# Patient Record
Sex: FEMALE | Race: WHITE | Employment: OTHER | RURAL
[De-identification: names, ages, dates, MRNs, and addresses within clinical notes are randomized per-mention and may not be internally consistent; named-entity substitution may affect disease eponyms.]

---

## 2017-02-23 ENCOUNTER — HOSPITAL ENCOUNTER (OUTPATIENT)
Dept: OTHER | Age: 67
Discharge: HOME | End: 2017-02-23
Attending: NURSE PRACTITIONER | Admitting: NURSE PRACTITIONER

## 2017-02-23 ENCOUNTER — OFFICE VISIT (OUTPATIENT)
Dept: PRIMARY CARE CLINIC | Age: 67
End: 2017-02-23
Payer: MEDICARE

## 2017-02-23 VITALS
DIASTOLIC BLOOD PRESSURE: 78 MMHG | SYSTOLIC BLOOD PRESSURE: 124 MMHG | OXYGEN SATURATION: 92 % | WEIGHT: 193 LBS | HEART RATE: 99 BPM | BODY MASS INDEX: 31.15 KG/M2

## 2017-02-23 DIAGNOSIS — I48.91 ATRIAL FIBRILLATION, UNSPECIFIED TYPE (HCC): ICD-10-CM

## 2017-02-23 DIAGNOSIS — N02.8 IGA NEPHROPATHY: ICD-10-CM

## 2017-02-23 DIAGNOSIS — E11.9 TYPE 2 DIABETES MELLITUS WITHOUT COMPLICATION, WITHOUT LONG-TERM CURRENT USE OF INSULIN (HCC): ICD-10-CM

## 2017-02-23 DIAGNOSIS — D64.9 ANEMIA, UNSPECIFIED TYPE: ICD-10-CM

## 2017-02-23 DIAGNOSIS — D50.9 IRON DEFICIENCY ANEMIA, UNSPECIFIED IRON DEFICIENCY ANEMIA TYPE: ICD-10-CM

## 2017-02-23 DIAGNOSIS — M54.5 LOW BACK PAIN, UNSPECIFIED BACK PAIN LATERALITY, UNSPECIFIED CHRONICITY, WITH SCIATICA PRESENCE UNSPECIFIED: ICD-10-CM

## 2017-02-23 DIAGNOSIS — D64.9 ANEMIA, UNSPECIFIED TYPE: Primary | ICD-10-CM

## 2017-02-23 DIAGNOSIS — I10 ESSENTIAL HYPERTENSION: ICD-10-CM

## 2017-02-23 DIAGNOSIS — Z79.01 CHRONIC ANTICOAGULATION: ICD-10-CM

## 2017-02-23 LAB
A/G RATIO: 1.3 (ref 0.8–2)
ABO/RH: NORMAL
ALBUMIN SERPL-MCNC: 3.9 G/DL (ref 3.4–4.8)
ALP BLD-CCNC: 68 U/L (ref 25–100)
ALT SERPL-CCNC: 12 U/L (ref 4–36)
ANION GAP SERPL CALCULATED.3IONS-SCNC: 15 MMOL/L (ref 3–16)
ANTIBODY SCREEN: NORMAL
AST SERPL-CCNC: 15 U/L (ref 8–33)
BASOPHILS ABSOLUTE: 0 K/UL (ref 0–0.1)
BASOPHILS RELATIVE PERCENT: 0.4 %
BILIRUB SERPL-MCNC: <0.2 MG/DL (ref 0.3–1.2)
BUN BLDV-MCNC: 15 MG/DL (ref 6–20)
CALCIUM SERPL-MCNC: 9.1 MG/DL (ref 8.5–10.5)
CHLORIDE BLD-SCNC: 102 MMOL/L (ref 98–107)
CO2: 26 MMOL/L (ref 20–30)
CREAT SERPL-MCNC: 1.2 MG/DL (ref 0.4–1.2)
EOSINOPHILS ABSOLUTE: 0.2 K/UL (ref 0–0.4)
EOSINOPHILS RELATIVE PERCENT: 2.5 %
FERRITIN: 9.7 NG/ML (ref 22–322)
FOLATE: >20 NG/ML
GFR AFRICAN AMERICAN: 54
GFR NON-AFRICAN AMERICAN: 45
GLOBULIN: 2.9 G/DL
GLUCOSE BLD-MCNC: 146 MG/DL (ref 74–106)
HCT VFR BLD CALC: 28.4 % (ref 37–47)
HEMOGLOBIN: 7.7 G/DL (ref 11.5–16.5)
HYPOCHROMIA: PRESENT
IRON: 17 UG/DL (ref 37–145)
LYMPHOCYTES ABSOLUTE: 2.3 K/UL (ref 1.5–4)
LYMPHOCYTES RELATIVE PERCENT: 30.3 % (ref 20–40)
MCH RBC QN AUTO: 20.8 PG (ref 27–32)
MCHC RBC AUTO-ENTMCNC: 27.1 G/DL (ref 31–35)
MCV RBC AUTO: 76.5 FL (ref 80–100)
MONOCYTES ABSOLUTE: 0.8 K/UL (ref 0.2–0.8)
MONOCYTES RELATIVE PERCENT: 11.1 % (ref 3–10)
NEUTROPHILS ABSOLUTE: 4.2 K/UL (ref 2–7.5)
NEUTROPHILS RELATIVE PERCENT: 55.7 %
PDW BLD-RTO: 20.7 % (ref 11–16)
PLATELET # BLD: 212 K/UL (ref 150–400)
PMV BLD AUTO: 10.2 FL (ref 6–10)
POTASSIUM SERPL-SCNC: 3.9 MMOL/L (ref 3.4–5.1)
RBC # BLD: 3.71 M/UL (ref 3.8–5.8)
SODIUM BLD-SCNC: 143 MMOL/L (ref 136–145)
TOTAL IRON BINDING CAPACITY: 418 UG/DL (ref 250–450)
TOTAL PROTEIN: 6.8 G/DL (ref 6.4–8.3)
VITAMIN B-12: 662 PG/ML (ref 211–911)
WBC # BLD: 7.5 K/UL (ref 4–11)

## 2017-02-23 PROCEDURE — 3017F COLORECTAL CA SCREEN DOC REV: CPT | Performed by: NURSE PRACTITIONER

## 2017-02-23 PROCEDURE — 99214 OFFICE O/P EST MOD 30 MIN: CPT | Performed by: NURSE PRACTITIONER

## 2017-02-23 PROCEDURE — 4040F PNEUMOC VAC/ADMIN/RCVD: CPT | Performed by: NURSE PRACTITIONER

## 2017-02-23 PROCEDURE — G8484 FLU IMMUNIZE NO ADMIN: HCPCS | Performed by: NURSE PRACTITIONER

## 2017-02-23 PROCEDURE — 1123F ACP DISCUSS/DSCN MKR DOCD: CPT | Performed by: NURSE PRACTITIONER

## 2017-02-23 PROCEDURE — G8598 ASA/ANTIPLAT THER USED: HCPCS | Performed by: NURSE PRACTITIONER

## 2017-02-23 PROCEDURE — 1036F TOBACCO NON-USER: CPT | Performed by: NURSE PRACTITIONER

## 2017-02-23 PROCEDURE — G8400 PT W/DXA NO RESULTS DOC: HCPCS | Performed by: NURSE PRACTITIONER

## 2017-02-23 PROCEDURE — 3046F HEMOGLOBIN A1C LEVEL >9.0%: CPT | Performed by: NURSE PRACTITIONER

## 2017-02-23 PROCEDURE — G8417 CALC BMI ABV UP PARAM F/U: HCPCS | Performed by: NURSE PRACTITIONER

## 2017-02-23 PROCEDURE — 3014F SCREEN MAMMO DOC REV: CPT | Performed by: NURSE PRACTITIONER

## 2017-02-23 PROCEDURE — G8427 DOCREV CUR MEDS BY ELIG CLIN: HCPCS | Performed by: NURSE PRACTITIONER

## 2017-02-23 PROCEDURE — 1090F PRES/ABSN URINE INCON ASSESS: CPT | Performed by: NURSE PRACTITIONER

## 2017-02-23 ASSESSMENT — ENCOUNTER SYMPTOMS
GASTROINTESTINAL NEGATIVE: 1
RESPIRATORY NEGATIVE: 1
BACK PAIN: 1

## 2017-02-24 ENCOUNTER — TELEPHONE (OUTPATIENT)
Dept: PRIMARY CARE CLINIC | Age: 67
End: 2017-02-24

## 2017-02-24 DIAGNOSIS — D50.9 IRON DEFICIENCY ANEMIA, UNSPECIFIED IRON DEFICIENCY ANEMIA TYPE: ICD-10-CM

## 2017-02-24 DIAGNOSIS — D64.9 ANEMIA, UNSPECIFIED TYPE: Primary | ICD-10-CM

## 2017-02-24 LAB
BLOOD BANK DISPENSE STATUS: NORMAL
BLOOD BANK DISPENSE STATUS: NORMAL
BLOOD BANK PRODUCT CODE: NORMAL
BLOOD BANK PRODUCT CODE: NORMAL
BPU ID: NORMAL
BPU ID: NORMAL
COMPONENT: NORMAL
COMPONENT: NORMAL
DONOR TYPE/RH: NORMAL
DONOR TYPE/RH: NORMAL
HOLLISTER NO: NORMAL
HOLLISTER NO: NORMAL

## 2017-02-27 ENCOUNTER — HOSPITAL ENCOUNTER (OUTPATIENT)
Dept: OTHER | Age: 67
Discharge: HOME | End: 2017-02-27
Attending: NURSE PRACTITIONER | Admitting: NURSE PRACTITIONER

## 2017-02-27 DIAGNOSIS — D64.9 ANEMIA, UNSPECIFIED TYPE: ICD-10-CM

## 2017-02-27 DIAGNOSIS — D64.9 ANEMIA, UNSPECIFIED TYPE: Primary | ICD-10-CM

## 2017-02-27 LAB
BASOPHILS ABSOLUTE: 0 K/UL (ref 0–0.1)
BASOPHILS RELATIVE PERCENT: 0.4 %
EOSINOPHILS ABSOLUTE: 0.1 K/UL (ref 0–0.4)
EOSINOPHILS RELATIVE PERCENT: 1.4 %
HCT VFR BLD CALC: 34.1 % (ref 37–47)
HEMOGLOBIN: 9.6 G/DL (ref 11.5–16.5)
HYPOCHROMIA: PRESENT
LYMPHOCYTES ABSOLUTE: 1.4 K/UL (ref 1.5–4)
LYMPHOCYTES RELATIVE PERCENT: 17.4 % (ref 20–40)
MCH RBC QN AUTO: 22.5 PG (ref 27–32)
MCHC RBC AUTO-ENTMCNC: 28.2 G/DL (ref 31–35)
MCV RBC AUTO: 79.9 FL (ref 80–100)
MONOCYTES ABSOLUTE: 1 K/UL (ref 0.2–0.8)
MONOCYTES RELATIVE PERCENT: 13.2 % (ref 3–10)
NEUTROPHILS ABSOLUTE: 5.3 K/UL (ref 2–7.5)
NEUTROPHILS RELATIVE PERCENT: 67.6 %
PDW BLD-RTO: 22.1 % (ref 11–16)
PLATELET # BLD: 172 K/UL (ref 150–400)
PMV BLD AUTO: 10.4 FL (ref 6–10)
RBC # BLD: 4.27 M/UL (ref 3.8–5.8)
WBC # BLD: 7.8 K/UL (ref 4–11)

## 2017-02-27 ASSESSMENT — ENCOUNTER SYMPTOMS
ABDOMINAL PAIN: 0
VOMITING: 0
SHORTNESS OF BREATH: 0
SORE THROAT: 0
NAUSEA: 0
EYE PAIN: 0
COUGH: 0

## 2017-02-28 PROBLEM — E11.9 TYPE 2 DIABETES MELLITUS (HCC): Status: ACTIVE | Noted: 2017-02-28

## 2017-02-28 PROBLEM — J18.9 PNEUMONIA OF RIGHT LUNG DUE TO INFECTIOUS ORGANISM: Status: ACTIVE | Noted: 2017-02-28

## 2017-02-28 PROBLEM — D64.9 ANEMIA: Status: ACTIVE | Noted: 2017-02-28

## 2017-02-28 PROBLEM — I48.91 ATRIAL FIBRILLATION WITH RVR (HCC): Status: ACTIVE | Noted: 2017-02-28

## 2017-02-28 PROBLEM — I10 ESSENTIAL HYPERTENSION: Status: ACTIVE | Noted: 2017-02-28

## 2017-02-28 PROBLEM — I48.91 ATRIAL FIBRILLATION (HCC): Status: ACTIVE | Noted: 2017-02-28

## 2017-02-28 PROBLEM — J10.1 INFLUENZA A: Status: ACTIVE | Noted: 2017-02-28

## 2017-03-01 ENCOUNTER — OUTSIDE FACILITY SERVICE (OUTPATIENT)
Dept: CARDIOLOGY | Facility: CLINIC | Age: 67
End: 2017-03-01

## 2017-03-01 PROCEDURE — 93306 TTE W/DOPPLER COMPLETE: CPT | Performed by: INTERNAL MEDICINE

## 2017-03-02 PROBLEM — E11.8 TYPE 2 DIABETES MELLITUS WITH COMPLICATION, WITHOUT LONG-TERM CURRENT USE OF INSULIN (HCC): Status: ACTIVE | Noted: 2017-02-28

## 2017-03-06 ENCOUNTER — CARE COORDINATION (OUTPATIENT)
Dept: CARE COORDINATION | Age: 67
End: 2017-03-06

## 2017-03-07 ENCOUNTER — TELEPHONE (OUTPATIENT)
Dept: PRIMARY CARE CLINIC | Age: 67
End: 2017-03-07

## 2017-03-10 ENCOUNTER — OFFICE VISIT (OUTPATIENT)
Dept: PRIMARY CARE CLINIC | Age: 67
End: 2017-03-10
Payer: MEDICARE

## 2017-03-10 ENCOUNTER — TELEPHONE (OUTPATIENT)
Dept: PRIMARY CARE CLINIC | Age: 67
End: 2017-03-10

## 2017-03-10 ENCOUNTER — HOSPITAL ENCOUNTER (OUTPATIENT)
Dept: OTHER | Age: 67
Discharge: HOME | End: 2017-03-10
Attending: NURSE PRACTITIONER | Admitting: NURSE PRACTITIONER

## 2017-03-10 VITALS
HEART RATE: 103 BPM | DIASTOLIC BLOOD PRESSURE: 70 MMHG | WEIGHT: 187.4 LBS | BODY MASS INDEX: 30.25 KG/M2 | OXYGEN SATURATION: 94 % | SYSTOLIC BLOOD PRESSURE: 120 MMHG

## 2017-03-10 DIAGNOSIS — D50.9 IRON DEFICIENCY ANEMIA, UNSPECIFIED IRON DEFICIENCY ANEMIA TYPE: ICD-10-CM

## 2017-03-10 DIAGNOSIS — R30.0 DYSURIA: Primary | ICD-10-CM

## 2017-03-10 DIAGNOSIS — D64.9 ANEMIA, UNSPECIFIED TYPE: ICD-10-CM

## 2017-03-10 DIAGNOSIS — J18.9 PNEUMONIA DUE TO INFECTIOUS ORGANISM, UNSPECIFIED LATERALITY, UNSPECIFIED PART OF LUNG: ICD-10-CM

## 2017-03-10 DIAGNOSIS — N18.9 CHRONIC RENAL DISEASE, UNSPECIFIED STAGE: ICD-10-CM

## 2017-03-10 LAB
A/G RATIO: 1.2 (ref 0.8–2)
ALBUMIN SERPL-MCNC: 3.9 G/DL (ref 3.4–4.8)
ALP BLD-CCNC: 72 U/L (ref 25–100)
ALT SERPL-CCNC: 16 U/L (ref 4–36)
ANION GAP SERPL CALCULATED.3IONS-SCNC: 12 MMOL/L (ref 3–16)
AST SERPL-CCNC: 22 U/L (ref 8–33)
BASOPHILS ABSOLUTE: 0 K/UL (ref 0–0.1)
BASOPHILS RELATIVE PERCENT: 0.7 %
BILIRUB SERPL-MCNC: 0.3 MG/DL (ref 0.3–1.2)
BILIRUBIN, POC: NORMAL
BLOOD URINE, POC: NORMAL
BUN BLDV-MCNC: 14 MG/DL (ref 6–20)
CALCIUM SERPL-MCNC: 9.4 MG/DL (ref 8.5–10.5)
CHLORIDE BLD-SCNC: 103 MMOL/L (ref 98–107)
CLARITY, POC: CLEAR
CO2: 28 MMOL/L (ref 20–30)
COLOR, POC: YELLOW
CREAT SERPL-MCNC: 1 MG/DL (ref 0.4–1.2)
EOSINOPHILS ABSOLUTE: 0.2 K/UL (ref 0–0.4)
EOSINOPHILS RELATIVE PERCENT: 2.9 %
FERRITIN: 221.6 NG/ML (ref 22–322)
GFR AFRICAN AMERICAN: >59
GFR NON-AFRICAN AMERICAN: 55
GLOBULIN: 3.2 G/DL
GLUCOSE BLD-MCNC: 85 MG/DL (ref 74–106)
GLUCOSE URINE, POC: NORMAL
HCT VFR BLD CALC: 38.6 % (ref 37–47)
HEMOGLOBIN: 11.1 G/DL (ref 11.5–16.5)
HYPOCHROMIA: PRESENT
IRON: 101 UG/DL (ref 37–145)
KETONES, POC: NORMAL
LEUKOCYTE EST, POC: NORMAL
LYMPHOCYTES ABSOLUTE: 1.9 K/UL (ref 1.5–4)
LYMPHOCYTES RELATIVE PERCENT: 34.9 % (ref 20–40)
MCH RBC QN AUTO: 23.6 PG (ref 27–32)
MCHC RBC AUTO-ENTMCNC: 28.8 G/DL (ref 31–35)
MCV RBC AUTO: 82 FL (ref 80–100)
MONOCYTES ABSOLUTE: 0.5 K/UL (ref 0.2–0.8)
MONOCYTES RELATIVE PERCENT: 9 % (ref 3–10)
NEUTROPHILS ABSOLUTE: 2.9 K/UL (ref 2–7.5)
NEUTROPHILS RELATIVE PERCENT: 52.5 %
NITRITE, POC: NORMAL
PDW BLD-RTO: 25.5 % (ref 11–16)
PH, POC: 6.5
PLATELET # BLD: 288 K/UL (ref 150–400)
PMV BLD AUTO: 9.9 FL (ref 6–10)
POTASSIUM SERPL-SCNC: 4.4 MMOL/L (ref 3.4–5.1)
PROTEIN, POC: NORMAL
RBC # BLD: 4.71 M/UL (ref 3.8–5.8)
SODIUM BLD-SCNC: 143 MMOL/L (ref 136–145)
SPECIFIC GRAVITY, POC: 1.02
TOTAL IRON BINDING CAPACITY: 301 UG/DL (ref 250–450)
TOTAL PROTEIN: 7.1 G/DL (ref 6.4–8.3)
UROBILINOGEN, POC: 0.2
WBC # BLD: 5.5 K/UL (ref 4–11)

## 2017-03-10 PROCEDURE — 1090F PRES/ABSN URINE INCON ASSESS: CPT | Performed by: NURSE PRACTITIONER

## 2017-03-10 PROCEDURE — 3014F SCREEN MAMMO DOC REV: CPT | Performed by: NURSE PRACTITIONER

## 2017-03-10 PROCEDURE — 81002 URINALYSIS NONAUTO W/O SCOPE: CPT | Performed by: NURSE PRACTITIONER

## 2017-03-10 PROCEDURE — 3017F COLORECTAL CA SCREEN DOC REV: CPT | Performed by: NURSE PRACTITIONER

## 2017-03-10 PROCEDURE — G8598 ASA/ANTIPLAT THER USED: HCPCS | Performed by: NURSE PRACTITIONER

## 2017-03-10 PROCEDURE — 1111F DSCHRG MED/CURRENT MED MERGE: CPT | Performed by: NURSE PRACTITIONER

## 2017-03-10 PROCEDURE — 1036F TOBACCO NON-USER: CPT | Performed by: NURSE PRACTITIONER

## 2017-03-10 PROCEDURE — G8400 PT W/DXA NO RESULTS DOC: HCPCS | Performed by: NURSE PRACTITIONER

## 2017-03-10 PROCEDURE — G8484 FLU IMMUNIZE NO ADMIN: HCPCS | Performed by: NURSE PRACTITIONER

## 2017-03-10 PROCEDURE — 4040F PNEUMOC VAC/ADMIN/RCVD: CPT | Performed by: NURSE PRACTITIONER

## 2017-03-10 PROCEDURE — 1123F ACP DISCUSS/DSCN MKR DOCD: CPT | Performed by: NURSE PRACTITIONER

## 2017-03-10 PROCEDURE — G8417 CALC BMI ABV UP PARAM F/U: HCPCS | Performed by: NURSE PRACTITIONER

## 2017-03-10 PROCEDURE — 99214 OFFICE O/P EST MOD 30 MIN: CPT | Performed by: NURSE PRACTITIONER

## 2017-03-10 PROCEDURE — G8427 DOCREV CUR MEDS BY ELIG CLIN: HCPCS | Performed by: NURSE PRACTITIONER

## 2017-03-10 ASSESSMENT — ENCOUNTER SYMPTOMS
GASTROINTESTINAL NEGATIVE: 1
RESPIRATORY NEGATIVE: 1

## 2017-03-12 ASSESSMENT — ENCOUNTER SYMPTOMS
SORE THROAT: 0
NAUSEA: 0
COUGH: 0
EYE PAIN: 0
SHORTNESS OF BREATH: 0
VOMITING: 0
ABDOMINAL PAIN: 0

## 2017-03-16 ENCOUNTER — HOSPITAL ENCOUNTER (OUTPATIENT)
Dept: OTHER | Age: 67
Discharge: HOME | End: 2017-03-16
Attending: NURSE PRACTITIONER | Admitting: NURSE PRACTITIONER

## 2017-03-16 DIAGNOSIS — J18.9 PNEUMONIA DUE TO INFECTIOUS ORGANISM, UNSPECIFIED LATERALITY, UNSPECIFIED PART OF LUNG: ICD-10-CM

## 2017-04-11 ENCOUNTER — OFFICE VISIT (OUTPATIENT)
Dept: PRIMARY CARE CLINIC | Age: 67
End: 2017-04-11
Payer: MEDICARE

## 2017-04-11 ENCOUNTER — HOSPITAL ENCOUNTER (OUTPATIENT)
Dept: OTHER | Age: 67
Discharge: HOME | End: 2017-04-11
Attending: NURSE PRACTITIONER | Admitting: NURSE PRACTITIONER

## 2017-04-11 VITALS
SYSTOLIC BLOOD PRESSURE: 132 MMHG | HEART RATE: 90 BPM | DIASTOLIC BLOOD PRESSURE: 80 MMHG | WEIGHT: 193 LBS | BODY MASS INDEX: 31.15 KG/M2 | OXYGEN SATURATION: 92 %

## 2017-04-11 DIAGNOSIS — E11.9 TYPE 2 DIABETES MELLITUS WITHOUT COMPLICATION, WITH LONG-TERM CURRENT USE OF INSULIN (HCC): ICD-10-CM

## 2017-04-11 DIAGNOSIS — G47.30 SLEEP APNEA, UNSPECIFIED TYPE: ICD-10-CM

## 2017-04-11 DIAGNOSIS — Z79.4 TYPE 2 DIABETES MELLITUS WITHOUT COMPLICATION, WITH LONG-TERM CURRENT USE OF INSULIN (HCC): ICD-10-CM

## 2017-04-11 DIAGNOSIS — D50.9 IRON DEFICIENCY ANEMIA, UNSPECIFIED IRON DEFICIENCY ANEMIA TYPE: ICD-10-CM

## 2017-04-11 DIAGNOSIS — B07.9 VIRAL WARTS, UNSPECIFIED TYPE: ICD-10-CM

## 2017-04-11 DIAGNOSIS — I48.91 ATRIAL FIBRILLATION, UNSPECIFIED TYPE (HCC): ICD-10-CM

## 2017-04-11 DIAGNOSIS — R09.02 HYPOXIA: Primary | ICD-10-CM

## 2017-04-11 PROCEDURE — G8400 PT W/DXA NO RESULTS DOC: HCPCS | Performed by: NURSE PRACTITIONER

## 2017-04-11 PROCEDURE — G8427 DOCREV CUR MEDS BY ELIG CLIN: HCPCS | Performed by: NURSE PRACTITIONER

## 2017-04-11 PROCEDURE — 1090F PRES/ABSN URINE INCON ASSESS: CPT | Performed by: NURSE PRACTITIONER

## 2017-04-11 PROCEDURE — 99213 OFFICE O/P EST LOW 20 MIN: CPT | Performed by: NURSE PRACTITIONER

## 2017-04-11 PROCEDURE — 1036F TOBACCO NON-USER: CPT | Performed by: NURSE PRACTITIONER

## 2017-04-11 PROCEDURE — 3017F COLORECTAL CA SCREEN DOC REV: CPT | Performed by: NURSE PRACTITIONER

## 2017-04-11 PROCEDURE — G8598 ASA/ANTIPLAT THER USED: HCPCS | Performed by: NURSE PRACTITIONER

## 2017-04-11 PROCEDURE — 1123F ACP DISCUSS/DSCN MKR DOCD: CPT | Performed by: NURSE PRACTITIONER

## 2017-04-11 PROCEDURE — 4040F PNEUMOC VAC/ADMIN/RCVD: CPT | Performed by: NURSE PRACTITIONER

## 2017-04-11 PROCEDURE — G8417 CALC BMI ABV UP PARAM F/U: HCPCS | Performed by: NURSE PRACTITIONER

## 2017-04-11 PROCEDURE — 3014F SCREEN MAMMO DOC REV: CPT | Performed by: NURSE PRACTITIONER

## 2017-04-11 PROCEDURE — 3046F HEMOGLOBIN A1C LEVEL >9.0%: CPT | Performed by: NURSE PRACTITIONER

## 2017-04-11 ASSESSMENT — ENCOUNTER SYMPTOMS
SORE THROAT: 0
SHORTNESS OF BREATH: 0
NAUSEA: 0
ABDOMINAL PAIN: 0
COUGH: 0
EYE PAIN: 0
VOMITING: 0

## 2017-04-12 ENCOUNTER — TELEPHONE (OUTPATIENT)
Dept: PRIMARY CARE CLINIC | Age: 67
End: 2017-04-12

## 2017-04-12 LAB
A/G RATIO: 1.3 (ref 0.8–2)
ALBUMIN SERPL-MCNC: 4 G/DL (ref 3.4–4.8)
ALP BLD-CCNC: 69 U/L (ref 25–100)
ALT SERPL-CCNC: 17 U/L (ref 4–36)
ANION GAP SERPL CALCULATED.3IONS-SCNC: 12 MMOL/L (ref 3–16)
AST SERPL-CCNC: 19 U/L (ref 8–33)
BASOPHILS ABSOLUTE: 0.1 K/UL (ref 0–0.1)
BASOPHILS RELATIVE PERCENT: 0.9 %
BILIRUB SERPL-MCNC: <0.2 MG/DL (ref 0.3–1.2)
BUN BLDV-MCNC: 17 MG/DL (ref 6–20)
CALCIUM SERPL-MCNC: 9.5 MG/DL (ref 8.5–10.5)
CHLORIDE BLD-SCNC: 100 MMOL/L (ref 98–107)
CO2: 29 MMOL/L (ref 20–30)
CREAT SERPL-MCNC: 1.3 MG/DL (ref 0.4–1.2)
EOSINOPHILS ABSOLUTE: 0.3 K/UL (ref 0–0.4)
EOSINOPHILS RELATIVE PERCENT: 4.9 %
FERRITIN: 68 NG/ML (ref 22–322)
GFR AFRICAN AMERICAN: 49
GFR NON-AFRICAN AMERICAN: 41
GLOBULIN: 3.2 G/DL
GLUCOSE BLD-MCNC: 153 MG/DL (ref 74–106)
HCT VFR BLD CALC: 38.8 % (ref 37–47)
HEMOGLOBIN: 12.3 G/DL (ref 11.5–16.5)
IRON: 69 UG/DL (ref 37–145)
LYMPHOCYTES ABSOLUTE: 1.9 K/UL (ref 1.5–4)
LYMPHOCYTES RELATIVE PERCENT: 33 % (ref 20–40)
MCH RBC QN AUTO: 28 PG (ref 27–32)
MCHC RBC AUTO-ENTMCNC: 31.7 G/DL (ref 31–35)
MCV RBC AUTO: 88.2 FL (ref 80–100)
MONOCYTES ABSOLUTE: 0.6 K/UL (ref 0.2–0.8)
MONOCYTES RELATIVE PERCENT: 10.2 % (ref 3–10)
NEUTROPHILS ABSOLUTE: 2.9 K/UL (ref 2–7.5)
NEUTROPHILS RELATIVE PERCENT: 51 %
PDW BLD-RTO: 26 % (ref 11–16)
PLATELET # BLD: 215 K/UL (ref 150–400)
PMV BLD AUTO: 10.4 FL (ref 6–10)
POTASSIUM SERPL-SCNC: 4.6 MMOL/L (ref 3.4–5.1)
RBC # BLD: 4.4 M/UL (ref 3.8–5.8)
SODIUM BLD-SCNC: 141 MMOL/L (ref 136–145)
TOTAL PROTEIN: 7.2 G/DL (ref 6.4–8.3)
WBC # BLD: 5.7 K/UL (ref 4–11)

## 2017-04-17 ENCOUNTER — HOSPITAL ENCOUNTER (OUTPATIENT)
Dept: RESPIRATORY THERAPY | Age: 67
Discharge: HOME | End: 2017-04-17
Attending: NURSE PRACTITIONER | Admitting: NURSE PRACTITIONER

## 2017-04-17 DIAGNOSIS — R09.02 HYPOXEMIA: ICD-10-CM

## 2017-04-18 ENCOUNTER — HOSPITAL ENCOUNTER (OUTPATIENT)
Dept: OTHER | Age: 67
Discharge: HOME | End: 2017-04-18
Attending: INTERNAL MEDICINE | Admitting: INTERNAL MEDICINE

## 2017-04-18 ENCOUNTER — OFFICE VISIT (OUTPATIENT)
Dept: CARDIOLOGY | Facility: CLINIC | Age: 67
End: 2017-04-18

## 2017-04-18 VITALS
SYSTOLIC BLOOD PRESSURE: 112 MMHG | BODY MASS INDEX: 32.95 KG/M2 | DIASTOLIC BLOOD PRESSURE: 60 MMHG | HEIGHT: 64 IN | HEART RATE: 64 BPM | WEIGHT: 193 LBS

## 2017-04-18 DIAGNOSIS — I48.0 PAROXYSMAL ATRIAL FIBRILLATION (HCC): Primary | ICD-10-CM

## 2017-04-18 DIAGNOSIS — I10 ESSENTIAL HYPERTENSION: ICD-10-CM

## 2017-04-18 DIAGNOSIS — E78.5 DYSLIPIDEMIA: ICD-10-CM

## 2017-04-18 DIAGNOSIS — I38 VHD (VALVULAR HEART DISEASE): ICD-10-CM

## 2017-04-18 PROCEDURE — 99213 OFFICE O/P EST LOW 20 MIN: CPT | Performed by: INTERNAL MEDICINE

## 2017-04-18 PROCEDURE — 93000 ELECTROCARDIOGRAM COMPLETE: CPT | Performed by: INTERNAL MEDICINE

## 2017-04-18 RX ORDER — PAROXETINE HYDROCHLORIDE 40 MG/1
40 TABLET, FILM COATED ORAL DAILY
COMMUNITY
Start: 2017-01-27

## 2017-04-18 RX ORDER — ATORVASTATIN CALCIUM 10 MG/1
10 TABLET, FILM COATED ORAL DAILY
COMMUNITY
Start: 2017-04-14 | End: 2021-02-18

## 2017-04-18 RX ORDER — LOSARTAN POTASSIUM AND HYDROCHLOROTHIAZIDE 25; 100 MG/1; MG/1
1 TABLET ORAL DAILY
COMMUNITY
Start: 2017-03-27 | End: 2019-06-06

## 2017-04-18 RX ORDER — FLECAINIDE ACETATE 50 MG/1
50 TABLET ORAL DAILY
COMMUNITY
Start: 2017-03-19 | End: 2017-05-27 | Stop reason: SDUPTHER

## 2017-04-18 RX ORDER — GABAPENTIN 800 MG/1
800 TABLET ORAL 2 TIMES DAILY
COMMUNITY
Start: 2017-01-27 | End: 2021-10-15 | Stop reason: SDUPTHER

## 2017-04-18 RX ORDER — LEVOTHYROXINE SODIUM 175 UG/1
175 TABLET ORAL DAILY
COMMUNITY
Start: 2017-02-15 | End: 2017-07-06

## 2017-04-18 RX ORDER — DOXAZOSIN 8 MG/1
8 TABLET ORAL DAILY
COMMUNITY
Start: 2017-02-07 | End: 2017-05-07 | Stop reason: SDUPTHER

## 2017-04-18 RX ORDER — ASPIRIN 81 MG/1
81 TABLET ORAL 2 TIMES DAILY
COMMUNITY
Start: 2017-03-03 | End: 2017-07-30

## 2017-04-18 RX ORDER — OMEPRAZOLE 40 MG/1
40 CAPSULE, DELAYED RELEASE ORAL DAILY
COMMUNITY
Start: 2017-02-15 | End: 2021-06-17

## 2017-04-18 RX ORDER — LISINOPRIL 40 MG/1
40 TABLET ORAL DAILY
COMMUNITY
Start: 2017-03-19 | End: 2018-01-23

## 2017-04-18 RX ORDER — DILTIAZEM HYDROCHLORIDE 420 MG/1
240 CAPSULE, EXTENDED RELEASE ORAL DAILY
COMMUNITY
Start: 2017-02-15 | End: 2022-04-27 | Stop reason: DRUGHIGH

## 2017-04-18 NOTE — PROGRESS NOTES
Karen Newman  1950  [phone]    04/18/2017    EUGENIE Fountain    Chief Complaint   Patient presents with   • Atrial Fibrillation     IDENTIFICATION: A 65-year-old  white female.  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     PROBLEM LIST:  1. Atrial fibrillation,  duration unknown; diagnosed August of 2013:  a. Status post ALANIS/external  cardioversion, 10/18/2013, Yara Bobo M.D. with the initiation of propafenone therapy.   b. Atrial fibrillation, 11/13/2013, in Gloria Gomez’ office.  c. Normal sinus rhythm at the time of office visit, 11/21/2013.  d. Normal sinus rhythm,  EKG, 06/05/2014.  e. Discontinuation of Rythmol secondary to diarrhea, 06/05/2014, with initiation of flecainide 50 mg b.i.d. and Eliquis 5 mg b.i.d.   f. CHADS Vasc= 4, 4/18/2017   2.  Valvular heart disease:  a. Previous echocardiogram approximately,  2006 (incomplete data base).  b. Echocardiogram, 08/15/2002, revealing moderate concentric LVH, hyperdynamic LV, EF 81%; mild MR, trace TR.  c. Echocardiogram, 10/07/2008, revealed a normal LVEF with mild to moderate MR.  d. Echocardiogram, 07/01/2011:  normal LV systolic function and wall motion with mild MR and mild TR.   3.  Hypertension:  a. Adenosine Cardiolite, 06/18/2009:  Normal systolic  function without evidence of inducible ischemia.    4. Dyslipidemia.  5.  Diabetes mellitus, Type 2 diagnosed, 2006.  6. Thyroid disease:  a.  Hyperthyroidism status post radioactive iodine therapy.  b. Hypothyroidism on thyroid replacement.  7.  Gastroesophageal reflux disease.  8. Chronic kidney disease.  9. History of hemorrhoids status post hemorrhoidectomy.  10. Colon polyps status post polypectomy.  11. Anxiety.  12. Iron deficiency anemia.  13. Obstructive sleep apnea on chronic CPAP therapy.  14.  Surgical history:  a. Total abdominal hysterectomy.      Allergies   Allergen Reactions   • Sular [Nisoldipine Er]      Lower extremity        Current  Medications:      Current Outpatient Prescriptions:   •  aspirin 81 MG EC tablet, Take 81 mg by mouth Daily., Disp: , Rfl:   •  atorvastatin (LIPITOR) 10 MG tablet, Take 10 mg by mouth Daily., Disp: , Rfl:   •  diltiaZEM (TIAZAC) 360 MG 24 hr capsule, Take 360 mg by mouth Daily., Disp: , Rfl:   •  doxazosin (CARDURA) 8 MG tablet, Take 8 mg by mouth Daily., Disp: , Rfl:   •  flecainide (TAMBOCOR) 50 MG tablet, Take 50 mg by mouth Daily., Disp: , Rfl:   •  gabapentin (NEURONTIN) 800 MG tablet, Take 800 mg by mouth 2 (Two) Times a Day., Disp: , Rfl:   •  levothyroxine (SYNTHROID, LEVOTHROID) 175 MCG tablet, Take 175 mcg by mouth Daily., Disp: , Rfl:   •  lisinopril (PRINIVIL,ZESTRIL) 40 MG tablet, Take 40 mg by mouth Daily., Disp: , Rfl:   •  losartan-hydrochlorothiazide (HYZAAR) 100-25 MG per tablet, Take 1 tablet by mouth Daily., Disp: , Rfl:   •  metFORMIN (GLUCOPHAGE) 850 MG tablet, Take 850 mg by mouth 3 (Three) Times a Day With Meals., Disp: , Rfl:   •  omeprazole (priLOSEC) 40 MG capsule, Take 40 mg by mouth Daily., Disp: , Rfl:   •  PARoxetine (PAXIL) 30 MG tablet, Take 30 mg by mouth Daily., Disp: , Rfl:     HPI    Ms. Newman presents today for 12 month follow up of afib, valvular heart disease, hypertension, and dyslipidemia. Since last visit she is unable to tell when she is in afib; however, she admits to worsening fatigue over the past year. She mentions that her O2 saturation has been low when measured with a pulse oximeter but she is not sure if she was in afib at the time.. She stopped Eliquis 6 weeks ago secondary to anemia, and received 2 units of blood at that time. She had a negative colonoscopy and EGD with Dr. Maurer, and the source of the bleeding is still unknown to the patient. She is to have a pill camera soon.  She is interested in opions of [employer] Equipment and possibly Watchman.  Patient denies chest pain, palpitations, edema, PND, orthopnea, dizziness, and syncope.     The following portions of the  "patient's history were reviewed and updated as appropriate: allergies, current medications and problem list.    Pertinent positives as listed in the HPI.  All other systems reviewed are negative.    Vitals:    04/18/17 1118   BP: 112/60   BP Location: Left arm   Patient Position: Sitting   Pulse: 64   Weight: 193 lb (87.5 kg)   Height: 64\" (162.6 cm)       Physical Exam:    General: Alert and oriented  Neck: Jugular venous pressure is within normal limits. Carotids have normal upstrokes without bruits.   Cardiovascular: Heart has a nondisplaced focal PMI. Irregular rate and rhythm without murmur, gallop or rub.  Lungs: Clear without rales or wheezes. Equal expansion is noted.   Extremities: Show no edema.  Skin: warm and dry.  Neurologic: nonfocal    Diagnostic Data: N/A      ECG 12 Lead  Date/Time: 4/18/2017 12:04 PM  Performed by: YARA FERRARO  Authorized by: YARA FERRARO   Rhythm: atrial fibrillation  BPM: 64  Clinical impression: abnormal ECG  Comments: Nonspecific T wave abnormality             Assessment:      ICD-10-CM ICD-9-CM   1. Paroxysmal atrial fibrillation I48.0 427.31   2. VHD (valvular heart disease) I38 424.90   3. Essential hypertension I10 401.9   4. Dyslipidemia E78.5 272.4       Plan:    1. Stop flecainide due to inefficacy. She may stay on it if she wants as she may be going in and out of afib.  2. Patient is to resume Eliquis as soon as possible once bleeding issues are resolved.   3. Increase aspirin to 325 mg daily.   4. Discussed pulmonary vein ablation vs. Watchman for recurrent afib. Will refer to EP.   5. Continue current medications.  6. F/up in 2 months or sooner if needed.    Scribed for Yara Ferraro MD by Radha Kidd. 4/18/2017  11:43 AM    I Yara Ferraro MD personally performed the services described in this documentation as scribed by the above individual in my presence, and it is both accurate and complete.    Yara Ferraro MD, " FACC

## 2017-04-19 DIAGNOSIS — R09.02 HYPOXIA: ICD-10-CM

## 2017-04-19 DIAGNOSIS — G47.30 SLEEP APNEA, UNSPECIFIED TYPE: ICD-10-CM

## 2017-05-08 RX ORDER — DOXAZOSIN 8 MG/1
TABLET ORAL
Qty: 90 TABLET | Refills: 3 | Status: SHIPPED | OUTPATIENT
Start: 2017-05-08 | End: 2017-07-30

## 2017-05-30 RX ORDER — FLECAINIDE ACETATE 50 MG/1
TABLET ORAL
Qty: 180 TABLET | Refills: 0 | Status: SHIPPED | OUTPATIENT
Start: 2017-05-30 | End: 2017-06-14

## 2017-06-14 ENCOUNTER — CONSULT (OUTPATIENT)
Dept: CARDIOLOGY | Facility: CLINIC | Age: 67
End: 2017-06-14

## 2017-06-14 VITALS
WEIGHT: 194 LBS | SYSTOLIC BLOOD PRESSURE: 116 MMHG | BODY MASS INDEX: 31.18 KG/M2 | DIASTOLIC BLOOD PRESSURE: 76 MMHG | HEART RATE: 84 BPM | HEIGHT: 66 IN

## 2017-06-14 DIAGNOSIS — I48.19 PERSISTENT ATRIAL FIBRILLATION (HCC): Primary | ICD-10-CM

## 2017-06-14 DIAGNOSIS — D50.0 BLOOD LOSS ANEMIA: ICD-10-CM

## 2017-06-14 PROCEDURE — 93000 ELECTROCARDIOGRAM COMPLETE: CPT | Performed by: INTERNAL MEDICINE

## 2017-06-14 PROCEDURE — 99204 OFFICE O/P NEW MOD 45 MIN: CPT | Performed by: INTERNAL MEDICINE

## 2017-06-14 NOTE — PROGRESS NOTES
Cardiology Consult     Karen Newman  1950  [phone]      06/14/17    DATE OF ADMISSION: (Not on file)  [facility] CARDIOLOGY    Mirian Gomez, APRN  [address] / [facility] [address]    Chief Complaint   Patient presents with   • Atrial Fibrillation     PROBLEM LIST:  1. Atrial fibrillation, duration unknown; diagnosed August of 2013:  a. Status post ALANIS/external cardioversion, 10/18/2013, Yara Bobo M.D. with the initiation of propafenone therapy.   b. Atrial fibrillation, 11/13/2013, in Gloria Gomez’ office.  c. Normal sinus rhythm at the time of office visit, 11/21/2013.  d. Normal sinus rhythm, EKG, 06/05/2014.  e. Discontinuation of Rythmol secondary to diarrhea, 06/05/2014, with initiation of flecainide 50 mg b.i.d. and Eliquis 5 mg b.i.d.   f. CHADS Vasc= 4, 4/18/2017   g. Previous echocardiogram approximately, 2006 (incomplete data base).  h. Echocardiogram, 08/15/2002, revealing moderate concentric LVH, hyperdynamic LV, EF 81%; mild MR, trace TR.  i. Echocardiogram, 10/07/2008, revealed a normal LVEF with mild to moderate MR.  j. Echocardiogram, 07/01/2011: normal LV systolic function and wall motion with mild MR and mild TR.   2. Hypertension:  a. Adenosine Cardiolite, 06/18/2009: Normal systolic function without evidence of inducible ischemia.   3. Dyslipidemia.  4. Diabetes mellitus, Type 2 diagnosed, 2006.  5. Thyroid disease:  a. Hyperthyroidism status post radioactive iodine therapy.  b. Hypothyroidism on thyroid replacement.  6. Gastroesophageal reflux disease.  7. Chronic kidney disease.  8. History of hemorrhoids status post hemorrhoidectomy.  9. Colon polyps status post polypectomy.  10. Anxiety.  11. Iron deficiency anemia.  12. Obstructive sleep apnea on chronic CPAP therapy, O2 qhs  13. Surgical history:  a. Total abdominal hysterectomy      History of Present Illness:   67 year old WF referred by Dr. Bobo for atrial fibrillation and  evaluation of PVA vs Watchman device. She was diagnosed with Atrial fibrillation in 2013 and initially treated with Rythmol which was stopped due to side effects. She was started on Flecainide after that which was effective, however, in April of this year it was stopped due to ineffectiveness. She thinks she may went out of rhythm in February after she had some anemia. At that time, she was given 2 units of blood. She apparently had normal EGD, colonoscopy, and pill camera. She has been off Eliquis since this time. Her last blood check was 2 months ago and was back to normal. She is not having any black or bloody stools. No CVA symptoms. She complains of fatigue and SOB, anginal type CP. BP is well controlled. She has hypothyroidism after radioactive iodine therapy which is followed by her endocrinologist.     Allergies   Allergen Reactions   • Sular [Nisoldipine Er]      Lower extremity         Cannot display prior to admission medications because the patient has not been admitted in this contact.            Current Outpatient Prescriptions:   •  aspirin 81 MG EC tablet, Take 81 mg by mouth Daily., Disp: , Rfl:   •  atorvastatin (LIPITOR) 10 MG tablet, Take 10 mg by mouth Daily., Disp: , Rfl:   •  diltiaZEM (TIAZAC) 360 MG 24 hr capsule, Take 360 mg by mouth Daily., Disp: , Rfl:   •  doxazosin (CARDURA) 8 MG tablet, Take 1 tablet by mouth  daily as directed, Disp: 90 tablet, Rfl: 3  •  gabapentin (NEURONTIN) 800 MG tablet, Take 800 mg by mouth 2 (Two) Times a Day., Disp: , Rfl:   •  levothyroxine (SYNTHROID, LEVOTHROID) 175 MCG tablet, Take 175 mcg by mouth Daily., Disp: , Rfl:   •  lisinopril (PRINIVIL,ZESTRIL) 40 MG tablet, Take 40 mg by mouth Daily., Disp: , Rfl:   •  losartan-hydrochlorothiazide (HYZAAR) 100-25 MG per tablet, Take 1 tablet by mouth Daily., Disp: , Rfl:   •  metFORMIN (GLUCOPHAGE) 850 MG tablet, Take 850 mg by mouth 3 (Three) Times a Day With Meals., Disp: , Rfl:   •  omeprazole (priLOSEC) 40 MG  "capsule, Take 40 mg by mouth Daily., Disp: , Rfl:   •  PARoxetine (PAXIL) 30 MG tablet, Take 30 mg by mouth Daily., Disp: , Rfl:     Social History     Social History   • Marital status:      Spouse name: N/A   • Number of children: N/A   • Years of education: N/A     Social History Main Topics   • Smoking status: Former Smoker     Types: Cigarettes     Quit date: 10/18/2016   • Smokeless tobacco: Never Used   • Alcohol use No   • Drug use: No   • Sexual activity: Defer     Other Topics Concern   • None     Social History Narrative       REVIEW OF SYSTEMS:   CONST:  No weight loss, fever, chills, weakness + fatigue.   HEENT:  No visual loss, blurred vision, double vision, yellow sclerae.                   No hearing loss, congestion, sore throat.   SKIN:      No rashes, urticaria, ulcers, sores.     RESP:     +shortness of breath, - hemoptysis, cough, sputum.   GI:           No anorexia, nausea, vomiting, diarrhea. No abdominal pain, melena. No current melana/hematochezia.   :         No burning on urination, hematuria or increased frequency.  ENDO:    No diaphoresis, cold or heat intolerance. No polyuria or polydipsia.   NEURO:  No headache, dizziness, syncope, paralysis, ataxia, or parasthesias.                  No change in bowel or bladder control. No history of CVA/TIA  MUSC:    No muscle, back pain, joint pain or stiffness.   HEME:    No anemia, bleeding, bruising. No history of DVT/PE.  PSYCH:  No history of depression, anxiety    Vitals:    06/14/17 1345   BP: 116/76   BP Location: Right arm   Patient Position: Sitting   Pulse: 84   Weight: 194 lb (88 kg)   Height: 66\" (167.6 cm)           Physical Exam:  GEN: Well nourished, Well- developed  No acute distress  HEENT: Normocephalic, Atraumatic, PERRLA, moist mucous membranes  NECK: supple, NO JVD, no thyromegaly, no lymphadenopathy  CARD: S1S2  irr irr,  no murmur, gallop, rub  LUNGS: Clear to auscultataion, normal respiratory effort  ABDOMEN: Soft, " nontender, normal bowel sounds  EXTREMITIES:No gross deformities,  No clubbing, cyanosis, or edema  SKIN: Warm, dry  NEURO: No focal deficits  PSYCHIATRIC: Normal affect and mood      Data:                                         ECG 12 Lead  Date/Time: 6/14/2017 2:51 PM  Performed by: JD OSULLIVAN  Authorized by: JD OSULLIVAN   Rhythm: atrial fibrillation  BPM: 84                    ICD-10-CM ICD-9-CM   1. Persistent atrial fibrillation I48.1 427.31   2. Blood loss anemia D50.0 280.0         Assessment and Plan:   1. Persistent atrial fibrillation - symptomatic in nature. Unfortunately, she is currently not on anticoagulation due to blood loss anemia requring blood transfusions. She has a CHADSVasc = 4. She would be a good candidate for epicardial surgical ablation and RADHA closure/clip (VATS procedure). We will refer her to Dr. Kay. We will order an echocardiogram for LV function and valvular morphology prior to this plan.   2. Blood loss anemia- see #1. Monitor for now        Scribed for Jd Osullivan MD by Keri Gibbons PA-C. 6/14/2017  2:51 PM     IJd MD, personally performed the services face to face as described in this documentation and as scribed by the above named individual in my presence, and it is both accurate and complete.  6/14/2017  2:53 PM

## 2017-06-15 ENCOUNTER — OFFICE VISIT (OUTPATIENT)
Dept: CARDIAC SURGERY | Facility: CLINIC | Age: 67
End: 2017-06-15

## 2017-06-15 VITALS
WEIGHT: 195 LBS | HEART RATE: 85 BPM | SYSTOLIC BLOOD PRESSURE: 140 MMHG | HEIGHT: 67 IN | DIASTOLIC BLOOD PRESSURE: 70 MMHG | TEMPERATURE: 97.1 F | OXYGEN SATURATION: 89 % | BODY MASS INDEX: 30.61 KG/M2

## 2017-06-15 DIAGNOSIS — I48.91 ATRIAL FIBRILLATION, UNSPECIFIED TYPE (HCC): Primary | ICD-10-CM

## 2017-06-15 DIAGNOSIS — G47.33 OBSTRUCTIVE SLEEP APNEA: ICD-10-CM

## 2017-06-15 DIAGNOSIS — R06.02 SHORTNESS OF BREATH: Primary | ICD-10-CM

## 2017-06-15 DIAGNOSIS — R06.02 SHORTNESS OF BREATH: ICD-10-CM

## 2017-06-15 DIAGNOSIS — I48.19 PERSISTENT ATRIAL FIBRILLATION (HCC): Primary | ICD-10-CM

## 2017-06-15 PROCEDURE — 99204 OFFICE O/P NEW MOD 45 MIN: CPT | Performed by: THORACIC SURGERY (CARDIOTHORACIC VASCULAR SURGERY)

## 2017-06-20 ENCOUNTER — HOSPITAL ENCOUNTER (OUTPATIENT)
Dept: NON INVASIVE DIAGNOSTICS | Age: 67
Discharge: HOME | End: 2017-06-20
Attending: INTERNAL MEDICINE | Admitting: INTERNAL MEDICINE

## 2017-06-20 ENCOUNTER — OUTSIDE FACILITY SERVICE (OUTPATIENT)
Dept: CARDIOLOGY | Facility: CLINIC | Age: 67
End: 2017-06-20

## 2017-06-20 DIAGNOSIS — I48.19 PERSISTENT ATRIAL FIBRILLATION (HCC): ICD-10-CM

## 2017-06-20 LAB
LV EF: 58 %
LVEF MODALITY: NORMAL

## 2017-06-20 PROCEDURE — 93306 TTE W/DOPPLER COMPLETE: CPT | Performed by: INTERNAL MEDICINE

## 2017-06-29 ENCOUNTER — HOSPITAL ENCOUNTER (OUTPATIENT)
Dept: CT IMAGING | Facility: HOSPITAL | Age: 67
Discharge: HOME | End: 2017-06-29
Admitting: PHYSICIAN ASSISTANT

## 2017-06-29 ENCOUNTER — HOSPITAL ENCOUNTER (OUTPATIENT)
Dept: CARDIOLOGY | Facility: HOSPITAL | Age: 67
Discharge: HOME | End: 2017-06-29

## 2017-06-29 ENCOUNTER — HOSPITAL ENCOUNTER (OUTPATIENT)
Dept: PULMONOLOGY | Facility: HOSPITAL | Age: 67
Discharge: HOME | End: 2017-06-29

## 2017-06-29 ENCOUNTER — LAB REQUISITION (OUTPATIENT)
Dept: LAB | Facility: HOSPITAL | Age: 67
End: 2017-06-29

## 2017-06-29 VITALS
WEIGHT: 195 LBS | DIASTOLIC BLOOD PRESSURE: 64 MMHG | BODY MASS INDEX: 30.61 KG/M2 | HEART RATE: 56 BPM | HEIGHT: 67 IN | SYSTOLIC BLOOD PRESSURE: 110 MMHG

## 2017-06-29 DIAGNOSIS — R06.02 SHORTNESS OF BREATH: ICD-10-CM

## 2017-06-29 DIAGNOSIS — I48.19 PERSISTENT ATRIAL FIBRILLATION (HCC): ICD-10-CM

## 2017-06-29 DIAGNOSIS — G47.33 OBSTRUCTIVE SLEEP APNEA: ICD-10-CM

## 2017-06-29 LAB
ARTERIAL PATENCY WRIST A: ABNORMAL
BASE EXCESS BLDA CALC-SCNC: 2.6 MMOL/L (ref 0–2)
BDY SITE: ABNORMAL
CO2 BLDA-SCNC: 28.7 MMOL/L (ref 23–27)
COHGB MFR BLD: 1.5 % (ref 0–1.5)
HCO3 BLDA-SCNC: 27.4 MMOL/L (ref 20–26)
HCT VFR BLD CALC: 39.7 % (ref 39–51)
HGB BLDA-MCNC: 12.9 G/DL (ref 10–16)
HOROWITZ INDEX BLD+IHG-RTO: 21 %
METHGB BLD QL: 0.9 % (ref 0–1.5)
MODALITY: ABNORMAL
OXYHGB MFR BLDV: 88.2 % (ref 94–97)
PCO2 BLDA: 42.5 MM HG (ref 34–46)
PH BLDA: 7.42 PH UNITS (ref 7.34–7.46)
PO2 BLDA: 58.4 MM HG (ref 79–99)

## 2017-06-29 PROCEDURE — 82565 ASSAY OF CREATININE: CPT

## 2017-06-29 PROCEDURE — 78452 HT MUSCLE IMAGE SPECT MULT: CPT

## 2017-06-29 PROCEDURE — 93017 CV STRESS TEST TRACING ONLY: CPT

## 2017-06-29 PROCEDURE — 36600 WITHDRAWAL OF ARTERIAL BLOOD: CPT

## 2017-06-29 PROCEDURE — 94727 GAS DIL/WSHOT DETER LNG VOL: CPT

## 2017-06-29 PROCEDURE — 0 IOPAMIDOL 61 % SOLUTION: Performed by: PHYSICIAN ASSISTANT

## 2017-06-29 PROCEDURE — 94727 GAS DIL/WSHOT DETER LNG VOL: CPT | Performed by: INTERNAL MEDICINE

## 2017-06-29 PROCEDURE — 78452 HT MUSCLE IMAGE SPECT MULT: CPT | Performed by: INTERNAL MEDICINE

## 2017-06-29 PROCEDURE — A9500 TC99M SESTAMIBI: HCPCS | Performed by: INTERNAL MEDICINE

## 2017-06-29 PROCEDURE — 94060 EVALUATION OF WHEEZING: CPT

## 2017-06-29 PROCEDURE — 25010000002 REGADENOSON 0.4 MG/5ML SOLUTION: Performed by: INTERNAL MEDICINE

## 2017-06-29 PROCEDURE — 82805 BLOOD GASES W/O2 SATURATION: CPT

## 2017-06-29 PROCEDURE — 93018 CV STRESS TEST I&R ONLY: CPT | Performed by: INTERNAL MEDICINE

## 2017-06-29 PROCEDURE — 94060 EVALUATION OF WHEEZING: CPT | Performed by: INTERNAL MEDICINE

## 2017-06-29 PROCEDURE — 71270 CT THORAX DX C-/C+: CPT

## 2017-06-29 PROCEDURE — 0 TECHNETIUM SESTAMIBI: Performed by: INTERNAL MEDICINE

## 2017-06-29 PROCEDURE — 94729 DIFFUSING CAPACITY: CPT | Performed by: INTERNAL MEDICINE

## 2017-06-29 PROCEDURE — 94729 DIFFUSING CAPACITY: CPT

## 2017-06-29 RX ADMIN — IOPAMIDOL 95 ML: 612 INJECTION, SOLUTION INTRAVENOUS at 10:00

## 2017-06-29 RX ADMIN — Medication 1 DOSE: at 14:00

## 2017-06-29 RX ADMIN — Medication 1 DOSE: at 11:55

## 2017-06-29 RX ADMIN — REGADENOSON 0.4 MG: 0.08 INJECTION, SOLUTION INTRAVENOUS at 14:05

## 2017-06-30 LAB — CREAT BLDA-MCNC: 1.1 MG/DL (ref 0.6–1.3)

## 2017-07-02 LAB
BH CV STRESS BP STAGE 2: NORMAL
BH CV STRESS BP STAGE 4: NORMAL
BH CV STRESS COMMENTS STAGE 1: NORMAL
BH CV STRESS DOSE REGADENOSON STAGE 1: 0.4
BH CV STRESS DURATION MIN STAGE 1: 1
BH CV STRESS DURATION MIN STAGE 2: 1
BH CV STRESS DURATION MIN STAGE 3: 1
BH CV STRESS DURATION MIN STAGE 4: 1
BH CV STRESS DURATION SEC STAGE 2: 0
BH CV STRESS HR STAGE 1: 64
BH CV STRESS HR STAGE 2: 71
BH CV STRESS HR STAGE 3: 63
BH CV STRESS HR STAGE 4: 65
BH CV STRESS PROTOCOL 1: NORMAL
BH CV STRESS RECOVERY BP: NORMAL MMHG
BH CV STRESS RECOVERY HR: 66 BPM
BH CV STRESS STAGE 1: 1
BH CV STRESS STAGE 2: 2
BH CV STRESS STAGE 3: 3
BH CV STRESS STAGE 4: 4
LV EF NUC BP: 78 %
MAXIMAL PREDICTED HEART RATE: 153 BPM
PERCENT MAX PREDICTED HR: 56.21 %
STRESS BASELINE BP: NORMAL MMHG
STRESS BASELINE HR: 64 BPM
STRESS PERCENT HR: 66 %
STRESS POST PEAK BP: NORMAL MMHG
STRESS POST PEAK HR: 86 BPM
STRESS TARGET HR: 130 BPM

## 2017-07-06 ENCOUNTER — OFFICE VISIT (OUTPATIENT)
Dept: CARDIOLOGY | Facility: CLINIC | Age: 67
End: 2017-07-06

## 2017-07-06 VITALS
DIASTOLIC BLOOD PRESSURE: 70 MMHG | WEIGHT: 195 LBS | BODY MASS INDEX: 31.34 KG/M2 | SYSTOLIC BLOOD PRESSURE: 118 MMHG | HEIGHT: 66 IN | HEART RATE: 87 BPM

## 2017-07-06 DIAGNOSIS — I48.19 PERSISTENT ATRIAL FIBRILLATION (HCC): Primary | ICD-10-CM

## 2017-07-06 DIAGNOSIS — I10 ESSENTIAL HYPERTENSION: ICD-10-CM

## 2017-07-06 PROCEDURE — 99213 OFFICE O/P EST LOW 20 MIN: CPT | Performed by: INTERNAL MEDICINE

## 2017-07-06 RX ORDER — LEVOTHYROXINE SODIUM 0.15 MG/1
137 TABLET ORAL DAILY
COMMUNITY
End: 2020-10-06 | Stop reason: SDUPTHER

## 2017-07-06 NOTE — PROGRESS NOTES
Karen Newman  1950  [phone]      07/06/2017    EUGENIE Fountain    Chief Complaint   Patient presents with   • Atrial Fibrillation       PROBLEM LIST:  1. Atrial fibrillation, duration unknown; diagnosed August of 2013:  a. Status post ALANIS/external cardioversion, (10/18/2013), Yara Bobo M.D. with the initiation of propafenone therapy.   b. Atrial fibrillation, (11/13/2013), in Gloria Gomez’ office.  c. Normal sinus rhythm at the time of office visit, (11/21/2013).  d. Normal sinus rhythm, EKG, (06/05/2014).  e. Discontinuation of Rythmol secondary to diarrhea, (06/05/2014), with initiation of flecainide 50 mg b.i.d. and Eliquis 5 mg b.i.d.   f. CHADS Vasc= 4, (4/18/2017)   g. CT scan of the chest (06/29/2017): Mild fibrotic changes, o/w normal  h. Pulmonary Function Test (06/29/2017): FEV1/FVC  1.54/2.57 = 60%  i. Nuclear Stress Test (06/29/2017): EF>70%, no ischemia   2. Valvular heart disease:  a. Previous echocardiogram approximately, 2006 (incomplete data base).  b. Echocardiogram (08/15/2002), revealing moderate concentric LVH, hyperdynamic LV, EF 81%; mild MR, trace TR.  c. Echocardiogram (10/07/2008), revealed a normal LVEF with mild to moderate MR.  d. Echocardiogram, (07/01/2011): normal LV systolic function and wall motion with mild MR and mild TR.   3. Hypertension:  a. Adenosine Cardiolite, (06/18/2009): Normal systolic function without evidence of inducible ischemia.   4. Dyslipidemia.  5. Diabetes mellitus, Type 2 diagnosed, 2006.  6. Thyroid disease:  a. Hyperthyroidism status post radioactive iodine therapy.  b. Hypothyroidism on thyroid replacement.  7. Gastroesophageal reflux disease.  8. Chronic kidney disease.  9. History of hemorrhoids status post hemorrhoidectomy.  10. Colon polyps status post polypectomy.  11. Anxiety.  12. Iron deficiency anemia.  13. Obstructive sleep apnea on chronic CPAP therapy.  14. Surgical history:  a. Total abdominal  hysterectomy.      Allergies   Allergen Reactions   • Sular [Nisoldipine Er]      Lower extremity        Current Medications:    Current Outpatient Prescriptions:   •  aspirin 81 MG EC tablet, Take 81 mg by mouth 2 (Two) Times a Day., Disp: , Rfl:   •  atorvastatin (LIPITOR) 10 MG tablet, Take 10 mg by mouth Daily., Disp: , Rfl:   •  diltiaZEM (TIAZAC) 360 MG 24 hr capsule, Take 360 mg by mouth Daily., Disp: , Rfl:   •  doxazosin (CARDURA) 8 MG tablet, Take 1 tablet by mouth  daily as directed, Disp: 90 tablet, Rfl: 3  •  gabapentin (NEURONTIN) 800 MG tablet, Take 800 mg by mouth 2 (Two) Times a Day., Disp: , Rfl:   •  levothyroxine (SYNTHROID, LEVOTHROID) 150 MCG tablet, Take 150 mcg by mouth Daily., Disp: , Rfl:   •  lisinopril (PRINIVIL,ZESTRIL) 40 MG tablet, Take 40 mg by mouth Daily., Disp: , Rfl:   •  losartan-hydrochlorothiazide (HYZAAR) 100-25 MG per tablet, Take 1 tablet by mouth Daily., Disp: , Rfl:   •  metFORMIN (GLUCOPHAGE) 850 MG tablet, Take 850 mg by mouth 3 (Three) Times a Day With Meals., Disp: , Rfl:   •  omeprazole (priLOSEC) 40 MG capsule, Take 40 mg by mouth Daily., Disp: , Rfl:   •  PARoxetine (PAXIL) 30 MG tablet, Take 30 mg by mouth Daily., Disp: , Rfl:     History of Present Illness (HPI):     Karen Newman returns today for follow-up of atrial fibrillation. She states that Dr. Kay had a CT scan and a Pulmonary Function Test performed in order to see if she could be a candidate for an epicardial surgical ablation. The results of both were acceptable. Overall, the patient is doing well from a cardiac standpoint. Denies any complaints of chest pain, pressure, tightness, or unusual dyspnea.     The following portions of the patient's history were reviewed and updated as appropriate: allergies, current medications and problem list.    Pertinent positives as listed in the HPI.  All other systems reviewed are negative.    Vitals:    07/06/17 1324   BP: 118/70   BP Location: Right arm  "  Patient Position: Sitting   Pulse: 87   Weight: 195 lb (88.5 kg)   Height: 66\" (167.6 cm)       General: Alert, awake, and oriented.  Neck: Jugular venous pressure is within normal limits. Carotids have normal upstrokes without bruits.   Cardiovascular: Heart has a nondisplaced focal PMI. Irregular rate and rhythm without murmur, gallop or rub.  Lungs: Clear without rales or wheezes. Equal expansion is noted.   Extremities: Show no edema.  Skin: Warm and dry.  Neurologic: Non-focal.    Diagnostic Data:  Procedures    Assessment:    ICD-10-CM ICD-9-CM   1. Persistent atrial fibrillation I48.1 427.31   2. Essential hypertension I10 401.9       Plan:  1. F/u Dr Kay for Surgical ablation  2. Continue current medications.  3. Follow-up in 3 months or sooner, if needed.      Scribed for Yara Bobo MD by Irene Varghese. 7/6/2017  2:13 PM    I Yara Bobo MD personally performed the services described in this documentation as scribed by the above individual in my presence, and it is both accurate and complete.    Yara Bobo MD, FACC      "

## 2017-07-18 ENCOUNTER — PREP FOR SURGERY (OUTPATIENT)
Dept: OTHER | Facility: HOSPITAL | Age: 67
End: 2017-07-18

## 2017-07-18 DIAGNOSIS — I48.91 ATRIAL FIBRILLATION, UNSPECIFIED TYPE (HCC): Primary | ICD-10-CM

## 2017-07-18 RX ORDER — CHLORHEXIDINE GLUCONATE 500 MG/1
1 CLOTH TOPICAL EVERY 12 HOURS PRN
Status: CANCELLED | OUTPATIENT
Start: 2017-07-30

## 2017-07-30 ENCOUNTER — HOSPITAL ENCOUNTER (OUTPATIENT)
Dept: PULMONOLOGY | Facility: HOSPITAL | Age: 67
Discharge: HOME | End: 2017-07-30
Attending: THORACIC SURGERY (CARDIOTHORACIC VASCULAR SURGERY)

## 2017-07-30 ENCOUNTER — APPOINTMENT (OUTPATIENT)
Dept: PREADMISSION TESTING | Facility: HOSPITAL | Age: 67
End: 2017-07-30

## 2017-07-30 ENCOUNTER — HOSPITAL ENCOUNTER (OUTPATIENT)
Dept: GENERAL RADIOLOGY | Facility: HOSPITAL | Age: 67
Discharge: HOME | End: 2017-07-30
Admitting: PHYSICIAN ASSISTANT

## 2017-07-30 ENCOUNTER — ANESTHESIA EVENT (OUTPATIENT)
Dept: PERIOP | Facility: HOSPITAL | Age: 67
End: 2017-07-30

## 2017-07-30 ENCOUNTER — HOSPITAL ENCOUNTER (OUTPATIENT)
Dept: PULMONOLOGY | Facility: HOSPITAL | Age: 67
End: 2017-07-30

## 2017-07-30 VITALS — HEIGHT: 66 IN | OXYGEN SATURATION: 94 % | WEIGHT: 199.96 LBS | BODY MASS INDEX: 32.14 KG/M2

## 2017-07-30 DIAGNOSIS — I48.91 ATRIAL FIBRILLATION, UNSPECIFIED TYPE (HCC): ICD-10-CM

## 2017-07-30 LAB
ABO GROUP BLD: NORMAL
ALBUMIN SERPL-MCNC: 4.1 G/DL (ref 3.2–4.8)
ALP SERPL-CCNC: 76 U/L (ref 25–100)
ALT SERPL W P-5'-P-CCNC: 19 U/L (ref 7–40)
ANION GAP SERPL CALCULATED.3IONS-SCNC: 9 MMOL/L (ref 3–11)
AST SERPL-CCNC: 19 U/L (ref 0–33)
BASOPHILS # BLD AUTO: 0.05 10*3/MM3 (ref 0–0.2)
BASOPHILS NFR BLD AUTO: 0.9 % (ref 0–1)
BILIRUB CONJ SERPL-MCNC: 0.1 MG/DL (ref 0–0.2)
BILIRUB INDIRECT SERPL-MCNC: 0.3 MG/DL (ref 0.1–1.1)
BILIRUB SERPL-MCNC: 0.4 MG/DL (ref 0.3–1.2)
BLD GP AB SCN SERPL QL: NEGATIVE
BUN BLD-MCNC: 18 MG/DL (ref 9–23)
BUN/CREAT SERPL: 15 (ref 7–25)
CALCIUM SPEC-SCNC: 9.5 MG/DL (ref 8.7–10.4)
CHLORIDE SERPL-SCNC: 102 MMOL/L (ref 99–109)
CO2 SERPL-SCNC: 29 MMOL/L (ref 20–31)
CREAT BLD-MCNC: 1.2 MG/DL (ref 0.6–1.3)
DEPRECATED RDW RBC AUTO: 47.6 FL (ref 37–54)
EOSINOPHIL # BLD AUTO: 0.31 10*3/MM3 (ref 0–0.3)
EOSINOPHIL NFR BLD AUTO: 5.5 % (ref 0–3)
ERYTHROCYTE [DISTWIDTH] IN BLOOD BY AUTOMATED COUNT: 13.1 % (ref 11.3–14.5)
GFR SERPL CREATININE-BSD FRML MDRD: 45 ML/MIN/1.73
GLUCOSE BLD-MCNC: 160 MG/DL (ref 70–100)
HBA1C MFR BLD: 7.9 % (ref 4.8–5.6)
HCT VFR BLD AUTO: 39.6 % (ref 34.5–44)
HGB BLD-MCNC: 12.6 G/DL (ref 11.5–15.5)
IMM GRANULOCYTES # BLD: 0.01 10*3/MM3 (ref 0–0.03)
IMM GRANULOCYTES NFR BLD: 0.2 % (ref 0–0.6)
INR PPP: 0.99
LYMPHOCYTES # BLD AUTO: 1.99 10*3/MM3 (ref 0.6–4.8)
LYMPHOCYTES NFR BLD AUTO: 35.2 % (ref 24–44)
MCH RBC QN AUTO: 31.7 PG (ref 27–31)
MCHC RBC AUTO-ENTMCNC: 31.8 G/DL (ref 32–36)
MCV RBC AUTO: 99.5 FL (ref 80–99)
MONOCYTES # BLD AUTO: 0.47 10*3/MM3 (ref 0–1)
MONOCYTES NFR BLD AUTO: 8.3 % (ref 0–12)
NEUTROPHILS # BLD AUTO: 2.82 10*3/MM3 (ref 1.5–8.3)
NEUTROPHILS NFR BLD AUTO: 49.9 % (ref 41–71)
PLATELET # BLD AUTO: 202 10*3/MM3 (ref 150–450)
PMV BLD AUTO: 9.7 FL (ref 6–12)
POTASSIUM BLD-SCNC: 4.4 MMOL/L (ref 3.5–5.5)
PROT SERPL-MCNC: 7.3 G/DL (ref 5.7–8.2)
PROTHROMBIN TIME: 10.8 SECONDS (ref 9.6–11.5)
RBC # BLD AUTO: 3.98 10*6/MM3 (ref 3.89–5.14)
RH BLD: POSITIVE
SODIUM BLD-SCNC: 140 MMOL/L (ref 132–146)
WBC NRBC COR # BLD: 5.65 10*3/MM3 (ref 3.5–10.8)

## 2017-07-30 PROCEDURE — 80076 HEPATIC FUNCTION PANEL: CPT | Performed by: PHYSICIAN ASSISTANT

## 2017-07-30 PROCEDURE — 86850 RBC ANTIBODY SCREEN: CPT | Performed by: PHYSICIAN ASSISTANT

## 2017-07-30 PROCEDURE — 85025 COMPLETE CBC W/AUTO DIFF WBC: CPT | Performed by: PHYSICIAN ASSISTANT

## 2017-07-30 PROCEDURE — 94010 BREATHING CAPACITY TEST: CPT

## 2017-07-30 PROCEDURE — 86901 BLOOD TYPING SEROLOGIC RH(D): CPT | Performed by: PHYSICIAN ASSISTANT

## 2017-07-30 PROCEDURE — 83036 HEMOGLOBIN GLYCOSYLATED A1C: CPT | Performed by: ANESTHESIOLOGY

## 2017-07-30 PROCEDURE — 86900 BLOOD TYPING SEROLOGIC ABO: CPT | Performed by: PHYSICIAN ASSISTANT

## 2017-07-30 PROCEDURE — 86920 COMPATIBILITY TEST SPIN: CPT

## 2017-07-30 PROCEDURE — 80048 BASIC METABOLIC PNL TOTAL CA: CPT | Performed by: PHYSICIAN ASSISTANT

## 2017-07-30 PROCEDURE — 94010 BREATHING CAPACITY TEST: CPT | Performed by: INTERNAL MEDICINE

## 2017-07-30 PROCEDURE — 71020 HC CHEST PA AND LATERAL: CPT

## 2017-07-30 PROCEDURE — 36415 COLL VENOUS BLD VENIPUNCTURE: CPT

## 2017-07-30 PROCEDURE — 85610 PROTHROMBIN TIME: CPT | Performed by: PHYSICIAN ASSISTANT

## 2017-07-30 RX ORDER — DOXAZOSIN 8 MG/1
8 TABLET ORAL NIGHTLY
COMMUNITY
End: 2018-03-27 | Stop reason: SDUPTHER

## 2017-07-30 RX ORDER — INSULIN GLARGINE 100 [IU]/ML
35 INJECTION, SOLUTION SUBCUTANEOUS NIGHTLY
COMMUNITY
End: 2020-12-30 | Stop reason: ALTCHOICE

## 2017-07-31 ENCOUNTER — HOSPITAL ENCOUNTER (OUTPATIENT)
Facility: HOSPITAL | Age: 67
Discharge: HOME | End: 2017-07-31
Attending: THORACIC SURGERY (CARDIOTHORACIC VASCULAR SURGERY) | Admitting: THORACIC SURGERY (CARDIOTHORACIC VASCULAR SURGERY)

## 2017-07-31 ENCOUNTER — APPOINTMENT (OUTPATIENT)
Dept: PERIOP | Facility: HOSPITAL | Age: 67
End: 2017-07-31

## 2017-07-31 ENCOUNTER — ANESTHESIA (OUTPATIENT)
Dept: PERIOP | Facility: HOSPITAL | Age: 67
End: 2017-07-31

## 2017-07-31 VITALS
WEIGHT: 199.94 LBS | HEIGHT: 66 IN | TEMPERATURE: 98.1 F | DIASTOLIC BLOOD PRESSURE: 72 MMHG | BODY MASS INDEX: 32.13 KG/M2 | SYSTOLIC BLOOD PRESSURE: 128 MMHG | OXYGEN SATURATION: 96 % | HEART RATE: 66 BPM | RESPIRATION RATE: 16 BRPM

## 2017-07-31 DIAGNOSIS — I48.91 ATRIAL FIBRILLATION, UNSPECIFIED TYPE (HCC): ICD-10-CM

## 2017-07-31 LAB
GLUCOSE BLDC GLUCOMTR-MCNC: 143 MG/DL (ref 70–130)
GLUCOSE BLDC GLUCOMTR-MCNC: 201 MG/DL (ref 70–130)

## 2017-07-31 PROCEDURE — 25010000002 ONDANSETRON PER 1 MG: Performed by: ANESTHESIOLOGY

## 2017-07-31 PROCEDURE — 25010000002 NEOSTIGMINE PER 0.5 MG: Performed by: NURSE ANESTHETIST, CERTIFIED REGISTERED

## 2017-07-31 PROCEDURE — 25010000002 PROMETHAZINE PER 50 MG: Performed by: ANESTHESIOLOGY

## 2017-07-31 PROCEDURE — 25010000002 CEFUROXIME PER 750 MG: Performed by: PHYSICIAN ASSISTANT

## 2017-07-31 PROCEDURE — 25010000002 ONDANSETRON PER 1 MG: Performed by: NURSE ANESTHETIST, CERTIFIED REGISTERED

## 2017-07-31 PROCEDURE — 63710000001 INSULIN LISPRO (HUMAN) PER 5 UNITS: Performed by: ANESTHESIOLOGY

## 2017-07-31 PROCEDURE — 82962 GLUCOSE BLOOD TEST: CPT

## 2017-07-31 PROCEDURE — 25010000002 PROPOFOL 10 MG/ML EMULSION: Performed by: NURSE ANESTHETIST, CERTIFIED REGISTERED

## 2017-07-31 PROCEDURE — 93318 ECHO TRANSESOPHAGEAL INTRAOP: CPT | Performed by: ANESTHESIOLOGY

## 2017-07-31 PROCEDURE — 25010000002 FENTANYL CITRATE (PF) 100 MCG/2ML SOLUTION: Performed by: NURSE ANESTHETIST, CERTIFIED REGISTERED

## 2017-07-31 PROCEDURE — 93318 ECHO TRANSESOPHAGEAL INTRAOP: CPT

## 2017-07-31 RX ORDER — PROPOFOL 10 MG/ML
VIAL (ML) INTRAVENOUS AS NEEDED
Status: DISCONTINUED | OUTPATIENT
Start: 2017-07-31 | End: 2017-07-31 | Stop reason: SURG

## 2017-07-31 RX ORDER — PROMETHAZINE HYDROCHLORIDE 25 MG/ML
6.25 INJECTION, SOLUTION INTRAMUSCULAR; INTRAVENOUS
Status: ACTIVE | OUTPATIENT
Start: 2017-07-31 | End: 2017-07-31

## 2017-07-31 RX ORDER — GLYCOPYRROLATE 0.2 MG/ML
INJECTION INTRAMUSCULAR; INTRAVENOUS AS NEEDED
Status: DISCONTINUED | OUTPATIENT
Start: 2017-07-31 | End: 2017-07-31 | Stop reason: SURG

## 2017-07-31 RX ORDER — DEXTROSE MONOHYDRATE 25 G/50ML
25 INJECTION, SOLUTION INTRAVENOUS
Status: DISCONTINUED | OUTPATIENT
Start: 2017-07-31 | End: 2017-07-31 | Stop reason: HOSPADM

## 2017-07-31 RX ORDER — FAMOTIDINE 20 MG/1
20 TABLET, FILM COATED ORAL ONCE
Status: COMPLETED | OUTPATIENT
Start: 2017-07-31 | End: 2017-07-31

## 2017-07-31 RX ORDER — CHLORHEXIDINE GLUCONATE 500 MG/1
1 CLOTH TOPICAL EVERY 12 HOURS PRN
Status: DISCONTINUED | OUTPATIENT
Start: 2017-07-31 | End: 2017-07-31 | Stop reason: HOSPADM

## 2017-07-31 RX ORDER — HYDROMORPHONE HYDROCHLORIDE 1 MG/ML
0.5 INJECTION, SOLUTION INTRAMUSCULAR; INTRAVENOUS; SUBCUTANEOUS
Status: DISCONTINUED | OUTPATIENT
Start: 2017-07-31 | End: 2017-07-31 | Stop reason: HOSPADM

## 2017-07-31 RX ORDER — FENTANYL CITRATE 50 UG/ML
INJECTION, SOLUTION INTRAMUSCULAR; INTRAVENOUS AS NEEDED
Status: DISCONTINUED | OUTPATIENT
Start: 2017-07-31 | End: 2017-07-31 | Stop reason: SURG

## 2017-07-31 RX ORDER — FENTANYL CITRATE 50 UG/ML
50 INJECTION, SOLUTION INTRAMUSCULAR; INTRAVENOUS
Status: DISCONTINUED | OUTPATIENT
Start: 2017-07-31 | End: 2017-07-31 | Stop reason: HOSPADM

## 2017-07-31 RX ORDER — LIDOCAINE HYDROCHLORIDE 10 MG/ML
INJECTION, SOLUTION INFILTRATION; PERINEURAL AS NEEDED
Status: DISCONTINUED | OUTPATIENT
Start: 2017-07-31 | End: 2017-07-31 | Stop reason: SURG

## 2017-07-31 RX ORDER — NICOTINE POLACRILEX 4 MG
15 LOZENGE BUCCAL
Status: DISCONTINUED | OUTPATIENT
Start: 2017-07-31 | End: 2017-07-31 | Stop reason: HOSPADM

## 2017-07-31 RX ORDER — FAMOTIDINE 10 MG/ML
20 INJECTION, SOLUTION INTRAVENOUS ONCE
Status: DISCONTINUED | OUTPATIENT
Start: 2017-07-31 | End: 2017-07-31 | Stop reason: HOSPADM

## 2017-07-31 RX ORDER — LIDOCAINE HYDROCHLORIDE 10 MG/ML
0.5 INJECTION, SOLUTION EPIDURAL; INFILTRATION; INTRACAUDAL; PERINEURAL ONCE AS NEEDED
Status: COMPLETED | OUTPATIENT
Start: 2017-07-31 | End: 2017-07-31

## 2017-07-31 RX ORDER — ONDANSETRON 2 MG/ML
4 INJECTION INTRAMUSCULAR; INTRAVENOUS ONCE
Status: COMPLETED | OUTPATIENT
Start: 2017-07-31 | End: 2017-07-31

## 2017-07-31 RX ORDER — SODIUM CHLORIDE, SODIUM LACTATE, POTASSIUM CHLORIDE, CALCIUM CHLORIDE 600; 310; 30; 20 MG/100ML; MG/100ML; MG/100ML; MG/100ML
9 INJECTION, SOLUTION INTRAVENOUS CONTINUOUS
Status: DISCONTINUED | OUTPATIENT
Start: 2017-07-31 | End: 2017-07-31 | Stop reason: HOSPADM

## 2017-07-31 RX ORDER — SODIUM CHLORIDE 0.9 % (FLUSH) 0.9 %
1-10 SYRINGE (ML) INJECTION AS NEEDED
Status: DISCONTINUED | OUTPATIENT
Start: 2017-07-31 | End: 2017-07-31 | Stop reason: HOSPADM

## 2017-07-31 RX ORDER — ATRACURIUM BESYLATE 10 MG/ML
INJECTION, SOLUTION INTRAVENOUS AS NEEDED
Status: DISCONTINUED | OUTPATIENT
Start: 2017-07-31 | End: 2017-07-31 | Stop reason: SURG

## 2017-07-31 RX ORDER — ONDANSETRON 2 MG/ML
INJECTION INTRAMUSCULAR; INTRAVENOUS AS NEEDED
Status: DISCONTINUED | OUTPATIENT
Start: 2017-07-31 | End: 2017-07-31 | Stop reason: SURG

## 2017-07-31 RX ADMIN — SODIUM CHLORIDE, POTASSIUM CHLORIDE, SODIUM LACTATE AND CALCIUM CHLORIDE 9 ML/HR: 600; 310; 30; 20 INJECTION, SOLUTION INTRAVENOUS at 06:32

## 2017-07-31 RX ADMIN — ATRACURIUM BESYLATE 50 MG: 10 INJECTION, SOLUTION INTRAVENOUS at 07:38

## 2017-07-31 RX ADMIN — SODIUM CHLORIDE 1.5 G: 9 INJECTION, SOLUTION INTRAVENOUS at 07:29

## 2017-07-31 RX ADMIN — FENTANYL CITRATE 50 MCG: 50 INJECTION, SOLUTION INTRAMUSCULAR; INTRAVENOUS at 07:29

## 2017-07-31 RX ADMIN — GLYCOPYRROLATE 0.4 MG: 0.2 INJECTION, SOLUTION INTRAMUSCULAR; INTRAVENOUS at 08:40

## 2017-07-31 RX ADMIN — LIDOCAINE HYDROCHLORIDE 100 MG: 10 INJECTION, SOLUTION INFILTRATION; PERINEURAL at 07:38

## 2017-07-31 RX ADMIN — ONDANSETRON 4 MG: 2 INJECTION INTRAMUSCULAR; INTRAVENOUS at 08:35

## 2017-07-31 RX ADMIN — MUPIROCIN 1 APPLICATION: 20 OINTMENT TOPICAL at 06:31

## 2017-07-31 RX ADMIN — PROMETHAZINE HYDROCHLORIDE 3.12 MG: 25 INJECTION INTRAMUSCULAR; INTRAVENOUS at 10:13

## 2017-07-31 RX ADMIN — Medication 3 MG: at 08:40

## 2017-07-31 RX ADMIN — FENTANYL CITRATE 50 MCG: 50 INJECTION, SOLUTION INTRAMUSCULAR; INTRAVENOUS at 07:37

## 2017-07-31 RX ADMIN — INSULIN LISPRO 5 UNITS: 100 INJECTION, SOLUTION INTRAVENOUS; SUBCUTANEOUS at 11:05

## 2017-07-31 RX ADMIN — ONDANSETRON 4 MG: 2 INJECTION INTRAMUSCULAR; INTRAVENOUS at 09:15

## 2017-07-31 RX ADMIN — LIDOCAINE HYDROCHLORIDE 0.5 ML: 10 INJECTION, SOLUTION EPIDURAL; INFILTRATION; INTRACAUDAL; PERINEURAL at 06:31

## 2017-07-31 RX ADMIN — PROPOFOL 200 MG: 10 INJECTION, EMULSION INTRAVENOUS at 07:38

## 2017-07-31 RX ADMIN — SODIUM CHLORIDE, POTASSIUM CHLORIDE, SODIUM LACTATE AND CALCIUM CHLORIDE: 600; 310; 30; 20 INJECTION, SOLUTION INTRAVENOUS at 07:28

## 2017-07-31 RX ADMIN — FAMOTIDINE 20 MG: 20 TABLET ORAL at 06:31

## 2017-07-31 NOTE — ANESTHESIA POSTPROCEDURE EVALUATION
Patient: Karen Newman    Procedure Summary     Date Anesthesia Start Anesthesia Stop Room / Location    07/31/17 0729 0905 [facility] OR [facility] / [facility]       Procedure Diagnosis Surgeon Provider    TRANSESOPHAGEAL ECHOCARDIOGRAM and Bronchoscopy (Bilateral Chest) Atrial fibrillation, unspecified type  (Atrial fibrillation, unspecified type [I48.91]) MD Arcenio Quispe MD          Anesthesia Type: general  Last vitals  BP        Temp        Pulse       Resp        SpO2          Post Anesthesia Care and Evaluation    Patient location during evaluation: PACU  Patient participation: complete - patient participated  Level of consciousness: awake and alert  Pain score: 0  Pain management: adequate  Airway patency: patent  Anesthetic complications: No anesthetic complications  PONV Status: none  Cardiovascular status: hemodynamically stable and acceptable  Respiratory status: nonlabored ventilation, acceptable and nasal cannula  Hydration status: acceptable

## 2017-07-31 NOTE — ADDENDUM NOTE
Addendum  created 07/31/17 1022 by Arcenio Gibbons MD    Anesthesia Intra Blocks edited, Child order released for a procedure order, Sign clinical note

## 2017-07-31 NOTE — ANESTHESIA PROCEDURE NOTES
Airway  Urgency: elective    Date/Time: 7/31/2017 7:40 AM  Airway not difficult    General Information and Staff    Patient location during procedure: OR  Anesthesiologist: TRUPTI LEO  CRNA: FELIX BURK    Indications and Patient Condition  Indications for airway management: airway protection    Preoxygenated: yes  MILS maintained throughout  Mask difficulty assessment: 2 - vent by mask + OA or adjuvant +/- NMBA    Final Airway Details  Final airway type: endotracheal airway      Successful airway: ETT - double lumen left  Cuffed: yes   Successful intubation technique: direct laryngoscopy  Facilitating devices/methods: cricoid pressure  Endotracheal tube insertion site: oral  Blade: Amanda  Blade size: #3  ETT DL size: 37 fr  Cormack-Lehane Classification: grade IIa - partial view of glottis  Placement verified by: bronchoscopy   Measured from: lips  Number of attempts at approach: 1

## 2017-07-31 NOTE — ANESTHESIA PREPROCEDURE EVALUATION
Anesthesia Evaluation     Patient summary reviewed and Nursing notes reviewed   NPO Solid Status: > 8 hours  NPO Liquid Status: > 8 hours     Airway   Mallampati: I  TM distance: >3 FB  Neck ROM: full  no difficulty expected  Dental - normal exam     Pulmonary - normal exam   (+) pneumonia , sleep apnea on CPAP,   Cardiovascular - normal exam    (+) hypertension, dysrhythmias,       Neuro/Psych  (+) psychiatric history,    GI/Hepatic/Renal/Endo    (+)  GERD, diabetes mellitus,     Musculoskeletal     Abdominal  - normal exam    Bowel sounds: normal.   Substance History - negative use     OB/GYN negative ob/gyn ROS         Other   (+) arthritis                                     Anesthesia Plan    ASA 3     general   (DARIO, ALANIS)  intravenous induction   Anesthetic plan and risks discussed with patient.    Plan discussed with CRNA.

## 2017-07-31 NOTE — ANESTHESIA PROCEDURE NOTES
Procedure Performed: Emergent/Open-Heart Anesthesia ALANIS     Start Time:        End Time:      Preanesthesia Checklist:  Patient identified, IV assessed, risks and benefits discussed, monitors and equipment assessed, procedure being performed at surgeon's request and anesthesia consent obtained.    General Procedure Information  Diagnostic Indications for Echo:  assessment of surgical repair and hemodynamic monitoring  Modalities:  2D only, color flow mapping and pulse wave Doppler    Echocardiographic and Doppler Measurements    Ventricles    Right Ventricle:  Cavity size normal.  Global function normal.    Left Ventricle:  Cavity size normal.  Global Function normal.  Ejection Fraction 60%.      Ventricular Regional Function:  1- Basal Anteroseptal:  normal  2- Basal Anterior:  normal  3- Basal Anterolateral:  normal  4- Basal Inferolateral:  normal  5- Basal Inferior:  normal  6- Basal Inferoseptal:  normal  7- Mid Anteroseptal:  normal  8- Mid Anterior:  normal  9- Mid Anterolateral:  normal  10- Mid Inferolateral:  normal  11- Mid Inferior:  normal  12- Mid Inferoseptal:  normal  13- Apical Anterior:  normal  14- Apical Lateral:  normal  15- Apical Inferior:  normal  16- Apical Septal:  normal  17- Reading:  normal      Valves    Aortic Valve:  Annulus normal.  Stenosis not present.  Regurgitation absent.  Leaflet motions normal.      Mitral Valve:  Annulus normal.  Stenosis not present.  Regurgitation trace.  Leaflet motions normal.                Atria    Other Atria Findings:       CAN NOT RULE OUT THROMBUS IN THE LEFT ATRIAL APPENDAGE                Anesthesia Information  Performed Personally  Anesthesiologist:  TRUPTI LEO

## 2017-08-01 ENCOUNTER — TELEPHONE (OUTPATIENT)
Dept: CARDIAC SURGERY | Facility: CLINIC | Age: 67
End: 2017-08-01

## 2017-08-01 ENCOUNTER — TELEPHONE (OUTPATIENT)
Dept: CARDIOLOGY | Facility: CLINIC | Age: 67
End: 2017-08-01

## 2017-08-01 NOTE — TELEPHONE ENCOUNTER
Lalo Newman called today and said the prescription Xarelto was not called into his pharmacy.     I spoke with Dr. Osullivan's office who said to prescribe Xarelto 20mg 1 PO q daily with food. I called this into the pharmacy and made the patient aware.

## 2017-08-01 NOTE — TELEPHONE ENCOUNTER
Called patient back and she stated that DR Hernandez office took care of her question about the medication that they had not sent into pharmacy.

## 2017-08-03 LAB
ABO + RH BLD: NORMAL
ABO + RH BLD: NORMAL
BH BB BLOOD EXPIRATION DATE: NORMAL
BH BB BLOOD EXPIRATION DATE: NORMAL
BH BB BLOOD TYPE BARCODE: 6200
BH BB BLOOD TYPE BARCODE: 6200
BH BB DISPENSE STATUS: NORMAL
BH BB DISPENSE STATUS: NORMAL
BH BB PRODUCT CODE: NORMAL
BH BB PRODUCT CODE: NORMAL
BH BB UNIT NUMBER: NORMAL
BH BB UNIT NUMBER: NORMAL
CROSSMATCH INTERPRETATION: NORMAL
CROSSMATCH INTERPRETATION: NORMAL
UNIT  ABO: NORMAL
UNIT  ABO: NORMAL
UNIT  RH: NORMAL
UNIT  RH: NORMAL

## 2017-08-16 ENCOUNTER — TELEPHONE (OUTPATIENT)
Dept: CARDIAC SURGERY | Facility: CLINIC | Age: 67
End: 2017-08-16

## 2017-08-16 NOTE — TELEPHONE ENCOUNTER
[facility] Pharmacy Appeals: Raul Goyal  Approved Xarelto  Approval # [id number]

## 2017-08-22 ENCOUNTER — OFFICE VISIT (OUTPATIENT)
Dept: PRIMARY CARE CLINIC | Age: 67
End: 2017-08-22
Payer: MEDICARE

## 2017-08-22 ENCOUNTER — HOSPITAL ENCOUNTER (OUTPATIENT)
Dept: OTHER | Age: 67
Discharge: HOME | End: 2017-08-22
Attending: NURSE PRACTITIONER | Admitting: NURSE PRACTITIONER

## 2017-08-22 VITALS
HEART RATE: 94 BPM | DIASTOLIC BLOOD PRESSURE: 70 MMHG | SYSTOLIC BLOOD PRESSURE: 120 MMHG | WEIGHT: 195.4 LBS | OXYGEN SATURATION: 94 % | BODY MASS INDEX: 31.54 KG/M2

## 2017-08-22 DIAGNOSIS — L98.9 SKIN LESION OF FOOT: Primary | ICD-10-CM

## 2017-08-22 DIAGNOSIS — I10 ESSENTIAL HYPERTENSION: ICD-10-CM

## 2017-08-22 DIAGNOSIS — E11.9 TYPE 2 DIABETES MELLITUS WITHOUT COMPLICATION, WITHOUT LONG-TERM CURRENT USE OF INSULIN (HCC): ICD-10-CM

## 2017-08-22 DIAGNOSIS — I48.91 ATRIAL FIBRILLATION, UNSPECIFIED TYPE (HCC): ICD-10-CM

## 2017-08-22 PROCEDURE — 3014F SCREEN MAMMO DOC REV: CPT | Performed by: NURSE PRACTITIONER

## 2017-08-22 PROCEDURE — 4040F PNEUMOC VAC/ADMIN/RCVD: CPT | Performed by: NURSE PRACTITIONER

## 2017-08-22 PROCEDURE — G8417 CALC BMI ABV UP PARAM F/U: HCPCS | Performed by: NURSE PRACTITIONER

## 2017-08-22 PROCEDURE — 3017F COLORECTAL CA SCREEN DOC REV: CPT | Performed by: NURSE PRACTITIONER

## 2017-08-22 PROCEDURE — G8598 ASA/ANTIPLAT THER USED: HCPCS | Performed by: NURSE PRACTITIONER

## 2017-08-22 PROCEDURE — 1036F TOBACCO NON-USER: CPT | Performed by: NURSE PRACTITIONER

## 2017-08-22 PROCEDURE — 1123F ACP DISCUSS/DSCN MKR DOCD: CPT | Performed by: NURSE PRACTITIONER

## 2017-08-22 PROCEDURE — 3046F HEMOGLOBIN A1C LEVEL >9.0%: CPT | Performed by: NURSE PRACTITIONER

## 2017-08-22 PROCEDURE — G8400 PT W/DXA NO RESULTS DOC: HCPCS | Performed by: NURSE PRACTITIONER

## 2017-08-22 PROCEDURE — 1090F PRES/ABSN URINE INCON ASSESS: CPT | Performed by: NURSE PRACTITIONER

## 2017-08-22 PROCEDURE — G8427 DOCREV CUR MEDS BY ELIG CLIN: HCPCS | Performed by: NURSE PRACTITIONER

## 2017-08-22 PROCEDURE — 99213 OFFICE O/P EST LOW 20 MIN: CPT | Performed by: NURSE PRACTITIONER

## 2017-08-22 RX ORDER — CEPHALEXIN 500 MG/1
500 CAPSULE ORAL 4 TIMES DAILY
Qty: 28 CAPSULE | Refills: 0 | Status: SHIPPED | OUTPATIENT
Start: 2017-08-22 | End: 2017-09-10 | Stop reason: ALTCHOICE

## 2017-08-22 ASSESSMENT — ENCOUNTER SYMPTOMS
SHORTNESS OF BREATH: 0
VOMITING: 0
SORE THROAT: 0
NAUSEA: 0
EYE PAIN: 0
ABDOMINAL PAIN: 0
COUGH: 0

## 2017-08-23 LAB
BASOPHILS ABSOLUTE: 0.1 K/UL (ref 0–0.1)
BASOPHILS RELATIVE PERCENT: 0.8 %
EOSINOPHILS ABSOLUTE: 0.3 K/UL (ref 0–0.4)
EOSINOPHILS RELATIVE PERCENT: 4.7 %
HCT VFR BLD CALC: 39.6 % (ref 37–47)
HEMOGLOBIN: 13.1 G/DL (ref 11.5–16.5)
LYMPHOCYTES ABSOLUTE: 2.2 K/UL (ref 1.5–4)
LYMPHOCYTES RELATIVE PERCENT: 36.5 % (ref 20–40)
MCH RBC QN AUTO: 33 PG (ref 27–32)
MCHC RBC AUTO-ENTMCNC: 33.1 G/DL (ref 31–35)
MCV RBC AUTO: 99.7 FL (ref 80–100)
MONOCYTES ABSOLUTE: 0.6 K/UL (ref 0.2–0.8)
MONOCYTES RELATIVE PERCENT: 9.6 % (ref 3–10)
NEUTROPHILS ABSOLUTE: 2.9 K/UL (ref 2–7.5)
NEUTROPHILS RELATIVE PERCENT: 48.4 %
PDW BLD-RTO: 13.1 % (ref 11–16)
PLATELET # BLD: 202 K/UL (ref 150–400)
PMV BLD AUTO: 11.1 FL (ref 6–10)
RBC # BLD: 3.97 M/UL (ref 3.8–5.8)
WBC # BLD: 6 K/UL (ref 4–11)

## 2017-08-28 ENCOUNTER — HOSPITAL ENCOUNTER (OUTPATIENT)
Dept: OTHER | Age: 67
Discharge: HOME | End: 2017-08-28
Attending: NURSE PRACTITIONER | Admitting: NURSE PRACTITIONER

## 2017-08-28 DIAGNOSIS — I10 ESSENTIAL HYPERTENSION: Primary | Chronic | ICD-10-CM

## 2017-08-28 DIAGNOSIS — I51.3 THROMBUS OF LEFT ATRIAL APPENDAGE: Primary | ICD-10-CM

## 2017-08-28 DIAGNOSIS — I48.19 PERSISTENT ATRIAL FIBRILLATION (HCC): ICD-10-CM

## 2017-08-28 LAB
A/G RATIO: 1.4 (ref 0.8–2)
ALBUMIN SERPL-MCNC: 4.1 G/DL (ref 3.4–4.8)
ALP BLD-CCNC: 67 U/L (ref 25–100)
ALT SERPL-CCNC: 13 U/L (ref 4–36)
ANION GAP SERPL CALCULATED.3IONS-SCNC: 14 MMOL/L (ref 3–16)
AST SERPL-CCNC: 14 U/L (ref 8–33)
BILIRUB SERPL-MCNC: 0.3 MG/DL (ref 0.3–1.2)
BUN BLDV-MCNC: 15 MG/DL (ref 6–20)
CALCIUM SERPL-MCNC: 9.2 MG/DL (ref 8.5–10.5)
CHLORIDE BLD-SCNC: 98 MMOL/L (ref 98–107)
CO2: 28 MMOL/L (ref 20–30)
CREAT SERPL-MCNC: 1 MG/DL (ref 0.4–1.2)
GFR AFRICAN AMERICAN: >59
GFR NON-AFRICAN AMERICAN: 55
GLOBULIN: 2.9 G/DL
GLUCOSE BLD-MCNC: 274 MG/DL (ref 74–106)
POTASSIUM SERPL-SCNC: 4.2 MMOL/L (ref 3.4–5.1)
SODIUM BLD-SCNC: 140 MMOL/L (ref 136–145)
TOTAL PROTEIN: 7 G/DL (ref 6.4–8.3)

## 2017-08-30 ENCOUNTER — TELEPHONE (OUTPATIENT)
Dept: PRIMARY CARE CLINIC | Age: 67
End: 2017-08-30

## 2017-08-30 DIAGNOSIS — I48.20 CHRONIC ATRIAL FIBRILLATION (HCC): ICD-10-CM

## 2017-08-30 DIAGNOSIS — Q20.8 ABNORMALITY OF LEFT ATRIAL APPENDAGE: Primary | ICD-10-CM

## 2017-09-10 ENCOUNTER — OFFICE VISIT (OUTPATIENT)
Dept: PRIMARY CARE CLINIC | Age: 67
End: 2017-09-10
Payer: MEDICARE

## 2017-09-10 VITALS
HEART RATE: 95 BPM | DIASTOLIC BLOOD PRESSURE: 72 MMHG | OXYGEN SATURATION: 95 % | WEIGHT: 198 LBS | RESPIRATION RATE: 18 BRPM | HEIGHT: 66 IN | TEMPERATURE: 97.9 F | BODY MASS INDEX: 31.82 KG/M2 | SYSTOLIC BLOOD PRESSURE: 120 MMHG

## 2017-09-10 DIAGNOSIS — J20.9 ACUTE BRONCHITIS, UNSPECIFIED ORGANISM: Primary | ICD-10-CM

## 2017-09-10 PROCEDURE — G8417 CALC BMI ABV UP PARAM F/U: HCPCS | Performed by: NURSE PRACTITIONER

## 2017-09-10 PROCEDURE — 99213 OFFICE O/P EST LOW 20 MIN: CPT | Performed by: NURSE PRACTITIONER

## 2017-09-10 PROCEDURE — 1123F ACP DISCUSS/DSCN MKR DOCD: CPT | Performed by: NURSE PRACTITIONER

## 2017-09-10 PROCEDURE — 1036F TOBACCO NON-USER: CPT | Performed by: NURSE PRACTITIONER

## 2017-09-10 PROCEDURE — 3017F COLORECTAL CA SCREEN DOC REV: CPT | Performed by: NURSE PRACTITIONER

## 2017-09-10 PROCEDURE — G8400 PT W/DXA NO RESULTS DOC: HCPCS | Performed by: NURSE PRACTITIONER

## 2017-09-10 PROCEDURE — 4040F PNEUMOC VAC/ADMIN/RCVD: CPT | Performed by: NURSE PRACTITIONER

## 2017-09-10 PROCEDURE — 3014F SCREEN MAMMO DOC REV: CPT | Performed by: NURSE PRACTITIONER

## 2017-09-10 PROCEDURE — G8598 ASA/ANTIPLAT THER USED: HCPCS | Performed by: NURSE PRACTITIONER

## 2017-09-10 PROCEDURE — G8427 DOCREV CUR MEDS BY ELIG CLIN: HCPCS | Performed by: NURSE PRACTITIONER

## 2017-09-10 PROCEDURE — 1090F PRES/ABSN URINE INCON ASSESS: CPT | Performed by: NURSE PRACTITIONER

## 2017-09-10 RX ORDER — BENZONATATE 100 MG/1
100 CAPSULE ORAL 3 TIMES DAILY PRN
Qty: 30 CAPSULE | Refills: 0 | Status: SHIPPED | OUTPATIENT
Start: 2017-09-10 | End: 2017-09-10 | Stop reason: SDUPTHER

## 2017-09-10 RX ORDER — AMOXICILLIN AND CLAVULANATE POTASSIUM 875; 125 MG/1; MG/1
1 TABLET, FILM COATED ORAL EVERY 12 HOURS
Qty: 20 TABLET | Refills: 0 | Status: SHIPPED | OUTPATIENT
Start: 2017-09-10 | End: 2017-09-20

## 2017-09-10 RX ORDER — BENZONATATE 100 MG/1
100 CAPSULE ORAL 3 TIMES DAILY PRN
Qty: 30 CAPSULE | Refills: 0 | Status: SHIPPED | OUTPATIENT
Start: 2017-09-10 | End: 2017-09-17

## 2017-09-10 RX ORDER — AMOXICILLIN AND CLAVULANATE POTASSIUM 875; 125 MG/1; MG/1
1 TABLET, FILM COATED ORAL EVERY 12 HOURS
Qty: 20 TABLET | Refills: 0 | Status: SHIPPED | OUTPATIENT
Start: 2017-09-10 | End: 2017-09-10 | Stop reason: SDUPTHER

## 2017-09-10 RX ORDER — METHYLPREDNISOLONE 4 MG/1
TABLET ORAL
Qty: 1 KIT | Refills: 0 | Status: SHIPPED | OUTPATIENT
Start: 2017-09-10 | End: 2017-09-10 | Stop reason: SDUPTHER

## 2017-09-10 RX ORDER — ALBUTEROL SULFATE 90 UG/1
2 AEROSOL, METERED RESPIRATORY (INHALATION) EVERY 6 HOURS PRN
Qty: 1 INHALER | Refills: 3 | Status: SHIPPED | OUTPATIENT
Start: 2017-09-10 | End: 2017-09-10 | Stop reason: SDUPTHER

## 2017-09-10 RX ORDER — METHYLPREDNISOLONE 4 MG/1
TABLET ORAL
Qty: 1 KIT | Refills: 0 | Status: SHIPPED | OUTPATIENT
Start: 2017-09-10 | End: 2018-01-10 | Stop reason: SDUPTHER

## 2017-09-10 RX ORDER — ALBUTEROL SULFATE 90 UG/1
2 AEROSOL, METERED RESPIRATORY (INHALATION) EVERY 6 HOURS PRN
Qty: 1 INHALER | Refills: 3 | Status: SHIPPED | OUTPATIENT
Start: 2017-09-10 | End: 2019-06-25 | Stop reason: ALTCHOICE

## 2017-09-10 ASSESSMENT — ENCOUNTER SYMPTOMS
SHORTNESS OF BREATH: 1
COUGH: 1
SORE THROAT: 0

## 2017-09-13 DIAGNOSIS — I48.91 ATRIAL FIBRILLATION, UNSPECIFIED TYPE (HCC): Primary | ICD-10-CM

## 2017-09-14 ENCOUNTER — PREP FOR SURGERY (OUTPATIENT)
Dept: OTHER | Facility: HOSPITAL | Age: 67
End: 2017-09-14

## 2017-09-14 DIAGNOSIS — I48.91 ATRIAL FIBRILLATION, UNSPECIFIED TYPE (HCC): Primary | ICD-10-CM

## 2017-09-14 RX ORDER — CHLORHEXIDINE GLUCONATE 500 MG/1
1 CLOTH TOPICAL EVERY 12 HOURS PRN
Status: CANCELLED | OUTPATIENT
Start: 2017-09-14

## 2017-09-22 ENCOUNTER — APPOINTMENT (OUTPATIENT)
Dept: PREADMISSION TESTING | Facility: HOSPITAL | Age: 67
End: 2017-09-22

## 2017-09-22 ENCOUNTER — HOSPITAL ENCOUNTER (OUTPATIENT)
Dept: PULMONOLOGY | Facility: HOSPITAL | Age: 67
Discharge: HOME | End: 2017-09-22

## 2017-09-22 ENCOUNTER — HOSPITAL ENCOUNTER (OUTPATIENT)
Dept: GENERAL RADIOLOGY | Facility: HOSPITAL | Age: 67
Discharge: HOME | End: 2017-09-22

## 2017-09-22 ENCOUNTER — HOSPITAL ENCOUNTER (OUTPATIENT)
Dept: CARDIOLOGY | Facility: HOSPITAL | Age: 67
Discharge: HOME | End: 2017-09-22
Attending: INTERNAL MEDICINE | Admitting: INTERNAL MEDICINE

## 2017-09-22 VITALS
DIASTOLIC BLOOD PRESSURE: 74 MMHG | HEART RATE: 65 BPM | SYSTOLIC BLOOD PRESSURE: 107 MMHG | OXYGEN SATURATION: 96 % | RESPIRATION RATE: 12 BRPM

## 2017-09-22 VITALS — OXYGEN SATURATION: 96 % | BODY MASS INDEX: 32.21 KG/M2 | WEIGHT: 200.4 LBS | HEIGHT: 66 IN | HEART RATE: 69 BPM

## 2017-09-22 DIAGNOSIS — I48.91 ATRIAL FIBRILLATION, UNSPECIFIED TYPE (HCC): ICD-10-CM

## 2017-09-22 LAB
ABO GROUP BLD: NORMAL
ALBUMIN SERPL-MCNC: 3.8 G/DL (ref 3.2–4.8)
ALP SERPL-CCNC: 71 U/L (ref 25–100)
ALT SERPL W P-5'-P-CCNC: 20 U/L (ref 7–40)
ANION GAP SERPL CALCULATED.3IONS-SCNC: 7 MMOL/L (ref 3–11)
AST SERPL-CCNC: 18 U/L (ref 0–33)
AVERAGE GLUCOSE: NORMAL
BASOPHILS # BLD AUTO: 0.03 10*3/MM3 (ref 0–0.2)
BASOPHILS NFR BLD AUTO: 0.6 % (ref 0–1)
BH CV ECHO MEAS - LA DIMENSION: 4.3 CM
BH CV VAS BP LEFT ARM: NORMAL MMHG
BILIRUB CONJ SERPL-MCNC: 0.1 MG/DL (ref 0–0.2)
BILIRUB INDIRECT SERPL-MCNC: 0.4 MG/DL (ref 0.1–1.1)
BILIRUB SERPL-MCNC: 0.5 MG/DL (ref 0.3–1.2)
BLD GP AB SCN SERPL QL: NEGATIVE
BUN BLD-MCNC: 20 MG/DL (ref 9–23)
BUN/CREAT SERPL: 18.2 (ref 7–25)
CALCIUM SPEC-SCNC: 9.1 MG/DL (ref 8.7–10.4)
CHLORIDE SERPL-SCNC: 106 MMOL/L (ref 99–109)
CO2 SERPL-SCNC: 31 MMOL/L (ref 20–31)
CREAT BLD-MCNC: 1.1 MG/DL (ref 0.6–1.3)
DEPRECATED RDW RBC AUTO: 49.2 FL (ref 37–54)
EOSINOPHIL # BLD AUTO: 0.19 10*3/MM3 (ref 0–0.3)
EOSINOPHIL NFR BLD AUTO: 3.8 % (ref 0–3)
ERYTHROCYTE [DISTWIDTH] IN BLOOD BY AUTOMATED COUNT: 13.3 % (ref 11.3–14.5)
GFR SERPL CREATININE-BSD FRML MDRD: 50 ML/MIN/1.73
GLUCOSE BLD-MCNC: 141 MG/DL (ref 70–100)
HBA1C MFR BLD: 7.9 %
HBA1C MFR BLD: 7.9 % (ref 4.8–5.6)
HCT VFR BLD AUTO: 37.1 % (ref 34.5–44)
HGB BLD-MCNC: 12 G/DL (ref 11.5–15.5)
IMM GRANULOCYTES # BLD: 0.01 10*3/MM3 (ref 0–0.03)
IMM GRANULOCYTES NFR BLD: 0.2 % (ref 0–0.6)
INR PPP: 0.93
LV EF 2D ECHO EST: 60 %
LYMPHOCYTES # BLD AUTO: 1.57 10*3/MM3 (ref 0.6–4.8)
LYMPHOCYTES NFR BLD AUTO: 31.3 % (ref 24–44)
MCH RBC QN AUTO: 32.6 PG (ref 27–31)
MCHC RBC AUTO-ENTMCNC: 32.3 G/DL (ref 32–36)
MCV RBC AUTO: 100.8 FL (ref 80–99)
MONOCYTES # BLD AUTO: 0.44 10*3/MM3 (ref 0–1)
MONOCYTES NFR BLD AUTO: 8.8 % (ref 0–12)
NEUTROPHILS # BLD AUTO: 2.77 10*3/MM3 (ref 1.5–8.3)
NEUTROPHILS NFR BLD AUTO: 55.3 % (ref 41–71)
PLATELET # BLD AUTO: 163 10*3/MM3 (ref 150–450)
PMV BLD AUTO: 9.4 FL (ref 6–12)
POTASSIUM BLD-SCNC: 4.3 MMOL/L (ref 3.5–5.5)
PROT SERPL-MCNC: 6.5 G/DL (ref 5.7–8.2)
PROTHROMBIN TIME: 10.1 SECONDS (ref 9.6–11.5)
RBC # BLD AUTO: 3.68 10*6/MM3 (ref 3.89–5.14)
RH BLD: POSITIVE
SODIUM BLD-SCNC: 144 MMOL/L (ref 132–146)
WBC NRBC COR # BLD: 5.01 10*3/MM3 (ref 3.5–10.8)

## 2017-09-22 PROCEDURE — 93320 DOPPLER ECHO COMPLETE: CPT | Performed by: INTERNAL MEDICINE

## 2017-09-22 PROCEDURE — 86923 COMPATIBILITY TEST ELECTRIC: CPT

## 2017-09-22 PROCEDURE — 93325 DOPPLER ECHO COLOR FLOW MAPG: CPT | Performed by: INTERNAL MEDICINE

## 2017-09-22 PROCEDURE — 93312 ECHO TRANSESOPHAGEAL: CPT | Performed by: INTERNAL MEDICINE

## 2017-09-22 PROCEDURE — 94010 BREATHING CAPACITY TEST: CPT | Performed by: INTERNAL MEDICINE

## 2017-09-22 RX ORDER — FENTANYL CITRATE 50 UG/ML
INJECTION, SOLUTION INTRAMUSCULAR; INTRAVENOUS
Status: COMPLETED | OUTPATIENT
Start: 2017-09-22 | End: 2017-09-22

## 2017-09-22 RX ORDER — MIDAZOLAM HYDROCHLORIDE 1 MG/ML
INJECTION INTRAMUSCULAR; INTRAVENOUS
Status: COMPLETED | OUTPATIENT
Start: 2017-09-22 | End: 2017-09-22

## 2017-09-22 RX ADMIN — MIDAZOLAM HYDROCHLORIDE 2 MG: 1 INJECTION, SOLUTION INTRAMUSCULAR; INTRAVENOUS at 10:28

## 2017-09-22 RX ADMIN — FENTANYL CITRATE 50 MCG: 50 INJECTION, SOLUTION INTRAMUSCULAR; INTRAVENOUS at 10:23

## 2017-09-22 RX ADMIN — FENTANYL CITRATE 50 MCG: 50 INJECTION, SOLUTION INTRAMUSCULAR; INTRAVENOUS at 10:36

## 2017-09-22 RX ADMIN — FENTANYL CITRATE 50 MCG: 50 INJECTION, SOLUTION INTRAMUSCULAR; INTRAVENOUS at 10:29

## 2017-09-22 RX ADMIN — MIDAZOLAM HYDROCHLORIDE 2 MG: 1 INJECTION, SOLUTION INTRAMUSCULAR; INTRAVENOUS at 10:32

## 2017-09-22 RX ADMIN — MIDAZOLAM HYDROCHLORIDE 2 MG: 1 INJECTION, SOLUTION INTRAMUSCULAR; INTRAVENOUS at 10:21

## 2017-09-22 NOTE — H&P
[facility] Consult Note      Referring Provider: Yara Bobo,*  Primary Provider:  [unfilled]  Reason for Consultation: Atrial fibrillation    Patient Care Team:  EUGENIE Jimenez as PCP - General  Alec Kay MD as Surgeon (Cardiothoracic Surgery)  Yara Bobo MD as Consulting Physician (Cardiology)    Chief complaint Atrial fibrillation    Identification: 67-year-old white female    Problem list:    1. Atrial fibrillation, duration unknown; diagnosed August of 2013:  a. Status post ALANIS/external cardioversion, (10/18/2013), Yara Bobo M.D. with the initiation of propafenone therapy.   b. Atrial fibrillation, (11/13/2013), in [facility]’ office.  c. Normal sinus rhythm at the time of office visit, (11/21/2013).  d. Normal sinus rhythm, EKG, (06/05/2014).  e. Discontinuation of Rythmol secondary to diarrhea, (06/05/2014), with initiation of flecainide 50 mg b.i.d. and Eliquis 5 mg b.i.d.   f. CHADS Vasc= 4, (4/18/2017)   g. CT scan of the chest (06/29/2017): Mild fibrotic changes, o/w normal  h. Pulmonary Function Test (06/29/2017): FEV1/FVC  1.54/2.57 = 60%  i. Nuclear Stress Test (06/29/2017): EF>70%, no ischemia   2. Valvular heart disease:  a. Previous echocardiogram approximately, 2006 (incomplete data base).  b. Echocardiogram (08/15/2002), revealing moderate concentric LVH, hyperdynamic LV, EF 81%; mild MR, trace TR.  c. Echocardiogram (10/07/2008), revealed a normal LVEF with mild to moderate MR.  d. Echocardiogram, (07/01/2011): normal LV systolic function and wall motion with mild MR and mild TR.   3. Hypertension:  a. Adenosine Cardiolite, (06/18/2009): Normal systolic function without evidence of inducible ischemia.   4. Dyslipidemia.  5. Diabetes mellitus, Type 2 diagnosed, 2006.  6. Thyroid disease:  a. Hyperthyroidism status post radioactive iodine therapy.  b. Hypothyroidism on thyroid replacement.  7. Gastroesophageal reflux disease.  8. Chronic kidney  disease.  9. History of hemorrhoids status post hemorrhoidectomy.  10. Colon polyps status post polypectomy.  11. Anxiety.  12. Iron deficiency anemia.  13. Obstructive sleep apnea on chronic CPAP therapy.  14. Surgical history:  a. Total abdominal hysterectomy.    Allergies:  Sular [nisoldipine er]    Home/Current Medications:    •  aspirin 81 MG EC tablet, Take 81 mg by mouth 2 (Two) Times a Day., Disp: , Rfl:   •  atorvastatin (LIPITOR) 10 MG tablet, Take 10 mg by mouth Daily., Disp: , Rfl:   •  diltiaZEM (TIAZAC) 360 MG 24 hr capsule, Take 360 mg by mouth Daily., Disp: , Rfl:   •  doxazosin (CARDURA) 8 MG tablet, Take 1 tablet by mouth  daily as directed, Disp: 90 tablet, Rfl: 3  •  gabapentin (NEURONTIN) 800 MG tablet, Take 800 mg by mouth 2 (Two) Times a Day., Disp: , Rfl:   •  levothyroxine (SYNTHROID, LEVOTHROID) 150 MCG tablet, Take 150 mcg by mouth Daily., Disp: , Rfl:   •  lisinopril (PRINIVIL,ZESTRIL) 40 MG tablet, Take 40 mg by mouth Daily., Disp: , Rfl:   •  losartan-hydrochlorothiazide (HYZAAR) 100-25 MG per tablet, Take 1 tablet by mouth Daily., Disp: , Rfl:   •  metFORMIN (GLUCOPHAGE) 850 MG tablet, Take 850 mg by mouth 3 (Three) Times a Day With Meals., Disp: , Rfl:   •  omeprazole (priLOSEC) 40 MG capsule, Take 40 mg by mouth Daily., Disp: , Rfl:   •  PARoxetine (PAXIL) 30 MG tablet, Take 30 mg by mouth Daily., Disp: , Rfl:       History of present illness:  Patient is a pleasant 67-year-old female who presents today for ALANIS to further assess for any thrombus.  She is going to be having an epicardial surgical ablation with Dr. Kay for her atrial fibrillation.  Overall the patient is doing well from a cardiac standpoint.  She denies any current complaints of chest pain, shortness of breath, unusual dyspnea, fatigue, edema, dizziness and syncope.    Social History:  Social History     Social History   • Marital status:      Spouse name: N/A   • Number of children: 3   • Years of education:  N/A     Occupational History   • Housewife      Social History Main Topics   • Smoking status: Former Smoker     Packs/day: 0.50     Years: 15.00     Types: Cigarettes     Quit date: 10/18/2016   • Smokeless tobacco: Never Used   • Alcohol use No   • Drug use: No   • Sexual activity: Defer     Other Topics Concern   • Not on file     Social History Narrative     Family History:  Family History   Problem Relation Age of Onset   • Hypertension Mother    • Coronary artery disease Mother    • Kidney disease Father      Review of Systems  Pertinent positives are listed in the HPI.  All other systems reviewed are negative.       Objective     Vital Sign Min/Max for last 24 hours  No Data Recorded   BP  Min: 130/93  Max: 130/93   Pulse  Min: 67  Max: 69   No Data Recorded   SpO2  Min: 91 %  Max: 96 %   No Data Recorded   Weight  Min: 200 lb 6.4 oz (90.9 kg)  Max: 200 lb 6.4 oz (90.9 kg)         Physical Exam:    GENERAL: well-developed, well-nourished; in no acute distress.   NECK:  There is no jugular venous distention at 30°.  Carotid upstrokes are 2+ and  symmetrical without bruits.   LUNGS: Clear to auscultation bilaterally without wheezing, rhonchi, or rales noted.   CARDIOVASCULAR: The heart has a irregularly irregular rate with a normal S1 and S2. There is no murmur, gallop, rub, or click appreciated. The PMI is nondisplaced.   ABDOMEN: Soft and nontender  NEUROLOGICAL: Nonfocal; Alert and oriented  PERIPHERAL VASCULAR:  Posterior tibial and dorsalis pedis pulses are 2+ and symmetrical. There is no peripheral edema.   MUSCULOSKELETAL:  Normal ROM  SKIN:  Warm and dry  PSYCHIATRIC: normal mood and affect; behavior appropriate     Labs:      Results from last 7 days  Lab Units 09/22/17  0848   SODIUM mmol/L 144   POTASSIUM mmol/L 4.3   CHLORIDE mmol/L 106   CO2 mmol/L 31.0   BUN mg/dL 20   CREATININE mg/dL 1.10   CALCIUM mg/dL 9.1   BILIRUBIN mg/dL 0.5   ALK PHOS U/L 71   ALT (SGPT) U/L 20   AST (SGOT) U/L 18    GLUCOSE mg/dL 141*       Lab Results (last 24 hours)     ** No results found for the last 24 hours. **             Lab Results   Component Value Date    INR 0.93 09/22/2017    INR 0.99 07/30/2017    PROTIME 10.1 09/22/2017    PROTIME 10.8 07/30/2017       Results Review:  I reviewed the patients new clinical results.      Assessment:  1.  Persistent atrial fibrillation  2.  Essential hypertension.      Plan:  ALANIS today with Dr. Yara Bobo.  The risks, benefits, potential complications all been discussed with the patient and she is agreeable to proceed.    I discussed the patients findings and my recommendations with patient.    Scribed for Yara Bobo MD by EUGENIE Eagle on September 22, 2017 at 10:27 AM       EUGENIE Garcia  09/22/17  10:21 AM        I Yara Bobo MD personally performed the services described in this documentation as scribed by the above individual in my presence, and it is both accurate and complete.    Yara Bobo MD, FACC

## 2017-09-25 ENCOUNTER — ANESTHESIA EVENT (OUTPATIENT)
Dept: PERIOP | Facility: HOSPITAL | Age: 67
End: 2017-09-25

## 2017-09-25 ENCOUNTER — ANESTHESIA (OUTPATIENT)
Dept: PERIOP | Facility: HOSPITAL | Age: 67
End: 2017-09-25

## 2017-09-25 ENCOUNTER — APPOINTMENT (OUTPATIENT)
Dept: GENERAL RADIOLOGY | Facility: HOSPITAL | Age: 67
End: 2017-09-25

## 2017-09-25 ENCOUNTER — HOSPITAL ENCOUNTER (INPATIENT)
Facility: HOSPITAL | Age: 67
LOS: 2 days | Discharge: HOME | End: 2017-09-27
Attending: THORACIC SURGERY (CARDIOTHORACIC VASCULAR SURGERY) | Admitting: THORACIC SURGERY (CARDIOTHORACIC VASCULAR SURGERY)

## 2017-09-25 DIAGNOSIS — I48.91 ATRIAL FIBRILLATION, UNSPECIFIED TYPE (HCC): ICD-10-CM

## 2017-09-25 DIAGNOSIS — Z74.09 IMPAIRED FUNCTIONAL MOBILITY, BALANCE, GAIT, AND ENDURANCE: Primary | ICD-10-CM

## 2017-09-25 PROBLEM — J95.821 POSTOPERATIVE RESPIRATORY FAILURE: Status: ACTIVE | Noted: 2017-09-25

## 2017-09-25 LAB
ACT BLD: 136 SECONDS (ref 82–152)
ALBUMIN SERPL-MCNC: 3.7 G/DL (ref 3.2–4.8)
ALBUMIN SERPL-MCNC: 3.9 G/DL (ref 3.2–4.8)
AMORPH URATE CRY URNS QL MICRO: ABNORMAL /HPF
ANION GAP SERPL CALCULATED.3IONS-SCNC: 14 MMOL/L (ref 3–11)
ANION GAP SERPL CALCULATED.3IONS-SCNC: 14 MMOL/L (ref 3–11)
APTT PPP: 24.3 SECONDS (ref 24–31)
ARTERIAL PATENCY WRIST A: ABNORMAL
ATMOSPHERIC PRESS: ABNORMAL MMHG
BACTERIA UR QL AUTO: ABNORMAL /HPF
BASE EXCESS BLDA CALC-SCNC: -3.1 MMOL/L (ref 0–2)
BASE EXCESS BLDA CALC-SCNC: -3.6 MMOL/L (ref 0–2)
BASE EXCESS BLDA CALC-SCNC: -5.3 MMOL/L (ref 0–2)
BASE EXCESS BLDA CALC-SCNC: 5 MMOL/L (ref -5–5)
BDY SITE: ABNORMAL
BILIRUB UR QL STRIP: NEGATIVE
BUN BLD-MCNC: 15 MG/DL (ref 9–23)
BUN BLD-MCNC: 17 MG/DL (ref 9–23)
BUN/CREAT SERPL: 10.7 (ref 7–25)
BUN/CREAT SERPL: 11.3 (ref 7–25)
CA-I BLDA-SCNC: 1.21 MMOL/L (ref 1.2–1.32)
CA-I SERPL ISE-MCNC: 1.12 MMOL/L (ref 1.12–1.32)
CALCIUM SPEC-SCNC: 8.4 MG/DL (ref 8.7–10.4)
CALCIUM SPEC-SCNC: 8.6 MG/DL (ref 8.7–10.4)
CHLORIDE SERPL-SCNC: 103 MMOL/L (ref 99–109)
CHLORIDE SERPL-SCNC: 104 MMOL/L (ref 99–109)
CLARITY UR: ABNORMAL
CO2 BLDA-SCNC: 21.2 MMOL/L (ref 22–33)
CO2 BLDA-SCNC: 24.8 MMOL/L (ref 22–33)
CO2 BLDA-SCNC: 25.7 MMOL/L (ref 22–33)
CO2 BLDA-SCNC: 31 MMOL/L (ref 24–29)
CO2 SERPL-SCNC: 21 MMOL/L (ref 20–31)
CO2 SERPL-SCNC: 22 MMOL/L (ref 20–31)
COHGB MFR BLD: 0.6 % (ref 0–2)
COHGB MFR BLD: 0.7 % (ref 0–2)
COHGB MFR BLD: 0.7 % (ref 0–2)
COLOR UR: YELLOW
CREAT BLD-MCNC: 1.4 MG/DL (ref 0.6–1.3)
CREAT BLD-MCNC: 1.5 MG/DL (ref 0.6–1.3)
DEPRECATED RDW RBC AUTO: 49.4 FL (ref 37–54)
DEPRECATED RDW RBC AUTO: 49.4 FL (ref 37–54)
ERYTHROCYTE [DISTWIDTH] IN BLOOD BY AUTOMATED COUNT: 13.2 % (ref 11.3–14.5)
ERYTHROCYTE [DISTWIDTH] IN BLOOD BY AUTOMATED COUNT: 13.3 % (ref 11.3–14.5)
GFR SERPL CREATININE-BSD FRML MDRD: 35 ML/MIN/1.73
GFR SERPL CREATININE-BSD FRML MDRD: 38 ML/MIN/1.73
GLUCOSE BLD-MCNC: 434 MG/DL (ref 70–100)
GLUCOSE BLD-MCNC: 470 MG/DL (ref 70–100)
GLUCOSE BLDC GLUCOMTR-MCNC: 179 MG/DL (ref 70–130)
GLUCOSE BLDC GLUCOMTR-MCNC: 221 MG/DL (ref 70–130)
GLUCOSE BLDC GLUCOMTR-MCNC: 251 MG/DL (ref 70–130)
GLUCOSE BLDC GLUCOMTR-MCNC: 284 MG/DL (ref 70–130)
GLUCOSE BLDC GLUCOMTR-MCNC: 326 MG/DL (ref 70–130)
GLUCOSE BLDC GLUCOMTR-MCNC: 361 MG/DL (ref 70–130)
GLUCOSE BLDC GLUCOMTR-MCNC: 400 MG/DL (ref 70–130)
GLUCOSE BLDC GLUCOMTR-MCNC: 415 MG/DL (ref 70–130)
GLUCOSE BLDC GLUCOMTR-MCNC: 424 MG/DL (ref 70–130)
GLUCOSE BLDC GLUCOMTR-MCNC: 431 MG/DL (ref 70–130)
GLUCOSE UR STRIP-MCNC: ABNORMAL MG/DL
HCO3 BLDA-SCNC: 20 MMOL/L (ref 20–26)
HCO3 BLDA-SCNC: 23.3 MMOL/L (ref 20–26)
HCO3 BLDA-SCNC: 24.1 MMOL/L (ref 20–26)
HCO3 BLDA-SCNC: 29.5 MMOL/L (ref 22–26)
HCT VFR BLD AUTO: 36.8 % (ref 34.5–44)
HCT VFR BLD AUTO: 36.9 % (ref 34.5–44)
HCT VFR BLD CALC: 36.8 %
HCT VFR BLD CALC: 37.3 %
HCT VFR BLD CALC: 37.4 %
HCT VFR BLDA CALC: 36 % (ref 38–51)
HGB BLD-MCNC: 11.6 G/DL (ref 11.5–15.5)
HGB BLD-MCNC: 11.8 G/DL (ref 11.5–15.5)
HGB BLDA-MCNC: 12 G/DL (ref 14–18)
HGB BLDA-MCNC: 12.2 G/DL (ref 12–17)
HGB BLDA-MCNC: 12.2 G/DL (ref 14–18)
HGB BLDA-MCNC: 12.2 G/DL (ref 14–18)
HGB UR QL STRIP.AUTO: NEGATIVE
HOROWITZ INDEX BLD+IHG-RTO: 100 %
HOROWITZ INDEX BLD+IHG-RTO: 55 %
HOROWITZ INDEX BLD+IHG-RTO: 65 %
HYALINE CASTS UR QL AUTO: ABNORMAL /LPF
KETONES UR QL STRIP: NEGATIVE
LEUKOCYTE ESTERASE UR QL STRIP.AUTO: NEGATIVE
MAGNESIUM SERPL-MCNC: 1.4 MG/DL (ref 1.3–2.7)
MAGNESIUM SERPL-MCNC: 1.4 MG/DL (ref 1.3–2.7)
MCH RBC QN AUTO: 31.9 PG (ref 27–31)
MCH RBC QN AUTO: 32.4 PG (ref 27–31)
MCHC RBC AUTO-ENTMCNC: 31.4 G/DL (ref 32–36)
MCHC RBC AUTO-ENTMCNC: 32.1 G/DL (ref 32–36)
MCV RBC AUTO: 101.1 FL (ref 80–99)
MCV RBC AUTO: 101.4 FL (ref 80–99)
METHGB BLD QL: 0.8 % (ref 0–1.5)
METHGB BLD QL: 0.9 % (ref 0–1.5)
METHGB BLD QL: 0.9 % (ref 0–1.5)
MODALITY: ABNORMAL
NITRITE UR QL STRIP: NEGATIVE
OXYHGB MFR BLDV: 88.7 % (ref 94–99)
OXYHGB MFR BLDV: 93.1 % (ref 94–99)
OXYHGB MFR BLDV: 97 % (ref 94–99)
PCO2 BLDA: 37.5 MM HG (ref 35–45)
PCO2 BLDA: 42.7 MM HG (ref 35–45)
PCO2 BLDA: 48.9 MM HG (ref 35–45)
PCO2 BLDA: 51.5 MM HG (ref 35–45)
PH BLDA: 7.28 PH UNITS (ref 7.35–7.45)
PH BLDA: 7.29 PH UNITS (ref 7.35–7.45)
PH BLDA: 7.34 PH UNITS (ref 7.35–7.45)
PH BLDA: 7.45 PH UNITS (ref 7.35–7.6)
PH UR STRIP.AUTO: <=5 [PH] (ref 5–8)
PHOSPHATE SERPL-MCNC: 4.4 MG/DL (ref 2.4–5.1)
PHOSPHATE SERPL-MCNC: 5 MG/DL (ref 2.4–5.1)
PLATELET # BLD AUTO: 162 10*3/MM3 (ref 150–450)
PLATELET # BLD AUTO: 168 10*3/MM3 (ref 150–450)
PMV BLD AUTO: 9.7 FL (ref 6–12)
PMV BLD AUTO: 9.7 FL (ref 6–12)
PO2 BLDA: 64.7 MM HG (ref 83–108)
PO2 BLDA: 65 MMHG (ref 80–105)
PO2 BLDA: 81.9 MM HG (ref 83–108)
PO2 BLDA: 91.7 MM HG (ref 83–108)
POTASSIUM BLD-SCNC: 4.2 MMOL/L (ref 3.5–5.5)
POTASSIUM BLD-SCNC: 4.3 MMOL/L (ref 3.5–5.5)
POTASSIUM BLDA-SCNC: 4.6 MMOL/L (ref 3.5–4.9)
PROT UR QL STRIP: ABNORMAL
RBC # BLD AUTO: 3.64 10*6/MM3 (ref 3.89–5.14)
RBC # BLD AUTO: 3.64 10*6/MM3 (ref 3.89–5.14)
RBC # UR: ABNORMAL /HPF
REF LAB TEST METHOD: ABNORMAL
SAO2 % BLDA: 93 % (ref 95–98)
SAO2 % BLDCOA: 88.7 %
SAO2 % BLDCOA: 93.1 %
SAO2 % BLDCOA: 97 %
SODIUM BLD-SCNC: 139 MMOL/L (ref 132–146)
SODIUM BLD-SCNC: 139 MMOL/L (ref 132–146)
SODIUM BLDA-SCNC: 140 MMOL/L (ref 138–146)
SP GR UR STRIP: 1.03 (ref 1–1.03)
SQUAMOUS #/AREA URNS HPF: ABNORMAL /HPF
UROBILINOGEN UR QL STRIP: ABNORMAL
WBC NRBC COR # BLD: 8.18 10*3/MM3 (ref 3.5–10.8)
WBC NRBC COR # BLD: 9.03 10*3/MM3 (ref 3.5–10.8)
WBC UR QL AUTO: ABNORMAL /HPF

## 2017-09-25 PROCEDURE — 94640 AIRWAY INHALATION TREATMENT: CPT

## 2017-09-25 PROCEDURE — 81001 URINALYSIS AUTO W/SCOPE: CPT | Performed by: PHYSICIAN ASSISTANT

## 2017-09-25 PROCEDURE — 25010000002 ONDANSETRON PER 1 MG: Performed by: NURSE ANESTHETIST, CERTIFIED REGISTERED

## 2017-09-25 PROCEDURE — 25010000002 PROPOFOL 10 MG/ML EMULSION: Performed by: NURSE ANESTHETIST, CERTIFIED REGISTERED

## 2017-09-25 PROCEDURE — 94002 VENT MGMT INPAT INIT DAY: CPT

## 2017-09-25 PROCEDURE — 87086 URINE CULTURE/COLONY COUNT: CPT | Performed by: PHYSICIAN ASSISTANT

## 2017-09-25 PROCEDURE — 25010000002 CEFUROXIME: Performed by: THORACIC SURGERY (CARDIOTHORACIC VASCULAR SURGERY)

## 2017-09-25 PROCEDURE — 33254 ABLATE ATRIA LMTD: CPT | Performed by: PHYSICIAN ASSISTANT

## 2017-09-25 PROCEDURE — 25010000002 FENTANYL CITRATE (PF) 100 MCG/2ML SOLUTION: Performed by: NURSE ANESTHETIST, CERTIFIED REGISTERED

## 2017-09-25 PROCEDURE — 25010000002 METHYLPREDNISOLONE PER 40 MG: Performed by: INTERNAL MEDICINE

## 2017-09-25 PROCEDURE — 94760 N-INVAS EAR/PLS OXIMETRY 1: CPT

## 2017-09-25 PROCEDURE — 25010000002 DEXAMETHASONE PER 1 MG: Performed by: NURSE ANESTHETIST, CERTIFIED REGISTERED

## 2017-09-25 PROCEDURE — 84132 ASSAY OF SERUM POTASSIUM: CPT

## 2017-09-25 PROCEDURE — 25010000003 CEFAZOLIN IN DEXTROSE 2-4 GM/100ML-% SOLUTION: Performed by: NURSE ANESTHETIST, CERTIFIED REGISTERED

## 2017-09-25 PROCEDURE — 85730 THROMBOPLASTIN TIME PARTIAL: CPT | Performed by: PHYSICIAN ASSISTANT

## 2017-09-25 PROCEDURE — 99291 CRITICAL CARE FIRST HOUR: CPT | Performed by: INTERNAL MEDICINE

## 2017-09-25 PROCEDURE — 85027 COMPLETE CBC AUTOMATED: CPT | Performed by: PHYSICIAN ASSISTANT

## 2017-09-25 PROCEDURE — 02L74CK OCCLUSION OF LEFT ATRIAL APPENDAGE WITH EXTRALUMINAL DEVICE, PERCUTANEOUS ENDOSCOPIC APPROACH: ICD-10-PCS | Performed by: THORACIC SURGERY (CARDIOTHORACIC VASCULAR SURGERY)

## 2017-09-25 PROCEDURE — C1769 GUIDE WIRE: HCPCS | Performed by: THORACIC SURGERY (CARDIOTHORACIC VASCULAR SURGERY)

## 2017-09-25 PROCEDURE — 94660 CPAP INITIATION&MGMT: CPT

## 2017-09-25 PROCEDURE — 02584ZZ DESTRUCTION OF CONDUCTION MECHANISM, PERCUTANEOUS ENDOSCOPIC APPROACH: ICD-10-PCS | Performed by: THORACIC SURGERY (CARDIOTHORACIC VASCULAR SURGERY)

## 2017-09-25 PROCEDURE — 85347 COAGULATION TIME ACTIVATED: CPT

## 2017-09-25 PROCEDURE — 25010000002 CEFUROXIME: Performed by: PHYSICIAN ASSISTANT

## 2017-09-25 PROCEDURE — C1751 CATH, INF, PER/CENT/MIDLINE: HCPCS | Performed by: ANESTHESIOLOGY

## 2017-09-25 PROCEDURE — 25010000002 PHENYLEPHRINE PER 1 ML: Performed by: NURSE ANESTHETIST, CERTIFIED REGISTERED

## 2017-09-25 PROCEDURE — 25010000002 NEOSTIGMINE 10 MG/10ML SOLUTION: Performed by: NURSE ANESTHETIST, CERTIFIED REGISTERED

## 2017-09-25 PROCEDURE — 94799 UNLISTED PULMONARY SVC/PX: CPT

## 2017-09-25 PROCEDURE — 80048 BASIC METABOLIC PNL TOTAL CA: CPT | Performed by: INTERNAL MEDICINE

## 2017-09-25 PROCEDURE — 93005 ELECTROCARDIOGRAM TRACING: CPT | Performed by: PHYSICIAN ASSISTANT

## 2017-09-25 PROCEDURE — 25010000002 KETOROLAC TROMETHAMINE PER 15 MG: Performed by: THORACIC SURGERY (CARDIOTHORACIC VASCULAR SURGERY)

## 2017-09-25 PROCEDURE — 82330 ASSAY OF CALCIUM: CPT

## 2017-09-25 PROCEDURE — 82330 ASSAY OF CALCIUM: CPT | Performed by: PHYSICIAN ASSISTANT

## 2017-09-25 PROCEDURE — 85014 HEMATOCRIT: CPT

## 2017-09-25 PROCEDURE — 83735 ASSAY OF MAGNESIUM: CPT | Performed by: PHYSICIAN ASSISTANT

## 2017-09-25 PROCEDURE — 25810000003 DEXTROSE 5 % WITH KCL 20 MEQ 20-5 MEQ/L-% SOLUTION: Performed by: PHYSICIAN ASSISTANT

## 2017-09-25 PROCEDURE — 25010000002 INSULIN REGULAR HUMAN PER 5 UNITS: Performed by: INTERNAL MEDICINE

## 2017-09-25 PROCEDURE — 71010 HC CHEST PA OR AP: CPT

## 2017-09-25 PROCEDURE — 33254 ABLATE ATRIA LMTD: CPT | Performed by: THORACIC SURGERY (CARDIOTHORACIC VASCULAR SURGERY)

## 2017-09-25 PROCEDURE — 93010 ELECTROCARDIOGRAM REPORT: CPT | Performed by: INTERNAL MEDICINE

## 2017-09-25 PROCEDURE — 82805 BLOOD GASES W/O2 SATURATION: CPT | Performed by: THORACIC SURGERY (CARDIOTHORACIC VASCULAR SURGERY)

## 2017-09-25 PROCEDURE — 82803 BLOOD GASES ANY COMBINATION: CPT

## 2017-09-25 PROCEDURE — 25010000002 MORPHINE SULFATE (PF) 2 MG/ML SOLUTION: Performed by: THORACIC SURGERY (CARDIOTHORACIC VASCULAR SURGERY)

## 2017-09-25 PROCEDURE — 80069 RENAL FUNCTION PANEL: CPT | Performed by: PHYSICIAN ASSISTANT

## 2017-09-25 PROCEDURE — 82947 ASSAY GLUCOSE BLOOD QUANT: CPT

## 2017-09-25 PROCEDURE — 84295 ASSAY OF SERUM SODIUM: CPT

## 2017-09-25 PROCEDURE — 82805 BLOOD GASES W/O2 SATURATION: CPT | Performed by: PHYSICIAN ASSISTANT

## 2017-09-25 PROCEDURE — 5A09357 ASSISTANCE WITH RESPIRATORY VENTILATION, LESS THAN 24 CONSECUTIVE HOURS, CONTINUOUS POSITIVE AIRWAY PRESSURE: ICD-10-PCS | Performed by: THORACIC SURGERY (CARDIOTHORACIC VASCULAR SURGERY)

## 2017-09-25 DEVICE — IMPLANTABLE DEVICE: Type: IMPLANTABLE DEVICE | Status: FUNCTIONAL

## 2017-09-25 RX ORDER — LIDOCAINE HYDROCHLORIDE 40 MG/ML
SOLUTION TOPICAL AS NEEDED
Status: DISCONTINUED | OUTPATIENT
Start: 2017-09-25 | End: 2017-09-25 | Stop reason: SURG

## 2017-09-25 RX ORDER — ONDANSETRON 2 MG/ML
4 INJECTION INTRAMUSCULAR; INTRAVENOUS EVERY 6 HOURS PRN
Status: DISCONTINUED | OUTPATIENT
Start: 2017-09-25 | End: 2017-09-27 | Stop reason: HOSPADM

## 2017-09-25 RX ORDER — METHYLPREDNISOLONE SODIUM SUCCINATE 40 MG/ML
20 INJECTION, POWDER, LYOPHILIZED, FOR SOLUTION INTRAMUSCULAR; INTRAVENOUS ONCE
Status: COMPLETED | OUTPATIENT
Start: 2017-09-25 | End: 2017-09-25

## 2017-09-25 RX ORDER — MAGNESIUM SULFATE HEPTAHYDRATE 40 MG/ML
4 INJECTION, SOLUTION INTRAVENOUS AS NEEDED
Status: DISCONTINUED | OUTPATIENT
Start: 2017-09-25 | End: 2017-09-27 | Stop reason: HOSPADM

## 2017-09-25 RX ORDER — KETOROLAC TROMETHAMINE 30 MG/ML
15 INJECTION, SOLUTION INTRAMUSCULAR; INTRAVENOUS EVERY 8 HOURS SCHEDULED
Status: DISCONTINUED | OUTPATIENT
Start: 2017-09-25 | End: 2017-09-26

## 2017-09-25 RX ORDER — NEOSTIGMINE METHYLSULFATE 1 MG/ML
INJECTION, SOLUTION INTRAVENOUS AS NEEDED
Status: DISCONTINUED | OUTPATIENT
Start: 2017-09-25 | End: 2017-09-25 | Stop reason: SURG

## 2017-09-25 RX ORDER — DEXAMETHASONE SODIUM PHOSPHATE 10 MG/ML
INJECTION INTRAMUSCULAR; INTRAVENOUS AS NEEDED
Status: DISCONTINUED | OUTPATIENT
Start: 2017-09-25 | End: 2017-09-25 | Stop reason: SURG

## 2017-09-25 RX ORDER — PANTOPRAZOLE SODIUM 40 MG/1
40 TABLET, DELAYED RELEASE ORAL EVERY MORNING
Status: DISCONTINUED | OUTPATIENT
Start: 2017-09-26 | End: 2017-09-27 | Stop reason: HOSPADM

## 2017-09-25 RX ORDER — SODIUM CHLORIDE, SODIUM LACTATE, POTASSIUM CHLORIDE, CALCIUM CHLORIDE 600; 310; 30; 20 MG/100ML; MG/100ML; MG/100ML; MG/100ML
20 INJECTION, SOLUTION INTRAVENOUS CONTINUOUS
Status: DISCONTINUED | OUTPATIENT
Start: 2017-09-25 | End: 2017-09-25

## 2017-09-25 RX ORDER — HYDROMORPHONE HYDROCHLORIDE 1 MG/ML
0.5 INJECTION, SOLUTION INTRAMUSCULAR; INTRAVENOUS; SUBCUTANEOUS
Status: DISCONTINUED | OUTPATIENT
Start: 2017-09-25 | End: 2017-09-25

## 2017-09-25 RX ORDER — SENNA AND DOCUSATE SODIUM 50; 8.6 MG/1; MG/1
2 TABLET, FILM COATED ORAL 2 TIMES DAILY
Status: DISCONTINUED | OUTPATIENT
Start: 2017-09-25 | End: 2017-09-27 | Stop reason: HOSPADM

## 2017-09-25 RX ORDER — LEVOTHYROXINE SODIUM ANHYDROUS 100 UG/5ML
75 INJECTION, POWDER, LYOPHILIZED, FOR SOLUTION INTRAVENOUS
Status: DISCONTINUED | OUTPATIENT
Start: 2017-09-26 | End: 2017-09-26

## 2017-09-25 RX ORDER — NITROGLYCERIN 20 MG/100ML
5-200 INJECTION INTRAVENOUS CONTINUOUS PRN
Status: DISCONTINUED | OUTPATIENT
Start: 2017-09-25 | End: 2017-09-26

## 2017-09-25 RX ORDER — CEFAZOLIN SODIUM 2 G/100ML
INJECTION, SOLUTION INTRAVENOUS AS NEEDED
Status: DISCONTINUED | OUTPATIENT
Start: 2017-09-25 | End: 2017-09-25 | Stop reason: SURG

## 2017-09-25 RX ORDER — HYDROCODONE BITARTRATE AND ACETAMINOPHEN 7.5; 325 MG/1; MG/1
1 TABLET ORAL EVERY 4 HOURS PRN
Status: DISCONTINUED | OUTPATIENT
Start: 2017-09-25 | End: 2017-09-27 | Stop reason: HOSPADM

## 2017-09-25 RX ORDER — ASPIRIN 325 MG
325 TABLET ORAL ONCE
Status: DISCONTINUED | OUTPATIENT
Start: 2017-09-25 | End: 2017-09-25

## 2017-09-25 RX ORDER — POTASSIUM CHLORIDE 750 MG/1
40 CAPSULE, EXTENDED RELEASE ORAL AS NEEDED
Status: DISCONTINUED | OUTPATIENT
Start: 2017-09-25 | End: 2017-09-27 | Stop reason: HOSPADM

## 2017-09-25 RX ORDER — DEXMEDETOMIDINE HYDROCHLORIDE 4 UG/ML
.2-1.5 INJECTION, SOLUTION INTRAVENOUS CONTINUOUS PRN
Status: DISCONTINUED | OUTPATIENT
Start: 2017-09-25 | End: 2017-09-26

## 2017-09-25 RX ORDER — POTASSIUM CHLORIDE, DEXTROSE MONOHYDRATE 150; 5 MG/100ML; G/100ML
30 INJECTION, SOLUTION INTRAVENOUS CONTINUOUS
Status: DISCONTINUED | OUTPATIENT
Start: 2017-09-25 | End: 2017-09-26

## 2017-09-25 RX ORDER — ATORVASTATIN CALCIUM 40 MG/1
40 TABLET, FILM COATED ORAL NIGHTLY
Status: DISCONTINUED | OUTPATIENT
Start: 2017-09-25 | End: 2017-09-26

## 2017-09-25 RX ORDER — METHYLPREDNISOLONE 4 MG/1
4 TABLET ORAL
Status: COMPLETED | OUTPATIENT
Start: 2017-09-26 | End: 2017-09-26

## 2017-09-25 RX ORDER — NALOXONE HCL 0.4 MG/ML
0.4 VIAL (ML) INJECTION
Status: DISCONTINUED | OUTPATIENT
Start: 2017-09-25 | End: 2017-09-26

## 2017-09-25 RX ORDER — MAGNESIUM SULFATE HEPTAHYDRATE 40 MG/ML
2 INJECTION, SOLUTION INTRAVENOUS AS NEEDED
Status: DISCONTINUED | OUTPATIENT
Start: 2017-09-25 | End: 2017-09-27 | Stop reason: HOSPADM

## 2017-09-25 RX ORDER — CHLORHEXIDINE GLUCONATE 0.12 MG/ML
15 RINSE ORAL EVERY 12 HOURS SCHEDULED
Status: DISCONTINUED | OUTPATIENT
Start: 2017-09-25 | End: 2017-09-27 | Stop reason: HOSPADM

## 2017-09-25 RX ORDER — METHYLPREDNISOLONE 4 MG/1
4 TABLET ORAL
Status: DISCONTINUED | OUTPATIENT
Start: 2017-09-30 | End: 2017-09-27 | Stop reason: HOSPADM

## 2017-09-25 RX ORDER — LIDOCAINE HYDROCHLORIDE 10 MG/ML
0.2 INJECTION, SOLUTION INFILTRATION; PERINEURAL ONCE
Status: COMPLETED | OUTPATIENT
Start: 2017-09-25 | End: 2017-09-25

## 2017-09-25 RX ORDER — SODIUM CHLORIDE, SODIUM LACTATE, POTASSIUM CHLORIDE, CALCIUM CHLORIDE 600; 310; 30; 20 MG/100ML; MG/100ML; MG/100ML; MG/100ML
INJECTION, SOLUTION INTRAVENOUS CONTINUOUS PRN
Status: DISCONTINUED | OUTPATIENT
Start: 2017-09-25 | End: 2017-09-25 | Stop reason: SURG

## 2017-09-25 RX ORDER — PROTAMINE SULFATE 10 MG/ML
50 INJECTION, SOLUTION INTRAVENOUS ONCE
Status: DISCONTINUED | OUTPATIENT
Start: 2017-09-25 | End: 2017-09-25

## 2017-09-25 RX ORDER — FENTANYL CITRATE 50 UG/ML
INJECTION, SOLUTION INTRAMUSCULAR; INTRAVENOUS AS NEEDED
Status: DISCONTINUED | OUTPATIENT
Start: 2017-09-25 | End: 2017-09-25 | Stop reason: SURG

## 2017-09-25 RX ORDER — ALBUMIN, HUMAN INJ 5% 5 %
500 SOLUTION INTRAVENOUS AS NEEDED
Status: DISCONTINUED | OUTPATIENT
Start: 2017-09-25 | End: 2017-09-26

## 2017-09-25 RX ORDER — LEVOTHYROXINE SODIUM 0.15 MG/1
150 TABLET ORAL
Status: DISCONTINUED | OUTPATIENT
Start: 2017-09-26 | End: 2017-09-25

## 2017-09-25 RX ORDER — POTASSIUM CHLORIDE 1.5 G/1.77G
40 POWDER, FOR SOLUTION ORAL AS NEEDED
Status: DISCONTINUED | OUTPATIENT
Start: 2017-09-25 | End: 2017-09-27 | Stop reason: HOSPADM

## 2017-09-25 RX ORDER — FENTANYL CITRATE 50 UG/ML
25 INJECTION, SOLUTION INTRAMUSCULAR; INTRAVENOUS
Status: DISCONTINUED | OUTPATIENT
Start: 2017-09-25 | End: 2017-09-26

## 2017-09-25 RX ORDER — ATORVASTATIN CALCIUM 10 MG/1
10 TABLET, FILM COATED ORAL NIGHTLY
Status: DISCONTINUED | OUTPATIENT
Start: 2017-09-25 | End: 2017-09-25

## 2017-09-25 RX ORDER — METHYLPREDNISOLONE 4 MG/1
8 TABLET ORAL
Status: DISCONTINUED | OUTPATIENT
Start: 2017-09-25 | End: 2017-09-25

## 2017-09-25 RX ORDER — FENTANYL CITRATE 50 UG/ML
50 INJECTION, SOLUTION INTRAMUSCULAR; INTRAVENOUS
Status: DISCONTINUED | OUTPATIENT
Start: 2017-09-25 | End: 2017-09-25

## 2017-09-25 RX ORDER — MORPHINE SULFATE 2 MG/ML
2 INJECTION, SOLUTION INTRAMUSCULAR; INTRAVENOUS
Status: DISCONTINUED | OUTPATIENT
Start: 2017-09-25 | End: 2017-09-26

## 2017-09-25 RX ORDER — ROCURONIUM BROMIDE 10 MG/ML
INJECTION, SOLUTION INTRAVENOUS AS NEEDED
Status: DISCONTINUED | OUTPATIENT
Start: 2017-09-25 | End: 2017-09-25 | Stop reason: SURG

## 2017-09-25 RX ORDER — DOCUSATE SODIUM 100 MG/1
100 CAPSULE, LIQUID FILLED ORAL 2 TIMES DAILY PRN
Status: DISCONTINUED | OUTPATIENT
Start: 2017-09-25 | End: 2017-09-27 | Stop reason: HOSPADM

## 2017-09-25 RX ORDER — TERAZOSIN 5 MG/1
10 CAPSULE ORAL NIGHTLY
Status: DISCONTINUED | OUTPATIENT
Start: 2017-09-25 | End: 2017-09-27 | Stop reason: HOSPADM

## 2017-09-25 RX ORDER — CHLORHEXIDINE GLUCONATE 500 MG/1
1 CLOTH TOPICAL EVERY 12 HOURS PRN
Status: DISCONTINUED | OUTPATIENT
Start: 2017-09-25 | End: 2017-09-25

## 2017-09-25 RX ORDER — SODIUM CHLORIDE 0.9 % (FLUSH) 0.9 %
30 SYRINGE (ML) INJECTION ONCE AS NEEDED
Status: DISCONTINUED | OUTPATIENT
Start: 2017-09-25 | End: 2017-09-26

## 2017-09-25 RX ORDER — MINERAL OIL AND WHITE PETROLATUM 150; 830 MG/G; MG/G
OINTMENT OPHTHALMIC
Status: DISCONTINUED | OUTPATIENT
Start: 2017-09-25 | End: 2017-09-27 | Stop reason: HOSPADM

## 2017-09-25 RX ORDER — METHYLPREDNISOLONE 4 MG/1
4 TABLET ORAL
Status: DISCONTINUED | OUTPATIENT
Start: 2017-09-28 | End: 2017-09-27 | Stop reason: HOSPADM

## 2017-09-25 RX ORDER — SODIUM CHLORIDE 9 MG/ML
30 INJECTION, SOLUTION INTRAVENOUS CONTINUOUS PRN
Status: DISCONTINUED | OUTPATIENT
Start: 2017-09-25 | End: 2017-09-26

## 2017-09-25 RX ORDER — WARFARIN SODIUM 5 MG/1
5 TABLET ORAL
Status: DISCONTINUED | OUTPATIENT
Start: 2017-09-26 | End: 2017-09-26

## 2017-09-25 RX ORDER — SUCRALFATE 1 G/1
1 TABLET ORAL
Status: DISCONTINUED | OUTPATIENT
Start: 2017-09-25 | End: 2017-09-27 | Stop reason: HOSPADM

## 2017-09-25 RX ORDER — DILTIAZEM HYDROCHLORIDE 180 MG/1
360 CAPSULE, COATED, EXTENDED RELEASE ORAL
Status: DISCONTINUED | OUTPATIENT
Start: 2017-09-26 | End: 2017-09-27 | Stop reason: HOSPADM

## 2017-09-25 RX ORDER — POTASSIUM CHLORIDE 29.8 MG/ML
20 INJECTION INTRAVENOUS
Status: DISCONTINUED | OUTPATIENT
Start: 2017-09-25 | End: 2017-09-27 | Stop reason: HOSPADM

## 2017-09-25 RX ORDER — ATRACURIUM BESYLATE 10 MG/ML
INJECTION, SOLUTION INTRAVENOUS AS NEEDED
Status: DISCONTINUED | OUTPATIENT
Start: 2017-09-25 | End: 2017-09-25 | Stop reason: SURG

## 2017-09-25 RX ORDER — FAMOTIDINE 20 MG/1
20 TABLET, FILM COATED ORAL ONCE
Status: COMPLETED | OUTPATIENT
Start: 2017-09-25 | End: 2017-09-25

## 2017-09-25 RX ORDER — METHYLPREDNISOLONE 4 MG/1
4 TABLET ORAL
Status: DISCONTINUED | OUTPATIENT
Start: 2017-09-25 | End: 2017-09-25

## 2017-09-25 RX ORDER — BISACODYL 10 MG
10 SUPPOSITORY, RECTAL RECTAL DAILY PRN
Status: DISCONTINUED | OUTPATIENT
Start: 2017-09-26 | End: 2017-09-27 | Stop reason: HOSPADM

## 2017-09-25 RX ORDER — PHENYLEPHRINE HCL IN 0.9% NACL 0.5 MG/5ML
.5-3 SYRINGE (ML) INTRAVENOUS CONTINUOUS PRN
Status: DISCONTINUED | OUTPATIENT
Start: 2017-09-25 | End: 2017-09-26

## 2017-09-25 RX ORDER — ONDANSETRON 2 MG/ML
INJECTION INTRAMUSCULAR; INTRAVENOUS AS NEEDED
Status: DISCONTINUED | OUTPATIENT
Start: 2017-09-25 | End: 2017-09-25 | Stop reason: SURG

## 2017-09-25 RX ORDER — METHYLPREDNISOLONE 4 MG/1
8 TABLET ORAL
Status: COMPLETED | OUTPATIENT
Start: 2017-09-26 | End: 2017-09-26

## 2017-09-25 RX ORDER — BISACODYL 5 MG/1
10 TABLET, DELAYED RELEASE ORAL DAILY PRN
Status: DISCONTINUED | OUTPATIENT
Start: 2017-09-25 | End: 2017-09-27 | Stop reason: HOSPADM

## 2017-09-25 RX ORDER — METOPROLOL TARTRATE 5 MG/5ML
2.5 INJECTION INTRAVENOUS EVERY 6 HOURS SCHEDULED
Status: DISCONTINUED | OUTPATIENT
Start: 2017-09-25 | End: 2017-09-26

## 2017-09-25 RX ORDER — PROPOFOL 10 MG/ML
VIAL (ML) INTRAVENOUS AS NEEDED
Status: DISCONTINUED | OUTPATIENT
Start: 2017-09-25 | End: 2017-09-25 | Stop reason: SURG

## 2017-09-25 RX ORDER — GABAPENTIN 400 MG/1
800 CAPSULE ORAL EVERY 8 HOURS SCHEDULED
Status: DISCONTINUED | OUTPATIENT
Start: 2017-09-25 | End: 2017-09-27

## 2017-09-25 RX ORDER — GABAPENTIN 400 MG/1
800 CAPSULE ORAL 3 TIMES DAILY
Status: DISCONTINUED | OUTPATIENT
Start: 2017-09-25 | End: 2017-09-25

## 2017-09-25 RX ORDER — MEPERIDINE HYDROCHLORIDE 25 MG/ML
25 INJECTION INTRAMUSCULAR; INTRAVENOUS; SUBCUTANEOUS EVERY 4 HOURS PRN
Status: DISCONTINUED | OUTPATIENT
Start: 2017-09-25 | End: 2017-09-26

## 2017-09-25 RX ORDER — BUPIVACAINE HYDROCHLORIDE AND EPINEPHRINE 5; 5 MG/ML; UG/ML
INJECTION, SOLUTION PERINEURAL AS NEEDED
Status: DISCONTINUED | OUTPATIENT
Start: 2017-09-25 | End: 2017-09-25 | Stop reason: HOSPADM

## 2017-09-25 RX ORDER — KETOROLAC TROMETHAMINE 30 MG/ML
15 INJECTION, SOLUTION INTRAMUSCULAR; INTRAVENOUS EVERY 8 HOURS
Status: DISCONTINUED | OUTPATIENT
Start: 2017-09-25 | End: 2017-09-25

## 2017-09-25 RX ORDER — ACETAMINOPHEN 325 MG/1
650 TABLET ORAL EVERY 4 HOURS PRN
Status: DISCONTINUED | OUTPATIENT
Start: 2017-09-25 | End: 2017-09-27 | Stop reason: HOSPADM

## 2017-09-25 RX ORDER — ONDANSETRON 2 MG/ML
4 INJECTION INTRAMUSCULAR; INTRAVENOUS ONCE AS NEEDED
Status: DISCONTINUED | OUTPATIENT
Start: 2017-09-25 | End: 2017-09-25

## 2017-09-25 RX ORDER — LISINOPRIL 40 MG/1
40 TABLET ORAL DAILY
Status: DISCONTINUED | OUTPATIENT
Start: 2017-09-26 | End: 2017-09-26

## 2017-09-25 RX ORDER — METHYLPREDNISOLONE 4 MG/1
4 TABLET ORAL
Status: DISCONTINUED | OUTPATIENT
Start: 2017-09-27 | End: 2017-09-27 | Stop reason: HOSPADM

## 2017-09-25 RX ORDER — METHYLPREDNISOLONE 4 MG/1
4 TABLET ORAL 2 TIMES DAILY
Status: DISCONTINUED | OUTPATIENT
Start: 2017-09-29 | End: 2017-09-27 | Stop reason: HOSPADM

## 2017-09-25 RX ORDER — DOPAMINE HYDROCHLORIDE 160 MG/100ML
2-20 INJECTION, SOLUTION INTRAVENOUS CONTINUOUS PRN
Status: DISCONTINUED | OUTPATIENT
Start: 2017-09-25 | End: 2017-09-26

## 2017-09-25 RX ORDER — GLYCOPYRROLATE 0.2 MG/ML
INJECTION INTRAMUSCULAR; INTRAVENOUS AS NEEDED
Status: DISCONTINUED | OUTPATIENT
Start: 2017-09-25 | End: 2017-09-25 | Stop reason: SURG

## 2017-09-25 RX ORDER — OXYCODONE HYDROCHLORIDE AND ACETAMINOPHEN 5; 325 MG/1; MG/1
2 TABLET ORAL EVERY 4 HOURS PRN
Status: DISCONTINUED | OUTPATIENT
Start: 2017-09-25 | End: 2017-09-27 | Stop reason: HOSPADM

## 2017-09-25 RX ORDER — DOBUTAMINE HYDROCHLORIDE 100 MG/100ML
2-20 INJECTION INTRAVENOUS CONTINUOUS PRN
Status: DISCONTINUED | OUTPATIENT
Start: 2017-09-25 | End: 2017-09-25

## 2017-09-25 RX ORDER — ASPIRIN 325 MG
325 TABLET, DELAYED RELEASE (ENTERIC COATED) ORAL DAILY
Status: DISCONTINUED | OUTPATIENT
Start: 2017-09-26 | End: 2017-09-27 | Stop reason: HOSPADM

## 2017-09-25 RX ADMIN — ATRACURIUM BESYLATE 15 MG: 10 INJECTION, SOLUTION INTRAVENOUS at 11:27

## 2017-09-25 RX ADMIN — ONDANSETRON 4 MG: 2 INJECTION INTRAMUSCULAR; INTRAVENOUS at 09:52

## 2017-09-25 RX ADMIN — CEFUROXIME 1.5 G: 1.5 INJECTION, POWDER, FOR SOLUTION INTRAVENOUS at 07:01

## 2017-09-25 RX ADMIN — GLYCOPYRROLATE 0.4 MG: 0.2 INJECTION, SOLUTION INTRAMUSCULAR; INTRAVENOUS at 12:21

## 2017-09-25 RX ADMIN — EPHEDRINE SULFATE 15 MG: 50 INJECTION INTRAMUSCULAR; INTRAVENOUS; SUBCUTANEOUS at 08:41

## 2017-09-25 RX ADMIN — ATRACURIUM BESYLATE 15 MG: 10 INJECTION, SOLUTION INTRAVENOUS at 09:45

## 2017-09-25 RX ADMIN — POTASSIUM CHLORIDE AND DEXTROSE MONOHYDRATE 30 ML/HR: 150; 5 INJECTION, SOLUTION INTRAVENOUS at 13:48

## 2017-09-25 RX ADMIN — PHENYLEPHRINE HYDROCHLORIDE 1 MCG/KG/MIN: 10 INJECTION INTRAVENOUS at 07:49

## 2017-09-25 RX ADMIN — EPHEDRINE SULFATE 12.5 MG: 50 INJECTION INTRAMUSCULAR; INTRAVENOUS; SUBCUTANEOUS at 11:19

## 2017-09-25 RX ADMIN — LIDOCAINE HYDROCHLORIDE 0.2 ML: 10 INJECTION, SOLUTION EPIDURAL; INFILTRATION; INTRACAUDAL; PERINEURAL at 06:09

## 2017-09-25 RX ADMIN — EPHEDRINE SULFATE 12.5 MG: 50 INJECTION INTRAMUSCULAR; INTRAVENOUS; SUBCUTANEOUS at 09:58

## 2017-09-25 RX ADMIN — KETOROLAC TROMETHAMINE 15 MG: 30 INJECTION, SOLUTION INTRAMUSCULAR at 21:45

## 2017-09-25 RX ADMIN — EPHEDRINE SULFATE 12.5 MG: 50 INJECTION INTRAMUSCULAR; INTRAVENOUS; SUBCUTANEOUS at 09:38

## 2017-09-25 RX ADMIN — GABAPENTIN 800 MG: 400 CAPSULE ORAL at 21:45

## 2017-09-25 RX ADMIN — PROPOFOL 50 MG: 10 INJECTION, EMULSION INTRAVENOUS at 07:09

## 2017-09-25 RX ADMIN — LIDOCAINE HYDROCHLORIDE 30 EACH: 40 SOLUTION TOPICAL at 07:07

## 2017-09-25 RX ADMIN — ATRACURIUM BESYLATE 15 MG: 10 INJECTION, SOLUTION INTRAVENOUS at 10:33

## 2017-09-25 RX ADMIN — SUCRALFATE 1 G: 1 TABLET ORAL at 21:45

## 2017-09-25 RX ADMIN — ROCURONIUM BROMIDE 10 MG: 10 INJECTION INTRAVENOUS at 07:59

## 2017-09-25 RX ADMIN — ROCURONIUM BROMIDE 30 MG: 10 INJECTION INTRAVENOUS at 07:08

## 2017-09-25 RX ADMIN — Medication 1.5 G: at 21:49

## 2017-09-25 RX ADMIN — SODIUM CHLORIDE 7.2 UNITS/HR: 9 INJECTION, SOLUTION INTRAVENOUS at 15:15

## 2017-09-25 RX ADMIN — CEFAZOLIN SODIUM 2 G: 2 INJECTION, SOLUTION INTRAVENOUS at 12:30

## 2017-09-25 RX ADMIN — METHYLPREDNISOLONE SODIUM SUCCINATE 20 MG: 40 INJECTION, POWDER, FOR SOLUTION INTRAMUSCULAR; INTRAVENOUS at 18:34

## 2017-09-25 RX ADMIN — HYDROCODONE BITARTRATE AND ACETAMINOPHEN 1 TABLET: 7.5; 325 TABLET ORAL at 17:20

## 2017-09-25 RX ADMIN — KETOROLAC TROMETHAMINE 15 MG: 30 INJECTION, SOLUTION INTRAMUSCULAR at 13:51

## 2017-09-25 RX ADMIN — CHLORHEXIDINE GLUCONATE 15 ML: 1.2 RINSE ORAL at 21:44

## 2017-09-25 RX ADMIN — SODIUM CHLORIDE 30 ML/HR: 900 INJECTION INTRAVENOUS at 21:47

## 2017-09-25 RX ADMIN — FAMOTIDINE 20 MG: 20 TABLET ORAL at 06:08

## 2017-09-25 RX ADMIN — ATRACURIUM BESYLATE 20 MG: 10 INJECTION, SOLUTION INTRAVENOUS at 08:30

## 2017-09-25 RX ADMIN — TERAZOSIN HYDROCHLORIDE ANHYDROUS 10 MG: 5 CAPSULE ORAL at 21:44

## 2017-09-25 RX ADMIN — FENTANYL CITRATE 50 MCG: 50 INJECTION, SOLUTION INTRAMUSCULAR; INTRAVENOUS at 07:07

## 2017-09-25 RX ADMIN — PROPOFOL 100 MG: 10 INJECTION, EMULSION INTRAVENOUS at 07:08

## 2017-09-25 RX ADMIN — ATRACURIUM BESYLATE 15 MG: 10 INJECTION, SOLUTION INTRAVENOUS at 09:09

## 2017-09-25 RX ADMIN — SODIUM CHLORIDE 30 ML/HR: 900 INJECTION INTRAVENOUS at 21:44

## 2017-09-25 RX ADMIN — ROCURONIUM BROMIDE 10 MG: 10 INJECTION INTRAVENOUS at 07:45

## 2017-09-25 RX ADMIN — ATORVASTATIN CALCIUM 40 MG: 40 TABLET, FILM COATED ORAL at 21:44

## 2017-09-25 RX ADMIN — SODIUM CHLORIDE, POTASSIUM CHLORIDE, SODIUM LACTATE AND CALCIUM CHLORIDE 20 ML/HR: 600; 310; 30; 20 INJECTION, SOLUTION INTRAVENOUS at 06:08

## 2017-09-25 RX ADMIN — FENTANYL CITRATE 50 MCG: 50 INJECTION, SOLUTION INTRAMUSCULAR; INTRAVENOUS at 12:19

## 2017-09-25 RX ADMIN — MORPHINE SULFATE 2 MG: 2 INJECTION, SOLUTION INTRAMUSCULAR; INTRAVENOUS at 17:19

## 2017-09-25 RX ADMIN — SODIUM CHLORIDE 12.5 UNITS/HR: 9 INJECTION, SOLUTION INTRAVENOUS at 21:32

## 2017-09-25 RX ADMIN — NEOSTIGMINE METHYLSULFATE 2.5 MG: 1 INJECTION, SOLUTION INTRAVENOUS at 12:21

## 2017-09-25 RX ADMIN — EPHEDRINE SULFATE 10 MG: 50 INJECTION INTRAMUSCULAR; INTRAVENOUS; SUBCUTANEOUS at 08:05

## 2017-09-25 RX ADMIN — DEXAMETHASONE SODIUM PHOSPHATE 4 MG: 10 INJECTION INTRAMUSCULAR; INTRAVENOUS at 09:05

## 2017-09-25 RX ADMIN — SODIUM CHLORIDE, POTASSIUM CHLORIDE, SODIUM LACTATE AND CALCIUM CHLORIDE: 600; 310; 30; 20 INJECTION, SOLUTION INTRAVENOUS at 07:25

## 2017-09-25 RX ADMIN — SODIUM CHLORIDE 30 ML/HR: 900 INJECTION INTRAVENOUS at 21:48

## 2017-09-25 RX ADMIN — ALBUTEROL SULFATE 2.5 MG: 2.5 SOLUTION RESPIRATORY (INHALATION) at 19:06

## 2017-09-25 NOTE — ANESTHESIA POSTPROCEDURE EVALUATION
Patient: Karen Newman    Procedure Summary     Date Anesthesia Start Anesthesia Stop Room / Location    09/25/17 0701 1252  [facility] / [facility]       Procedure Diagnosis Surgeon Provider    VATS with left atrial appendage ablation  (Left ) Atrial fibrillation, unspecified type  (Atrial fibrillation, unspecified type [I48.91]) MD Wilfred Quispe MD          Anesthesia Type: general  Last vitals  BP     115/54   Temp 97   Pulse 81   Resp 12   SpO2 100 Vent     Post Anesthesia Care and Evaluation    Patient location during evaluation: ICU  Patient participation: complete - patient participated  Level of consciousness: awake  Pain score: 0  Pain management: adequate  Airway patency: patent  Anesthetic complications: No anesthetic complications  PONV Status: none  Cardiovascular status: hemodynamically stable and acceptable  Respiratory status: nonlabored ventilation, acceptable and ventilator  Hydration status: acceptable

## 2017-09-25 NOTE — ANESTHESIA PROCEDURE NOTES
Central Line    Patient location during procedure: OR  Indications: vascular access and MD/Surgeon request  Staff  Anesthesiologist: SETH CULP  Central Line Prep  Sterile Tech:cap, gloves, gown, mask and sterile barriers  Prep: chloraprep  Central Line Procedure  Laterality:right  Location:internal jugular  Catheter Type:Cordis  Catheter Size:9 Fr  Guidance:ultrasound guided, landmark technique and palpation technique  PROCEDURE NOTE/ULTRASOUND INTERPRETATION.  Using ultrasound guidance the potential vascular sites for insertion of the catheter were visualized to determine the patency of the vessel to be used for vascular access.  After selecting the appropriate site for insertion, the needle was visualized under ultrasound being inserted into the internal jugular vein, followed by ultrasound confirmation of wire and catheter placement. There were no abnormalities seen on ultrasound; an image was taken; and the patient tolerated the procedure with no complications.   Assessment  Post procedure:biopatch applied, line sutured and occlusive dressing applied  Assessement:blood return through all ports, free fluid flow and Tejas Test  Complications:no

## 2017-09-25 NOTE — ANESTHESIA PREPROCEDURE EVALUATION
Anesthesia Evaluation            Airway   Mallampati: II  TM distance: >3 FB  Neck ROM: full  possible difficult intubation  Dental - normal exam     Pulmonary    (+) shortness of breath, sleep apnea, decreased breath sounds,   Cardiovascular     ECG reviewed  Rate: normal    (+) hypertension, dysrhythmias Atrial Fib,   (-) pacemaker, angina, murmur, cardiac stents, CABG      Neuro/Psych  (-) seizures, TIA, CVA  GI/Hepatic/Renal/Endo    (+) obesity,    (-) liver disease, no renal diseaseDiabetes: NIDDM.    Musculoskeletal     Abdominal    Substance History      OB/GYN          Other                                        Anesthesia Plan    ASA 3     general   (GA, a line, cortis, ALANIS, Doube lumen tube 37L)  intravenous induction   Anesthetic plan and risks discussed with patient.    Plan discussed with CRNA.

## 2017-09-25 NOTE — ANESTHESIA PROCEDURE NOTES
Arterial Line    Patient location during procedure: pre-op   Performed By   Anesthesiologist: SETH CULP  Arterial Line Prep   Sterile Tech: cap, gloves, mask and sterile barriers  Prep: ChloraPrep  Patient monitoring: blood pressure monitoring  Arterial Line Procedure   Laterality:right  Location:  radial artery  Catheter size: 20 G   Successful placement: yes          Post Assessment   Dressing Type: line sutured and occlusive dressing applied.   Complications no  Patient Tolerance: patient tolerated the procedure well with no apparent complications

## 2017-09-26 ENCOUNTER — APPOINTMENT (OUTPATIENT)
Dept: GENERAL RADIOLOGY | Facility: HOSPITAL | Age: 67
End: 2017-09-26

## 2017-09-26 PROBLEM — N17.9 AKI (ACUTE KIDNEY INJURY) (HCC): Status: ACTIVE | Noted: 2017-09-26

## 2017-09-26 LAB
ABO + RH BLD: NORMAL
ABO + RH BLD: NORMAL
ALBUMIN SERPL-MCNC: 3.4 G/DL (ref 3.2–4.8)
ANION GAP SERPL CALCULATED.3IONS-SCNC: 7 MMOL/L (ref 3–11)
ANION GAP SERPL CALCULATED.3IONS-SCNC: 7 MMOL/L (ref 3–11)
BASOPHILS # BLD AUTO: 0.01 10*3/MM3 (ref 0–0.2)
BASOPHILS NFR BLD AUTO: 0.1 % (ref 0–1)
BH BB BLOOD EXPIRATION DATE: NORMAL
BH BB BLOOD EXPIRATION DATE: NORMAL
BH BB BLOOD TYPE BARCODE: 6200
BH BB BLOOD TYPE BARCODE: 6200
BH BB DISPENSE STATUS: NORMAL
BH BB DISPENSE STATUS: NORMAL
BH BB PRODUCT CODE: NORMAL
BH BB PRODUCT CODE: NORMAL
BH BB UNIT NUMBER: NORMAL
BH BB UNIT NUMBER: NORMAL
BUN BLD-MCNC: 20 MG/DL (ref 9–23)
BUN BLD-MCNC: 26 MG/DL (ref 9–23)
BUN/CREAT SERPL: 12.5 (ref 7–25)
BUN/CREAT SERPL: 17.3 (ref 7–25)
CALCIUM SPEC-SCNC: 8.6 MG/DL (ref 8.7–10.4)
CALCIUM SPEC-SCNC: 8.7 MG/DL (ref 8.7–10.4)
CHLORIDE SERPL-SCNC: 101 MMOL/L (ref 99–109)
CHLORIDE SERPL-SCNC: 104 MMOL/L (ref 99–109)
CO2 SERPL-SCNC: 23 MMOL/L (ref 20–31)
CO2 SERPL-SCNC: 24 MMOL/L (ref 20–31)
CREAT BLD-MCNC: 1.5 MG/DL (ref 0.6–1.3)
CREAT BLD-MCNC: 1.6 MG/DL (ref 0.6–1.3)
DEPRECATED RDW RBC AUTO: 48.7 FL (ref 37–54)
EOSINOPHIL # BLD AUTO: 0 10*3/MM3 (ref 0–0.3)
EOSINOPHIL NFR BLD AUTO: 0 % (ref 0–3)
ERYTHROCYTE [DISTWIDTH] IN BLOOD BY AUTOMATED COUNT: 13.5 % (ref 11.3–14.5)
GFR SERPL CREATININE-BSD FRML MDRD: 32 ML/MIN/1.73
GFR SERPL CREATININE-BSD FRML MDRD: 35 ML/MIN/1.73
GLUCOSE BLD-MCNC: 231 MG/DL (ref 70–100)
GLUCOSE BLD-MCNC: 246 MG/DL (ref 70–100)
GLUCOSE BLDC GLUCOMTR-MCNC: 156 MG/DL (ref 70–130)
GLUCOSE BLDC GLUCOMTR-MCNC: 158 MG/DL (ref 70–130)
GLUCOSE BLDC GLUCOMTR-MCNC: 160 MG/DL (ref 70–130)
GLUCOSE BLDC GLUCOMTR-MCNC: 165 MG/DL (ref 70–130)
GLUCOSE BLDC GLUCOMTR-MCNC: 168 MG/DL (ref 70–130)
GLUCOSE BLDC GLUCOMTR-MCNC: 169 MG/DL (ref 70–130)
GLUCOSE BLDC GLUCOMTR-MCNC: 176 MG/DL (ref 70–130)
GLUCOSE BLDC GLUCOMTR-MCNC: 177 MG/DL (ref 70–130)
GLUCOSE BLDC GLUCOMTR-MCNC: 179 MG/DL (ref 70–130)
GLUCOSE BLDC GLUCOMTR-MCNC: 191 MG/DL (ref 70–130)
GLUCOSE BLDC GLUCOMTR-MCNC: 206 MG/DL (ref 70–130)
GLUCOSE BLDC GLUCOMTR-MCNC: 215 MG/DL (ref 70–130)
GLUCOSE BLDC GLUCOMTR-MCNC: 227 MG/DL (ref 70–130)
GLUCOSE BLDC GLUCOMTR-MCNC: 257 MG/DL (ref 70–130)
GLUCOSE BLDC GLUCOMTR-MCNC: 290 MG/DL (ref 70–130)
HCT VFR BLD AUTO: 35.4 % (ref 34.5–44)
HGB BLD-MCNC: 11.4 G/DL (ref 11.5–15.5)
IMM GRANULOCYTES # BLD: 0.03 10*3/MM3 (ref 0–0.03)
IMM GRANULOCYTES NFR BLD: 0.3 % (ref 0–0.6)
INR PPP: 1.02
LYMPHOCYTES # BLD AUTO: 0.63 10*3/MM3 (ref 0.6–4.8)
LYMPHOCYTES NFR BLD AUTO: 6.4 % (ref 24–44)
MAGNESIUM SERPL-MCNC: 1.4 MG/DL (ref 1.3–2.7)
MCH RBC QN AUTO: 32.4 PG (ref 27–31)
MCHC RBC AUTO-ENTMCNC: 32.2 G/DL (ref 32–36)
MCV RBC AUTO: 100.6 FL (ref 80–99)
MONOCYTES # BLD AUTO: 0.98 10*3/MM3 (ref 0–1)
MONOCYTES NFR BLD AUTO: 10 % (ref 0–12)
NEUTROPHILS # BLD AUTO: 8.16 10*3/MM3 (ref 1.5–8.3)
NEUTROPHILS NFR BLD AUTO: 83.2 % (ref 41–71)
PHOSPHATE SERPL-MCNC: 2.8 MG/DL (ref 2.4–5.1)
PLATELET # BLD AUTO: 178 10*3/MM3 (ref 150–450)
PMV BLD AUTO: 9.6 FL (ref 6–12)
POTASSIUM BLD-SCNC: 4.4 MMOL/L (ref 3.5–5.5)
POTASSIUM BLD-SCNC: 4.6 MMOL/L (ref 3.5–5.5)
PROTHROMBIN TIME: 11.1 SECONDS (ref 9.6–11.5)
RBC # BLD AUTO: 3.52 10*6/MM3 (ref 3.89–5.14)
SODIUM BLD-SCNC: 131 MMOL/L (ref 132–146)
SODIUM BLD-SCNC: 135 MMOL/L (ref 132–146)
UNIT  ABO: NORMAL
UNIT  ABO: NORMAL
UNIT  RH: NORMAL
UNIT  RH: NORMAL
WBC NRBC COR # BLD: 9.81 10*3/MM3 (ref 3.5–10.8)

## 2017-09-26 PROCEDURE — 63710000001 METHYLPREDNISOLONE 4 MG TABLET THERAPY PACK 21 EACH DISP PACK: Performed by: PHYSICIAN ASSISTANT

## 2017-09-26 PROCEDURE — 94799 UNLISTED PULMONARY SVC/PX: CPT

## 2017-09-26 PROCEDURE — 94760 N-INVAS EAR/PLS OXIMETRY 1: CPT

## 2017-09-26 PROCEDURE — 25010000002 KETOROLAC TROMETHAMINE PER 15 MG: Performed by: THORACIC SURGERY (CARDIOTHORACIC VASCULAR SURGERY)

## 2017-09-26 PROCEDURE — 63710000001 INSULIN LISPRO (HUMAN) PER 5 UNITS: Performed by: INTERNAL MEDICINE

## 2017-09-26 PROCEDURE — 71010 HC CHEST PA OR AP: CPT

## 2017-09-26 PROCEDURE — 82962 GLUCOSE BLOOD TEST: CPT

## 2017-09-26 PROCEDURE — 25010000002 INSULIN REGULAR HUMAN PER 5 UNITS: Performed by: INTERNAL MEDICINE

## 2017-09-26 PROCEDURE — 83735 ASSAY OF MAGNESIUM: CPT | Performed by: PHYSICIAN ASSISTANT

## 2017-09-26 PROCEDURE — 25010000002 CEFUROXIME: Performed by: THORACIC SURGERY (CARDIOTHORACIC VASCULAR SURGERY)

## 2017-09-26 PROCEDURE — 80069 RENAL FUNCTION PANEL: CPT | Performed by: PHYSICIAN ASSISTANT

## 2017-09-26 PROCEDURE — 99024 POSTOP FOLLOW-UP VISIT: CPT | Performed by: PHYSICIAN ASSISTANT

## 2017-09-26 PROCEDURE — 93005 ELECTROCARDIOGRAM TRACING: CPT | Performed by: PHYSICIAN ASSISTANT

## 2017-09-26 PROCEDURE — 85610 PROTHROMBIN TIME: CPT | Performed by: PHYSICIAN ASSISTANT

## 2017-09-26 PROCEDURE — 99232 SBSQ HOSP IP/OBS MODERATE 35: CPT | Performed by: INTERNAL MEDICINE

## 2017-09-26 PROCEDURE — 63710000001 INSULIN DETEMIR PER 5 UNITS: Performed by: INTERNAL MEDICINE

## 2017-09-26 PROCEDURE — 93010 ELECTROCARDIOGRAM REPORT: CPT | Performed by: INTERNAL MEDICINE

## 2017-09-26 PROCEDURE — 97162 PT EVAL MOD COMPLEX 30 MIN: CPT

## 2017-09-26 PROCEDURE — 85025 COMPLETE CBC W/AUTO DIFF WBC: CPT | Performed by: PHYSICIAN ASSISTANT

## 2017-09-26 PROCEDURE — 94640 AIRWAY INHALATION TREATMENT: CPT

## 2017-09-26 RX ORDER — NICOTINE POLACRILEX 4 MG
15 LOZENGE BUCCAL
Status: DISCONTINUED | OUTPATIENT
Start: 2017-09-26 | End: 2017-09-27 | Stop reason: HOSPADM

## 2017-09-26 RX ORDER — ATORVASTATIN CALCIUM 10 MG/1
10 TABLET, FILM COATED ORAL NIGHTLY
Status: DISCONTINUED | OUTPATIENT
Start: 2017-09-26 | End: 2017-09-27 | Stop reason: HOSPADM

## 2017-09-26 RX ORDER — WARFARIN SODIUM 5 MG/1
5 TABLET ORAL
Status: DISCONTINUED | OUTPATIENT
Start: 2017-09-26 | End: 2017-09-26

## 2017-09-26 RX ORDER — MORPHINE SULFATE 2 MG/ML
2 INJECTION, SOLUTION INTRAMUSCULAR; INTRAVENOUS
Status: DISCONTINUED | OUTPATIENT
Start: 2017-09-26 | End: 2017-09-27 | Stop reason: HOSPADM

## 2017-09-26 RX ORDER — WARFARIN SODIUM 5 MG/1
5 TABLET ORAL
Status: DISCONTINUED | OUTPATIENT
Start: 2017-09-27 | End: 2017-09-27 | Stop reason: HOSPADM

## 2017-09-26 RX ORDER — LISINOPRIL 40 MG/1
40 TABLET ORAL DAILY
Status: DISCONTINUED | OUTPATIENT
Start: 2017-09-27 | End: 2017-09-27 | Stop reason: HOSPADM

## 2017-09-26 RX ORDER — DEXTROSE MONOHYDRATE 25 G/50ML
25 INJECTION, SOLUTION INTRAVENOUS
Status: DISCONTINUED | OUTPATIENT
Start: 2017-09-26 | End: 2017-09-27 | Stop reason: HOSPADM

## 2017-09-26 RX ORDER — HYDROCODONE BITARTRATE AND ACETAMINOPHEN 7.5; 325 MG/1; MG/1
1 TABLET ORAL EVERY 4 HOURS PRN
Qty: 30 TABLET | Refills: 0 | Status: SHIPPED | OUTPATIENT
Start: 2017-09-26 | End: 2017-10-05

## 2017-09-26 RX ADMIN — METHYLPREDNISOLONE 4 MG: 4 TABLET ORAL at 18:09

## 2017-09-26 RX ADMIN — SUCRALFATE 1 G: 1 TABLET ORAL at 20:04

## 2017-09-26 RX ADMIN — METHYLPREDNISOLONE 4 MG: 4 TABLET ORAL at 13:18

## 2017-09-26 RX ADMIN — HYDROCODONE BITARTRATE AND ACETAMINOPHEN 1 TABLET: 7.5; 325 TABLET ORAL at 20:03

## 2017-09-26 RX ADMIN — DILTIAZEM HYDROCHLORIDE 360 MG: 180 CAPSULE, COATED, EXTENDED RELEASE ORAL at 08:30

## 2017-09-26 RX ADMIN — Medication 2 TABLET: at 18:09

## 2017-09-26 RX ADMIN — Medication 1.5 G: at 04:09

## 2017-09-26 RX ADMIN — Medication 2 TABLET: at 08:31

## 2017-09-26 RX ADMIN — GABAPENTIN 800 MG: 400 CAPSULE ORAL at 23:12

## 2017-09-26 RX ADMIN — SUCRALFATE 1 G: 1 TABLET ORAL at 06:41

## 2017-09-26 RX ADMIN — LEVOTHYROXINE SODIUM 150 MCG: 100 TABLET ORAL at 13:14

## 2017-09-26 RX ADMIN — METHYLPREDNISOLONE 8 MG: 4 TABLET ORAL at 20:04

## 2017-09-26 RX ADMIN — METHYLPREDNISOLONE 4 MG: 4 TABLET ORAL at 06:38

## 2017-09-26 RX ADMIN — KETOROLAC TROMETHAMINE 15 MG: 30 INJECTION, SOLUTION INTRAMUSCULAR at 06:40

## 2017-09-26 RX ADMIN — PAROXETINE 30 MG: 10 TABLET, FILM COATED ORAL at 08:30

## 2017-09-26 RX ADMIN — INSULIN DETEMIR 25 UNITS: 100 INJECTION, SOLUTION SUBCUTANEOUS at 13:11

## 2017-09-26 RX ADMIN — TERAZOSIN HYDROCHLORIDE ANHYDROUS 10 MG: 5 CAPSULE ORAL at 20:06

## 2017-09-26 RX ADMIN — GABAPENTIN 800 MG: 400 CAPSULE ORAL at 06:41

## 2017-09-26 RX ADMIN — METOPROLOL TARTRATE 2.5 MG: 5 INJECTION INTRAVENOUS at 04:09

## 2017-09-26 RX ADMIN — SUCRALFATE 1 G: 1 TABLET ORAL at 16:39

## 2017-09-26 RX ADMIN — SODIUM CHLORIDE 17.5 UNITS/HR: 9 INJECTION, SOLUTION INTRAVENOUS at 09:26

## 2017-09-26 RX ADMIN — METOPROLOL TARTRATE 2.5 MG: 5 INJECTION INTRAVENOUS at 06:40

## 2017-09-26 RX ADMIN — INSULIN DETEMIR 25 UNITS: 100 INJECTION, SOLUTION SUBCUTANEOUS at 20:24

## 2017-09-26 RX ADMIN — METOPROLOL TARTRATE 12.5 MG: 25 TABLET, FILM COATED ORAL at 20:03

## 2017-09-26 RX ADMIN — LISINOPRIL 40 MG: 40 TABLET ORAL at 08:32

## 2017-09-26 RX ADMIN — ASPIRIN 325 MG: 325 TABLET, DELAYED RELEASE ORAL at 08:29

## 2017-09-26 RX ADMIN — PANTOPRAZOLE SODIUM 40 MG: 40 TABLET, DELAYED RELEASE ORAL at 06:41

## 2017-09-26 RX ADMIN — ATORVASTATIN CALCIUM 10 MG: 10 TABLET, FILM COATED ORAL at 20:04

## 2017-09-26 RX ADMIN — INSULIN LISPRO 15 UNITS: 100 INJECTION, SOLUTION INTRAVENOUS; SUBCUTANEOUS at 13:15

## 2017-09-26 RX ADMIN — SUCRALFATE 1 G: 1 TABLET ORAL at 13:16

## 2017-09-26 RX ADMIN — INSULIN LISPRO 4 UNITS: 100 INJECTION, SOLUTION INTRAVENOUS; SUBCUTANEOUS at 13:15

## 2017-09-26 RX ADMIN — INSULIN LISPRO 8 UNITS: 100 INJECTION, SOLUTION INTRAVENOUS; SUBCUTANEOUS at 20:28

## 2017-09-26 RX ADMIN — INSULIN LISPRO 15 UNITS: 100 INJECTION, SOLUTION INTRAVENOUS; SUBCUTANEOUS at 18:06

## 2017-09-26 RX ADMIN — CHLORHEXIDINE GLUCONATE 15 ML: 1.2 RINSE ORAL at 08:29

## 2017-09-26 RX ADMIN — INSULIN LISPRO 4 UNITS: 100 INJECTION, SOLUTION INTRAVENOUS; SUBCUTANEOUS at 16:39

## 2017-09-26 RX ADMIN — ALBUTEROL SULFATE 2.5 MG: 2.5 SOLUTION RESPIRATORY (INHALATION) at 07:12

## 2017-09-26 RX ADMIN — GABAPENTIN 800 MG: 400 CAPSULE ORAL at 13:14

## 2017-09-27 ENCOUNTER — APPOINTMENT (OUTPATIENT)
Dept: GENERAL RADIOLOGY | Facility: HOSPITAL | Age: 67
End: 2017-09-27

## 2017-09-27 VITALS
SYSTOLIC BLOOD PRESSURE: 96 MMHG | OXYGEN SATURATION: 92 % | DIASTOLIC BLOOD PRESSURE: 66 MMHG | RESPIRATION RATE: 16 BRPM | TEMPERATURE: 98.4 F | HEART RATE: 62 BPM

## 2017-09-27 LAB
ANION GAP SERPL CALCULATED.3IONS-SCNC: 1 MMOL/L (ref 3–11)
BACTERIA SPEC AEROBE CULT: NORMAL
BUN BLD-MCNC: 32 MG/DL (ref 9–23)
BUN/CREAT SERPL: 21.3 (ref 7–25)
CALCIUM SPEC-SCNC: 8.4 MG/DL (ref 8.7–10.4)
CHLORIDE SERPL-SCNC: 104 MMOL/L (ref 99–109)
CO2 SERPL-SCNC: 26 MMOL/L (ref 20–31)
CREAT BLD-MCNC: 1.5 MG/DL (ref 0.6–1.3)
DEPRECATED RDW RBC AUTO: 51.6 FL (ref 37–54)
ERYTHROCYTE [DISTWIDTH] IN BLOOD BY AUTOMATED COUNT: 13.7 % (ref 11.3–14.5)
GFR SERPL CREATININE-BSD FRML MDRD: 35 ML/MIN/1.73
GLUCOSE BLD-MCNC: 238 MG/DL (ref 70–100)
GLUCOSE BLDC GLUCOMTR-MCNC: 292 MG/DL (ref 70–130)
GLUCOSE BLDC GLUCOMTR-MCNC: 312 MG/DL (ref 70–130)
HCT VFR BLD AUTO: 34.6 % (ref 34.5–44)
HGB BLD-MCNC: 11.1 G/DL (ref 11.5–15.5)
INR PPP: 0.98
MCH RBC QN AUTO: 32.6 PG (ref 27–31)
MCHC RBC AUTO-ENTMCNC: 32.1 G/DL (ref 32–36)
MCV RBC AUTO: 101.5 FL (ref 80–99)
PLATELET # BLD AUTO: 176 10*3/MM3 (ref 150–450)
PMV BLD AUTO: 9.9 FL (ref 6–12)
POTASSIUM BLD-SCNC: 5.3 MMOL/L (ref 3.5–5.5)
PROTHROMBIN TIME: 10.7 SECONDS (ref 9.6–11.5)
RBC # BLD AUTO: 3.41 10*6/MM3 (ref 3.89–5.14)
SODIUM BLD-SCNC: 131 MMOL/L (ref 132–146)
WBC NRBC COR # BLD: 9.51 10*3/MM3 (ref 3.5–10.8)

## 2017-09-27 PROCEDURE — 93010 ELECTROCARDIOGRAM REPORT: CPT | Performed by: INTERNAL MEDICINE

## 2017-09-27 PROCEDURE — 63710000001 METHYLPREDNISOLONE 4 MG TABLET THERAPY PACK 21 EACH DISP PACK: Performed by: PHYSICIAN ASSISTANT

## 2017-09-27 PROCEDURE — 71010 HC CHEST PA OR AP: CPT

## 2017-09-27 PROCEDURE — 80048 BASIC METABOLIC PNL TOTAL CA: CPT | Performed by: PHYSICIAN ASSISTANT

## 2017-09-27 PROCEDURE — 93005 ELECTROCARDIOGRAM TRACING: CPT | Performed by: PHYSICIAN ASSISTANT

## 2017-09-27 PROCEDURE — 85027 COMPLETE CBC AUTOMATED: CPT | Performed by: PHYSICIAN ASSISTANT

## 2017-09-27 PROCEDURE — 97110 THERAPEUTIC EXERCISES: CPT

## 2017-09-27 PROCEDURE — 63710000001 INSULIN DETEMIR PER 5 UNITS: Performed by: INTERNAL MEDICINE

## 2017-09-27 PROCEDURE — 63710000001 INSULIN LISPRO (HUMAN) PER 5 UNITS: Performed by: INTERNAL MEDICINE

## 2017-09-27 PROCEDURE — 94799 UNLISTED PULMONARY SVC/PX: CPT

## 2017-09-27 PROCEDURE — 85610 PROTHROMBIN TIME: CPT

## 2017-09-27 PROCEDURE — 99232 SBSQ HOSP IP/OBS MODERATE 35: CPT | Performed by: INTERNAL MEDICINE

## 2017-09-27 PROCEDURE — 82962 GLUCOSE BLOOD TEST: CPT

## 2017-09-27 PROCEDURE — 99024 POSTOP FOLLOW-UP VISIT: CPT | Performed by: PHYSICIAN ASSISTANT

## 2017-09-27 RX ORDER — GABAPENTIN 400 MG/1
800 CAPSULE ORAL EVERY 12 HOURS SCHEDULED
Status: DISCONTINUED | OUTPATIENT
Start: 2017-09-27 | End: 2017-09-27 | Stop reason: HOSPADM

## 2017-09-27 RX ORDER — METHYLPREDNISOLONE 4 MG/1
TABLET ORAL
Qty: 10 TABLET | Refills: 0 | Status: SHIPPED | OUTPATIENT
Start: 2017-09-27 | End: 2017-10-19

## 2017-09-27 RX ADMIN — INSULIN DETEMIR 10 UNITS: 100 INJECTION, SOLUTION SUBCUTANEOUS at 10:37

## 2017-09-27 RX ADMIN — INSULIN LISPRO 20 UNITS: 100 INJECTION, SOLUTION INTRAVENOUS; SUBCUTANEOUS at 12:17

## 2017-09-27 RX ADMIN — PAROXETINE 30 MG: 10 TABLET, FILM COATED ORAL at 08:59

## 2017-09-27 RX ADMIN — LISINOPRIL 40 MG: 40 TABLET ORAL at 10:23

## 2017-09-27 RX ADMIN — INSULIN LISPRO 16 UNITS: 100 INJECTION, SOLUTION INTRAVENOUS; SUBCUTANEOUS at 08:53

## 2017-09-27 RX ADMIN — SUCRALFATE 1 G: 1 TABLET ORAL at 12:16

## 2017-09-27 RX ADMIN — ASPIRIN 325 MG: 325 TABLET, DELAYED RELEASE ORAL at 08:59

## 2017-09-27 RX ADMIN — DILTIAZEM HYDROCHLORIDE 360 MG: 180 CAPSULE, COATED, EXTENDED RELEASE ORAL at 08:59

## 2017-09-27 RX ADMIN — LEVOTHYROXINE SODIUM 150 MCG: 100 TABLET ORAL at 05:57

## 2017-09-27 RX ADMIN — GABAPENTIN 800 MG: 400 CAPSULE ORAL at 05:57

## 2017-09-27 RX ADMIN — INSULIN DETEMIR 25 UNITS: 100 INJECTION, SOLUTION SUBCUTANEOUS at 08:52

## 2017-09-27 RX ADMIN — Medication 2 TABLET: at 08:58

## 2017-09-27 RX ADMIN — METHYLPREDNISOLONE 4 MG: 4 TABLET ORAL at 09:00

## 2017-09-27 RX ADMIN — PANTOPRAZOLE SODIUM 40 MG: 40 TABLET, DELAYED RELEASE ORAL at 06:13

## 2017-09-27 RX ADMIN — METHYLPREDNISOLONE 4 MG: 4 TABLET ORAL at 12:18

## 2017-09-27 RX ADMIN — METOPROLOL TARTRATE 12.5 MG: 25 TABLET, FILM COATED ORAL at 08:59

## 2017-09-27 RX ADMIN — SUCRALFATE 1 G: 1 TABLET ORAL at 09:00

## 2017-09-27 RX ADMIN — HYDROCODONE BITARTRATE AND ACETAMINOPHEN 1 TABLET: 7.5; 325 TABLET ORAL at 14:27

## 2017-09-27 RX ADMIN — INSULIN LISPRO 12 UNITS: 100 INJECTION, SOLUTION INTRAVENOUS; SUBCUTANEOUS at 12:16

## 2017-09-27 RX ADMIN — INSULIN LISPRO 15 UNITS: 100 INJECTION, SOLUTION INTRAVENOUS; SUBCUTANEOUS at 08:54

## 2017-09-28 ENCOUNTER — TELEPHONE (OUTPATIENT)
Dept: CARDIOLOGY | Facility: CLINIC | Age: 67
End: 2017-09-28

## 2017-09-28 DIAGNOSIS — I48.91 ATRIAL FIBRILLATION, UNSPECIFIED TYPE (HCC): Primary | ICD-10-CM

## 2017-09-28 DIAGNOSIS — Z98.890 OTHER SPECIFIED POSTPROCEDURAL STATES: ICD-10-CM

## 2017-09-28 RX ORDER — SUCRALFATE 1 G/1
1 TABLET ORAL 4 TIMES DAILY
Qty: 21 TABLET | Refills: 0 | Status: SHIPPED | OUTPATIENT
Start: 2017-09-28 | End: 2017-11-01

## 2017-09-28 NOTE — TELEPHONE ENCOUNTER
Spoke with both Mr & Mrs Newman.  She states she is doing very well.  Warfarin is not listed on her hospital discharge med list, but she confirms that she does have it (she checked her bottles) and that she is to have an INR done tomorrow to be sent to Dr. Kay.  She does not have carafate - this was sent in.  I also gave her follow up appointments with the [facility] and Dr. Osullivan.  Explained about the ALANIS at 3 months.  She verbalized understanding.

## 2017-09-29 ENCOUNTER — HOSPITAL ENCOUNTER (OUTPATIENT)
Dept: OTHER | Age: 67
Discharge: HOME | End: 2017-09-29

## 2017-09-29 LAB
INR BLD: 1 (ref 0.86–1.14)
PROTHROMBIN TIME: 10.6 SEC (ref 9.5–10.9)

## 2017-10-17 ENCOUNTER — OFFICE VISIT (OUTPATIENT)
Dept: CARDIAC SURGERY | Facility: CLINIC | Age: 67
End: 2017-10-17

## 2017-10-17 VITALS
OXYGEN SATURATION: 90 % | HEIGHT: 65 IN | BODY MASS INDEX: 32.82 KG/M2 | HEART RATE: 78 BPM | WEIGHT: 197 LBS | SYSTOLIC BLOOD PRESSURE: 120 MMHG | TEMPERATURE: 97.3 F | DIASTOLIC BLOOD PRESSURE: 50 MMHG

## 2017-10-17 DIAGNOSIS — I48.91 ATRIAL FIBRILLATION, UNSPECIFIED TYPE (HCC): Primary | ICD-10-CM

## 2017-10-17 PROCEDURE — 99024 POSTOP FOLLOW-UP VISIT: CPT | Performed by: PHYSICIAN ASSISTANT

## 2017-10-17 NOTE — PROGRESS NOTES
10/17/2017  Patient Information  Karen Newman                                                                                          [address] [facility] [address]   1950  'PCP/Referring Physician'  Mirian Gomez, APRN  [phone]  No ref. provider found    Chief Complaint   Patient presents with   • Post-op Follow-up     Hosp D/C Bilateral VAT's w RADHA Ligation 9/25/17 for Atrial Fibrillation       History of Present Illness:  Patient presents to office today for postoperative follow up of 9/25/17 bilateral VAT with left atrial appendage ligation and MAZE. Patient states she has incisional soreness but denies any chest pain or shortness of breath. She has completed her Medrol dosepak regimen without incident. She will follow up with Dr. Bobo on 10/19/17, Connie PORTER on 11/1/17 and Dr. Osullivan on 1/17/18. She is currently on Warfarin without any difficulty, but would like to discontinue use. She denies any bleeding or hematochezia. She will need to have incisional sutures removed today in office. She is otherwise doing well.      Patient Active Problem List   Diagnosis   • Atrial fibrillation   • VHD (valvular heart disease)   • Hypertension   • Dyslipidemia   • Diabetes mellitus   • Thyroid disease   • GERD (gastroesophageal reflux disease)   • CKD (chronic kidney disease)   • Hemorrhoids   • Colon polyps   • Iron deficiency anemia   • KRISTEN on CPAP   • Blood loss anemia   • Postoperative respiratory failure   • JORDAN (acute kidney injury)     Past Medical History:   Diagnosis Date   • Anxiety    • Arthritis    • Atrial fibrillation    • Cancer     RIGHT FOOT STILL HAS SOME SKIN CANCER IN PLACE AT THIS TIME- WILL BE GETTING REMOVED OCTOBER 22ND    • CKD (chronic kidney disease)     IGA - GETS CHECKED YEARLY FROM [facility]    • Colon polyps    • Diabetes mellitus     SINCE 2005 - CHECKS 3 TIMES PER DAY    • Dyslipidemia    • GERD (gastroesophageal reflux disease)    • Hemorrhoids    • History  of transfusion    • Hypertension    • Iron deficiency anemia    • On home O2     2l AT NIGHT WITH CPAP    • KRISTEN on CPAP     COMPLIANT WITH MACHINE - DOES USE O2 2L WITH MACHINE    • Panic attacks    • Pneumonia    • PONV (postoperative nausea and vomiting)     NAUSEA -  MEDICINE HELPED PATIENT    • Sleep apnea     HAS CPAP AT HOME   • SOB (shortness of breath) on exertion    • Thyroid disease    • VHD (valvular heart disease)    • Wears glasses    • Wears glasses      Past Surgical History:   Procedure Laterality Date   • CATARACT EXTRACTION WITH INTRAOCULAR LENS IMPLANT Bilateral    • COLONOSCOPY W/ POLYPECTOMY     • COLONOSCOPY W/ POLYPECTOMY     • ENDOSCOPY     • OTHER SURGICAL HISTORY      PT HAD BLOOD CLOT IN HEART SURGERY AND WENT IN THROUGH NECK BUT UNSURE WHAT CALLED - possible santa?   • SKIN BIOPSY      RIGHT FOOT FOR SKIN CANCER   • THORACOSCOPY Left 9/25/2017    Procedure: THORASOSCOPY VIDEO ASSISTED WITH LEFT ATRIAL APPENDAGE ABLATION;  Surgeon: Alce Kay MD;  Location: [facility] OR;  Service:    • TOTAL ABDOMINAL HYSTERECTOMY     • TRANSESOPHAGEAL ECHOCARDIOGRAM (SANTA) Bilateral 7/31/2017    Procedure: TRANSESOPHAGEAL ECHOCARDIOGRAM AND BRONCHOSCOPY ;  Surgeon: Alec Kay MD;  Location: [facility] OR;  Service:    • WISDOM TOOTH EXTRACTION         Current Outpatient Prescriptions:   •  atorvastatin (LIPITOR) 10 MG tablet, Take 10 mg by mouth Daily., Disp: , Rfl:   •  diltiaZEM (TIAZAC) 360 MG 24 hr capsule, Take 360 mg by mouth Daily., Disp: , Rfl:   •  doxazosin (CARDURA) 8 MG tablet, Take 8 mg by mouth Every Night., Disp: , Rfl:   •  gabapentin (NEURONTIN) 800 MG tablet, Take 800 mg by mouth 2 (Two) Times a Day., Disp: , Rfl:   •  insulin glargine (LANTUS) 100 UNIT/ML injection, Inject 35 Units under the skin Every Night., Disp: , Rfl:   •  Insulin Lispro (HUMALOG KWIKPEN SC), Inject  under the skin 3 (Three) Times a Day Before Meals. B-14U; L-18U; D-20U, Disp: , Rfl:   •  levothyroxine (SYNTHROID,  "LEVOTHROID) 150 MCG tablet, Take 150 mcg by mouth Daily. 0.15 mg tablets, Disp: , Rfl:   •  lisinopril (PRINIVIL,ZESTRIL) 40 MG tablet, Take 40 mg by mouth Daily., Disp: , Rfl:   •  losartan-hydrochlorothiazide (HYZAAR) 100-25 MG per tablet, Take 1 tablet by mouth Daily., Disp: , Rfl:   •  metFORMIN (GLUCOPHAGE) 850 MG tablet, Take 850 mg by mouth 3 (Three) Times a Day With Meals., Disp: , Rfl:   •  MethylPREDNISolone (MEDROL, JUAN MIGUEL,) 4 MG tablet, Take 4 tablets by mouth on 9/29, then take 3 tabs on 9/28, then take 2 tabs on 9/29, then take 1 tab on 9/30., Disp: 10 tablet, Rfl: 0  •  omeprazole (priLOSEC) 40 MG capsule, Take 40 mg by mouth Daily., Disp: , Rfl:   •  PARoxetine (PAXIL) 30 MG tablet, Take 30 mg by mouth Daily., Disp: , Rfl:   •  sucralfate (CARAFATE) 1 g tablet, Take 1 tablet by mouth 4 (Four) Times a Day., Disp: 21 tablet, Rfl: 0  •  warfarin (COUMADIN) 5 MG tablet, Take 1 tablet by mouth Daily., Disp: 21 tablet, Rfl: 0  Allergies   Allergen Reactions   • Sular [Nisoldipine Er] Cough     Social History     Social History   • Marital status:      Spouse name: N/A   • Number of children: 3   • Years of education: N/A     Occupational History   • Housewife      Social History Main Topics   • Smoking status: Former Smoker     Packs/day: 0.50     Years: 15.00     Types: Cigarettes     Quit date: 10/18/2016   • Smokeless tobacco: Never Used   • Alcohol use No   • Drug use: No   • Sexual activity: Defer     Other Topics Concern   • Not on file     Social History Narrative     Family History   Problem Relation Age of Onset   • Hypertension Mother    • Coronary artery disease Mother    • Kidney disease Father      ROS: As per HPI, otherwise negative.  Vitals:    10/17/17 0920   BP: 120/50   BP Location: Left arm   Patient Position: Sitting   Pulse: 78   Temp: 97.3 °F (36.3 °C)   SpO2: 90%   Weight: 197 lb (89.4 kg)   Height: 65\" (165.1 cm)      Physical Exam:  Gen - NAD, pleasant, cooperative  CV - " Regular rate and rhythm, no murmur gallop or rub  Pulm - Lungs clear to auscultation without wheeze or rhonchi   GI - Soft, normoactive bowel sounds, non-tender  Ext - Without edema  Incision - Incisions healing well, no evidence of incisional dehiscence or cellulitis    Labs/Imaging: 10/17/17 CXR  Chest radiograph is satisfactory. Bony structures are within normal limits. Trachea is midline. Left and right lung fields clear, costophrenic angles appear blunted, but could be secondary to underdevelopment of flim. Appears to be bilateral atelectasis. Left atrial appendage clip noted. No evidence of pneumothorax. Compared to most recent chest xray (9/27/17), there is no acute chest pathology.    Assessment/Plan:  67 year old  female with history of atrial fibrillation s/p 9/25/17 bilateral VAT with left atrial appendage ligation and MAZE procedure. Patient is having steady postoperative course. Her heart rhythm is regular, as is her rate. Sutures were removed from incisions without incident. Patient requested more pain medication for soreness, but I explained that Tylenol for pain was reasonable solution. Will discuss anticoagulation with Dr. Bobo as to whether we may discontinue or not. She will need to follow up with our office on 1/23/18. By that time she will have seen Ari Bobo and Abran, as well as CHARLOTTE Calix. She may call with any questions or concerns regarding her operation, should any arise.       Patient Active Problem List   Diagnosis   • Atrial fibrillation   • VHD (valvular heart disease)   • Hypertension   • Dyslipidemia   • Diabetes mellitus   • Thyroid disease   • GERD (gastroesophageal reflux disease)   • CKD (chronic kidney disease)   • Hemorrhoids   • Colon polyps   • Iron deficiency anemia   • KRISTEN on CPAP   • Blood loss anemia   • Postoperative respiratory failure   • JORDAN (acute kidney injury)     Signed by:

## 2017-10-19 ENCOUNTER — OFFICE VISIT (OUTPATIENT)
Dept: CARDIOLOGY | Facility: CLINIC | Age: 67
End: 2017-10-19

## 2017-10-19 VITALS
HEART RATE: 80 BPM | WEIGHT: 197.6 LBS | SYSTOLIC BLOOD PRESSURE: 140 MMHG | HEIGHT: 66 IN | BODY MASS INDEX: 31.76 KG/M2 | DIASTOLIC BLOOD PRESSURE: 70 MMHG

## 2017-10-19 DIAGNOSIS — E78.5 DYSLIPIDEMIA: ICD-10-CM

## 2017-10-19 DIAGNOSIS — Z86.79 S/P MAZE OPERATION FOR ATRIAL FIBRILLATION: ICD-10-CM

## 2017-10-19 DIAGNOSIS — Z98.890 S/P MAZE OPERATION FOR ATRIAL FIBRILLATION: ICD-10-CM

## 2017-10-19 DIAGNOSIS — I48.19 PERSISTENT ATRIAL FIBRILLATION (HCC): Primary | ICD-10-CM

## 2017-10-19 DIAGNOSIS — I10 ESSENTIAL HYPERTENSION: ICD-10-CM

## 2017-10-19 PROCEDURE — 99213 OFFICE O/P EST LOW 20 MIN: CPT | Performed by: INTERNAL MEDICINE

## 2017-10-19 NOTE — PROGRESS NOTES
Karen Newman  1950  [phone]      10/19/2017    EUGENIE Fountain    Chief Complaint   Patient presents with   • Atrial Fibrillation   • Hypertension     PROBLEM LIST:  1. Atrial fibrillation, duration unknown; diagnosed August of 2013:  a. Status post ALANIS/external cardioversion, (10/18/2013), Yara Bobo M.D. with the initiation of propafenone therapy.   b. Atrial fibrillation, (11/13/2013), in Gloria Gomez’ office.  c. Normal sinus rhythm at the time of office visit, (11/21/2013).  d. Normal sinus rhythm, EKG, (06/05/2014).  e. Discontinuation of Rythmol secondary to diarrhea, (06/05/2014), with initiation of flecainide 50 mg b.i.d. and Eliquis 5 mg b.i.d.   f. CHADS Vasc= 4, (4/18/2017)   g. CT scan of the chest (06/29/2017): Mild fibrotic changes, o/w normal  h. Pulmonary Function Test (06/29/2017): FEV1/FVC  1.54/2.57 = 60%  i. Nuclear Stress Test (06/29/2017): EF>70%, no ischemia   j. Bilateral thoracoscopy and Maze procedure and clipping of the left atrial appendage (9/26/2017) Dr. Kay  2. Valvular heart disease:  a. Previous echocardiogram approximately, 2006 (incomplete data base).  b. Echocardiogram (08/15/2002), revealing moderate concentric LVH, hyperdynamic LV, EF 81%; mild MR, trace TR.  c. Echocardiogram (10/07/2008), revealed a normal LVEF with mild to moderate MR.  d. Echocardiogram, (07/01/2011): normal LV systolic function and wall motion with mild MR and mild TR.   e. Echocardiogram, 9/22/2017: EF 60%. Moderate to severe MR. Trace to mild TR. No thrombus appears to be present within the left atrial appendage.  3. Hypertension:  a. Adenosine Cardiolite, (06/18/2009): Normal systolic function without evidence of inducible ischemia.   4. Dyslipidemia.  5. Diabetes mellitus, Type 2 diagnosed, 2006.  6. Thyroid disease:  a. Hyperthyroidism status post radioactive iodine therapy.  b. Hypothyroidism on thyroid replacement.  7. Gastroesophageal reflux disease.  8. Chronic kidney  disease.  9. History of hemorrhoids status post hemorrhoidectomy.  10. Colon polyps status post polypectomy.  11. Anxiety.  12. Iron deficiency anemia.  13. Obstructive sleep apnea on chronic CPAP therapy.  14. Surgical history:  a. Total abdominal hysterectomy.      Allergies   Allergen Reactions   • Sular [Nisoldipine Er] Cough       Current Medications:      Current Outpatient Prescriptions:   •  atorvastatin (LIPITOR) 10 MG tablet, Take 10 mg by mouth Daily., Disp: , Rfl:   •  diltiaZEM (TIAZAC) 360 MG 24 hr capsule, Take 360 mg by mouth Daily., Disp: , Rfl:   •  doxazosin (CARDURA) 8 MG tablet, Take 8 mg by mouth Every Night., Disp: , Rfl:   •  gabapentin (NEURONTIN) 800 MG tablet, Take 800 mg by mouth 2 (Two) Times a Day., Disp: , Rfl:   •  insulin glargine (LANTUS) 100 UNIT/ML injection, Inject 35 Units under the skin Every Night., Disp: , Rfl:   •  Insulin Lispro (HUMALOG KWIKPEN SC), Inject  under the skin 3 (Three) Times a Day Before Meals. B-14U; L-18U; D-20U, Disp: , Rfl:   •  levothyroxine (SYNTHROID, LEVOTHROID) 150 MCG tablet, Take 150 mcg by mouth Daily. 0.15 mg tablets, Disp: , Rfl:   •  lisinopril (PRINIVIL,ZESTRIL) 40 MG tablet, Take 40 mg by mouth Daily., Disp: , Rfl:   •  losartan-hydrochlorothiazide (HYZAAR) 100-25 MG per tablet, Take 1 tablet by mouth Daily., Disp: , Rfl:   •  metFORMIN (GLUCOPHAGE) 850 MG tablet, Take 850 mg by mouth 3 (Three) Times a Day With Meals., Disp: , Rfl:   •  omeprazole (priLOSEC) 40 MG capsule, Take 40 mg by mouth Daily., Disp: , Rfl:   •  PARoxetine (PAXIL) 30 MG tablet, Take 30 mg by mouth Daily., Disp: , Rfl:   •  sucralfate (CARAFATE) 1 g tablet, Take 1 tablet by mouth 4 (Four) Times a Day., Disp: 21 tablet, Rfl: 0  •  warfarin (COUMADIN) 5 MG tablet, Take 1 tablet by mouth Daily., Disp: 21 tablet, Rfl: 0    HPI    Karen Newman presents today for 3 month follow up of atrial fibrillation, hypertension, and dyslipidemia. Since last visit, patient has been  "feeling well overall from a cardiovascular standpoint status post cardioversion and left atrial appendage ligation. Patient denies chest pain, palpitations, shortness of breath, edema, PND, orthopnea, dizziness, and syncope.     The following portions of the patient's history were reviewed and updated as appropriate: allergies, current medications and problem list.    Pertinent positives as listed in the HPI.  All other systems reviewed are negative.    Vitals:    10/19/17 1323   BP: 140/70   BP Location: Left arm   Patient Position: Sitting   Pulse: 80   Weight: 197 lb 9.6 oz (89.6 kg)   Height: 66\" (167.6 cm)       Physical Exam:  General: Alert and oriented  Neck: Jugular venous pressure is within normal limits. Carotids have normal upstrokes without bruits.   Cardiovascular: Regular rate and rhythm without murmur gallop or rub.  Lungs: Clear without rales or wheezes. Equal expansion is noted.   Extremities: Show no edema.  Skin: warm and dry.  Neurologic: nonfocal    Diagnostic Data:    Lab Results   Component Value Date    GLUCOSE 238 (H) 09/27/2017    CALCIUM 8.4 (L) 09/27/2017     (L) 09/27/2017    K 5.3 09/27/2017    CO2 26.0 09/27/2017     09/27/2017    BUN 32 (H) 09/27/2017    CREATININE 1.50 (H) 09/27/2017    EGFRIFNONA 35 (L) 09/27/2017    BCR 21.3 09/27/2017    ANIONGAP 1.0 (L) 09/27/2017     Lab Results   Component Value Date    WBC 9.51 09/27/2017    HGB 11.1 (L) 09/27/2017    HCT 34.6 09/27/2017    .5 (H) 09/27/2017     09/27/2017     Lab Results   Component Value Date    INR 0.98 09/27/2017    INR 1.02 09/26/2017    INR 0.93 09/22/2017    PROTIME 10.7 09/27/2017    PROTIME 11.1 09/26/2017    PROTIME 10.1 09/22/2017     Procedures    Assessment:      ICD-10-CM ICD-9-CM   1. Persistent atrial fibrillation I48.1 427.31   2. Essential hypertension I10 401.9   3. Dyslipidemia E78.5 272.4       Plan:    1. Proceed with ALANIS to assess left atrial appendage clipping and need for " future anticoagulation  2. Continue current medications.  3. F/up in 6 months or sooner if needed.    Scribed for Yara Bobo MD by Demarco Schaefer. 10/19/2017  1:38 PM    I Yara Bobo MD personally performed the services described in this documentation as scribed by the above individual in my presence, and it is both accurate and complete.    Yara Bobo MD, FACC

## 2017-10-24 ENCOUNTER — HOSPITAL ENCOUNTER (OUTPATIENT)
Dept: CARDIOLOGY | Facility: HOSPITAL | Age: 67
Discharge: HOME | End: 2017-10-24
Attending: INTERNAL MEDICINE | Admitting: INTERNAL MEDICINE

## 2017-10-24 VITALS
DIASTOLIC BLOOD PRESSURE: 70 MMHG | WEIGHT: 193 LBS | SYSTOLIC BLOOD PRESSURE: 131 MMHG | OXYGEN SATURATION: 92 % | HEIGHT: 66 IN | HEART RATE: 74 BPM | RESPIRATION RATE: 20 BRPM | BODY MASS INDEX: 31.02 KG/M2

## 2017-10-24 DIAGNOSIS — I48.19 PERSISTENT ATRIAL FIBRILLATION (HCC): ICD-10-CM

## 2017-10-24 DIAGNOSIS — Z86.79 S/P MAZE OPERATION FOR ATRIAL FIBRILLATION: ICD-10-CM

## 2017-10-24 DIAGNOSIS — Z98.890 S/P MAZE OPERATION FOR ATRIAL FIBRILLATION: ICD-10-CM

## 2017-10-24 PROCEDURE — 93312 ECHO TRANSESOPHAGEAL: CPT

## 2017-10-24 PROCEDURE — 93320 DOPPLER ECHO COMPLETE: CPT

## 2017-10-24 PROCEDURE — 25010000002 FENTANYL CITRATE (PF) 100 MCG/2ML SOLUTION: Performed by: INTERNAL MEDICINE

## 2017-10-24 PROCEDURE — 25010000002 MIDAZOLAM PER 1 MG: Performed by: INTERNAL MEDICINE

## 2017-10-24 PROCEDURE — 93325 DOPPLER ECHO COLOR FLOW MAPG: CPT

## 2017-10-24 PROCEDURE — 93312 ECHO TRANSESOPHAGEAL: CPT | Performed by: INTERNAL MEDICINE

## 2017-10-24 PROCEDURE — 93320 DOPPLER ECHO COMPLETE: CPT | Performed by: INTERNAL MEDICINE

## 2017-10-24 PROCEDURE — 93325 DOPPLER ECHO COLOR FLOW MAPG: CPT | Performed by: INTERNAL MEDICINE

## 2017-10-24 RX ORDER — FENTANYL CITRATE 50 UG/ML
INJECTION, SOLUTION INTRAMUSCULAR; INTRAVENOUS
Status: COMPLETED | OUTPATIENT
Start: 2017-10-24 | End: 2017-10-24

## 2017-10-24 RX ORDER — MIDAZOLAM HYDROCHLORIDE 1 MG/ML
INJECTION INTRAMUSCULAR; INTRAVENOUS
Status: COMPLETED | OUTPATIENT
Start: 2017-10-24 | End: 2017-10-24

## 2017-10-24 RX ADMIN — FENTANYL CITRATE 50 MCG: 50 INJECTION, SOLUTION INTRAMUSCULAR; INTRAVENOUS at 13:47

## 2017-10-24 RX ADMIN — MIDAZOLAM HYDROCHLORIDE 2 MG: 1 INJECTION, SOLUTION INTRAMUSCULAR; INTRAVENOUS at 13:50

## 2017-10-24 RX ADMIN — MIDAZOLAM HYDROCHLORIDE 2 MG: 1 INJECTION, SOLUTION INTRAMUSCULAR; INTRAVENOUS at 13:46

## 2017-10-24 RX ADMIN — FENTANYL CITRATE 50 MCG: 50 INJECTION, SOLUTION INTRAMUSCULAR; INTRAVENOUS at 13:52

## 2017-10-24 RX ADMIN — METHOHEXITAL SODIUM 20 MG: 500 INJECTION, POWDER, LYOPHILIZED, FOR SOLUTION INTRAMUSCULAR; INTRAVENOUS; RECTAL at 13:56

## 2017-10-24 NOTE — INTERVAL H&P NOTE
H&P reviewed. The patient was examined and there are no changes to the H&P.  ALANIS today to further assess RADHA clipping.  The risks, benefits and potential complications have been discussed with the patient and she is willing to proceed.     Yara Bobo MD, FACC

## 2017-10-24 NOTE — H&P (VIEW-ONLY)
Karen Newman  1950  [phone]      10/19/2017    EUGENIE Fountain    Chief Complaint   Patient presents with   • Atrial Fibrillation   • Hypertension     PROBLEM LIST:  1. Atrial fibrillation, duration unknown; diagnosed August of 2013:  a. Status post ALANIS/external cardioversion, (10/18/2013), Yara Bobo M.D. with the initiation of propafenone therapy.   b. Atrial fibrillation, (11/13/2013), in Gloria Gomez’ office.  c. Normal sinus rhythm at the time of office visit, (11/21/2013).  d. Normal sinus rhythm, EKG, (06/05/2014).  e. Discontinuation of Rythmol secondary to diarrhea, (06/05/2014), with initiation of flecainide 50 mg b.i.d. and Eliquis 5 mg b.i.d.   f. CHADS Vasc= 4, (4/18/2017)   g. CT scan of the chest (06/29/2017): Mild fibrotic changes, o/w normal  h. Pulmonary Function Test (06/29/2017): FEV1/FVC  1.54/2.57 = 60%  i. Nuclear Stress Test (06/29/2017): EF>70%, no ischemia   j. Bilateral thoracoscopy and Maze procedure and clipping of the left atrial appendage (9/26/2017) Dr. Kay  2. Valvular heart disease:  a. Previous echocardiogram approximately, 2006 (incomplete data base).  b. Echocardiogram (08/15/2002), revealing moderate concentric LVH, hyperdynamic LV, EF 81%; mild MR, trace TR.  c. Echocardiogram (10/07/2008), revealed a normal LVEF with mild to moderate MR.  d. Echocardiogram, (07/01/2011): normal LV systolic function and wall motion with mild MR and mild TR.   e. Echocardiogram, 9/22/2017: EF 60%. Moderate to severe MR. Trace to mild TR. No thrombus appears to be present within the left atrial appendage.  3. Hypertension:  a. Adenosine Cardiolite, (06/18/2009): Normal systolic function without evidence of inducible ischemia.   4. Dyslipidemia.  5. Diabetes mellitus, Type 2 diagnosed, 2006.  6. Thyroid disease:  a. Hyperthyroidism status post radioactive iodine therapy.  b. Hypothyroidism on thyroid replacement.  7. Gastroesophageal reflux disease.  8. Chronic kidney  disease.  9. History of hemorrhoids status post hemorrhoidectomy.  10. Colon polyps status post polypectomy.  11. Anxiety.  12. Iron deficiency anemia.  13. Obstructive sleep apnea on chronic CPAP therapy.  14. Surgical history:  a. Total abdominal hysterectomy.      Allergies   Allergen Reactions   • Sular [Nisoldipine Er] Cough       Current Medications:      Current Outpatient Prescriptions:   •  atorvastatin (LIPITOR) 10 MG tablet, Take 10 mg by mouth Daily., Disp: , Rfl:   •  diltiaZEM (TIAZAC) 360 MG 24 hr capsule, Take 360 mg by mouth Daily., Disp: , Rfl:   •  doxazosin (CARDURA) 8 MG tablet, Take 8 mg by mouth Every Night., Disp: , Rfl:   •  gabapentin (NEURONTIN) 800 MG tablet, Take 800 mg by mouth 2 (Two) Times a Day., Disp: , Rfl:   •  insulin glargine (LANTUS) 100 UNIT/ML injection, Inject 35 Units under the skin Every Night., Disp: , Rfl:   •  Insulin Lispro (HUMALOG KWIKPEN SC), Inject  under the skin 3 (Three) Times a Day Before Meals. B-14U; L-18U; D-20U, Disp: , Rfl:   •  levothyroxine (SYNTHROID, LEVOTHROID) 150 MCG tablet, Take 150 mcg by mouth Daily. 0.15 mg tablets, Disp: , Rfl:   •  lisinopril (PRINIVIL,ZESTRIL) 40 MG tablet, Take 40 mg by mouth Daily., Disp: , Rfl:   •  losartan-hydrochlorothiazide (HYZAAR) 100-25 MG per tablet, Take 1 tablet by mouth Daily., Disp: , Rfl:   •  metFORMIN (GLUCOPHAGE) 850 MG tablet, Take 850 mg by mouth 3 (Three) Times a Day With Meals., Disp: , Rfl:   •  omeprazole (priLOSEC) 40 MG capsule, Take 40 mg by mouth Daily., Disp: , Rfl:   •  PARoxetine (PAXIL) 30 MG tablet, Take 30 mg by mouth Daily., Disp: , Rfl:   •  sucralfate (CARAFATE) 1 g tablet, Take 1 tablet by mouth 4 (Four) Times a Day., Disp: 21 tablet, Rfl: 0  •  warfarin (COUMADIN) 5 MG tablet, Take 1 tablet by mouth Daily., Disp: 21 tablet, Rfl: 0    HPI    Karen Newman presents today for 3 month follow up of atrial fibrillation, hypertension, and dyslipidemia. Since last visit, patient has been  "feeling well overall from a cardiovascular standpoint status post cardioversion and left atrial appendage ligation. Patient denies chest pain, palpitations, shortness of breath, edema, PND, orthopnea, dizziness, and syncope.     The following portions of the patient's history were reviewed and updated as appropriate: allergies, current medications and problem list.    Pertinent positives as listed in the HPI.  All other systems reviewed are negative.    Vitals:    10/19/17 1323   BP: 140/70   BP Location: Left arm   Patient Position: Sitting   Pulse: 80   Weight: 197 lb 9.6 oz (89.6 kg)   Height: 66\" (167.6 cm)       Physical Exam:  General: Alert and oriented  Neck: Jugular venous pressure is within normal limits. Carotids have normal upstrokes without bruits.   Cardiovascular: Regular rate and rhythm without murmur gallop or rub.  Lungs: Clear without rales or wheezes. Equal expansion is noted.   Extremities: Show no edema.  Skin: warm and dry.  Neurologic: nonfocal    Diagnostic Data:    Lab Results   Component Value Date    GLUCOSE 238 (H) 09/27/2017    CALCIUM 8.4 (L) 09/27/2017     (L) 09/27/2017    K 5.3 09/27/2017    CO2 26.0 09/27/2017     09/27/2017    BUN 32 (H) 09/27/2017    CREATININE 1.50 (H) 09/27/2017    EGFRIFNONA 35 (L) 09/27/2017    BCR 21.3 09/27/2017    ANIONGAP 1.0 (L) 09/27/2017     Lab Results   Component Value Date    WBC 9.51 09/27/2017    HGB 11.1 (L) 09/27/2017    HCT 34.6 09/27/2017    .5 (H) 09/27/2017     09/27/2017     Lab Results   Component Value Date    INR 0.98 09/27/2017    INR 1.02 09/26/2017    INR 0.93 09/22/2017    PROTIME 10.7 09/27/2017    PROTIME 11.1 09/26/2017    PROTIME 10.1 09/22/2017     Procedures    Assessment:      ICD-10-CM ICD-9-CM   1. Persistent atrial fibrillation I48.1 427.31   2. Essential hypertension I10 401.9   3. Dyslipidemia E78.5 272.4       Plan:    1. Proceed with ALANIS to assess left atrial appendage clipping and need for " future anticoagulation  2. Continue current medications.  3. F/up in 6 months or sooner if needed.    Scribed for Yara Bobo MD by Demarco Schaefer. 10/19/2017  1:38 PM    I Yara Bobo MD personally performed the services described in this documentation as scribed by the above individual in my presence, and it is both accurate and complete.    Yara Bobo MD, FACC

## 2017-10-26 LAB
BH CV ECHO MEAS - BSA(HAYCOCK): 2 M^2
BH CV ECHO MEAS - BSA: 2 M^2
BH CV ECHO MEAS - BZI_BMI: 31.2 KILOGRAMS/M^2
BH CV ECHO MEAS - BZI_METRIC_HEIGHT: 167.6 CM
BH CV ECHO MEAS - BZI_METRIC_WEIGHT: 87.5 KG
BH CV VAS BP RIGHT ARM: NORMAL MMHG
LV EF 2D ECHO EST: 60 %

## 2017-10-27 ENCOUNTER — NURSE ONLY (OUTPATIENT)
Dept: PRIMARY CARE CLINIC | Age: 67
End: 2017-10-27

## 2017-10-27 DIAGNOSIS — Z23 NEED FOR INFLUENZA VACCINATION: Primary | ICD-10-CM

## 2017-10-27 PROCEDURE — 90688 IIV4 VACCINE SPLT 0.5 ML IM: CPT | Performed by: NURSE PRACTITIONER

## 2017-10-27 PROCEDURE — G0008 ADMIN INFLUENZA VIRUS VAC: HCPCS | Performed by: NURSE PRACTITIONER

## 2017-10-27 NOTE — PROGRESS NOTES
I have reviewed the notes, assessments, and/or procedures performed by Oz Franks and independently evaluated the patient.  I concur with his documentation of Karen Newman.

## 2017-11-01 ENCOUNTER — OFFICE VISIT (OUTPATIENT)
Dept: CARDIOLOGY | Facility: HOSPITAL | Age: 67
End: 2017-11-01

## 2017-11-01 ENCOUNTER — HOSPITAL ENCOUNTER (OUTPATIENT)
Dept: CARDIOLOGY | Facility: HOSPITAL | Age: 67
Discharge: HOME | End: 2017-11-01
Admitting: NURSE PRACTITIONER

## 2017-11-01 VITALS
OXYGEN SATURATION: 94 % | DIASTOLIC BLOOD PRESSURE: 68 MMHG | BODY MASS INDEX: 31.79 KG/M2 | SYSTOLIC BLOOD PRESSURE: 125 MMHG | HEART RATE: 94 BPM | WEIGHT: 197.8 LBS | TEMPERATURE: 97.5 F | HEIGHT: 66 IN | RESPIRATION RATE: 16 BRPM

## 2017-11-01 DIAGNOSIS — Z79.4 TYPE 2 DIABETES MELLITUS WITH COMPLICATION, WITH LONG-TERM CURRENT USE OF INSULIN (HCC): ICD-10-CM

## 2017-11-01 DIAGNOSIS — G47.33 OSA ON CPAP: ICD-10-CM

## 2017-11-01 DIAGNOSIS — I48.0 PAROXYSMAL ATRIAL FIBRILLATION (HCC): Primary | ICD-10-CM

## 2017-11-01 DIAGNOSIS — E11.8 TYPE 2 DIABETES MELLITUS WITH COMPLICATION, WITH LONG-TERM CURRENT USE OF INSULIN (HCC): ICD-10-CM

## 2017-11-01 DIAGNOSIS — I38 VHD (VALVULAR HEART DISEASE): ICD-10-CM

## 2017-11-01 DIAGNOSIS — Z99.89 OSA ON CPAP: ICD-10-CM

## 2017-11-01 DIAGNOSIS — I48.0 PAROXYSMAL ATRIAL FIBRILLATION (HCC): ICD-10-CM

## 2017-11-01 PROCEDURE — 93010 ELECTROCARDIOGRAM REPORT: CPT | Performed by: INTERNAL MEDICINE

## 2017-11-01 PROCEDURE — 99213 OFFICE O/P EST LOW 20 MIN: CPT | Performed by: NURSE PRACTITIONER

## 2017-11-01 PROCEDURE — 93005 ELECTROCARDIOGRAM TRACING: CPT | Performed by: NURSE PRACTITIONER

## 2017-11-01 RX ORDER — ASPIRIN 81 MG/1
81 TABLET ORAL DAILY
COMMUNITY
End: 2018-01-23 | Stop reason: HOSPADM

## 2017-11-01 NOTE — PROGRESS NOTES
Subjective:     Encounter Date:11/01/2017      Patient ID: Karen Newman is a 67 y.o. female.    Chief Complaint:  History of Present Illness:  Mrs. Newman comes in to the afib clinic for one month post thorascopic VATS MAZE and RADHA on 9/25/17.  EKG is needed to assess rhythm status.  She has followed up with Dr. Bobo and had SANTA.  Warfarin discontinued as RADHA was confirmed closed.      Overall, patient states she is feeling better and beginning to walk regularly.     Past Medical History:   Diagnosis Date   • Anxiety    • Arthritis    • Atrial fibrillation    • Cancer     RIGHT FOOT STILL HAS SOME SKIN CANCER IN PLACE AT THIS TIME- WILL BE GETTING REMOVED OCTOBER 22ND    • CKD (chronic kidney disease)     IGA - GETS CHECKED YEARLY FROM [facility]    • Colon polyps    • Diabetes mellitus     SINCE 2005 - CHECKS 3 TIMES PER DAY    • Dyslipidemia    • GERD (gastroesophageal reflux disease)    • Hemorrhoids    • History of transfusion    • Hypertension    • Iron deficiency anemia    • On home O2     2l AT NIGHT WITH CPAP    • KRISTEN on CPAP     COMPLIANT WITH MACHINE - DOES USE O2 2L WITH MACHINE    • Panic attacks    • Pneumonia    • PONV (postoperative nausea and vomiting)     NAUSEA -  MEDICINE HELPED PATIENT    • Sleep apnea     HAS CPAP AT HOME   • SOB (shortness of breath) on exertion    • Thyroid disease    • VHD (valvular heart disease)    • Wears glasses    • Wears glasses        Past Surgical History:   Procedure Laterality Date   • CATARACT EXTRACTION WITH INTRAOCULAR LENS IMPLANT Bilateral    • COLONOSCOPY W/ POLYPECTOMY     • COLONOSCOPY W/ POLYPECTOMY     • ENDOSCOPY     • OTHER SURGICAL HISTORY      PT HAD BLOOD CLOT IN HEART SURGERY AND WENT IN THROUGH NECK BUT UNSURE WHAT CALLED - possible santa?   • SKIN BIOPSY      RIGHT FOOT FOR SKIN CANCER   • THORACOSCOPY Left 9/25/2017    Procedure: THORASOSCOPY VIDEO ASSISTED WITH LEFT ATRIAL APPENDAGE ABLATION;  Surgeon: Alec Kay MD;  Location:   [facility];  Service:    • TOTAL ABDOMINAL HYSTERECTOMY     • TRANSESOPHAGEAL ECHOCARDIOGRAM (ALANIS) Bilateral 7/31/2017    Procedure: TRANSESOPHAGEAL ECHOCARDIOGRAM AND BRONCHOSCOPY ;  Surgeon: Alec Kay MD;  Location:  [facility];  Service:    • WISDOM TOOTH EXTRACTION         Social History     Social History   • Marital status:      Spouse name: N/A   • Number of children: 3   • Years of education: N/A     Occupational History   • Housewife      Social History Main Topics   • Smoking status: Former Smoker     Packs/day: 0.50     Years: 15.00     Types: Cigarettes     Quit date: 10/18/2016   • Smokeless tobacco: Never Used   • Alcohol use No   • Drug use: No   • Sexual activity: Defer     Other Topics Concern   • Not on file     Social History Narrative       Family History   Problem Relation Age of Onset   • Hypertension Mother    • Coronary artery disease Mother    • Kidney disease Father        Review of Systems   Constitution: Positive for weakness and malaise/fatigue. Negative for chills, decreased appetite, diaphoresis, fever, night sweats, weight gain and weight loss.   HENT: Negative for congestion, hearing loss, hoarse voice and nosebleeds.    Eyes: Negative for blurred vision, visual disturbance and visual halos.   Cardiovascular: Positive for dyspnea on exertion and orthopnea. Negative for chest pain, claudication, cyanosis, irregular heartbeat, leg swelling, near-syncope, palpitations, paroxysmal nocturnal dyspnea and syncope.   Respiratory: Positive for shortness of breath. Negative for cough, hemoptysis, sleep disturbances due to breathing, snoring, sputum production and wheezing.    Hematologic/Lymphatic: Negative for bleeding problem. Does not bruise/bleed easily.   Skin: Negative for dry skin, itching and rash.   Musculoskeletal: Negative for arthritis, joint pain, joint swelling and myalgias.   Gastrointestinal: Negative for bloating, abdominal pain, constipation, diarrhea, flatus,  "heartburn, hematemesis, hematochezia, melena, nausea and vomiting.   Genitourinary: Negative for dysuria, frequency, hematuria, nocturia and urgency.   Neurological: Negative for excessive daytime sleepiness, dizziness, headaches, light-headedness and loss of balance.   Psychiatric/Behavioral: Negative for depression. The patient does not have insomnia and is not nervous/anxious.      Vitals:    11/01/17 1310 11/01/17 1313 11/01/17 1314   BP: 115/68 117/79 125/68   BP Location: Right arm Left arm Left arm   Patient Position: Sitting Sitting Standing   Pulse: 90  94   Resp: 16     Temp: 97.5 °F (36.4 °C)     TempSrc: Temporal Artery      SpO2: 94%     Weight: 197 lb 12.8 oz (89.7 kg)     Height: 66\" (167.6 cm)         Objective:     Physical Exam   Constitutional: She is oriented to person, place, and time. She appears well-developed and well-nourished. No distress.   HENT:   Head: Normocephalic.   Eyes: Pupils are equal, round, and reactive to light.   Neck: Neck supple. No JVD present.   Cardiovascular: Normal rate, regular rhythm and intact distal pulses.    Murmur heard.  2/6 systolic murmur   Pulmonary/Chest: Effort normal and breath sounds normal. No respiratory distress. She has no wheezes. She has no rales.   Abdominal: Soft. Bowel sounds are normal. There is no tenderness.   Lymphadenopathy:     She has no cervical adenopathy.   Neurological: She is alert and oriented to person, place, and time. No cranial nerve deficit.   Skin: Skin is warm and dry. No erythema.   Chest tube sites healed well   Psychiatric: She has a normal mood and affect. Her behavior is normal.       Lab Review: EKG in clinic:  Sinus rhythm with 1st degree AV block.  HR is 90 bpm.  CO is 212 ms and  ms.     Assessment/ Plan:          Diagnosis Plan   1. Paroxysmal atrial fibrillation  S/P thoracoscopic assisted MAZE procedure and RADHA clip per Dr. Kay 9/26/17.  ALANIS performed per Dr. Bobo 10/24/17 showing closure of RADHA " and LVEF 60%.  Warfarin discontinued.  To follow up with Dr. Osullivan 1/2018.  Report any recurrent Afib symptoms if they occur.  Follow up with Dr. Bobo in 6 months.  No further scheduled visits at Afib clinic unless requested by Cardiology.       2. VHD (valvular heart disease)  Mild to moderate MR per echo 10/24/17.     3. KRISTEN on CPAP  Compliant w/ CPAP   4. Type 2 diabetes mellitus with complication, with long-term current use of insulin  Follows with Endocrinology.

## 2017-11-03 ENCOUNTER — TELEPHONE (OUTPATIENT)
Dept: CARDIOLOGY | Facility: HOSPITAL | Age: 67
End: 2017-11-03

## 2017-11-03 NOTE — TELEPHONE ENCOUNTER
Called patient regarding Dr. Bobo's response to question about lisinopril or losartan.  Advised Jihan says DC lisinopril.  Check BP regularly and report back if averaging above 140 systolic.  She may call me @ [phone] or call Rachana Devine RN.  Mrs. Newman read back to me the above information to me correctly.

## 2017-11-16 NOTE — ANESTHESIA PROCEDURE NOTES
Airway  Urgency: elective    Airway not difficult    General Information and Staff    Patient location during procedure: OR    Indications and Patient Condition  Indications for airway management: airway protection    Preoxygenated: yes  Mask difficulty assessment: 1 - vent by mask    Final Airway Details  Final airway type: endotracheal airway      Successful airway: ETT - double lumen left  Cuffed: yes   Successful intubation technique: direct laryngoscopy  Facilitating devices/methods: intubating stylet and Bougie  Endotracheal tube insertion site: oral  Blade: Rojas  Blade size: #2  ETT DL size: 37 fr  Cormack-Lehane Classification: grade IIa - partial view of glottis  Placement verified by: chest auscultation, bronchoscopy and capnometry   Measured from: lips  ETT to lips (cm): 29  Number of attempts at approach: 2                 Noemi Santos, Dr. Emilee Chi RN. Patient cleared from his standpoint for surgery tomorrow, RIH repair with mesh.

## 2017-11-28 ENCOUNTER — TELEPHONE (OUTPATIENT)
Dept: PRIMARY CARE CLINIC | Age: 67
End: 2017-11-28

## 2017-11-28 RX ORDER — HYDROCODONE BITARTRATE AND ACETAMINOPHEN 5; 325 MG/1; MG/1
1 TABLET ORAL EVERY 6 HOURS PRN
Qty: 12 TABLET | Refills: 0 | Status: SHIPPED | OUTPATIENT
Start: 2017-11-28 | End: 2017-12-05

## 2017-12-21 ENCOUNTER — OFFICE VISIT (OUTPATIENT)
Dept: PRIMARY CARE CLINIC | Age: 67
End: 2017-12-21
Payer: MEDICARE

## 2017-12-21 VITALS
SYSTOLIC BLOOD PRESSURE: 120 MMHG | DIASTOLIC BLOOD PRESSURE: 60 MMHG | HEART RATE: 84 BPM | OXYGEN SATURATION: 94 % | BODY MASS INDEX: 31.73 KG/M2 | WEIGHT: 196.6 LBS

## 2017-12-21 DIAGNOSIS — E11.8 TYPE 2 DIABETES MELLITUS WITH COMPLICATION, WITHOUT LONG-TERM CURRENT USE OF INSULIN (HCC): ICD-10-CM

## 2017-12-21 DIAGNOSIS — D50.9 IRON DEFICIENCY ANEMIA, UNSPECIFIED IRON DEFICIENCY ANEMIA TYPE: Primary | ICD-10-CM

## 2017-12-21 DIAGNOSIS — I48.91 ATRIAL FIBRILLATION, UNSPECIFIED TYPE (HCC): ICD-10-CM

## 2017-12-21 DIAGNOSIS — I10 ESSENTIAL HYPERTENSION: ICD-10-CM

## 2017-12-21 DIAGNOSIS — C44.709: ICD-10-CM

## 2017-12-21 PROCEDURE — 99213 OFFICE O/P EST LOW 20 MIN: CPT | Performed by: NURSE PRACTITIONER

## 2017-12-21 PROCEDURE — 1090F PRES/ABSN URINE INCON ASSESS: CPT | Performed by: NURSE PRACTITIONER

## 2017-12-21 PROCEDURE — 3017F COLORECTAL CA SCREEN DOC REV: CPT | Performed by: NURSE PRACTITIONER

## 2017-12-21 PROCEDURE — 4040F PNEUMOC VAC/ADMIN/RCVD: CPT | Performed by: NURSE PRACTITIONER

## 2017-12-21 PROCEDURE — G8484 FLU IMMUNIZE NO ADMIN: HCPCS | Performed by: NURSE PRACTITIONER

## 2017-12-21 PROCEDURE — 1123F ACP DISCUSS/DSCN MKR DOCD: CPT | Performed by: NURSE PRACTITIONER

## 2017-12-21 PROCEDURE — G8417 CALC BMI ABV UP PARAM F/U: HCPCS | Performed by: NURSE PRACTITIONER

## 2017-12-21 PROCEDURE — G8400 PT W/DXA NO RESULTS DOC: HCPCS | Performed by: NURSE PRACTITIONER

## 2017-12-21 PROCEDURE — 3046F HEMOGLOBIN A1C LEVEL >9.0%: CPT | Performed by: NURSE PRACTITIONER

## 2017-12-21 PROCEDURE — 1036F TOBACCO NON-USER: CPT | Performed by: NURSE PRACTITIONER

## 2017-12-21 PROCEDURE — 3014F SCREEN MAMMO DOC REV: CPT | Performed by: NURSE PRACTITIONER

## 2017-12-21 PROCEDURE — G8427 DOCREV CUR MEDS BY ELIG CLIN: HCPCS | Performed by: NURSE PRACTITIONER

## 2017-12-21 PROCEDURE — G8598 ASA/ANTIPLAT THER USED: HCPCS | Performed by: NURSE PRACTITIONER

## 2017-12-21 RX ORDER — OXYCODONE HYDROCHLORIDE AND ACETAMINOPHEN 5; 325 MG/1; MG/1
1 TABLET ORAL EVERY 4 HOURS PRN
Qty: 18 TABLET | Refills: 0 | Status: SHIPPED | OUTPATIENT
Start: 2017-12-21 | End: 2017-12-28

## 2017-12-21 ASSESSMENT — ENCOUNTER SYMPTOMS
NAUSEA: 0
EYE PAIN: 0
SHORTNESS OF BREATH: 0
VOMITING: 0
COUGH: 0
SORE THROAT: 0
ABDOMINAL PAIN: 0

## 2018-01-01 NOTE — PROGRESS NOTES
16u- lunch, 18u supper Yes Historical Provider, MD   furosemide (LASIX) 20 MG tablet One daily as needed for swelling. Yes EULALIA Rievra   doxazosin (CARDURA) 8 MG tablet Take 1 tablet by mouth nightly. Yes EULALIA Rivera   levothyroxine (SYNTHROID) 175 MCG tablet Take 1 tablet by mouth every 48 hours. Patient taking differently: Take 175 mcg by mouth daily  Yes EULALIA Rivera   metformin (GLUCOPHAGE) 850 MG tablet Take 1 tablet by mouth 3 times daily. Yes EULALIA Rivera       ASSESSMENT:  1. Iron deficiency anemia, unspecified iron deficiency anemia type    2. Type 2 diabetes mellitus with complication, without long-term current use of insulin ([facility] [address]    3. Essential hypertension    4. Atrial fibrillation, unspecified type ([facility] [address]    5. Primary cancer of skin of left foot          PLAN:    Orders Placed This Encounter   Medications    oxyCODONE-acetaminophen (PERCOCET) 5-325 MG per tablet     Sig: Take 1 tablet by mouth every 4 hours as needed for Pain . Dispense:  18 tablet     Refill:  0     Orders Placed This Encounter   Procedures    LIPID PANEL    COMPREHENSIVE METABOLIC PANEL    CBC WITH AUTO DIFFERENTIAL    FERRITIN    IRON     There are no Patient Instructions on file for this visit. Return in about 3 months (around 3/21/2018).

## 2018-01-02 RX ORDER — LOSARTAN POTASSIUM AND HYDROCHLOROTHIAZIDE 25; 100 MG/1; MG/1
TABLET ORAL
Qty: 90 TABLET | Refills: 1 | Status: SHIPPED | OUTPATIENT
Start: 2018-01-02 | End: 2018-07-03 | Stop reason: SDUPTHER

## 2018-01-10 ENCOUNTER — OFFICE VISIT (OUTPATIENT)
Dept: PRIMARY CARE CLINIC | Age: 68
End: 2018-01-10
Payer: MEDICARE

## 2018-01-10 VITALS
OXYGEN SATURATION: 90 % | TEMPERATURE: 98 F | HEART RATE: 85 BPM | DIASTOLIC BLOOD PRESSURE: 80 MMHG | BODY MASS INDEX: 31.54 KG/M2 | WEIGHT: 195.4 LBS | SYSTOLIC BLOOD PRESSURE: 140 MMHG

## 2018-01-10 DIAGNOSIS — F43.9 SITUATIONAL STRESS: ICD-10-CM

## 2018-01-10 DIAGNOSIS — R05.9 COUGH: ICD-10-CM

## 2018-01-10 DIAGNOSIS — F41.9 ANXIETY: ICD-10-CM

## 2018-01-10 DIAGNOSIS — J01.90 ACUTE SINUSITIS, RECURRENCE NOT SPECIFIED, UNSPECIFIED LOCATION: Primary | ICD-10-CM

## 2018-01-10 PROCEDURE — 1090F PRES/ABSN URINE INCON ASSESS: CPT | Performed by: NURSE PRACTITIONER

## 2018-01-10 PROCEDURE — 99213 OFFICE O/P EST LOW 20 MIN: CPT | Performed by: NURSE PRACTITIONER

## 2018-01-10 PROCEDURE — 4040F PNEUMOC VAC/ADMIN/RCVD: CPT | Performed by: NURSE PRACTITIONER

## 2018-01-10 PROCEDURE — G8598 ASA/ANTIPLAT THER USED: HCPCS | Performed by: NURSE PRACTITIONER

## 2018-01-10 PROCEDURE — G8417 CALC BMI ABV UP PARAM F/U: HCPCS | Performed by: NURSE PRACTITIONER

## 2018-01-10 PROCEDURE — 3014F SCREEN MAMMO DOC REV: CPT | Performed by: NURSE PRACTITIONER

## 2018-01-10 PROCEDURE — 1036F TOBACCO NON-USER: CPT | Performed by: NURSE PRACTITIONER

## 2018-01-10 PROCEDURE — 3017F COLORECTAL CA SCREEN DOC REV: CPT | Performed by: NURSE PRACTITIONER

## 2018-01-10 PROCEDURE — G8484 FLU IMMUNIZE NO ADMIN: HCPCS | Performed by: NURSE PRACTITIONER

## 2018-01-10 PROCEDURE — G8400 PT W/DXA NO RESULTS DOC: HCPCS | Performed by: NURSE PRACTITIONER

## 2018-01-10 PROCEDURE — 1123F ACP DISCUSS/DSCN MKR DOCD: CPT | Performed by: NURSE PRACTITIONER

## 2018-01-10 PROCEDURE — G8427 DOCREV CUR MEDS BY ELIG CLIN: HCPCS | Performed by: NURSE PRACTITIONER

## 2018-01-10 RX ORDER — HYDROCODONE POLISTIREX AND CHLORPHENIRAMINE POLISTIREX 10; 8 MG/5ML; MG/5ML
5 SUSPENSION, EXTENDED RELEASE ORAL EVERY 12 HOURS PRN
Qty: 120 ML | Refills: 0 | Status: SHIPPED | OUTPATIENT
Start: 2018-01-10 | End: 2018-01-20

## 2018-01-10 RX ORDER — LEVOFLOXACIN 500 MG/1
500 TABLET, FILM COATED ORAL DAILY
Qty: 10 TABLET | Refills: 0 | Status: SHIPPED | OUTPATIENT
Start: 2018-01-10 | End: 2018-01-20

## 2018-01-10 RX ORDER — METHYLPREDNISOLONE 4 MG/1
TABLET ORAL
Qty: 1 KIT | Refills: 0 | Status: SHIPPED | OUTPATIENT
Start: 2018-01-10 | End: 2018-01-16

## 2018-01-10 RX ORDER — ALPRAZOLAM 0.25 MG/1
0.25 TABLET ORAL 3 TIMES DAILY PRN
Qty: 9 TABLET | Refills: 0 | Status: SHIPPED | OUTPATIENT
Start: 2018-01-10 | End: 2018-12-06 | Stop reason: SDUPTHER

## 2018-01-10 ASSESSMENT — ENCOUNTER SYMPTOMS
VOMITING: 0
SHORTNESS OF BREATH: 0
EYE PAIN: 0
SORE THROAT: 0
COUGH: 1
ABDOMINAL PAIN: 0
NAUSEA: 0

## 2018-01-12 ENCOUNTER — PROCEDURE VISIT (OUTPATIENT)
Dept: PRIMARY CARE CLINIC | Age: 68
End: 2018-01-12
Payer: MEDICARE

## 2018-01-12 ENCOUNTER — HOSPITAL ENCOUNTER (OUTPATIENT)
Dept: CARDIOLOGY | Facility: HOSPITAL | Age: 68
Discharge: HOME | End: 2018-01-12
Attending: INTERNAL MEDICINE | Admitting: INTERNAL MEDICINE

## 2018-01-12 VITALS
HEART RATE: 73 BPM | WEIGHT: 196.8 LBS | TEMPERATURE: 98.3 F | DIASTOLIC BLOOD PRESSURE: 68 MMHG | OXYGEN SATURATION: 98 % | SYSTOLIC BLOOD PRESSURE: 128 MMHG | BODY MASS INDEX: 31.76 KG/M2

## 2018-01-12 VITALS
HEIGHT: 66 IN | SYSTOLIC BLOOD PRESSURE: 110 MMHG | OXYGEN SATURATION: 92 % | DIASTOLIC BLOOD PRESSURE: 72 MMHG | BODY MASS INDEX: 31.98 KG/M2 | HEART RATE: 72 BPM | WEIGHT: 199 LBS

## 2018-01-12 DIAGNOSIS — M25.511 CHRONIC RIGHT SHOULDER PAIN: Primary | ICD-10-CM

## 2018-01-12 DIAGNOSIS — Z98.890 OTHER SPECIFIED POSTPROCEDURAL STATES: ICD-10-CM

## 2018-01-12 DIAGNOSIS — G89.29 CHRONIC RIGHT SHOULDER PAIN: Primary | ICD-10-CM

## 2018-01-12 DIAGNOSIS — I48.91 ATRIAL FIBRILLATION, UNSPECIFIED TYPE (HCC): ICD-10-CM

## 2018-01-12 PROCEDURE — 3014F SCREEN MAMMO DOC REV: CPT | Performed by: FAMILY MEDICINE

## 2018-01-12 PROCEDURE — G8484 FLU IMMUNIZE NO ADMIN: HCPCS | Performed by: FAMILY MEDICINE

## 2018-01-12 PROCEDURE — 4040F PNEUMOC VAC/ADMIN/RCVD: CPT | Performed by: FAMILY MEDICINE

## 2018-01-12 PROCEDURE — 20610 DRAIN/INJ JOINT/BURSA W/O US: CPT | Performed by: FAMILY MEDICINE

## 2018-01-12 PROCEDURE — G8417 CALC BMI ABV UP PARAM F/U: HCPCS | Performed by: FAMILY MEDICINE

## 2018-01-12 PROCEDURE — 93325 DOPPLER ECHO COLOR FLOW MAPG: CPT

## 2018-01-12 PROCEDURE — 25010000002 FENTANYL CITRATE (PF) 100 MCG/2ML SOLUTION: Performed by: INTERNAL MEDICINE

## 2018-01-12 PROCEDURE — G8598 ASA/ANTIPLAT THER USED: HCPCS | Performed by: FAMILY MEDICINE

## 2018-01-12 PROCEDURE — 93321 DOPPLER ECHO F-UP/LMTD STD: CPT

## 2018-01-12 PROCEDURE — 99213 OFFICE O/P EST LOW 20 MIN: CPT | Performed by: FAMILY MEDICINE

## 2018-01-12 PROCEDURE — 1123F ACP DISCUSS/DSCN MKR DOCD: CPT | Performed by: FAMILY MEDICINE

## 2018-01-12 PROCEDURE — 25010000002 MIDAZOLAM PER 1 MG: Performed by: INTERNAL MEDICINE

## 2018-01-12 PROCEDURE — 3017F COLORECTAL CA SCREEN DOC REV: CPT | Performed by: FAMILY MEDICINE

## 2018-01-12 PROCEDURE — 1036F TOBACCO NON-USER: CPT | Performed by: FAMILY MEDICINE

## 2018-01-12 PROCEDURE — G8427 DOCREV CUR MEDS BY ELIG CLIN: HCPCS | Performed by: FAMILY MEDICINE

## 2018-01-12 PROCEDURE — G8400 PT W/DXA NO RESULTS DOC: HCPCS | Performed by: FAMILY MEDICINE

## 2018-01-12 PROCEDURE — 1090F PRES/ABSN URINE INCON ASSESS: CPT | Performed by: FAMILY MEDICINE

## 2018-01-12 RX ORDER — TRIAMCINOLONE ACETONIDE 40 MG/ML
40 INJECTION, SUSPENSION INTRA-ARTICULAR; INTRAMUSCULAR ONCE
Status: COMPLETED | OUTPATIENT
Start: 2018-01-12 | End: 2018-01-12

## 2018-01-12 RX ORDER — FENTANYL CITRATE 50 UG/ML
INJECTION, SOLUTION INTRAMUSCULAR; INTRAVENOUS
Status: COMPLETED | OUTPATIENT
Start: 2018-01-12 | End: 2018-01-12

## 2018-01-12 RX ORDER — MIDAZOLAM HYDROCHLORIDE 1 MG/ML
INJECTION INTRAMUSCULAR; INTRAVENOUS
Status: COMPLETED | OUTPATIENT
Start: 2018-01-12 | End: 2018-01-12

## 2018-01-12 RX ADMIN — TRIAMCINOLONE ACETONIDE 40 MG: 40 INJECTION, SUSPENSION INTRA-ARTICULAR; INTRAMUSCULAR at 14:20

## 2018-01-12 RX ADMIN — MIDAZOLAM HYDROCHLORIDE 2 MG: 1 INJECTION, SOLUTION INTRAMUSCULAR; INTRAVENOUS at 11:50

## 2018-01-12 RX ADMIN — FENTANYL CITRATE 25 MCG: 50 INJECTION, SOLUTION INTRAMUSCULAR; INTRAVENOUS at 11:51

## 2018-01-12 ASSESSMENT — ENCOUNTER SYMPTOMS
ABDOMINAL PAIN: 0
VOMITING: 0
RHINORRHEA: 0
COUGH: 1
SORE THROAT: 0
NAUSEA: 0
SHORTNESS OF BREATH: 0
CONSTIPATION: 0
DIARRHEA: 0

## 2018-01-12 ASSESSMENT — PATIENT HEALTH QUESTIONNAIRE - PHQ9
2. FEELING DOWN, DEPRESSED OR HOPELESS: 0
SUM OF ALL RESPONSES TO PHQ9 QUESTIONS 1 & 2: 3
1. LITTLE INTEREST OR PLEASURE IN DOING THINGS: 3
SUM OF ALL RESPONSES TO PHQ QUESTIONS 1-9: 3

## 2018-01-12 NOTE — PROGRESS NOTES
After obtaining consent, and per orders of Dr. Edward Chavez, injection of Kenalog was given in Right Shoulder by Joeann Boxer, DO. Patient instructed to remain in clinic for 20 minutes afterwards, and to report any adverse reaction to me immediately.
Relevant Orders    WI ARTHROCENTESIS ASPIR&/INJ MAJOR JT/BURSA W/O US (Completed)      Other Visit Diagnoses    None. Return if symptoms worsen or fail to improve.

## 2018-01-12 NOTE — H&P
[facility] Consult Note      Referring Provider: Arpan Osullivan MD  Primary Provider:  [unfilled]  Reason for Consultation: ALANIS s/p RADHA    Patient Care Team:  EUGENIE Jimenez as PCP - General  Alec Kay MD as Surgeon (Cardiothoracic Surgery)  Yara Bobo MD as Consulting Physician (Cardiology)    Chief complaint ALANIS s/p RADHA    Identification:  67-year-old white female    Problem list:    1. Atrial fibrillation, duration unknown; diagnosed August of 2013:  a. Status post ALANIS/external cardioversion, (10/18/2013), Yara Bobo M.D. with the initiation of propafenone therapy.   b. Atrial fibrillation, (11/13/2013), in Gloria Gomez’ office.  c. Normal sinus rhythm at the time of office visit, (11/21/2013).  d. Normal sinus rhythm, EKG, (06/05/2014).  e. Discontinuation of Rythmol secondary to diarrhea, (06/05/2014), with initiation of flecainide 50 mg b.i.d. and Eliquis 5 mg b.i.d.   f. CHADS Vasc= 4, (4/18/2017)   g. CT scan of the chest (06/29/2017): Mild fibrotic changes, o/w normal  h. Pulmonary Function Test (06/29/2017): FEV1/FVC  1.54/2.57 = 60%  i. Nuclear Stress Test (06/29/2017): EF>70%, no ischemia   j. NAHUM and Eliza GARCIA, 9/25/2017  k. ALANIS 10/24/17:  Closure of RADHA; coumadin discontinued  2. Valvular heart disease:  a. Previous echocardiogram approximately, 2006 (incomplete data base).  b. Echocardiogram (08/15/2002), revealing moderate concentric LVH, hyperdynamic LV, EF 81%; mild MR, trace TR.  c. Echocardiogram (10/07/2008), revealed a normal LVEF with mild to moderate MR.  d. Echocardiogram, (07/01/2011): normal LV systolic function and wall motion with mild MR and mild TR.   3. Hypertension:  a. Adenosine Cardiolite, (06/18/2009): Normal systolic function without evidence of inducible ischemia.   4. Dyslipidemia.  5. Diabetes mellitus, Type 2 diagnosed, 2006.  6. Thyroid disease:  a. Hyperthyroidism status post radioactive iodine therapy.  b. Hypothyroidism on  thyroid replacement.  7. Gastroesophageal reflux disease.  8. Chronic kidney disease.  9. History of hemorrhoids status post hemorrhoidectomy.  10. Colon polyps status post polypectomy.  11. Anxiety.  12. Iron deficiency anemia.  13. Obstructive sleep apnea on chronic CPAP therapy.  14. Surgical history:  a. Total abdominal hysterectomy.    Allergies:  Sular [nisoldipine er]    Home/Current Medications:    1.  Atorvastatin 10 mg daily  2.  Diltiazem 360 mg daily  3.  Doxazosin 8 mg daily at bedtime  4.  Neurontin 800 mg twice a day  5.  Lantus insulin 35 units daily at bedtime  6.  Lispro 3 times a day; 14 units at breakfast, 18 units at lunch, 20 units at dinner  7.  Levothyroxine 150 µg daily  8.  Losartan-HCTZ 125 mg daily  9.  Metformin 850 mg 3 times a day  10.  Prilosec 40 mg daily  11.  Paxil 30 mg daily  12.  Carafate 1 g 4 times a day      History of present illness:  Patient is a pleasant 67-year-old female with the above-noted medical history who presents today for ALANIS status post Maze procedure and left atrial appendage ligation performed on September 26, 2017 by Dr. Alec Kay.  Since her procedure she has been doing well from a cardiovascular standpoint.  She currently is in sinus rhythm and states that she has not ever been able to tell if she is in or out of atrial fibrillation.  She denies having any shortness of breath up until the past few days when she was recently diagnosed with bronchitis.  There has been no fever and she does state that there is an improvement in her cough since being initiated on steroids.  She denies having any chest pain, edema, fatigue, palpitations, dizziness and syncope.  She does admit to some shortness of breath and attributes this to her bronchitis she is only noticed within the past few days.  She underwent ALANIS in October 2017 of which her left atrial appendage was found to be closed.  Her Coumadin was discontinued at that time.    Cardiac Risk Factors:   "Hypertension, hyperlipidemia, sedentary lifestyle, advanced age female, former smoker       Social History:  Social History     Social History   • Marital status:      Spouse name: N/A   • Number of children: 3   • Years of education: N/A     Occupational History   • Housewife      Social History Main Topics   • Smoking status: Former Smoker     Packs/day: 0.50     Years: 15.00     Types: Cigarettes     Quit date: 10/18/2016   • Smokeless tobacco: Never Used   • Alcohol use No   • Drug use: No   • Sexual activity: Defer     Other Topics Concern   • Not on file     Social History Narrative     Family History:  Family History   Problem Relation Age of Onset   • Hypertension Mother    • Coronary artery disease Mother    • Kidney disease Father      Review of Systems  Pertinent positives are listed in the HPI.  All other systems reviewed are negative.         Objective     Vital Sign Min/Max for last 24 hours  No Data Recorded   BP  Min: 144/89  Max: 144/89   Pulse  Min: 71  Max: 73   No Data Recorded   SpO2  Min: 88 %  Max: 88 %   No Data Recorded   Weight  Min: 90.3 kg (199 lb)  Max: 90.3 kg (199 lb)     Flowsheet Rows         First Filed Value    Admission Height  167.6 cm (66\") Documented at 01/12/2018 0919    Admission Weight  90.3 kg (199 lb) Documented at 01/12/2018 0919          Physical Exam:    GENERAL: well-developed, well-nourished; in no acute distress.   NECK:  There is no jugular venous distention at 30°.  Carotid upstrokes are 2+ and  symmetrical without bruits.   LUNGS: Faint expiratory wheezing throughout; no rhonchi, or rales noted.   CARDIOVASCULAR: The heart has a regular rate with a normal S1 and S2. There is no murmur, gallop, rub, or click appreciated. The PMI is nondisplaced.   ABDOMEN: Soft and nontender  NEUROLOGICAL: Nonfocal; Alert and oriented  PERIPHERAL VASCULAR:  Posterior tibial and dorsalis pedis pulses are 2+ and symmetrical. There is no peripheral edema.   MUSCULOSKELETAL:  " Normal ROM  SKIN:  Warm and dry  PSYCHIATRIC: normal mood and affect; behavior appropriate     EKG:   NSR @ 74    Labs:        Lab Results   Component Value Date    INR 0.98 09/27/2017    INR 1.02 09/26/2017    INR 0.93 09/22/2017    PROTIME 10.7 09/27/2017    PROTIME 11.1 09/26/2017    PROTIME 10.1 09/22/2017       Ejection Fraction:  60% per ALANIS 10/24/17      Results Review:  I reviewed the patients new clinical results.      Assessment:  1.  Persistent atrial fibrillation   -Status post maze and RADHA on 9/25/17 with Eliza  2.  Hypertension  3.  Dyslipidemia      Plan:  ALANIS today to further assess RADHA ligation performed 9/25/2017 by Dr. Kay.  The risks, benefits, potential complications of all been discussed with the patient and she is agreeable to proceed.  It is of note she has been off of her Coumadin since October 25, 2017 which was when her last ALANIS was performed.    I discussed the patients findings and my recommendations with patient.    Scribed for Yara Bobo MD by EUGENIE Eagle on January 12, 2017 at 10:35 AM    EUGENIE Garcia  01/12/18  10:17 AM    The patient already had this study on 10/24/17.  This procedure will be cancelled.    I Yara Bobo MD personally performed the services described in this documentation as scribed by the above individual in my presence, and it is both accurate and complete.    Yara Bobo MD, [facility]

## 2018-01-23 ENCOUNTER — OFFICE VISIT (OUTPATIENT)
Dept: CARDIOLOGY | Facility: CLINIC | Age: 68
End: 2018-01-23

## 2018-01-23 VITALS
HEART RATE: 78 BPM | BODY MASS INDEX: 31.34 KG/M2 | HEIGHT: 66 IN | DIASTOLIC BLOOD PRESSURE: 64 MMHG | OXYGEN SATURATION: 94 % | WEIGHT: 195 LBS | SYSTOLIC BLOOD PRESSURE: 126 MMHG

## 2018-01-23 DIAGNOSIS — I10 ESSENTIAL HYPERTENSION: ICD-10-CM

## 2018-01-23 DIAGNOSIS — I48.19 PERSISTENT ATRIAL FIBRILLATION (HCC): Primary | ICD-10-CM

## 2018-01-23 PROCEDURE — 93000 ELECTROCARDIOGRAM COMPLETE: CPT | Performed by: PHYSICIAN ASSISTANT

## 2018-01-23 PROCEDURE — 99213 OFFICE O/P EST LOW 20 MIN: CPT | Performed by: PHYSICIAN ASSISTANT

## 2018-01-23 RX ORDER — MELATONIN
1000 DAILY
COMMUNITY
End: 2018-05-17 | Stop reason: ALTCHOICE

## 2018-01-23 NOTE — PROGRESS NOTES
Karen Newman  1950  [phone]      01/23/2018    [facility] CARDIOLOGY     Mirian Gomez, APRN  [address] [facility] [address]    Chief Complaint   Patient presents with   • Atrial Fibrillation         PROBLEM LIST:  1. Atrial fibrillation, duration unknown; diagnosed August of 2013:  a. Status post ALANIS/external cardioversion, (10/18/2013), Yara Bobo M.D. with the initiation of propafenone therapy.   b. Atrial fibrillation, (11/13/2013), in Gloria Gomez’ office.  c. Normal sinus rhythm at the time of office visit, (11/21/2013).  d. Normal sinus rhythm, EKG, (06/05/2014).  e. Discontinuation of Rythmol secondary to diarrhea, (06/05/2014), with initiation of flecainide 50 mg b.i.d. and Eliquis 5 mg b.i.d.   f. CHADS Vasc= 4, (4/18/2017)   g. CT scan of the chest (06/29/2017): Mild fibrotic changes, o/w normal  h. Pulmonary Function Test (06/29/2017): FEV1/FVC  1.54/2.57 = 60%  i. Nuclear Stress Test (06/29/2017): EF>70%, no ischemia   j. Bilateral thoracoscopy and Maze procedure and clipping of the left atrial appendage (9/26/2017) Dr. Eliza wallis ALANIS 10/24/17: EF 60%, RADHA successfully closed with no evidence of a residual left atrial appendage. Mild to moderate MR  2. Valvular heart disease:  a. Previous echocardiogram approximately, 2006 (incomplete data base).  b. Echocardiogram (08/15/2002), revealing moderate concentric LVH, hyperdynamic LV, EF 81%; mild MR, trace TR.  c. Echocardiogram (10/07/2008), revealed a normal LVEF with mild to moderate MR.  d. Echocardiogram, (07/01/2011): normal LV systolic function and wall motion with mild MR and mild TR.   e. Echocardiogram, 9/22/2017: EF 60%. Moderate to severe MR. Trace to mild TR. No thrombus appears to be present within the left atrial appendage.  f. ALANIS 10/24/17: EF 60%, RADHA successfully closed with no evidence of a residual left atrial appendage. Mild to moderate MR  3. Hypertension:  a. Adenosine Cardiolite,  (06/18/2009): Normal systolic function without evidence of inducible ischemia.   4. Dyslipidemia.  5. Diabetes mellitus, Type 2 diagnosed, 2006.  6. Thyroid disease:  a. Hyperthyroidism status post radioactive iodine therapy.  b. Hypothyroidism on thyroid replacement.  7. Gastroesophageal reflux disease.  8. Chronic kidney disease.  9. History of hemorrhoids status post hemorrhoidectomy.  10. Colon polyps status post polypectomy.  11. Anxiety.  12. Iron deficiency anemia.  13. Obstructive sleep apnea on chronic CPAP therapy.  14. Surgical history:  a. Total abdominal hysterectomy  b. Squamous cell removal (right foot)    Allergies  Allergies   Allergen Reactions   • Sular [Nisoldipine Er] Cough       Current Medications    Current Outpatient Prescriptions:   •  atorvastatin (LIPITOR) 10 MG tablet, Take 10 mg by mouth Daily., Disp: , Rfl:   •  B Complex-Folic Acid (B COMPLEX-VITAMIN B12 PO), Take  by mouth Daily., Disp: , Rfl:   •  cholecalciferol (VITAMIN D3) 1000 units tablet, Take 1,000 Units by mouth Daily., Disp: , Rfl:   •  diltiaZEM (TIAZAC) 360 MG 24 hr capsule, Take 360 mg by mouth Daily., Disp: , Rfl:   •  doxazosin (CARDURA) 8 MG tablet, Take 8 mg by mouth Every Night., Disp: , Rfl:   •  gabapentin (NEURONTIN) 800 MG tablet, Take 800 mg by mouth 2 (Two) Times a Day., Disp: , Rfl:   •  insulin glargine (LANTUS) 100 UNIT/ML injection, Inject 35 Units under the skin Every Night., Disp: , Rfl:   •  Insulin Lispro (HUMALOG KWIKPEN SC), Inject  under the skin 3 (Three) Times a Day Before Meals. B-14U; L-18U; D-20U, Disp: , Rfl:   •  levothyroxine (SYNTHROID, LEVOTHROID) 150 MCG tablet, Take 150 mcg by mouth Daily. 0.15 mg tablets, Disp: , Rfl:   •  losartan-hydrochlorothiazide (HYZAAR) 100-25 MG per tablet, Take 1 tablet by mouth Daily., Disp: , Rfl:   •  metFORMIN (GLUCOPHAGE) 850 MG tablet, Take 850 mg by mouth 3 (Three) Times a Day With Meals., Disp: , Rfl:   •  omeprazole (priLOSEC) 40 MG capsule, Take 40 mg  "by mouth Daily., Disp: , Rfl:   •  PARoxetine (PAXIL) 30 MG tablet, Take 30 mg by mouth Daily., Disp: , Rfl:     History of Present Illness   HPI    Pt presents for follow up of atrial fibrillation s/p MAZE and RADHA ligation with Dr. Kay in September 2017.  She had a follow up ALANIS on 10/24/17 which confirmed closure of her RADHA, and her anticoagulation was stopped at that time.  Since the procedure, she has been doing well with no recurrences of atrial fibrillation that she is aware of. She states that she is feeling much better and her energy level is much improved. She follows up with Dr. Kay again in a couple weeks. She states her incision site healed well.  She denies any CP, syncope, TIA/CVA like symptoms. She did have bronchitis and had some SOB, which is now resolved. No other hospitalizations or ER visits. Her BP has been well controlled. She has not been exercising due to a recent foot surgery with cancerous lesion removed.     ROS:  General:  Denies fatigue, weight gain or loss  Cardiovascular:  Denies CP, PND, syncope, near syncope, edema or palpitations.  Pulmonary:  Denies RODRIGUEZ, cough, or wheezing      Vitals:    01/23/18 1504   BP: 126/64   BP Location: Right arm   Patient Position: Sitting   Pulse: 78   SpO2: 94%   Weight: 88.5 kg (195 lb)   Height: 167.6 cm (66\")     PE:  General: NAD A & O x 3  Neck: no JVD, no carotid bruits, no TM  Heart RRR, NL S1, S2, S4 present, no rubs, murmurs  Lungs: CTAB, no wheezes, rhonchi, or rales  Abd: soft, non-tender  Ext: No musculoskeletal deformities, no edema, cyanosis, or clubbing  Psych: normal mood and affect    Diagnostic Data:        ECG 12 Lead  Date/Time: 1/23/2018 3:29 PM  Performed by: ANGEL LEO  Authorized by: ANGEL LEO   Comparison: compared with previous ECG from 11/1/2017  Similar to previous ECG  Rhythm: sinus rhythm  BPM: 78              1. Persistent atrial fibrillation    2. Essential hypertension          Plan:  1) Persistent " Atrial fibrillation - maintaining NSR since MAZE procedure 9/2017 off AAD.   Continue Dilitiazem.   2) Anticoagulation:   S/p RADHA ligation with confirmed closure by ALANIS.   3) HTN- controlled.   Wt loss, exercise, salt reduction    Follow up with Dr. Bobo, follow up with Dr. Abran ayoub.     [facility]  1/23/2018   3:33 PM  I saw this patient independently.

## 2018-01-30 NOTE — PROGRESS NOTES
injection Yes EULALIA Nuñez   Insulin Lispro, Human, (HUMALOG SC) Inject into the skin 14u- breakfast, 16u- lunch, 18u supper Yes Historical Provider, MD   furosemide (LASIX) 20 MG tablet One daily as needed for swelling. Yes EULALIA Nuñez   doxazosin (CARDURA) 8 MG tablet Take 1 tablet by mouth nightly. Yes EULALIA Nuñez   levothyroxine (SYNTHROID) 175 MCG tablet Take 1 tablet by mouth every 48 hours. Patient taking differently: Take 175 mcg by mouth daily  Yes EULALIA Nuñez   metformin (GLUCOPHAGE) 850 MG tablet Take 1 tablet by mouth 3 times daily. Yes EULALIA Nuñez       ASSESSMENT:  1. Acute sinusitis, recurrence not specified, unspecified location    2. Cough    3. Anxiety    4. Situational stress          PLAN:    Orders Placed This Encounter   Medications    methylPREDNISolone (MEDROL DOSEPACK) 4 MG tablet     Sig: Take by mouth. Dispense:  1 kit     Refill:  0    levofloxacin (LEVAQUIN) 500 MG tablet     Sig: Take 1 tablet by mouth daily for 10 days     Dispense:  10 tablet     Refill:  0    hydrocodone-chlorpheniramine (TUSSIONEX PENNKINETIC ER) 10-8 MG/5ML SUER     Sig: Take 5 mLs by mouth every 12 hours as needed (cough) for up to 10 days. Dispense:  120 mL     Refill:  0    ALPRAZolam (XANAX) 0.25 MG tablet     Sig: Take 1 tablet by mouth 3 times daily as needed for Sleep for up to 3 days. Dispense:  9 tablet     Refill:  0     Rest. Increase fluids. Call if no improvement in a few days.

## 2018-02-06 ENCOUNTER — OFFICE VISIT (OUTPATIENT)
Dept: CARDIAC SURGERY | Facility: CLINIC | Age: 68
End: 2018-02-06

## 2018-02-06 VITALS
OXYGEN SATURATION: 91 % | DIASTOLIC BLOOD PRESSURE: 70 MMHG | HEIGHT: 66 IN | BODY MASS INDEX: 31.66 KG/M2 | HEART RATE: 80 BPM | TEMPERATURE: 97.4 F | SYSTOLIC BLOOD PRESSURE: 180 MMHG | WEIGHT: 197 LBS

## 2018-02-06 DIAGNOSIS — I48.19 PERSISTENT ATRIAL FIBRILLATION (HCC): Primary | ICD-10-CM

## 2018-02-06 PROCEDURE — 99213 OFFICE O/P EST LOW 20 MIN: CPT | Performed by: PHYSICIAN ASSISTANT

## 2018-02-06 NOTE — PROGRESS NOTES
02/06/2018  Patient Information  Karen Newman                                                                                          [address] [facility] [address]   1950  'PCP/Referring Physician'  Mirian Gomez, APRN  [phone]  No ref. provider found    Chief Complaint   Patient presents with   • Follow-up     3 MO FU for A-Fib       History of Present Illness:  Patient presents to office today for 3 month postoperative follow-up of bilateral VATS with left atrial appendage ligation and Maze procedure performed 9/25/17.  The patient was last seen in office on 10/17/17, and since that time the patient states she has much more energy.  The patient complains of some shortness of breath with extended exertion but denies chest pain, palpitations, dizziness or syncopal episodes.  The patient's warfarin has been stopped at this time.  A 10/24/17 ALANIS confirmed closure of the left atrial appendage.  She was seen by Dr. Osullivan on 1/17/18 and will follow up when necessary.  The patient has an upcoming office visit with Dr. Bobo on 5/17/18.  The patient is otherwise doing well.    Patient Active Problem List   Diagnosis   • Atrial fibrillation   • VHD (valvular heart disease)   • Hypertension   • Dyslipidemia   • Diabetes mellitus   • Thyroid disease   • GERD (gastroesophageal reflux disease)   • CKD (chronic kidney disease)   • Hemorrhoids   • Colon polyps   • Iron deficiency anemia   • KRISTEN on CPAP   • Blood loss anemia   • Postoperative respiratory failure   • JORDAN (acute kidney injury)     Past Medical History:   Diagnosis Date   • Anxiety    • Arthritis    • Atrial fibrillation    • Cancer     RIGHT FOOT STILL HAS SOME SKIN CANCER IN PLACE AT THIS TIME- WILL BE GETTING REMOVED OCTOBER 22ND    • CKD (chronic kidney disease)     IGA - GETS CHECKED YEARLY FROM [facility]PH[facility]    • Colon polyps    • Diabetes mellitus     SINCE 2005 - CHECKS 3 TIMES PER DAY    • Dyslipidemia    • GERD (gastroesophageal  reflux disease)    • Hemorrhoids    • History of transfusion    • Hypertension    • Iron deficiency anemia    • On home O2     2l AT NIGHT WITH CPAP    • KRISTEN on CPAP     COMPLIANT WITH MACHINE - DOES USE O2 2L WITH MACHINE    • Panic attacks    • Pneumonia    • PONV (postoperative nausea and vomiting)     NAUSEA -  MEDICINE HELPED PATIENT    • Sleep apnea     HAS CPAP AT HOME   • SOB (shortness of breath) on exertion    • Thyroid disease    • VHD (valvular heart disease)    • Wears glasses    • Wears glasses      Past Surgical History:   Procedure Laterality Date   • CATARACT EXTRACTION WITH INTRAOCULAR LENS IMPLANT Bilateral    • COLONOSCOPY W/ POLYPECTOMY     • COLONOSCOPY W/ POLYPECTOMY     • ENDOSCOPY     • OTHER SURGICAL HISTORY      PT HAD BLOOD CLOT IN HEART SURGERY AND WENT IN THROUGH NECK BUT UNSURE WHAT CALLED - possible santa?   • SKIN BIOPSY      RIGHT FOOT FOR SKIN CANCER   • THORACOSCOPY Left 9/25/2017    Procedure: THORASOSCOPY VIDEO ASSISTED WITH LEFT ATRIAL APPENDAGE ABLATION;  Surgeon: Alec Kay MD;  Location:  [facility];  Service:    • TOTAL ABDOMINAL HYSTERECTOMY     • TRANSESOPHAGEAL ECHOCARDIOGRAM (SANTA) Bilateral 7/31/2017    Procedure: TRANSESOPHAGEAL ECHOCARDIOGRAM AND BRONCHOSCOPY ;  Surgeon: Alec Kay MD;  Location:  [facility];  Service:    • WISDOM TOOTH EXTRACTION         Current Outpatient Prescriptions:   •  atorvastatin (LIPITOR) 10 MG tablet, Take 10 mg by mouth Daily., Disp: , Rfl:   •  B Complex-Folic Acid (B COMPLEX-VITAMIN B12 PO), Take  by mouth Daily., Disp: , Rfl:   •  cholecalciferol (VITAMIN D3) 1000 units tablet, Take 1,000 Units by mouth Daily., Disp: , Rfl:   •  diltiaZEM (TIAZAC) 360 MG 24 hr capsule, Take 360 mg by mouth Daily., Disp: , Rfl:   •  doxazosin (CARDURA) 8 MG tablet, Take 8 mg by mouth Every Night., Disp: , Rfl:   •  gabapentin (NEURONTIN) 800 MG tablet, Take 800 mg by mouth 2 (Two) Times a Day., Disp: , Rfl:   •  insulin glargine (LANTUS) 100 UNIT/ML  injection, Inject 35 Units under the skin Every Night., Disp: , Rfl:   •  Insulin Lispro (HUMALOG KWIKPEN SC), Inject  under the skin 3 (Three) Times a Day Before Meals. B-14U; L-18U; D-20U, Disp: , Rfl:   •  levothyroxine (SYNTHROID, LEVOTHROID) 150 MCG tablet, Take 150 mcg by mouth Daily. 0.15 mg tablets, Disp: , Rfl:   •  losartan-hydrochlorothiazide (HYZAAR) 100-25 MG per tablet, Take 1 tablet by mouth Daily., Disp: , Rfl:   •  metFORMIN (GLUCOPHAGE) 850 MG tablet, Take 850 mg by mouth 3 (Three) Times a Day With Meals., Disp: , Rfl:   •  omeprazole (priLOSEC) 40 MG capsule, Take 40 mg by mouth Daily., Disp: , Rfl:   •  PARoxetine (PAXIL) 30 MG tablet, Take 30 mg by mouth Daily., Disp: , Rfl:   Allergies   Allergen Reactions   • Sular [Nisoldipine Er] Cough     Social History     Social History   • Marital status:      Spouse name: N/A   • Number of children: 3   • Years of education: N/A     Occupational History   • Housewife      Social History Main Topics   • Smoking status: Former Smoker     Packs/day: 0.50     Years: 15.00     Types: Cigarettes     Quit date: 10/18/2016   • Smokeless tobacco: Never Used   • Alcohol use No   • Drug use: No   • Sexual activity: Defer     Other Topics Concern   • Not on file     Social History Narrative     Family History   Problem Relation Age of Onset   • Hypertension Mother    • Coronary artery disease Mother    • Kidney disease Father      Review of Systems   Constitution: Negative for chills, fever, malaise/fatigue, night sweats and weight loss.   HENT: Negative for hearing loss, odynophagia and sore throat.    Eyes: Positive for blurred vision.   Cardiovascular: Positive for dyspnea on exertion. Negative for chest pain, leg swelling, orthopnea and palpitations.   Respiratory: Negative for cough and hemoptysis.    Endocrine: Negative for cold intolerance, heat intolerance, polydipsia, polyphagia and polyuria.   Hematologic/Lymphatic: Bruises/bleeds easily.   Skin:  "Negative for itching and rash.   Musculoskeletal: Positive for arthritis. Negative for joint pain, joint swelling and myalgias.   Gastrointestinal: Negative for abdominal pain, constipation, diarrhea, hematemesis, hematochezia, melena, nausea and vomiting.   Genitourinary: Negative for dysuria, frequency and hematuria.   Neurological: Positive for dizziness. Negative for focal weakness, headaches, numbness and seizures.   Psychiatric/Behavioral: Negative for suicidal ideas.   All other systems reviewed and are negative.    Vitals:    02/06/18 0905   BP: 180/70   BP Location: Left arm   Patient Position: Sitting   Pulse: 80   Temp: 97.4 °F (36.3 °C)   SpO2: 91%   Weight: 89.4 kg (197 lb)   Height: 167.6 cm (66\")      Physical Exam:  Gen - NAD, pleasant, cooperative  CV - Regular rate and rhythm, no murmur gallop or rub  Pulm - Lungs clear to auscultation without wheeze or rhonchi   GI - Soft, normoactive bowel sounds, non-tender  Ext - Without edema  Incision - well-healed, no evidence of incisional dehiscence or cellulitis    Assessment/Plan:  Patient is a 67-year-old  female with history of atrial fibrillation status post 9/25/17 bilateral VATS with left atrial appendage ligation and Maze procedure.  The patient has had a very productive postoperative course, and states she has a lot more energy.  She is without chest pain, palpitations, dizziness or syncopal episodes.  The patient's workup has been discontinued.  She is currently in regular rate and rhythm and is being followed by Dr. Bobo with cardiology.  Because the patient received a Maze procedure, I would like for her to be seen in 1 year so we may evaluate her progress.  She may call with any questions or concerns, should any arise during the interval.      Patient Active Problem List   Diagnosis   • Atrial fibrillation   • VHD (valvular heart disease)   • Hypertension   • Dyslipidemia   • Diabetes mellitus   • Thyroid disease   • GERD " (gastroesophageal reflux disease)   • CKD (chronic kidney disease)   • Hemorrhoids   • Colon polyps   • Iron deficiency anemia   • KRISTEN on CPAP   • Blood loss anemia   • Postoperative respiratory failure   • JORDAN (acute kidney injury)     Signed by:

## 2018-03-22 ENCOUNTER — OFFICE VISIT (OUTPATIENT)
Dept: PRIMARY CARE CLINIC | Age: 68
End: 2018-03-22
Payer: MEDICARE

## 2018-03-22 VITALS
BODY MASS INDEX: 31.44 KG/M2 | DIASTOLIC BLOOD PRESSURE: 80 MMHG | SYSTOLIC BLOOD PRESSURE: 124 MMHG | OXYGEN SATURATION: 91 % | WEIGHT: 194.8 LBS | HEART RATE: 86 BPM

## 2018-03-22 DIAGNOSIS — E11.8 TYPE 2 DIABETES MELLITUS WITH COMPLICATION, WITHOUT LONG-TERM CURRENT USE OF INSULIN (HCC): ICD-10-CM

## 2018-03-22 DIAGNOSIS — R42 DIZZINESS: Primary | ICD-10-CM

## 2018-03-22 DIAGNOSIS — I10 ESSENTIAL HYPERTENSION: ICD-10-CM

## 2018-03-22 PROCEDURE — G8482 FLU IMMUNIZE ORDER/ADMIN: HCPCS | Performed by: NURSE PRACTITIONER

## 2018-03-22 PROCEDURE — 1090F PRES/ABSN URINE INCON ASSESS: CPT | Performed by: NURSE PRACTITIONER

## 2018-03-22 PROCEDURE — G8400 PT W/DXA NO RESULTS DOC: HCPCS | Performed by: NURSE PRACTITIONER

## 2018-03-22 PROCEDURE — 1036F TOBACCO NON-USER: CPT | Performed by: NURSE PRACTITIONER

## 2018-03-22 PROCEDURE — 3014F SCREEN MAMMO DOC REV: CPT | Performed by: NURSE PRACTITIONER

## 2018-03-22 PROCEDURE — 3017F COLORECTAL CA SCREEN DOC REV: CPT | Performed by: NURSE PRACTITIONER

## 2018-03-22 PROCEDURE — 3046F HEMOGLOBIN A1C LEVEL >9.0%: CPT | Performed by: NURSE PRACTITIONER

## 2018-03-22 PROCEDURE — G8598 ASA/ANTIPLAT THER USED: HCPCS | Performed by: NURSE PRACTITIONER

## 2018-03-22 PROCEDURE — G8417 CALC BMI ABV UP PARAM F/U: HCPCS | Performed by: NURSE PRACTITIONER

## 2018-03-22 PROCEDURE — 4040F PNEUMOC VAC/ADMIN/RCVD: CPT | Performed by: NURSE PRACTITIONER

## 2018-03-22 PROCEDURE — 99213 OFFICE O/P EST LOW 20 MIN: CPT | Performed by: NURSE PRACTITIONER

## 2018-03-22 PROCEDURE — 1123F ACP DISCUSS/DSCN MKR DOCD: CPT | Performed by: NURSE PRACTITIONER

## 2018-03-22 PROCEDURE — G8427 DOCREV CUR MEDS BY ELIG CLIN: HCPCS | Performed by: NURSE PRACTITIONER

## 2018-03-22 ASSESSMENT — ENCOUNTER SYMPTOMS
ABDOMINAL PAIN: 0
COUGH: 0
EYE PAIN: 0
NAUSEA: 0
VOMITING: 0
SORE THROAT: 0
SHORTNESS OF BREATH: 0

## 2018-03-23 ENCOUNTER — TELEPHONE (OUTPATIENT)
Dept: PRIMARY CARE CLINIC | Age: 68
End: 2018-03-23

## 2018-03-23 NOTE — TELEPHONE ENCOUNTER
Pt states she tried taking only half of her Cardura (took 4 mg) r/t dizziness, but BP is staying elevated 185/95. Asking for advice. Advised to come be seen but declined.

## 2018-03-26 NOTE — TELEPHONE ENCOUNTER
Try going back to the full dose and decrease Losartan HCTZ to 1/2 tablet to see if that helps with her dizziness.

## 2018-03-27 ENCOUNTER — HOSPITAL ENCOUNTER (OUTPATIENT)
Dept: OTHER | Age: 68
Discharge: HOME | End: 2018-03-27
Attending: NURSE PRACTITIONER | Admitting: NURSE PRACTITIONER

## 2018-03-27 DIAGNOSIS — D50.9 IRON DEFICIENCY ANEMIA, UNSPECIFIED IRON DEFICIENCY ANEMIA TYPE: ICD-10-CM

## 2018-03-27 DIAGNOSIS — I10 ESSENTIAL HYPERTENSION: ICD-10-CM

## 2018-03-27 DIAGNOSIS — E11.8 TYPE 2 DIABETES MELLITUS WITH COMPLICATION, WITHOUT LONG-TERM CURRENT USE OF INSULIN (HCC): ICD-10-CM

## 2018-03-27 LAB
A/G RATIO: 1.6 (ref 0.8–2)
ALBUMIN SERPL-MCNC: 3.9 G/DL (ref 3.4–4.8)
ALP BLD-CCNC: 58 U/L (ref 25–100)
ALT SERPL-CCNC: 11 U/L (ref 4–36)
ANION GAP SERPL CALCULATED.3IONS-SCNC: 14 MMOL/L (ref 3–16)
AST SERPL-CCNC: 14 U/L (ref 8–33)
BASOPHILS ABSOLUTE: 0 K/UL (ref 0–0.1)
BASOPHILS RELATIVE PERCENT: 0.8 %
BILIRUB SERPL-MCNC: 0.3 MG/DL (ref 0.3–1.2)
BUN BLDV-MCNC: 21 MG/DL (ref 6–20)
CALCIUM SERPL-MCNC: 9.2 MG/DL (ref 8.5–10.5)
CHLORIDE BLD-SCNC: 100 MMOL/L (ref 98–107)
CO2: 27 MMOL/L (ref 20–30)
CREAT SERPL-MCNC: 1.3 MG/DL (ref 0.4–1.2)
EOSINOPHILS ABSOLUTE: 0.2 K/UL (ref 0–0.4)
EOSINOPHILS RELATIVE PERCENT: 3.3 %
FERRITIN: 29.4 NG/ML (ref 22–322)
GFR AFRICAN AMERICAN: 49
GFR NON-AFRICAN AMERICAN: 41
GLOBULIN: 2.4 G/DL
GLUCOSE BLD-MCNC: 167 MG/DL (ref 74–106)
HCT VFR BLD CALC: 38.1 % (ref 37–47)
HEMOGLOBIN: 11.7 G/DL (ref 11.5–16.5)
IMMATURE GRANULOCYTES #: 0 K/UL
IMMATURE GRANULOCYTES %: 0.4 % (ref 0–5)
IRON: 72 UG/DL (ref 37–145)
LYMPHOCYTES ABSOLUTE: 1.8 K/UL (ref 1.5–4)
LYMPHOCYTES RELATIVE PERCENT: 36.2 %
MCH RBC QN AUTO: 30.3 PG (ref 27–32)
MCHC RBC AUTO-ENTMCNC: 30.7 G/DL (ref 31–35)
MCV RBC AUTO: 98.7 FL (ref 80–100)
MONOCYTES ABSOLUTE: 0.5 K/UL (ref 0.2–0.8)
MONOCYTES RELATIVE PERCENT: 9.3 %
NEUTROPHILS ABSOLUTE: 2.4 K/UL (ref 2–7.5)
NEUTROPHILS RELATIVE PERCENT: 50 %
PDW BLD-RTO: 14.3 % (ref 11–16)
PLATELET # BLD: 236 K/UL (ref 150–400)
PMV BLD AUTO: 10 FL (ref 6–10)
POTASSIUM SERPL-SCNC: 4.7 MMOL/L (ref 3.4–5.1)
RBC # BLD: 3.86 M/UL (ref 3.8–5.8)
SODIUM BLD-SCNC: 141 MMOL/L (ref 136–145)
TOTAL PROTEIN: 6.3 G/DL (ref 6.4–8.3)
WBC # BLD: 4.8 K/UL (ref 4–11)

## 2018-03-27 RX ORDER — DOXAZOSIN 8 MG/1
TABLET ORAL
Qty: 90 TABLET | Refills: 2 | Status: SHIPPED | OUTPATIENT
Start: 2018-03-27 | End: 2018-11-05 | Stop reason: SDUPTHER

## 2018-03-28 ENCOUNTER — TELEPHONE (OUTPATIENT)
Dept: PRIMARY CARE CLINIC | Age: 68
End: 2018-03-28

## 2018-04-12 RX ORDER — LISINOPRIL 40 MG/1
40 TABLET ORAL DAILY
Qty: 30 TABLET | Refills: 3 | OUTPATIENT
Start: 2018-04-12 | End: 2022-09-10

## 2018-05-04 ENCOUNTER — HOSPITAL ENCOUNTER (OUTPATIENT)
Dept: OTHER | Age: 68
Discharge: HOME | End: 2018-05-04
Attending: NURSE PRACTITIONER | Admitting: NURSE PRACTITIONER

## 2018-05-04 ENCOUNTER — OFFICE VISIT (OUTPATIENT)
Dept: PRIMARY CARE CLINIC | Age: 68
End: 2018-05-04
Payer: MEDICARE

## 2018-05-04 VITALS
OXYGEN SATURATION: 94 % | BODY MASS INDEX: 30.73 KG/M2 | SYSTOLIC BLOOD PRESSURE: 120 MMHG | WEIGHT: 190.4 LBS | DIASTOLIC BLOOD PRESSURE: 60 MMHG | HEART RATE: 76 BPM

## 2018-05-04 DIAGNOSIS — I10 ESSENTIAL HYPERTENSION: ICD-10-CM

## 2018-05-04 DIAGNOSIS — E11.8 TYPE 2 DIABETES MELLITUS WITH COMPLICATION, WITHOUT LONG-TERM CURRENT USE OF INSULIN (HCC): ICD-10-CM

## 2018-05-04 DIAGNOSIS — L85.3 DRY SKIN: ICD-10-CM

## 2018-05-04 DIAGNOSIS — I48.91 ATRIAL FIBRILLATION, UNSPECIFIED TYPE (HCC): ICD-10-CM

## 2018-05-04 DIAGNOSIS — Z01.818 PREOP EXAMINATION: Primary | ICD-10-CM

## 2018-05-04 DIAGNOSIS — H05.20 EXOPHTHALMOS OF RIGHT EYE: ICD-10-CM

## 2018-05-04 LAB
ANION GAP SERPL CALCULATED.3IONS-SCNC: 12 MMOL/L (ref 3–16)
BUN BLDV-MCNC: 19 MG/DL (ref 6–20)
CALCIUM SERPL-MCNC: 9.8 MG/DL (ref 8.5–10.5)
CHLORIDE BLD-SCNC: 100 MMOL/L (ref 98–107)
CO2: 29 MMOL/L (ref 20–30)
CREAT SERPL-MCNC: 1.1 MG/DL (ref 0.4–1.2)
GFR AFRICAN AMERICAN: >59
GFR NON-AFRICAN AMERICAN: 49
GLUCOSE BLD-MCNC: 125 MG/DL (ref 74–106)
POTASSIUM SERPL-SCNC: 4.2 MMOL/L (ref 3.4–5.1)
SODIUM BLD-SCNC: 141 MMOL/L (ref 136–145)

## 2018-05-04 PROCEDURE — G8417 CALC BMI ABV UP PARAM F/U: HCPCS | Performed by: NURSE PRACTITIONER

## 2018-05-04 PROCEDURE — 1090F PRES/ABSN URINE INCON ASSESS: CPT | Performed by: NURSE PRACTITIONER

## 2018-05-04 PROCEDURE — 3046F HEMOGLOBIN A1C LEVEL >9.0%: CPT | Performed by: NURSE PRACTITIONER

## 2018-05-04 PROCEDURE — 2022F DILAT RTA XM EVC RTNOPTHY: CPT | Performed by: NURSE PRACTITIONER

## 2018-05-04 PROCEDURE — 4040F PNEUMOC VAC/ADMIN/RCVD: CPT | Performed by: NURSE PRACTITIONER

## 2018-05-04 PROCEDURE — 1036F TOBACCO NON-USER: CPT | Performed by: NURSE PRACTITIONER

## 2018-05-04 PROCEDURE — G8598 ASA/ANTIPLAT THER USED: HCPCS | Performed by: NURSE PRACTITIONER

## 2018-05-04 PROCEDURE — 99214 OFFICE O/P EST MOD 30 MIN: CPT | Performed by: NURSE PRACTITIONER

## 2018-05-04 PROCEDURE — G8400 PT W/DXA NO RESULTS DOC: HCPCS | Performed by: NURSE PRACTITIONER

## 2018-05-04 PROCEDURE — 1123F ACP DISCUSS/DSCN MKR DOCD: CPT | Performed by: NURSE PRACTITIONER

## 2018-05-04 PROCEDURE — 3017F COLORECTAL CA SCREEN DOC REV: CPT | Performed by: NURSE PRACTITIONER

## 2018-05-04 PROCEDURE — G8427 DOCREV CUR MEDS BY ELIG CLIN: HCPCS | Performed by: NURSE PRACTITIONER

## 2018-05-04 RX ORDER — TRIAMCINOLONE ACETONIDE 1 MG/G
CREAM TOPICAL
Qty: 80 G | Refills: 0 | Status: SHIPPED | OUTPATIENT
Start: 2018-05-04 | End: 2022-09-10

## 2018-05-04 ASSESSMENT — ENCOUNTER SYMPTOMS
ABDOMINAL PAIN: 0
VOMITING: 0
NAUSEA: 0
EYE PAIN: 0
COUGH: 0
SORE THROAT: 0
SHORTNESS OF BREATH: 0

## 2018-05-08 ENCOUNTER — TELEPHONE (OUTPATIENT)
Dept: PRIMARY CARE CLINIC | Age: 68
End: 2018-05-08

## 2018-05-17 ENCOUNTER — OFFICE VISIT (OUTPATIENT)
Dept: CARDIOLOGY | Facility: CLINIC | Age: 68
End: 2018-05-17

## 2018-05-17 VITALS
HEIGHT: 67 IN | OXYGEN SATURATION: 96 % | WEIGHT: 178.2 LBS | RESPIRATION RATE: 18 BRPM | HEART RATE: 76 BPM | DIASTOLIC BLOOD PRESSURE: 70 MMHG | BODY MASS INDEX: 27.97 KG/M2 | SYSTOLIC BLOOD PRESSURE: 124 MMHG

## 2018-05-17 DIAGNOSIS — E78.5 DYSLIPIDEMIA: ICD-10-CM

## 2018-05-17 DIAGNOSIS — Z86.79 STATUS POST MAZE OPERATION FOR ATRIAL FIBRILLATION: Primary | ICD-10-CM

## 2018-05-17 DIAGNOSIS — I10 ESSENTIAL HYPERTENSION: ICD-10-CM

## 2018-05-17 DIAGNOSIS — Z98.890 STATUS POST MAZE OPERATION FOR ATRIAL FIBRILLATION: Primary | ICD-10-CM

## 2018-05-17 DIAGNOSIS — I48.19 PERSISTENT ATRIAL FIBRILLATION (HCC): ICD-10-CM

## 2018-05-17 PROCEDURE — 99213 OFFICE O/P EST LOW 20 MIN: CPT | Performed by: INTERNAL MEDICINE

## 2018-05-17 NOTE — PROGRESS NOTES
Karen Newman  1950  [phone]      05/17/2018    EUGENIE Fountain    Chief Complaint   Patient presents with   • Follow-up       Problem List:  1. Atrial fibrillation, duration unknown; diagnosed August of 2013:  a. Status post ALANIS/external cardioversion, (10/18/2013), Yara Bobo M.D. with the initiation of propafenone therapy.   b. Atrial fibrillation, (11/13/2013), in [facility] Gomez’ office.  c. Normal sinus rhythm at the time of office visit, (11/21/2013).  d. Normal sinus rhythm, EKG, (06/05/2014).  e. Discontinuation of Rythmol secondary to diarrhea, (06/05/2014), with initiation of flecainide 50 mg b.i.d. and Eliquis 5 mg b.i.d.   f. CHADS Vasc= 4, (4/18/2017)   g. CT scan of the chest (06/29/2017): Mild fibrotic changes, o/w normal  h. Pulmonary Function Test (06/29/2017): FEV1/FVC  1.54/2.57 = 60%  i. Nuclear Stress Test (06/29/2017): EF>70%, no ischemia   j. Bilateral thoracoscopy and Maze procedure and clipping of the left atrial appendage (9/26/2017) Dr. Kay  2. Valvular heart disease:  a. Previous echocardiogram approximately, 2006 (incomplete data base).  b. Echocardiogram (08/15/2002), revealing moderate concentric LVH, hyperdynamic LV, EF 81%; mild MR, trace TR.  c. Echocardiogram (10/07/2008), revealed a normal LVEF with mild to moderate MR.  d. Echocardiogram, (07/01/2011): normal LV systolic function and wall motion with mild MR and mild TR.   e. Echocardiogram, 9/22/2017: EF 60%. Moderate to severe MR. Trace to mild TR. No thrombus appears to be present within the left atrial appendage.  f. ALANIS 10/26/2017: EF 60%. Left atrial appendage has been closed. Mild to moderate MR.   3. Hypertension:  a. Adenosine Cardiolite, (06/18/2009): Normal systolic function without evidence of inducible ischemia.   4. Dyslipidemia.  5. Diabetes mellitus, Type 2 diagnosed, 2006.  6. Thyroid disease:  a. Hyperthyroidism status post radioactive iodine therapy.  b. Hypothyroidism on thyroid  replacement.  7. Gastroesophageal reflux disease.  8. Chronic kidney disease.  9. History of hemorrhoids status post hemorrhoidectomy.  10. Colon polyps status post polypectomy.  11. Anxiety.  12. Iron deficiency anemia.  13. Obstructive sleep apnea on chronic CPAP therapy.  14. Surgical history:  a. Total abdominal hysterectomy.    Allergies   Allergen Reactions   • Sular [Nisoldipine Er] Cough       Current Medications:      Current Outpatient Prescriptions:   •  atorvastatin (LIPITOR) 10 MG tablet, Take 10 mg by mouth Daily., Disp: , Rfl:   •  diltiaZEM (TIAZAC) 360 MG 24 hr capsule, Take 360 mg by mouth Daily., Disp: , Rfl:   •  doxazosin (CARDURA) 8 MG tablet, TAKE 1 TABLET BY MOUTH  DAILY AS DIRECTED, Disp: 90 tablet, Rfl: 2  •  gabapentin (NEURONTIN) 800 MG tablet, Take 800 mg by mouth 2 (Two) Times a Day., Disp: , Rfl:   •  insulin glargine (LANTUS) 100 UNIT/ML injection, Inject 35 Units under the skin Every Night., Disp: , Rfl:   •  Insulin Lispro (HUMALOG KWIKPEN SC), Inject  under the skin 3 (Three) Times a Day Before Meals. B-14U; L-18U; D-20U, Disp: , Rfl:   •  levothyroxine (SYNTHROID, LEVOTHROID) 150 MCG tablet, Take 150 mcg by mouth Daily. 0.15 mg tablets, Disp: , Rfl:   •  losartan-hydrochlorothiazide (HYZAAR) 100-25 MG per tablet, Take 1 tablet by mouth Daily., Disp: , Rfl:   •  metFORMIN (GLUCOPHAGE) 850 MG tablet, Take 850 mg by mouth 3 (Three) Times a Day With Meals., Disp: , Rfl:   •  omeprazole (priLOSEC) 40 MG capsule, Take 40 mg by mouth Daily., Disp: , Rfl:   •  PARoxetine (PAXIL) 30 MG tablet, Take 30 mg by mouth Daily., Disp: , Rfl:     HPI    Karen Newman presents today for 6 month follow up of atrial fibrillation, valvular heart disease, hypertension, and hyperlipidemia. Since last visit, patient has been doing well from a cardiac standpoint. Has not had any reoccurrence of atrial fibrillation. Patient denies chest pain, palpitations, shortness of breath, edema, PND, orthopnea,  "dizziness, and syncope. Remains busy and active with walking on her treadmill with no limitations.     The following portions of the patient's history were reviewed and updated as appropriate: allergies, current medications and problem list.    Pertinent positives as listed in the HPI.  All other systems reviewed are negative.    Vitals:    05/17/18 1407   BP: 124/70   BP Location: Right arm   Patient Position: Sitting   Cuff Size: Small Adult   Pulse: 76   Resp: 18   SpO2: 96%   Weight: 80.8 kg (178 lb 3.2 oz)   Height: 170.2 cm (67\")       Physical Exam:  General: Alert and oriented to person, place, and time.  Neck: Jugular venous pressure is within normal limits. Carotids have normal upstrokes without bruits.   Cardiovascular: Regular rate and rhythm without murmur gallop or rub.  Lungs: Clear without rales or wheezes. Equal expansion is noted.   Extremities: Show no edema.  Skin: warm and dry.  Neurologic: nonfocal    Diagnostic Data:    Lab Results   Component Value Date    WBC 9.51 09/27/2017    HGB 11.1 (L) 09/27/2017    HCT 34.6 09/27/2017    .5 (H) 09/27/2017     09/27/2017     Lab Results   Component Value Date    GLUCOSE 238 (H) 09/27/2017    CALCIUM 8.4 (L) 09/27/2017     (L) 09/27/2017    K 5.3 09/27/2017    CO2 26.0 09/27/2017     09/27/2017    BUN 32 (H) 09/27/2017    CREATININE 1.50 (H) 09/27/2017    EGFRIFNONA 35 (L) 09/27/2017    BCR 21.3 09/27/2017    ANIONGAP 1.0 (L) 09/27/2017     Procedures    Assessment:      ICD-10-CM ICD-9-CM   1. Status post Maze operation for atrial fibrillation Z98.890 V45.89    Z86.79    2. Essential hypertension I10 401.9   3. Dyslipidemia E78.5 272.4   4. Persistent atrial fibrillation I48.1 427.31       Plan:    1. Stable cardiac status.  2. Continue current medications.  3. F/up in 12 months or sooner if needed.    Scribed for Yara Bobo MD by Moises Metcalf. 5/17/2018  2:30 PM     I Yara Bobo MD personally performed " the services described in this documentation as scribed by the above individual in my presence, and it is both accurate and complete.    Yara Bobo MD, FACC

## 2018-07-03 RX ORDER — LOSARTAN POTASSIUM AND HYDROCHLOROTHIAZIDE 25; 100 MG/1; MG/1
TABLET ORAL
Qty: 90 TABLET | Refills: 3 | Status: SHIPPED | OUTPATIENT
Start: 2018-07-03 | End: 2019-06-25 | Stop reason: ALTCHOICE

## 2018-07-18 ENCOUNTER — OFFICE VISIT (OUTPATIENT)
Dept: PRIMARY CARE CLINIC | Age: 68
End: 2018-07-18
Payer: MEDICARE

## 2018-07-18 VITALS
HEART RATE: 73 BPM | OXYGEN SATURATION: 96 % | WEIGHT: 184 LBS | BODY MASS INDEX: 29.57 KG/M2 | HEIGHT: 66 IN | DIASTOLIC BLOOD PRESSURE: 70 MMHG | SYSTOLIC BLOOD PRESSURE: 110 MMHG

## 2018-07-18 DIAGNOSIS — G89.29 CHRONIC RIGHT SHOULDER PAIN: Primary | ICD-10-CM

## 2018-07-18 DIAGNOSIS — M25.511 CHRONIC RIGHT SHOULDER PAIN: Primary | ICD-10-CM

## 2018-07-18 DIAGNOSIS — Z12.39 SCREENING FOR BREAST CANCER: ICD-10-CM

## 2018-07-18 PROCEDURE — 3017F COLORECTAL CA SCREEN DOC REV: CPT | Performed by: INTERNAL MEDICINE

## 2018-07-18 PROCEDURE — 4040F PNEUMOC VAC/ADMIN/RCVD: CPT | Performed by: INTERNAL MEDICINE

## 2018-07-18 PROCEDURE — 1101F PT FALLS ASSESS-DOCD LE1/YR: CPT | Performed by: INTERNAL MEDICINE

## 2018-07-18 PROCEDURE — 20610 DRAIN/INJ JOINT/BURSA W/O US: CPT | Performed by: INTERNAL MEDICINE

## 2018-07-18 PROCEDURE — 1090F PRES/ABSN URINE INCON ASSESS: CPT | Performed by: INTERNAL MEDICINE

## 2018-07-18 PROCEDURE — 1036F TOBACCO NON-USER: CPT | Performed by: INTERNAL MEDICINE

## 2018-07-18 PROCEDURE — G8417 CALC BMI ABV UP PARAM F/U: HCPCS | Performed by: INTERNAL MEDICINE

## 2018-07-18 PROCEDURE — 1123F ACP DISCUSS/DSCN MKR DOCD: CPT | Performed by: INTERNAL MEDICINE

## 2018-07-18 PROCEDURE — G8427 DOCREV CUR MEDS BY ELIG CLIN: HCPCS | Performed by: INTERNAL MEDICINE

## 2018-07-18 PROCEDURE — 99213 OFFICE O/P EST LOW 20 MIN: CPT | Performed by: INTERNAL MEDICINE

## 2018-07-18 PROCEDURE — G8598 ASA/ANTIPLAT THER USED: HCPCS | Performed by: INTERNAL MEDICINE

## 2018-07-18 PROCEDURE — G8400 PT W/DXA NO RESULTS DOC: HCPCS | Performed by: INTERNAL MEDICINE

## 2018-07-18 ASSESSMENT — ENCOUNTER SYMPTOMS
BACK PAIN: 0
SHORTNESS OF BREATH: 0
EYE DISCHARGE: 0
NAUSEA: 0
VOMITING: 0
SINUS PRESSURE: 0
ABDOMINAL PAIN: 0
COUGH: 0
WHEEZING: 0
SORE THROAT: 0

## 2018-07-18 NOTE — PROGRESS NOTES
SUBJECTIVE:    Patient ID: Msaoud Vincent is a 76 y.o. female. Chief Complaint   Patient presents with    Shoulder Pain     HPI:  Patient has been complaining of right shoulder pain for the past several months. Onset was gradual. Inciting event: none known. Current symptoms include: pain. Pain is Moderate. Pain is aching and throbbing. The pain occurs daily. Patient has had similar problems. Sx are aggravated by all activities. Sx are getting worse. Treatment to date: injections in the past with some relief. .    Patient's medications, allergies, past medical, surgical, social and family histories were reviewed and updated as appropriate in the electronic medical record/chart.         Outpatient Prescriptions Marked as Taking for the 7/18/18 encounter (Office Visit) with Brendan Caceres MD   Medication Sig Dispense Refill    losartan-hydrochlorothiazide (HYZAAR) 100-25 MG per tablet TAKE 1 TABLET BY MOUTH  DAILY 90 tablet 3    PARoxetine (PAXIL) 30 MG tablet TAKE 1 TABLET BY MOUTH  EVERY MORNING 90 tablet 3    triamcinolone (KENALOG) 0.1 % cream Apply topically 2 times daily- mix with entire jar of Cetaphil 80 g 0    lisinopril (PRINIVIL;ZESTRIL) 40 MG tablet Take 1 tablet by mouth daily 30 tablet 3    omeprazole (PRILOSEC) 40 MG delayed release capsule TAKE 1 CAPSULE BY MOUTH  DAILY 90 capsule 3    albuterol sulfate HFA (PROAIR HFA) 108 (90 Base) MCG/ACT inhaler Inhale 2 puffs into the lungs every 6 hours as needed for Wheezing 1 Inhaler 3    rivaroxaban (XARELTO) 20 MG TABS tablet Take 20 mg by mouth daily (with breakfast)      aspirin 81 MG EC tablet Take 1 tablet by mouth daily 30 tablet 0    insulin glargine (LANTUS) 100 UNIT/ML injection vial Inject 25 Units into the skin nightly 1 vial 3    ONE TOUCH ULTRA TEST strip   0    gabapentin (NEURONTIN) 800 MG tablet       Syringe/Needle, Disp, (SYRINGE 3CC/25GX1\") 25G X 1\" 3 ML MISC Monthly for B12 injection 3 each 3    atorvastatin (LIPITOR) 10 MG tablet Take 10 mg by mouth daily.  diltiazem (TIAZAC) 360 MG SR capsule Take 360 mg by mouth daily.  Needles & Syringes MISC 1 each by Does not apply route daily. Syringe for B12 IM injection 3 each 3    Insulin Lispro, Human, (HUMALOG SC) Inject into the skin 14u- breakfast, 16u- lunch, 18u supper      furosemide (LASIX) 20 MG tablet One daily as needed for swelling. 90 tablet 3    doxazosin (CARDURA) 8 MG tablet Take 1 tablet by mouth nightly. 30 tablet 0    levothyroxine (SYNTHROID) 175 MCG tablet Take 1 tablet by mouth every 48 hours. (Patient taking differently: Take 175 mcg by mouth daily ) 45 tablet 3    metformin (GLUCOPHAGE) 850 MG tablet Take 1 tablet by mouth 3 times daily. 270 tablet 3        Review of Systems   Constitutional: Negative for chills and fever. HENT: Negative for congestion, sinus pressure and sore throat. Eyes: Negative for discharge and visual disturbance. Respiratory: Negative for cough, shortness of breath and wheezing. Cardiovascular: Negative for chest pain and palpitations. Gastrointestinal: Negative for abdominal pain, nausea and vomiting. Endocrine: Negative for cold intolerance and heat intolerance. Genitourinary: Negative for dysuria, frequency and urgency. Musculoskeletal: Negative for arthralgias and back pain. R shoulder pain   Skin: Negative for rash and wound. Neurological: Negative for syncope, numbness and headaches. Hematological: Negative. Psychiatric/Behavioral: Negative for agitation and sleep disturbance. The patient is not nervous/anxious.         OBJECTIVE:   Wt Readings from Last 3 Encounters:   07/18/18 184 lb (83.5 kg)   05/04/18 190 lb 6.4 oz (86.4 kg)   03/22/18 194 lb 12.8 oz (88.4 kg)     BP Readings from Last 3 Encounters:   07/18/18 110/70   05/04/18 120/60   03/22/18 124/80       /70 (Site: Right Arm, Position: Sitting, Cuff Size: Medium Adult)   Pulse 73   Ht 5' 6\" (1.676 m)   Wt 184 lb (83.5 kg) SpO2 96%   BMI 29.70 kg/m²      Physical Exam   Constitutional: She is oriented to person, place, and time. She appears well-developed and well-nourished. HENT:   Head: Normocephalic and atraumatic. Right Ear: External ear normal.   Left Ear: External ear normal.   Nose: Nose normal.   Eyes: Conjunctivae and EOM are normal. Pupils are equal, round, and reactive to light. Neck: Neck supple. No JVD present. No thyromegaly present. Cardiovascular: Normal rate, regular rhythm and normal heart sounds. Pulmonary/Chest: Effort normal and breath sounds normal. She has no wheezes. She has no rales. Abdominal: Soft. Bowel sounds are normal. She exhibits no distension. There is no tenderness. Musculoskeletal: She exhibits no edema or tenderness. Right shoulder: She exhibits decreased range of motion, crepitus and decreased strength. She exhibits no swelling, no effusion and no deformity. Neurological: She is alert and oriented to person, place, and time. Skin: No rash noted. No erythema. Psychiatric: She has a normal mood and affect. Judgment normal.   Nursing note and vitals reviewed. Lab Results   Component Value Date     05/04/2018    K 4.2 05/04/2018     05/04/2018    CO2 29 05/04/2018    GLUCOSE 125 05/04/2018    BUN 19 05/04/2018    CREATININE 1.1 05/04/2018    CALCIUM 9.8 05/04/2018    PROT 6.3 03/27/2018    LABALBU 3.9 03/27/2018    BILITOT 0.3 03/27/2018    ALT 11 03/27/2018    AST 14 03/27/2018       Hemoglobin A1C (%)   Date Value   09/22/2017 7.9     LDL Calculated (mg/dL)   Date Value   06/06/2014 46         Lab Results   Component Value Date    WBC 4.8 03/27/2018    NEUTROABS 2.4 03/27/2018    HGB 11.7 03/27/2018    HCT 38.1 03/27/2018    MCV 98.7 03/27/2018     03/27/2018       Lab Results   Component Value Date    TSH 0.80 02/27/2017       ASSESSMENT/PLAN:     1. Chronic right shoulder pain  Proceed with xrays due to worsening sx.  Likely underlying degenerative arthritis with possible rotator cuff. Patient requested to have injection. I have discussed with her the procedure and  possible risks and complications from the procedure. After obtaining informed consent the area was prepped with iodine. Procedure was done under sterile conditions. I did inject Rshoulder  with 1ml of Kenalog 40 mg and 1ml of 1 % Lidocaine. She tolerated the procedure well. There was no bleeding. She was advised to contact me if there were any questions or complications in the next few days. Will continue on NSAIDs and pain meds on as needed basis. - XR Shoulder Right 2 VW; Future    2. Screening for breast cancer  Proceed with mammogram. Advised patient on monthly self breast exams.     - SUGEY DIGITAL SCREEN W CAD BILATERAL; Future  - Mammography screening bilateral; Future      Orders Placed This Encounter   Medications    lidocaine 1 % injection 1 mL    triamcinolone acetonide (KENALOG-40) injection 40 mg        Written by Salome Walls CMA, acting as a scribe for Dr. Brittany Nicholson on 7/18/2018 at 11:25 AM.       I, Dr. Rosario Alejandra, personally performed the services described in the documentation as scribed by Salome Walls CMA, in my presence and it is both accurate and complete.

## 2018-07-23 RX ORDER — TRIAMCINOLONE ACETONIDE 40 MG/ML
40 INJECTION, SUSPENSION INTRA-ARTICULAR; INTRAMUSCULAR ONCE
Status: SHIPPED | OUTPATIENT
Start: 2018-07-18 | End: 2038-07-18

## 2018-07-23 RX ORDER — LIDOCAINE HYDROCHLORIDE 10 MG/ML
1 INJECTION, SOLUTION INFILTRATION; PERINEURAL ONCE
Status: SHIPPED | OUTPATIENT
Start: 2018-07-18 | End: 2038-07-18

## 2018-07-26 ENCOUNTER — HOSPITAL ENCOUNTER (OUTPATIENT)
Dept: GENERAL RADIOLOGY | Facility: HOSPITAL | Age: 68
Discharge: HOME | End: 2018-07-26
Payer: MEDICARE

## 2018-07-26 ENCOUNTER — HOSPITAL ENCOUNTER (OUTPATIENT)
Dept: MAMMOGRAPHY | Facility: HOSPITAL | Age: 68
Discharge: HOME | End: 2018-07-26
Payer: MEDICARE

## 2018-07-26 DIAGNOSIS — M25.511 CHRONIC RIGHT SHOULDER PAIN: ICD-10-CM

## 2018-07-26 DIAGNOSIS — G89.29 CHRONIC RIGHT SHOULDER PAIN: ICD-10-CM

## 2018-07-26 DIAGNOSIS — Z12.39 SCREENING FOR BREAST CANCER: ICD-10-CM

## 2018-07-26 PROCEDURE — 73030 X-RAY EXAM OF SHOULDER: CPT

## 2018-07-26 PROCEDURE — 77063 BREAST TOMOSYNTHESIS BI: CPT

## 2018-07-31 NOTE — PROGRESS NOTES
The patient's recent mammogram was reported normal or no concerning findings identified. Recommend doing monthly self breast exams. Repeat mammogram in 1 year. Please notify patient and let them know to return or call if any new problems.

## 2018-08-03 ENCOUNTER — OFFICE VISIT (OUTPATIENT)
Dept: PRIMARY CARE CLINIC | Age: 68
End: 2018-08-03
Payer: MEDICARE

## 2018-08-03 ENCOUNTER — TELEPHONE (OUTPATIENT)
Dept: PRIMARY CARE CLINIC | Age: 68
End: 2018-08-03

## 2018-08-03 VITALS
WEIGHT: 180.4 LBS | DIASTOLIC BLOOD PRESSURE: 80 MMHG | BODY MASS INDEX: 29.12 KG/M2 | SYSTOLIC BLOOD PRESSURE: 124 MMHG | HEART RATE: 76 BPM | OXYGEN SATURATION: 97 %

## 2018-08-03 DIAGNOSIS — I10 ESSENTIAL HYPERTENSION: ICD-10-CM

## 2018-08-03 DIAGNOSIS — K21.9 GASTROESOPHAGEAL REFLUX DISEASE, ESOPHAGITIS PRESENCE NOT SPECIFIED: ICD-10-CM

## 2018-08-03 DIAGNOSIS — G89.29 CHRONIC RIGHT SHOULDER PAIN: Primary | ICD-10-CM

## 2018-08-03 DIAGNOSIS — F41.9 ANXIETY: ICD-10-CM

## 2018-08-03 DIAGNOSIS — M25.511 CHRONIC RIGHT SHOULDER PAIN: Primary | ICD-10-CM

## 2018-08-03 DIAGNOSIS — K59.00 CONSTIPATION, UNSPECIFIED CONSTIPATION TYPE: ICD-10-CM

## 2018-08-03 PROCEDURE — 1036F TOBACCO NON-USER: CPT | Performed by: NURSE PRACTITIONER

## 2018-08-03 PROCEDURE — 1123F ACP DISCUSS/DSCN MKR DOCD: CPT | Performed by: NURSE PRACTITIONER

## 2018-08-03 PROCEDURE — 1101F PT FALLS ASSESS-DOCD LE1/YR: CPT | Performed by: NURSE PRACTITIONER

## 2018-08-03 PROCEDURE — 3017F COLORECTAL CA SCREEN DOC REV: CPT | Performed by: NURSE PRACTITIONER

## 2018-08-03 PROCEDURE — G8427 DOCREV CUR MEDS BY ELIG CLIN: HCPCS | Performed by: NURSE PRACTITIONER

## 2018-08-03 PROCEDURE — G8417 CALC BMI ABV UP PARAM F/U: HCPCS | Performed by: NURSE PRACTITIONER

## 2018-08-03 PROCEDURE — G8400 PT W/DXA NO RESULTS DOC: HCPCS | Performed by: NURSE PRACTITIONER

## 2018-08-03 PROCEDURE — G8598 ASA/ANTIPLAT THER USED: HCPCS | Performed by: NURSE PRACTITIONER

## 2018-08-03 PROCEDURE — 4040F PNEUMOC VAC/ADMIN/RCVD: CPT | Performed by: NURSE PRACTITIONER

## 2018-08-03 PROCEDURE — 99213 OFFICE O/P EST LOW 20 MIN: CPT | Performed by: NURSE PRACTITIONER

## 2018-08-03 PROCEDURE — 1090F PRES/ABSN URINE INCON ASSESS: CPT | Performed by: NURSE PRACTITIONER

## 2018-08-03 ASSESSMENT — ENCOUNTER SYMPTOMS
VOMITING: 0
SORE THROAT: 0
EYE PAIN: 0
ABDOMINAL PAIN: 0
NAUSEA: 0
SHORTNESS OF BREATH: 0
COUGH: 0

## 2018-08-03 NOTE — PROGRESS NOTES
Have you seen any other physician or provider since your last visit? no    Have you had any other diagnostic tests since your last visit? no    Have you changed or stopped any medications since your last visit including any over-the-counter medicines, vitamins, or herbal medicines? no     Are you taking all your prescribed medications? Yes  If NO, why? -  N/A      REVIEW OF SYSTEMS:  Review of Systems   Constitutional: Negative for chills and fever. HENT: Negative for ear pain and sore throat. Eyes: Negative for pain and visual disturbance. Respiratory: Negative for cough and shortness of breath. Cardiovascular: Negative for chest pain, palpitations and leg swelling. Gastrointestinal: Positive for constipation. Negative for abdominal pain, nausea and vomiting. Abd bloating   Genitourinary: Negative for dysuria and hematuria. Musculoskeletal: Negative for joint swelling. Skin: Negative for rash. Neurological: Negative for dizziness and weakness. Psychiatric/Behavioral: Negative for sleep disturbance.

## 2018-08-03 NOTE — PATIENT INSTRUCTIONS
Patient Education        Shoulder Arthritis: Exercises  Your Care Instructions  Here are some examples of typical rehabilitation exercises for your condition. Start each exercise slowly. Ease off the exercise if you start to have pain. Your doctor or physical therapist will tell you when you can start these exercises and which ones will work best for you. How to do the exercises  Shoulder flexion (lying down)    To make a wand for this exercise, use a piece of PVC pipe or a broom handle with the broom removed. Make the wand about a foot wider than your shoulders. 1. Lie on your back, holding a wand with both hands. Your palms should face down as you hold the wand. 2. Keeping your elbows straight, slowly raise your arms over your head. Raise them until you feel a stretch in your shoulders, upper back, and chest.  3. Hold for 15 to 30 seconds. 4. Repeat 2 to 4 times. Shoulder rotation (lying down)    To make a wand for this exercise, use a piece of PVC pipe or a broom handle with the broom removed. Make the wand about a foot wider than your shoulders. 1. Lie on your back. Hold a wand with both hands with your elbows bent and palms up. 2. Keep your elbows close to your body, and move the wand across your body toward the sore arm. 3. Hold for 8 to 12 seconds. 4. Repeat 2 to 4 times. Shoulder internal rotation with towel    1. Hold a towel above and behind your head with the arm that is not sore. 2. With your sore arm, reach behind your back and grasp the towel. 3. With the arm above your head, pull the towel upward. Do this until you feel a stretch on the front and outside of your sore shoulder. 4. Hold 15 to 30 seconds. 5. Repeat 2 to 4 times. Shoulder blade squeeze    1. Stand with your arms at your sides, and squeeze your shoulder blades together. Do not raise your shoulders up as you squeeze. 2. Hold 6 seconds. 3. Repeat 8 to 12 times.   Resisted rows    For this exercise, you will need elastic exercise material, such as surgical tubing or Thera-Band. 1. Put the band around a solid object at about waist level. (A bedpost will work well.) Each hand should hold an end of the band. 2. With your elbows at your sides and bent to 90 degrees, pull the band back. Your shoulder blades should move toward each other. Return to the starting position. 3. Repeat 8 to 12 times. External rotator strengthening exercise    1. Start by tying a piece of elastic exercise material to a doorknob. You can use surgical tubing or Thera-Band. (You may also hold one end of the band in each hand.)  2. Stand or sit with your shoulder relaxed and your elbow bent 90 degrees. Your upper arm should rest comfortably against your side. Squeeze a rolled towel between your elbow and your body for comfort. This will help keep your arm at your side. 3. Hold one end of the elastic band with the hand of the painful arm. 4. Start with your forearm across your belly. Slowly rotate the forearm out away from your body. Keep your elbow and upper arm tucked against the towel roll or the side of your body until you begin to feel tightness in your shoulder. Slowly move your arm back to where you started. 5. Repeat 8 to 12 times. Internal rotator strengthening exercise    1. Start by tying a piece of elastic exercise material to a doorknob. You can use surgical tubing or Thera-Band. 2. Stand or sit with your shoulder relaxed and your elbow bent 90 degrees. Your upper arm should rest comfortably against your side. Squeeze a rolled towel between your elbow and your body for comfort. This will help keep your arm at your side. 3. Hold one end of the elastic band in the hand of the painful arm. 4. Slowly rotate your forearm toward your body until it touches your belly. Slowly move it back to where you started. 5. Keep your elbow and upper arm firmly tucked against the towel roll or at your side. 6. Repeat 8 to 12 times.   Pendulum swing    If you

## 2018-08-07 RX ORDER — INSULIN GLARGINE 100 [IU]/ML
25 INJECTION, SOLUTION SUBCUTANEOUS NIGHTLY
Qty: 3 VIAL | Refills: 0 | COMMUNITY
Start: 2018-08-07 | End: 2019-01-09 | Stop reason: SDUPTHER

## 2018-08-26 ASSESSMENT — ENCOUNTER SYMPTOMS: CONSTIPATION: 1

## 2018-08-26 NOTE — PROGRESS NOTES
SUBJECTIVE:    Patient ID: Matty Contreras is a 76 y.o. female. Medical history Review  Past Medical, Family, and Social History reviewed and does contribute to the patient presenting condition    Health Maintenance Due   Topic Date Due    Hepatitis C screen  1950    Diabetic foot exam  06/08/1960    DTaP/Tdap/Td vaccine (1 - Tdap) 06/08/1969    Shingles Vaccine (1 of 2 - 2 Dose Series) 06/08/2000    Lipid screen  06/06/2015    DEXA (modify frequency per FRAX score)  06/08/2015    Pneumococcal low/med risk (2 of 2 - PPSV23) 08/25/2017    TSH testing  02/27/2018        HPI:   Chief Complaint   Patient presents with    Hypertension     Patient here today for a follow up with HTN. She would like to discuss her stomach she states she stays bloated. She also had a shoulder x ray she would like to discuss. Home BP is normal. The shoulder injection has helped. Sugar has been good. She sees endocrinology. Patient's medications, allergies, past medical, surgical, social and family histories were reviewed and updated as appropriate. Review of Systems Reviewed and acurate. See MA note. OBJECTIVE:  /80 (Site: Right Arm, Position: Sitting, Cuff Size: Medium Adult)   Pulse 76   Wt 180 lb 6.4 oz (81.8 kg)   SpO2 97%   BMI 29.12 kg/m²    Physical Exam    No results found for requested labs within last 30 days. Hemoglobin A1C (%)   Date Value   09/22/2017 7.9     LDL Calculated (mg/dL)   Date Value   06/06/2014 46         Lab Results   Component Value Date    WBC 4.8 03/27/2018    NEUTROABS 2.4 03/27/2018    HGB 11.7 03/27/2018    HCT 38.1 03/27/2018    MCV 98.7 03/27/2018     03/27/2018       Lab Results   Component Value Date    TSH 0.80 02/27/2017       Prior to Visit Medications    Medication Sig Taking?  Authorizing Provider   losartan-hydrochlorothiazide (HYZAAR) 100-25 MG per tablet TAKE 1 TABLET BY MOUTH  DAILY Yes EULALIA Silva   PARoxetine (PAXIL) 30 MG Narrowing of the subacromial space suggestive of rotator cuff tendinopathy. 3. Calcific tendinosis. 4. Minimal right basilar subsegmental atelectasis.             ASSESSMENT:  1. Chronic right shoulder pain    2. Gastroesophageal reflux disease, esophagitis presence not specified    3. Constipation, unspecified constipation type    4. Essential hypertension    5. Anxiety          PLAN:      Patient Instructions     Patient Education        Shoulder Arthritis: Exercises  Your Care Instructions  Here are some examples of typical rehabilitation exercises for your condition. Start each exercise slowly. Ease off the exercise if you start to have pain. Your doctor or physical therapist will tell you when you can start these exercises and which ones will work best for you. How to do the exercises  Shoulder flexion (lying down)    To make a wand for this exercise, use a piece of PVC pipe or a broom handle with the broom removed. Make the wand about a foot wider than your shoulders. 1. Lie on your back, holding a wand with both hands. Your palms should face down as you hold the wand. 2. Keeping your elbows straight, slowly raise your arms over your head. Raise them until you feel a stretch in your shoulders, upper back, and chest.  3. Hold for 15 to 30 seconds. 4. Repeat 2 to 4 times. Shoulder rotation (lying down)    To make a wand for this exercise, use a piece of PVC pipe or a broom handle with the broom removed. Make the wand about a foot wider than your shoulders. 1. Lie on your back. Hold a wand with both hands with your elbows bent and palms up. 2. Keep your elbows close to your body, and move the wand across your body toward the sore arm. 3. Hold for 8 to 12 seconds. 4. Repeat 2 to 4 times. Shoulder internal rotation with towel    1. Hold a towel above and behind your head with the arm that is not sore. 2. With your sore arm, reach behind your back and grasp the towel.   3. With the arm above your towel between your elbow and your body for comfort. This will help keep your arm at your side. 3. Hold one end of the elastic band in the hand of the painful arm. 4. Slowly rotate your forearm toward your body until it touches your belly. Slowly move it back to where you started. 5. Keep your elbow and upper arm firmly tucked against the towel roll or at your side. 6. Repeat 8 to 12 times. Pendulum swing    If you have pain in your back, do not do this exercise. 1. Hold on to a table or the back of a chair with your good arm. Then bend forward a little and let your sore arm hang straight down. This exercise does not use the arm muscles. Rather, use your legs and your hips to create movement that makes your arm swing freely. 2. Use the movement from your hips and legs to guide the slightly swinging arm back and forth like a pendulum (or elephant trunk). Then guide it in circles that start small (about the size of a dinner plate). Make the circles a bit larger each day, as your pain allows. 3. Do this exercise for 5 minutes, 5 to 7 times each day. 4. As you have less pain, try bending over a little farther to do this exercise. This will increase the amount of movement at your shoulder. Follow-up care is a key part of your treatment and safety. Be sure to make and go to all appointments, and call your doctor if you are having problems. It's also a good idea to know your test results and keep a list of the medicines you take. Where can you learn more? Go to https://[email or web address]. org and sign in to your [employer] account. Enter H562 in the [employer] box to learn more about \"Shoulder Arthritis: Exercises. \"     If you do not have an account, please click on the \"Sign Up Now\" link. Current as of: November 29, 2017  Content Version: 11.6  © 1076-0335 [employer]. Care instructions adapted under license by [facility] ([facility]).  If you have questions about a medical condition or this instruction, always ask your healthcare professional. [place] [address] any warranty or liability for your use of this information. Patient Education        Shoulder Arthritis: Exercises  Your Care Instructions  Here are some examples of typical rehabilitation exercises for your condition. Start each exercise slowly. Ease off the exercise if you start to have pain. Your doctor or physical therapist will tell you when you can start these exercises and which ones will work best for you. How to do the exercises  Shoulder flexion (lying down)    To make a wand for this exercise, use a piece of PVC pipe or a broom handle with the broom removed. Make the wand about a foot wider than your shoulders. 5. Lie on your back, holding a wand with both hands. Your palms should face down as you hold the wand. 6. Keeping your elbows straight, slowly raise your arms over your head. Raise them until you feel a stretch in your shoulders, upper back, and chest.  7. Hold for 15 to 30 seconds. 8. Repeat 2 to 4 times. Shoulder rotation (lying down)    To make a wand for this exercise, use a piece of PVC pipe or a broom handle with the broom removed. Make the wand about a foot wider than your shoulders. 5. Lie on your back. Hold a wand with both hands with your elbows bent and palms up. 6. Keep your elbows close to your body, and move the wand across your body toward the sore arm. 7. Hold for 8 to 12 seconds. 8. Repeat 2 to 4 times. Shoulder internal rotation with towel    6. Hold a towel above and behind your head with the arm that is not sore. 7. With your sore arm, reach behind your back and grasp the towel. 8. With the arm above your head, pull the towel upward. Do this until you feel a stretch on the front and outside of your sore shoulder. 9. Hold 15 to 30 seconds. 10. Repeat 2 to 4 times. Shoulder blade squeeze    4.  Stand with your arms at your sides, and squeeze your shoulder blades bowels. Return in about 3 months (around 11/3/2018).

## 2018-09-26 PROBLEM — J10.1 INFLUENZA A: Status: RESOLVED | Noted: 2017-02-28 | Resolved: 2018-09-26

## 2018-11-05 RX ORDER — DOXAZOSIN 8 MG/1
TABLET ORAL
Qty: 90 TABLET | Refills: 1 | Status: SHIPPED | OUTPATIENT
Start: 2018-11-05 | End: 2019-07-03 | Stop reason: SDUPTHER

## 2018-11-06 ENCOUNTER — OFFICE VISIT (OUTPATIENT)
Dept: PRIMARY CARE CLINIC | Age: 68
End: 2018-11-06
Payer: MEDICARE

## 2018-11-06 ENCOUNTER — HOSPITAL ENCOUNTER (OUTPATIENT)
Facility: HOSPITAL | Age: 68
Discharge: HOME | End: 2018-11-06
Payer: MEDICARE

## 2018-11-06 VITALS
BODY MASS INDEX: 30.09 KG/M2 | WEIGHT: 186.4 LBS | SYSTOLIC BLOOD PRESSURE: 120 MMHG | DIASTOLIC BLOOD PRESSURE: 84 MMHG | OXYGEN SATURATION: 93 % | HEART RATE: 86 BPM

## 2018-11-06 DIAGNOSIS — R30.0 DYSURIA: Primary | ICD-10-CM

## 2018-11-06 DIAGNOSIS — I10 ESSENTIAL HYPERTENSION: ICD-10-CM

## 2018-11-06 DIAGNOSIS — R53.83 OTHER FATIGUE: ICD-10-CM

## 2018-11-06 DIAGNOSIS — D64.9 ANEMIA, UNSPECIFIED TYPE: ICD-10-CM

## 2018-11-06 DIAGNOSIS — E11.8 TYPE 2 DIABETES MELLITUS WITH COMPLICATION, WITHOUT LONG-TERM CURRENT USE OF INSULIN (HCC): ICD-10-CM

## 2018-11-06 DIAGNOSIS — F41.9 ANXIETY: ICD-10-CM

## 2018-11-06 LAB
BILIRUBIN, POC: NORMAL
BLOOD URINE, POC: NORMAL
CLARITY, POC: CLEAR
COLOR, POC: YELLOW
GLUCOSE URINE, POC: NORMAL
KETONES, POC: NORMAL
LEUKOCYTE EST, POC: NORMAL
NITRITE, POC: NORMAL
PH, POC: 6
PROTEIN, POC: NORMAL
SPECIFIC GRAVITY, POC: 1.03
UROBILINOGEN, POC: 0.2

## 2018-11-06 PROCEDURE — 1123F ACP DISCUSS/DSCN MKR DOCD: CPT | Performed by: NURSE PRACTITIONER

## 2018-11-06 PROCEDURE — G0008 ADMIN INFLUENZA VIRUS VAC: HCPCS | Performed by: NURSE PRACTITIONER

## 2018-11-06 PROCEDURE — 83540 ASSAY OF IRON: CPT

## 2018-11-06 PROCEDURE — 80053 COMPREHEN METABOLIC PANEL: CPT

## 2018-11-06 PROCEDURE — 90471 IMMUNIZATION ADMIN: CPT | Performed by: NURSE PRACTITIONER

## 2018-11-06 PROCEDURE — 3017F COLORECTAL CA SCREEN DOC REV: CPT | Performed by: NURSE PRACTITIONER

## 2018-11-06 PROCEDURE — 1090F PRES/ABSN URINE INCON ASSESS: CPT | Performed by: NURSE PRACTITIONER

## 2018-11-06 PROCEDURE — G8427 DOCREV CUR MEDS BY ELIG CLIN: HCPCS | Performed by: NURSE PRACTITIONER

## 2018-11-06 PROCEDURE — G8417 CALC BMI ABV UP PARAM F/U: HCPCS | Performed by: NURSE PRACTITIONER

## 2018-11-06 PROCEDURE — G8400 PT W/DXA NO RESULTS DOC: HCPCS | Performed by: NURSE PRACTITIONER

## 2018-11-06 PROCEDURE — G8598 ASA/ANTIPLAT THER USED: HCPCS | Performed by: NURSE PRACTITIONER

## 2018-11-06 PROCEDURE — 1101F PT FALLS ASSESS-DOCD LE1/YR: CPT | Performed by: NURSE PRACTITIONER

## 2018-11-06 PROCEDURE — 36415 COLL VENOUS BLD VENIPUNCTURE: CPT

## 2018-11-06 PROCEDURE — 90632 HEPA VACCINE ADULT IM: CPT | Performed by: NURSE PRACTITIONER

## 2018-11-06 PROCEDURE — G8482 FLU IMMUNIZE ORDER/ADMIN: HCPCS | Performed by: NURSE PRACTITIONER

## 2018-11-06 PROCEDURE — 1036F TOBACCO NON-USER: CPT | Performed by: NURSE PRACTITIONER

## 2018-11-06 PROCEDURE — 90688 IIV4 VACCINE SPLT 0.5 ML IM: CPT | Performed by: NURSE PRACTITIONER

## 2018-11-06 PROCEDURE — 82728 ASSAY OF FERRITIN: CPT

## 2018-11-06 PROCEDURE — 99213 OFFICE O/P EST LOW 20 MIN: CPT | Performed by: NURSE PRACTITIONER

## 2018-11-06 PROCEDURE — 2022F DILAT RTA XM EVC RTNOPTHY: CPT | Performed by: NURSE PRACTITIONER

## 2018-11-06 PROCEDURE — 83550 IRON BINDING TEST: CPT

## 2018-11-06 PROCEDURE — 85025 COMPLETE CBC W/AUTO DIFF WBC: CPT

## 2018-11-06 PROCEDURE — 3046F HEMOGLOBIN A1C LEVEL >9.0%: CPT | Performed by: NURSE PRACTITIONER

## 2018-11-06 PROCEDURE — 81002 URINALYSIS NONAUTO W/O SCOPE: CPT | Performed by: NURSE PRACTITIONER

## 2018-11-06 PROCEDURE — 4040F PNEUMOC VAC/ADMIN/RCVD: CPT | Performed by: NURSE PRACTITIONER

## 2018-11-06 ASSESSMENT — ENCOUNTER SYMPTOMS
ABDOMINAL PAIN: 0
VOMITING: 0
SORE THROAT: 0
EYE PAIN: 0
COUGH: 0
SHORTNESS OF BREATH: 0
NAUSEA: 0

## 2018-11-07 LAB
A/G RATIO: 1.4 (ref 0.8–2)
ALBUMIN SERPL-MCNC: 4 G/DL (ref 3.4–4.8)
ALP BLD-CCNC: 66 U/L (ref 25–100)
ALT SERPL-CCNC: 9 U/L (ref 4–36)
ANION GAP SERPL CALCULATED.3IONS-SCNC: 16 MMOL/L (ref 3–16)
AST SERPL-CCNC: 15 U/L (ref 8–33)
BASOPHILS ABSOLUTE: 0.1 K/UL (ref 0–0.1)
BASOPHILS RELATIVE PERCENT: 0.8 %
BILIRUB SERPL-MCNC: <0.2 MG/DL (ref 0.3–1.2)
BUN BLDV-MCNC: 17 MG/DL (ref 6–20)
CALCIUM SERPL-MCNC: 9.8 MG/DL (ref 8.5–10.5)
CHLORIDE BLD-SCNC: 103 MMOL/L (ref 98–107)
CO2: 26 MMOL/L (ref 20–30)
CREAT SERPL-MCNC: 1.2 MG/DL (ref 0.4–1.2)
EOSINOPHILS ABSOLUTE: 0.1 K/UL (ref 0–0.4)
EOSINOPHILS RELATIVE PERCENT: 2 %
FERRITIN: 24.4 NG/ML (ref 22–322)
GFR AFRICAN AMERICAN: 54
GFR NON-AFRICAN AMERICAN: 45
GLOBULIN: 2.8 G/DL
GLUCOSE BLD-MCNC: 145 MG/DL (ref 74–106)
HCT VFR BLD CALC: 38 % (ref 37–47)
HEMOGLOBIN: 12 G/DL (ref 11.5–16.5)
IMMATURE GRANULOCYTES #: 0 K/UL
IMMATURE GRANULOCYTES %: 0.2 % (ref 0–5)
IRON: 52 UG/DL (ref 37–145)
LYMPHOCYTES ABSOLUTE: 1.9 K/UL (ref 1.5–4)
LYMPHOCYTES RELATIVE PERCENT: 31.7 %
MCH RBC QN AUTO: 31.5 PG (ref 27–32)
MCHC RBC AUTO-ENTMCNC: 31.6 G/DL (ref 31–35)
MCV RBC AUTO: 99.7 FL (ref 80–100)
MONOCYTES ABSOLUTE: 0.6 K/UL (ref 0.2–0.8)
MONOCYTES RELATIVE PERCENT: 9.7 %
NEUTROPHILS ABSOLUTE: 3.3 K/UL (ref 2–7.5)
NEUTROPHILS RELATIVE PERCENT: 55.6 %
PDW BLD-RTO: 12.6 % (ref 11–16)
PLATELET # BLD: 246 K/UL (ref 150–400)
PMV BLD AUTO: 10.5 FL (ref 6–10)
POTASSIUM SERPL-SCNC: 4.1 MMOL/L (ref 3.4–5.1)
RBC # BLD: 3.81 M/UL (ref 3.8–5.8)
SODIUM BLD-SCNC: 145 MMOL/L (ref 136–145)
TOTAL IRON BINDING CAPACITY: 368 UG/DL (ref 250–450)
TOTAL PROTEIN: 6.8 G/DL (ref 6.4–8.3)
WBC # BLD: 6 K/UL (ref 4–11)

## 2018-11-08 ENCOUNTER — TELEPHONE (OUTPATIENT)
Dept: PRIMARY CARE CLINIC | Age: 68
End: 2018-11-08

## 2018-11-30 NOTE — PROGRESS NOTES
Yes EULALIA Arriaga   losartan-hydrochlorothiazide (HYZAAR) 100-25 MG per tablet TAKE 1 TABLET BY MOUTH  DAILY Yes EULALIA Arriaga   PARoxetine (PAXIL) 30 MG tablet TAKE 1 TABLET BY MOUTH  EVERY MORNING Yes EULALIA Arriaga   triamcinolone (KENALOG) 0.1 % cream Apply topically 2 times daily- mix with entire jar of Cetaphil Yes EULALIA Arriaga   lisinopril (PRINIVIL;ZESTRIL) 40 MG tablet Take 1 tablet by mouth daily Yes EULALIA Arriaga   omeprazole (PRILOSEC) 40 MG delayed release capsule TAKE 1 CAPSULE BY MOUTH  DAILY Yes EULALIA Arriaga   albuterol sulfate HFA (PROAIR HFA) 108 (90 Base) MCG/ACT inhaler Inhale 2 puffs into the lungs every 6 hours as needed for Wheezing Yes EULALIA Bunch   aspirin 81 MG EC tablet Take 1 tablet by mouth daily Yes Julito Mcarthur MD   ONE TOUCH ULTRA TEST strip  Yes Historical Provider, MD   gabapentin (NEURONTIN) 800 MG tablet  Yes Historical Provider, MD   Syringe/Needle, Disp, (SYRINGE 3CC/25GX1\") 25G X 1\" 3 ML MISC Monthly for B12 injection Yes EULALIA Arriaga   atorvastatin (LIPITOR) 10 MG tablet Take 10 mg by mouth daily. Yes Historical Provider, MD   diltiazem (TIAZAC) 360 MG SR capsule Take 360 mg by mouth daily. Yes Historical Provider, MD   Needles & Syringes MISC 1 each by Does not apply route daily. Syringe for B12 IM injection Yes EULALIA Arriaga   Insulin Lispro, Human, (HUMALOG SC) Inject into the skin 14u- breakfast, 16u- lunch, 18u supper Yes Historical Provider, MD   furosemide (LASIX) 20 MG tablet One daily as needed for swelling. Yes EULALIA Arriaga   doxazosin (CARDURA) 8 MG tablet Take 1 tablet by mouth nightly. Yes EULALIA Arriaga   levothyroxine (SYNTHROID) 175 MCG tablet Take 1 tablet by mouth every 48 hours. Patient taking differently: Take 175 mcg by mouth daily  Yes EULALIA Arriaga   metformin (GLUCOPHAGE) 850 MG tablet Take 1 tablet by mouth 3 times daily.  Yes EULALIA Arriaga

## 2018-12-06 ENCOUNTER — OFFICE VISIT (OUTPATIENT)
Dept: PRIMARY CARE CLINIC | Age: 68
End: 2018-12-06
Payer: MEDICARE

## 2018-12-06 VITALS
OXYGEN SATURATION: 94 % | HEART RATE: 83 BPM | WEIGHT: 188 LBS | SYSTOLIC BLOOD PRESSURE: 138 MMHG | DIASTOLIC BLOOD PRESSURE: 70 MMHG | BODY MASS INDEX: 30.34 KG/M2

## 2018-12-06 DIAGNOSIS — F41.9 ANXIETY: ICD-10-CM

## 2018-12-06 DIAGNOSIS — I10 ESSENTIAL HYPERTENSION: Primary | ICD-10-CM

## 2018-12-06 PROCEDURE — G8482 FLU IMMUNIZE ORDER/ADMIN: HCPCS | Performed by: NURSE PRACTITIONER

## 2018-12-06 PROCEDURE — 1036F TOBACCO NON-USER: CPT | Performed by: NURSE PRACTITIONER

## 2018-12-06 PROCEDURE — 3017F COLORECTAL CA SCREEN DOC REV: CPT | Performed by: NURSE PRACTITIONER

## 2018-12-06 PROCEDURE — 1101F PT FALLS ASSESS-DOCD LE1/YR: CPT | Performed by: NURSE PRACTITIONER

## 2018-12-06 PROCEDURE — G8427 DOCREV CUR MEDS BY ELIG CLIN: HCPCS | Performed by: NURSE PRACTITIONER

## 2018-12-06 PROCEDURE — G8598 ASA/ANTIPLAT THER USED: HCPCS | Performed by: NURSE PRACTITIONER

## 2018-12-06 PROCEDURE — 99213 OFFICE O/P EST LOW 20 MIN: CPT | Performed by: NURSE PRACTITIONER

## 2018-12-06 PROCEDURE — 4040F PNEUMOC VAC/ADMIN/RCVD: CPT | Performed by: NURSE PRACTITIONER

## 2018-12-06 PROCEDURE — G8417 CALC BMI ABV UP PARAM F/U: HCPCS | Performed by: NURSE PRACTITIONER

## 2018-12-06 PROCEDURE — G8400 PT W/DXA NO RESULTS DOC: HCPCS | Performed by: NURSE PRACTITIONER

## 2018-12-06 PROCEDURE — 1090F PRES/ABSN URINE INCON ASSESS: CPT | Performed by: NURSE PRACTITIONER

## 2018-12-06 PROCEDURE — 1123F ACP DISCUSS/DSCN MKR DOCD: CPT | Performed by: NURSE PRACTITIONER

## 2018-12-06 RX ORDER — PAROXETINE HYDROCHLORIDE 40 MG/1
40 TABLET, FILM COATED ORAL EVERY MORNING
Qty: 30 TABLET | Refills: 5 | Status: SHIPPED | OUTPATIENT
Start: 2018-12-06 | End: 2019-01-09 | Stop reason: SDUPTHER

## 2018-12-06 RX ORDER — ALPRAZOLAM 0.25 MG/1
0.25 TABLET ORAL 3 TIMES DAILY PRN
Qty: 9 TABLET | Refills: 0 | Status: SHIPPED | OUTPATIENT
Start: 2018-12-06 | End: 2018-12-09

## 2018-12-06 RX ORDER — DILTIAZEM HYDROCHLORIDE 420 MG/1
420 CAPSULE, EXTENDED RELEASE ORAL DAILY
Qty: 30 CAPSULE | Refills: 5 | Status: SHIPPED | OUTPATIENT
Start: 2018-12-06 | End: 2019-01-09 | Stop reason: SDUPTHER

## 2018-12-06 ASSESSMENT — ENCOUNTER SYMPTOMS
SHORTNESS OF BREATH: 0
VOMITING: 0
COUGH: 0
SORE THROAT: 0
NAUSEA: 0
ABDOMINAL PAIN: 0
EYE PAIN: 0

## 2019-01-09 ENCOUNTER — TELEPHONE (OUTPATIENT)
Dept: PRIMARY CARE CLINIC | Age: 69
End: 2019-01-09

## 2019-01-09 ENCOUNTER — OFFICE VISIT (OUTPATIENT)
Dept: PRIMARY CARE CLINIC | Age: 69
End: 2019-01-09
Payer: MEDICARE

## 2019-01-09 VITALS
BODY MASS INDEX: 30.67 KG/M2 | WEIGHT: 190 LBS | SYSTOLIC BLOOD PRESSURE: 142 MMHG | OXYGEN SATURATION: 93 % | DIASTOLIC BLOOD PRESSURE: 60 MMHG | HEART RATE: 87 BPM

## 2019-01-09 DIAGNOSIS — F41.9 ANXIETY: ICD-10-CM

## 2019-01-09 DIAGNOSIS — I10 ESSENTIAL HYPERTENSION: Primary | ICD-10-CM

## 2019-01-09 DIAGNOSIS — E11.8 TYPE 2 DIABETES MELLITUS WITH COMPLICATION, WITHOUT LONG-TERM CURRENT USE OF INSULIN (HCC): ICD-10-CM

## 2019-01-09 PROCEDURE — 99213 OFFICE O/P EST LOW 20 MIN: CPT | Performed by: NURSE PRACTITIONER

## 2019-01-09 PROCEDURE — 3017F COLORECTAL CA SCREEN DOC REV: CPT | Performed by: NURSE PRACTITIONER

## 2019-01-09 PROCEDURE — 2022F DILAT RTA XM EVC RTNOPTHY: CPT | Performed by: NURSE PRACTITIONER

## 2019-01-09 PROCEDURE — G8482 FLU IMMUNIZE ORDER/ADMIN: HCPCS | Performed by: NURSE PRACTITIONER

## 2019-01-09 PROCEDURE — G8417 CALC BMI ABV UP PARAM F/U: HCPCS | Performed by: NURSE PRACTITIONER

## 2019-01-09 PROCEDURE — 1123F ACP DISCUSS/DSCN MKR DOCD: CPT | Performed by: NURSE PRACTITIONER

## 2019-01-09 PROCEDURE — G8400 PT W/DXA NO RESULTS DOC: HCPCS | Performed by: NURSE PRACTITIONER

## 2019-01-09 PROCEDURE — 1101F PT FALLS ASSESS-DOCD LE1/YR: CPT | Performed by: NURSE PRACTITIONER

## 2019-01-09 PROCEDURE — G0009 ADMIN PNEUMOCOCCAL VACCINE: HCPCS | Performed by: NURSE PRACTITIONER

## 2019-01-09 PROCEDURE — 1090F PRES/ABSN URINE INCON ASSESS: CPT | Performed by: NURSE PRACTITIONER

## 2019-01-09 PROCEDURE — 3046F HEMOGLOBIN A1C LEVEL >9.0%: CPT | Performed by: NURSE PRACTITIONER

## 2019-01-09 PROCEDURE — G8427 DOCREV CUR MEDS BY ELIG CLIN: HCPCS | Performed by: NURSE PRACTITIONER

## 2019-01-09 PROCEDURE — 4040F PNEUMOC VAC/ADMIN/RCVD: CPT | Performed by: NURSE PRACTITIONER

## 2019-01-09 PROCEDURE — G8599 NO ASA/ANTIPLAT THER USE RNG: HCPCS | Performed by: NURSE PRACTITIONER

## 2019-01-09 PROCEDURE — 90732 PPSV23 VACC 2 YRS+ SUBQ/IM: CPT | Performed by: NURSE PRACTITIONER

## 2019-01-09 PROCEDURE — 1036F TOBACCO NON-USER: CPT | Performed by: NURSE PRACTITIONER

## 2019-01-09 RX ORDER — DILTIAZEM HYDROCHLORIDE 420 MG/1
420 CAPSULE, EXTENDED RELEASE ORAL DAILY
Qty: 90 CAPSULE | Refills: 3 | Status: SHIPPED | OUTPATIENT
Start: 2019-01-09 | End: 2019-10-22 | Stop reason: SDUPTHER

## 2019-01-09 RX ORDER — NAPROXEN SODIUM 220 MG
1 TABLET ORAL DAILY
Qty: 50 EACH | Refills: 5 | Status: SHIPPED | OUTPATIENT
Start: 2019-01-09

## 2019-01-09 RX ORDER — PAROXETINE HYDROCHLORIDE 40 MG/1
40 TABLET, FILM COATED ORAL EVERY MORNING
Qty: 90 TABLET | Refills: 3 | Status: SHIPPED | OUTPATIENT
Start: 2019-01-09 | End: 2019-12-10 | Stop reason: SDUPTHER

## 2019-01-09 RX ORDER — INSULIN GLARGINE 100 [IU]/ML
25 INJECTION, SOLUTION SUBCUTANEOUS NIGHTLY
Qty: 2 VIAL | Refills: 0 | COMMUNITY
Start: 2019-01-09 | End: 2020-09-02 | Stop reason: CLARIF

## 2019-01-09 ASSESSMENT — ENCOUNTER SYMPTOMS
NAUSEA: 0
SORE THROAT: 0
EYE PAIN: 0
COUGH: 0
VOMITING: 0
ABDOMINAL PAIN: 0
SHORTNESS OF BREATH: 0

## 2019-02-06 ENCOUNTER — OFFICE VISIT (OUTPATIENT)
Dept: PRIMARY CARE CLINIC | Age: 69
End: 2019-02-06
Payer: MEDICARE

## 2019-02-06 VITALS
OXYGEN SATURATION: 98 % | DIASTOLIC BLOOD PRESSURE: 80 MMHG | WEIGHT: 185.6 LBS | SYSTOLIC BLOOD PRESSURE: 122 MMHG | BODY MASS INDEX: 29.96 KG/M2 | HEART RATE: 75 BPM

## 2019-02-06 DIAGNOSIS — K64.9 HEMORRHOIDS, UNSPECIFIED HEMORRHOID TYPE: ICD-10-CM

## 2019-02-06 DIAGNOSIS — M54.50 ACUTE RIGHT-SIDED LOW BACK PAIN WITHOUT SCIATICA: ICD-10-CM

## 2019-02-06 DIAGNOSIS — R10.30 LOWER ABDOMINAL PAIN: Primary | ICD-10-CM

## 2019-02-06 DIAGNOSIS — I10 ESSENTIAL HYPERTENSION: ICD-10-CM

## 2019-02-06 PROCEDURE — 3017F COLORECTAL CA SCREEN DOC REV: CPT | Performed by: NURSE PRACTITIONER

## 2019-02-06 PROCEDURE — G8427 DOCREV CUR MEDS BY ELIG CLIN: HCPCS | Performed by: NURSE PRACTITIONER

## 2019-02-06 PROCEDURE — 4040F PNEUMOC VAC/ADMIN/RCVD: CPT | Performed by: NURSE PRACTITIONER

## 2019-02-06 PROCEDURE — 99213 OFFICE O/P EST LOW 20 MIN: CPT | Performed by: NURSE PRACTITIONER

## 2019-02-06 PROCEDURE — G8599 NO ASA/ANTIPLAT THER USE RNG: HCPCS | Performed by: NURSE PRACTITIONER

## 2019-02-06 PROCEDURE — G8482 FLU IMMUNIZE ORDER/ADMIN: HCPCS | Performed by: NURSE PRACTITIONER

## 2019-02-06 PROCEDURE — G8417 CALC BMI ABV UP PARAM F/U: HCPCS | Performed by: NURSE PRACTITIONER

## 2019-02-06 PROCEDURE — 1101F PT FALLS ASSESS-DOCD LE1/YR: CPT | Performed by: NURSE PRACTITIONER

## 2019-02-06 PROCEDURE — 1090F PRES/ABSN URINE INCON ASSESS: CPT | Performed by: NURSE PRACTITIONER

## 2019-02-06 PROCEDURE — 1036F TOBACCO NON-USER: CPT | Performed by: NURSE PRACTITIONER

## 2019-02-06 PROCEDURE — 1123F ACP DISCUSS/DSCN MKR DOCD: CPT | Performed by: NURSE PRACTITIONER

## 2019-02-06 PROCEDURE — G8400 PT W/DXA NO RESULTS DOC: HCPCS | Performed by: NURSE PRACTITIONER

## 2019-02-06 RX ORDER — TIZANIDINE 4 MG/1
4 TABLET ORAL EVERY 8 HOURS PRN
Qty: 60 TABLET | Refills: 2 | Status: SHIPPED | OUTPATIENT
Start: 2019-02-06 | End: 2020-09-02

## 2019-02-06 ASSESSMENT — PATIENT HEALTH QUESTIONNAIRE - PHQ9
2. FEELING DOWN, DEPRESSED OR HOPELESS: 0
SUM OF ALL RESPONSES TO PHQ9 QUESTIONS 1 & 2: 0
SUM OF ALL RESPONSES TO PHQ QUESTIONS 1-9: 0
SUM OF ALL RESPONSES TO PHQ QUESTIONS 1-9: 0
1. LITTLE INTEREST OR PLEASURE IN DOING THINGS: 0

## 2019-02-06 ASSESSMENT — ENCOUNTER SYMPTOMS
NAUSEA: 0
SORE THROAT: 0
VOMITING: 0
SHORTNESS OF BREATH: 0
BACK PAIN: 1
COUGH: 0
ABDOMINAL PAIN: 0
EYE PAIN: 0

## 2019-02-11 ENCOUNTER — TELEPHONE (OUTPATIENT)
Dept: PRIMARY CARE CLINIC | Age: 69
End: 2019-02-11

## 2019-02-11 RX ORDER — METHOCARBAMOL 750 MG/1
750 TABLET, FILM COATED ORAL 4 TIMES DAILY PRN
Qty: 60 TABLET | Refills: 2 | Status: SHIPPED | OUTPATIENT
Start: 2019-02-11 | End: 2019-02-21

## 2019-02-12 ENCOUNTER — HOSPITAL ENCOUNTER (OUTPATIENT)
Dept: ULTRASOUND IMAGING | Facility: HOSPITAL | Age: 69
Discharge: HOME | End: 2019-02-12
Payer: MEDICARE

## 2019-02-12 DIAGNOSIS — R10.30 LOWER ABDOMINAL PAIN: ICD-10-CM

## 2019-02-12 PROCEDURE — 76856 US EXAM PELVIC COMPLETE: CPT

## 2019-02-13 ENCOUNTER — TELEPHONE (OUTPATIENT)
Dept: PRIMARY CARE CLINIC | Age: 69
End: 2019-02-13

## 2019-03-04 ENCOUNTER — TELEPHONE (OUTPATIENT)
Dept: CARDIAC SURGERY | Facility: CLINIC | Age: 69
End: 2019-03-04

## 2019-03-21 RX ORDER — OMEPRAZOLE 40 MG/1
CAPSULE, DELAYED RELEASE ORAL
Qty: 90 CAPSULE | Refills: 3 | Status: SHIPPED | OUTPATIENT
Start: 2019-03-21 | End: 2020-04-17

## 2019-03-29 ENCOUNTER — HOSPITAL ENCOUNTER (OUTPATIENT)
Facility: HOSPITAL | Age: 69
Discharge: HOME | End: 2019-03-29
Payer: MEDICARE

## 2019-03-29 LAB
ANION GAP SERPL CALCULATED.3IONS-SCNC: 15 MMOL/L (ref 3–16)
BUN BLDV-MCNC: 17 MG/DL (ref 6–20)
CALCIUM SERPL-MCNC: 9.4 MG/DL (ref 8.5–10.5)
CHLORIDE BLD-SCNC: 103 MMOL/L (ref 98–107)
CO2: 23 MMOL/L (ref 20–30)
CREAT SERPL-MCNC: 1.3 MG/DL (ref 0.4–1.2)
GFR AFRICAN AMERICAN: 49
GFR NON-AFRICAN AMERICAN: 41
GLUCOSE BLD-MCNC: 220 MG/DL (ref 74–106)
POTASSIUM SERPL-SCNC: 4.1 MMOL/L (ref 3.4–5.1)
SODIUM BLD-SCNC: 141 MMOL/L (ref 136–145)

## 2019-03-29 PROCEDURE — 36415 COLL VENOUS BLD VENIPUNCTURE: CPT

## 2019-03-29 PROCEDURE — 93005 ELECTROCARDIOGRAM TRACING: CPT

## 2019-03-29 PROCEDURE — 80048 BASIC METABOLIC PNL TOTAL CA: CPT

## 2019-04-09 ENCOUNTER — OFFICE VISIT (OUTPATIENT)
Dept: PRIMARY CARE CLINIC | Age: 69
End: 2019-04-09
Payer: MEDICARE

## 2019-04-09 ENCOUNTER — OFFICE VISIT (OUTPATIENT)
Dept: CARDIAC SURGERY | Facility: CLINIC | Age: 69
End: 2019-04-09

## 2019-04-09 VITALS
HEART RATE: 65 BPM | BODY MASS INDEX: 30.28 KG/M2 | OXYGEN SATURATION: 93 % | WEIGHT: 188.4 LBS | HEIGHT: 66 IN | DIASTOLIC BLOOD PRESSURE: 60 MMHG | SYSTOLIC BLOOD PRESSURE: 130 MMHG

## 2019-04-09 VITALS
WEIGHT: 189 LBS | SYSTOLIC BLOOD PRESSURE: 150 MMHG | HEART RATE: 69 BPM | DIASTOLIC BLOOD PRESSURE: 68 MMHG | TEMPERATURE: 98 F | BODY MASS INDEX: 30.37 KG/M2 | HEIGHT: 66 IN | OXYGEN SATURATION: 92 %

## 2019-04-09 DIAGNOSIS — E53.8 B12 DEFICIENCY: ICD-10-CM

## 2019-04-09 DIAGNOSIS — E11.8 TYPE 2 DIABETES MELLITUS WITH COMPLICATION, WITHOUT LONG-TERM CURRENT USE OF INSULIN (HCC): ICD-10-CM

## 2019-04-09 DIAGNOSIS — I10 ESSENTIAL HYPERTENSION: ICD-10-CM

## 2019-04-09 DIAGNOSIS — Z01.818 PREOP EXAMINATION: Primary | ICD-10-CM

## 2019-04-09 DIAGNOSIS — R79.89 ELEVATED SERUM CREATININE: ICD-10-CM

## 2019-04-09 DIAGNOSIS — I48.19 PERSISTENT ATRIAL FIBRILLATION (HCC): Primary | ICD-10-CM

## 2019-04-09 PROCEDURE — 96372 THER/PROPH/DIAG INJ SC/IM: CPT | Performed by: NURSE PRACTITIONER

## 2019-04-09 PROCEDURE — G8400 PT W/DXA NO RESULTS DOC: HCPCS | Performed by: NURSE PRACTITIONER

## 2019-04-09 PROCEDURE — 3017F COLORECTAL CA SCREEN DOC REV: CPT | Performed by: NURSE PRACTITIONER

## 2019-04-09 PROCEDURE — 4040F PNEUMOC VAC/ADMIN/RCVD: CPT | Performed by: NURSE PRACTITIONER

## 2019-04-09 PROCEDURE — 1036F TOBACCO NON-USER: CPT | Performed by: NURSE PRACTITIONER

## 2019-04-09 PROCEDURE — 1123F ACP DISCUSS/DSCN MKR DOCD: CPT | Performed by: NURSE PRACTITIONER

## 2019-04-09 PROCEDURE — 99213 OFFICE O/P EST LOW 20 MIN: CPT | Performed by: PHYSICIAN ASSISTANT

## 2019-04-09 PROCEDURE — 99213 OFFICE O/P EST LOW 20 MIN: CPT | Performed by: NURSE PRACTITIONER

## 2019-04-09 PROCEDURE — G8599 NO ASA/ANTIPLAT THER USE RNG: HCPCS | Performed by: NURSE PRACTITIONER

## 2019-04-09 PROCEDURE — 3046F HEMOGLOBIN A1C LEVEL >9.0%: CPT | Performed by: NURSE PRACTITIONER

## 2019-04-09 PROCEDURE — 2022F DILAT RTA XM EVC RTNOPTHY: CPT | Performed by: NURSE PRACTITIONER

## 2019-04-09 PROCEDURE — G8427 DOCREV CUR MEDS BY ELIG CLIN: HCPCS | Performed by: NURSE PRACTITIONER

## 2019-04-09 PROCEDURE — 1090F PRES/ABSN URINE INCON ASSESS: CPT | Performed by: NURSE PRACTITIONER

## 2019-04-09 PROCEDURE — G8417 CALC BMI ABV UP PARAM F/U: HCPCS | Performed by: NURSE PRACTITIONER

## 2019-04-09 RX ORDER — CYANOCOBALAMIN 1000 UG/ML
1000 INJECTION INTRAMUSCULAR; SUBCUTANEOUS ONCE
Status: COMPLETED | OUTPATIENT
Start: 2019-04-09 | End: 2019-04-09

## 2019-04-09 RX ADMIN — CYANOCOBALAMIN 1000 MCG: 1000 INJECTION INTRAMUSCULAR; SUBCUTANEOUS at 13:13

## 2019-04-09 ASSESSMENT — ENCOUNTER SYMPTOMS
COUGH: 0
SORE THROAT: 0
ABDOMINAL PAIN: 0
NAUSEA: 0
SHORTNESS OF BREATH: 0
EYE PAIN: 0
VOMITING: 0

## 2019-04-09 NOTE — PROGRESS NOTES
04/09/2019  Patient Information  Karen Newman                                                                                          [address] [facility] [address]   1950  'PCP/Referring Physician'  Mirian Arnold, APRN  [phone]  No ref. provider found    Chief Complaint   Patient presents with   • Follow-up     1 year follow for Afib s/p atrial appendage ligation 9/25/17.   • Atrial Fibrillation       History of Present Illness:  Patient is a 68-year-old  female with history of persistent atrial fibrillation status post bilateral VATS with left atrial appendage ligation and maze procedure performed 9/25/2017 who presents to office for annual follow-up.  The patient was last seen on 2/6/2018 and denies any interval chest pain, shortness of breath, difficulty with ambulation, palpitations, dizziness or syncopal episodes.  She is scheduled to follow-up with her cardiologist, Dr. Bobo, in May 2019.  The patient denies any interval hospital admissions, and is overall doing well.    Patient Active Problem List   Diagnosis   • Atrial fibrillation (CMS/HCC)   • VHD (valvular heart disease)   • Hypertension   • Dyslipidemia   • Diabetes mellitus (CMS/HCC)   • Thyroid disease   • GERD (gastroesophageal reflux disease)   • CKD (chronic kidney disease)   • Hemorrhoids   • Colon polyps   • Iron deficiency anemia   • KRISTEN on CPAP   • Blood loss anemia   • Postoperative respiratory failure (CMS/HCC)   • JORDAN (acute kidney injury) (CMS/HCC)   • Status post Maze operation for atrial fibrillation     Past Medical History:   Diagnosis Date   • Anxiety    • Arthritis    • Atrial fibrillation (CMS/HCC)    • Cancer (CMS/HCC)     RIGHT FOOT STILL HAS SOME SKIN CANCER IN PLACE AT THIS TIME- WILL BE GETTING REMOVED OCTOBER 22ND    • CKD (chronic kidney disease)     IGA - GETS CHECKED YEARLY FROM [facility]    • Colon polyps    • Diabetes mellitus (CMS/HCC)     SINCE 2005 - CHECKS 3 TIMES PER DAY    •  Dyslipidemia    • GERD (gastroesophageal reflux disease)    • Hemorrhoids    • History of transfusion    • Hypertension    • Iron deficiency anemia    • On home O2     2l AT NIGHT WITH CPAP    • KRISTEN on CPAP     COMPLIANT WITH MACHINE - DOES USE O2 2L WITH MACHINE    • Panic attacks    • Pneumonia    • PONV (postoperative nausea and vomiting)     NAUSEA -  MEDICINE HELPED PATIENT    • Sleep apnea     HAS CPAP AT HOME   • SOB (shortness of breath) on exertion    • Thyroid disease    • VHD (valvular heart disease)    • Wears glasses    • Wears glasses      Past Surgical History:   Procedure Laterality Date   • CATARACT EXTRACTION WITH INTRAOCULAR LENS IMPLANT Bilateral    • COLONOSCOPY W/ POLYPECTOMY     • COLONOSCOPY W/ POLYPECTOMY     • ENDOSCOPY     • EYE SURGERY Right 05/15/2018   • OTHER SURGICAL HISTORY      PT HAD BLOOD CLOT IN HEART SURGERY AND WENT IN THROUGH NECK BUT UNSURE WHAT CALLED - possible santa?   • SKIN BIOPSY      RIGHT FOOT FOR SKIN CANCER   • THORACOSCOPY Left 9/25/2017    Procedure: THORASOSCOPY VIDEO ASSISTED WITH LEFT ATRIAL APPENDAGE ABLATION;  Surgeon: Alec Kay MD;  Location:  [facility];  Service:    • TOTAL ABDOMINAL HYSTERECTOMY     • TRANSESOPHAGEAL ECHOCARDIOGRAM (SANTA) Bilateral 7/31/2017    Procedure: TRANSESOPHAGEAL ECHOCARDIOGRAM AND BRONCHOSCOPY ;  Surgeon: Alec Kay MD;  Location:  [facility];  Service:    • WISDOM TOOTH EXTRACTION         Current Outpatient Medications:   •  atorvastatin (LIPITOR) 10 MG tablet, Take 10 mg by mouth Daily., Disp: , Rfl:   •  diltiaZEM (TIAZAC) 360 MG 24 hr capsule, Take 360 mg by mouth Daily., Disp: , Rfl:   •  doxazosin (CARDURA) 8 MG tablet, TAKE 1 TABLET BY MOUTH  DAILY AS DIRECTED, Disp: 90 tablet, Rfl: 1  •  gabapentin (NEURONTIN) 800 MG tablet, Take 800 mg by mouth 2 (Two) Times a Day., Disp: , Rfl:   •  insulin glargine (LANTUS) 100 UNIT/ML injection, Inject 35 Units under the skin Every Night., Disp: , Rfl:   •  Insulin Lispro (HUMALOG  TERESA SC), Inject  under the skin 3 (Three) Times a Day Before Meals. B-14U; L-18U; D-20U, Disp: , Rfl:   •  levothyroxine (SYNTHROID, LEVOTHROID) 150 MCG tablet, Take 150 mcg by mouth Daily. 0.15 mg tablets, Disp: , Rfl:   •  losartan-hydrochlorothiazide (HYZAAR) 100-25 MG per tablet, Take 1 tablet by mouth Daily., Disp: , Rfl:   •  metFORMIN (GLUCOPHAGE) 850 MG tablet, Take 850 mg by mouth 3 (Three) Times a Day With Meals., Disp: , Rfl:   •  omeprazole (priLOSEC) 40 MG capsule, Take 40 mg by mouth Daily., Disp: , Rfl:   •  PARoxetine (PAXIL) 30 MG tablet, Take 30 mg by mouth Daily., Disp: , Rfl:   Allergies   Allergen Reactions   • Sular [Nisoldipine Er] Cough     Social History     Socioeconomic History   • Marital status:      Spouse name: Not on file   • Number of children: 3   • Years of education: Not on file   • Highest education level: Not on file   Occupational History   • Occupation: Housewife   Tobacco Use   • Smoking status: Former Smoker     Packs/day: 0.50     Years: 15.00     Pack years: 7.50     Types: Cigarettes     Last attempt to quit: 10/18/2016     Years since quittin.4   • Smokeless tobacco: Never Used   Substance and Sexual Activity   • Alcohol use: No   • Drug use: No   • Sexual activity: Defer   Social History Narrative    Lives in [facility]     Family History   Problem Relation Age of Onset   • Hypertension Mother    • Coronary artery disease Mother    • Kidney disease Father      Review of Systems   Constitution: Positive for malaise/fatigue. Negative for chills, fever, night sweats and weight loss.   HENT: Positive for hearing loss. Negative for odynophagia and sore throat.    Eyes: Negative.    Cardiovascular: Negative.  Negative for chest pain, dyspnea on exertion, leg swelling, orthopnea and palpitations.   Respiratory: Negative.  Negative for cough and hemoptysis.    Endocrine: Negative.  Negative for cold intolerance, heat intolerance, polydipsia, polyphagia and  "polyuria.   Hematologic/Lymphatic: Negative.  Does not bruise/bleed easily.   Skin: Negative.  Negative for itching and rash.   Musculoskeletal: Positive for arthritis. Negative for joint pain, joint swelling and myalgias.   Gastrointestinal: Negative.  Negative for abdominal pain, constipation, diarrhea, hematemesis, hematochezia, melena, nausea and vomiting.   Genitourinary: Negative.  Negative for dysuria, frequency and hematuria.   Neurological: Negative.  Negative for focal weakness, headaches, numbness and seizures.   Psychiatric/Behavioral: Negative.  Negative for suicidal ideas.   Allergic/Immunologic: Negative.    All other systems reviewed and are negative.    Vitals:    04/09/19 0843   BP: 150/68   BP Location: Right arm   Patient Position: Sitting   Pulse: 69   Temp: 98 °F (36.7 °C)   SpO2: 92%   Weight: 85.7 kg (189 lb)   Height: 167.6 cm (66\")      Physical Exam:  Gen - NAD, pleasant, cooperative  CV - Regular rate and rhythm, no murmur gallop or rub  Pulm - Lungs clear to auscultation without wheeze or rhonchi   GI - Soft, normoactive bowel sounds, non-tender  Ext - Without edema  Neuro - CN II - XII grossly intact, tongue midline, voice normal    Assessment/Plan:  Patient is a 68-year-old  female with history of persistent atrial fibrillation status post bilateral VATS with left atrial appendage ligation and maze procedure performed 9/25/2017 who presents to office for annual follow-up.  The patient has been doing well since her last office visit.  Upon physical examination, her rhythm and rate are regular and she denies any symptoms of atrial fibrillation.  The patient will continue be followed by her cardiologist, Dr. Bobo, for continued management of cardiovascular medications.  If the patient has any questions, concerns or acutely worsening symptoms during the interval she may call our office or present to the nearest emergency department immediately. Otherwise, the patient will " need to follow up in one year for continued evaluation.     Patient Active Problem List   Diagnosis   • Atrial fibrillation (CMS/HCC)   • VHD (valvular heart disease)   • Hypertension   • Dyslipidemia   • Diabetes mellitus (CMS/HCC)   • Thyroid disease   • GERD (gastroesophageal reflux disease)   • CKD (chronic kidney disease)   • Hemorrhoids   • Colon polyps   • Iron deficiency anemia   • KRISTEN on CPAP   • Blood loss anemia   • Postoperative respiratory failure (CMS/HCC)   • JORDAN (acute kidney injury) (CMS/HCC)   • Status post Maze operation for atrial fibrillation

## 2019-04-09 NOTE — PROGRESS NOTES
Administrations This Visit     cyanocobalamin injection 1,000 mcg     Admin Date  04/09/2019  13:13 Action  Given Dose  1000 mcg Route  Intramuscular Site  Deltoid Left Administered By  Krunal Chapman MA    Ordering Provider:  EULALIA Boss    NDC:  34348-611-96    Lot#:  [id number]    :  [id number] [facility]    Patient Supplied?:  No    Comments:  exp. 12/20

## 2019-04-09 NOTE — PROGRESS NOTES
Have you seen any other physician or provider since your last visit? Yes Dr. Tomas Marroquin    Have you had any other diagnostic tests since your last visit? no    Have you changed or stopped any medications since your last visit including any over-the-counter medicines, vitamins, or herbal medicines? no     Are you taking all your prescribed medications? Yes  If NO, why? -  N/A      REVIEW OF SYSTEMS:  Review of Systems   Constitutional: Negative for chills and fever. HENT: Negative for ear pain and sore throat. Eyes: Negative for pain and visual disturbance. Respiratory: Negative for cough and shortness of breath. Cardiovascular: Negative for chest pain, palpitations and leg swelling. Gastrointestinal: Negative for abdominal pain, nausea and vomiting. Genitourinary: Negative for dysuria and hematuria. Musculoskeletal: Negative for joint swelling. Skin: Negative for rash. Neurological: Negative for dizziness and weakness. Psychiatric/Behavioral: Negative for sleep disturbance.

## 2019-04-09 NOTE — PROGRESS NOTES
SUBJECTIVE:    Patient ID: Mary Contreras is a 76 y.o. female. Medicalhistory Review  Past Medical, Family, and Social History reviewed and does contribute to the patient presenting condition    Health Maintenance Due   Topic Date Due    Hepatitis C screen  1950    Diabetic foot exam  06/08/1960    DTaP/Tdap/Td vaccine (1 - Tdap) 06/08/1969    Shingles Vaccine (1 of 2) 06/08/2000    Lipid screen  06/06/2015    DEXA (modify frequency per FRAX score)  06/08/2015    TSH testing  02/27/2018    A1C test (Diabetic or Prediabetic)  09/22/2018    Diabetic retinal exam  03/26/2019       HPI:   Chief Complaint   Patient presents with    Pre-op Exam     Patietnt here today for a pre op clearance. She is having surgery on her eyes. She would like a b12 shot today. She has been feeling well. Her sugar has been good. She sees endocrinology this week. She got a good report from cardiology today. She is having corrective surgery on her eyes from her complications from hyperthyroidism. Patient's medications, allergies, pastmedical, surgical, social and family histories were reviewed and updated as appropriate. Review of Systems Reviewed and acurate. See MA note. OBJECTIVE:    /60 (Site: Right Upper Arm, Position: Sitting, Cuff Size: Medium Adult)   Pulse 65   Ht 5' 6\" (1.676 m)   Wt 188 lb 6.4 oz (85.5 kg)   SpO2 93%   BMI 30.41 kg/m²      Physical Exam   Constitutional: She is oriented to person, place, and time. She appears well-developed and well-nourished. No distress. HENT:   Head: Normocephalic. Right Ear: Tympanic membrane normal.   Left Ear: Tympanic membrane normal.   Mouth/Throat: No oropharyngeal exudate. Eyes: Lids are normal.   Neck: Neck supple. Cardiovascular: Normal rate, regular rhythm and normal heart sounds. Pulmonary/Chest: Effort normal and breath sounds normal.   Abdominal: Soft. Bowel sounds are normal. She exhibits no distension.  There is no tenderness. Musculoskeletal: She exhibits no edema. Lymphadenopathy:     She has no cervical adenopathy. Neurological: She is alert and oriented to person, place, and time. Skin: Skin is warm and dry. Psychiatric: She has a normal mood and affect. Vitals reviewed. Results in Past 30 Days  Result Component Current Result Ref Range Previous Result Ref Range   BUN 17 (3/29/2019) 6 - 20 mg/dL Not in Time Range    Calcium 9.4 (3/29/2019) 8.5 - 10.5 mg/dL Not in Time Range    Chloride 103 (3/29/2019) 98 - 107 mmol/L Not in Time Range    CO2 23 (3/29/2019) 20 - 30 mmol/L Not in Time Range    CREATININE 1.3 (H) (3/29/2019) 0.4 - 1.2 mg/dL Not in Time Range    GFR  49 (L) (3/29/2019) >59 Not in Time Range    GFR Non- 41 (L) (3/29/2019) >59 Not in Time Range    Glucose 220 (H) (3/29/2019) 74 - 106 mg/dL Not in Time Range    Potassium 4.1 (3/29/2019) 3.4 - 5.1 mmol/L Not in Time Range    Sodium 141 (3/29/2019) 136 - 145 mmol/L Not in Time Range        Hemoglobin A1C (%)   Date Value   09/22/2017 7.9     LDL Calculated (mg/dL)   Date Value   06/06/2014 46         Lab Results   Component Value Date    WBC 6.0 11/06/2018    NEUTROABS 3.3 11/06/2018    HGB 12.0 11/06/2018    HCT 38.0 11/06/2018    MCV 99.7 11/06/2018     11/06/2018       Lab Results   Component Value Date    TSH 0.80 02/27/2017       Prior to Visit Medications    Medication Sig Taking? Authorizing Provider   omeprazole (PRILOSEC) 40 MG delayed release capsule TAKE 1 CAPSULE BY MOUTH  DAILY Yes EULALIA Ortiz   tiZANidine (ZANAFLEX) 4 MG tablet Take 1 tablet by mouth every 8 hours as needed (muscle spasm) Yes EULALIA Ortiz   hydrocortisone (ANUSOL-HC) 2.5 % rectal cream Place rectally 2 times daily.  Yes EULALIA Ortiz   PARoxetine (PAXIL) 40 MG tablet Take 1 tablet by mouth every morning Yes EULALIA Ortiz   Insulin Syringe-Needle U-100 (INSULIN SYRINGE .5CC/31GX5/16\") 31G X 5/16\" 0.5 ML MISC 1 each by Does not apply route daily Please sub U50 Yes EULALIA Michael   diltiazem HAYNES [facility]) 420 MG extended release capsule Take 1 capsule by mouth daily Yes EULALIA Michael   insulin glargine (LANTUS) 100 UNIT/ML injection vial Inject 25 Units into the skin nightly Yes EULALIA Michael   losartan-hydrochlorothiazide (HYZAAR) 100-25 MG per tablet TAKE 1 TABLET BY MOUTH  DAILY Yes EULALIA Michael   triamcinolone (KENALOG) 0.1 % cream Apply topically 2 times daily- mix with entire jar of Cetaphil Yes EULALIA Michael   lisinopril (PRINIVIL;ZESTRIL) 40 MG tablet Take 1 tablet by mouth daily Yes EULALIA Michael   albuterol sulfate HFA (PROAIR HFA) 108 (90 Base) MCG/ACT inhaler Inhale 2 puffs into the lungs every 6 hours as needed for Wheezing Yes EULALIA Bell   aspirin 81 MG EC tablet Take 1 tablet by mouth daily Yes Say Torres MD   ONE TOUCH ULTRA TEST strip  Yes Historical Provider, MD   gabapentin (NEURONTIN) 800 MG tablet  Yes Historical Provider, MD   Syringe/Needle, Disp, (SYRINGE 3CC/25GX1\") 25G X 1\" 3 ML MISC Monthly for B12 injection Yes EULALIA Michael   atorvastatin (LIPITOR) 10 MG tablet Take 10 mg by mouth daily. Yes Historical Provider, MD   Needles & Syringes MISC 1 each by Does not apply route daily. Syringe for B12 IM injection Yes EULALIA Michael   Insulin Lispro, Human, (HUMALOG SC) Inject into the skin 14u- breakfast, 16u- lunch, 18u supper Yes Historical Provider, MD   furosemide (LASIX) 20 MG tablet One daily as needed for swelling. Yes EULALIA Michael   doxazosin (CARDURA) 8 MG tablet Take 1 tablet by mouth nightly. Yes EULALIA Michael   levothyroxine (SYNTHROID) 175 MCG tablet Take 1 tablet by mouth every 48 hours. Patient taking differently: Take 175 mcg by mouth daily  Yes EULALIA Michael   metformin (GLUCOPHAGE) 850 MG tablet Take 1 tablet by mouth 3 times daily.  Yes EULALIA Michael     EKG: sinus rhythm with 1st degree AV block    ASSESSMENT:  1. Preop examination    2. Essential hypertension    3. Type 2 diabetes mellitus with complication, without long-term current use of insulin (HCC)    4. Elevated serum creatinine    5. B12 deficiency          PLAN:    Orders Placed This Encounter   Medications    cyanocobalamin injection 1,000 mcg     Needs to be sure to f/u with nephrology. Keep f/u. Cleared for surgery. Return if symptoms worsen or fail to improve.

## 2019-04-24 ENCOUNTER — TRANSCRIBE ORDERS (OUTPATIENT)
Dept: LAB | Facility: HOSPITAL | Age: 69
End: 2019-04-24

## 2019-04-24 ENCOUNTER — LAB (OUTPATIENT)
Dept: LAB | Facility: HOSPITAL | Age: 69
End: 2019-04-24

## 2019-04-24 DIAGNOSIS — N18.2 CHRONIC KIDNEY DISEASE, STAGE II (MILD): Primary | ICD-10-CM

## 2019-04-24 DIAGNOSIS — N18.2 CHRONIC KIDNEY DISEASE, STAGE II (MILD): ICD-10-CM

## 2019-04-24 LAB
ALBUMIN SERPL-MCNC: 4.3 G/DL (ref 3.5–5)
ANION GAP SERPL CALCULATED.3IONS-SCNC: 17 MMOL/L (ref 10–20)
BACTERIA UR QL AUTO: ABNORMAL /HPF
BASOPHILS # BLD AUTO: 0.05 10*3/MM3 (ref 0–0.2)
BASOPHILS NFR BLD AUTO: 0.9 % (ref 0–1.5)
BILIRUB UR QL STRIP: NEGATIVE
BUN BLD-MCNC: 21 MG/DL (ref 7–20)
BUN/CREAT SERPL: 16.2 (ref 7.1–23.5)
CALCIUM SPEC-SCNC: 9.4 MG/DL (ref 8.4–10.2)
CHLORIDE SERPL-SCNC: 98 MMOL/L (ref 98–107)
CLARITY UR: CLEAR
CO2 SERPL-SCNC: 28 MMOL/L (ref 26–30)
COLOR UR: YELLOW
CREAT BLD-MCNC: 1.3 MG/DL (ref 0.6–1.3)
CREAT UR-MCNC: 163.9 MG/DL
DEPRECATED RDW RBC AUTO: 46.5 FL (ref 37–54)
EOSINOPHIL # BLD AUTO: 0.17 10*3/MM3 (ref 0–0.4)
EOSINOPHIL NFR BLD AUTO: 3.1 % (ref 0.3–6.2)
ERYTHROCYTE [DISTWIDTH] IN BLOOD BY AUTOMATED COUNT: 13.4 % (ref 12.3–15.4)
GFR SERPL CREATININE-BSD FRML MDRD: 41 ML/MIN/1.73
GLUCOSE BLD-MCNC: 166 MG/DL (ref 74–98)
GLUCOSE UR STRIP-MCNC: NEGATIVE MG/DL
HCT VFR BLD AUTO: 36 % (ref 34–46.6)
HGB BLD-MCNC: 11.8 G/DL (ref 12–15.9)
HGB UR QL STRIP.AUTO: NEGATIVE
HYALINE CASTS UR QL AUTO: ABNORMAL /LPF
IMM GRANULOCYTES # BLD AUTO: 0.01 10*3/MM3 (ref 0–0.05)
IMM GRANULOCYTES NFR BLD AUTO: 0.2 % (ref 0–0.5)
KETONES UR QL STRIP: NEGATIVE
LEUKOCYTE ESTERASE UR QL STRIP.AUTO: NEGATIVE
LYMPHOCYTES # BLD AUTO: 2.2 10*3/MM3 (ref 0.7–3.1)
LYMPHOCYTES NFR BLD AUTO: 39.7 % (ref 19.6–45.3)
MCH RBC QN AUTO: 31.1 PG (ref 26.6–33)
MCHC RBC AUTO-ENTMCNC: 32.8 G/DL (ref 31.5–35.7)
MCV RBC AUTO: 94.7 FL (ref 79–97)
MONOCYTES # BLD AUTO: 0.51 10*3/MM3 (ref 0.1–0.9)
MONOCYTES NFR BLD AUTO: 9.2 % (ref 5–12)
NEUTROPHILS # BLD AUTO: 2.6 10*3/MM3 (ref 1.7–7)
NEUTROPHILS NFR BLD AUTO: 46.9 % (ref 42.7–76)
NITRITE UR QL STRIP: NEGATIVE
NRBC BLD AUTO-RTO: 0 /100 WBC (ref 0–0.2)
PH UR STRIP.AUTO: 6 [PH] (ref 5–8)
PHOSPHATE SERPL-MCNC: 4 MG/DL (ref 2.5–4.5)
PLATELET # BLD AUTO: 229 10*3/MM3 (ref 140–450)
PMV BLD AUTO: 9.6 FL (ref 6–12)
POTASSIUM BLD-SCNC: 4 MMOL/L (ref 3.5–5.1)
PROT UR QL STRIP: ABNORMAL
PROT UR-MCNC: 306 MG/DL (ref 0–11.9)
RBC # BLD AUTO: 3.8 10*6/MM3 (ref 3.77–5.28)
RBC # UR: ABNORMAL /HPF
REF LAB TEST METHOD: ABNORMAL
SODIUM BLD-SCNC: 139 MMOL/L (ref 137–145)
SP GR UR STRIP: 1.02 (ref 1–1.03)
SQUAMOUS #/AREA URNS HPF: ABNORMAL /HPF
UROBILINOGEN UR QL STRIP: ABNORMAL
WBC NRBC COR # BLD: 5.54 10*3/MM3 (ref 3.4–10.8)
WBC UR QL AUTO: ABNORMAL /HPF

## 2019-04-24 PROCEDURE — 84156 ASSAY OF PROTEIN URINE: CPT

## 2019-04-24 PROCEDURE — 80069 RENAL FUNCTION PANEL: CPT

## 2019-04-24 PROCEDURE — 82570 ASSAY OF URINE CREATININE: CPT

## 2019-04-24 PROCEDURE — 85025 COMPLETE CBC W/AUTO DIFF WBC: CPT

## 2019-04-24 PROCEDURE — 81001 URINALYSIS AUTO W/SCOPE: CPT

## 2019-04-24 PROCEDURE — 36415 COLL VENOUS BLD VENIPUNCTURE: CPT

## 2019-05-07 ENCOUNTER — HOSPITAL ENCOUNTER (OUTPATIENT)
Facility: HOSPITAL | Age: 69
Setting detail: HOSPITAL OUTPATIENT SURGERY
Discharge: HOME | End: 2019-05-07
Attending: OPHTHALMOLOGY | Admitting: OPHTHALMOLOGY

## 2019-05-07 VITALS
HEART RATE: 73 BPM | SYSTOLIC BLOOD PRESSURE: 151 MMHG | DIASTOLIC BLOOD PRESSURE: 72 MMHG | OXYGEN SATURATION: 93 % | HEIGHT: 66 IN | TEMPERATURE: 97.5 F | BODY MASS INDEX: 30.86 KG/M2 | WEIGHT: 192 LBS | RESPIRATION RATE: 18 BRPM

## 2019-05-07 PROBLEM — H26.491 POSTERIOR CAPSULAR OPACIFICATION OF RIGHT EYE, OBSCURING VISION: Status: RESOLVED | Noted: 2019-05-07 | Resolved: 2019-05-07

## 2019-05-07 PROBLEM — H26.491 POSTERIOR CAPSULAR OPACIFICATION OF RIGHT EYE, OBSCURING VISION: Status: ACTIVE | Noted: 2019-05-07

## 2019-05-07 RX ORDER — BALANCED SALT SOLUTION 6.4; .75; .48; .3; 3.9; 1.7 MG/ML; MG/ML; MG/ML; MG/ML; MG/ML; MG/ML
SOLUTION OPHTHALMIC AS NEEDED
Status: DISCONTINUED | OUTPATIENT
Start: 2019-05-07 | End: 2019-05-07 | Stop reason: HOSPADM

## 2019-05-07 RX ORDER — CYCLOPENTOLATE HYDROCHLORIDE 20 MG/ML
1 SOLUTION/ DROPS OPHTHALMIC ONCE
Status: COMPLETED | OUTPATIENT
Start: 2019-05-07 | End: 2019-05-07

## 2019-05-07 RX ORDER — PHENYLEPHRINE HYDROCHLORIDE 100 MG/ML
1 SOLUTION/ DROPS OPHTHALMIC ONCE
Status: COMPLETED | OUTPATIENT
Start: 2019-05-07 | End: 2019-05-07

## 2019-05-07 RX ORDER — PREDNISOLONE ACETATE 10 MG/ML
SUSPENSION/ DROPS OPHTHALMIC AS NEEDED
Status: DISCONTINUED | OUTPATIENT
Start: 2019-05-07 | End: 2019-05-07 | Stop reason: HOSPADM

## 2019-05-07 RX ORDER — TETRACAINE HYDROCHLORIDE 5 MG/ML
SOLUTION OPHTHALMIC AS NEEDED
Status: DISCONTINUED | OUTPATIENT
Start: 2019-05-07 | End: 2019-05-07 | Stop reason: HOSPADM

## 2019-05-07 RX ADMIN — CYCLOPENTOLATE HYDROCHLORIDE 1 DROP: 20 SOLUTION/ DROPS OPHTHALMIC at 09:47

## 2019-05-07 RX ADMIN — PHENYLEPHRINE HYDROCHLORIDE 1 DROP: 100 SOLUTION/ DROPS OPHTHALMIC at 09:47

## 2019-05-07 RX ADMIN — APRACLONIDINE HYDROCHLORIDE 1 DROP: 10 SOLUTION/ DROPS OPHTHALMIC at 09:49

## 2019-05-07 NOTE — OP NOTE
Pre-op Diagnosis: Posterior Capsular Opacification, Left eye  Procedure: Nd: YAG Laser Capsulotomy, Left eye    Post-op Diagnosis: Posterior Capsular Opacification, Left eye    Indications:  Karen Newman is a 68 y.o. female with Posterior Capsular Opacification. It was recommended that the next step in her management be a Nd:YAG laser capsulotomy in the left eye.      Procedure: The left eye was dilated prior to arrival in the laser suite.  A drop of topical Tetracaine was instilled in the left conjunctival cul-de-sac and the patient was then positioned in front of the Nd:YAG laser. The correct eye was again confirmed verbally by both the patient and the surgeon.  The laser contact lens was positioned on the left eye and the capsulotomy was carried out without difficulty using the following parameters:    Laser: Nd:YAG  Spot Size: Fixed  Burst Mode: I  Power Settin.6 mJ/burst  Number of shots: 32  Total energy delivered: 163 mJ    Immediately following the procedure, the left eye was rinsed with BSS solution and a drop of prednisolone acetate 1% was applied.    The patient tolerated the procedure without difficulty. No complications were encountered.    The patient was discharged home with the appropriate instructions.    Cecily Maria MD   2019  10:14 AM

## 2019-06-04 NOTE — PROGRESS NOTES
Karen Newman  1950  68 y.o.  [phone]  [phone]      Date: 06/06/2019    PCP: Mirian Arnold APRN    Chief Complaint   Patient presents with   • Status post Maze operation for atrial fibrillation       Problem List:  1. Atrial fibrillation, duration unknown; diagnosed August of 2013:  a. ALANIS/external cardioversion, (10/18/2013), Yara Bobo M.D. with the initiation of propafenone therapy.   b. Atrial fibrillation, (11/13/2013), in Gloria Gomez’ office.  c. Normal sinus rhythm at the time of office visit, (11/21/2013).  d. Normal sinus rhythm, EKG, (06/05/2014).  e. Discontinuation of Rythmol secondary to diarrhea, (06/05/2014), with initiation of flecainide 50 mg b.i.d. and Eliquis 5 mg b.i.d.   f. CHADS Vasc= 4, (4/18/2017)   g. CT scan of the chest (06/29/2017): Mild fibrotic changes, o/w normal  h. Pulmonary Function Test (06/29/2017): FEV1/FVC  1.54/2.57 = 60%  i. Nuclear Stress Test (06/29/2017): EF>70%, no ischemia   j. Bilateral thoracoscopy and Maze procedure and clipping of the left atrial appendage (9/26/2017) Dr. Kay  2. Valvular heart disease:  a. Previous echocardiogram approximately, 2006 (incomplete data base).  b. Echocardiogram (08/15/2002), revealing moderate concentric LVH, hyperdynamic LV, EF 81%; mild MR, trace TR.  c. Echocardiogram (10/07/2008), revealed a normal LVEF with mild to moderate MR.  d. Echocardiogram, (07/01/2011): normal LV systolic function and wall motion with mild MR and mild TR.   e. Echocardiogram, 9/22/2017: EF 60%. Moderate to severe MR. Trace to mild TR. No thrombus appears to be present within the left atrial appendage.  f. ALANIS 10/26/2017: EF 60%. Left atrial appendage has been closed. Mild to moderate MR.   3. Hypertension:  a. Adenosine Cardiolite, (06/18/2009): Normal systolic function without evidence of inducible ischemia.   4. Dyslipidemia.  5. Diabetes mellitus, Type 2 diagnosed, 2006.  6. Thyroid disease:  a. Hyperthyroidism status post  radioactive iodine therapy.  b. Hypothyroidism on thyroid replacement.  7. Gastroesophageal reflux disease.  8. Chronic kidney disease.  9. History of hemorrhoids status post hemorrhoidectomy.  10. Colon polyps status post polypectomy.  11. Anxiety.  12. Iron deficiency anemia.  13. Obstructive sleep apnea on chronic CPAP therapy.  14. Surgical history:  a. Total abdominal hysterectomy.      Allergies   Allergen Reactions   • Buspirone Nausea And Vomiting   • Lisinopril Other (See Comments)     cough   • Sertraline Hcl Other (See Comments)     nightmares   • Sular [Nisoldipine Er] Cough       Current Medications:      Current Outpatient Medications:   •  atorvastatin (LIPITOR) 10 MG tablet, Take 10 mg by mouth Daily., Disp: , Rfl:   •  diltiaZEM (TIAZAC) 420 MG 24 hr capsule, Take 420 mg by mouth Daily., Disp: , Rfl:   •  doxazosin (CARDURA) 8 MG tablet, TAKE 1 TABLET BY MOUTH  DAILY AS DIRECTED, Disp: 90 tablet, Rfl: 1  •  gabapentin (NEURONTIN) 800 MG tablet, Take 800 mg by mouth 2 (Two) Times a Day., Disp: , Rfl:   •  insulin glargine (LANTUS) 100 UNIT/ML injection, Inject 35 Units under the skin Every Night., Disp: , Rfl:   •  Insulin Lispro (HUMALOG KWIKPEN SC), Inject  under the skin into the appropriate area as directed 3 (Three) Times a Day Before Meals. Reports she is instructed to take as follows:  Breakfast -14 units, Lunch - 18 units, Supper - 20 units, Disp: , Rfl:   •  levothyroxine (SYNTHROID, LEVOTHROID) 150 MCG tablet, Take 150 mcg by mouth Daily. 0.15 mg tablets, Disp: , Rfl:   •  metFORMIN (GLUCOPHAGE) 850 MG tablet, Take 850 mg by mouth 3 (Three) Times a Day With Meals., Disp: , Rfl:   •  O2 (OXYGEN), Inhale 2 L/min Every Night., Disp: , Rfl:   •  omeprazole (priLOSEC) 40 MG capsule, Take 40 mg by mouth Daily., Disp: , Rfl:   •  PARoxetine (PAXIL) 40 MG tablet, Take 40 mg by mouth Daily., Disp: , Rfl:   •  spironolactone (ALDACTONE) 25 MG tablet, Take 12.5 mg by mouth 2 (Two) Times a Day., Disp:  ", Rfl: 0    HPI    Karen Newman is a 68 y.o. female who presents today for annual  follow up of atrial fibrillation, valvular heart disease, hypertension, and hyperlipidemia. Since last visit, she was taken off Hyzaar by her nephrologist and put on spironolactone. She reports high blood pressure readings at home, around 157/77. She is unsure whether her home blood pressure cuff is accurate. Patient denies chest pain, palpitations, shortness of breath, edema, dizziness, and syncope.     The following portions of the patient's history were reviewed and updated as appropriate: allergies, current medications and problem list.    Pertinent positives as listed in the HPI.  All other systems reviewed are negative.    Vitals:    06/06/19 1402 06/06/19 1426   BP: 112/48 114/46   BP Location: Right arm Right arm   Patient Position: Sitting Sitting   Pulse: 89 75   SpO2: 93%    Weight: 86.2 kg (190 lb)    Height: 167.6 cm (66\")        Physical Exam:    General: Alert and oriented.  Neck: Jugular venous pressure is within normal limits. Carotids have normal upstrokes without bruits.   Cardiovascular: Heart has a nondisplaced focal PMI. Regular rate and rhythm without murmur, gallop or rub.  Lungs: Clear without rales or wheezes. Equal expansion is noted.   Extremities: Show no edema.  Skin: Warm and dry.  Neurologic: Nonfocal.    Diagnostic Data:  Lab Results   Component Value Date    GLUCOSE 166 (H) 04/24/2019    BUN 21 (H) 04/24/2019    CREATININE 1.30 04/24/2019    EGFRIFNONA 41 (L) 04/24/2019    BCR 16.2 04/24/2019     04/24/2019    K 4.0 04/24/2019    CL 98 04/24/2019    CO2 28.0 04/24/2019    CALCIUM 9.4 04/24/2019    ALBUMIN 4.30 04/24/2019    AST 18 09/22/2017    ALT 20 09/22/2017     Lab Results   Component Value Date    WBC 5.54 04/24/2019    HGB 11.8 (L) 04/24/2019    HCT 36.0 04/24/2019    MCV 94.7 04/24/2019     04/24/2019       Procedures    Assessment:      ICD-10-CM ICD-9-CM   1. Persistent " atrial fibrillation (CMS/HCC) I48.1 427.31   2. VHD (valvular heart disease) I38 424.90   3. Essential hypertension I10 401.9   4. Dyslipidemia E78.5 272.4     Lab results found above were reviewed with the patient.    Plan:    1. Stable cardiac status overall.   2. Continue diltiazem, doxazosin, and spironolactone for hypertension.  3. Continue atorvastatin 10 mg for hyperlipidemia. Lipid levels followed by Dr. Friedman.  4. Continue all other current medications.  5. F/up in 12 months or sooner if needed.      Scribed for Yara Bobo MD by Augusta Spence. 6/6/2019  2:30 PM     I Yara Bobo MD personally performed the services described in this documentation as scribed by the above individual in my presence, and it is both accurate and complete.    Yara Bobo MD, FACC

## 2019-06-06 ENCOUNTER — OFFICE VISIT (OUTPATIENT)
Dept: CARDIOLOGY | Facility: CLINIC | Age: 69
End: 2019-06-06

## 2019-06-06 VITALS
DIASTOLIC BLOOD PRESSURE: 46 MMHG | SYSTOLIC BLOOD PRESSURE: 114 MMHG | OXYGEN SATURATION: 93 % | WEIGHT: 190 LBS | BODY MASS INDEX: 30.53 KG/M2 | HEIGHT: 66 IN | HEART RATE: 75 BPM

## 2019-06-06 DIAGNOSIS — I10 ESSENTIAL HYPERTENSION: ICD-10-CM

## 2019-06-06 DIAGNOSIS — I38 VHD (VALVULAR HEART DISEASE): ICD-10-CM

## 2019-06-06 DIAGNOSIS — E78.5 DYSLIPIDEMIA: ICD-10-CM

## 2019-06-06 DIAGNOSIS — I48.19 PERSISTENT ATRIAL FIBRILLATION (HCC): Primary | ICD-10-CM

## 2019-06-06 PROCEDURE — 99213 OFFICE O/P EST LOW 20 MIN: CPT | Performed by: INTERNAL MEDICINE

## 2019-06-06 RX ORDER — SPIRONOLACTONE 25 MG/1
12.5 TABLET ORAL 2 TIMES DAILY
Refills: 0 | COMMUNITY
Start: 2019-05-21 | End: 2020-01-12 | Stop reason: HOSPADM

## 2019-06-25 ENCOUNTER — HOSPITAL ENCOUNTER (OUTPATIENT)
Facility: HOSPITAL | Age: 69
Discharge: HOME | End: 2019-06-25
Payer: MEDICARE

## 2019-06-25 ENCOUNTER — TELEPHONE (OUTPATIENT)
Dept: PRIMARY CARE CLINIC | Age: 69
End: 2019-06-25

## 2019-06-25 ENCOUNTER — OFFICE VISIT (OUTPATIENT)
Dept: PRIMARY CARE CLINIC | Age: 69
End: 2019-06-25
Payer: MEDICARE

## 2019-06-25 VITALS
SYSTOLIC BLOOD PRESSURE: 123 MMHG | BODY MASS INDEX: 30.22 KG/M2 | OXYGEN SATURATION: 94 % | HEART RATE: 66 BPM | TEMPERATURE: 97.4 F | HEIGHT: 66 IN | DIASTOLIC BLOOD PRESSURE: 70 MMHG | WEIGHT: 188 LBS

## 2019-06-25 DIAGNOSIS — R53.83 FATIGUE, UNSPECIFIED TYPE: Primary | ICD-10-CM

## 2019-06-25 DIAGNOSIS — I10 ESSENTIAL HYPERTENSION: ICD-10-CM

## 2019-06-25 DIAGNOSIS — R53.83 FATIGUE, UNSPECIFIED TYPE: ICD-10-CM

## 2019-06-25 DIAGNOSIS — I48.91 ATRIAL FIBRILLATION, UNSPECIFIED TYPE (HCC): ICD-10-CM

## 2019-06-25 DIAGNOSIS — Z23 NEED FOR HEPATITIS A VACCINATION: ICD-10-CM

## 2019-06-25 DIAGNOSIS — E05.90 HYPERTHYROIDISM: ICD-10-CM

## 2019-06-25 LAB
BASOPHILS ABSOLUTE: 0.1 K/UL (ref 0–0.1)
BASOPHILS RELATIVE PERCENT: 0.9 %
EOSINOPHILS ABSOLUTE: 0.1 K/UL (ref 0–0.4)
EOSINOPHILS RELATIVE PERCENT: 2.6 %
FOLATE: 15.55 NG/ML
HCT VFR BLD CALC: 35.2 % (ref 37–47)
HEMOGLOBIN: 11.1 G/DL (ref 11.5–16.5)
IMMATURE GRANULOCYTES #: 0 K/UL
IMMATURE GRANULOCYTES %: 0.2 % (ref 0–5)
IRON: 102 UG/DL (ref 37–145)
LYMPHOCYTES ABSOLUTE: 1.9 K/UL (ref 1.5–4)
LYMPHOCYTES RELATIVE PERCENT: 36 %
MCH RBC QN AUTO: 30.6 PG (ref 27–32)
MCHC RBC AUTO-ENTMCNC: 31.5 G/DL (ref 31–35)
MCV RBC AUTO: 97 FL (ref 80–100)
MONOCYTES ABSOLUTE: 0.5 K/UL (ref 0.2–0.8)
MONOCYTES RELATIVE PERCENT: 8.8 %
NEUTROPHILS ABSOLUTE: 2.8 K/UL (ref 2–7.5)
NEUTROPHILS RELATIVE PERCENT: 51.5 %
PDW BLD-RTO: 13.4 % (ref 11–16)
PLATELET # BLD: 234 K/UL (ref 150–400)
PMV BLD AUTO: 10.3 FL (ref 6–10)
RBC # BLD: 3.63 M/UL (ref 3.8–5.8)
TOTAL IRON BINDING CAPACITY: 388 UG/DL (ref 250–450)
VITAMIN B-12: >2000 PG/ML (ref 211–911)
WBC # BLD: 5.3 K/UL (ref 4–11)

## 2019-06-25 PROCEDURE — 82607 VITAMIN B-12: CPT

## 2019-06-25 PROCEDURE — 83540 ASSAY OF IRON: CPT

## 2019-06-25 PROCEDURE — 99213 OFFICE O/P EST LOW 20 MIN: CPT | Performed by: NURSE PRACTITIONER

## 2019-06-25 PROCEDURE — 82746 ASSAY OF FOLIC ACID SERUM: CPT

## 2019-06-25 PROCEDURE — 90471 IMMUNIZATION ADMIN: CPT | Performed by: NURSE PRACTITIONER

## 2019-06-25 PROCEDURE — 85025 COMPLETE CBC W/AUTO DIFF WBC: CPT

## 2019-06-25 PROCEDURE — 90632 HEPA VACCINE ADULT IM: CPT | Performed by: NURSE PRACTITIONER

## 2019-06-25 PROCEDURE — G8427 DOCREV CUR MEDS BY ELIG CLIN: HCPCS | Performed by: NURSE PRACTITIONER

## 2019-06-25 PROCEDURE — 3017F COLORECTAL CA SCREEN DOC REV: CPT | Performed by: NURSE PRACTITIONER

## 2019-06-25 PROCEDURE — G8400 PT W/DXA NO RESULTS DOC: HCPCS | Performed by: NURSE PRACTITIONER

## 2019-06-25 PROCEDURE — 83550 IRON BINDING TEST: CPT

## 2019-06-25 PROCEDURE — 1036F TOBACCO NON-USER: CPT | Performed by: NURSE PRACTITIONER

## 2019-06-25 PROCEDURE — G8598 ASA/ANTIPLAT THER USED: HCPCS | Performed by: NURSE PRACTITIONER

## 2019-06-25 PROCEDURE — G8417 CALC BMI ABV UP PARAM F/U: HCPCS | Performed by: NURSE PRACTITIONER

## 2019-06-25 PROCEDURE — 1090F PRES/ABSN URINE INCON ASSESS: CPT | Performed by: NURSE PRACTITIONER

## 2019-06-25 PROCEDURE — 4040F PNEUMOC VAC/ADMIN/RCVD: CPT | Performed by: NURSE PRACTITIONER

## 2019-06-25 PROCEDURE — 1123F ACP DISCUSS/DSCN MKR DOCD: CPT | Performed by: NURSE PRACTITIONER

## 2019-06-25 PROCEDURE — 82652 VIT D 1 25-DIHYDROXY: CPT

## 2019-06-25 RX ORDER — OMEGA-3-ACID ETHYL ESTERS 1 G/1
CAPSULE, LIQUID FILLED ORAL
Refills: 0 | COMMUNITY
Start: 2019-06-03 | End: 2021-09-08

## 2019-06-25 RX ORDER — SPIRONOLACTONE 25 MG/1
12.5 TABLET ORAL
COMMUNITY
Start: 2019-05-21 | End: 2020-01-16 | Stop reason: ALTCHOICE

## 2019-06-25 RX ORDER — LEVOTHYROXINE SODIUM 137 UG/1
TABLET ORAL
COMMUNITY
Start: 2019-05-27 | End: 2019-06-25 | Stop reason: ALTCHOICE

## 2019-06-25 NOTE — PROGRESS NOTES
Chief Complaint   Patient presents with    Fatigue     Patient is very tired. She states she sleeps well but gets up and feels like she hasn't sleep at all. She would like to find out what is causing this problem. She has noticed her fatigue has been worse for the past couple months. Have you seen any other physician or provider since your last visit yes - patient has seen kidney specialist and cardiologist.     Have you had any other diagnostic tests since your last visit? yes - labs    Have you changed or stopped any medications since your last visit?  yes

## 2019-06-25 NOTE — TELEPHONE ENCOUNTER
[facility]  Marguerite Mayorga called to schedule a possible Lab visit for anemia check, Patient has been very fatigue.  Please call Patient back today for nurse advice

## 2019-06-25 NOTE — PROGRESS NOTES
SUBJECTIVE:    Patient ID: Ira Warner is a 71 y. o.female. Chief Complaint   Patient presents with    Fatigue         HPI:    Patient presents to clinic with complaints of fatigue for past few months and seems to be worsening past few weeks. No Associated symptoms reported. Hx of CAD, Afib, HTN, DM, hyperthyroidism. Followed by endocrine for thyroid and last TSH a month ago and normal per patient. Denies fevers, LE swelling, weight gain/loss, acute depression, muscle weakness, unilateral weakness, dizziness, dark stools, constipation, n/v/d, CP, palpitations, SOB. Denies new meds or changes in meds. Needs second Hep A shot too. No relieving or worsening factors reported. No treatment regimens reported. Of note: Last ECHO 06/2017 with EF 55-60%. Patient's medications, allergies, past medical, surgical, social and family histories were reviewed and updated as appropriate in electronic medical record. Outpatient Medications Marked as Taking for the 6/25/19 encounter (Office Visit) with EULALIA Rueda - CNP   Medication Sig Dispense Refill    spironolactone (ALDACTONE) 25 MG tablet Take 12.5 mg by mouth      omega-3 acid ethyl esters (LOVAZA) 1 g capsule take 4 capsules by mouth at bedtime  0    omeprazole (PRILOSEC) 40 MG delayed release capsule TAKE 1 CAPSULE BY MOUTH  DAILY 90 capsule 3    tiZANidine (ZANAFLEX) 4 MG tablet Take 1 tablet by mouth every 8 hours as needed (muscle spasm) 60 tablet 2    hydrocortisone (ANUSOL-HC) 2.5 % rectal cream Place rectally 2 times daily.  1 Tube 0    PARoxetine (PAXIL) 40 MG tablet Take 1 tablet by mouth every morning 90 tablet 3    Insulin Syringe-Needle U-100 (INSULIN SYRINGE .5CC/31GX5/16\") 31G X 5/16\" 0.5 ML MISC 1 each by Does not apply route daily Please sub U50 50 each 5    diltiazem (TIAZAC) 420 MG extended release capsule Take 1 capsule by mouth daily 90 capsule 3    insulin glargine (LANTUS) 100 UNIT/ML injection vial Inject 25 Psychiatric/Behavioral: Negative for decreased concentration and dysphoric mood. The patient is not nervous/anxious. Past Medical History:   Diagnosis Date    CAD (coronary artery disease)     Hypertension     Hyperthyroidism     IgA nephropathy     Type II or unspecified type diabetes mellitus without mention of complication, not stated as uncontrolled      Past Surgical History:   Procedure Laterality Date    COLONOSCOPY  2000    polypectomy    HYSTERECTOMY       History reviewed. No pertinent family history. Social History     Tobacco Use   Smoking Status Never Smoker   Smokeless Tobacco Never Used       OBJECTIVE:   Wt Readings from Last 3 Encounters:   06/25/19 188 lb (85.3 kg)   04/09/19 188 lb 6.4 oz (85.5 kg)   02/06/19 185 lb 9.6 oz (84.2 kg)     BP Readings from Last 3 Encounters:   06/25/19 123/70   04/09/19 130/60   02/06/19 122/80       /70   Pulse 66   Temp 97.4 °F (36.3 °C)   Ht 5' 6\" (1.676 m)   Wt 188 lb (85.3 kg)   SpO2 94%   BMI 30.34 kg/m²      Physical Exam   Constitutional: She is oriented to person, place, and time. She appears well-developed and well-nourished. HENT:   Head: Normocephalic. Right Ear: External ear normal.   Left Ear: External ear normal.   Nose: Nose normal.   Mouth/Throat: Oropharynx is clear and moist.   Eyes: Pupils are equal, round, and reactive to light. Conjunctivae are normal.   Neck: Normal range of motion. Neck supple. No JVD present. No thyromegaly present. Cardiovascular: Normal rate and regular rhythm. Pulmonary/Chest: Effort normal and breath sounds normal.   Abdominal: Soft. Bowel sounds are normal.   Musculoskeletal: Normal range of motion. Neurological: She is alert and oriented to person, place, and time. Skin: Skin is warm and dry. Capillary refill takes 2 to 3 seconds. Psychiatric: She has a normal mood and affect. Her behavior is normal. Thought content normal.   Nursing note and vitals reviewed.       Lab Results

## 2019-06-26 DIAGNOSIS — R53.83 FATIGUE, UNSPECIFIED TYPE: Primary | ICD-10-CM

## 2019-06-26 DIAGNOSIS — E05.90 HYPERTHYROIDISM: ICD-10-CM

## 2019-06-26 NOTE — PROGRESS NOTES
Pt presented with fatigue for few months and worsening past few weeks. Multiple labs checked and WNL. Reports she has hx of abnormal hormone levels in the past and would like those rechecked because still not feeling the best. Order placed and will call if abnormal. F/U PCP for further concerns.

## 2019-06-27 LAB — VITAMIN D 1,25-DIHYDROXY: 14.7 PG/ML (ref 19.9–79.3)

## 2019-06-28 ENCOUNTER — TELEPHONE (OUTPATIENT)
Dept: PRIMARY CARE CLINIC | Age: 69
End: 2019-06-28

## 2019-06-28 DIAGNOSIS — E55.9 VITAMIN D DEFICIENCY, UNSPECIFIED: Primary | ICD-10-CM

## 2019-06-28 RX ORDER — ERGOCALCIFEROL 1.25 MG/1
50000 CAPSULE ORAL WEEKLY
Qty: 8 CAPSULE | Refills: 0 | Status: SHIPPED | OUTPATIENT
Start: 2019-06-28 | End: 2019-06-28 | Stop reason: SDUPTHER

## 2019-06-28 RX ORDER — ERGOCALCIFEROL 1.25 MG/1
50000 CAPSULE ORAL WEEKLY
Qty: 36 CAPSULE | Refills: 3 | Status: SHIPPED | OUTPATIENT
Start: 2019-06-28 | End: 2020-04-16 | Stop reason: SDUPTHER

## 2019-06-28 NOTE — TELEPHONE ENCOUNTER
Pt vitamin D is very low. Will Start weekly dose and increase foods in diet with Vitamin D. Will recheck level in 8 weeks. This could a factor in causing her fatigue. F/U PCP any further concerns. Will check other Vit D level too and hormone level too. Orders are in and patient needs to come in for nurse visit and get those 2 labs drawn soon. Thanks.

## 2019-07-02 ASSESSMENT — ENCOUNTER SYMPTOMS
BACK PAIN: 0
SORE THROAT: 0
COLOR CHANGE: 0
ABDOMINAL PAIN: 0
CHEST TIGHTNESS: 0
COUGH: 0
ABDOMINAL DISTENTION: 0
DIARRHEA: 0
SHORTNESS OF BREATH: 0
NAUSEA: 0
CONSTIPATION: 0

## 2019-07-03 RX ORDER — DOXAZOSIN 8 MG/1
TABLET ORAL
Qty: 90 TABLET | Refills: 3 | Status: SHIPPED | OUTPATIENT
Start: 2019-07-03 | End: 2020-01-10

## 2019-07-18 ENCOUNTER — OFFICE VISIT (OUTPATIENT)
Dept: PRIMARY CARE CLINIC | Age: 69
End: 2019-07-18
Payer: MEDICARE

## 2019-07-18 VITALS
OXYGEN SATURATION: 98 % | DIASTOLIC BLOOD PRESSURE: 72 MMHG | BODY MASS INDEX: 30.51 KG/M2 | WEIGHT: 189 LBS | SYSTOLIC BLOOD PRESSURE: 118 MMHG | HEART RATE: 62 BPM

## 2019-07-18 DIAGNOSIS — R79.89 ELEVATED SERUM CREATININE: ICD-10-CM

## 2019-07-18 DIAGNOSIS — I10 ESSENTIAL HYPERTENSION: Primary | ICD-10-CM

## 2019-07-18 DIAGNOSIS — E11.8 TYPE 2 DIABETES MELLITUS WITH COMPLICATION, WITHOUT LONG-TERM CURRENT USE OF INSULIN (HCC): ICD-10-CM

## 2019-07-18 DIAGNOSIS — Z12.39 SCREENING FOR BREAST CANCER: ICD-10-CM

## 2019-07-18 DIAGNOSIS — E55.9 VITAMIN D DEFICIENCY, UNSPECIFIED: ICD-10-CM

## 2019-07-18 DIAGNOSIS — E05.90 HYPERTHYROIDISM: ICD-10-CM

## 2019-07-18 PROCEDURE — 3046F HEMOGLOBIN A1C LEVEL >9.0%: CPT | Performed by: NURSE PRACTITIONER

## 2019-07-18 PROCEDURE — 1036F TOBACCO NON-USER: CPT | Performed by: NURSE PRACTITIONER

## 2019-07-18 PROCEDURE — 3017F COLORECTAL CA SCREEN DOC REV: CPT | Performed by: NURSE PRACTITIONER

## 2019-07-18 PROCEDURE — 1123F ACP DISCUSS/DSCN MKR DOCD: CPT | Performed by: NURSE PRACTITIONER

## 2019-07-18 PROCEDURE — G8400 PT W/DXA NO RESULTS DOC: HCPCS | Performed by: NURSE PRACTITIONER

## 2019-07-18 PROCEDURE — G8427 DOCREV CUR MEDS BY ELIG CLIN: HCPCS | Performed by: NURSE PRACTITIONER

## 2019-07-18 PROCEDURE — 99214 OFFICE O/P EST MOD 30 MIN: CPT | Performed by: NURSE PRACTITIONER

## 2019-07-18 PROCEDURE — G8598 ASA/ANTIPLAT THER USED: HCPCS | Performed by: NURSE PRACTITIONER

## 2019-07-18 PROCEDURE — 2022F DILAT RTA XM EVC RTNOPTHY: CPT | Performed by: NURSE PRACTITIONER

## 2019-07-18 PROCEDURE — 1090F PRES/ABSN URINE INCON ASSESS: CPT | Performed by: NURSE PRACTITIONER

## 2019-07-18 PROCEDURE — G8417 CALC BMI ABV UP PARAM F/U: HCPCS | Performed by: NURSE PRACTITIONER

## 2019-07-18 PROCEDURE — 4040F PNEUMOC VAC/ADMIN/RCVD: CPT | Performed by: NURSE PRACTITIONER

## 2019-07-18 ASSESSMENT — ENCOUNTER SYMPTOMS
SHORTNESS OF BREATH: 0
SORE THROAT: 0
ABDOMINAL PAIN: 0
VOMITING: 0
NAUSEA: 0
COUGH: 0
EYE PAIN: 0

## 2019-07-19 ENCOUNTER — TELEPHONE (OUTPATIENT)
Dept: PRIMARY CARE CLINIC | Age: 69
End: 2019-07-19

## 2019-07-19 NOTE — TELEPHONE ENCOUNTER
Per the orders of мария Claudio, 1 box/boxes of Humalog vial 100 mg were given to patient today. , Lot # [id number], Exp. Date 07/21. Patient given instructions with samples.

## 2019-07-29 ENCOUNTER — HOSPITAL ENCOUNTER (OUTPATIENT)
Dept: MAMMOGRAPHY | Facility: HOSPITAL | Age: 69
Discharge: HOME | End: 2019-07-29
Payer: MEDICARE

## 2019-07-29 DIAGNOSIS — Z12.39 SCREENING FOR BREAST CANCER: ICD-10-CM

## 2019-07-29 PROCEDURE — 77067 SCR MAMMO BI INCL CAD: CPT

## 2019-08-02 ENCOUNTER — LAB (OUTPATIENT)
Dept: LAB | Facility: HOSPITAL | Age: 69
End: 2019-08-02

## 2019-08-02 ENCOUNTER — TRANSCRIBE ORDERS (OUTPATIENT)
Dept: INTERVENTIONAL RADIOLOGY/VASCULAR | Facility: HOSPITAL | Age: 69
End: 2019-08-02

## 2019-08-02 DIAGNOSIS — N18.30 CHRONIC KIDNEY DISEASE, STAGE III (MODERATE) (HCC): Primary | ICD-10-CM

## 2019-08-02 DIAGNOSIS — N18.30 CHRONIC KIDNEY DISEASE, STAGE III (MODERATE) (HCC): ICD-10-CM

## 2019-08-02 LAB
25(OH)D3 SERPL-MCNC: 45.3 NG/ML
ALBUMIN SERPL-MCNC: 4.2 G/DL (ref 3.5–5)
ANION GAP SERPL CALCULATED.3IONS-SCNC: 16.5 MMOL/L (ref 10–20)
BACTERIA UR QL AUTO: ABNORMAL /HPF
BILIRUB UR QL STRIP: NEGATIVE
BUN BLD-MCNC: 17 MG/DL (ref 7–20)
BUN/CREAT SERPL: 13.1 (ref 7.1–23.5)
CALCIUM SPEC-SCNC: 9.8 MG/DL (ref 8.4–10.2)
CHLORIDE SERPL-SCNC: 103 MMOL/L (ref 98–107)
CLARITY UR: CLEAR
CO2 SERPL-SCNC: 25 MMOL/L (ref 26–30)
COLOR UR: YELLOW
CREAT BLD-MCNC: 1.3 MG/DL (ref 0.6–1.3)
CREAT UR-MCNC: 126.7 MG/DL
GFR SERPL CREATININE-BSD FRML MDRD: 41 ML/MIN/1.73
GLUCOSE BLD-MCNC: 100 MG/DL (ref 74–98)
GLUCOSE UR STRIP-MCNC: NEGATIVE MG/DL
HGB UR QL STRIP.AUTO: NEGATIVE
HYALINE CASTS UR QL AUTO: ABNORMAL /LPF
KETONES UR QL STRIP: NEGATIVE
LEUKOCYTE ESTERASE UR QL STRIP.AUTO: NEGATIVE
MUCOUS THREADS URNS QL MICRO: ABNORMAL /HPF
NITRITE UR QL STRIP: NEGATIVE
PH UR STRIP.AUTO: 6 [PH] (ref 5–8)
PHOSPHATE SERPL-MCNC: 3.8 MG/DL (ref 2.5–4.5)
POTASSIUM BLD-SCNC: 5.5 MMOL/L (ref 3.5–5.1)
PROT UR QL STRIP: ABNORMAL
PROT UR-MCNC: 136 MG/DL (ref 0–11.9)
RBC # UR: ABNORMAL /HPF
REF LAB TEST METHOD: ABNORMAL
SODIUM BLD-SCNC: 139 MMOL/L (ref 137–145)
SP GR UR STRIP: 1.02 (ref 1–1.03)
SQUAMOUS #/AREA URNS HPF: ABNORMAL /HPF
TRANS CELLS #/AREA URNS HPF: ABNORMAL /HPF
UROBILINOGEN UR QL STRIP: ABNORMAL
WBC UR QL AUTO: ABNORMAL /HPF

## 2019-08-02 PROCEDURE — 82306 VITAMIN D 25 HYDROXY: CPT

## 2019-08-02 PROCEDURE — 81001 URINALYSIS AUTO W/SCOPE: CPT

## 2019-08-02 PROCEDURE — 80069 RENAL FUNCTION PANEL: CPT

## 2019-08-02 PROCEDURE — 36415 COLL VENOUS BLD VENIPUNCTURE: CPT

## 2019-08-02 PROCEDURE — 83970 ASSAY OF PARATHORMONE: CPT

## 2019-08-02 PROCEDURE — 84156 ASSAY OF PROTEIN URINE: CPT

## 2019-08-02 PROCEDURE — 82570 ASSAY OF URINE CREATININE: CPT

## 2019-08-03 LAB — PTH-INTACT SERPL-MCNC: 18 PG/ML (ref 15–65)

## 2019-08-11 NOTE — PROGRESS NOTES
SUBJECTIVE:    Patient ID: Aurelia Gutiérrez is a 71 y.o. female. Medicalhistory Review  Past Medical, Family, and Social History reviewed and does contribute to the patient presenting condition    Health Maintenance Due   Topic Date Due    Hepatitis C screen  1950    Diabetic foot exam  06/08/1960    DTaP/Tdap/Td vaccine (1 - Tdap) 06/08/1969    Shingles Vaccine (1 of 2) 06/08/2000    Annual Wellness Visit (AWV)  06/08/2013    Lipid screen  06/06/2015    DEXA (modify frequency per FRAX score)  06/08/2015    TSH testing  02/27/2018    A1C test (Diabetic or Prediabetic)  09/22/2018    Diabetic retinal exam  03/26/2019       HPI:   Chief Complaint   Patient presents with   [address]     Patient here today would like to discuss lab result from a few weeks ago. She has been doing well. Her sugar and BP are both good. She sees endocrinology and nephrology. Patient's medications, allergies, pastmedical, surgical, social and family histories were reviewed and updated as appropriate. Review of Systems Reviewed and acurate. See MA note. OBJECTIVE:    /72 (Site: Right Upper Arm, Position: Sitting, Cuff Size: Medium Adult)   Pulse 62   Wt 189 lb (85.7 kg)   SpO2 98%   BMI 30.51 kg/m²      Physical Exam   Constitutional: She is oriented to person, place, and time. She appears well-developed and well-nourished. No distress. HENT:   Head: Normocephalic. Right Ear: Tympanic membrane normal.   Left Ear: Tympanic membrane normal.   Mouth/Throat: No oropharyngeal exudate. Eyes: Lids are normal.   Neck: Neck supple. Cardiovascular: Normal rate, regular rhythm and normal heart sounds. Pulmonary/Chest: Effort normal and breath sounds normal.   Abdominal: Soft. Bowel sounds are normal. She exhibits no distension. There is no tenderness. Musculoskeletal: She exhibits no edema. Lymphadenopathy:     She has no cervical adenopathy.    Neurological: She is alert and oriented to person, place, and time. Skin: Skin is warm and dry. Psychiatric: She has a normal mood and affect. Vitals reviewed. No results found for requested labs within last 30 days. Hemoglobin A1C (%)   Date Value   09/22/2017 7.9     LDL Calculated (mg/dL)   Date Value   06/06/2014 46         Lab Results   Component Value Date    WBC 5.3 06/25/2019    NEUTROABS 2.8 06/25/2019    HGB 11.1 (L) 06/25/2019    HCT 35.2 (L) 06/25/2019    MCV 97.0 06/25/2019     06/25/2019       Lab Results   Component Value Date    TSH 0.80 02/27/2017       Prior to Visit Medications    Medication Sig Taking? Authorizing Provider   vitamin D (ERGOCALCIFEROL) 26874 units CAPS capsule Take 1 capsule by mouth once a week for 8 doses Yes EULALIA Clancy - CNP   spironolactone (ALDACTONE) 25 MG tablet Take 12.5 mg by mouth Yes Historical Provider, MD   omega-3 acid ethyl esters (LOVAZA) 1 g capsule take 4 capsules by mouth at bedtime Yes Historical Provider, MD   omeprazole (PRILOSEC) 40 MG delayed release capsule TAKE 1 CAPSULE BY MOUTH  DAILY Yes EULALIA Saini   tiZANidine (ZANAFLEX) 4 MG tablet Take 1 tablet by mouth every 8 hours as needed (muscle spasm) Yes EULALIA Saini   hydrocortisone (ANUSOL-HC) 2.5 % rectal cream Place rectally 2 times daily.  Yes EULALIA Saini   PARoxetine (PAXIL) 40 MG tablet Take 1 tablet by mouth every morning Yes EULALIA Saini   Insulin Syringe-Needle U-100 (INSULIN SYRINGE .5CC/31GX5/16\") 31G X 5/16\" 0.5 ML MISC 1 each by Does not apply route daily Please sub U50 Yes EULALIA Saini   diltiazem HAYNES [facility]) 420 MG extended release capsule Take 1 capsule by mouth daily Yes EULALIA Saini   insulin glargine (LANTUS) 100 UNIT/ML injection vial Inject 25 Units into the skin nightly Yes EULALIA Saini   triamcinolone (KENALOG) 0.1 % cream Apply topically 2 times daily- mix with entire jar of Cetaphil Yes EULALIA Saini   lisinopril

## 2019-08-13 ENCOUNTER — HOSPITAL ENCOUNTER (OUTPATIENT)
Dept: MAMMOGRAPHY | Facility: HOSPITAL | Age: 69
Discharge: HOME | End: 2019-08-13
Payer: MEDICARE

## 2019-08-13 DIAGNOSIS — R92.8 ABNORMAL MAMMOGRAM: ICD-10-CM

## 2019-08-13 PROCEDURE — 77065 DX MAMMO INCL CAD UNI: CPT

## 2019-08-27 ENCOUNTER — TRANSCRIBE ORDERS (OUTPATIENT)
Dept: LAB | Facility: HOSPITAL | Age: 69
End: 2019-08-27

## 2019-08-27 ENCOUNTER — LAB (OUTPATIENT)
Dept: LAB | Facility: HOSPITAL | Age: 69
End: 2019-08-27

## 2019-08-27 DIAGNOSIS — E87.5 HYPERKALEMIA: ICD-10-CM

## 2019-08-27 DIAGNOSIS — E87.5 HYPERKALEMIA: Primary | ICD-10-CM

## 2019-08-27 LAB
ANION GAP SERPL CALCULATED.3IONS-SCNC: 13.1 MMOL/L (ref 5–15)
BUN BLD-MCNC: 17 MG/DL (ref 8–23)
BUN/CREAT SERPL: 14 (ref 7–25)
CALCIUM SPEC-SCNC: 9.6 MG/DL (ref 8.6–10.5)
CHLORIDE SERPL-SCNC: 101 MMOL/L (ref 98–107)
CO2 SERPL-SCNC: 24.9 MMOL/L (ref 22–29)
CREAT BLD-MCNC: 1.21 MG/DL (ref 0.57–1)
GFR SERPL CREATININE-BSD FRML MDRD: 44 ML/MIN/1.73
GLUCOSE BLD-MCNC: 163 MG/DL (ref 65–99)
POTASSIUM BLD-SCNC: 4.9 MMOL/L (ref 3.5–5.2)
SODIUM BLD-SCNC: 139 MMOL/L (ref 136–145)

## 2019-08-27 PROCEDURE — 36415 COLL VENOUS BLD VENIPUNCTURE: CPT

## 2019-08-27 PROCEDURE — 80048 BASIC METABOLIC PNL TOTAL CA: CPT

## 2019-10-18 ENCOUNTER — OFFICE VISIT (OUTPATIENT)
Dept: PRIMARY CARE CLINIC | Age: 69
End: 2019-10-18
Payer: MEDICARE

## 2019-10-18 ENCOUNTER — HOSPITAL ENCOUNTER (OUTPATIENT)
Facility: HOSPITAL | Age: 69
Discharge: HOME | End: 2019-10-18
Payer: MEDICARE

## 2019-10-18 VITALS
OXYGEN SATURATION: 95 % | BODY MASS INDEX: 31.18 KG/M2 | SYSTOLIC BLOOD PRESSURE: 126 MMHG | HEART RATE: 72 BPM | WEIGHT: 193.2 LBS | DIASTOLIC BLOOD PRESSURE: 70 MMHG

## 2019-10-18 DIAGNOSIS — E11.8 TYPE 2 DIABETES MELLITUS WITH COMPLICATION, WITHOUT LONG-TERM CURRENT USE OF INSULIN (HCC): ICD-10-CM

## 2019-10-18 DIAGNOSIS — D64.9 ANEMIA, UNSPECIFIED TYPE: ICD-10-CM

## 2019-10-18 DIAGNOSIS — I10 ESSENTIAL HYPERTENSION: ICD-10-CM

## 2019-10-18 DIAGNOSIS — E55.9 VITAMIN D DEFICIENCY, UNSPECIFIED: ICD-10-CM

## 2019-10-18 DIAGNOSIS — R53.83 FATIGUE, UNSPECIFIED TYPE: Primary | ICD-10-CM

## 2019-10-18 DIAGNOSIS — N18.30 CHRONIC KIDNEY DISEASE, STAGE 3 (MODERATE): ICD-10-CM

## 2019-10-18 DIAGNOSIS — R53.83 FATIGUE, UNSPECIFIED TYPE: ICD-10-CM

## 2019-10-18 LAB
A/G RATIO: 1.4 (ref 0.8–2)
ALBUMIN SERPL-MCNC: 4.3 G/DL (ref 3.4–4.8)
ALP BLD-CCNC: 67 U/L (ref 25–100)
ALT SERPL-CCNC: 9 U/L (ref 4–36)
ANION GAP SERPL CALCULATED.3IONS-SCNC: 17 MMOL/L (ref 3–16)
AST SERPL-CCNC: 13 U/L (ref 8–33)
BILIRUB SERPL-MCNC: <0.2 MG/DL (ref 0.3–1.2)
BUN BLDV-MCNC: 17 MG/DL (ref 6–20)
CALCIUM SERPL-MCNC: 9.8 MG/DL (ref 8.5–10.5)
CHLORIDE BLD-SCNC: 103 MMOL/L (ref 98–107)
CO2: 23 MMOL/L (ref 20–30)
CREAT SERPL-MCNC: 1.4 MG/DL (ref 0.4–1.2)
FERRITIN: 19.5 NG/ML (ref 22–322)
GFR AFRICAN AMERICAN: 45
GFR NON-AFRICAN AMERICAN: 37
GLOBULIN: 3 G/DL
GLUCOSE BLD-MCNC: 64 MG/DL (ref 74–106)
IRON: 85 UG/DL (ref 37–145)
POTASSIUM SERPL-SCNC: 4.8 MMOL/L (ref 3.4–5.1)
SODIUM BLD-SCNC: 143 MMOL/L (ref 136–145)
TOTAL IRON BINDING CAPACITY: 418 UG/DL (ref 250–450)
TOTAL PROTEIN: 7.3 G/DL (ref 6.4–8.3)
VITAMIN D 25-HYDROXY: 32.9 (ref 32–100)

## 2019-10-18 PROCEDURE — 1123F ACP DISCUSS/DSCN MKR DOCD: CPT | Performed by: NURSE PRACTITIONER

## 2019-10-18 PROCEDURE — G8598 ASA/ANTIPLAT THER USED: HCPCS | Performed by: NURSE PRACTITIONER

## 2019-10-18 PROCEDURE — G8427 DOCREV CUR MEDS BY ELIG CLIN: HCPCS | Performed by: NURSE PRACTITIONER

## 2019-10-18 PROCEDURE — 3046F HEMOGLOBIN A1C LEVEL >9.0%: CPT | Performed by: NURSE PRACTITIONER

## 2019-10-18 PROCEDURE — 2022F DILAT RTA XM EVC RTNOPTHY: CPT | Performed by: NURSE PRACTITIONER

## 2019-10-18 PROCEDURE — 80053 COMPREHEN METABOLIC PANEL: CPT

## 2019-10-18 PROCEDURE — 83540 ASSAY OF IRON: CPT

## 2019-10-18 PROCEDURE — 4040F PNEUMOC VAC/ADMIN/RCVD: CPT | Performed by: NURSE PRACTITIONER

## 2019-10-18 PROCEDURE — 3017F COLORECTAL CA SCREEN DOC REV: CPT | Performed by: NURSE PRACTITIONER

## 2019-10-18 PROCEDURE — 82728 ASSAY OF FERRITIN: CPT

## 2019-10-18 PROCEDURE — 85025 COMPLETE CBC W/AUTO DIFF WBC: CPT

## 2019-10-18 PROCEDURE — 99213 OFFICE O/P EST LOW 20 MIN: CPT | Performed by: NURSE PRACTITIONER

## 2019-10-18 PROCEDURE — 1036F TOBACCO NON-USER: CPT | Performed by: NURSE PRACTITIONER

## 2019-10-18 PROCEDURE — 83550 IRON BINDING TEST: CPT

## 2019-10-18 PROCEDURE — G8482 FLU IMMUNIZE ORDER/ADMIN: HCPCS | Performed by: NURSE PRACTITIONER

## 2019-10-18 PROCEDURE — 1090F PRES/ABSN URINE INCON ASSESS: CPT | Performed by: NURSE PRACTITIONER

## 2019-10-18 PROCEDURE — 82306 VITAMIN D 25 HYDROXY: CPT

## 2019-10-18 PROCEDURE — 90688 IIV4 VACCINE SPLT 0.5 ML IM: CPT | Performed by: NURSE PRACTITIONER

## 2019-10-18 PROCEDURE — G8400 PT W/DXA NO RESULTS DOC: HCPCS | Performed by: NURSE PRACTITIONER

## 2019-10-18 PROCEDURE — G0008 ADMIN INFLUENZA VIRUS VAC: HCPCS | Performed by: NURSE PRACTITIONER

## 2019-10-18 PROCEDURE — 36415 COLL VENOUS BLD VENIPUNCTURE: CPT

## 2019-10-18 PROCEDURE — G8417 CALC BMI ABV UP PARAM F/U: HCPCS | Performed by: NURSE PRACTITIONER

## 2019-10-18 ASSESSMENT — ENCOUNTER SYMPTOMS
COUGH: 0
VOMITING: 0
SHORTNESS OF BREATH: 0
EYE PAIN: 0
ABDOMINAL PAIN: 0
SORE THROAT: 0
NAUSEA: 0

## 2019-10-19 LAB
BASOPHILS ABSOLUTE: 0.1 K/UL (ref 0–0.1)
BASOPHILS RELATIVE PERCENT: 0.8 %
EOSINOPHILS ABSOLUTE: 0.2 K/UL (ref 0–0.4)
EOSINOPHILS RELATIVE PERCENT: 3.2 %
HCT VFR BLD CALC: 34.6 % (ref 37–47)
HEMOGLOBIN: 10.7 G/DL (ref 11.5–16.5)
IMMATURE GRANULOCYTES #: 0 K/UL
IMMATURE GRANULOCYTES %: 0.2 % (ref 0–5)
LYMPHOCYTES ABSOLUTE: 2.1 K/UL (ref 1.5–4)
LYMPHOCYTES RELATIVE PERCENT: 34.8 %
MCH RBC QN AUTO: 30.1 PG (ref 27–32)
MCHC RBC AUTO-ENTMCNC: 30.9 G/DL (ref 31–35)
MCV RBC AUTO: 97.2 FL (ref 80–100)
MONOCYTES ABSOLUTE: 0.6 K/UL (ref 0.2–0.8)
MONOCYTES RELATIVE PERCENT: 9.6 %
NEUTROPHILS ABSOLUTE: 3.1 K/UL (ref 2–7.5)
NEUTROPHILS RELATIVE PERCENT: 51.4 %
PDW BLD-RTO: 14.1 % (ref 11–16)
PLATELET # BLD: 251 K/UL (ref 150–400)
PMV BLD AUTO: 10.3 FL (ref 6–10)
RBC # BLD: 3.56 M/UL (ref 3.8–5.8)
WBC # BLD: 5.9 K/UL (ref 4–11)

## 2019-10-23 ENCOUNTER — TELEPHONE (OUTPATIENT)
Dept: PRIMARY CARE CLINIC | Age: 69
End: 2019-10-23

## 2019-10-23 RX ORDER — DILTIAZEM HYDROCHLORIDE 420 MG/1
420 CAPSULE, EXTENDED RELEASE ORAL DAILY
Qty: 90 CAPSULE | Refills: 3 | Status: SHIPPED | OUTPATIENT
Start: 2019-10-23 | End: 2020-10-20

## 2019-10-23 RX ORDER — FERROUS SULFATE 325(65) MG
325 TABLET ORAL
Qty: 30 TABLET | Refills: 5 | Status: SHIPPED | OUTPATIENT
Start: 2019-10-23 | End: 2020-11-09

## 2019-10-28 ENCOUNTER — NURSE ONLY (OUTPATIENT)
Dept: PRIMARY CARE CLINIC | Age: 69
End: 2019-10-28

## 2019-10-28 DIAGNOSIS — Z12.11 SCREENING FOR COLON CANCER: Primary | ICD-10-CM

## 2019-10-28 DIAGNOSIS — Z12.11 SCREENING FOR COLON CANCER: ICD-10-CM

## 2019-10-28 LAB
CONTROL: POSITIVE
HEMOCCULT STL QL: NEGATIVE

## 2019-10-28 PROCEDURE — 82274 ASSAY TEST FOR BLOOD FECAL: CPT | Performed by: NURSE PRACTITIONER

## 2019-12-11 RX ORDER — PAROXETINE HYDROCHLORIDE 40 MG/1
40 TABLET, FILM COATED ORAL EVERY MORNING
Qty: 90 TABLET | Refills: 3 | Status: SHIPPED | OUTPATIENT
Start: 2019-12-11 | End: 2020-10-20

## 2020-01-10 ENCOUNTER — APPOINTMENT (OUTPATIENT)
Dept: CT IMAGING | Facility: HOSPITAL | Age: 70
End: 2020-01-10

## 2020-01-10 ENCOUNTER — HOSPITAL ENCOUNTER (INPATIENT)
Facility: HOSPITAL | Age: 70
LOS: 2 days | Discharge: HOME | End: 2020-01-12
Attending: EMERGENCY MEDICINE | Admitting: FAMILY MEDICINE

## 2020-01-10 ENCOUNTER — APPOINTMENT (OUTPATIENT)
Dept: GENERAL RADIOLOGY | Facility: HOSPITAL | Age: 70
End: 2020-01-10

## 2020-01-10 DIAGNOSIS — M54.50 ACUTE LOW BACK PAIN, UNSPECIFIED BACK PAIN LATERALITY, UNSPECIFIED WHETHER SCIATICA PRESENT: ICD-10-CM

## 2020-01-10 DIAGNOSIS — R10.9 ACUTE ABDOMINAL PAIN: ICD-10-CM

## 2020-01-10 DIAGNOSIS — R53.1 WEAKNESS: ICD-10-CM

## 2020-01-10 DIAGNOSIS — E83.42 HYPOMAGNESEMIA: ICD-10-CM

## 2020-01-10 DIAGNOSIS — N28.9 RENAL INSUFFICIENCY: ICD-10-CM

## 2020-01-10 DIAGNOSIS — E87.5 HYPERKALEMIA: Primary | ICD-10-CM

## 2020-01-10 DIAGNOSIS — R00.1 BRADYCARDIA: ICD-10-CM

## 2020-01-10 PROBLEM — N17.9 ACUTE RENAL FAILURE SUPERIMPOSED ON STAGE 3 CHRONIC KIDNEY DISEASE: Status: ACTIVE | Noted: 2020-01-10

## 2020-01-10 PROBLEM — Z79.4 TYPE 2 DIABETES MELLITUS WITH CHRONIC KIDNEY DISEASE, WITH LONG-TERM CURRENT USE OF INSULIN: Status: ACTIVE | Noted: 2020-01-10

## 2020-01-10 PROBLEM — N18.30 ACUTE RENAL FAILURE SUPERIMPOSED ON STAGE 3 CHRONIC KIDNEY DISEASE: Status: ACTIVE | Noted: 2020-01-10

## 2020-01-10 PROBLEM — E11.22 TYPE 2 DIABETES MELLITUS WITH CHRONIC KIDNEY DISEASE, WITH LONG-TERM CURRENT USE OF INSULIN: Status: ACTIVE | Noted: 2020-01-10

## 2020-01-10 PROBLEM — IMO0001 INSULIN DEPENDENT DIABETES MELLITUS: Status: ACTIVE | Noted: 2020-01-10

## 2020-01-10 PROBLEM — I38 VHD (VALVULAR HEART DISEASE): Status: ACTIVE | Noted: 2020-01-10

## 2020-01-10 LAB
ALBUMIN SERPL-MCNC: 4.5 G/DL (ref 3.5–5.2)
ALBUMIN/GLOB SERPL: 1.5 G/DL
ALP SERPL-CCNC: 67 U/L (ref 39–117)
ALT SERPL W P-5'-P-CCNC: 9 U/L (ref 1–33)
ANION GAP SERPL CALCULATED.3IONS-SCNC: 11 MMOL/L (ref 5–15)
AST SERPL-CCNC: 12 U/L (ref 1–32)
BACTERIA UR QL AUTO: NORMAL /HPF
BASOPHILS # BLD AUTO: 0.04 10*3/MM3 (ref 0–0.2)
BASOPHILS NFR BLD AUTO: 0.6 % (ref 0–1.5)
BILIRUB SERPL-MCNC: 0.2 MG/DL (ref 0.2–1.2)
BILIRUB UR QL STRIP: NEGATIVE
BUN BLD-MCNC: 29 MG/DL (ref 8–23)
BUN BLDA-MCNC: 29 MG/DL (ref 8–26)
BUN/CREAT SERPL: 15.9 (ref 7–25)
CA-I BLDA-SCNC: 1.29 MMOL/L (ref 1.2–1.32)
CALCIUM SPEC-SCNC: 10 MG/DL (ref 8.6–10.5)
CHLORIDE BLDA-SCNC: 111 MMOL/L (ref 98–109)
CHLORIDE SERPL-SCNC: 107 MMOL/L (ref 98–107)
CLARITY UR: CLEAR
CO2 BLDA-SCNC: 20 MMOL/L (ref 24–29)
CO2 SERPL-SCNC: 19 MMOL/L (ref 22–29)
COLOR UR: YELLOW
CREAT BLD-MCNC: 1.82 MG/DL (ref 0.57–1)
CREAT BLDA-MCNC: 1.9 MG/DL (ref 0.6–1.3)
DEPRECATED RDW RBC AUTO: 52.4 FL (ref 37–54)
EOSINOPHIL # BLD AUTO: 0.14 10*3/MM3 (ref 0–0.4)
EOSINOPHIL NFR BLD AUTO: 2.1 % (ref 0.3–6.2)
ERYTHROCYTE [DISTWIDTH] IN BLOOD BY AUTOMATED COUNT: 14.5 % (ref 12.3–15.4)
GFR SERPL CREATININE-BSD FRML MDRD: 28 ML/MIN/1.73
GLOBULIN UR ELPH-MCNC: 3.1 GM/DL
GLUCOSE BLD-MCNC: 115 MG/DL (ref 65–99)
GLUCOSE BLDC GLUCOMTR-MCNC: 123 MG/DL (ref 70–130)
GLUCOSE BLDC GLUCOMTR-MCNC: 128 MG/DL (ref 70–130)
GLUCOSE BLDC GLUCOMTR-MCNC: 135 MG/DL (ref 70–130)
GLUCOSE UR STRIP-MCNC: NEGATIVE MG/DL
HCT VFR BLD AUTO: 34.4 % (ref 34–46.6)
HCT VFR BLDA CALC: 31 % (ref 38–51)
HGB BLD-MCNC: 10.8 G/DL (ref 12–15.9)
HGB BLDA-MCNC: 10.5 G/DL (ref 12–17)
HGB UR QL STRIP.AUTO: NEGATIVE
HOLD SPECIMEN: NORMAL
HOLD SPECIMEN: NORMAL
HYALINE CASTS UR QL AUTO: NORMAL /LPF
IMM GRANULOCYTES # BLD AUTO: 0.02 10*3/MM3 (ref 0–0.05)
IMM GRANULOCYTES NFR BLD AUTO: 0.3 % (ref 0–0.5)
KETONES UR QL STRIP: NEGATIVE
LEUKOCYTE ESTERASE UR QL STRIP.AUTO: NEGATIVE
LYMPHOCYTES # BLD AUTO: 2.43 10*3/MM3 (ref 0.7–3.1)
LYMPHOCYTES NFR BLD AUTO: 36.9 % (ref 19.6–45.3)
MAGNESIUM SERPL-MCNC: 1.5 MG/DL (ref 1.6–2.4)
MCH RBC QN AUTO: 31.2 PG (ref 26.6–33)
MCHC RBC AUTO-ENTMCNC: 31.4 G/DL (ref 31.5–35.7)
MCV RBC AUTO: 99.4 FL (ref 79–97)
MONOCYTES # BLD AUTO: 0.62 10*3/MM3 (ref 0.1–0.9)
MONOCYTES NFR BLD AUTO: 9.4 % (ref 5–12)
NEUTROPHILS # BLD AUTO: 3.34 10*3/MM3 (ref 1.7–7)
NEUTROPHILS NFR BLD AUTO: 50.7 % (ref 42.7–76)
NITRITE UR QL STRIP: NEGATIVE
NRBC BLD AUTO-RTO: 0 /100 WBC (ref 0–0.2)
PH UR STRIP.AUTO: 5.5 [PH] (ref 5–8)
PLATELET # BLD AUTO: 216 10*3/MM3 (ref 140–450)
PMV BLD AUTO: 9.8 FL (ref 6–12)
POTASSIUM BLD-SCNC: 8 MMOL/L (ref 3.5–5.2)
POTASSIUM BLD-SCNC: 8.2 MMOL/L (ref 3.5–5.2)
POTASSIUM BLDA-SCNC: 8 MMOL/L (ref 3.5–4.9)
PROT SERPL-MCNC: 7.6 G/DL (ref 6–8.5)
PROT UR QL STRIP: ABNORMAL
RBC # BLD AUTO: 3.46 10*6/MM3 (ref 3.77–5.28)
RBC # UR: NORMAL /HPF
REF LAB TEST METHOD: NORMAL
SODIUM BLD-SCNC: 137 MMOL/L (ref 136–145)
SODIUM BLDA-SCNC: 137 MMOL/L (ref 138–146)
SP GR UR STRIP: 1.02 (ref 1–1.03)
SQUAMOUS #/AREA URNS HPF: NORMAL /HPF
TROPONIN T SERPL-MCNC: <0.01 NG/ML (ref 0–0.03)
UROBILINOGEN UR QL STRIP: ABNORMAL
WBC NRBC COR # BLD: 6.59 10*3/MM3 (ref 3.4–10.8)
WBC UR QL AUTO: NORMAL /HPF
WHOLE BLOOD HOLD SPECIMEN: NORMAL
WHOLE BLOOD HOLD SPECIMEN: NORMAL

## 2020-01-10 PROCEDURE — 82962 GLUCOSE BLOOD TEST: CPT

## 2020-01-10 PROCEDURE — 85014 HEMATOCRIT: CPT

## 2020-01-10 PROCEDURE — 71045 X-RAY EXAM CHEST 1 VIEW: CPT

## 2020-01-10 PROCEDURE — 83735 ASSAY OF MAGNESIUM: CPT

## 2020-01-10 PROCEDURE — 93005 ELECTROCARDIOGRAM TRACING: CPT | Performed by: PHYSICIAN ASSISTANT

## 2020-01-10 PROCEDURE — 85025 COMPLETE CBC W/AUTO DIFF WBC: CPT

## 2020-01-10 PROCEDURE — 93005 ELECTROCARDIOGRAM TRACING: CPT | Performed by: EMERGENCY MEDICINE

## 2020-01-10 PROCEDURE — 25010000002 MAGNESIUM SULFATE IN D5W 1G/100ML (PREMIX) 1-5 GM/100ML-% SOLUTION: Performed by: NURSE PRACTITIONER

## 2020-01-10 PROCEDURE — 80053 COMPREHEN METABOLIC PANEL: CPT

## 2020-01-10 PROCEDURE — 94640 AIRWAY INHALATION TREATMENT: CPT

## 2020-01-10 PROCEDURE — 84484 ASSAY OF TROPONIN QUANT: CPT

## 2020-01-10 PROCEDURE — 93005 ELECTROCARDIOGRAM TRACING: CPT

## 2020-01-10 PROCEDURE — 93010 ELECTROCARDIOGRAM REPORT: CPT | Performed by: INTERNAL MEDICINE

## 2020-01-10 PROCEDURE — 99284 EMERGENCY DEPT VISIT MOD MDM: CPT

## 2020-01-10 PROCEDURE — 63710000001 INSULIN REGULAR HUMAN PER 5 UNITS: Performed by: NURSE PRACTITIONER

## 2020-01-10 PROCEDURE — 80047 BASIC METABLC PNL IONIZED CA: CPT

## 2020-01-10 PROCEDURE — 81001 URINALYSIS AUTO W/SCOPE: CPT | Performed by: EMERGENCY MEDICINE

## 2020-01-10 PROCEDURE — 74176 CT ABD & PELVIS W/O CONTRAST: CPT

## 2020-01-10 PROCEDURE — 63710000001 INSULIN DETEMIR PER 5 UNITS: Performed by: PHYSICIAN ASSISTANT

## 2020-01-10 PROCEDURE — 99223 1ST HOSP IP/OBS HIGH 75: CPT | Performed by: FAMILY MEDICINE

## 2020-01-10 PROCEDURE — 84132 ASSAY OF SERUM POTASSIUM: CPT | Performed by: NURSE PRACTITIONER

## 2020-01-10 PROCEDURE — 63710000001 INSULIN LISPRO (HUMAN) PER 5 UNITS: Performed by: PHYSICIAN ASSISTANT

## 2020-01-10 RX ORDER — LISINOPRIL 40 MG/1
40 TABLET ORAL DAILY
COMMUNITY
End: 2021-02-18 | Stop reason: SDUPTHER

## 2020-01-10 RX ORDER — PANTOPRAZOLE SODIUM 40 MG/1
40 TABLET, DELAYED RELEASE ORAL EVERY MORNING
Status: DISCONTINUED | OUTPATIENT
Start: 2020-01-11 | End: 2020-01-12 | Stop reason: HOSPADM

## 2020-01-10 RX ORDER — FERROUS SULFATE TAB EC 324 MG (65 MG FE EQUIVALENT) 324 (65 FE) MG
324 TABLET DELAYED RESPONSE ORAL
COMMUNITY
End: 2023-01-19

## 2020-01-10 RX ORDER — DEXTROSE MONOHYDRATE 25 G/50ML
25 INJECTION, SOLUTION INTRAVENOUS
Status: DISCONTINUED | OUTPATIENT
Start: 2020-01-10 | End: 2020-01-12 | Stop reason: HOSPADM

## 2020-01-10 RX ORDER — ATORVASTATIN CALCIUM 10 MG/1
10 TABLET, FILM COATED ORAL NIGHTLY
Status: DISCONTINUED | OUTPATIENT
Start: 2020-01-10 | End: 2020-01-12 | Stop reason: HOSPADM

## 2020-01-10 RX ORDER — MAGNESIUM SULFATE 1 G/100ML
1 INJECTION INTRAVENOUS ONCE
Status: COMPLETED | OUTPATIENT
Start: 2020-01-10 | End: 2020-01-10

## 2020-01-10 RX ORDER — SODIUM CHLORIDE 0.9 % (FLUSH) 0.9 %
10 SYRINGE (ML) INJECTION AS NEEDED
Status: DISCONTINUED | OUTPATIENT
Start: 2020-01-10 | End: 2020-01-12 | Stop reason: HOSPADM

## 2020-01-10 RX ORDER — IPRATROPIUM BROMIDE AND ALBUTEROL SULFATE 2.5; .5 MG/3ML; MG/3ML
3 SOLUTION RESPIRATORY (INHALATION) ONCE
Status: COMPLETED | OUTPATIENT
Start: 2020-01-10 | End: 2020-01-10

## 2020-01-10 RX ORDER — TERAZOSIN 5 MG/1
5 CAPSULE ORAL NIGHTLY
Status: DISCONTINUED | OUTPATIENT
Start: 2020-01-10 | End: 2020-01-12 | Stop reason: HOSPADM

## 2020-01-10 RX ORDER — SODIUM CHLORIDE 0.9 % (FLUSH) 0.9 %
10 SYRINGE (ML) INJECTION EVERY 12 HOURS SCHEDULED
Status: DISCONTINUED | OUTPATIENT
Start: 2020-01-10 | End: 2020-01-12 | Stop reason: HOSPADM

## 2020-01-10 RX ORDER — NICOTINE POLACRILEX 4 MG
15 LOZENGE BUCCAL
Status: DISCONTINUED | OUTPATIENT
Start: 2020-01-10 | End: 2020-01-12 | Stop reason: HOSPADM

## 2020-01-10 RX ORDER — DOXAZOSIN 8 MG/1
8 TABLET ORAL NIGHTLY
COMMUNITY
End: 2020-07-21

## 2020-01-10 RX ORDER — GABAPENTIN 300 MG/1
600 CAPSULE ORAL 2 TIMES DAILY
Status: DISCONTINUED | OUTPATIENT
Start: 2020-01-10 | End: 2020-01-12 | Stop reason: HOSPADM

## 2020-01-10 RX ORDER — ACETAMINOPHEN 325 MG/1
650 TABLET ORAL EVERY 4 HOURS PRN
Status: DISCONTINUED | OUTPATIENT
Start: 2020-01-10 | End: 2020-01-12 | Stop reason: HOSPADM

## 2020-01-10 RX ORDER — FERROUS SULFATE 325(65) MG
325 TABLET ORAL
Status: DISCONTINUED | OUTPATIENT
Start: 2020-01-11 | End: 2020-01-12 | Stop reason: HOSPADM

## 2020-01-10 RX ORDER — LEVOTHYROXINE SODIUM 0.15 MG/1
150 TABLET ORAL
Status: DISCONTINUED | OUTPATIENT
Start: 2020-01-11 | End: 2020-01-12 | Stop reason: HOSPADM

## 2020-01-10 RX ORDER — PAROXETINE HYDROCHLORIDE 20 MG/1
40 TABLET, FILM COATED ORAL DAILY
Status: DISCONTINUED | OUTPATIENT
Start: 2020-01-11 | End: 2020-01-12 | Stop reason: HOSPADM

## 2020-01-10 RX ORDER — DEXTROSE AND SODIUM CHLORIDE 5; .45 G/100ML; G/100ML
100 INJECTION, SOLUTION INTRAVENOUS CONTINUOUS
Status: ACTIVE | OUTPATIENT
Start: 2020-01-10 | End: 2020-01-11

## 2020-01-10 RX ORDER — DEXTROSE MONOHYDRATE 25 G/50ML
25 INJECTION, SOLUTION INTRAVENOUS ONCE
Status: COMPLETED | OUTPATIENT
Start: 2020-01-10 | End: 2020-01-10

## 2020-01-10 RX ADMIN — GABAPENTIN 600 MG: 300 CAPSULE ORAL at 20:07

## 2020-01-10 RX ADMIN — INSULIN LISPRO 7 UNITS: 100 INJECTION, SOLUTION INTRAVENOUS; SUBCUTANEOUS at 20:29

## 2020-01-10 RX ADMIN — TERAZOSIN HYDROCHLORIDE 5 MG: 5 CAPSULE ORAL at 20:07

## 2020-01-10 RX ADMIN — INSULIN DETEMIR 20 UNITS: 100 INJECTION, SOLUTION SUBCUTANEOUS at 20:30

## 2020-01-10 RX ADMIN — ATORVASTATIN CALCIUM 10 MG: 10 TABLET, FILM COATED ORAL at 20:07

## 2020-01-10 RX ADMIN — INSULIN HUMAN 10 UNITS: 100 INJECTION, SOLUTION PARENTERAL at 15:30

## 2020-01-10 RX ADMIN — SODIUM CHLORIDE 1000 ML: 9 INJECTION, SOLUTION INTRAVENOUS at 15:27

## 2020-01-10 RX ADMIN — DEXTROSE MONOHYDRATE 25 G: 25 INJECTION, SOLUTION INTRAVENOUS at 16:21

## 2020-01-10 RX ADMIN — MAGNESIUM SULFATE HEPTAHYDRATE 1 G: 1 INJECTION, SOLUTION INTRAVENOUS at 16:25

## 2020-01-10 RX ADMIN — DEXTROSE AND SODIUM CHLORIDE 100 ML/HR: 5; 450 INJECTION, SOLUTION INTRAVENOUS at 18:07

## 2020-01-10 RX ADMIN — SODIUM ZIRCONIUM CYCLOSILICATE 10 G: 10 POWDER, FOR SUSPENSION ORAL at 20:06

## 2020-01-10 RX ADMIN — IPRATROPIUM BROMIDE AND ALBUTEROL SULFATE 3 ML: 2.5; .5 SOLUTION RESPIRATORY (INHALATION) at 15:34

## 2020-01-10 RX ADMIN — SODIUM ZIRCONIUM CYCLOSILICATE 10 G: 10 POWDER, FOR SUSPENSION ORAL at 15:28

## 2020-01-10 NOTE — PLAN OF CARE
Pt arrived via stretcher from CT with RN. Pt in NAD, SB on the monitor, RA SpO2 90%, applied 2 lpm NC. Pt given diet tray and glucose checked. Pt ambulatory with stand by assistance to bathroom. Echo ordered for pt. Will continue to monitor.

## 2020-01-10 NOTE — H&P
[facility] Medicine Services  Clinical Decision Unit  HISTORY & PHYSICAL    Patient Name: Karen Newman  : 1950  MRN: [id number]  Primary Care Physician: Mirian Arnold APRN  Date of admission: 1/10/2020  1:56 PM    Subjective     Chief Complaint: weakness     HPI:  Karen Newman is a 69 y.o. female with PMH significant for HTN, HLD, paroxysmal atrial fibrillation (s/p MAZE by Dr. Kay 17), insulin-dependent DMII, GERD, acquired hypothyroidism KRISTEN on CPAP and CKD III. She presented to [facility] ED 1/10/2020 with complaints of a 24 hour history of weakness. She reported that she developed weakness on  and has been unable to walk since this time. She also noted a 2-3 month history of low back pain and intermittent dizzy spells, which occur particularly after sitting or standing for a long period of time and resolve quickly. She has fallen once due to a dizzy spell, approximately 1 month ago.     No recent medication changes. She was started on spironolactone sometime in summer 2019, per chart review, as recently as May 2019.     ED evaluation notable for K+ 8.2, creatinine 1.9 (baseline 1.3-1.5), BUN 29. Hgb 10.8. She was given insulin, D50 and Lokelma in the ED. Hospital medicine was asked to admit.     Review of Systems   HENT: Negative.    Respiratory: Negative.    Cardiovascular: Negative.    Gastrointestinal: Positive for abdominal pain. Negative for diarrhea, nausea and vomiting.   Genitourinary: Negative.    Musculoskeletal: Positive for back pain and gait problem.   Neurological: Positive for weakness.   Psychiatric/Behavioral: Negative.       All other systems reviewed and negative    Personal History     Past Medical History:   Diagnosis Date   • Anxiety    • Arthritis    • Atrial fibrillation (CMS/HCC)     Patient reported HX of cardiac ablation and that she had a small clot that formed in a vessel prior to this procedure   • Body piercing      "ears   • Cancer (CMS/HCC) 10/22/2018    RIGHT FOOT STILL HAS SOME SKIN CANCER IN PLACE AT THIS TIME- WILL BE GETTING REMOVED OCTOBER 22ND 2018   • CKD (chronic kidney disease)     IGA - GETS CHECKED YEARLY FROM NEPHRO    • Colon polyps    • Diabetes mellitus (CMS/HCC)     SINCE 2005 - CHECKS 3 TIMES PER DAY    • Elevated cholesterol    • GERD (gastroesophageal reflux disease)    • Hemorrhoids    • History of bronchitis    • History of pneumonia    • History of transfusion     Reports no reaction to transfusion   • Hypertension    • Iron deficiency anemia    • On home O2     2L AT NIGHT WITH CPAP    • Sleep apnea     CPAP HS with oxygen at 2L    • SOB (shortness of breath) on exertion    • Thyroid disease    • VHD (valvular heart disease)    • Wears glasses      Past Surgical History:   Procedure Laterality Date   • CARDIAC ABLATION     • COLONOSCOPY     • COLONOSCOPY W/ POLYPECTOMY     • COLONOSCOPY W/ POLYPECTOMY     • ENDOSCOPY     • EYE CAPSULOTOMY WITH LASER Left 5/7/2019    Procedure: EYE CAPSULOTOMY WITH LASER LEFT;  Surgeon: Cecily Maria MD;  Location:  [facility];  Service: Ophthalmology   • EYE SURGERY Bilateral     cataract extraction   • EYE SURGERY Right     Reported \"they did a little surgery for Graves Disease where they chopped the bone away\"   • EYE SURGERY Left     Reported \"they took some scar tissue\"   • OTHER SURGICAL HISTORY      PT HAD BLOOD CLOT IN HEART SURGERY AND WENT IN THROUGH NECK BUT UNSURE WHAT CALLED - possible santa?   • SKIN BIOPSY      RIGHT FOOT FOR SKIN CANCER   • THORACOSCOPY Left 9/25/2017    Procedure: THORASOSCOPY VIDEO ASSISTED WITH LEFT ATRIAL APPENDAGE ABLATION;  Surgeon: Alec Kay MD;  Location:  [facility];  Service:    • TOTAL ABDOMINAL HYSTERECTOMY     • TRANSESOPHAGEAL ECHOCARDIOGRAM (SANTA) Bilateral 7/31/2017    Procedure: TRANSESOPHAGEAL ECHOCARDIOGRAM AND BRONCHOSCOPY ;  Surgeon: Alec Kay MD;  Location:  [facility];  Service:    • TUBAL ABDOMINAL " LIGATION     • VAGINAL DELIVERY      x3   • WISDOM TOOTH EXTRACTION       Family History: family history includes Coronary artery disease in her mother; Hypertension in her mother; Kidney disease in her father. Otherwise pertinent FHx was reviewed and unremarkable.     Social History:  reports that she quit smoking about 3 years ago. Her smoking use included cigarettes. She has a 7.50 pack-year smoking history. She has never used smokeless tobacco. She reports that she does not drink alcohol or use drugs.  Social History     Social History Narrative    L[facility] in [facility]     Medications:  Available home medication information reviewed.    (Not in a hospital admission)    Allergies   Allergen Reactions   • Buspirone Nausea And Vomiting   • Lisinopril Other (See Comments)     cough   • Sertraline Hcl Other (See Comments)     nightmares   • Sular [Nisoldipine Er] Cough     Objective     Vital Signs:   Temp:  [98.5 °F (36.9 °C)] 98.5 °F (36.9 °C)  Heart Rate:  [43-58] 51  Resp:  [12-20] 19  BP: (123-148)/(56-69) 123/56        Physical Exam   Constitutional: Awake, alert and conversant. Sitting upright in bed. Obese body habitus.   Eyes: PERRLA, sclerae anicteric, no conjunctival injection  HENT: NCAT, mucous membranes moist  Neck: Supple, no thyromegaly, no lymphadenopathy, trachea midline  Respiratory: Clear to auscultation bilaterally, nonlabored respirations   Cardiovascular: RRR, no murmurs, rubs, or gallops, palpable pedal pulses bilaterally  Gastrointestinal: Positive bowel sounds, soft, nontender, nondistended  Musculoskeletal: No bilateral ankle edema, no clubbing or cyanosis to extremities  Psychiatric: Appropriate affect, cooperative  Neurologic: Oriented x 3, strength symmetric in all extremities, Cranial Nerves grossly intact to confrontation, speech clear  Skin: No rashes    Results Reviewed:  Results from last 7 days   Lab Units 01/10/20  1435 01/10/20  1307   WBC 10*3/mm3  --  6.59   HEMOGLOBIN g/dL  --   10.8*   HEMOGLOBIN, POC g/dL 10.5*  --    HEMATOCRIT %  --  34.4   HEMATOCRIT POC % 31*  --    PLATELETS 10*3/mm3  --  216     Results from last 7 days   Lab Units 01/10/20  1435 01/10/20  1434 01/10/20  1307   SODIUM mmol/L  --   --  137   POTASSIUM mmol/L  --  8.2* 8.0*   CHLORIDE mmol/L  --   --  107   CO2 mmol/L  --   --  19.0*   BUN mg/dL  --   --  29*   CREATININE mg/dL 1.90*  --  1.82*   CALCIUM mg/dL  --   --  10.0   BILIRUBIN mg/dL  --   --  0.2   ALK PHOS U/L  --   --  67   ALT (SGPT) U/L  --   --  9   AST (SGOT) U/L  --   --  12   GLUCOSE mg/dL  --   --  115*     Assessment / Plan     Active Hospital Problems    Diagnosis POA   • **Hyperkalemia [E87.5] Yes   • VHD (valvular heart disease) [I38] Yes   • Acute renal failure superimposed on stage 3 chronic kidney disease (CMS/HCC) [N17.9, N18.3] Yes   • Type 2 diabetes mellitus with chronic kidney disease, with long-term current use of insulin (CMS/HCC) [E11.22, Z79.4] Not Applicable   • Stage 3 chronic kidney disease (CMS/HCC) [N18.3] Yes   • KRISTEN on CPAP [G47.33, Z99.89] Not Applicable   • Hypertension [I10] Yes     Karen Newman is a 69 y.o. female with PMH significant for HTN, HLD, paroxysmal atrial fibrillation (s/p MAZE by Dr. Kay 9/26/17), insulin-dependent DMII, GERD, acquired hypothyroidism KRISTEN on CPAP and CKD III. Admitted to [facility] 1/10/20 for JORDAN, hyperkalemia and weakness. Also complained of intermittent lower abdominal pain and a 2-3 month history of hip/low back pain for which a CT abdomen/pelvis was ordered in the ED.     Hyperkalemia  - Cause not clear, given that she has been taking spironolactone for at least 5 months   - Hold spironolactine and lisinopril for now  - s/p Lokelma 10g in ED, give a second dose at 8pm tonight  - Repeat BMP in AM - can have up to 50g Lokelma in 24H     Acute kidney injury on CKD III  - [facility]. Baseline creatinine 1.3-1.5  - Holding nephrotoxins  - Give D5 1/2 NS  overnight, AM BMP     Dizzy spells  - Check orthostatics tonight and again in AM  - Check ECHO   - Low DBP in ED and she reports that episodes primarily occur when getting up after sitting or laying for a long period of time   - May need less BP medication     Low back/hip pain   - 2-3 month history. No sciatica symptoms  - CT abdomen/pelvis pending     Weakness  - Suspect related to hyperkalemia and JORDAN  - PT/OT for mobilization     Essential HTN  - Holding home lisinopril and spironolactone   - Continue home Diltiazem XL 420mg and Cardura 8mg QHS   - Suspect will need adjustments in BP regimen prior to discharge     Insulin-dependent DMII  - Check A1c, patient of Dr. Karel Blevins   - At baseline, on Metformin 850mg PO TID, Lantus 35 units QHS and Lispro 14 units QAM, 18 units with lunch and 20 units with dinner   - Hold Metformin, start Levemir 20 units QHS, Lispro 6 units TID AC + SSI     KRISTEN on CPAP  - CPAP QHS and PRN     Admission Status:   I believe this patient meets INPATIENT status     Discharge Blueprint (criteria for discharge):   1. Electrolytes stabilized  2. JORDAN resolved   3. Blood pressure controlled (and no hypotension or pre-syncopal symptoms)   4. Weakness improved    Electronically signed by Sparkle Gayle PA-C, 01/10/20, 4:42 PM.           Attending   Admission Attestation         I have seen and examined the patient, performing an independent face-to-face diagnostic evaluation with plan of care reviewed and developed with the advanced practice clinician (APC).        Brief Summary Statement:   Karen Newman is a 69 y.o. female with known hx of CKDIII followed by Dr. Hastings of [facility] recently started on aldactone in August 2019 and also taking chronic ACEI presented to [facility] ED with 1 day of extreme BLE weakness.  The weakness started yesterday as uncontrolled tremors to BLE then progressed to unable to safely bear own weight or ambulate.  Also very fatigued.  Has had ongoing issues with  likely symptomatic orthostasis based on history.  Admitting for reversal of critical K+ with  adjustment of home meds to avoid orthostasis and electrolyte abnormalities.   Remainder of detailed HPI is as noted by APC and has been reviewed and/or edited by me for completeness.     Attending Physical Exam:  Constitutional: No acute distress, awake, alert, nontoxic, obese body habitus  Eyes: Pupils equal, sclerae anicteric, no conjunctival injection  HENT: Mucous membranes moist  Respiratory: Clear to auscultation bilaterally, good effort, nonlabored respirations   Cardiovascular: RRR, 2-3/6 soft holosystolic murmur  Gastrointestinal: Soft, nondistended  Musculoskeletal: No peripheral edema, normal muscle tone for age  Psychiatric: Appropriate affect, good insight and judgement, cooperative  Neurologic: No current tremors or myoclonic jerks     Brief Assessment/Plan :  See detailed assessment and plan developed with APC which I have reviewed and/or edited for completeness.     Electronically signed by Ebony Mayen MD, 01/10/20, 4:29 PM.

## 2020-01-10 NOTE — ED PROVIDER NOTES
Subjective   Karen Newman is a 69 y.o. female who presents to the ED with c/o generalized weakness. The patient complains of generalized weakness, dizziness, and fatigue since last night. She notes that she feels so weak that she is unable to walk. She also complains of lower back pain, abdominal pain, and frequent falls. She denies chest pain, dysuria, numbness or tingling in her groin, and shortness of breath. The patient has a medical history of chronic kidney disease and diabetes mellitus. She denies dialysis. There are no other acute complains       History provided by:  Patient  Weakness - Generalized   Severity:  Moderate  Onset quality:  Sudden  Duration:  1 day  Timing:  Constant  Progression:  Unchanged  Chronicity:  New  Relieved by:  None tried  Ineffective treatments:  None tried  Associated symptoms: abdominal pain and dizziness    Associated symptoms: no chest pain, no dysuria and no shortness of breath        Review of Systems   Constitutional: Positive for fatigue.   Respiratory: Negative for shortness of breath.    Cardiovascular: Negative for chest pain.   Gastrointestinal: Positive for abdominal pain.   Genitourinary: Negative for dysuria.   Musculoskeletal: Positive for back pain.   Neurological: Positive for dizziness and weakness. Negative for numbness.   All other systems reviewed and are negative.      Past Medical History:   Diagnosis Date   • Anxiety    • Arthritis    • Atrial fibrillation (CMS/HCC)     Patient reported HX of cardiac ablation and that she had a small clot that formed in a vessel prior to this procedure   • Body piercing     ears   • Cancer (CMS/HCC) 10/22/2018    RIGHT FOOT STILL HAS SOME SKIN CANCER IN PLACE AT THIS TIME- WILL BE GETTING REMOVED OCTOBER 22ND 2018   • CKD (chronic kidney disease)     IGA - GETS CHECKED YEARLY FROM NEPHRO    • Colon polyps    • Diabetes mellitus (CMS/HCC)     SINCE 2005 - CHECKS 3 TIMES PER DAY    • Elevated cholesterol    • GERD  "(gastroesophageal reflux disease)    • Hemorrhoids    • History of bronchitis    • History of pneumonia    • History of transfusion     Reports no reaction to transfusion   • Hypertension    • Iron deficiency anemia    • On home O2     2L AT NIGHT WITH CPAP    • Sleep apnea     CPAP HS with oxygen at 2L    • SOB (shortness of breath) on exertion    • Thyroid disease    • VHD (valvular heart disease)    • Wears glasses        Allergies   Allergen Reactions   • Buspirone Nausea And Vomiting   • Lisinopril Other (See Comments)     cough   • Sertraline Hcl Other (See Comments)     nightmares   • Sular [Nisoldipine Er] Cough       Past Surgical History:   Procedure Laterality Date   • CARDIAC ABLATION     • COLONOSCOPY     • COLONOSCOPY W/ POLYPECTOMY     • COLONOSCOPY W/ POLYPECTOMY     • ENDOSCOPY     • EYE CAPSULOTOMY WITH LASER Left 5/7/2019    Procedure: EYE CAPSULOTOMY WITH LASER LEFT;  Surgeon: Cecily Maria MD;  Location:  [facility];  Service: Ophthalmology   • EYE SURGERY Bilateral     cataract extraction   • EYE SURGERY Right     Reported \"they did a little surgery for Graves Disease where they chopped the bone away\"   • EYE SURGERY Left     Reported \"they took some scar tissue\"   • OTHER SURGICAL HISTORY      PT HAD BLOOD CLOT IN HEART SURGERY AND WENT IN THROUGH NECK BUT UNSURE WHAT CALLED - possible santa?   • SKIN BIOPSY      RIGHT FOOT FOR SKIN CANCER   • THORACOSCOPY Left 9/25/2017    Procedure: THORASOSCOPY VIDEO ASSISTED WITH LEFT ATRIAL APPENDAGE ABLATION;  Surgeon: Alec Kay MD;  Location:  [facility];  Service:    • TOTAL ABDOMINAL HYSTERECTOMY     • TRANSESOPHAGEAL ECHOCARDIOGRAM (SANTA) Bilateral 7/31/2017    Procedure: TRANSESOPHAGEAL ECHOCARDIOGRAM AND BRONCHOSCOPY ;  Surgeon: Alec Kay MD;  Location:  [facility];  Service:    • TUBAL ABDOMINAL LIGATION     • VAGINAL DELIVERY      x3   • WISDOM TOOTH EXTRACTION         Family History   Problem Relation Age of Onset   • Hypertension " Mother    • Coronary artery disease Mother    • Kidney disease Father        Social History     Socioeconomic History   • Marital status:      Spouse name: Not on file   • Number of children: 3   • Years of education: Not on file   • Highest education level: Not on file   Occupational History   • Occupation: Housewife   Tobacco Use   • Smoking status: Former Smoker     Packs/day: 0.50     Years: 15.00     Pack years: 7.50     Types: Cigarettes     Last attempt to quit: 10/18/2016     Years since quitting: 3.2   • Smokeless tobacco: Never Used   Substance and Sexual Activity   • Alcohol use: No   • Drug use: No   • Sexual activity: Defer   Social History Narrative    L[facility] in [facility]         Objective   Physical Exam   Constitutional: She is oriented to person, place, and time. She appears well-developed and well-nourished. No distress.   HENT:   Head: Normocephalic and atraumatic.   Nose: Nose normal.   Eyes: Conjunctivae are normal. No scleral icterus.   Neck: Normal range of motion. Neck supple.   Cardiovascular: Regular rhythm and normal heart sounds. Bradycardia present.   Pulmonary/Chest: Effort normal and breath sounds normal. No respiratory distress.   Abdominal: There is tenderness.   Mild diffuse abdominal tenderness.   Musculoskeletal: Normal range of motion. She exhibits tenderness.   Right sided lumbar tenderness.   Neurological: She is alert and oriented to person, place, and time.   Skin: Skin is warm and dry.   Psychiatric: She has a normal mood and affect. Her behavior is normal.   Nursing note and vitals reviewed.      Critical Care  Performed by: Alec Rojas APRN  Authorized by: Mandeep Hodgson MD     Critical care provider statement:     Critical care time (minutes):  35    Critical care was necessary to treat or prevent imminent or life-threatening deterioration of the following conditions:  Metabolic crisis (Hyperkalemia.  Hypo-magnesium.)    Critical care was time spent personally  by me on the following activities:  Ordering and review of laboratory studies, ordering and review of radiographic studies, pulse oximetry, re-evaluation of patient's condition, review of old charts, obtaining history from patient or surrogate, examination of patient, evaluation of patient's response to treatment, discussions with consultants and development of treatment plan with patient or surrogate             ED Course  ED Course as of Chalino 10 1604   Fri Chalino 10, 2020   1526 Alec Rojas paged the hospitalist.     [SK]   1544 Alec Rojas spoke to Dr. Hines, hospitalist.    [SK]   1548 Alec Rojas is at bedside updating the patient.     [SK]   1600 Spoke with a nurse Emily who advised she is already given the D50 insulin.  CT pending.    [JM]      ED Course User Index  [JM] Alec Rojas APRN  [SK] Rody Wade     Recent Results (from the past 24 hour(s))   Comprehensive Metabolic Panel    Collection Time: 01/10/20  1:07 PM   Result Value Ref Range    Glucose 115 (H) 65 - 99 mg/dL    BUN 29 (H) 8 - 23 mg/dL    Creatinine 1.82 (H) 0.57 - 1.00 mg/dL    Sodium 137 136 - 145 mmol/L    Potassium 8.0 (C) 3.5 - 5.2 mmol/L    Chloride 107 98 - 107 mmol/L    CO2 19.0 (L) 22.0 - 29.0 mmol/L    Calcium 10.0 8.6 - 10.5 mg/dL    Total Protein 7.6 6.0 - 8.5 g/dL    Albumin 4.50 3.50 - 5.20 g/dL    ALT (SGPT) 9 1 - 33 U/L    AST (SGOT) 12 1 - 32 U/L    Alkaline Phosphatase 67 39 - 117 U/L    Total Bilirubin 0.2 0.2 - 1.2 mg/dL    eGFR Non African Amer 28 (L) >60 mL/min/1.73    Globulin 3.1 gm/dL    A/G Ratio 1.5 g/dL    BUN/Creatinine Ratio 15.9 7.0 - 25.0    Anion Gap 11.0 5.0 - 15.0 mmol/L   Troponin    Collection Time: 01/10/20  1:07 PM   Result Value Ref Range    Troponin T <0.010 0.000 - 0.030 ng/mL   Magnesium    Collection Time: 01/10/20  1:07 PM   Result Value Ref Range    Magnesium 1.5 (L) 1.6 - 2.4 mg/dL   Light Blue Top    Collection Time: 01/10/20  1:07 PM   Result Value Ref Range    Extra Tube hold for add-on     Green Top (Gel)    Collection Time: 01/10/20  1:07 PM   Result Value Ref Range    Extra Tube Hold for add-ons.    Lavender Top    Collection Time: 01/10/20  1:07 PM   Result Value Ref Range    Extra Tube hold for add-on    Gold Top - SST    Collection Time: 01/10/20  1:07 PM   Result Value Ref Range    Extra Tube Hold for add-ons.    CBC Auto Differential    Collection Time: 01/10/20  1:07 PM   Result Value Ref Range    WBC 6.59 3.40 - 10.80 10*3/mm3    RBC 3.46 (L) 3.77 - 5.28 10*6/mm3    Hemoglobin 10.8 (L) 12.0 - 15.9 g/dL    Hematocrit 34.4 34.0 - 46.6 %    MCV 99.4 (H) 79.0 - 97.0 fL    MCH 31.2 26.6 - 33.0 pg    MCHC 31.4 (L) 31.5 - 35.7 g/dL    RDW 14.5 12.3 - 15.4 %    RDW-SD 52.4 37.0 - 54.0 fl    MPV 9.8 6.0 - 12.0 fL    Platelets 216 140 - 450 10*3/mm3    Neutrophil % 50.7 42.7 - 76.0 %    Lymphocyte % 36.9 19.6 - 45.3 %    Monocyte % 9.4 5.0 - 12.0 %    Eosinophil % 2.1 0.3 - 6.2 %    Basophil % 0.6 0.0 - 1.5 %    Immature Grans % 0.3 0.0 - 0.5 %    Neutrophils, Absolute 3.34 1.70 - 7.00 10*3/mm3    Lymphocytes, Absolute 2.43 0.70 - 3.10 10*3/mm3    Monocytes, Absolute 0.62 0.10 - 0.90 10*3/mm3    Eosinophils, Absolute 0.14 0.00 - 0.40 10*3/mm3    Basophils, Absolute 0.04 0.00 - 0.20 10*3/mm3    Immature Grans, Absolute 0.02 0.00 - 0.05 10*3/mm3    nRBC 0.0 0.0 - 0.2 /100 WBC   POC Glucose Once    Collection Time: 01/10/20  2:24 PM   Result Value Ref Range    Glucose 128 70 - 130 mg/dL   Potassium    Collection Time: 01/10/20  2:34 PM   Result Value Ref Range    Potassium 8.2 (C) 3.5 - 5.2 mmol/L   POC CHEM 8    Collection Time: 01/10/20  2:35 PM   Result Value Ref Range    Glucose 123 70 - 130 mg/dL    BUN 29 (H) 8 - 26 mg/dL    Creatinine 1.90 (H) 0.60 - 1.30 mg/dL    Sodium 137 (L) 138 - 146 mmol/L    POC Potassium 8.0 (H) 3.5 - 4.9 mmol/L    Chloride 111 (H) 98 - 109 mmol/L    Total CO2 20 (L) 24 - 29 mmol/L    Hemoglobin 10.5 (L) 12.0 - 17.0 g/dL    Hematocrit 31 (L) 38 - 51 %    Ionized Calcium  "1.29 1.20 - 1.32 mmol/L     Note: In addition to lab results from this visit, the labs listed above may include labs taken at another facility or during a different encounter within the last 24 hours. Please correlate lab times with ED admission and discharge times for further clarification of the services performed during this visit.    XR Chest 1 View   Final Result   No active disease.       D:  01/10/2020   E:  01/10/2020       This report was finalized on 1/10/2020 3:07 PM by Dr. Donnie Dorado MD.          CT Abdomen Pelvis Without Contrast    (Results Pending)     Vitals:    01/10/20 1252 01/10/20 1528 01/10/20 1534 01/10/20 1536   BP: 148/69 127/60  128/63   BP Location: Right arm      Patient Position: Sitting      Pulse: 58 (!) 46 (!) 43 (!) 45   Resp: 18 20  12   Temp: 98.5 °F (36.9 °C)      TempSrc: Oral      SpO2: 95% 97% 97% 93%   Weight: 85.3 kg (188 lb)      Height: 167.6 cm (66\")        Medications   sodium chloride 0.9 % flush 10 mL (has no administration in time range)   sodium chloride 0.9 % bolus 1,000 mL (1,000 mL Intravenous New Bag 1/10/20 1527)   dextrose (D50W) 25 g/ 50mL Intravenous Solution 25 g (has no administration in time range)   magnesium sulfate in D5W 1g/100mL (PREMIX) (has no administration in time range)   Patiromer Sorbitex Calcium (VELTASSA) 1 packet (has no administration in time range)   ipratropium-albuterol (DUO-NEB) nebulizer solution 3 mL (3 mL Nebulization Given 1/10/20 1534)   insulin regular (humuLIN R,novoLIN R) injection 10 Units (10 Units Intravenous Given 1/10/20 1530)   Sodium Zirconium Cyclosilicate pack 10 g (10 g Oral Given 1/10/20 1528)     ECG/EMG Results (last 24 hours)     Procedure Component Value Units Date/Time    ECG 12 Lead [[id number]] Collected:  01/10/20 1303     Updated:  01/10/20 1402        ECG 12 Lead   Final Result   Test Reason : WEAK   Blood Pressure : **/** mmHG   Vent. Rate : 056 BPM     Atrial Rate : 267 BPM      P-R Int : 260 ms          " QRS Dur : 086 ms       QT Int : 374 ms       P-R-T Axes : 000 001 098 degrees      QTc Int : 360 ms      Normal sinus rhythm  with first degree AV block   Nonspecific ST and T wave abnormality   Abnormal ECG   When compared with ECG of 01-NOV-2017 12:19,   Vent. rate has decreased BY  34 BPM   T wave inversion no longer evident in Inferior leads   T wave inversion less evident in Lateral leads   QT has shortened   Confirmed by JEANMARIE LEGER MD ([id number]) on 1/10/2020 3:40:49 PM      Referred By: MD ER           Confirmed By:JEANMARIE LEGER MD                          [facility]    Final diagnoses:   Hyperkalemia   Hypomagnesemia   Bradycardia   Weakness   Acute abdominal pain   Acute low back pain, unspecified back pain laterality, unspecified whether sciatica present   Renal insufficiency       Documentation assistance provided by keegan Wade.  Information recorded by the scribe was done at my direction and has been verified and validated by me.     Rody Wade  01/10/20 3381       Alec Rojas APRN  01/10/20 1312

## 2020-01-11 ENCOUNTER — APPOINTMENT (OUTPATIENT)
Dept: CARDIOLOGY | Facility: HOSPITAL | Age: 70
End: 2020-01-11

## 2020-01-11 LAB
ANION GAP SERPL CALCULATED.3IONS-SCNC: 10 MMOL/L (ref 5–15)
ANION GAP SERPL CALCULATED.3IONS-SCNC: 9 MMOL/L (ref 5–15)
BUN BLD-MCNC: 21 MG/DL (ref 8–23)
BUN BLD-MCNC: 24 MG/DL (ref 8–23)
BUN/CREAT SERPL: 13.1 (ref 7–25)
BUN/CREAT SERPL: 15.1 (ref 7–25)
CALCIUM SPEC-SCNC: 9.1 MG/DL (ref 8.6–10.5)
CALCIUM SPEC-SCNC: 9.3 MG/DL (ref 8.6–10.5)
CHLORIDE SERPL-SCNC: 105 MMOL/L (ref 98–107)
CHLORIDE SERPL-SCNC: 106 MMOL/L (ref 98–107)
CO2 SERPL-SCNC: 22 MMOL/L (ref 22–29)
CO2 SERPL-SCNC: 24 MMOL/L (ref 22–29)
CREAT BLD-MCNC: 1.59 MG/DL (ref 0.57–1)
CREAT BLD-MCNC: 1.6 MG/DL (ref 0.57–1)
DEPRECATED RDW RBC AUTO: 51.5 FL (ref 37–54)
ERYTHROCYTE [DISTWIDTH] IN BLOOD BY AUTOMATED COUNT: 14.5 % (ref 12.3–15.4)
GFR SERPL CREATININE-BSD FRML MDRD: 32 ML/MIN/1.73
GFR SERPL CREATININE-BSD FRML MDRD: 32 ML/MIN/1.73
GLUCOSE BLD-MCNC: 177 MG/DL (ref 65–99)
GLUCOSE BLD-MCNC: 254 MG/DL (ref 65–99)
GLUCOSE BLDC GLUCOMTR-MCNC: 113 MG/DL (ref 70–130)
GLUCOSE BLDC GLUCOMTR-MCNC: 131 MG/DL (ref 70–130)
GLUCOSE BLDC GLUCOMTR-MCNC: 156 MG/DL (ref 70–130)
GLUCOSE BLDC GLUCOMTR-MCNC: 176 MG/DL (ref 70–130)
HBA1C MFR BLD: 8.4 % (ref 4.8–5.6)
HCT VFR BLD AUTO: 33 % (ref 34–46.6)
HGB BLD-MCNC: 10.4 G/DL (ref 12–15.9)
MAGNESIUM SERPL-MCNC: 1.6 MG/DL (ref 1.6–2.4)
MCH RBC QN AUTO: 30.6 PG (ref 26.6–33)
MCHC RBC AUTO-ENTMCNC: 31.5 G/DL (ref 31.5–35.7)
MCV RBC AUTO: 97.1 FL (ref 79–97)
PHOSPHATE SERPL-MCNC: 3.9 MG/DL (ref 2.5–4.5)
PLATELET # BLD AUTO: 218 10*3/MM3 (ref 140–450)
PMV BLD AUTO: 10.1 FL (ref 6–12)
POTASSIUM BLD-SCNC: 6.2 MMOL/L (ref 3.5–5.2)
POTASSIUM BLD-SCNC: 6.4 MMOL/L (ref 3.5–5.2)
RBC # BLD AUTO: 3.4 10*6/MM3 (ref 3.77–5.28)
SODIUM BLD-SCNC: 137 MMOL/L (ref 136–145)
SODIUM BLD-SCNC: 139 MMOL/L (ref 136–145)
WBC NRBC COR # BLD: 5.91 10*3/MM3 (ref 3.4–10.8)

## 2020-01-11 PROCEDURE — 80048 BASIC METABOLIC PNL TOTAL CA: CPT | Performed by: FAMILY MEDICINE

## 2020-01-11 PROCEDURE — 84100 ASSAY OF PHOSPHORUS: CPT | Performed by: PHYSICIAN ASSISTANT

## 2020-01-11 PROCEDURE — 93306 TTE W/DOPPLER COMPLETE: CPT | Performed by: INTERNAL MEDICINE

## 2020-01-11 PROCEDURE — 25010000002 SULFUR HEXAFLUORIDE MICROSPH 60.7-25 MG RECONSTITUTED SUSPENSION: Performed by: FAMILY MEDICINE

## 2020-01-11 PROCEDURE — 83735 ASSAY OF MAGNESIUM: CPT | Performed by: PHYSICIAN ASSISTANT

## 2020-01-11 PROCEDURE — 80048 BASIC METABOLIC PNL TOTAL CA: CPT | Performed by: PHYSICIAN ASSISTANT

## 2020-01-11 PROCEDURE — 97161 PT EVAL LOW COMPLEX 20 MIN: CPT

## 2020-01-11 PROCEDURE — 83036 HEMOGLOBIN GLYCOSYLATED A1C: CPT | Performed by: PHYSICIAN ASSISTANT

## 2020-01-11 PROCEDURE — 82962 GLUCOSE BLOOD TEST: CPT

## 2020-01-11 PROCEDURE — 63710000001 INSULIN LISPRO (HUMAN) PER 5 UNITS: Performed by: PHYSICIAN ASSISTANT

## 2020-01-11 PROCEDURE — 85027 COMPLETE CBC AUTOMATED: CPT | Performed by: PHYSICIAN ASSISTANT

## 2020-01-11 PROCEDURE — 63710000001 INSULIN DETEMIR PER 5 UNITS: Performed by: PHYSICIAN ASSISTANT

## 2020-01-11 PROCEDURE — 93306 TTE W/DOPPLER COMPLETE: CPT

## 2020-01-11 PROCEDURE — 99232 SBSQ HOSP IP/OBS MODERATE 35: CPT | Performed by: FAMILY MEDICINE

## 2020-01-11 RX ADMIN — LEVOTHYROXINE SODIUM 150 MCG: 150 TABLET ORAL at 05:47

## 2020-01-11 RX ADMIN — PANTOPRAZOLE SODIUM 40 MG: 40 TABLET, DELAYED RELEASE ORAL at 05:47

## 2020-01-11 RX ADMIN — DILTIAZEM HYDROCHLORIDE 420 MG: 240 CAPSULE, COATED, EXTENDED RELEASE ORAL at 08:02

## 2020-01-11 RX ADMIN — GABAPENTIN 600 MG: 300 CAPSULE ORAL at 08:02

## 2020-01-11 RX ADMIN — SULFUR HEXAFLUORIDE 3 ML: KIT at 16:15

## 2020-01-11 RX ADMIN — INSULIN LISPRO 2 UNITS: 100 INJECTION, SOLUTION INTRAVENOUS; SUBCUTANEOUS at 17:38

## 2020-01-11 RX ADMIN — GABAPENTIN 600 MG: 300 CAPSULE ORAL at 20:48

## 2020-01-11 RX ADMIN — TERAZOSIN HYDROCHLORIDE 5 MG: 5 CAPSULE ORAL at 20:47

## 2020-01-11 RX ADMIN — FERROUS SULFATE TAB 325 MG (65 MG ELEMENTAL FE) 325 MG: 325 (65 FE) TAB at 08:02

## 2020-01-11 RX ADMIN — INSULIN LISPRO 6 UNITS: 100 INJECTION, SOLUTION INTRAVENOUS; SUBCUTANEOUS at 12:33

## 2020-01-11 RX ADMIN — PAROXETINE HYDROCHLORIDE 40 MG: 20 TABLET, FILM COATED ORAL at 08:02

## 2020-01-11 RX ADMIN — SODIUM ZIRCONIUM CYCLOSILICATE 10 G: 10 POWDER, FOR SUSPENSION ORAL at 09:17

## 2020-01-11 RX ADMIN — SODIUM ZIRCONIUM CYCLOSILICATE 20 G: 10 POWDER, FOR SUSPENSION ORAL at 18:48

## 2020-01-11 RX ADMIN — SODIUM CHLORIDE, PRESERVATIVE FREE 10 ML: 5 INJECTION INTRAVENOUS at 20:53

## 2020-01-11 RX ADMIN — INSULIN LISPRO 6 UNITS: 100 INJECTION, SOLUTION INTRAVENOUS; SUBCUTANEOUS at 07:55

## 2020-01-11 RX ADMIN — ATORVASTATIN CALCIUM 10 MG: 10 TABLET, FILM COATED ORAL at 20:48

## 2020-01-11 RX ADMIN — SODIUM CHLORIDE, PRESERVATIVE FREE 10 ML: 5 INJECTION INTRAVENOUS at 08:04

## 2020-01-11 RX ADMIN — DEXTROSE AND SODIUM CHLORIDE 100 ML/HR: 5; 450 INJECTION, SOLUTION INTRAVENOUS at 04:29

## 2020-01-11 RX ADMIN — INSULIN LISPRO 6 UNITS: 100 INJECTION, SOLUTION INTRAVENOUS; SUBCUTANEOUS at 17:39

## 2020-01-11 RX ADMIN — INSULIN DETEMIR 20 UNITS: 100 INJECTION, SOLUTION SUBCUTANEOUS at 20:47

## 2020-01-11 RX ADMIN — INSULIN LISPRO 2 UNITS: 100 INJECTION, SOLUTION INTRAVENOUS; SUBCUTANEOUS at 07:55

## 2020-01-11 NOTE — PLAN OF CARE
Pt has slept well during the night, VSS on 2L, NSR/SB, no complaints of pain, plan for ECHO in tomorrow, plan for labs in the AM.

## 2020-01-11 NOTE — PROGRESS NOTES
[facility] Medicine Services  Clinical Decision Unit  PROGRESS NOTE    Patient Name: Karen Newman  : 1950  MRN: [id number]    Admission Date and Time: 1/10/2020  1:56 PM  Primary Care Physician: Mirian Arnold APRN    Subjective   Subjective     CC:  Symptomatic hyperkalemia    HPI:  Patient feels much less fatigued today, no longer has myoclonic tremors, also feels her bilateral lower extremities are not as weak and wants to attempt ambulation with assistance today.    Review of Systems  CV- No chest pain, palpitations  Resp- No dyspnea  GI- No N/V/D, abd pain    Objective   Objective     Vital Signs:   Temp:  [97.3 °F (36.3 °C)-99 °F (37.2 °C)] 98 °F (36.7 °C)  Heart Rate:  [43-79] 73  Resp:  [12-20] 16  BP: (117-150)/(56-97) 127/63        Physical Exam:  Constitutional: No acute distress, awake, alert, nontoxic, obese body habitus  HENT: Mucous membranes moist  Cardiovascular: RRR, 2-3/6 chronic soft holosystolic murmur  Gastrointestinal: Soft, nontender, nondistended  Musculoskeletal: No peripheral edema, normal muscle tone for age  Psychiatric: Appropriate affect, good insight and judgement, cooperative      Results Reviewed:  Potassium improved to 6.4  Creatinine down to 1.59 (baseline ~1.2)      Assessment/Plan   Assessment / Plan     Active Hospital Problems    Diagnosis  POA   • **Hyperkalemia [E87.5]  Yes   • VHD (valvular heart disease) [I38]  Yes   • Acute renal failure superimposed on stage 3 chronic kidney disease (CMS/HCC) [N17.9, N18.3]  Yes   • Type 2 diabetes mellitus with chronic kidney disease, with long-term current use of insulin (CMS/HCC) [E11.22, Z79.4]  Not Applicable   • Stage 3 chronic kidney disease (CMS/HCC) [N18.3]  Yes   • KRISTEN on CPAP [G47.33, Z99.89]  Not Applicable   • Hypertension [I10]  Yes      Resolved Hospital Problems   No resolved problems to display.        Brief course to date:  Karen Newman is a 69 y.o. female with known hx of  CKDIII followed by Dr. Hastings of [facility] recently started on aldactone in summer 2019 and also taking chronic ACEI presented to [facility] ED with 1 day of extreme BLE weakness.  The weakness started yesterday as uncontrolled tremors to BLE then progressed to unable to safely bear own weight or ambulate.  Also very fatigued.  Has had ongoing issues with likely symptomatic orthostasis based on history.  Admitting for reversal of critical K+ with  adjustment of home meds to avoid orthostasis and electrolyte abnormalities.     - s/p Lokelma x2 with improved K+ and sx's -->  Will give another dose x1, check BMP at 1800 and in am    - STOPPED home aldactone due to both hyperkalemia and orthostasis    - SBP's 140's off aldactone and patient no longer feels orthostatic -->  plan to keep OFF aldactone and allow permissive mild HTN due to chronic orthostatism    - unclear why aldactone specifically was initiated but may have simply been for aldosterone sensitive HTN, last ECHO is 2017 --> repeat ECHO pending to eval EF%    - at d/c will restart ACEI (has been long standing med) if creatinine near her baseline ~1.2 or so    - will need outpt repeat BMP and f/u with PCP next week as well as f/u Dr. Hastings of [facility] ([facility]) at discharge       Discharge Blueprint:   1. Repeat BMPs tonight and in am show reliably improved K+  2. Ambulating safely  3. Creatinine remains stable or improved on tomorrow am labs      Electronically signed by Ebony Mayen MD, 01/11/20, 1:36 PM.

## 2020-01-11 NOTE — PLAN OF CARE
Problem: Patient Care Overview  Goal: Plan of Care Review  Flowsheets (Taken 1/11/2020 6051)  Progress: no change  Plan of Care Reviewed With: patient  Outcome Summary: PT evaluation completed; pt reports she feels she is back at her baseline. Pt ambulated in room without assist, no LOB or unsteadiness. Pt also displayed good safety awareness throughout. No mobility needs identified at this time. Recommend home with assist upon DC. If status changes please reorder IP PT services.

## 2020-01-11 NOTE — PLAN OF CARE
Problem: Fall Risk (Adult)  Goal: Identify Related Risk Factors and Signs and Symptoms  Outcome: Ongoing (interventions implemented as appropriate)  Note:   Pt continues to be treated for hyperkalemia. Fluids stopped. Pt ambulating in room more.  at bedside. VSS. Additional K to be drawn at 1800. VSS. Will continue to monitor.

## 2020-01-11 NOTE — THERAPY DISCHARGE NOTE
Patient Name: Karen Newman  : 1950    MRN: [id number]                              Today's Date: 2020       Admit Date: 1/10/2020    Visit Dx:     ICD-10-CM ICD-9-CM   1. Hyperkalemia E87.5 276.7   2. Hypomagnesemia E83.42 275.2   3. Bradycardia R00.1 427.89   4. Weakness R53.1 780.79   5. Acute abdominal pain R10.9 789.00     338.19   6. Acute low back pain, unspecified back pain laterality, unspecified whether sciatica present M54.5 724.2   7. Renal insufficiency N28.9 593.9     Patient Active Problem List   Diagnosis   • Paroxysmal atrial fibrillation (CMS/HCC)   • VHD (valvular heart disease)   • Hypertension   • Dyslipidemia   • Thyroid disease   • GERD (gastroesophageal reflux disease)   • Stage 3 chronic kidney disease (CMS/HCC)   • Hemorrhoids   • Colon polyps   • Iron deficiency anemia   • KRISTEN on CPAP   • Blood loss anemia   • Postoperative respiratory failure (CMS/HCC)   • Status post Maze operation for atrial fibrillation   • Hyperkalemia   • VHD (valvular heart disease)   • Acute renal failure superimposed on stage 3 chronic kidney disease (CMS/HCC)   • Type 2 diabetes mellitus with chronic kidney disease, with long-term current use of insulin (CMS/HCC)     Past Medical History:   Diagnosis Date   • Anxiety    • Arthritis    • Atrial fibrillation (CMS/HCC)     Patient reported HX of cardiac ablation and that she had a small clot that formed in a vessel prior to this procedure   • Body piercing     ears   • Cancer (CMS/HCC) 10/22/2018    RIGHT FOOT STILL HAS SOME SKIN CANCER IN PLACE AT THIS TIME- WILL BE GETTING REMOVED    • CKD (chronic kidney disease)     IGA - GETS CHECKED YEARLY FROM [facility]    • Colon polyps    • Diabetes mellitus (CMS/HCC)     SINCE  - CHECKS 3 TIMES PER DAY    • Elevated cholesterol    • GERD (gastroesophageal reflux disease)    • Hemorrhoids    • History of bronchitis    • History of pneumonia    • History of transfusion     Reports no reaction  "to transfusion   • Hypertension    • Iron deficiency anemia    • On home O2     2L AT NIGHT WITH CPAP    • Sleep apnea     CPAP HS with oxygen at 2L    • SOB (shortness of breath) on exertion    • Thyroid disease    • VHD (valvular heart disease)    • Wears glasses      Past Surgical History:   Procedure Laterality Date   • CARDIAC ABLATION     • COLONOSCOPY     • COLONOSCOPY W/ POLYPECTOMY     • COLONOSCOPY W/ POLYPECTOMY     • ENDOSCOPY     • EYE CAPSULOTOMY WITH LASER Left 5/7/2019    Procedure: EYE CAPSULOTOMY WITH LASER LEFT;  Surgeon: Cecily Maria MD;  Location:  [facility];  Service: Ophthalmology   • EYE SURGERY Bilateral     cataract extraction   • EYE SURGERY Right     Reported \"they did a little surgery for Graves Disease where they chopped the bone away\"   • EYE SURGERY Left     Reported \"they took some scar tissue\"   • OTHER SURGICAL HISTORY      PT HAD BLOOD CLOT IN HEART SURGERY AND WENT IN THROUGH NECK BUT UNSURE WHAT CALLED - possible santa?   • SKIN BIOPSY      RIGHT FOOT FOR SKIN CANCER   • THORACOSCOPY Left 9/25/2017    Procedure: THORASOSCOPY VIDEO ASSISTED WITH LEFT ATRIAL APPENDAGE ABLATION;  Surgeon: Alec Kay MD;  Location:  [facility];  Service:    • TOTAL ABDOMINAL HYSTERECTOMY     • TRANSESOPHAGEAL ECHOCARDIOGRAM (SANTA) Bilateral 7/31/2017    Procedure: TRANSESOPHAGEAL ECHOCARDIOGRAM AND BRONCHOSCOPY ;  Surgeon: Alec Kay MD;  Location:  [facility];  Service:    • TUBAL ABDOMINAL LIGATION     • VAGINAL DELIVERY      x3   • WISDOM TOOTH EXTRACTION       General Information     Row Name 01/11/20 0932          PT Evaluation Time/Intention    Document Type  evaluation;discharge evaluation/summary  -DEION     Mode of Treatment  physical therapy  -DEION     Row Name 01/11/20 0932          General Information    Patient Profile Reviewed?  yes  -DEION     Prior Level of Function  independent:;ADL's;bathing;dressing;driving;community mobility;all household mobility  -DEION     Existing " Precautions/Restrictions  fall  -DEION     Barriers to Rehab  none identified  -DEION     Row Name 01/11/20 0932          Relationship/Environment    Lives With  spouse  -DEION     Row Name 01/11/20 0932          Resource/Environmental Concerns    Current Living Arrangements  home/apartment/condo  -DEION     Row Name 01/11/20 0932          Home Main Entrance    Number of Stairs, Main Entrance  none Can use door without steps.  -DEION     Row Name 01/11/20 0932          Cognitive Assessment/Intervention- PT/OT    Orientation Status (Cognition)  oriented x 4  -DEION     Row Name 01/11/20 0932          Safety Issues, Functional Mobility    Comment, Safety Issues/Impairments (Mobility)  Pt demo good safety awareness during evaluation, no needs identified.   -DEION       User Key  (r) = Recorded By, (t) = Taken By, (c) = Cosigned By    Initials Name Provider Type    Inés Carrasco, PT Physical Therapist        Mobility     Row Name 01/11/20 0933          Bed Mobility Assessment/Treatment    Comment (Bed Mobility)  Deferred, pt up in restroom with staff when PT entered.   -DEION     Row Name 01/11/20 0933          Transfer Assessment/Treatment    Comment (Transfers)  Pt ambulated in room, demo good dynamic balance and safety with transfers.   -DEION     Row Name 01/11/20 0933          Sit-Stand Transfer    Sit-Stand [place] (Transfers)  conditional independence  -DEION     Row Name 01/11/20 0933          Gait/Stairs Assessment/Training    Gait/Stairs Assessment/Training  gait/ambulation independence  -DEION     Harris Level (Gait)  conditional independence  -DEION     Distance in Feet (Gait)  30  -DEION     Pattern (Gait)  step-through  -DEION     Deviations/Abnormal Patterns (Gait)  gait speed decreased  -DEION     Comment (Gait/Stairs)  Pt ambulated in room, no complaints or LOB.   -DEION       User Key  (r) = Recorded By, (t) = Taken By, (c) = Cosigned By    Initials Name Provider Type    Inés Carrasco PT Physical Therapist         Obj/Interventions     Row Name 01/11/20 0935          General ROM    GENERAL ROM COMMENTS  LEs WFL  -DEION     Row Name 01/11/20 0935          MMT (Manual Muscle Testing)    General MMT Comments  LEs grossly 4/5  -DEION     Row Name 01/11/20 0935          Static Sitting Balance    Level of [place] (Unsupported Sitting, Static Balance)  independent  -DEION     Sitting Position (Unsupported Sitting, Static Balance)  sitting in chair  -DEION     Time Able to Maintain Position (Unsupported Sitting, Static Balance)  3 to 4 minutes  -DEION     Row Name 01/11/20 09          Static Standing Balance    Level of [place] (Supported Standing, Static Balance)  independent  -DEION     Time Able to Maintain Position (Supported Standing, Static Balance)  1 to 2 minutes  -DEION     Row Name 01/11/20 09          Dynamic Standing Balance    Level of [place], Reaches Outside Midline (Standing, Dynamic Balance)  conditional independence  -DEION     Time Able to Maintain Position, Reaches Outside Midline (Standing, Dynamic Balance)  15 to 30 seconds  -DEION     Comment, Reaches Outside Midline (Standing, Dynamic Balance)  Reaching in restroom for supplies while standing.   -DEION     Row Name 01/11/20 0935          Sensory Assessment/Intervention    Sensory General Assessment  no sensation deficits identified  -       User Key  (r) = Recorded By, (t) = Taken By, (c) = Cosigned By    Initials Name Provider Type    Inés Carrasco, PT Physical Therapist        Goals/Plan    No documentation.       Clinical Impression     Row Name 01/11/20 0935          Pain Assessment    Additional Documentation  Pain Scale: Numbers Pre/Post-Treatment (Group)  -DEION     Row Name 01/11/20 0935          Pain Scale: Numbers Pre/Post-Treatment    Pain Scale: Numbers, Pretreatment  0/10 - no pain  -DEION     Pain Scale: Numbers, Post-Treatment  0/10 - no pain  -DEION     Row Name 01/11/20 0935          Plan of Care Review    Plan of Care Reviewed With  patient  -DEION      Progress  no change  -DEION     Outcome Summary  PT evaluation completed, pt reports she feels she is back at her baseline. Pt ambulated in room without assist, no LOB or unsteadiness. Pt also displayed good safety awareness throughout. No mobility needs identified at this time. Recommend home with assist upon DC. If status changes please reorder IP PT services.   -DEION     Row Name 01/11/20 0935          Physical Therapy Clinical Impression    Patient/Family Goals Statement (PT Clinical Impression)  Return home.   -DEION     Criteria for Skilled Interventions Met (PT Clinical Impression)  no;no problems identified which require skilled intervention;current level of function same as previous level of function  -DEION     Row Name 01/11/20 0935          Vital Signs    Pre Systolic BP Rehab  137  -DEION     Pre Treatment Diastolic BP  97  -DEION     Post Systolic BP Rehab  144  -DEION     Post Treatment Diastolic BP  73  -DEION     Pretreatment Heart Rate (beats/min)  73  -DEION     Pre SpO2 (%)  94  -DEION     O2 Delivery Pre Treatment  room air  -DEION     O2 Delivery Intra Treatment  room air  -DEION     Post SpO2 (%)  95  -DEION     O2 Delivery Post Treatment  room air  -DEION     Pre Patient Position  Standing  -DEION     Intra Patient Position  Standing  -DEION     Post Patient Position  Sitting  -DEION     Row Name 01/11/20 0935          Positioning and Restraints    Pre-Treatment Position  bathroom  -DEION     Post Treatment Position  chair  -DEION     In Chair  notified nsg;reclined;call light within reach;encouraged to call for assist  -DEION       User Key  (r) = Recorded By, (t) = Taken By, (c) = Cosigned By    Initials Name Provider Type    Inés Carrasco, PT Physical Therapist        Outcome Measures     Row Name 01/11/20 0938          How much help from another person do you currently need...    Turning from your back to your side while in flat bed without using bedrails?  4  -DEION     Moving from lying on back to sitting on the side of a flat bed without  bedrails?  4  -DEION     Moving to and from a bed to a chair (including a wheelchair)?  4  -DEION     Standing up from a chair using your arms (e.g., wheelchair, bedside chair)?  4  -DEION     Climbing 3-5 steps with a railing?  3  -DEION     To walk in hospital room?  4  -DEION     AM-PAC 6 Clicks Score (PT)  23  -DEION     Row Name 01/11/20 0938          Functional Assessment    Outcome Measure Options  AM-PAC 6 Clicks Basic Mobility (PT)  -DEION       User Key  (r) = Recorded By, (t) = Taken By, (c) = Cosigned By    Initials Name Provider Type    Inés Carrasco, PT Physical Therapist          PT Recommendation and Plan     Outcome Summary/Treatment Plan (PT)  Anticipated Discharge Disposition (PT): home with assist  Plan of Care Reviewed With: patient  Progress: no change  Outcome Summary: PT evaluation completed, pt reports she feels she is back at her baseline. Pt ambulated in room without assist, no LOB or unsteadiness. Pt also displayed good safety awareness throughout. No mobility needs identified at this time. Recommend home with assist upon DC. If status changes please reorder IP PT services.      Time Calculation:   PT Charges     Row Name 01/11/20 0940             Time Calculation    Start Time  0836  -DEION      PT Received On  01/11/20  -DEION        User Key  (r) = Recorded By, (t) = Taken By, (c) = Cosigned By    Initials Name Provider Type    Inés Carrasco PT Physical Therapist        Therapy Charges for Today     Code Description Service Date Service Provider Modifiers Qty    55342591756 HC PT EVAL LOW COMPLEXITY 3 1/11/2020 Inés Mendosa, PT GP 1          PT G-Codes  Outcome Measure Options: AM-PAC 6 Clicks Basic Mobility (PT)  AM-PAC 6 Clicks Score (PT): 23    PT Discharge Summary  Anticipated Discharge Disposition (PT): home with assist    Inés Mendosa PT  1/11/2020

## 2020-01-12 VITALS
HEART RATE: 69 BPM | OXYGEN SATURATION: 91 % | RESPIRATION RATE: 16 BRPM | HEIGHT: 66 IN | BODY MASS INDEX: 30.22 KG/M2 | SYSTOLIC BLOOD PRESSURE: 133 MMHG | TEMPERATURE: 98.1 F | DIASTOLIC BLOOD PRESSURE: 77 MMHG | WEIGHT: 188.05 LBS

## 2020-01-12 PROBLEM — E87.5 HYPERKALEMIA: Status: RESOLVED | Noted: 2020-01-10 | Resolved: 2020-01-12

## 2020-01-12 LAB
ANION GAP SERPL CALCULATED.3IONS-SCNC: 12 MMOL/L (ref 5–15)
ASCENDING AORTA: 3 CM
BH CV ECHO MEAS - AO MAX PG (FULL): 5.3 MMHG
BH CV ECHO MEAS - AO MAX PG: 11.4 MMHG
BH CV ECHO MEAS - AO MEAN PG (FULL): 3.1 MMHG
BH CV ECHO MEAS - AO MEAN PG: 6.2 MMHG
BH CV ECHO MEAS - AO ROOT AREA (BSA CORRECTED): 1.5
BH CV ECHO MEAS - AO ROOT AREA: 6.3 CM^2
BH CV ECHO MEAS - AO ROOT DIAM: 2.8 CM
BH CV ECHO MEAS - AO V2 MAX: 169 CM/SEC
BH CV ECHO MEAS - AO V2 MEAN: 114.8 CM/SEC
BH CV ECHO MEAS - AO V2 VTI: 40 CM
BH CV ECHO MEAS - ASC AORTA: 3 CM
BH CV ECHO MEAS - AVA(I,A): 2 CM^2
BH CV ECHO MEAS - AVA(I,D): 2 CM^2
BH CV ECHO MEAS - AVA(V,A): 2.3 CM^2
BH CV ECHO MEAS - AVA(V,D): 2.3 CM^2
BH CV ECHO MEAS - BSA(HAYCOCK): 2 M^2
BH CV ECHO MEAS - BSA: 1.9 M^2
BH CV ECHO MEAS - BZI_BMI: 30.3 KILOGRAMS/M^2
BH CV ECHO MEAS - BZI_METRIC_HEIGHT: 167.6 CM
BH CV ECHO MEAS - BZI_METRIC_WEIGHT: 85.3 KG
BH CV ECHO MEAS - EDV(CUBED): 60.5 ML
BH CV ECHO MEAS - EDV(MOD-SP2): 61 ML
BH CV ECHO MEAS - EDV(MOD-SP4): 163 ML
BH CV ECHO MEAS - EDV(TEICH): 67 ML
BH CV ECHO MEAS - EF(CUBED): 74.4 %
BH CV ECHO MEAS - EF(MOD-BP): 68 %
BH CV ECHO MEAS - EF(MOD-SP2): 63.9 %
BH CV ECHO MEAS - EF(MOD-SP4): 71.8 %
BH CV ECHO MEAS - EF(TEICH): 66.9 %
BH CV ECHO MEAS - ESV(CUBED): 15.5 ML
BH CV ECHO MEAS - ESV(MOD-SP2): 22 ML
BH CV ECHO MEAS - ESV(MOD-SP4): 46 ML
BH CV ECHO MEAS - ESV(TEICH): 22.2 ML
BH CV ECHO MEAS - FS: 36.5 %
BH CV ECHO MEAS - IVS/LVPW: 1
BH CV ECHO MEAS - IVSD: 1.2 CM
BH CV ECHO MEAS - LA DIMENSION: 3.7 CM
BH CV ECHO MEAS - LA/AO: 1.3
BH CV ECHO MEAS - LAD MAJOR: 6.1 CM
BH CV ECHO MEAS - LAT PEAK E' VEL: 12.3 CM/SEC
BH CV ECHO MEAS - LATERAL E/E' RATIO: 9
BH CV ECHO MEAS - LV DIASTOLIC VOL/BSA (35-75): 83.7 ML/M^2
BH CV ECHO MEAS - LV MASS(C)D: 156.1 GRAMS
BH CV ECHO MEAS - LV MASS(C)DI: 80.1 GRAMS/M^2
BH CV ECHO MEAS - LV MAX PG: 6.1 MMHG
BH CV ECHO MEAS - LV MEAN PG: 3.1 MMHG
BH CV ECHO MEAS - LV SYSTOLIC VOL/BSA (12-30): 23.6 ML/M^2
BH CV ECHO MEAS - LV V1 MAX: 123.4 CM/SEC
BH CV ECHO MEAS - LV V1 MEAN: 81.7 CM/SEC
BH CV ECHO MEAS - LV V1 VTI: 25.6 CM
BH CV ECHO MEAS - LVIDD: 3.9 CM
BH CV ECHO MEAS - LVIDS: 2.5 CM
BH CV ECHO MEAS - LVLD AP2: 8.4 CM
BH CV ECHO MEAS - LVLD AP4: 10.1 CM
BH CV ECHO MEAS - LVLS AP2: 6.7 CM
BH CV ECHO MEAS - LVLS AP4: 8.2 CM
BH CV ECHO MEAS - LVOT AREA (M): 3.1 CM^2
BH CV ECHO MEAS - LVOT AREA: 3.2 CM^2
BH CV ECHO MEAS - LVOT DIAM: 2 CM
BH CV ECHO MEAS - LVPWD: 1.2 CM
BH CV ECHO MEAS - MED PEAK E' VEL: 9.2 CM/SEC
BH CV ECHO MEAS - MEDIAL E/E' RATIO: 12
BH CV ECHO MEAS - MV DEC SLOPE: 478 CM/SEC^2
BH CV ECHO MEAS - MV DEC TIME: 0.26 SEC
BH CV ECHO MEAS - MV E MAX VEL: 113 CM/SEC
BH CV ECHO MEAS - MV MAX PG: 6.5 MMHG
BH CV ECHO MEAS - MV MEAN PG: 1.6 MMHG
BH CV ECHO MEAS - MV P1/2T MAX VEL: 128.3 CM/SEC
BH CV ECHO MEAS - MV P1/2T: 78.6 MSEC
BH CV ECHO MEAS - MV V2 MAX: 127.6 CM/SEC
BH CV ECHO MEAS - MV V2 MEAN: 54.2 CM/SEC
BH CV ECHO MEAS - MV V2 VTI: 42.6 CM
BH CV ECHO MEAS - MVA P1/2T LCG: 1.7 CM^2
BH CV ECHO MEAS - MVA(P1/2T): 2.8 CM^2
BH CV ECHO MEAS - MVA(VTI): 1.9 CM^2
BH CV ECHO MEAS - PA ACC SLOPE: 892.9 CM/SEC^2
BH CV ECHO MEAS - PA ACC TIME: 0.11 SEC
BH CV ECHO MEAS - PA MAX PG: 6 MMHG
BH CV ECHO MEAS - PA MEAN PG: 3.6 MMHG
BH CV ECHO MEAS - PA PR(ACCEL): 28.3 MMHG
BH CV ECHO MEAS - PA V2 MAX: 121.9 CM/SEC
BH CV ECHO MEAS - PA V2 MEAN: 87.4 CM/SEC
BH CV ECHO MEAS - PA V2 VTI: 28 CM
BH CV ECHO MEAS - PI END-D VEL: 77.4 CM/SEC
BH CV ECHO MEAS - RAP SYSTOLE: 8 MMHG
BH CV ECHO MEAS - RVSP: 32 MMHG
BH CV ECHO MEAS - SI(AO): 129.2 ML/M^2
BH CV ECHO MEAS - SI(CUBED): 23.1 ML/M^2
BH CV ECHO MEAS - SI(LVOT): 42 ML/M^2
BH CV ECHO MEAS - SI(MOD-SP2): 20 ML/M^2
BH CV ECHO MEAS - SI(MOD-SP4): 60.1 ML/M^2
BH CV ECHO MEAS - SI(TEICH): 23 ML/M^2
BH CV ECHO MEAS - SV(AO): 251.6 ML
BH CV ECHO MEAS - SV(CUBED): 45 ML
BH CV ECHO MEAS - SV(LVOT): 81.7 ML
BH CV ECHO MEAS - SV(MOD-SP2): 39 ML
BH CV ECHO MEAS - SV(MOD-SP4): 117 ML
BH CV ECHO MEAS - SV(TEICH): 44.8 ML
BH CV ECHO MEAS - TR MAX PG: 24 MMHG
BH CV ECHO MEAS - TR MAX VEL: 245 CM/SEC
BH CV ECHO MEASUREMENTS AVERAGE E/E' RATIO: 10.51
BH CV VAS BP RIGHT ARM: NORMAL MMHG
BH CV XLRA - RV BASE: 4.2 CM
BH CV XLRA - RV LENGTH: 7.5 CM
BH CV XLRA - RV MID: 2.6 CM
BH CV XLRA - TDI S': 9.87 CM/SEC
BUN BLD-MCNC: 21 MG/DL (ref 8–23)
BUN/CREAT SERPL: 13.5 (ref 7–25)
CALCIUM SPEC-SCNC: 9.5 MG/DL (ref 8.6–10.5)
CHLORIDE SERPL-SCNC: 106 MMOL/L (ref 98–107)
CO2 SERPL-SCNC: 23 MMOL/L (ref 22–29)
CREAT BLD-MCNC: 1.56 MG/DL (ref 0.57–1)
GFR SERPL CREATININE-BSD FRML MDRD: 33 ML/MIN/1.73
GLUCOSE BLD-MCNC: 122 MG/DL (ref 65–99)
GLUCOSE BLDC GLUCOMTR-MCNC: 154 MG/DL (ref 70–130)
GLUCOSE BLDC GLUCOMTR-MCNC: 318 MG/DL (ref 70–130)
LEFT ATRIUM VOLUME INDEX: 27.7 ML/M^2
LEFT ATRIUM VOLUME: 54 ML
LV EF 2D ECHO EST: 65 %
POTASSIUM BLD-SCNC: 4.9 MMOL/L (ref 3.5–5.2)
POTASSIUM BLD-SCNC: 5.1 MMOL/L (ref 3.5–5.2)
SODIUM BLD-SCNC: 141 MMOL/L (ref 136–145)

## 2020-01-12 PROCEDURE — 82962 GLUCOSE BLOOD TEST: CPT

## 2020-01-12 PROCEDURE — 99239 HOSP IP/OBS DSCHRG MGMT >30: CPT | Performed by: FAMILY MEDICINE

## 2020-01-12 PROCEDURE — 80048 BASIC METABOLIC PNL TOTAL CA: CPT | Performed by: FAMILY MEDICINE

## 2020-01-12 PROCEDURE — 63710000001 INSULIN LISPRO (HUMAN) PER 5 UNITS: Performed by: PHYSICIAN ASSISTANT

## 2020-01-12 PROCEDURE — 84132 ASSAY OF SERUM POTASSIUM: CPT | Performed by: FAMILY MEDICINE

## 2020-01-12 RX ADMIN — INSULIN LISPRO 2 UNITS: 100 INJECTION, SOLUTION INTRAVENOUS; SUBCUTANEOUS at 08:06

## 2020-01-12 RX ADMIN — LEVOTHYROXINE SODIUM 150 MCG: 150 TABLET ORAL at 05:59

## 2020-01-12 RX ADMIN — INSULIN LISPRO 6 UNITS: 100 INJECTION, SOLUTION INTRAVENOUS; SUBCUTANEOUS at 08:01

## 2020-01-12 RX ADMIN — INSULIN LISPRO 2 UNITS: 100 INJECTION, SOLUTION INTRAVENOUS; SUBCUTANEOUS at 12:13

## 2020-01-12 RX ADMIN — PANTOPRAZOLE SODIUM 40 MG: 40 TABLET, DELAYED RELEASE ORAL at 05:59

## 2020-01-12 RX ADMIN — FERROUS SULFATE TAB 325 MG (65 MG ELEMENTAL FE) 325 MG: 325 (65 FE) TAB at 08:01

## 2020-01-12 RX ADMIN — INSULIN LISPRO 6 UNITS: 100 INJECTION, SOLUTION INTRAVENOUS; SUBCUTANEOUS at 12:13

## 2020-01-12 RX ADMIN — SODIUM CHLORIDE, PRESERVATIVE FREE 10 ML: 5 INJECTION INTRAVENOUS at 08:07

## 2020-01-12 RX ADMIN — GABAPENTIN 600 MG: 300 CAPSULE ORAL at 08:01

## 2020-01-12 RX ADMIN — DILTIAZEM HYDROCHLORIDE 420 MG: 240 CAPSULE, COATED, EXTENDED RELEASE ORAL at 08:01

## 2020-01-12 RX ADMIN — PAROXETINE HYDROCHLORIDE 40 MG: 20 TABLET, FILM COATED ORAL at 08:01

## 2020-01-12 NOTE — DISCHARGE SUMMARY
[facility] Medicine Services  Clinical Decision Unit  DISCHARGE SUMMARY    Patient Name: Karen Newman  : 1950  MRN: [id number]    Admission Date and Time: 1/10/2020  1:56 PM  Discharge Date and Time:  20    Primary Care Physician: Mirian Arnold APRN    Hospital Course     Active Hospital Problems    Diagnosis  POA   • VHD (valvular heart disease) [I38]  Yes   • Acute renal failure superimposed on stage 3 chronic kidney disease (CMS/HCC) [N17.9, N18.3]  Yes   • Type 2 diabetes mellitus with chronic kidney disease, with long-term current use of insulin (CMS/HCC) [E11.22, Z79.4]  Not Applicable   • Status post Maze operation for atrial fibrillation [Z98.890, Z86.79]  Not Applicable   • Stage 3 chronic kidney disease (CMS/HCC) [N18.3]  Yes   • RKISTEN on CPAP [G47.33, Z99.89]  Not Applicable   • Hypertension [I10]  Yes   • Paroxysmal atrial fibrillation (CMS/HCC) [I48.0]  Yes      Resolved Hospital Problems    Diagnosis Date Resolved POA   • **Hyperkalemia [E87.5] 2020 Yes          Brief CDU Course:  Karen Newman is a 69 y.o. female with known hx of CKDIII followed by Dr. Hastings of [facility] recently started on aldactone in summer 2019 and also taking chronic ACEI presented to [facility] ED with 1 day of extreme BLE weakness.  The weakness started yesterday as uncontrolled tremors to BLE then progressed to unable to safely bear own weight or ambulate.  Also very fatigued.  Has had ongoing issues with likely symptomatic orthostasis based on history.  Admitting for reversal of critical K+ with  adjustment of home meds to avoid orthostasis and electrolyte abnormalities.      - s/p Lokelma x3 with improved K+ and sx's     - STOPPED home aldactone due to both hyperkalemia and orthostasis     - SBP's ~140's off aldactone and patient no longer feels orthostatic -->  plan to keep OFF aldactone and allow permissive mild HTN due to chronic orthostatism     - ECHO 20 with mild LVH, EF  65%, normal RVSP      - incidental tele finding of asymptomatic rate controlled Afib, pt s/p prior ablation and MAZE -->  Start Xarelto (the anticoagulant patient best tolerated in past) and patient to see Dr. Bobo soonest available to discuss consideration of repeat ablation since patient had issue on chronic anticoagulation with indolent anemia prompting MAZE candidacy       Key Discharge Recommendations:  - STOP aldactone, but continue chronic ACEI  - f/u PCP this week for K+ and H&H recheck  - start Xarelto for incidental finding on tele of rate controlled Afib (s/p ablation)     Discharge Evaluation     Vital Signs:   Temp:  [97.6 °F (36.4 °C)-98.6 °F (37 °C)] 98.1 °F (36.7 °C)  Heart Rate:  [61-77] 69  Resp:  [16] 16  BP: (106-162)/(63-88) 133/77     Physical Exam:  Constitutional: No acute distress, awake, alert, nontoxic, obese body habitus  Respiratory: Clear to auscultation bilaterally, good effort, nonlabored respirations   Cardiovascular: IRRR, no murmur  Musculoskeletal: No peripheral edema, normal muscle tone for age  Psychiatric: Appropriate affect, good insight and judgement, cooperative      Discharge Medications and Follow-Up        Discharge Medications      New Medications      Instructions Start Date   Rivaroxaban tablet therapy pack starter pack  Commonly known as:  XARELTO   Take as directed         Continue These Medications      Instructions Start Date   atorvastatin 10 MG tablet  Commonly known as:  LIPITOR   10 mg, Oral, Daily      dilTIAZem 420 MG 24 hr capsule  Commonly known as:  TIAZAC   420 mg, Oral, Daily      doxazosin 8 MG tablet  Commonly known as:  CARDURA   8 mg, Oral, Nightly      ferrous sulfate 324 (65 Fe) MG tablet delayed-release EC tablet   324 mg, Oral, Daily With Breakfast      gabapentin 800 MG tablet  Commonly known as:  NEURONTIN   800 mg, Oral, 2 Times Daily      HUMALOG KWIKPEN SC   Subcutaneous, 3 Times Daily Before Meals, Reports she is instructed to take  as follows:  Breakfast -14 units, Lunch - 18 units, Supper - 20 units      insulin glargine 100 UNIT/ML injection  Commonly known as:  LANTUS   35 Units, Subcutaneous, Nightly      levothyroxine 150 MCG tablet  Commonly known as:  SYNTHROID, LEVOTHROID   150 mcg, Oral, Daily      lisinopril 40 MG tablet  Commonly known as:  PRINIVIL,ZESTRIL   40 mg, Oral, Daily      metFORMIN 850 MG tablet  Commonly known as:  GLUCOPHAGE   850 mg, Oral, 3 Times Daily With Meals      Omega-3-Acid Eth Est (Dietary) 1 g capsule   1 capsule, Oral, 2 Times Daily      omeprazole 40 MG capsule  Commonly known as:  priLOSEC   40 mg, Oral, Daily      PARoxetine 40 MG tablet  Commonly known as:  PAXIL   40 mg, Oral, Daily         Stop These Medications    spironolactone 25 MG tablet  Commonly known as:  ALDACTONE              Future Appointments   Date Time Provider Department Center   6/18/2020  1:45 PM Yara Bobo MD [facility] None         Time Spent on Discharge:  45 minutes    Electronically signed by Ebony Mayen MD, 01/12/20, 11:50 AM.

## 2020-01-12 NOTE — PLAN OF CARE
"Patient states that she is no longer feeling dizzy. She ambulated to the bathroom on her own and said that she felt \"steady on her feet\". No c/o of Nausea. Tolerated PO meds. No c/o pain. Afib on monitor.Will continue to monitor.   "

## 2020-01-13 ENCOUNTER — TELEPHONE (OUTPATIENT)
Dept: CARDIOLOGY | Facility: CLINIC | Age: 70
End: 2020-01-13

## 2020-01-13 ENCOUNTER — READMISSION MANAGEMENT (OUTPATIENT)
Dept: CALL CENTER | Facility: HOSPITAL | Age: 70
End: 2020-01-13

## 2020-01-13 NOTE — TELEPHONE ENCOUNTER
PT's  called requesting a f/u appt s/p hospital visit-     Reviewed records with Dr. Bobo who recommends her to f/u with Dr. Osullivan regarding the aflutter she had in the hospital-      While reviewing the records, it was discovered that PT was sent home on Xarelto but not per Afib/flutter protocol but per DVT/PE protocol- also PT does not need anticoagulation as she had RADHA closure 2017 with Dr. Kay- she reports to me that she tried to tell them this but they insisted that she go home on Xarelto-   She states that she has not started Xarelto- instructed that she does not need to start it-     Message to scheduling to get her worked on to see Dr. Osullivan- pt knows that they will contact her with an appt

## 2020-01-13 NOTE — OUTREACH NOTE
Prep Survey      Responses   Facility patient discharged from?  [facility]   Is patient eligible?  Yes   Discharge diagnosis  Hyperkalemia    Does the patient have one of the following disease processes/diagnoses(primary or secondary)?  Other   Does the patient have Home health ordered?  No   Is there a DME ordered?  No   Prep survey completed?  Yes          Virginia Salazar RN

## 2020-01-14 LAB — GLUCOSE BLDC GLUCOMTR-MCNC: 165 MG/DL (ref 70–130)

## 2020-01-16 ENCOUNTER — OFFICE VISIT (OUTPATIENT)
Dept: PRIMARY CARE CLINIC | Age: 70
End: 2020-01-16
Payer: MEDICARE

## 2020-01-16 ENCOUNTER — HOSPITAL ENCOUNTER (OUTPATIENT)
Facility: HOSPITAL | Age: 70
Discharge: HOME | End: 2020-01-16
Payer: MEDICARE

## 2020-01-16 VITALS
WEIGHT: 189 LBS | SYSTOLIC BLOOD PRESSURE: 110 MMHG | OXYGEN SATURATION: 99 % | DIASTOLIC BLOOD PRESSURE: 60 MMHG | BODY MASS INDEX: 30.51 KG/M2 | HEART RATE: 75 BPM

## 2020-01-16 PROCEDURE — 2022F DILAT RTA XM EVC RTNOPTHY: CPT | Performed by: NURSE PRACTITIONER

## 2020-01-16 PROCEDURE — 3046F HEMOGLOBIN A1C LEVEL >9.0%: CPT | Performed by: NURSE PRACTITIONER

## 2020-01-16 PROCEDURE — 83550 IRON BINDING TEST: CPT

## 2020-01-16 PROCEDURE — G8417 CALC BMI ABV UP PARAM F/U: HCPCS | Performed by: NURSE PRACTITIONER

## 2020-01-16 PROCEDURE — 82728 ASSAY OF FERRITIN: CPT

## 2020-01-16 PROCEDURE — 1090F PRES/ABSN URINE INCON ASSESS: CPT | Performed by: NURSE PRACTITIONER

## 2020-01-16 PROCEDURE — 80053 COMPREHEN METABOLIC PANEL: CPT

## 2020-01-16 PROCEDURE — 99214 OFFICE O/P EST MOD 30 MIN: CPT | Performed by: NURSE PRACTITIONER

## 2020-01-16 PROCEDURE — 82306 VITAMIN D 25 HYDROXY: CPT

## 2020-01-16 PROCEDURE — 83540 ASSAY OF IRON: CPT

## 2020-01-16 PROCEDURE — 1123F ACP DISCUSS/DSCN MKR DOCD: CPT | Performed by: NURSE PRACTITIONER

## 2020-01-16 PROCEDURE — 1036F TOBACCO NON-USER: CPT | Performed by: NURSE PRACTITIONER

## 2020-01-16 PROCEDURE — G8400 PT W/DXA NO RESULTS DOC: HCPCS | Performed by: NURSE PRACTITIONER

## 2020-01-16 PROCEDURE — G8427 DOCREV CUR MEDS BY ELIG CLIN: HCPCS | Performed by: NURSE PRACTITIONER

## 2020-01-16 PROCEDURE — 85025 COMPLETE CBC W/AUTO DIFF WBC: CPT

## 2020-01-16 PROCEDURE — G8482 FLU IMMUNIZE ORDER/ADMIN: HCPCS | Performed by: NURSE PRACTITIONER

## 2020-01-16 PROCEDURE — 4040F PNEUMOC VAC/ADMIN/RCVD: CPT | Performed by: NURSE PRACTITIONER

## 2020-01-16 PROCEDURE — 3017F COLORECTAL CA SCREEN DOC REV: CPT | Performed by: NURSE PRACTITIONER

## 2020-01-16 RX ORDER — SODIUM POLYSTYRENE SULFONATE 15 G/60ML
15 SUSPENSION ORAL; RECTAL
COMMUNITY
Start: 2020-01-08 | End: 2022-09-10

## 2020-01-16 ASSESSMENT — PATIENT HEALTH QUESTIONNAIRE - PHQ9
SUM OF ALL RESPONSES TO PHQ9 QUESTIONS 1 & 2: 0
SUM OF ALL RESPONSES TO PHQ QUESTIONS 1-9: 0
2. FEELING DOWN, DEPRESSED OR HOPELESS: 0
1. LITTLE INTEREST OR PLEASURE IN DOING THINGS: 0
SUM OF ALL RESPONSES TO PHQ QUESTIONS 1-9: 0

## 2020-01-16 ASSESSMENT — ENCOUNTER SYMPTOMS
SHORTNESS OF BREATH: 0
EYE PAIN: 0
SORE THROAT: 0
COUGH: 0
NAUSEA: 0
VOMITING: 0
ABDOMINAL PAIN: 0

## 2020-01-16 NOTE — PROGRESS NOTES
Chief Complaint   Patient presents with    Hypertension     Patient here today for a follow up. She states she has been having pain in her lower back. She would like her potassium, iron, vitamin D checked. She has been in the hospital with her potassium. She got to were she couldn't walk.  Diabetes       Have you seen any other physician or provider since your last visit? yes [facility]    Have you had any other diagnostic tests since your last visit? no    Have you changed or stopped any medications since your last visit including any over-the-counter medicines, vitamins, or herbal medicines? no     Are you taking all your prescribed medications? Yes  If NO, why? -  N/A        Diabetic retinal exam completed this year? Yes- Dr. Mahsa Craven in [place]- already in chart will get rescanned. * If yes please have patient sign a records release to obtain record to update Health Maintenance                       * If no, please order referral for patient to be scheduled      REVIEW OF SYSTEMS:  Review of Systems   Constitutional: Negative for chills and fever. HENT: Negative for ear pain and sore throat. Eyes: Negative for pain and visual disturbance. Respiratory: Negative for cough and shortness of breath. Cardiovascular: Negative for chest pain, palpitations and leg swelling. Gastrointestinal: Negative for abdominal pain, nausea and vomiting. Genitourinary: Negative for dysuria and hematuria. Musculoskeletal: Negative for joint swelling. Skin: Negative for rash. Neurological: Negative for dizziness and weakness. Psychiatric/Behavioral: Negative for sleep disturbance.

## 2020-01-17 ENCOUNTER — READMISSION MANAGEMENT (OUTPATIENT)
Dept: CALL CENTER | Facility: HOSPITAL | Age: 70
End: 2020-01-17

## 2020-01-17 LAB
A/G RATIO: 1.6 (ref 0.8–2)
ALBUMIN SERPL-MCNC: 4.4 G/DL (ref 3.4–4.8)
ALP BLD-CCNC: 70 U/L (ref 25–100)
ALT SERPL-CCNC: 8 U/L (ref 4–36)
ANION GAP SERPL CALCULATED.3IONS-SCNC: 10 MMOL/L (ref 3–16)
AST SERPL-CCNC: 14 U/L (ref 8–33)
BASOPHILS ABSOLUTE: 0.1 K/UL (ref 0–0.1)
BASOPHILS RELATIVE PERCENT: 0.8 %
BILIRUB SERPL-MCNC: <0.2 MG/DL (ref 0.3–1.2)
BUN BLDV-MCNC: 26 MG/DL (ref 6–20)
CALCIUM SERPL-MCNC: 9.5 MG/DL (ref 8.5–10.5)
CHLORIDE BLD-SCNC: 104 MMOL/L (ref 98–107)
CO2: 25 MMOL/L (ref 20–30)
CREAT SERPL-MCNC: 1.7 MG/DL (ref 0.4–1.2)
EOSINOPHILS ABSOLUTE: 0.2 K/UL (ref 0–0.4)
EOSINOPHILS RELATIVE PERCENT: 2.9 %
FERRITIN: 28 NG/ML (ref 22–322)
GFR AFRICAN AMERICAN: 36
GFR NON-AFRICAN AMERICAN: 30
GLOBULIN: 2.8 G/DL
GLUCOSE BLD-MCNC: 109 MG/DL (ref 74–106)
HCT VFR BLD CALC: 34.7 % (ref 37–47)
HEMOGLOBIN: 10.8 G/DL (ref 11.5–16.5)
IMMATURE GRANULOCYTES #: 0 K/UL
IMMATURE GRANULOCYTES %: 0.2 % (ref 0–5)
IRON: 69 UG/DL (ref 37–145)
LYMPHOCYTES ABSOLUTE: 2.2 K/UL (ref 1.5–4)
LYMPHOCYTES RELATIVE PERCENT: 35.3 %
MCH RBC QN AUTO: 30.6 PG (ref 27–32)
MCHC RBC AUTO-ENTMCNC: 31.1 G/DL (ref 31–35)
MCV RBC AUTO: 98.3 FL (ref 80–100)
MONOCYTES ABSOLUTE: 0.6 K/UL (ref 0.2–0.8)
MONOCYTES RELATIVE PERCENT: 10.1 %
NEUTROPHILS ABSOLUTE: 3.1 K/UL (ref 2–7.5)
NEUTROPHILS RELATIVE PERCENT: 50.7 %
PDW BLD-RTO: 14.3 % (ref 11–16)
PLATELET # BLD: 245 K/UL (ref 150–400)
PMV BLD AUTO: 10.6 FL (ref 6–10)
POTASSIUM SERPL-SCNC: 5.1 MMOL/L (ref 3.4–5.1)
RBC # BLD: 3.53 M/UL (ref 3.8–5.8)
SODIUM BLD-SCNC: 139 MMOL/L (ref 136–145)
TOTAL IRON BINDING CAPACITY: 385 UG/DL (ref 250–450)
TOTAL PROTEIN: 7.2 G/DL (ref 6.4–8.3)
VITAMIN D 25-HYDROXY: 25.1 (ref 32–100)
WBC # BLD: 6.2 K/UL (ref 4–11)

## 2020-01-17 NOTE — OUTREACH NOTE
Medical Week 1 Survey      Responses   Facility patient discharged from?  [place]   Does the patient have one of the following disease processes/diagnoses(primary or secondary)?  Other   Is there a successful TCM telephone encounter documented?  No   Week 1 attempt successful?  Yes   Call start time  0946   Call end time  0949   Discharge diagnosis  Hyperkalemia    Meds reviewed with patient/caregiver?  Yes   Is the patient having any side effects they believe may be caused by any medication additions or changes?  No   Does the patient have all medications ordered at discharge?  Yes   Is the patient taking all medications as directed (includes completed medication regime)?  Yes   Does the patient have a primary care provider?   Yes   Does the patient have an appointment with their PCP within 7 days of discharge?  Yes   Has the patient kept scheduled appointments due by today?  N/A   Has home health visited the patient within 72 hours of discharge?  N/A   Psychosocial issues?  No   Did the patient receive a copy of their discharge instructions?  Yes   What is the patient's perception of their health status since discharge?  Improving   Is the patient/caregiver able to teach back signs and symptoms related to disease process for when to call PCP?  Yes   Is the patient/caregiver able to teach back signs and symptoms related to disease process for when to call 911?  Yes   Is the patient/caregiver able to teach back the hierarchy of who to call/visit for symptoms/problems? PCP, Specialist, Home health nurse, Urgent Care, ED, 911  Yes   Week 1 call completed?  Yes   Graduated  Yes   Did the patient feel the follow up calls were helpful during their recovery period?  Yes   Was the number of calls appropriate?  Yes   Graduated/Revoked comments  Doing well.  All goals met.          Kami Syed RN

## 2020-01-26 NOTE — PROGRESS NOTES
SUBJECTIVE:    Patient ID: Ezra Myers is a 71 y.o. female. Medical History Review  Past Medical, Family, and Social History reviewed and does contribute to the patient presenting condition    Health Maintenance Due   Topic Date Due    Hepatitis C screen  1950    Diabetic foot exam  06/08/1960    DTaP/Tdap/Td vaccine (1 - Tdap) 06/08/1961    Shingles Vaccine (1 of 2) 06/08/2000    Lipid screen  06/06/2015    DEXA (modify frequency per FRAX score)  06/08/2015    TSH testing  02/27/2018    A1C test (Diabetic or Prediabetic)  09/22/2018    Annual Wellness Visit (AWV)  05/29/2019       HPI:   Chief Complaint   Patient presents with    Hypertension     Patient here today for a follow up. She states she has been having pain in her lower back. She would like her potassium, iron, vitamin D checked. She has been in the hospital with her potassium. She got to were she couldn't walk.  Diabetes   She is taking daily Kayexelate. She had some a fib while she was admitted. They did not restart any anticoagulants. Patient's medications, allergies, past medical, surgical, social and family histories were reviewed and updated as appropriate. Review of Systems Reviewed and acurate. See MA note. OBJECTIVE:  /60   Pulse 75   Wt 189 lb (85.7 kg)   SpO2 99%   BMI 30.51 kg/m²    Physical Exam  Vitals signs reviewed. Constitutional:       General: She is not in acute distress. Appearance: She is well-developed. HENT:      Head: Normocephalic. Right Ear: Tympanic membrane normal.      Left Ear: Tympanic membrane normal.      Mouth/Throat:      Pharynx: No oropharyngeal exudate. Eyes:      General: Lids are normal.   Neck:      Musculoskeletal: Neck supple. Cardiovascular:      Rate and Rhythm: Normal rate and regular rhythm. Heart sounds: Normal heart sounds. Pulmonary:      Effort: Pulmonary effort is normal.      Breath sounds: Normal breath sounds.    Abdominal: General: Bowel sounds are normal. There is no distension. Palpations: Abdomen is soft. Tenderness: There is no abdominal tenderness. Lymphadenopathy:      Cervical: No cervical adenopathy. Skin:     General: Skin is warm and dry. Neurological:      Mental Status: She is alert and oriented to person, place, and time.          Results in Past 30 Days  Result Component Current Result Ref Range Previous Result Ref Range   Alb 4.4 (1/16/2020) 3.4 - 4.8 g/dL Not in Time Range    Albumin/Globulin Ratio 1.6 (1/16/2020) 0.8 - 2.0 Not in Time Range    Alkaline Phosphatase 70 (1/16/2020) 25 - 100 U/L Not in Time Range    ALT 8 (1/16/2020) 4 - 36 U/L Not in Time Range    AST 14 (1/16/2020) 8 - 33 U/L Not in Time Range    BUN 26 (H) (1/16/2020) 6 - 20 mg/dL Not in Time Range    Calcium 9.5 (1/16/2020) 8.5 - 10.5 mg/dL Not in Time Range    Chloride 104 (1/16/2020) 98 - 107 mmol/L Not in Time Range    CO2 25 (1/16/2020) 20 - 30 mmol/L Not in Time Range    CREATININE 1.7 (H) (1/16/2020) 0.4 - 1.2 mg/dL Not in Time Range    GFR  36 (L) (1/16/2020) >59 Not in Time Range    GFR Non- 30 (L) (1/16/2020) >59 Not in Time Range    Globulin 2.8 (1/16/2020) g/dL Not in Time Range    Glucose 109 (H) (1/16/2020) 74 - 106 mg/dL Not in Time Range    Potassium 5.1 (1/16/2020) 3.4 - 5.1 mmol/L Not in Time Range    Sodium 139 (1/16/2020) 136 - 145 mmol/L Not in Time Range    Total Bilirubin <0.2 (L) (1/16/2020) 0.3 - 1.2 mg/dL Not in Time Range    Total Protein 7.2 (1/16/2020) 6.4 - 8.3 g/dL Not in Time Range      Hemoglobin A1C (%)   Date Value   09/22/2017 7.9     LDL Calculated (mg/dL)   Date Value   06/06/2014 46       Lab Results   Component Value Date    WBC 6.2 01/16/2020    NEUTROABS 3.1 01/16/2020    HGB 10.8 (L) 01/16/2020    HCT 34.7 (L) 01/16/2020    MCV 98.3 01/16/2020     01/16/2020     Lab Results   Component Value Date    TSH 0.80 02/27/2017       Prior to Visit Medications 1\" 3 ML MISC Monthly for B12 injection Yes EULALIA Alvarez   atorvastatin (LIPITOR) 10 MG tablet Take 10 mg by mouth daily. Yes Historical Provider, MD   Needles & Syringes MISC 1 each by Does not apply route daily. Syringe for B12 IM injection Yes EULALIA Alvarez   Insulin Lispro, Human, (HUMALOG SC) Inject into the skin 14u- breakfast, 16u- lunch, 18u supper Yes Historical Provider, MD   furosemide (LASIX) 20 MG tablet One daily as needed for swelling. Yes EULALIA Alvarez   doxazosin (CARDURA) 8 MG tablet Take 1 tablet by mouth nightly. Yes EULALIA Alvarez   levothyroxine (SYNTHROID) 175 MCG tablet Take 1 tablet by mouth every 48 hours. Patient taking differently: Take 175 mcg by mouth daily  Yes EULALIA Alvarez   metformin (GLUCOPHAGE) 850 MG tablet Take 1 tablet by mouth 3 times daily. Yes EULALIA Alvarez   vitamin D (ERGOCALCIFEROL) 66546 units CAPS capsule Take 1 capsule by mouth once a week for 8 doses  EULALIA Pepe - CNP       ASSESSMENT:  1. Anemia, unspecified type    2. Type 2 diabetes mellitus with complication, without long-term current use of insulin ([facility] [address]    3. Chronic kidney disease, stage 3 (moderate) (HCC)    4. Vitamin D deficiency    5. Hyperkalemia        PLAN:  No orders of the defined types were placed in this encounter. Orders Placed This Encounter   Procedures    VITAMIN D 25 HYDROXY    CBC WITH AUTO DIFFERENTIAL    COMPREHENSIVE METABOLIC PANEL    FERRITIN     Keep scheduled f/u.

## 2020-01-29 ENCOUNTER — OFFICE VISIT (OUTPATIENT)
Dept: CARDIOLOGY | Facility: CLINIC | Age: 70
End: 2020-01-29

## 2020-01-29 VITALS
SYSTOLIC BLOOD PRESSURE: 110 MMHG | DIASTOLIC BLOOD PRESSURE: 56 MMHG | BODY MASS INDEX: 30.76 KG/M2 | OXYGEN SATURATION: 93 % | HEART RATE: 72 BPM | HEIGHT: 66 IN | WEIGHT: 191.4 LBS

## 2020-01-29 DIAGNOSIS — I48.0 PAF (PAROXYSMAL ATRIAL FIBRILLATION) (HCC): Primary | ICD-10-CM

## 2020-01-29 DIAGNOSIS — Z98.890 STATUS POST MAZE OPERATION FOR ATRIAL FIBRILLATION: ICD-10-CM

## 2020-01-29 DIAGNOSIS — Z86.79 STATUS POST MAZE OPERATION FOR ATRIAL FIBRILLATION: ICD-10-CM

## 2020-01-29 DIAGNOSIS — I38 VHD (VALVULAR HEART DISEASE): ICD-10-CM

## 2020-01-29 PROCEDURE — 99213 OFFICE O/P EST LOW 20 MIN: CPT | Performed by: INTERNAL MEDICINE

## 2020-01-29 NOTE — PROGRESS NOTES
Karen Newman  1950  [phone]    01/29/2020    [facility] CARDIOLOGY     Mirian Arnold, APRN  [address] [facility] [address]    Chief Complaint   Patient presents with   • Atrial Fibrillation       PROBLEM LIST:  1. Atrial fibrillation, duration unknown; diagnosed August of 2013:  a. Status post ALANIS/external cardioversion, (10/18/2013), Yara Bobo M.D. with the initiation of propafenone therapy.   b. Atrial fibrillation, (11/13/2013), in Gloria Gomez’ office.  c. Normal sinus rhythm at the time of office visit, (11/21/2013).  d. Normal sinus rhythm, EKG, (06/05/2014).  e. Discontinuation of Rythmol secondary to diarrhea, (06/05/2014), with initiation of flecainide 50 mg b.i.d. and Eliquis 5 mg b.i.d.   f. CHADS Vasc= 4, (4/18/2017)   g. CT scan of the chest (06/29/2017): Mild fibrotic changes, o/w normal  h. Pulmonary Function Test (06/29/2017): FEV1/FVC  1.54/2.57 = 60%  i. Nuclear Stress Test (06/29/2017): EF>70%, no ischemia   j. Bilateral thoracoscopy and Maze procedure and clipping of the left atrial appendage (9/26/2017) Dr. Eliza jeronimo. ALANIS 10/24/17: EF 60%, RADHA successfully closed with no evidence of a residual left atrial appendage. Mild to moderate MR  2. Valvular heart disease:  a. Previous echocardiogram approximately, 2006 (incomplete data base).  b. Echocardiogram (08/15/2002), revealing moderate concentric LVH, hyperdynamic LV, EF 81%; mild MR, trace TR.  c. Echocardiogram (10/07/2008), revealed a normal LVEF with mild to moderate MR.  d. Echocardiogram, (07/01/2011): normal LV systolic function and wall motion with mild MR and mild TR.   e. Echocardiogram, 9/22/2017: EF 60%. Moderate to severe MR. Trace to mild TR. No thrombus appears to be present within the left atrial appendage.  f. ALANIS 10/24/17: EF 60%, RADHA successfully closed with no evidence of a residual left atrial appendage. Mild to moderate MR  3. Hypertension:  a. Adenosine Cardiolite,  (06/18/2009): Normal systolic function without evidence of inducible ischemia.   b. Echo 1/11/20 NL LVEF   4. Dyslipidemia.  5. Diabetes mellitus, Type 2 diagnosed, 2006.  6. Thyroid disease:  a. Hyperthyroidism status post radioactive iodine therapy.  b. Hypothyroidism on thyroid replacement.  7. Gastroesophageal reflux disease.  8. Chronic kidney disease. crea 1.7  9. History of hemorrhoids status post hemorrhoidectomy.  10. Colon polyps status post polypectomy.  11. Anxiety.  12. Iron deficiency anemia.  13. Obstructive sleep apnea on chronic CPAP therapy.  14. Surgical history:  a. Total abdominal hysterectomy  b. Squamous cell removal (right foot)  Allergies  Allergies   Allergen Reactions   • Buspirone Nausea And Vomiting   • Lisinopril Other (See Comments)     cough   • Sertraline Hcl Other (See Comments)     nightmares   • Sular [Nisoldipine Er] Cough       Current Medications    Current Outpatient Medications:   •  atorvastatin (LIPITOR) 10 MG tablet, Take 10 mg by mouth Daily., Disp: , Rfl:   •  diltiaZEM (TIAZAC) 420 MG 24 hr capsule, Take 420 mg by mouth Daily., Disp: , Rfl:   •  doxazosin (CARDURA) 8 MG tablet, Take 8 mg by mouth Every Night., Disp: , Rfl:   •  ferrous sulfate 324 (65 Fe) MG tablet delayed-release EC tablet, Take 324 mg by mouth Daily With Breakfast., Disp: , Rfl:   •  gabapentin (NEURONTIN) 800 MG tablet, Take 800 mg by mouth 2 (Two) Times a Day., Disp: , Rfl:   •  insulin glargine (LANTUS) 100 UNIT/ML injection, Inject 35 Units under the skin Every Night., Disp: , Rfl:   •  Insulin Lispro (HUMALOG KWIKPEN SC), Inject  under the skin into the appropriate area as directed 3 (Three) Times a Day Before Meals. Reports she is instructed to take as follows:  Breakfast -14 units, Lunch - 18 units, Supper - 20 units, Disp: , Rfl:   •  levothyroxine (SYNTHROID, LEVOTHROID) 150 MCG tablet, Take 137 mcg by mouth Daily., Disp: , Rfl:   •  lisinopril (PRINIVIL,ZESTRIL) 40 MG tablet, Take 40 mg by  "mouth Daily., Disp: , Rfl:   •  metFORMIN (GLUCOPHAGE) 850 MG tablet, Take 850 mg by mouth 3 (Three) Times a Day With Meals., Disp: , Rfl:   •  Omega-3-Acid Eth Est, Dietary, 1 g capsule, Take 1 capsule by mouth 2 (Two) Times a Day., Disp: , Rfl:   •  omeprazole (priLOSEC) 40 MG capsule, Take 40 mg by mouth Daily., Disp: , Rfl:   •  PARoxetine (PAXIL) 40 MG tablet, Take 40 mg by mouth Daily., Disp: , Rfl:     History of Present Illness     Pt presents for follow up of AF/HTN/VVS. Since we last saw the pt, pt denies any AF episodes, SOB, CP, LH, and dizziness. Denies any hospitalizations, ER visits, bleeding, or TIA/CVA symptoms. Overall feels well. BP at home has been well controlled. Had high K last week on aldactone and was hospitalization. Pt with PAF during the hospital asymptomatic.     ROS:  General:  + mild fatigue, No weight gain or loss  Cardiovascular:  Denies CP, PND, syncope, near syncope, edema or palpitations.  Pulmonary:  Denies RODRIGUEZ, cough, or wheezing      Vitals:    01/29/20 1459   BP: 110/56   BP Location: Left arm   Patient Position: Sitting   Pulse: 72   SpO2: 93%   Weight: 86.8 kg (191 lb 6.4 oz)   Height: 167.6 cm (66\")     Body mass index is 30.89 kg/m².  PE:  General: NAD  Neck: no JVD, no carotid bruits, no TM  Heart RRR, NL S1, S2, S4 present, no rubs, murmurs  Lungs: CTA, no wheezes, rhonchi, or rales  Abd: soft, non-tender, NL BS  Ext: No musculoskeletal deformities, no edema, cyanosis, or clubbing  Psych: normal mood and affect    Diagnostic Data:      Procedures    1. PAF (paroxysmal atrial fibrillation) (CMS/HCC)    2. Status post Maze operation for atrial fibrillation    3. VHD (valvular heart disease)          Plan:  1) PAF - maintaining NSR since MAZE procedure 9/2017 off AAD.   Continue Diltiazem Had PAF during hospitalization. In NSR today. Needs ziopatch  2) Anticoagulation:   S/p RADHA ligation with confirmed closure by ALANIS. No OAC  3) HTN- controlled.   Wt loss, exercise, salt " reduction    F/up in 6 months

## 2020-02-13 ENCOUNTER — HOSPITAL ENCOUNTER (OUTPATIENT)
Dept: MAMMOGRAPHY | Facility: HOSPITAL | Age: 70
Discharge: HOME | End: 2020-02-13
Payer: MEDICARE

## 2020-02-13 PROCEDURE — G0279 TOMOSYNTHESIS, MAMMO: HCPCS

## 2020-02-14 ENCOUNTER — TRANSCRIBE ORDERS (OUTPATIENT)
Dept: LAB | Facility: HOSPITAL | Age: 70
End: 2020-02-14

## 2020-02-14 ENCOUNTER — LAB (OUTPATIENT)
Dept: LAB | Facility: HOSPITAL | Age: 70
End: 2020-02-14

## 2020-02-14 DIAGNOSIS — N18.32 CHRONIC KIDNEY DISEASE (CKD) STAGE G3B/A3, MODERATELY DECREASED GLOMERULAR FILTRATION RATE (GFR) BETWEEN 30-44 ML/MIN/1.73 SQUARE METER AND ALBUMINURIA CREATININE RATIO GREATER THAN 300 MG/G (C* (HCC): Primary | ICD-10-CM

## 2020-02-14 DIAGNOSIS — N18.32 CHRONIC KIDNEY DISEASE (CKD) STAGE G3B/A3, MODERATELY DECREASED GLOMERULAR FILTRATION RATE (GFR) BETWEEN 30-44 ML/MIN/1.73 SQUARE METER AND ALBUMINURIA CREATININE RATIO GREATER THAN 300 MG/G (C* (HCC): ICD-10-CM

## 2020-02-14 LAB
ANION GAP SERPL CALCULATED.3IONS-SCNC: 14.2 MMOL/L (ref 5–15)
BUN BLD-MCNC: 16 MG/DL (ref 8–23)
BUN/CREAT SERPL: 12.5 (ref 7–25)
CALCIUM SPEC-SCNC: 9 MG/DL (ref 8.6–10.5)
CHLORIDE SERPL-SCNC: 102 MMOL/L (ref 98–107)
CO2 SERPL-SCNC: 23.8 MMOL/L (ref 22–29)
CREAT BLD-MCNC: 1.28 MG/DL (ref 0.57–1)
GFR SERPL CREATININE-BSD FRML MDRD: 41 ML/MIN/1.73
GLUCOSE BLD-MCNC: 99 MG/DL (ref 65–99)
POTASSIUM BLD-SCNC: 4.2 MMOL/L (ref 3.5–5.2)
SODIUM BLD-SCNC: 140 MMOL/L (ref 136–145)

## 2020-02-14 PROCEDURE — 36415 COLL VENOUS BLD VENIPUNCTURE: CPT

## 2020-02-14 PROCEDURE — 80048 BASIC METABOLIC PNL TOTAL CA: CPT

## 2020-04-15 ENCOUNTER — TELEPHONE (OUTPATIENT)
Dept: CARDIAC SURGERY | Facility: CLINIC | Age: 70
End: 2020-04-15

## 2020-04-16 ENCOUNTER — VIRTUAL VISIT (OUTPATIENT)
Dept: PRIMARY CARE CLINIC | Age: 70
End: 2020-04-16
Payer: MEDICARE

## 2020-04-16 PROCEDURE — 98967 PH1 ASSMT&MGMT NQHP 11-20: CPT | Performed by: NURSE PRACTITIONER

## 2020-04-16 RX ORDER — ERGOCALCIFEROL 1.25 MG/1
50000 CAPSULE ORAL WEEKLY
Qty: 12 CAPSULE | Refills: 3 | Status: SHIPPED | OUTPATIENT
Start: 2020-04-16 | End: 2021-02-18

## 2020-04-16 RX ORDER — DOXAZOSIN 2 MG/1
2 TABLET ORAL EVERY MORNING
Qty: 90 TABLET | Refills: 3 | Status: SHIPPED | OUTPATIENT
Start: 2020-04-16 | End: 2020-09-02 | Stop reason: SDUPTHER

## 2020-04-17 RX ORDER — OMEPRAZOLE 40 MG/1
CAPSULE, DELAYED RELEASE ORAL
Qty: 90 CAPSULE | Refills: 3 | Status: SHIPPED | OUTPATIENT
Start: 2020-04-17 | End: 2021-02-18

## 2020-04-28 ENCOUNTER — VIRTUAL VISIT (OUTPATIENT)
Dept: PRIMARY CARE CLINIC | Age: 70
End: 2020-04-28
Payer: MEDICARE

## 2020-04-28 PROCEDURE — 98966 PH1 ASSMT&MGMT NQHP 5-10: CPT | Performed by: NURSE PRACTITIONER

## 2020-04-28 RX ORDER — HYDROCHLOROTHIAZIDE 25 MG/1
25 TABLET ORAL DAILY
Qty: 90 TABLET | Refills: 3 | Status: SHIPPED | OUTPATIENT
Start: 2020-04-28 | End: 2022-09-10

## 2020-05-08 ENCOUNTER — TRANSCRIBE ORDERS (OUTPATIENT)
Dept: LAB | Facility: HOSPITAL | Age: 70
End: 2020-05-08

## 2020-05-08 ENCOUNTER — LAB (OUTPATIENT)
Dept: LAB | Facility: HOSPITAL | Age: 70
End: 2020-05-08

## 2020-05-08 DIAGNOSIS — N02.8 PRIMARY IGA NEPHROPATHY: ICD-10-CM

## 2020-05-08 DIAGNOSIS — N18.30 CHRONIC KIDNEY DISEASE, STAGE III (MODERATE) (HCC): Primary | ICD-10-CM

## 2020-05-08 DIAGNOSIS — N18.30 CHRONIC KIDNEY DISEASE, STAGE III (MODERATE) (HCC): ICD-10-CM

## 2020-05-08 LAB
BACTERIA UR QL AUTO: NORMAL /HPF
BASOPHILS # BLD AUTO: 0.04 10*3/MM3 (ref 0–0.2)
BASOPHILS NFR BLD AUTO: 0.7 % (ref 0–1.5)
BILIRUB UR QL STRIP: NEGATIVE
CLARITY UR: CLEAR
COLOR UR: YELLOW
DEPRECATED RDW RBC AUTO: 42 FL (ref 37–54)
EOSINOPHIL # BLD AUTO: 0.21 10*3/MM3 (ref 0–0.4)
EOSINOPHIL NFR BLD AUTO: 3.5 % (ref 0.3–6.2)
ERYTHROCYTE [DISTWIDTH] IN BLOOD BY AUTOMATED COUNT: 12.6 % (ref 12.3–15.4)
GLUCOSE UR STRIP-MCNC: NEGATIVE MG/DL
HCT VFR BLD AUTO: 32.4 % (ref 34–46.6)
HGB BLD-MCNC: 10.8 G/DL (ref 12–15.9)
HGB UR QL STRIP.AUTO: NEGATIVE
HYALINE CASTS UR QL AUTO: NORMAL /LPF
IMM GRANULOCYTES # BLD AUTO: 0.02 10*3/MM3 (ref 0–0.05)
IMM GRANULOCYTES NFR BLD AUTO: 0.3 % (ref 0–0.5)
KETONES UR QL STRIP: NEGATIVE
LEUKOCYTE ESTERASE UR QL STRIP.AUTO: NEGATIVE
LYMPHOCYTES # BLD AUTO: 1.74 10*3/MM3 (ref 0.7–3.1)
LYMPHOCYTES NFR BLD AUTO: 29.2 % (ref 19.6–45.3)
MCH RBC QN AUTO: 30.9 PG (ref 26.6–33)
MCHC RBC AUTO-ENTMCNC: 33.3 G/DL (ref 31.5–35.7)
MCV RBC AUTO: 92.6 FL (ref 79–97)
MONOCYTES # BLD AUTO: 0.61 10*3/MM3 (ref 0.1–0.9)
MONOCYTES NFR BLD AUTO: 10.3 % (ref 5–12)
NEUTROPHILS # BLD AUTO: 3.33 10*3/MM3 (ref 1.7–7)
NEUTROPHILS NFR BLD AUTO: 56 % (ref 42.7–76)
NITRITE UR QL STRIP: NEGATIVE
NRBC BLD AUTO-RTO: 0 /100 WBC (ref 0–0.2)
PH UR STRIP.AUTO: 8 [PH] (ref 5–8)
PLATELET # BLD AUTO: 242 10*3/MM3 (ref 140–450)
PMV BLD AUTO: 10.5 FL (ref 6–12)
PROT UR QL STRIP: ABNORMAL
RBC # BLD AUTO: 3.5 10*6/MM3 (ref 3.77–5.28)
RBC # UR: NORMAL /HPF
REF LAB TEST METHOD: NORMAL
SP GR UR STRIP: 1.02 (ref 1–1.03)
SQUAMOUS #/AREA URNS HPF: NORMAL /HPF
UROBILINOGEN UR QL STRIP: ABNORMAL
WBC NRBC COR # BLD: 5.95 10*3/MM3 (ref 3.4–10.8)
WBC UR QL AUTO: NORMAL /HPF

## 2020-05-08 PROCEDURE — 83540 ASSAY OF IRON: CPT

## 2020-05-08 PROCEDURE — 84466 ASSAY OF TRANSFERRIN: CPT

## 2020-05-08 PROCEDURE — 85025 COMPLETE CBC W/AUTO DIFF WBC: CPT

## 2020-05-08 PROCEDURE — 81001 URINALYSIS AUTO W/SCOPE: CPT

## 2020-05-08 PROCEDURE — 84156 ASSAY OF PROTEIN URINE: CPT

## 2020-05-08 PROCEDURE — 36415 COLL VENOUS BLD VENIPUNCTURE: CPT

## 2020-05-08 PROCEDURE — 80069 RENAL FUNCTION PANEL: CPT

## 2020-05-08 PROCEDURE — 82570 ASSAY OF URINE CREATININE: CPT

## 2020-05-08 PROCEDURE — 82728 ASSAY OF FERRITIN: CPT

## 2020-05-09 LAB
ALBUMIN SERPL-MCNC: 4.2 G/DL (ref 3.5–5.2)
ANION GAP SERPL CALCULATED.3IONS-SCNC: 11.8 MMOL/L (ref 5–15)
BUN BLD-MCNC: 17 MG/DL (ref 8–23)
BUN/CREAT SERPL: 13.1 (ref 7–25)
CALCIUM SPEC-SCNC: 9.8 MG/DL (ref 8.6–10.5)
CHLORIDE SERPL-SCNC: 100 MMOL/L (ref 98–107)
CO2 SERPL-SCNC: 27.2 MMOL/L (ref 22–29)
CREAT BLD-MCNC: 1.3 MG/DL (ref 0.57–1)
CREAT UR-MCNC: 69 MG/DL
FERRITIN SERPL-MCNC: 26.3 NG/ML (ref 13–150)
GFR SERPL CREATININE-BSD FRML MDRD: 41 ML/MIN/1.73
GLUCOSE BLD-MCNC: 105 MG/DL (ref 65–99)
IRON 24H UR-MRATE: 39 MCG/DL (ref 37–145)
IRON SATN MFR SERPL: 9 % (ref 20–50)
PHOSPHATE SERPL-MCNC: 3 MG/DL (ref 2.5–4.5)
POTASSIUM BLD-SCNC: 4.2 MMOL/L (ref 3.5–5.2)
PROT UR-MCNC: 147 MG/DL
SODIUM BLD-SCNC: 139 MMOL/L (ref 136–145)
TIBC SERPL-MCNC: 450 MCG/DL (ref 298–536)
TRANSFERRIN SERPL-MCNC: 302 MG/DL (ref 200–360)

## 2020-05-11 NOTE — PROGRESS NOTES
Jorgito Mart is a 71 y.o. female evaluated via telephone on 4/16/2020. The visit was conducted with the patient in his/her home and provider in her office. Consent:  She and/or health care decision maker is aware that that she may receive a bill for this telephone service, depending on her insurance coverage, and has provided verbal consent to proceed: Yes    SUBJECTIVE:    Health Maintenance Due   Topic Date Due    Hepatitis C screen  1950    Diabetic foot exam  06/08/1960    DTaP/Tdap/Td vaccine (1 - Tdap) 06/08/1969    Shingles Vaccine (1 of 2) 06/08/2000    DEXA (modify frequency per FRAX score)  06/08/2005    Lipid screen  06/06/2015    TSH testing  02/27/2018    A1C test (Diabetic or Prediabetic)  09/22/2018    Annual Wellness Visit (AWV)  05/29/2019    Colon cancer screen colonoscopy  04/05/2020    Diabetic retinal exam  05/03/2020       HPI:   Chief Complaint   Patient presents with    Hypertension   She states her BP has been running a little high. Nephrology decreased Lisinopril to 20mg QD instead of BID due to hyperkalemia. She is not taking her Vit D. Her blood sugar is staying below 200. Her endocrinology appointment was rescheduled. Patient's medications, allergies, past medical, surgical, social and family histories were reviewed and updated as appropriate. Review of Systems   Constitutional: Negative for chills and fever. HENT: Negative for ear pain and sore throat. Eyes: Negative for pain and visual disturbance. Respiratory: Negative for cough and shortness of breath. Cardiovascular: Negative for chest pain, palpitations and leg swelling. Gastrointestinal: Negative for abdominal pain, nausea and vomiting. Genitourinary: Negative for dysuria and hematuria. Musculoskeletal: Negative for joint swelling. Skin: Negative for rash. Neurological: Negative for dizziness and weakness. Psychiatric/Behavioral: Negative for sleep disturbance.

## 2020-05-12 ASSESSMENT — ENCOUNTER SYMPTOMS
EYE PAIN: 0
SHORTNESS OF BREATH: 0
VOMITING: 0
NAUSEA: 0
COUGH: 0
SORE THROAT: 0
ABDOMINAL PAIN: 0

## 2020-05-14 ENCOUNTER — VIRTUAL VISIT (OUTPATIENT)
Dept: PRIMARY CARE CLINIC | Age: 70
End: 2020-05-14
Payer: MEDICARE

## 2020-05-14 PROCEDURE — G2025 DIS SITE TELE SVCS RHC/FQHC: HCPCS | Performed by: NURSE PRACTITIONER

## 2020-05-14 RX ORDER — CARVEDILOL 6.25 MG/1
6.25 TABLET ORAL 2 TIMES DAILY
Qty: 60 TABLET | Refills: 5 | Status: SHIPPED | OUTPATIENT
Start: 2020-05-14 | End: 2020-09-02

## 2020-05-18 ASSESSMENT — ENCOUNTER SYMPTOMS
ABDOMINAL PAIN: 0
COUGH: 0
VOMITING: 0
SHORTNESS OF BREATH: 0
NAUSEA: 0
EYE PAIN: 0
SORE THROAT: 0

## 2020-05-19 NOTE — PROGRESS NOTES
Marco Anderson is a 71 y.o. female evaluated via telephone on 4/28/2020. The visit was conducted with the patient in his/her home and provider in her home. Consent:  She and/or health care decision maker is aware that that she may receive a bill for this telephone service, depending on her insurance coverage, and has provided verbal consent to proceed: Yes    SUBJECTIVE:    Health Maintenance Due   Topic Date Due    Hepatitis C screen  1950    Diabetic foot exam  06/08/1960    DTaP/Tdap/Td vaccine (1 - Tdap) 06/08/1969    Shingles Vaccine (1 of 2) 06/08/2000    DEXA (modify frequency per FRAX score)  06/08/2005    Lipid screen  06/06/2015    TSH testing  02/27/2018    A1C test (Diabetic or Prediabetic)  09/22/2018    Annual Wellness Visit (AWV)  05/29/2019    Colon cancer screen colonoscopy  04/05/2020    Diabetic retinal exam  05/03/2020       HPI:   Chief Complaint   Patient presents with    Hypertension   Her BP has been high and is not going down. Her Lisinopril was cut in half and Hyzaar was stopped due to hyperkalemia. She was in a fib the lat time she saw cardiology. She added Doxazosin 2mg in the morning for a week with no change. She is not taking Furosemide. Patient's medications, allergies, past medical, surgical, social and family histories were reviewed and updated as appropriate. Review of Systems   Constitutional: Negative for chills and fever. HENT: Negative for ear pain and sore throat. Eyes: Negative for pain and visual disturbance. Respiratory: Negative for cough and shortness of breath. Cardiovascular: Negative for chest pain, palpitations and leg swelling. Gastrointestinal: Negative for abdominal pain, nausea and vomiting. Genitourinary: Negative for dysuria and hematuria. Musculoskeletal: Negative for joint swelling. Skin: Negative for rash. Neurological: Negative for dizziness and weakness.    Psychiatric/Behavioral: Negative for sleep lispro (HUMALOG) 100 UNIT/ML injection vial Samples given in office  Claudeen Ponds, APRN   omega-3 acid ethyl esters (LOVAZA) 1 g capsule take 4 capsules by mouth at bedtime  Historical Provider, MD   tiZANidine (ZANAFLEX) 4 MG tablet Take 1 tablet by mouth every 8 hours as needed (muscle spasm)  Claudeen Ponds, APRN   hydrocortisone (ANUSOL-HC) 2.5 % rectal cream Place rectally 2 times daily. Claudeen Ponds, APRN   Insulin Syringe-Needle U-100 (INSULIN SYRINGE .5CC/31GX5/16\") 31G X 5/16\" 0.5 ML MISC 1 each by Does not apply route daily Please sub U50  Claudeen Ponds, APRN   insulin glargine (LANTUS) 100 UNIT/ML injection vial Inject 25 Units into the skin nightly  Claudeen Ponds, APRN   triamcinolone (KENALOG) 0.1 % cream Apply topically 2 times daily- mix with entire jar of Cetaphil  Claudeen Ponds, APRN   lisinopril (PRINIVIL;ZESTRIL) 40 MG tablet Take 1 tablet by mouth daily  Claudeen Ponds, APRN   ONE TOUCH ULTRA TEST strip   Historical Provider, MD   gabapentin (NEURONTIN) 800 MG tablet   Historical Provider, MD   Syringe/Needle, Disp, (SYRINGE 3CC/25GX1\") 25G X 1\" 3 ML MISC Monthly for B12 injection  Claudeen Ponds, APRN   atorvastatin (LIPITOR) 10 MG tablet Take 10 mg by mouth daily. Historical Provider, MD   Needles & Syringes MISC 1 each by Does not apply route daily. Syringe for B12 IM injection  Claudeen Ponds, APRN   Insulin Lispro, Human, (HUMALOG SC) Inject into the skin 14u- breakfast, 16u- lunch, 18u supper  Historical Provider, MD   furosemide (LASIX) 20 MG tablet One daily as needed for swelling. Claudeen Ponds, APRN   doxazosin (CARDURA) 8 MG tablet Take 1 tablet by mouth nightly. Claudeen Ponds, APRN   levothyroxine (SYNTHROID) 175 MCG tablet Take 1 tablet by mouth every 48 hours. Patient taking differently: Take 175 mcg by mouth daily   Claudeen Ponds, APRN   metformin (GLUCOPHAGE) 850 MG tablet Take 1 tablet by mouth 3 times daily. Claudeen Ponds, APRN       ASSESSMENT:  1.  Essential

## 2020-05-26 ENCOUNTER — OFFICE VISIT (OUTPATIENT)
Dept: CARDIOLOGY | Facility: CLINIC | Age: 70
End: 2020-05-26

## 2020-05-26 VITALS
HEIGHT: 66 IN | OXYGEN SATURATION: 92 % | HEART RATE: 50 BPM | SYSTOLIC BLOOD PRESSURE: 110 MMHG | DIASTOLIC BLOOD PRESSURE: 60 MMHG | WEIGHT: 193 LBS | BODY MASS INDEX: 31.02 KG/M2

## 2020-05-26 DIAGNOSIS — I38 VHD (VALVULAR HEART DISEASE): Primary | ICD-10-CM

## 2020-05-26 DIAGNOSIS — I48.0 PAROXYSMAL ATRIAL FIBRILLATION (HCC): ICD-10-CM

## 2020-05-26 PROCEDURE — 99213 OFFICE O/P EST LOW 20 MIN: CPT | Performed by: PHYSICIAN ASSISTANT

## 2020-05-26 RX ORDER — CARVEDILOL 6.25 MG/1
6.25 TABLET ORAL 2 TIMES DAILY WITH MEALS
COMMUNITY
End: 2021-01-06

## 2020-05-26 NOTE — PROGRESS NOTES
[facility]   OFFICE NOTE      Karen Newman  1950  PCP: Mirian Arnlod APRN    SUBJECTIVE:   Karen Newman is a 69 y.o. female seen for a follow up visit regarding the following:     CC:Afib    HPI:   Pleasant 69-year-old female presents today follow-up regarding atrial fibrillation, valvular heart disease and hypertension.  She reports recently she is had some fluid retention.  Her blood pressure somewhat labile usually high when she first wakes in the morning but later in the day that are controlled to the afternoon to get and becomes high.  She recently started carvedilol per her primary care provider which seems to have helped.  She has not had any dizziness, near-syncope seen.  She denies any chest pain.  She denies orthopnea PND peripheral edema.  She would like to take Lasix for couple days to improve upon her her edema.  Since the pandemic she is not very active she is not exercising and she is very sedentary.    Cardiac PMH: (Old records have been reviewed and summarized below)  1. Atrial fibrillation, duration unknown; diagnosed August of 2013:  a. Status post ALANIS/external cardioversion, (10/18/2013), Yara Bobo M.D. with the initiation of propafenone therapy.   b. Atrial fibrillation, (11/13/2013), in Gloria Gomez’ office.  c. Normal sinus rhythm at the time of office visit, (11/21/2013).  d. Normal sinus rhythm, EKG, (06/05/2014).  e. Discontinuation of Rythmol secondary to diarrhea, (06/05/2014), with initiation of flecainide 50 mg b.i.d. and Eliquis 5 mg b.i.d.   f. CHADS Vasc= 4, (4/18/2017)   g. CT scan of the chest (06/29/2017): Mild fibrotic changes, o/w normal  h. Pulmonary Function Test (06/29/2017): FEV1/FVC  1.54/2.57 = 60%  i. Nuclear Stress Test (06/29/2017): EF>70%, no ischemia   j. Bilateral thoracoscopy and Maze procedure and clipping of the left atrial appendage (9/26/2017) Dr. Eliza wallis ALANIS 10/24/17: EF 60%, RADHA successfully closed  with no evidence of a residual left atrial appendage. Mild to moderate MR  2. Valvular heart disease:  a. Previous echocardiogram approximately, 2006 (incomplete data base).  b. Echocardiogram (08/15/2002), revealing moderate concentric LVH, hyperdynamic LV, EF 81%; mild MR, trace TR.  c. Echocardiogram (10/07/2008), revealed a normal LVEF with mild to moderate MR.  d. Echocardiogram, (07/01/2011): normal LV systolic function and wall motion with mild MR and mild TR.   e. Echocardiogram, 9/22/2017: EF 60%. Moderate to severe MR. Trace to mild TR. No thrombus appears to be present within the left atrial appendage.  f. ALANIS 10/24/17: EF 60%, RADHA successfully closed with no evidence of a residual left atrial appendage. Mild to moderate MR  3. Hypertension:  a. Adenosine Cardiolite, (06/18/2009): Normal systolic function without evidence of inducible ischemia.   b. Echo 1/11/20 NL LVEF   4. Dyslipidemia.  5. Diabetes mellitus, Type 2 diagnosed, 2006.  6. Thyroid disease:  a. Hyperthyroidism status post radioactive iodine therapy.  b. Hypothyroidism on thyroid replacement.  7. Gastroesophageal reflux disease.  8. Chronic kidney disease. crea 1.7  9. History of hemorrhoids status post hemorrhoidectomy.  10. Colon polyps status post polypectomy.  11. Anxiety.  12. Iron deficiency anemia.  13. Obstructive sleep apnea on chronic CPAP therapy.  14. Surgical history:  a. Total abdominal hysterectomy  b. Squamous cell removal (right foot)    Past Medical History, Past Surgical History, Family history, Social History, and Medications were all reviewed with the patient today and updated as necessary.       Current Outpatient Medications:   •  atorvastatin (LIPITOR) 10 MG tablet, Take 10 mg by mouth Daily., Disp: , Rfl:   •  carvedilol (COREG) 6.25 MG tablet, Take 6.25 mg by mouth 2 (Two) Times a Day With Meals., Disp: , Rfl:   •  diltiaZEM (TIAZAC) 420 MG 24 hr capsule, Take 420 mg by mouth Daily., Disp: , Rfl:   •  doxazosin  (CARDURA) 8 MG tablet, Take 8 mg by mouth Every Night., Disp: , Rfl:   •  ferrous sulfate 324 (65 Fe) MG tablet delayed-release EC tablet, Take 324 mg by mouth Daily With Breakfast., Disp: , Rfl:   •  gabapentin (NEURONTIN) 800 MG tablet, Take 800 mg by mouth 2 (Two) Times a Day., Disp: , Rfl:   •  insulin glargine (LANTUS) 100 UNIT/ML injection, Inject 35 Units under the skin Every Night., Disp: , Rfl:   •  Insulin Lispro (HUMALOG KWIKPEN SC), Inject  under the skin into the appropriate area as directed 3 (Three) Times a Day Before Meals. Reports she is instructed to take as follows:  Breakfast -14 units, Lunch - 18 units, Supper - 20 units, Disp: , Rfl:   •  levothyroxine (SYNTHROID, LEVOTHROID) 150 MCG tablet, Take 137 mcg by mouth Daily., Disp: , Rfl:   •  lisinopril (PRINIVIL,ZESTRIL) 40 MG tablet, Take 20 mg by mouth Daily., Disp: , Rfl:   •  metFORMIN (GLUCOPHAGE) 850 MG tablet, Take 850 mg by mouth 3 (Three) Times a Day With Meals., Disp: , Rfl:   •  Omega-3-Acid Eth Est, Dietary, 1 g capsule, Take 1 capsule by mouth 2 (Two) Times a Day., Disp: , Rfl:   •  omeprazole (priLOSEC) 40 MG capsule, Take 40 mg by mouth Daily., Disp: , Rfl:   •  PARoxetine (PAXIL) 40 MG tablet, Take 40 mg by mouth Daily., Disp: , Rfl:       Allergies   Allergen Reactions   • Buspirone Nausea And Vomiting   • Lisinopril Other (See Comments)     cough   • Sertraline Hcl Other (See Comments)     nightmares   • Sular [Nisoldipine Er] Cough     Patient Active Problem List   Diagnosis   • Paroxysmal atrial fibrillation (CMS/HCC)   • VHD (valvular heart disease)   • Hypertension   • Dyslipidemia   • Thyroid disease   • GERD (gastroesophageal reflux disease)   • Stage 3 chronic kidney disease (CMS/HCC)   • Hemorrhoids   • Colon polyps   • Iron deficiency anemia   • KRISTEN on CPAP   • Blood loss anemia   • Postoperative respiratory failure (CMS/HCC)   • Status post Maze operation for atrial fibrillation   • VHD (valvular heart disease)   •  "Acute renal failure superimposed on stage 3 chronic kidney disease (CMS/HCC)   • Type 2 diabetes mellitus with chronic kidney disease, with long-term current use of insulin (CMS/HCC)     Past Medical History:   Diagnosis Date   • Anxiety    • Arthritis    • Atrial fibrillation (CMS/HCC)     Patient reported HX of cardiac ablation and that she had a small clot that formed in a vessel prior to this procedure   • Body piercing     ears   • Cancer (CMS/HCC) 10/22/2018    RIGHT FOOT STILL HAS SOME SKIN CANCER IN PLACE AT THIS TIME- WILL BE GETTING REMOVED OCTOBER 22ND 2018   • CKD (chronic kidney disease)     IGA - GETS CHECKED YEARLY FROM NEPHRO    • Colon polyps    • Diabetes mellitus (CMS/HCC)     SINCE 2005 - CHECKS 3 TIMES PER DAY    • Elevated cholesterol    • GERD (gastroesophageal reflux disease)    • Hemorrhoids    • History of bronchitis    • History of pneumonia    • History of transfusion     Reports no reaction to transfusion   • Hypertension    • Iron deficiency anemia    • On home O2     2L AT NIGHT WITH CPAP    • Sleep apnea     CPAP HS with oxygen at 2L    • SOB (shortness of breath) on exertion    • Thyroid disease    • VHD (valvular heart disease)    • Wears glasses      Past Surgical History:   Procedure Laterality Date   • CARDIAC ABLATION     • COLONOSCOPY     • COLONOSCOPY W/ POLYPECTOMY     • COLONOSCOPY W/ POLYPECTOMY     • ENDOSCOPY     • EYE CAPSULOTOMY WITH LASER Left 5/7/2019    Procedure: EYE CAPSULOTOMY WITH LASER LEFT;  Surgeon: Cecily Maria MD;  Location: [facility];  Service: Ophthalmology   • EYE SURGERY Bilateral     cataract extraction   • EYE SURGERY Right     Reported \"they did a little surgery for Graves Disease where they chopped the bone away\"   • EYE SURGERY Left     Reported \"they took some scar tissue\"   • OTHER SURGICAL HISTORY      PT HAD BLOOD CLOT IN HEART SURGERY AND WENT IN THROUGH NECK BUT UNSURE WHAT CALLED - possible santa?   • SKIN BIOPSY      RIGHT FOOT " "FOR SKIN CANCER   • THORACOSCOPY Left 9/25/2017    Procedure: THORASOSCOPY VIDEO ASSISTED WITH LEFT ATRIAL APPENDAGE ABLATION;  Surgeon: Alec Kay MD;  Location:  [facility];  Service:    • TOTAL ABDOMINAL HYSTERECTOMY     • TRANSESOPHAGEAL ECHOCARDIOGRAM (ALANIS) Bilateral 7/31/2017    Procedure: TRANSESOPHAGEAL ECHOCARDIOGRAM AND BRONCHOSCOPY ;  Surgeon: Alec Kay MD;  Location:  [facility];  Service:    • TUBAL ABDOMINAL LIGATION     • VAGINAL DELIVERY      x3   • WISDOM TOOTH EXTRACTION       Family History   Problem Relation Age of Onset   • Hypertension Mother    • Coronary artery disease Mother    • Kidney disease Father      Social History     Tobacco Use   • Smoking status: Former Smoker     Packs/day: 0.50     Years: 15.00     Pack years: 7.50     Types: Cigarettes     Last attempt to quit: 10/18/2016     Years since quitting: 3.6   • Smokeless tobacco: Never Used   Substance Use Topics   • Alcohol use: No       ROS:  Review of Symptoms:  General: no recent weight loss/gain, weakness or fatigue  Skin: no rashes, lumps, or other skin changes  HEENT: no dizziness, lightheadedness, or vision changes  Respiratory: no cough or hemoptysis  Cardiovascular: no palpitations, and tachycardia  Gastrointestinal: no black/tarry stools or diarrhea  Urinary: no change in frequency or urgency  Peripheral Vascular: no claudication or leg cramps  Musculoskeletal: no muscle or joint pain/stiffness  Psychiatric: no depression or excessive stress  Neurological: no sensory or motor loss, no syncope  Hematologic: + Iron def. Anemia.    Endocrine: no thyroid problems, nor heat or cold intolerance    PHYSICAL EXAM:    /60 (BP Location: Left arm, Patient Position: Sitting)   Pulse 50   Ht 167.6 cm (66\")   Wt 87.5 kg (193 lb)   LMP  (LMP Unknown)   SpO2 92%   BMI 31.15 kg/m²        Wt Readings from Last 5 Encounters:   05/26/20 87.5 kg (193 lb)   01/29/20 86.8 kg (191 lb 6.4 oz)   01/11/20 85.3 kg (188 lb 0.8 oz) "   06/06/19 86.2 kg (190 lb)   05/06/19 87.1 kg (192 lb)       BP Readings from Last 5 Encounters:   05/26/20 110/60   01/29/20 110/56   01/12/20 133/77   06/06/19 114/46   05/07/19 151/72       General appearance - Alert, well appearing, and in no distress   Mental status - Affect appropriate to mood.  Eyes - Sclerae anicteric,  ENMT - Hearing grossly normal bilaterally, Dental hygiene good.  Neck - Carotids upstroke normal bilaterally, no bruits, no JVD.  Resp - Clear to auscultation, no wheezes, rales or rhonchi, symmetric air entry.  Heart - Normal rate, regular rhythm, normal S1, S2, no murmurs, rubs, clicks or gallops.  GI - Soft, nontender, nondistended, no masses or organomegaly.  Neurological - Grossly intact - normal speech, no focal findings  Musculoskeletal - No joint tenderness, deformity or swelling, no muscular tenderness noted.  Extremities - Peripheral pulses normal, no pedal edema, no clubbing or cyanosis.  Skin - Normal coloration and turgor.  Psych -  oriented to person, place, and time.    Medical problems and test results were reviewed with the patient today.     Recent Results (from the past 672 hour(s))   Iron Profile    Collection Time: 05/08/20  1:50 PM   Result Value Ref Range    Iron 39 37 - 145 mcg/dL    Iron Saturation 9 (L) 20 - 50 %    Transferrin 302 200 - 360 mg/dL    TIBC 450 298 - 536 mcg/dL   Creatinine, Urine, Random - Urine, Clean Catch    Collection Time: 05/08/20  1:50 PM   Result Value Ref Range    Creatinine, Urine 69.0 mg/dL   Ferritin    Collection Time: 05/08/20  1:50 PM   Result Value Ref Range    Ferritin 26.30 13.00 - 150.00 ng/mL   Protein, Urine, Random - Urine, Clean Catch    Collection Time: 05/08/20  1:50 PM   Result Value Ref Range    Total Protein, Urine 147.0 mg/dL   Renal Function Panel    Collection Time: 05/08/20  1:50 PM   Result Value Ref Range    Glucose 105 (H) 65 - 99 mg/dL    BUN 17 8 - 23 mg/dL    Creatinine 1.30 (H) 0.57 - 1.00 mg/dL    Sodium 139  136 - 145 mmol/L    Potassium 4.2 3.5 - 5.2 mmol/L    Chloride 100 98 - 107 mmol/L    CO2 27.2 22.0 - 29.0 mmol/L    Calcium 9.8 8.6 - 10.5 mg/dL    Albumin 4.20 3.50 - 5.20 g/dL    Phosphorus 3.0 2.5 - 4.5 mg/dL    Anion Gap 11.8 5.0 - 15.0 mmol/L    BUN/Creatinine Ratio 13.1 7.0 - 25.0    eGFR Non African Amer 41 (L) >60 mL/min/1.73   CBC Auto Differential    Collection Time: 05/08/20  1:50 PM   Result Value Ref Range    WBC 5.95 3.40 - 10.80 10*3/mm3    RBC 3.50 (L) 3.77 - 5.28 10*6/mm3    Hemoglobin 10.8 (L) 12.0 - 15.9 g/dL    Hematocrit 32.4 (L) 34.0 - 46.6 %    MCV 92.6 79.0 - 97.0 fL    MCH 30.9 26.6 - 33.0 pg    MCHC 33.3 31.5 - 35.7 g/dL    RDW 12.6 12.3 - 15.4 %    RDW-SD 42.0 37.0 - 54.0 fl    MPV 10.5 6.0 - 12.0 fL    Platelets 242 140 - 450 10*3/mm3    Neutrophil % 56.0 42.7 - 76.0 %    Lymphocyte % 29.2 19.6 - 45.3 %    Monocyte % 10.3 5.0 - 12.0 %    Eosinophil % 3.5 0.3 - 6.2 %    Basophil % 0.7 0.0 - 1.5 %    Immature Grans % 0.3 0.0 - 0.5 %    Neutrophils, Absolute 3.33 1.70 - 7.00 10*3/mm3    Lymphocytes, Absolute 1.74 0.70 - 3.10 10*3/mm3    Monocytes, Absolute 0.61 0.10 - 0.90 10*3/mm3    Eosinophils, Absolute 0.21 0.00 - 0.40 10*3/mm3    Basophils, Absolute 0.04 0.00 - 0.20 10*3/mm3    Immature Grans, Absolute 0.02 0.00 - 0.05 10*3/mm3    nRBC 0.0 0.0 - 0.2 /100 WBC   Urinalysis without microscopic (no culture) - Urine, Clean Catch    Collection Time: 05/08/20  1:50 PM   Result Value Ref Range    Color, UA Yellow Yellow, Straw    Appearance, UA Clear Clear    pH, UA 8.0 5.0 - 8.0    Specific Gravity, UA 1.017 1.005 - 1.030    Glucose, UA Negative Negative    Ketones, UA Negative Negative    Bilirubin, UA Negative Negative    Blood, UA Negative Negative    Protein,  mg/dL (2+) (A) Negative    Leuk Esterase, UA Negative Negative    Nitrite, UA Negative Negative    Urobilinogen, UA 0.2 E.U./dL 0.2 - 1.0 E.U./dL   Urinalysis, Microscopic Only - Urine, Clean Catch    Collection Time: 05/08/20   1:50 PM   Result Value Ref Range    RBC, UA 0-2 None Seen, 0-2 /HPF    WBC, UA 0-2 None Seen, 0-2 /HPF    Bacteria, UA None Seen None Seen /HPF    Squamous Epithelial Cells, UA 0-2 None Seen, 0-2 /HPF    Hyaline Casts, UA None Seen None Seen /LPF    Methodology Automated Microscopy            Echocardiogram 1/11/2020    · Left ventricular wall thickness is consistent with mild concentric hypertrophy.  · Left ventricular systolic function is normal. Estimated EF = 65%.  · Trace mitral regurgitation, trace to mild tricuspid regurgitation, normal RVSP.         ASSESSMENT   1. PAF/Flutter:  Remote MAZE 9/17, OFF AAD. Rate controlled on CCB and BB. She denies any palpitations. She can't tell if she is out of rhythm.  Zio monitor on last visit did no record.  She declines another monitor.   2. HTN: Labile, Followed by Nephorlogy, on CCB, Coreg, ACE and Cardura.   3. Anticoagulation:  S/p RADHA Ligation with confirmed closure by ALANIS.   4. Echocardiogram 1/12/2020 Normal EF Mild LVH, Trace MR.   5. CKD Stage II-III      PLAN  · Patient reports she is asymptomatic from A. fib she cannot tell she is out of rhythm.  Is all monitor was then completed the data did not transfer.  We offered option of wearing another monitor but she declines at this time.  · Initiate Lasix for lower extremity  Edema as instructed.   Continue to monitor sodium intake, daily weights.  She has a follow-up with nephrology tomorrow.  · Return to follow-up Dr. Bobo as scheduled with Dr. Osullivan in 6 months or sooner as needed.            5/26/2020  13:24    Will Chava GAYLE

## 2020-06-04 ENCOUNTER — OFFICE VISIT (OUTPATIENT)
Dept: CARDIOLOGY | Facility: CLINIC | Age: 70
End: 2020-06-04

## 2020-06-04 VITALS
BODY MASS INDEX: 30.37 KG/M2 | RESPIRATION RATE: 18 BRPM | HEIGHT: 66 IN | HEART RATE: 54 BPM | WEIGHT: 189 LBS | SYSTOLIC BLOOD PRESSURE: 100 MMHG | DIASTOLIC BLOOD PRESSURE: 44 MMHG | OXYGEN SATURATION: 96 %

## 2020-06-04 DIAGNOSIS — I38 VHD (VALVULAR HEART DISEASE): ICD-10-CM

## 2020-06-04 DIAGNOSIS — I48.0 PAROXYSMAL ATRIAL FIBRILLATION (HCC): ICD-10-CM

## 2020-06-04 DIAGNOSIS — I10 ESSENTIAL HYPERTENSION: Primary | ICD-10-CM

## 2020-06-04 PROCEDURE — 99214 OFFICE O/P EST MOD 30 MIN: CPT | Performed by: NURSE PRACTITIONER

## 2020-06-04 RX ORDER — AMLODIPINE BESYLATE 2.5 MG/1
2.5 TABLET ORAL DAILY
COMMUNITY
End: 2020-12-30 | Stop reason: ALTCHOICE

## 2020-06-04 RX ORDER — ERGOCALCIFEROL 1.25 MG/1
50000 CAPSULE ORAL WEEKLY
COMMUNITY
End: 2021-06-17

## 2020-06-04 NOTE — PROGRESS NOTES
[facility]    Patient ID: Karen Newman is a 69 y.o. female.  : 1950   Contact: [phone]    Encounter date: 2020    PCP: Mirian Arnold APRN      Chief complaint:   Chief Complaint   Patient presents with   • Follow-up   • Shortness of Breath   • Dizziness       PROBLEM LIST:  1. Atrial fibrillation, duration unknown; diagnosed 2013:  a. Status post ALANIS/external cardioversion, (10/18/2013), Yara Bobo M.D. with the initiation of propafenone therapy.   b. Atrial fibrillation, (2013), in Gloria Gomez’ office.  c. Normal sinus rhythm at the time of office visit, (2013).  d. Normal sinus rhythm, EKG, (2014).  e. Discontinuation of Rythmol secondary to diarrhea, (2014), with initiation of flecainide 50 mg b.i.d. and Eliquis 5 mg b.i.d.   f. CHADS Vasc= 4, (2017)   g. CT scan of the chest (2017): Mild fibrotic changes, o/w normal  h. Pulmonary Function Test (2017): FEV1/FVC  1.54/2.57 = 60%  i. Nuclear Stress Test (2017): EF>70%, no ischemia   j. Bilateral thoracoscopy and Maze procedure and clipping of the left atrial appendage (2017) Dr. Eliza wallis ALANIS 10/24/17: EF 60%, RADHA successfully closed with no evidence of a residual left atrial appendage. Mild to moderate MR  2. Valvular heart disease:  a. Previous echocardiogram approximately,  (incomplete data base).  b. Echocardiogram (08/15/2002), revealing moderate concentric LVH, hyperdynamic LV, EF 81%; mild MR, trace TR.  c. Echocardiogram (10/07/2008), revealed a normal LVEF with mild to moderate MR.  d. Echocardiogram, (2011): normal LV systolic function and wall motion with mild MR and mild TR.   e. Echocardiogram, 2017: EF 60%. Moderate to severe MR. Trace to mild TR. No thrombus appears to be present within the left atrial appendage.  f. ALANIS 10/24/17: EF 60%, RADHA successfully closed with no evidence of a residual left  atrial appendage. Mild to moderate MR  g. Echo 1/10/20: EF 65%, trace MR, mild TR, mild LVH.   3. Hypertension:  a. Adenosine Cardiolite, (06/18/2009): Normal systolic function without evidence of inducible ischemia.   b. Echo 1/11/20 NL LVEF   4. Dyslipidemia.  5. Diabetes mellitus, Type 2 diagnosed, 2006.  6. Thyroid disease:  a. Hyperthyroidism status post radioactive iodine therapy.  b. Hypothyroidism on thyroid replacement.  7. Gastroesophageal reflux disease.  8. Chronic kidney disease. crea 1.7  9. History of hemorrhoids status post hemorrhoidectomy.  10. Colon polyps status post polypectomy.  11. Anxiety.  12. Iron deficiency anemia.  13. Obstructive sleep apnea on chronic CPAP therapy.  14. Surgical history:  a. Total abdominal hysterectomy  b. Squamous cell removal (right foot)    Allergies   Allergen Reactions   • Buspirone Nausea And Vomiting   • Lisinopril Other (See Comments)     cough   • Sertraline Hcl Other (See Comments)     nightmares   • Sular [Nisoldipine Er] Cough       Current Medications:    Current Outpatient Medications:   •  amLODIPine (NORVASC) 2.5 MG tablet, Take 2.5 mg by mouth Daily., Disp: , Rfl:   •  atorvastatin (LIPITOR) 10 MG tablet, Take 10 mg by mouth Daily., Disp: , Rfl:   •  carvedilol (COREG) 6.25 MG tablet, Take 6.25 mg by mouth 2 (Two) Times a Day With Meals., Disp: , Rfl:   •  diltiaZEM (TIAZAC) 420 MG 24 hr capsule, Take 420 mg by mouth Daily., Disp: , Rfl:   •  doxazosin (CARDURA) 8 MG tablet, Take 8 mg by mouth Every Night., Disp: , Rfl:   •  ferrous sulfate 324 (65 Fe) MG tablet delayed-release EC tablet, Take 324 mg by mouth Daily With Breakfast., Disp: , Rfl:   •  gabapentin (NEURONTIN) 800 MG tablet, Take 800 mg by mouth 2 (Two) Times a Day., Disp: , Rfl:   •  insulin glargine (LANTUS) 100 UNIT/ML injection, Inject 35 Units under the skin Every Night., Disp: , Rfl:   •  Insulin Lispro (HUMALOG KWIKPEN SC), Inject  under the skin into the appropriate area as directed  "3 (Three) Times a Day Before Meals. Reports she is instructed to take as follows:  Breakfast -16 units, Lunch - 18 units, Supper - 20 units, Disp: , Rfl:   •  levothyroxine (SYNTHROID, LEVOTHROID) 150 MCG tablet, Take 137 mcg by mouth Daily., Disp: , Rfl:   •  lisinopril (PRINIVIL,ZESTRIL) 40 MG tablet, Take 40 mg by mouth Daily., Disp: , Rfl:   •  metFORMIN (GLUCOPHAGE) 850 MG tablet, Take 850 mg by mouth 3 (Three) Times a Day With Meals., Disp: , Rfl:   •  Omega-3-Acid Eth Est, Dietary, 1 g capsule, Take 1 capsule by mouth 2 (Two) Times a Day., Disp: , Rfl:   •  omeprazole (priLOSEC) 40 MG capsule, Take 40 mg by mouth Daily., Disp: , Rfl:   •  PARoxetine (PAXIL) 40 MG tablet, Take 40 mg by mouth Daily., Disp: , Rfl:   •  vitamin D (ERGOCALCIFEROL) 1.25 MG (75095 UT) capsule capsule, Take 50,000 Units by mouth 1 (One) Time Per Week., Disp: , Rfl:     [facility]    Karen Newman is a 69 y.o. female who presents today for follow up on PAF, HTN, VHD. Since last visit, patient has done well from cardiovascular standpoint. BP has been mostly controlled. Some dizziness today but had c-scope yesterday and relates mostly feeling tired. No chest pain, palpitations, SOB, KWASI.  She had echo 2/2020 with stable VHD. Her endocrinologist follows [facility]. She has f/u with them in July.       The following portions of the patient's history were reviewed and updated as appropriate: allergies, current medications and problem list.    Pertinent positives as listed in the HPI.  All other systems reviewed are negative.         Vitals:    06/04/20 1352 06/04/20 1356 06/04/20 1357   BP: 112/44 112/44 100/44   BP Location: Left arm Left arm Left arm   Patient Position: Lying Sitting Standing   Pulse: 52 51 54   Resp: 18     SpO2: 96%     Weight: 85.7 kg (189 lb)     Height: 167.6 cm (66\")         Physical Exam:  General: Alert and oriented.  Neck: Jugular venous pressure is within normal limits. Carotids have normal upstrokes without bruits. "   Cardiovascular: Heart has a nondisplaced focal PMI. Regular rate and rhythm without murmur, gallop or rub.  Lungs: Clear without rales or wheezes. Equal expansion is noted.   Extremities: Show no edema.  Skin: Warm and dry.  Neurologic: Nonfocal.     Diagnostic Data:  Lab Results   Component Value Date    GLUCOSE 105 (H) 05/08/2020    BUN 17 05/08/2020    CREATININE 1.30 (H) 05/08/2020    EGFRIFNONA 41 (L) 05/08/2020    BCR 13.1 05/08/2020    K 4.2 05/08/2020    CO2 27.2 05/08/2020    CALCIUM 9.8 05/08/2020    ALBUMIN 4.20 05/08/2020    AST 12 01/10/2020    ALT 9 01/10/2020     Lab Results   Component Value Date    GLUCOSE 105 (H) 05/08/2020    CALCIUM 9.8 05/08/2020     05/08/2020    K 4.2 05/08/2020    CO2 27.2 05/08/2020     05/08/2020    BUN 17 05/08/2020    CREATININE 1.30 (H) 05/08/2020    EGFRIFNONA 41 (L) 05/08/2020    BCR 13.1 05/08/2020    ANIONGAP 11.8 05/08/2020     No results found for: CHOL, CHLPL, TRIG, HDL, LDL, LDLDIRECT  Lab Results   Component Value Date    WBC 5.95 05/08/2020    HGB 10.8 (L) 05/08/2020    HCT 32.4 (L) 05/08/2020    MCV 92.6 05/08/2020     05/08/2020     No results found for: TSH    Procedures    Advance Care Planning   ACP discussion was held with the patient during this visit. Patient has an advance directive in EMR which is still valid.        ASSESSMENT:    ICD-10-CM ICD-9-CM   1. Essential hypertension I10 401.9   2. Paroxysmal atrial fibrillation (CMS/HCC) I48.0 427.31   3. VHD (valvular heart disease) I38 424.90     Lab results found above were reviewed with the patient.      PLAN:  1. Continue Norvasc, lisinopril, diltiazem, coreg for HTN  2. Continue Coreg and diltiazem for Afib.   3. Not on A/C owed to prev having RADHA closure   4. Continue all other current medications.  5. F/up in 12 months, sooner if needed.      Electronically signed by EUGENIE Gruber, 06/04/20, 2:34 PM.

## 2020-06-07 ASSESSMENT — ENCOUNTER SYMPTOMS
VOMITING: 0
EYE PAIN: 0
ABDOMINAL PAIN: 0
SHORTNESS OF BREATH: 0
NAUSEA: 0
SORE THROAT: 0
COUGH: 0

## 2020-06-07 NOTE — PROGRESS NOTES
Tricia Babin is a 71 y.o. female evaluated via telephone on 5/14/2020. The visit was conducted with the patient in his/her home and provider in her home. Consent:  She and/or health care decision maker is aware that that she may receive a bill for this telephone service, depending on her insurance coverage, and has provided verbal consent to proceed: Yes    SUBJECTIVE:    Health Maintenance Due   Topic Date Due    Hepatitis C screen  1950    Diabetic foot exam  06/08/1960    DTaP/Tdap/Td vaccine (1 - Tdap) 06/08/1969    Shingles Vaccine (1 of 2) 06/08/2000    DEXA (modify frequency per FRAX score)  06/08/2005    Lipid screen  06/06/2015    TSH testing  02/27/2018    A1C test (Diabetic or Prediabetic)  09/22/2018    Annual Wellness Visit (AWV)  05/29/2019    Colon cancer screen colonoscopy  04/05/2020    Diabetic retinal exam  05/03/2020       HPI:   Chief Complaint   Patient presents with    Hypertension   Her BP may be a little bit better with the addition of HCTZ but is still high. She states she had a recurrence of a fib the last time she saw cardiology. She feels OK except for an occ HA. BP readings- 150/86, 150/82, 139/60, 163/79, 148/71. Patient's medications, allergies, past medical, surgical, social and family histories were reviewed and updated as appropriate. Review of Systems   Constitutional: Negative for chills and fever. HENT: Negative for ear pain and sore throat. Eyes: Negative for pain and visual disturbance. Respiratory: Negative for cough and shortness of breath. Cardiovascular: Negative for chest pain, palpitations and leg swelling. Gastrointestinal: Negative for abdominal pain, nausea and vomiting. Genitourinary: Negative for dysuria and hematuria. Musculoskeletal: Negative for joint swelling. Skin: Negative for rash. Neurological: Positive for headaches. Negative for dizziness and weakness.    Psychiatric/Behavioral: Negative for sleep disturbance. OBJECTIVE:  There were no vitals taken for this visit. Physical Exam  Pulmonary:      Effort: Pulmonary effort is normal.   Neurological:      Mental Status: She is alert and oriented to person, place, and time. No results found for requested labs within last 30 days. Hemoglobin A1C (%)   Date Value   09/22/2017 7.9     LDL Calculated (mg/dL)   Date Value   06/06/2014 46       Lab Results   Component Value Date    WBC 6.2 01/16/2020    NEUTROABS 3.1 01/16/2020    HGB 10.8 (L) 01/16/2020    HCT 34.7 (L) 01/16/2020    MCV 98.3 01/16/2020     01/16/2020     Lab Results   Component Value Date    TSH 0.80 02/27/2017       Prior to Visit Medications    Medication Sig Taking?  Authorizing Provider   carvedilol (COREG) 6.25 MG tablet Take 1 tablet by mouth 2 times daily Yes EULALIA Schmidt   hydroCHLOROthiazide (HYDRODIURIL) 25 MG tablet Take 1 tablet by mouth daily 340B if needed  EULALIA Schmidt   omeprazole (PRILOSEC) 40 MG delayed release capsule TAKE 1 CAPSULE BY MOUTH  DAILY  EULALIA Schmidt   vitamin D (ERGOCALCIFEROL) 1.25 MG (64507 UT) CAPS capsule Take 1 capsule by mouth once a week  EULALIA Schmidt   doxazosin (CARDURA) 2 MG tablet Take 1 tablet by mouth every morning Take with 8mg  EULALIA Schmidt   SPS 15 GM/60ML suspension Take 15 g by mouth  Historical Provider, MD   PARoxetine (PAXIL) 40 MG tablet TAKE 1 TABLET BY MOUTH  EVERY MORNING  EULALIA Schmidt   diltiazem (TIAZAC) 420 MG extended release capsule TAKE 1 CAPSULE BY MOUTH  DAILY  EULALIA Schmidt   ferrous sulfate 325 (65 Fe) MG tablet Take 1 tablet by mouth daily (with breakfast)  EULALIA Schmidt   insulin detemir (LEVEMIR FLEXTOUCH) 100 UNIT/ML injection pen Inject 35 Units into the skin nightly 340B  EULALIA Schmidt   insulin lispro (HUMALOG KWIKPEN) 100 UNIT/ML pen Inject 16-20 Units into the skin 3 times daily (before meals) [address] Janny[address] Raygoza, EULALIA   insulin

## 2020-06-19 ENCOUNTER — TELEPHONE (OUTPATIENT)
Dept: PRIMARY CARE CLINIC | Age: 70
End: 2020-06-19

## 2020-06-19 NOTE — TELEPHONE ENCOUNTER
Pt states  (kidney specialist) put her Amlodipine 2.5 mg nightly and states that \"It is making her swell (legs and feet) and causing her to be short or breath) Also reports he told her to stop HCTZ and Losartan \"Unable to get in touch with him\"

## 2020-06-23 NOTE — TELEPHONE ENCOUNTER
She really needs to talk to him since he made the changes. Can we call the office and ask them to touch base with her about some medication side effects?

## 2020-07-01 ENCOUNTER — TRANSCRIBE ORDERS (OUTPATIENT)
Dept: GENERAL RADIOLOGY | Facility: HOSPITAL | Age: 70
End: 2020-07-01

## 2020-07-01 ENCOUNTER — LAB (OUTPATIENT)
Dept: LAB | Facility: HOSPITAL | Age: 70
End: 2020-07-01

## 2020-07-01 DIAGNOSIS — N18.32 CHRONIC KIDNEY DISEASE (CKD) STAGE G3B/A3, MODERATELY DECREASED GLOMERULAR FILTRATION RATE (GFR) BETWEEN 30-44 ML/MIN/1.73 SQUARE METER AND ALBUMINURIA CREATININE RATIO GREATER THAN 300 MG/G (C* (HCC): ICD-10-CM

## 2020-07-01 DIAGNOSIS — N18.32 CHRONIC KIDNEY DISEASE (CKD) STAGE G3B/A3, MODERATELY DECREASED GLOMERULAR FILTRATION RATE (GFR) BETWEEN 30-44 ML/MIN/1.73 SQUARE METER AND ALBUMINURIA CREATININE RATIO GREATER THAN 300 MG/G (C* (HCC): Primary | ICD-10-CM

## 2020-07-01 PROCEDURE — 80069 RENAL FUNCTION PANEL: CPT

## 2020-07-01 PROCEDURE — 84156 ASSAY OF PROTEIN URINE: CPT

## 2020-07-01 PROCEDURE — 82570 ASSAY OF URINE CREATININE: CPT

## 2020-07-01 PROCEDURE — 36415 COLL VENOUS BLD VENIPUNCTURE: CPT

## 2020-07-02 ENCOUNTER — VIRTUAL VISIT (OUTPATIENT)
Dept: PRIMARY CARE CLINIC | Age: 70
End: 2020-07-02
Payer: MEDICARE

## 2020-07-02 LAB
ALBUMIN SERPL-MCNC: 4.1 G/DL (ref 3.5–5.2)
ANION GAP SERPL CALCULATED.3IONS-SCNC: 10.3 MMOL/L (ref 5–15)
BUN SERPL-MCNC: 21 MG/DL (ref 8–23)
BUN/CREAT SERPL: 15 (ref 7–25)
CALCIUM SPEC-SCNC: 9.7 MG/DL (ref 8.6–10.5)
CHLORIDE SERPL-SCNC: 105 MMOL/L (ref 98–107)
CO2 SERPL-SCNC: 26.7 MMOL/L (ref 22–29)
CREAT SERPL-MCNC: 1.4 MG/DL (ref 0.57–1)
CREAT UR-MCNC: 167 MG/DL
GFR SERPL CREATININE-BSD FRML MDRD: 37 ML/MIN/1.73
GLUCOSE SERPL-MCNC: 94 MG/DL (ref 65–99)
PHOSPHATE SERPL-MCNC: 4 MG/DL (ref 2.5–4.5)
POTASSIUM SERPL-SCNC: 5 MMOL/L (ref 3.5–5.2)
PROT UR-MCNC: 235.8 MG/DL
SODIUM SERPL-SCNC: 142 MMOL/L (ref 136–145)

## 2020-07-02 PROCEDURE — 1090F PRES/ABSN URINE INCON ASSESS: CPT | Performed by: NURSE PRACTITIONER

## 2020-07-02 PROCEDURE — 4040F PNEUMOC VAC/ADMIN/RCVD: CPT | Performed by: NURSE PRACTITIONER

## 2020-07-02 PROCEDURE — G8400 PT W/DXA NO RESULTS DOC: HCPCS | Performed by: NURSE PRACTITIONER

## 2020-07-02 PROCEDURE — G8428 CUR MEDS NOT DOCUMENT: HCPCS | Performed by: NURSE PRACTITIONER

## 2020-07-02 PROCEDURE — 3017F COLORECTAL CA SCREEN DOC REV: CPT | Performed by: NURSE PRACTITIONER

## 2020-07-02 PROCEDURE — G2025 DIS SITE TELE SVCS RHC/FQHC: HCPCS | Performed by: NURSE PRACTITIONER

## 2020-07-02 PROCEDURE — 1123F ACP DISCUSS/DSCN MKR DOCD: CPT | Performed by: NURSE PRACTITIONER

## 2020-07-21 RX ORDER — DOXAZOSIN 8 MG/1
TABLET ORAL
Qty: 90 TABLET | Refills: 3 | Status: SHIPPED | OUTPATIENT
Start: 2020-07-21 | End: 2021-06-17

## 2020-08-03 ENCOUNTER — TELEPHONE (OUTPATIENT)
Dept: PRIMARY CARE CLINIC | Age: 70
End: 2020-08-03

## 2020-08-03 NOTE — TELEPHONE ENCOUNTER
----- Message from EULALIA Dyer sent at 8/2/2020  8:09 PM EDT -----  Tell her I looked up her labs she did for the kidney doctor. They look about the same. Creatinine was 1.4. Blood count slightly low but stable.

## 2020-08-03 NOTE — TELEPHONE ENCOUNTER
----- Message from EULALIA Rogers sent at 8/2/2020  8:09 PM EDT -----  Tell her I looked up her labs she did for the kidney doctor. They look about the same. Creatinine was 1.4. Blood count slightly low but stable.

## 2020-08-07 ASSESSMENT — ENCOUNTER SYMPTOMS
ABDOMINAL DISTENTION: 1
SHORTNESS OF BREATH: 1
EYE PAIN: 0
SORE THROAT: 0
COUGH: 0
ABDOMINAL PAIN: 0
VOMITING: 0
NAUSEA: 0

## 2020-08-07 NOTE — PROGRESS NOTES
Musculoskeletal: Negative for joint swelling. Skin: Negative for rash. Neurological: Negative for dizziness and weakness. Psychiatric/Behavioral: Negative for sleep disturbance. OBJECTIVE:  There were no vitals taken for this visit. Physical Exam  Constitutional:       Appearance: Normal appearance. Pulmonary:      Effort: Pulmonary effort is normal.   Neurological:      Mental Status: She is alert and oriented to person, place, and time. No results found for requested labs within last 30 days. Hemoglobin A1C (%)   Date Value   09/22/2017 7.9     LDL Calculated (mg/dL)   Date Value   06/06/2014 46       Lab Results   Component Value Date    WBC 6.2 01/16/2020    NEUTROABS 3.1 01/16/2020    HGB 10.8 (L) 01/16/2020    HCT 34.7 (L) 01/16/2020    MCV 98.3 01/16/2020     01/16/2020     Lab Results   Component Value Date    TSH 0.80 02/27/2017       Prior to Visit Medications    Medication Sig Taking?  Authorizing Provider   carvedilol (COREG) 6.25 MG tablet Take 1 tablet by mouth 2 times daily  EULLAIA Navarro   hydroCHLOROthiazide (HYDRODIURIL) 25 MG tablet Take 1 tablet by mouth daily 340B if needed  EULALIA Navarro   omeprazole (PRILOSEC) 40 MG delayed release capsule TAKE 1 CAPSULE BY MOUTH  DAILY  EULALIA Navarro   vitamin D (ERGOCALCIFEROL) 1.25 MG (48064 UT) CAPS capsule Take 1 capsule by mouth once a week  EULALIA Navarro   doxazosin (CARDURA) 2 MG tablet Take 1 tablet by mouth every morning Take with 8mg  EULALIA Navarro   SPS 15 GM/60ML suspension Take 15 g by mouth  Historical Provider, MD   PARoxetine (PAXIL) 40 MG tablet TAKE 1 TABLET BY MOUTH  EVERY MORNING  EULALIA Navarro   diltiazem (TIAZAC) 420 MG extended release capsule TAKE 1 CAPSULE BY MOUTH  DAILY  EULALIA Navarro   ferrous sulfate 325 (65 Fe) MG tablet Take 1 tablet by mouth daily (with breakfast)  EULALIA Navarro   insulin detemir (LEVEMIR FLEXTOUCH) 100 UNIT/ML injection hours. Patient taking differently: Take 175 mcg by mouth daily   EULALIA Newberry   metformin (GLUCOPHAGE) 850 MG tablet Take 1 tablet by mouth 3 times daily. EULALIA Newberry       ASSESSMENT:  1. Essential hypertension    2. Edema, unspecified type    3. SOB (shortness of breath)        PLAN:  Continue current meds for now. I will look for her lab results. Return in about 2 months (around 9/2/2020). Chapincito Newton is a 79 y.o. female being evaluated by a Virtual Visit (video visit) encounter to address concerns as mentioned above. A caregiver was present when appropriate. Due to this being a TeleHealth encounter (During [facility]- public health emergency), evaluation of the following organ systems was limited: Vitals/Constitutional/EENT/Resp/CV/GI//MS/Neuro/Skin/Heme-Lymph-Imm. Pursuant to the emergency declaration under the [address] [place] [address] and the [employer] and [employer] Act, this Virtual Visit was conducted with patient's (and/or legal guardian's) consent, to reduce the patient's risk of exposure to COVID-19 and provide necessary medical care. The patient (and/or legal guardian) has also been advised to contact this office for worsening conditions or problems, and seek emergency medical treatment and/or call 911 if deemed necessary. Services were provided through a video synchronous discussion virtually to substitute for in-person clinic visit. Patient and provider were located at their individual homes. --EULALIA Newberry on 8/7/2020 at 2:46 AM    An electronic signature was used to authenticate this note.

## 2020-08-31 LAB
AVERAGE GLUCOSE: NORMAL
HBA1C MFR BLD: 6.8 %

## 2020-09-02 ENCOUNTER — HOSPITAL ENCOUNTER (OUTPATIENT)
Dept: MAMMOGRAPHY | Facility: HOSPITAL | Age: 70
Discharge: HOME | End: 2020-09-02
Payer: MEDICARE

## 2020-09-02 ENCOUNTER — OFFICE VISIT (OUTPATIENT)
Dept: PRIMARY CARE CLINIC | Age: 70
End: 2020-09-02
Payer: MEDICARE

## 2020-09-02 VITALS
HEIGHT: 65 IN | WEIGHT: 177 LBS | SYSTOLIC BLOOD PRESSURE: 114 MMHG | OXYGEN SATURATION: 98 % | TEMPERATURE: 99 F | RESPIRATION RATE: 16 BRPM | DIASTOLIC BLOOD PRESSURE: 70 MMHG | BODY MASS INDEX: 29.49 KG/M2 | HEART RATE: 87 BPM

## 2020-09-02 PROCEDURE — 4040F PNEUMOC VAC/ADMIN/RCVD: CPT | Performed by: NURSE PRACTITIONER

## 2020-09-02 PROCEDURE — 3044F HG A1C LEVEL LT 7.0%: CPT | Performed by: NURSE PRACTITIONER

## 2020-09-02 PROCEDURE — 77063 BREAST TOMOSYNTHESIS BI: CPT

## 2020-09-02 PROCEDURE — G8400 PT W/DXA NO RESULTS DOC: HCPCS | Performed by: NURSE PRACTITIONER

## 2020-09-02 PROCEDURE — 1036F TOBACCO NON-USER: CPT | Performed by: NURSE PRACTITIONER

## 2020-09-02 PROCEDURE — 99213 OFFICE O/P EST LOW 20 MIN: CPT | Performed by: NURSE PRACTITIONER

## 2020-09-02 PROCEDURE — 1123F ACP DISCUSS/DSCN MKR DOCD: CPT | Performed by: NURSE PRACTITIONER

## 2020-09-02 PROCEDURE — 1090F PRES/ABSN URINE INCON ASSESS: CPT | Performed by: NURSE PRACTITIONER

## 2020-09-02 PROCEDURE — 3017F COLORECTAL CA SCREEN DOC REV: CPT | Performed by: NURSE PRACTITIONER

## 2020-09-02 PROCEDURE — G8427 DOCREV CUR MEDS BY ELIG CLIN: HCPCS | Performed by: NURSE PRACTITIONER

## 2020-09-02 PROCEDURE — 2022F DILAT RTA XM EVC RTNOPTHY: CPT | Performed by: NURSE PRACTITIONER

## 2020-09-02 PROCEDURE — G8417 CALC BMI ABV UP PARAM F/U: HCPCS | Performed by: NURSE PRACTITIONER

## 2020-09-02 RX ORDER — DOXAZOSIN 8 MG/1
8 TABLET ORAL NIGHTLY
COMMUNITY

## 2020-09-02 ASSESSMENT — ENCOUNTER SYMPTOMS
SHORTNESS OF BREATH: 0
ABDOMINAL PAIN: 0
SORE THROAT: 0
VOMITING: 0
COUGH: 0
CONSTIPATION: 0
RHINORRHEA: 0
DIARRHEA: 0
EYE DISCHARGE: 0
EYE ITCHING: 0
EYE REDNESS: 0
NAUSEA: 0

## 2020-09-02 NOTE — PATIENT INSTRUCTIONS
· Keep a list of your medicines with you. List all of the prescription medicines, nonprescription medicines, supplements, natural remedies, and vitamins that you take. Tell your healthcare providers who treat you about all of the products you are taking. Your provider can provide you with a form to keep track of them. Just ask. · Follow the directions that come with your medicine, including information about food or alcohol. Make sure you know how and when to take your medicine. Do not take more or less than you are supposed to take. · Keep all medicines out of the reach of children. · Store medicines according to the directions on the label. · Monitor yourself. Learn to know how your body reacts to your new medicine and keep track of how it makes you feel before attempting (If your provider has allowed you to do so) to drive or go to work. · Seek emergency medical attention if you think you have used too much of this medicine. An overdose of any prescription medicine can be fatal. Overdose symptoms may include extreme drowsiness, muscle weakness, confusion, cold and clammy skin, pinpoint pupils, shallow breathing, slow heart rate, fainting, or coma. · Don't share prescription medicines with others, even when they seem to have the same symptoms. What may be good for you may be harmful to others. · If you are no longer taking a prescribed medication and you have pills left please take your pills out of their original containers. Mix crushed pills with an undesirable substance, such as cat litter or used coffee grounds. Put the mixture into a disposable container with a lid, such as an empty margarine tub, or into a sealable bag. Cover up or remove any of your personal information on the empty containers by covering it with black permanent marker or duct tape. Place the sealed container with the mixture, and the empty drug containers, in the trash.    · If you use a medication that is in the form of a patch, dispose of used patches by folding them in half so that the sticky sides meet, and then flushing them down a toilet. They should not be placed in the household trash where children or pets can find them. · If you have any questions, ask your provider or pharmacist for more information. · Be sure to keep all appointments for provider visits or tests. We are committed to providing you with the best care possible. In order to help us achieve these goals please remember to bring all medications, herbal products, and over the counter supplements with you to each visit. If your provider has ordered testing for you, please be sure to follow up with our office if you have not received results within 7 days after the testing took place. *If you receive a survey after visiting one of our offices, please take time to share your experience concerning your physician office visit. These surveys are confidential and no health information about you is shared. We are eager to improve for you and we are counting on your feedback to help make that happen. Thank you for requesting your Continuity of Care Document (CCD) electronically. Please follow the instructions below to securely access your online medical record. [employer] allows you to send messages to your doctor, view your test results, renew your prescriptions, schedule appointments, and more. How Do I Access my CCD? In your Internet browser, go to [email or web address] Enter your user name and password   Click on My medical Record  --> Download Summary --> Enter Password --> Download --> Save or Open Document    Additional Information  If you have questions, please contact your physician practice where you receive care. Remember, [employer] is NOT to be used for urgent needs. For medical emergencies, dial 911.

## 2020-09-02 NOTE — PROGRESS NOTES
SUBJECTIVE:    Patient ID: nAastasia Alford is a 79 y.o. female. Medical History Review  Past Medical, Family, and Social History reviewed and does contribute to the patient presenting condition    Health Maintenance Due   Topic Date Due    Hepatitis C screen  1950    Diabetic foot exam  06/08/1960    DTaP/Tdap/Td vaccine (1 - Tdap) 06/08/1969    Shingles Vaccine (1 of 2) 06/08/2000    DEXA (modify frequency per FRAX score)  06/08/2005    Lipid screen  06/06/2015    TSH testing  02/27/2018    Annual Wellness Visit (AWV)  05/29/2019    Flu vaccine (1) 09/01/2020       HPI:   Chief Complaint   Patient presents with    Diabetes    Hypertension   She had to stop Coreg due to SOB. Her home BP runs a little high at times. She is unsure if her cuff is working well. She has been trying to lose weight, down 16lbs by her scale. She feels like her heart has been in rhythm. Patient's medications, allergies, past medical, surgical, social and family histories were reviewed and updated as appropriate. Review of Systems Reviewed and acurate. See MA note. OBJECTIVE:  /70 (Site: Right Upper Arm, Position: Sitting)   Pulse 87   Temp 99 °F (37.2 °C) (Temporal)   Resp 16   Ht 5' 5\" (1.651 m)   Wt 177 lb (80.3 kg)   SpO2 98% Comment: room air  BMI 29.45 kg/m²    Physical Exam  Vitals signs reviewed. Constitutional:       General: She is not in acute distress. Appearance: She is well-developed. HENT:      Head: Normocephalic. Right Ear: Tympanic membrane normal.      Left Ear: Tympanic membrane normal.      Mouth/Throat:      Pharynx: No oropharyngeal exudate. Eyes:      General: Lids are normal.   Neck:      Musculoskeletal: Neck supple. Cardiovascular:      Rate and Rhythm: Normal rate and regular rhythm. Heart sounds: Normal heart sounds. Pulmonary:      Effort: Pulmonary effort is normal.      Breath sounds: Normal breath sounds.    Abdominal:      General: Bowel sounds are normal. There is no distension. Palpations: Abdomen is soft. Tenderness: There is no abdominal tenderness. Lymphadenopathy:      Cervical: No cervical adenopathy. Skin:     General: Skin is warm and dry. Neurological:      Mental Status: She is alert and oriented to person, place, and time. No results found for requested labs within last 30 days. Hemoglobin A1C (%)   Date Value   08/31/2020 6.8     LDL Calculated (mg/dL)   Date Value   06/06/2014 46       Lab Results   Component Value Date    WBC 6.2 01/16/2020    NEUTROABS 3.1 01/16/2020    HGB 10.8 (L) 01/16/2020    HCT 34.7 (L) 01/16/2020    MCV 98.3 01/16/2020     01/16/2020     Lab Results   Component Value Date    TSH 0.80 02/27/2017       Prior to Visit Medications    Medication Sig Taking?  Authorizing Provider   doxazosin (CARDURA) 8 MG tablet Take 8 mg by mouth nightly Yes Historical Provider, MD   hydroCHLOROthiazide (HYDRODIURIL) 25 MG tablet Take 1 tablet by mouth daily 340B if needed Yes EULALIA Mcintosh   omeprazole (PRILOSEC) 40 MG delayed release capsule TAKE 1 CAPSULE BY MOUTH  DAILY Yes EULALIA Mcintosh   vitamin D (ERGOCALCIFEROL) 1.25 MG (78104 UT) CAPS capsule Take 1 capsule by mouth once a week Yes EULALIA Mcintosh   SPS 15 GM/60ML suspension Take 15 g by mouth Yes Historical Provider, MD   PARoxetine (PAXIL) 40 MG tablet TAKE 1 TABLET BY MOUTH  EVERY MORNING Yes EULALIA Mcintosh   diltiazem (TIAZAC) 420 MG extended release capsule TAKE 1 CAPSULE BY MOUTH  DAILY Yes EULALIA Mcintosh   ferrous sulfate 325 (65 Fe) MG tablet Take 1 tablet by mouth daily (with breakfast) Yes EULALIA Mcintosh   insulin detemir (LEVEMIR FLEXTOUCH) 100 UNIT/ML injection pen Inject 35 Units into the skin nightly 340B Yes EULALIA Mcintosh   insulin lispro (HUMALOG KWIKPEN) 100 UNIT/ML pen Inject 16-20 Units into the skin 3 times daily (before meals) 340B  Patient taking differently: Inject 16-20 Units into the skin 3 times daily (before meals) 340B. 16 morning, 20 lunch, 20 dinner Yes EULALIA Wright   omega-3 acid ethyl esters (LOVAZA) 1 g capsule take 4 capsules by mouth at bedtime Yes Historical Provider, MD   hydrocortisone (ANUSOL-HC) 2.5 % rectal cream Place rectally 2 times daily. Yes EULALIA Wright   Insulin Syringe-Needle U-100 (INSULIN SYRINGE .5CC/31GX5/16\") 31G X 5/16\" 0.5 ML MISC 1 each by Does not apply route daily Please sub U50 Yes EULALIA Wright   triamcinolone (KENALOG) 0.1 % cream Apply topically 2 times daily- mix with entire jar of Cetaphil Yes EULALIA Wright   lisinopril (PRINIVIL;ZESTRIL) 40 MG tablet Take 1 tablet by mouth daily Yes EULALIA Wright   ONE TOUCH ULTRA TEST strip  Yes Historical Provider, MD   gabapentin (NEURONTIN) 800 MG tablet  Yes Historical Provider, MD   Syringe/Needle, Disp, (SYRINGE 3CC/25GX1\") 25G X 1\" 3 ML MISC Monthly for B12 injection Yes EULALIA Wright   atorvastatin (LIPITOR) 10 MG tablet Take 10 mg by mouth daily. Yes Historical Provider, MD   Needles & Syringes MISC 1 each by Does not apply route daily. Syringe for B12 IM injection Yes EULALIA Wright   furosemide (LASIX) 20 MG tablet One daily as needed for swelling. Yes EULALIA Wright   levothyroxine (SYNTHROID) 175 MCG tablet Take 1 tablet by mouth every 48 hours. Patient taking differently: Take 175 mcg by mouth daily  Yes EULALIA Wright   metformin (GLUCOPHAGE) 850 MG tablet Take 1 tablet by mouth 3 times daily. Yes EULALIA Wright       ASSESSMENT:  1. Essential hypertension    2. Type 2 diabetes mellitus with complication, without long-term current use of insulin (HCC)    3. Paroxysmal atrial fibrillation (HCC)        PLAN:    Patient Instructions   · Keep a list of your medicines with you. List all of the prescription medicines, nonprescription medicines, supplements, natural remedies, and vitamins that you take.  Tell your healthcare providers them.  · If you have any questions, ask your provider or pharmacist for more information. · Be sure to keep all appointments for provider visits or tests. We are committed to providing you with the best care possible. In order to help us achieve these goals please remember to bring all medications, herbal products, and over the counter supplements with you to each visit. If your provider has ordered testing for you, please be sure to follow up with our office if you have not received results within 7 days after the testing took place. *If you receive a survey after visiting one of our offices, please take time to share your experience concerning your physician office visit. These surveys are confidential and no health information about you is shared. We are eager to improve for you and we are counting on your feedback to help make that happen. Thank you for requesting your Continuity of Care Document (CCD) electronically. Please follow the instructions below to securely access your online medical record. [employer] allows you to send messages to your doctor, view your test results, renew your prescriptions, schedule appointments, and more. How Do I Access my CCD? In your Internet browser, go to [email or web address] Enter your user name and password   Click on My medical Record  --> Download Summary --> Enter Password --> Download --> Save or Open Document    Additional Information  If you have questions, please contact your physician practice where you receive care. Remember, [employer] is NOT to be used for urgent needs. For medical emergencies, dial 911. Return in about 3 months (around 12/2/2020).

## 2020-09-02 NOTE — PROGRESS NOTES
Chief Complaint   Patient presents with    Diabetes    Hypertension       Have you seen any other physician or provider since your last visit yes - endocrinologist    Have you had any other diagnostic tests since your last visit? yes - labs    Have you changed or stopped any medications since your last visit? no     Review of Systems   Constitutional: Negative for chills, fatigue and fever. HENT: Negative for congestion, ear pain, rhinorrhea and sore throat. Eyes: Negative for discharge, redness and itching. Respiratory: Negative for cough and shortness of breath. Cardiovascular: Negative for chest pain, palpitations and leg swelling. Gastrointestinal: Negative for abdominal pain, constipation, diarrhea, nausea and vomiting. Endocrine: Negative for cold intolerance and heat intolerance. Genitourinary: Negative for dysuria. Musculoskeletal: Negative for arthralgias and joint swelling. Skin: Negative for rash and wound. Neurological: Negative for weakness and headaches. Hematological: Negative for adenopathy. Psychiatric/Behavioral: Negative for dysphoric mood and sleep disturbance. The patient is not nervous/anxious.                   Health Maintenance Due This Visit   Colonoscopy No   Mammogram No   Annual Wellness Visit Yes   Microalbumin No   HgbA1C Yes   Diabetic Eye Exam No    House Bill One Due This Visit   GERMAN No   UDS No   Contract No

## 2020-10-01 ENCOUNTER — LAB (OUTPATIENT)
Dept: LAB | Facility: HOSPITAL | Age: 70
End: 2020-10-01

## 2020-10-01 ENCOUNTER — TRANSCRIBE ORDERS (OUTPATIENT)
Dept: LAB | Facility: HOSPITAL | Age: 70
End: 2020-10-01

## 2020-10-01 DIAGNOSIS — N18.30 MALIGNANT HYPERTENSIVE HEART AND CHRONIC KIDNEY DISEASE STAGE III (HCC): Primary | ICD-10-CM

## 2020-10-01 DIAGNOSIS — I13.10 MALIGNANT HYPERTENSIVE HEART AND CHRONIC KIDNEY DISEASE STAGE III (HCC): Primary | ICD-10-CM

## 2020-10-01 DIAGNOSIS — N18.30 MALIGNANT HYPERTENSIVE HEART AND CHRONIC KIDNEY DISEASE STAGE III (HCC): ICD-10-CM

## 2020-10-01 DIAGNOSIS — I13.10 MALIGNANT HYPERTENSIVE HEART AND CHRONIC KIDNEY DISEASE STAGE III (HCC): ICD-10-CM

## 2020-10-01 LAB
ALBUMIN SERPL-MCNC: 4.1 G/DL (ref 3.5–5.2)
ANION GAP SERPL CALCULATED.3IONS-SCNC: 11.2 MMOL/L (ref 5–15)
BACTERIA UR QL AUTO: ABNORMAL /HPF
BASOPHILS # BLD AUTO: 0.05 10*3/MM3 (ref 0–0.2)
BASOPHILS NFR BLD AUTO: 1.1 % (ref 0–1.5)
BILIRUB UR QL STRIP: NEGATIVE
BUN SERPL-MCNC: 21 MG/DL (ref 8–23)
BUN/CREAT SERPL: 14.2 (ref 7–25)
CALCIUM SPEC-SCNC: 9.3 MG/DL (ref 8.6–10.5)
CHLORIDE SERPL-SCNC: 107 MMOL/L (ref 98–107)
CLARITY UR: CLEAR
CO2 SERPL-SCNC: 24.8 MMOL/L (ref 22–29)
COLOR UR: YELLOW
CREAT SERPL-MCNC: 1.48 MG/DL (ref 0.57–1)
CREAT UR-MCNC: 100.3 MG/DL
DEPRECATED RDW RBC AUTO: 45.5 FL (ref 37–54)
EOSINOPHIL # BLD AUTO: 0.15 10*3/MM3 (ref 0–0.4)
EOSINOPHIL NFR BLD AUTO: 3.4 % (ref 0.3–6.2)
ERYTHROCYTE [DISTWIDTH] IN BLOOD BY AUTOMATED COUNT: 14.6 % (ref 12.3–15.4)
GFR SERPL CREATININE-BSD FRML MDRD: 35 ML/MIN/1.73
GLUCOSE SERPL-MCNC: 61 MG/DL (ref 65–99)
GLUCOSE UR STRIP-MCNC: NEGATIVE MG/DL
HCT VFR BLD AUTO: 30.2 % (ref 34–46.6)
HGB BLD-MCNC: 9.8 G/DL (ref 12–15.9)
HGB UR QL STRIP.AUTO: NEGATIVE
HYALINE CASTS UR QL AUTO: ABNORMAL /LPF
IMM GRANULOCYTES # BLD AUTO: 0.01 10*3/MM3 (ref 0–0.05)
IMM GRANULOCYTES NFR BLD AUTO: 0.2 % (ref 0–0.5)
KETONES UR QL STRIP: NEGATIVE
LEUKOCYTE ESTERASE UR QL STRIP.AUTO: NEGATIVE
LYMPHOCYTES # BLD AUTO: 1.64 10*3/MM3 (ref 0.7–3.1)
LYMPHOCYTES NFR BLD AUTO: 37 % (ref 19.6–45.3)
MCH RBC QN AUTO: 27.8 PG (ref 26.6–33)
MCHC RBC AUTO-ENTMCNC: 32.5 G/DL (ref 31.5–35.7)
MCV RBC AUTO: 85.6 FL (ref 79–97)
MONOCYTES # BLD AUTO: 0.48 10*3/MM3 (ref 0.1–0.9)
MONOCYTES NFR BLD AUTO: 10.8 % (ref 5–12)
NEUTROPHILS NFR BLD AUTO: 2.1 10*3/MM3 (ref 1.7–7)
NEUTROPHILS NFR BLD AUTO: 47.5 % (ref 42.7–76)
NITRITE UR QL STRIP: NEGATIVE
NRBC BLD AUTO-RTO: 0 /100 WBC (ref 0–0.2)
PH UR STRIP.AUTO: 7.5 [PH] (ref 5–8)
PHOSPHATE SERPL-MCNC: 4.1 MG/DL (ref 2.5–4.5)
PLATELET # BLD AUTO: 217 10*3/MM3 (ref 140–450)
PMV BLD AUTO: 10.3 FL (ref 6–12)
POTASSIUM SERPL-SCNC: 4.2 MMOL/L (ref 3.5–5.2)
PROT UR QL STRIP: ABNORMAL
PROT UR-MCNC: 97 MG/DL
RBC # BLD AUTO: 3.53 10*6/MM3 (ref 3.77–5.28)
RBC # UR: ABNORMAL /HPF
REF LAB TEST METHOD: ABNORMAL
SODIUM SERPL-SCNC: 143 MMOL/L (ref 136–145)
SP GR UR STRIP: 1.02 (ref 1–1.03)
SQUAMOUS #/AREA URNS HPF: ABNORMAL /HPF
UROBILINOGEN UR QL STRIP: ABNORMAL
WBC # BLD AUTO: 4.43 10*3/MM3 (ref 3.4–10.8)
WBC UR QL AUTO: ABNORMAL /HPF

## 2020-10-01 PROCEDURE — 84156 ASSAY OF PROTEIN URINE: CPT

## 2020-10-01 PROCEDURE — 36415 COLL VENOUS BLD VENIPUNCTURE: CPT

## 2020-10-01 PROCEDURE — 81001 URINALYSIS AUTO W/SCOPE: CPT

## 2020-10-01 PROCEDURE — 85025 COMPLETE CBC W/AUTO DIFF WBC: CPT

## 2020-10-01 PROCEDURE — 80069 RENAL FUNCTION PANEL: CPT

## 2020-10-01 PROCEDURE — 82570 ASSAY OF URINE CREATININE: CPT

## 2020-10-01 RX ORDER — INSULIN DETEMIR 100 [IU]/ML
INJECTION, SOLUTION SUBCUTANEOUS
Qty: 30 ML | Refills: 3 | Status: SHIPPED | OUTPATIENT
Start: 2020-10-01 | End: 2020-12-23 | Stop reason: SDUPTHER

## 2020-10-06 DIAGNOSIS — E07.9 THYROID DISEASE: Primary | ICD-10-CM

## 2020-10-06 RX ORDER — LEVOTHYROXINE SODIUM 137 UG/1
137 TABLET ORAL DAILY
Qty: 90 TABLET | Refills: 0 | Status: SHIPPED | OUTPATIENT
Start: 2020-10-06 | End: 2020-12-07

## 2020-10-09 ENCOUNTER — TRANSCRIBE ORDERS (OUTPATIENT)
Dept: LAB | Facility: HOSPITAL | Age: 70
End: 2020-10-09

## 2020-10-09 ENCOUNTER — LAB (OUTPATIENT)
Dept: LAB | Facility: HOSPITAL | Age: 70
End: 2020-10-09

## 2020-10-09 DIAGNOSIS — D50.9 IRON DEFICIENCY ANEMIA, UNSPECIFIED IRON DEFICIENCY ANEMIA TYPE: ICD-10-CM

## 2020-10-09 DIAGNOSIS — N18.4 CHRONIC KIDNEY DISEASE, STAGE IV (SEVERE) (HCC): ICD-10-CM

## 2020-10-09 DIAGNOSIS — N18.4 CHRONIC KIDNEY DISEASE, STAGE IV (SEVERE) (HCC): Primary | ICD-10-CM

## 2020-10-09 LAB
FERRITIN SERPL-MCNC: 19.8 NG/ML (ref 13–150)
IRON 24H UR-MRATE: 39 MCG/DL (ref 37–145)
IRON SATN MFR SERPL: 9 % (ref 20–50)
TIBC SERPL-MCNC: 435 MCG/DL (ref 298–536)
TRANSFERRIN SERPL-MCNC: 292 MG/DL (ref 200–360)

## 2020-10-09 PROCEDURE — 36415 COLL VENOUS BLD VENIPUNCTURE: CPT

## 2020-10-09 PROCEDURE — 84466 ASSAY OF TRANSFERRIN: CPT

## 2020-10-09 PROCEDURE — 83540 ASSAY OF IRON: CPT

## 2020-10-09 PROCEDURE — 82728 ASSAY OF FERRITIN: CPT

## 2020-10-20 RX ORDER — DILTIAZEM HYDROCHLORIDE 420 MG/1
CAPSULE, EXTENDED RELEASE ORAL
Qty: 90 CAPSULE | Refills: 3 | Status: SHIPPED | OUTPATIENT
Start: 2020-10-20 | End: 2021-03-08

## 2020-10-20 RX ORDER — PAROXETINE HYDROCHLORIDE 40 MG/1
40 TABLET, FILM COATED ORAL EVERY MORNING
Qty: 90 TABLET | Refills: 3 | Status: SHIPPED | OUTPATIENT
Start: 2020-10-20

## 2020-10-23 PROBLEM — D50.9 IRON DEFICIENCY ANEMIA, UNSPECIFIED: Status: ACTIVE | Noted: 2020-10-23

## 2020-10-23 PROBLEM — N18.30 CHRONIC KIDNEY DISEASE, STAGE III (MODERATE): Status: ACTIVE | Noted: 2020-10-23

## 2020-10-26 ENCOUNTER — HOSPITAL ENCOUNTER (OUTPATIENT)
Dept: INFUSION THERAPY | Facility: HOSPITAL | Age: 70
Setting detail: INFUSION SERIES
Discharge: HOME | End: 2020-10-26

## 2020-10-26 VITALS
HEART RATE: 77 BPM | SYSTOLIC BLOOD PRESSURE: 104 MMHG | DIASTOLIC BLOOD PRESSURE: 56 MMHG | RESPIRATION RATE: 18 BRPM | BODY MASS INDEX: 28.32 KG/M2 | HEIGHT: 65 IN | WEIGHT: 170 LBS | TEMPERATURE: 97.3 F | OXYGEN SATURATION: 95 %

## 2020-10-26 DIAGNOSIS — D50.9 IRON DEFICIENCY ANEMIA, UNSPECIFIED IRON DEFICIENCY ANEMIA TYPE: Primary | ICD-10-CM

## 2020-10-26 DIAGNOSIS — N18.30 STAGE 3 CHRONIC KIDNEY DISEASE, UNSPECIFIED WHETHER STAGE 3A OR 3B CKD (HCC): ICD-10-CM

## 2020-10-26 PROCEDURE — 25010000002 FERUMOXYTOL 510 MG/17ML SOLUTION 510 MG VIAL: Performed by: INTERNAL MEDICINE

## 2020-10-26 PROCEDURE — 96374 THER/PROPH/DIAG INJ IV PUSH: CPT

## 2020-10-26 RX ORDER — SODIUM CHLORIDE 9 MG/ML
250 INJECTION, SOLUTION INTRAVENOUS ONCE
Status: DISCONTINUED | OUTPATIENT
Start: 2020-10-26 | End: 2020-10-28 | Stop reason: HOSPADM

## 2020-10-26 RX ORDER — SODIUM CHLORIDE 9 MG/ML
250 INJECTION, SOLUTION INTRAVENOUS ONCE
Status: CANCELLED | OUTPATIENT
Start: 2020-10-29

## 2020-10-26 RX ADMIN — FERUMOXYTOL 510 MG: 510 INJECTION INTRAVENOUS at 13:26

## 2020-10-29 ENCOUNTER — HOSPITAL ENCOUNTER (OUTPATIENT)
Dept: INFUSION THERAPY | Facility: HOSPITAL | Age: 70
Setting detail: INFUSION SERIES
Discharge: HOME | End: 2020-10-29

## 2020-10-29 VITALS
DIASTOLIC BLOOD PRESSURE: 74 MMHG | SYSTOLIC BLOOD PRESSURE: 137 MMHG | OXYGEN SATURATION: 96 % | HEART RATE: 83 BPM | TEMPERATURE: 96.8 F

## 2020-10-29 DIAGNOSIS — D50.9 IRON DEFICIENCY ANEMIA, UNSPECIFIED IRON DEFICIENCY ANEMIA TYPE: Primary | ICD-10-CM

## 2020-10-29 DIAGNOSIS — N18.30 STAGE 3 CHRONIC KIDNEY DISEASE, UNSPECIFIED WHETHER STAGE 3A OR 3B CKD (HCC): ICD-10-CM

## 2020-10-29 PROCEDURE — 25010000002 FERUMOXYTOL 510 MG/17ML SOLUTION 510 MG VIAL: Performed by: INTERNAL MEDICINE

## 2020-10-29 PROCEDURE — 96365 THER/PROPH/DIAG IV INF INIT: CPT

## 2020-10-29 PROCEDURE — 96374 THER/PROPH/DIAG INJ IV PUSH: CPT

## 2020-10-29 RX ORDER — SODIUM CHLORIDE 9 MG/ML
250 INJECTION, SOLUTION INTRAVENOUS ONCE
Status: CANCELLED | OUTPATIENT
Start: 2020-10-29

## 2020-10-29 RX ORDER — SODIUM CHLORIDE 9 MG/ML
250 INJECTION, SOLUTION INTRAVENOUS ONCE
Status: DISCONTINUED | OUTPATIENT
Start: 2020-10-29 | End: 2020-10-31 | Stop reason: HOSPADM

## 2020-10-29 RX ADMIN — FERUMOXYTOL 510 MG: 510 INJECTION INTRAVENOUS at 13:41

## 2020-11-03 PROBLEM — I10 HYPERTENSION: Status: RESOLVED | Noted: 2020-11-03 | Resolved: 2020-11-03

## 2020-12-02 ENCOUNTER — HOSPITAL ENCOUNTER (OUTPATIENT)
Facility: HOSPITAL | Age: 70
Discharge: HOME | End: 2020-12-02
Payer: MEDICARE

## 2020-12-02 ENCOUNTER — OFFICE VISIT (OUTPATIENT)
Dept: PRIMARY CARE CLINIC | Age: 70
End: 2020-12-02
Payer: MEDICARE

## 2020-12-02 VITALS
DIASTOLIC BLOOD PRESSURE: 60 MMHG | OXYGEN SATURATION: 98 % | HEART RATE: 101 BPM | RESPIRATION RATE: 16 BRPM | SYSTOLIC BLOOD PRESSURE: 130 MMHG | WEIGHT: 178 LBS | BODY MASS INDEX: 29.66 KG/M2 | HEIGHT: 65 IN | TEMPERATURE: 98 F

## 2020-12-02 PROCEDURE — 1123F ACP DISCUSS/DSCN MKR DOCD: CPT | Performed by: NURSE PRACTITIONER

## 2020-12-02 PROCEDURE — 80053 COMPREHEN METABOLIC PANEL: CPT

## 2020-12-02 PROCEDURE — G8484 FLU IMMUNIZE NO ADMIN: HCPCS | Performed by: NURSE PRACTITIONER

## 2020-12-02 PROCEDURE — 3044F HG A1C LEVEL LT 7.0%: CPT | Performed by: NURSE PRACTITIONER

## 2020-12-02 PROCEDURE — 99214 OFFICE O/P EST MOD 30 MIN: CPT | Performed by: NURSE PRACTITIONER

## 2020-12-02 PROCEDURE — 85025 COMPLETE CBC W/AUTO DIFF WBC: CPT

## 2020-12-02 PROCEDURE — G8417 CALC BMI ABV UP PARAM F/U: HCPCS | Performed by: NURSE PRACTITIONER

## 2020-12-02 PROCEDURE — 1036F TOBACCO NON-USER: CPT | Performed by: NURSE PRACTITIONER

## 2020-12-02 PROCEDURE — 1090F PRES/ABSN URINE INCON ASSESS: CPT | Performed by: NURSE PRACTITIONER

## 2020-12-02 PROCEDURE — 82728 ASSAY OF FERRITIN: CPT

## 2020-12-02 PROCEDURE — 83540 ASSAY OF IRON: CPT

## 2020-12-02 PROCEDURE — G8400 PT W/DXA NO RESULTS DOC: HCPCS | Performed by: NURSE PRACTITIONER

## 2020-12-02 PROCEDURE — 3017F COLORECTAL CA SCREEN DOC REV: CPT | Performed by: NURSE PRACTITIONER

## 2020-12-02 PROCEDURE — 2022F DILAT RTA XM EVC RTNOPTHY: CPT | Performed by: NURSE PRACTITIONER

## 2020-12-02 PROCEDURE — 83550 IRON BINDING TEST: CPT

## 2020-12-02 PROCEDURE — 4040F PNEUMOC VAC/ADMIN/RCVD: CPT | Performed by: NURSE PRACTITIONER

## 2020-12-02 PROCEDURE — G8427 DOCREV CUR MEDS BY ELIG CLIN: HCPCS | Performed by: NURSE PRACTITIONER

## 2020-12-02 NOTE — PROGRESS NOTES
Have you seen any other physician or provider since your last visit? nephrology    Have you had any other diagnostic tests since your last visit? Iron infusion    Have you changed or stopped any medications since your last visit? No    SUBJECTIVE:    Patient ID: Janie Diaz is a 79 y.o. female. Medical History Review  Past Medical, Family, and Social History reviewed and does contribute to the patient presenting condition    Health Maintenance Due   Topic Date Due    Hepatitis C screen  1950    Diabetic foot exam  06/08/1960    DTaP/Tdap/Td vaccine (1 - Tdap) 06/08/1969    Shingles Vaccine (1 of 2) 06/08/2000    DEXA (modify frequency per FRAX score)  06/08/2005    Lipid screen  06/06/2015    TSH testing  02/27/2018    Annual Wellness Visit (AWV)  05/29/2019    Flu vaccine (1) 09/01/2020       HPI:   Chief Complaint   Patient presents with    Diabetes    Hypertension    Blood Work       Pt is here today for a follow up on DM and HTN. She is requesting blood work for potassium and iron. She had 2 iron infusions and feels better. Her stomach bothers her all the time. It feels full and bloated, with a lot of gas. Patient's medications, allergies, past medical, surgical, social and family histories were reviewed and updated as appropriate. Review of Systems Reviewed and acurate. See MA note. OBJECTIVE:  /60 (Site: Left Upper Arm, Position: Sitting)   Pulse 101   Temp 98 °F (36.7 °C) (Temporal)   Resp 16   Ht 5' 5\" (1.651 m)   Wt 178 lb (80.7 kg)   SpO2 98% Comment: room air  BMI 29.62 kg/m²    Physical Exam  Vitals signs reviewed. Constitutional:       General: She is not in acute distress. Appearance: She is well-developed. HENT:      Head: Normocephalic. Right Ear: Tympanic membrane normal.      Left Ear: Tympanic membrane normal.      Mouth/Throat:      Pharynx: No oropharyngeal exudate.    Eyes:      General: Lids are normal.   Neck: Musculoskeletal: Neck supple. Cardiovascular:      Rate and Rhythm: Normal rate. Rhythm irregular. Heart sounds: Normal heart sounds. Pulmonary:      Effort: Pulmonary effort is normal.      Breath sounds: Normal breath sounds. Abdominal:      General: Bowel sounds are normal. There is no distension. Palpations: Abdomen is soft. Tenderness: There is no abdominal tenderness. Lymphadenopathy:      Cervical: No cervical adenopathy. Skin:     General: Skin is warm and dry. Neurological:      Mental Status: She is alert and oriented to person, place, and time.          Results in Past 30 Days  Result Component Current Result Ref Range Previous Result Ref Range   Alb 4.0 (12/2/2020) 3.4 - 4.8 g/dL Not in Time Range    Albumin/Globulin Ratio 1.5 (12/2/2020) 0.8 - 2.0 Not in Time Range    Alkaline Phosphatase 72 (12/2/2020) 25 - 100 U/L Not in Time Range    ALT 11 (12/2/2020) 4 - 36 U/L Not in Time Range    AST 14 (12/2/2020) 8 - 33 U/L Not in Time Range    BUN 22 (H) (12/2/2020) 6 - 20 mg/dL Not in Time Range    Calcium 9.4 (12/2/2020) 8.5 - 10.5 mg/dL Not in Time Range    Chloride 104 (12/2/2020) 98 - 107 mmol/L Not in Time Range    CO2 27 (12/2/2020) 20 - 30 mmol/L Not in Time Range    CREATININE 1.2 (12/2/2020) 0.4 - 1.2 mg/dL Not in Time Range    GFR  54 (L) (12/2/2020) >59 Not in Time Range    GFR Non- 44 (L) (12/2/2020) >59 Not in Time Range    Globulin 2.7 (12/2/2020) g/dL Not in Time Range    Glucose 130 (H) (12/2/2020) 74 - 106 mg/dL Not in Time Range    Potassium 4.5 (12/2/2020) 3.4 - 5.1 mmol/L Not in Time Range    Sodium 142 (12/2/2020) 136 - 145 mmol/L Not in Time Range    Total Bilirubin <0.2 (L) (12/2/2020) 0.3 - 1.2 mg/dL Not in Time Range    Total Protein 6.7 (12/2/2020) 6.4 - 8.3 g/dL Not in Time Range      Hemoglobin A1C (%)   Date Value   08/31/2020 6.8     LDL Calculated (mg/dL)   Date Value   06/06/2014 46       Lab Results   Component Value EULALIA Harris   atorvastatin (LIPITOR) 10 MG tablet Take 10 mg by mouth daily. Yes Historical Provider, MD   Needles & Syringes MISC 1 each by Does not apply route daily. Syringe for B12 IM injection Yes EULALIA Miller   furosemide (LASIX) 20 MG tablet One daily as needed for swelling. Yes EULALIA Miller   levothyroxine (SYNTHROID) 175 MCG tablet Take 1 tablet by mouth every 48 hours. Patient taking differently: Take 175 mcg by mouth daily  Yes EULALIA Miller   metformin (GLUCOPHAGE) 850 MG tablet Take 1 tablet by mouth 3 times daily. Yes EULALIA Miller   insulin detemir (LEVEMIR FLEXTOUCH) 100 UNIT/ML injection pen INJECT 35 UNITS INTO THE SKIN NIGHTLY 340B 90 day supply  Keesha Bai MD   insulin aspart (NOVOLOG FLEXPEN) 100 UNIT/ML injection pen Inject 16-20 Units into the skin 3 times daily (before meals) 340B  EULALIA Miller   dexlansoprazole (DEXILANT) 60 MG CPDR delayed release capsule Take 1 capsule by mouth daily  EULALIA Miller       ASSESSMENT:  1. Iron deficiency    2. Type 2 diabetes mellitus with complication, without long-term current use of insulin ([address]    3. Essential hypertension    4. Paroxysmal atrial fibrillation (HCC)    5. Abdominal bloating    6. Gastroesophageal reflux disease, unspecified whether esophagitis present        PLAN:    Orders Placed This Encounter   Procedures    COMPREHENSIVE METABOLIC PANEL    CBC WITH AUTO DIFFERENTIAL    IRON AND TIBC    FERRITIN       Return in about 3 months (around 3/2/2021). Giuliano Willis CMA am scribing for and in the presence of EULALIA Guzman on 12/28/2020 at 9:39 PM.      I, Shravan ESTEBAN, personally performed the services described in the documentation as scribed by Lesly Rhoades CMA, in my presence and it is both accurate and complete.

## 2020-12-03 ENCOUNTER — TELEPHONE (OUTPATIENT)
Dept: PRIMARY CARE CLINIC | Age: 70
End: 2020-12-03

## 2020-12-03 LAB
A/G RATIO: 1.5 (ref 0.8–2)
ALBUMIN SERPL-MCNC: 4 G/DL (ref 3.4–4.8)
ALP BLD-CCNC: 72 U/L (ref 25–100)
ALT SERPL-CCNC: 11 U/L (ref 4–36)
ANION GAP SERPL CALCULATED.3IONS-SCNC: 11 MMOL/L (ref 3–16)
AST SERPL-CCNC: 14 U/L (ref 8–33)
BASOPHILS ABSOLUTE: 0.1 K/UL (ref 0–0.1)
BASOPHILS RELATIVE PERCENT: 1 %
BILIRUB SERPL-MCNC: <0.2 MG/DL (ref 0.3–1.2)
BUN BLDV-MCNC: 22 MG/DL (ref 6–20)
CALCIUM SERPL-MCNC: 9.4 MG/DL (ref 8.5–10.5)
CHLORIDE BLD-SCNC: 104 MMOL/L (ref 98–107)
CO2: 27 MMOL/L (ref 20–30)
CREAT SERPL-MCNC: 1.2 MG/DL (ref 0.4–1.2)
EOSINOPHILS ABSOLUTE: 0.1 K/UL (ref 0–0.4)
EOSINOPHILS RELATIVE PERCENT: 2.1 %
FERRITIN: 256.1 NG/ML (ref 22–322)
GFR AFRICAN AMERICAN: 54
GFR NON-AFRICAN AMERICAN: 44
GLOBULIN: 2.7 G/DL
GLUCOSE BLD-MCNC: 130 MG/DL (ref 74–106)
HCT VFR BLD CALC: 36.5 % (ref 37–47)
HEMOGLOBIN: 11.1 G/DL (ref 11.5–16.5)
IMMATURE GRANULOCYTES #: 0 K/UL
IMMATURE GRANULOCYTES %: 0.2 % (ref 0–5)
IRON SATURATION: 26 % (ref 15–50)
IRON: 67 UG/DL (ref 37–145)
LYMPHOCYTES ABSOLUTE: 1.6 K/UL (ref 1.5–4)
LYMPHOCYTES RELATIVE PERCENT: 29.8 %
MCH RBC QN AUTO: 29.8 PG (ref 27–32)
MCHC RBC AUTO-ENTMCNC: 30.4 G/DL (ref 31–35)
MCV RBC AUTO: 97.9 FL (ref 80–100)
MONOCYTES ABSOLUTE: 0.6 K/UL (ref 0.2–0.8)
MONOCYTES RELATIVE PERCENT: 10.6 %
NEUTROPHILS ABSOLUTE: 2.9 K/UL (ref 2–7.5)
NEUTROPHILS RELATIVE PERCENT: 56.3 %
PDW BLD-RTO: 18.2 % (ref 11–16)
PLATELET # BLD: 201 K/UL (ref 150–400)
PMV BLD AUTO: 10.4 FL (ref 6–10)
POTASSIUM SERPL-SCNC: 4.5 MMOL/L (ref 3.4–5.1)
RBC # BLD: 3.73 M/UL (ref 3.8–5.8)
SODIUM BLD-SCNC: 142 MMOL/L (ref 136–145)
TOTAL IRON BINDING CAPACITY: 257 UG/DL (ref 250–450)
TOTAL PROTEIN: 6.7 G/DL (ref 6.4–8.3)
WBC # BLD: 5.2 K/UL (ref 4–11)

## 2020-12-03 RX ORDER — DEXLANSOPRAZOLE 60 MG/1
60 CAPSULE, DELAYED RELEASE ORAL DAILY
Qty: 15 CAPSULE | Refills: 0 | COMMUNITY
Start: 2020-12-03 | End: 2022-09-10

## 2020-12-03 NOTE — TELEPHONE ENCOUNTER
Per the orders of мария Hollins, 3 box/boxes of Dexilant 60 mg were given to patient today. Qty. 15, Lot # [id number], Exp. Date 2/23. Patient given instructions with samples.

## 2020-12-04 RX ORDER — INSULIN LISPRO 100 [IU]/ML
INJECTION, SOLUTION INTRAVENOUS; SUBCUTANEOUS
Qty: 45 ML | Refills: 3 | Status: SHIPPED | OUTPATIENT
Start: 2020-12-04 | End: 2020-12-22 | Stop reason: CLARIF

## 2020-12-07 DIAGNOSIS — E07.9 THYROID DISEASE: ICD-10-CM

## 2020-12-07 RX ORDER — LEVOTHYROXINE SODIUM 137 UG/1
137 TABLET ORAL DAILY
Qty: 90 TABLET | Refills: 0 | Status: SHIPPED | OUTPATIENT
Start: 2020-12-07 | End: 2021-02-18

## 2020-12-22 RX ORDER — INSULIN ASPART 100 [IU]/ML
16-20 INJECTION, SOLUTION INTRAVENOUS; SUBCUTANEOUS
Qty: 20 PEN | Refills: 0 | Status: SHIPPED | OUTPATIENT
Start: 2020-12-22 | End: 2021-03-08 | Stop reason: SDUPTHER

## 2020-12-22 NOTE — TELEPHONE ENCOUNTER
Pushpa,    Our pharmacy intern spoke with the pt today--can the pt's humalog be changed to novolog on 340B since humalog isn't on the program anymore? Pt verbalized understanding of the change. If so the rx would need to be written as qty 20 pens to last the pt 90 days.     Thank you,  Gerardo Fernando

## 2020-12-23 RX ORDER — INSULIN DETEMIR 100 [IU]/ML
INJECTION, SOLUTION SUBCUTANEOUS
Qty: 10 PEN | Refills: 1 | Status: SHIPPED | OUTPATIENT
Start: 2020-12-23 | End: 2021-03-08 | Stop reason: SDUPTHER

## 2020-12-30 ENCOUNTER — LAB (OUTPATIENT)
Dept: LAB | Facility: HOSPITAL | Age: 70
End: 2020-12-30

## 2020-12-30 ENCOUNTER — OFFICE VISIT (OUTPATIENT)
Dept: ENDOCRINOLOGY | Facility: CLINIC | Age: 70
End: 2020-12-30

## 2020-12-30 VITALS
WEIGHT: 177.8 LBS | HEART RATE: 61 BPM | SYSTOLIC BLOOD PRESSURE: 130 MMHG | OXYGEN SATURATION: 96 % | TEMPERATURE: 97.5 F | HEIGHT: 65 IN | DIASTOLIC BLOOD PRESSURE: 58 MMHG | BODY MASS INDEX: 29.62 KG/M2

## 2020-12-30 DIAGNOSIS — E89.0 POSTABLATIVE HYPOTHYROIDISM: ICD-10-CM

## 2020-12-30 DIAGNOSIS — Z79.4 INSULIN LONG-TERM USE (HCC): ICD-10-CM

## 2020-12-30 DIAGNOSIS — E11.649 TYPE 2 DIABETES MELLITUS WITH HYPOGLYCEMIA WITHOUT COMA, WITH LONG-TERM CURRENT USE OF INSULIN (HCC): ICD-10-CM

## 2020-12-30 DIAGNOSIS — E11.49 DIABETIC NEUROPATHY WITH NEUROLOGIC COMPLICATION (HCC): ICD-10-CM

## 2020-12-30 DIAGNOSIS — Z86.39 HISTORY OF GRAVES' DISEASE: ICD-10-CM

## 2020-12-30 DIAGNOSIS — Z79.4 TYPE 2 DIABETES MELLITUS WITH HYPOGLYCEMIA WITHOUT COMA, WITH LONG-TERM CURRENT USE OF INSULIN (HCC): ICD-10-CM

## 2020-12-30 DIAGNOSIS — Z79.4 TYPE 2 DIABETES MELLITUS WITH CHRONIC KIDNEY DISEASE, WITH LONG-TERM CURRENT USE OF INSULIN, UNSPECIFIED CKD STAGE (HCC): ICD-10-CM

## 2020-12-30 DIAGNOSIS — E11.65 UNCONTROLLED TYPE 2 DIABETES MELLITUS WITH HYPERGLYCEMIA (HCC): Primary | ICD-10-CM

## 2020-12-30 DIAGNOSIS — E11.22 TYPE 2 DIABETES MELLITUS WITH CHRONIC KIDNEY DISEASE, WITH LONG-TERM CURRENT USE OF INSULIN, UNSPECIFIED CKD STAGE (HCC): ICD-10-CM

## 2020-12-30 DIAGNOSIS — E11.40 DIABETIC NEUROPATHY WITH NEUROLOGIC COMPLICATION (HCC): ICD-10-CM

## 2020-12-30 DIAGNOSIS — I10 ESSENTIAL HYPERTENSION: ICD-10-CM

## 2020-12-30 LAB
EXPIRATION DATE: NORMAL
HBA1C MFR BLD: 6 %
Lab: NORMAL
TSH SERPL DL<=0.05 MIU/L-ACNC: 4.25 UIU/ML (ref 0.27–4.2)

## 2020-12-30 PROCEDURE — 84443 ASSAY THYROID STIM HORMONE: CPT

## 2020-12-30 PROCEDURE — 83036 HEMOGLOBIN GLYCOSYLATED A1C: CPT | Performed by: INTERNAL MEDICINE

## 2020-12-30 PROCEDURE — 99214 OFFICE O/P EST MOD 30 MIN: CPT | Performed by: INTERNAL MEDICINE

## 2020-12-30 RX ORDER — BLOOD SUGAR DIAGNOSTIC
STRIP MISCELLANEOUS 3 TIMES DAILY
COMMUNITY
Start: 2020-09-22 | End: 2021-04-08 | Stop reason: SDUPTHER

## 2020-12-30 RX ORDER — HYDRALAZINE HYDROCHLORIDE 50 MG/1
50 TABLET, FILM COATED ORAL 2 TIMES DAILY
COMMUNITY
Start: 2020-12-14 | End: 2022-03-11 | Stop reason: DRUGHIGH

## 2020-12-30 RX ORDER — INSULIN DETEMIR 100 [IU]/ML
INJECTION, SOLUTION SUBCUTANEOUS
COMMUNITY
Start: 2020-12-23 | End: 2021-08-31 | Stop reason: SDUPTHER

## 2020-12-30 RX ORDER — OMEGA-3-ACID ETHYL ESTERS 1 G/1
1 CAPSULE, LIQUID FILLED ORAL DAILY
COMMUNITY
Start: 2020-11-23 | End: 2022-10-19

## 2020-12-30 RX ORDER — INSULIN ASPART 100 [IU]/ML
16-20 INJECTION, SOLUTION INTRAVENOUS; SUBCUTANEOUS
COMMUNITY
Start: 2020-12-22 | End: 2022-12-06 | Stop reason: SDUPTHER

## 2020-12-30 NOTE — PROGRESS NOTES
"     Office Note      Date: 2020  Patient Name: Karen Newman  MRN: [id number]  : 1950    Chief Complaint   Patient presents with   • Diabetes       History of Present Illness:   Karen Newman is a 70 y.o. female who presents for Diabetes type 2. Diagnosed in: . Treated in past with oral agents. Current treatments: metformin and basal bolus insulin. Number of insulin shots per day: 4. Checks blood sugar 4 times a day. Has low blood sugar: occasional. Aspirin use: No - easy bleeding. Statin use: Yes. ACE-I/ARB use: Yes. Changes in health since last visit: none. Last eye exam .    She remains on T4. She is taking 137mcg qd. She is taking this correctly. She isn't taking any interfering meds concurrently. She hasn't noted any change in the size of her neck. She denies any compressive sxs. She denies any sxs of hypo- or hyperthyroidism.    Subjective      Diabetic Complications:  Eyes: No  Kidneys: Yes - microalbumin and elevated Cr  Feet: Yes  Heart: No    Diet and Exercise:  Meals per day: 2  Minutes of exercise per week: 0 mins.    Review of Systems:   Review of Systems   Constitutional: Negative.    Cardiovascular: Negative.    Gastrointestinal: Positive for abdominal distention.   Endocrine: Negative.        The following portions of the patient's history were reviewed and updated as appropriate: allergies, current medications, past family history, past medical history, past social history, past surgical history and problem list.    Objective       Visit Vitals  /58   Pulse 61   Temp 97.5 °F (36.4 °C) (Infrared)   Ht 165.1 cm (65\")   Wt 80.6 kg (177 lb 12.8 oz)   LMP  (LMP Unknown)   SpO2 96%   BMI 29.59 kg/m²       Physical Exam:  Physical Exam  Constitutional:       Appearance: Normal appearance.   Neurological:      Mental Status: She is alert.         Labs:    HbA1c  Lab Results   Component Value Date    HGBA1C 6.0 2020       CMP  Lab Results   Component Value Date    " GLUCOSE 61 (L) 10/01/2020    BUN 21 10/01/2020    CREATININE 1.48 (H) 10/01/2020    EGFRIFNONA 35 (L) 10/01/2020    BCR 14.2 10/01/2020    K 4.2 10/01/2020    CO2 24.8 10/01/2020    CALCIUM 9.3 10/01/2020    AST 12 01/10/2020    ALT 9 01/10/2020        Lipid Panel        TSH  No results found for: TSH, FREET4     Hemoglobin A1C  Lab Results   Component Value Date    HGBA1C 6.0 12/30/2020        Microalbumin/Creatinine  No results found for: MALBCRERATIO, CREATINIURIN, MICROALBUR        Assessment / Plan      Assessment & Plan:  Problem List Items Addressed This Visit        Other    Hypertension    Current Assessment & Plan     Hypertension is unchanged.  Continue current treatment regimen.  Blood pressure will be reassessed in 3 months.         Relevant Medications    diltiaZEM (TIAZAC) 420 MG 24 hr capsule    lisinopril (PRINIVIL,ZESTRIL) 40 MG tablet    carvedilol (COREG) 6.25 MG tablet    doxazosin (CARDURA) 8 MG tablet    hydrALAZINE (APRESOLINE) 50 MG tablet    Type 2 diabetes mellitus with chronic kidney disease, with long-term current use of insulin (CMS/[facility])    Current Assessment & Plan     Continue ACE-I.         Relevant Medications    metFORMIN (GLUCOPHAGE) 850 MG tablet    insulin aspart (NovoLOG FlexPen) 100 UNIT/ML solution pen-injector sc pen    insulin detemir (Levemir FlexTouch) 100 UNIT/ML injection    Uncontrolled type 2 diabetes mellitus with hyperglycemia (CMS/[facility]) - Primary    Current Assessment & Plan     Diabetes is unchanged.   Continue current treatment regimen.  Diabetes will be reassessed in 3 months.         Relevant Medications    metFORMIN (GLUCOPHAGE) 850 MG tablet    insulin aspart (NovoLOG FlexPen) 100 UNIT/ML solution pen-injector sc pen    insulin detemir (Levemir FlexTouch) 100 UNIT/ML injection    Other Relevant Orders    POC Glycosylated Hemoglobin (Hb A1C) (Completed)    Type 2 diabetes mellitus with hypoglycemia without coma (CMS/[facility])    Current Assessment & Plan     None  severe.  Continue frequent FSBS.         Relevant Medications    metFORMIN (GLUCOPHAGE) 850 MG tablet    insulin aspart (NovoLOG FlexPen) 100 UNIT/ML solution pen-injector sc pen    insulin detemir (Levemir FlexTouch) 100 UNIT/ML injection    Diabetic neuropathy with neurologic complication (CMS/HCC)    Relevant Medications    metFORMIN (GLUCOPHAGE) 850 MG tablet    insulin aspart (NovoLOG FlexPen) 100 UNIT/ML solution pen-injector sc pen    insulin detemir (Levemir FlexTouch) 100 UNIT/ML injection    Postablative hypothyroidism    Current Assessment & Plan     Check TSH today.         Relevant Medications    carvedilol (COREG) 6.25 MG tablet    levothyroxine (SYNTHROID, LEVOTHROID) 137 MCG tablet    Other Relevant Orders    TSH    History of Graves' disease    Current Assessment & Plan     Continue ophthalmology follow up.         Insulin long-term use (CMS/HCC)           Return in about 3 months (around 3/30/2021) for Recheck with A1c, CMP, TSH.    Karel Blevins MD   12/30/2020

## 2021-01-06 ENCOUNTER — OFFICE VISIT (OUTPATIENT)
Dept: CARDIOLOGY | Facility: CLINIC | Age: 71
End: 2021-01-06

## 2021-01-06 VITALS
BODY MASS INDEX: 29.42 KG/M2 | SYSTOLIC BLOOD PRESSURE: 118 MMHG | WEIGHT: 176.6 LBS | HEIGHT: 65 IN | HEART RATE: 67 BPM | DIASTOLIC BLOOD PRESSURE: 68 MMHG | OXYGEN SATURATION: 97 %

## 2021-01-06 DIAGNOSIS — I48.0 PAROXYSMAL ATRIAL FIBRILLATION (HCC): Primary | ICD-10-CM

## 2021-01-06 DIAGNOSIS — I10 ESSENTIAL HYPERTENSION: ICD-10-CM

## 2021-01-06 PROCEDURE — 93000 ELECTROCARDIOGRAM COMPLETE: CPT | Performed by: INTERNAL MEDICINE

## 2021-01-06 PROCEDURE — 99213 OFFICE O/P EST LOW 20 MIN: CPT | Performed by: INTERNAL MEDICINE

## 2021-01-06 NOTE — PROGRESS NOTES
Karen Newman  1950  [phone]    01/06/2021    [facility] CARDIOLOGY     Referring Provider: No ref. provider found     Mirian Arnold APRN  [address] [facility] [address]    Chief Complaint   Patient presents with   • PAF       PROBLEM LIST:  1. Atrial fibrillation, duration unknown; diagnosed August of 2013:  a. Status post ALANIS/external cardioversion, (10/18/2013), Yara Bobo M.D. with the initiation of propafenone therapy.   b. Atrial fibrillation, (11/13/2013), in [facility] Gomez’ office.  c. Normal sinus rhythm at the time of office visit, (11/21/2013).  d. Normal sinus rhythm, EKG, (06/05/2014).  e. Discontinuation of Rythmol secondary to diarrhea, (06/05/2014), with initiation of flecainide 50 mg b.i.d. and Eliquis 5 mg b.i.d.   f. CHADS Vasc= 4, (4/18/2017)   g. CT scan of the chest (06/29/2017): Mild fibrotic changes, o/w normal  h. Pulmonary Function Test (06/29/2017): FEV1/FVC  1.54/2.57 = 60%  i. Nuclear Stress Test (06/29/2017): EF>70%, no ischemia   j. Bilateral thoracoscopy and Maze procedure and clipping of the left atrial appendage (9/26/2017) Dr. Eliza jeronimo. ALANIS 10/24/17: EF 60%, RADHA successfully closed with no evidence of a residual left atrial appendage. Mild to moderate MR  2. Valvular heart disease:  a. Previous echocardiogram approximately, 2006 (incomplete data base).  b. Echocardiogram (08/15/2002), revealing moderate concentric LVH, hyperdynamic LV, EF 81%; mild MR, trace TR.  c. Echocardiogram (10/07/2008), revealed a normal LVEF with mild to moderate MR.  d. Echocardiogram, (07/01/2011): normal LV systolic function and wall motion with mild MR and mild TR.   e. Echocardiogram, 9/22/2017: EF 60%. Moderate to severe MR. Trace to mild TR. No thrombus appears to be present within the left atrial appendage.  f. ALANIS 10/24/17: EF 60%, RADHA successfully closed with no evidence of a residual left atrial appendage. Mild to moderate MR  g. Echocardiogram  1/11/2020: EF 65%, trace MR, trace to mild TR   3. Hypertension:  a. Adenosine Cardiolite, (06/18/2009): Normal systolic function without evidence of inducible ischemia.   b. Echo 1/11/20 NL LVEF   4. Dyslipidemia.  5. Diabetes mellitus, Type 2 diagnosed, 2006.  6. Thyroid disease:  a. Hyperthyroidism status post radioactive iodine therapy.  b. Hypothyroidism on thyroid replacement.  7. Gastroesophageal reflux disease.  8. Chronic kidney disease. crea 1.7  9. History of hemorrhoids status post hemorrhoidectomy.  10. Colon polyps status post polypectomy.  11. Anxiety.  12. Iron deficiency anemia.  13. Obstructive sleep apnea on chronic CPAP therapy.  14. Surgical history:  a. Total abdominal hysterectomy  b. Squamous cell removal (right foot)    Allergies  Allergies   Allergen Reactions   • Amlodipine Shortness Of Breath     Swelling and SOB   • Buspirone Nausea And Vomiting   • Lisinopril Other (See Comments)     cough   • Sertraline Hcl Other (See Comments)     nightmares   • Sular [Nisoldipine Er] Cough   • Carvedilol Other (See Comments)     SOB       Current Medications    Current Outpatient Medications:   •  atorvastatin (LIPITOR) 10 MG tablet, Take 10 mg by mouth Daily., Disp: , Rfl:   •  diltiaZEM (TIAZAC) 420 MG 24 hr capsule, Take 420 mg by mouth Daily., Disp: , Rfl:   •  doxazosin (CARDURA) 8 MG tablet, TAKE 1 TABLET BY MOUTH  DAILY AS DIRECTED, Disp: 90 tablet, Rfl: 3  •  ferrous sulfate 324 (65 Fe) MG tablet delayed-release EC tablet, Take 324 mg by mouth Daily With Breakfast., Disp: , Rfl:   •  gabapentin (NEURONTIN) 800 MG tablet, Take 800 mg by mouth 2 (Two) Times a Day., Disp: , Rfl:   •  hydrALAZINE (APRESOLINE) 50 MG tablet, , Disp: , Rfl:   •  insulin aspart (NovoLOG FlexPen) 100 UNIT/ML solution pen-injector sc pen, Inject 16-20 Units under the skin into the appropriate area as directed., Disp: , Rfl:   •  insulin detemir (Levemir FlexTouch) 100 UNIT/ML injection, INJECT 35 UNITS INTO THE SKIN  "NIGHTLY 340B 90 day supply, Disp: , Rfl:   •  levothyroxine (SYNTHROID, LEVOTHROID) 137 MCG tablet, TAKE 1 TABLET BY MOUTH  DAILY, Disp: 90 tablet, Rfl: 0  •  lisinopril (PRINIVIL,ZESTRIL) 40 MG tablet, Take 40 mg by mouth Daily., Disp: , Rfl:   •  metFORMIN (GLUCOPHAGE) 850 MG tablet, Take 850 mg by mouth 3 (Three) Times a Day With Meals., Disp: , Rfl:   •  omega-3 acid ethyl esters (LOVAZA) 1 g capsule, , Disp: , Rfl:   •  omeprazole (priLOSEC) 40 MG capsule, Take 40 mg by mouth Daily., Disp: , Rfl:   •  OneTouch Verio test strip, 3 (Three) Times a Day. for testing, Disp: , Rfl:   •  PARoxetine (PAXIL) 40 MG tablet, Take 40 mg by mouth Daily., Disp: , Rfl:   •  vitamin D (ERGOCALCIFEROL) 1.25 MG (20724 UT) capsule capsule, Take 50,000 Units by mouth 1 (One) Time Per Week., Disp: , Rfl:     History of Present Illness     Pt presents for follow up of atrial fibrillation. Since we last saw the pt, she states that he is having palpitations, once per week, lasting a few minutes. She thinks it is in atrial fibrillation but is not sure.She denies SOB, CP, LH, and dizziness, syncope.  Denies any hospitalizations, ER visits, bleeding issues, or TIA/CVA symptoms. BP is doing well at home.     ROS:  General:  Denies fatigue, weight gain + loss   Cardiovascular:  Denies CP, PND, syncope, near syncope, edema + palpitations.  Pulmonary:  Denies RODRIGUEZ, cough, or wheezing      Vitals:    01/06/21 1137   BP: 118/68   BP Location: Left arm   Patient Position: Sitting   Pulse: 67   SpO2: 97%   Weight: 80.1 kg (176 lb 9.6 oz)   Height: 165.1 cm (65\")     Body mass index is 29.39 kg/m².  PE:  General: NAD  Neck: no JVD, no carotid bruits, no TM  Heart RRR, NL S1, S2, S4 present, no rubs, murmurs  Lungs: CTA, no wheezes, rhonchi, or rales  Abd: soft, non-tender, NL BS  Ext: No musculoskeletal deformities, no edema, cyanosis, or clubbing  Psych: normal mood and affect    Diagnostic Data:        ECG 12 Lead    Date/Time: 1/6/2021 12:00 " PM  Performed by: Arpan Osullivan MD  Authorized by: Arpan Osullivan MD   Comparison: compared with previous ECG from 1/10/2020  Comparison to previous ECG: Now in NSR   Rhythm: sinus rhythm  BPM: 66  Conduction: 1st degree AV block            1. Paroxysmal atrial fibrillation (CMS/HCC)    2. Essential hypertension          Plan:  1) PAF  -  MAZE procedure 9/2017 off AAD.   - she has more palpitations now, would do ZioPatch   2) Anticoagulation:   S/p RADHA ligation with confirmed closure by ALANIS. No OAC  3) HTN- controlled.   Wt loss, exercise, salt reduction    F/up in 6 months    Electronically signed by MASOOD Glze, 01/06/21, 12:00 PM EST.

## 2021-02-05 ENCOUNTER — TELEPHONE (OUTPATIENT)
Dept: CARDIOLOGY | Facility: CLINIC | Age: 71
End: 2021-02-05

## 2021-02-05 NOTE — TELEPHONE ENCOUNTER
Called patient today to give results of her ZioPatch read by Dr. Osullivan. It showed less than 1% AFIB and an episode of junctional bradycardia 40 bpm which were overall both asymptomatic according to the patient. She states she is doing well and not bothered by her atrial fibrillation, not affecting her quality of life. She has confirmed closure of her RADHA by ALANIS. For now, we will monitor and she will follow up as scheduled. She agrees and expresses verbal understanding.     Electronically signed by MASOOD Glez, 02/05/21, 11:10 AM EST.

## 2021-02-18 DIAGNOSIS — E07.9 THYROID DISEASE: ICD-10-CM

## 2021-02-18 RX ORDER — ATORVASTATIN CALCIUM 10 MG/1
10 TABLET, FILM COATED ORAL DAILY
Qty: 90 TABLET | Refills: 3 | Status: SHIPPED | OUTPATIENT
Start: 2021-02-18 | End: 2022-01-10

## 2021-02-18 RX ORDER — LISINOPRIL 40 MG/1
40 TABLET ORAL DAILY
Qty: 90 TABLET | Refills: 3 | Status: SHIPPED | OUTPATIENT
Start: 2021-02-18 | End: 2021-02-19 | Stop reason: SDUPTHER

## 2021-02-18 RX ORDER — LEVOTHYROXINE SODIUM 137 UG/1
137 TABLET ORAL DAILY
Qty: 90 TABLET | Refills: 3 | Status: SHIPPED | OUTPATIENT
Start: 2021-02-18 | End: 2022-01-06 | Stop reason: DRUGHIGH

## 2021-02-19 RX ORDER — LISINOPRIL 40 MG/1
40 TABLET ORAL DAILY
Qty: 90 TABLET | Refills: 1 | Status: SHIPPED | OUTPATIENT
Start: 2021-02-19 | End: 2021-04-20 | Stop reason: SDUPTHER

## 2021-03-08 ENCOUNTER — OFFICE VISIT (OUTPATIENT)
Dept: PRIMARY CARE CLINIC | Age: 71
End: 2021-03-08
Payer: MEDICARE

## 2021-03-08 VITALS
DIASTOLIC BLOOD PRESSURE: 72 MMHG | SYSTOLIC BLOOD PRESSURE: 140 MMHG | OXYGEN SATURATION: 97 % | WEIGHT: 181 LBS | HEART RATE: 72 BPM | TEMPERATURE: 98 F | HEIGHT: 66 IN | BODY MASS INDEX: 29.09 KG/M2 | RESPIRATION RATE: 16 BRPM

## 2021-03-08 DIAGNOSIS — I48.0 PAROXYSMAL ATRIAL FIBRILLATION (HCC): ICD-10-CM

## 2021-03-08 DIAGNOSIS — E11.8 TYPE 2 DIABETES MELLITUS WITH COMPLICATION, WITHOUT LONG-TERM CURRENT USE OF INSULIN (HCC): Primary | ICD-10-CM

## 2021-03-08 DIAGNOSIS — I10 ESSENTIAL HYPERTENSION: ICD-10-CM

## 2021-03-08 PROCEDURE — 3017F COLORECTAL CA SCREEN DOC REV: CPT | Performed by: NURSE PRACTITIONER

## 2021-03-08 PROCEDURE — 3046F HEMOGLOBIN A1C LEVEL >9.0%: CPT | Performed by: NURSE PRACTITIONER

## 2021-03-08 PROCEDURE — 4040F PNEUMOC VAC/ADMIN/RCVD: CPT | Performed by: NURSE PRACTITIONER

## 2021-03-08 PROCEDURE — 1036F TOBACCO NON-USER: CPT | Performed by: NURSE PRACTITIONER

## 2021-03-08 PROCEDURE — G8417 CALC BMI ABV UP PARAM F/U: HCPCS | Performed by: NURSE PRACTITIONER

## 2021-03-08 PROCEDURE — G8400 PT W/DXA NO RESULTS DOC: HCPCS | Performed by: NURSE PRACTITIONER

## 2021-03-08 PROCEDURE — 99213 OFFICE O/P EST LOW 20 MIN: CPT | Performed by: NURSE PRACTITIONER

## 2021-03-08 PROCEDURE — 2022F DILAT RTA XM EVC RTNOPTHY: CPT | Performed by: NURSE PRACTITIONER

## 2021-03-08 PROCEDURE — G8427 DOCREV CUR MEDS BY ELIG CLIN: HCPCS | Performed by: NURSE PRACTITIONER

## 2021-03-08 PROCEDURE — 1090F PRES/ABSN URINE INCON ASSESS: CPT | Performed by: NURSE PRACTITIONER

## 2021-03-08 PROCEDURE — 83036 HEMOGLOBIN GLYCOSYLATED A1C: CPT | Performed by: NURSE PRACTITIONER

## 2021-03-08 PROCEDURE — 1123F ACP DISCUSS/DSCN MKR DOCD: CPT | Performed by: NURSE PRACTITIONER

## 2021-03-08 PROCEDURE — G8484 FLU IMMUNIZE NO ADMIN: HCPCS | Performed by: NURSE PRACTITIONER

## 2021-03-08 RX ORDER — INSULIN DETEMIR 100 [IU]/ML
INJECTION, SOLUTION SUBCUTANEOUS
Qty: 10 PEN | Refills: 3 | Status: SHIPPED | OUTPATIENT
Start: 2021-03-08

## 2021-03-08 RX ORDER — INSULIN ASPART 100 [IU]/ML
16-20 INJECTION, SOLUTION INTRAVENOUS; SUBCUTANEOUS
Qty: 20 PEN | Refills: 3 | Status: SHIPPED | OUTPATIENT
Start: 2021-03-08

## 2021-03-08 RX ORDER — DILTIAZEM HYDROCHLORIDE EXTENDED-RELEASE TABLETS 420 MG/1
1 TABLET, EXTENDED RELEASE ORAL DAILY
Qty: 90 TABLET | Refills: 3 | Status: SHIPPED | OUTPATIENT
Start: 2021-03-08 | End: 2021-03-24 | Stop reason: SDUPTHER

## 2021-03-08 ASSESSMENT — ENCOUNTER SYMPTOMS
VOMITING: 0
DIARRHEA: 0
EYE DISCHARGE: 0
ABDOMINAL PAIN: 0
COUGH: 0
SORE THROAT: 0
NAUSEA: 0
CONSTIPATION: 0
SHORTNESS OF BREATH: 0
EYE REDNESS: 0
RHINORRHEA: 0
EYE ITCHING: 0

## 2021-03-08 ASSESSMENT — PATIENT HEALTH QUESTIONNAIRE - PHQ9
1. LITTLE INTEREST OR PLEASURE IN DOING THINGS: 0
SUM OF ALL RESPONSES TO PHQ9 QUESTIONS 1 & 2: 0

## 2021-03-08 NOTE — PATIENT INSTRUCTIONS
· Keep a list of your medicines with you. List all of the prescription medicines, nonprescription medicines, supplements, natural remedies, and vitamins that you take. Tell your healthcare providers who treat you about all of the products you are taking. Your provider can provide you with a form to keep track of them. Just ask. · Follow the directions that come with your medicine, including information about food or alcohol. Make sure you know how and when to take your medicine. Do not take more or less than you are supposed to take. · Keep all medicines out of the reach of children. · Store medicines according to the directions on the label. · Monitor yourself. Learn to know how your body reacts to your new medicine and keep track of how it makes you feel before attempting (If your provider has allowed you to do so) to drive or go to work. · Seek emergency medical attention if you think you have used too much of this medicine. An overdose of any prescription medicine can be fatal. Overdose symptoms may include extreme drowsiness, muscle weakness, confusion, cold and clammy skin, pinpoint pupils, shallow breathing, slow heart rate, fainting, or coma. · Don't share prescription medicines with others, even when they seem to have the same symptoms. What may be good for you may be harmful to others. · If you are no longer taking a prescribed medication and you have pills left please take your pills out of their original containers. Mix crushed pills with an undesirable substance, such as cat litter or used coffee grounds. Put the mixture into a disposable container with a lid, such as an empty margarine tub, or into a sealable bag. Cover up or remove any of your personal information on the empty containers by covering it with black permanent marker or duct tape. Place the sealed container with the mixture, and the empty drug containers, in the trash.    · If you use a medication that is in the form of a patch, dispose of used patches by folding them in half so that the sticky sides meet, and then flushing them down a toilet. They should not be placed in the household trash where children or pets can find them. · If you have any questions, ask your provider or pharmacist for more information. · Be sure to keep all appointments for provider visits or tests. We are committed to providing you with the best care possible. In order to help us achieve these goals please remember to bring all medications, herbal products, and over the counter supplements with you to each visit. If your provider has ordered testing for you, please be sure to follow up with our office if you have not received results within 7 days after the testing took place. *If you receive a survey after visiting one of our offices, please take time to share your experience concerning your physician office visit. These surveys are confidential and no health information about you is shared. We are eager to improve for you and we are counting on your feedback to help make that happen. Thank you for requesting your Continuity of Care Document (CCD) electronically. Please follow the instructions below to securely access your online medical record. [employer] allows you to send messages to your doctor, view your test results, renew your prescriptions, schedule appointments, and more. How Do I Access my CCD? In your Internet browser, go to [email or web address] Enter your user name and password   Click on My medical Record  --> Download Summary --> Enter Password --> Download --> Save or Open Document    Additional Information  If you have questions, please contact your physician practice where you receive care. Remember, [employer] is NOT to be used for urgent needs. For medical emergencies, dial 911.

## 2021-03-08 NOTE — PROGRESS NOTES
Have you seen any other physician or provider since your last visit? No    Have you had any other diagnostic tests since your last visit? No    Have you changed or stopped any medications since your last visit? No    SUBJECTIVE:    Patient ID: Olu Bang is a 79 y.o. female. Medical History Review  Past Medical, Family, and Social History reviewed. Health Maintenance Due   Topic Date Due    Hepatitis C screen  Never done    Diabetic foot exam  Never done    DTaP/Tdap/Td vaccine (1 - Tdap) Never done    Shingles Vaccine (1 of 2) Never done    DEXA (modify frequency per FRAX score)  Never done    Lipid screen  06/06/2015    TSH testing  02/27/2018    Annual Wellness Visit (AWV)  Never done    Flu vaccine (1) 09/01/2020    COVID-19 Vaccine (2 - Moderna 2-dose series) 03/24/2021       HPI:   Chief Complaint   Patient presents with    Diabetes    Hypertension    Gastroesophageal Reflux     Pt here today for a f/u. She wore a 14 day heart monitor. She had a fib 1% of the time. She has not been checking home BP. She feels like it may be up a little, she has a mild headache. She has had her first COVID vaccine. Patient's medications, allergies, past medical, surgical, social and family histories were reviewed and updated as appropriate. Review of Systems   Constitutional: Negative for chills, fatigue and fever. HENT: Negative for congestion, ear pain, rhinorrhea and sore throat. Eyes: Negative for discharge, redness and itching. Respiratory: Negative for cough and shortness of breath. Cardiovascular: Negative for chest pain, palpitations and leg swelling. Gastrointestinal: Negative for abdominal pain, constipation, diarrhea, nausea and vomiting. Endocrine: Negative for cold intolerance and heat intolerance. Genitourinary: Negative for dysuria. Musculoskeletal: Negative for arthralgias and joint swelling. Skin: Negative for rash and wound.    Neurological: Positive for headaches. Negative for weakness. Hematological: Negative for adenopathy. Psychiatric/Behavioral: Negative for dysphoric mood and sleep disturbance. The patient is not nervous/anxious. Reviewed and acurate. See MA note. OBJECTIVE:  BP (!) 140/72   Pulse 72   Temp 98 °F (36.7 °C) (Temporal)   Resp 16   Ht 5' 6\" (1.676 m)   Wt 181 lb (82.1 kg)   SpO2 97% Comment: room air  BMI 29.21 kg/m²    Physical Exam  Constitutional:       Appearance: She is well-developed. HENT:      Head: Normocephalic and atraumatic. Right Ear: External ear normal.      Left Ear: External ear normal.   Eyes:      Conjunctiva/sclera: Conjunctivae normal.      Pupils: Pupils are equal, round, and reactive to light. Neck:      Musculoskeletal: Neck supple. Thyroid: No thyromegaly. Vascular: No JVD. Cardiovascular:      Rate and Rhythm: Normal rate and regular rhythm. Heart sounds: Normal heart sounds. No murmur. No friction rub. No gallop. Pulmonary:      Effort: Pulmonary effort is normal. No respiratory distress. Breath sounds: Normal breath sounds. Abdominal:      General: Bowel sounds are normal. There is no distension. Palpations: Abdomen is soft. Tenderness: There is no abdominal tenderness. Musculoskeletal: Normal range of motion. Lymphadenopathy:      Cervical: No cervical adenopathy. Skin:     General: Skin is warm and dry. Neurological:      Mental Status: She is alert and oriented to person, place, and time. Cranial Nerves: No cranial nerve deficit. No results found for requested labs within last 30 days.      Hemoglobin A1C (%)   Date Value   08/31/2020 6.8     LDL Calculated (mg/dL)   Date Value   06/06/2014 46       Lab Results   Component Value Date    WBC 5.2 12/02/2020    NEUTROABS 2.9 12/02/2020    HGB 11.1 (L) 12/02/2020    HCT 36.5 (L) 12/02/2020    MCV 97.9 12/02/2020     12/02/2020     Lab Results   Component Value Date    TSH 0.80 02/27/2017       Prior to Visit Medications    Medication Sig Taking? Authorizing Provider   insulin detemir (LEVEMIR FLEXTOUCH) 100 UNIT/ML injection pen INJECT 35 UNITS INTO THE SKIN NIGHTLY 340B Disp 90 day supply Yes EULALIA Lux   insulin aspart (NOVOLOG FLEXPEN) 100 UNIT/ML injection pen Inject 16-20 Units into the skin 3 times daily (before meals) 340B Disp 90 day supply Yes EULALIA Lux   omeprazole (PRILOSEC) 40 MG delayed release capsule TAKE 1 CAPSULE BY MOUTH  DAILY Yes EULALIA Lux   vitamin D (ERGOCALCIFEROL) 1.25 MG (51309 UT) CAPS capsule TAKE 1 CAPSULE BY MOUTH  ONCE WEEKLY Yes EULALIA Lux   dexlansoprazole (DEXILANT) 60 MG CPDR delayed release capsule Take 1 capsule by mouth daily Yes EULALIA Lux   FEROSUL 325 (65 Fe) MG tablet TAKE 1 TABLET BY MOUTH DAILY WITH BREAKFAST Yes EULALIA Lux   PARoxetine (PAXIL) 40 MG tablet TAKE 1 TABLET BY MOUTH  EVERY MORNING Yes EULALIA Lxu   doxazosin (CARDURA) 8 MG tablet Take 8 mg by mouth nightly Yes Historical Provider, MD   hydroCHLOROthiazide (HYDRODIURIL) 25 MG tablet Take 1 tablet by mouth daily 340B if needed Yes EULALIA Lux   SPS 15 GM/60ML suspension Take 15 g by mouth Yes Historical Provider, MD   omega-3 acid ethyl esters (LOVAZA) 1 g capsule take 4 capsules by mouth at bedtime Yes Historical Provider, MD   hydrocortisone (ANUSOL-HC) 2.5 % rectal cream Place rectally 2 times daily.  Yes EULALIA Lux   Insulin Syringe-Needle U-100 (INSULIN SYRINGE .5CC/31GX5/16\") 31G X 5/16\" 0.5 ML MISC 1 each by Does not apply route daily Please sub U50 Yes EULALIA Lux   triamcinolone (KENALOG) 0.1 % cream Apply topically 2 times daily- mix with entire jar of Cetaphil Yes EULALIA Lux   lisinopril (PRINIVIL;ZESTRIL) 40 MG tablet Take 1 tablet by mouth daily Yes EULALIA Lux   ONE TOUCH ULTRA TEST strip  Yes Historical Provider, MD   gabapentin (NEURONTIN) 800 MG tablet Yes Historical Provider, MD   Syringe/Needle, Disp, (SYRINGE 3CC/25GX1\") 25G X 1\" 3 ML MISC Monthly for B12 injection Yes EULALIA Lux   atorvastatin (LIPITOR) 10 MG tablet Take 10 mg by mouth daily. Yes Historical Provider, MD   Needles & Syringes MISC 1 each by Does not apply route daily. Syringe for B12 IM injection Yes EULALIA Lux   furosemide (LASIX) 20 MG tablet One daily as needed for swelling. Yes EULALIA Lux   levothyroxine (SYNTHROID) 175 MCG tablet Take 1 tablet by mouth every 48 hours. Patient taking differently: Take 175 mcg by mouth daily  Yes EULALIA Lux   metformin (GLUCOPHAGE) 850 MG tablet Take 1 tablet by mouth 3 times daily. Yes EULALIA Lux   dilTIAZem HCl ER Coated Beads 420 MG TB24 Take 1 capsule by mouth daily [address], APRN       ASSESSMENT:  1. Type 2 diabetes mellitus with complication, without long-term current use of insulin ([facility]    2. Essential hypertension    3. Paroxysmal atrial fibrillation (HCC)        PLAN:  Orders Placed This Encounter   Medications    DISCONTD: dilTIAZem HCl ER Coated Beads 420 MG TB24     Sig: Take 1 capsule by mouth daily     Dispense:  90 tablet     Refill:  3    insulin detemir (LEVEMIR FLEXTOUCH) 100 UNIT/ML injection pen     Sig: INJECT 35 UNITS INTO THE SKIN NIGHTLY 340B Disp 90 day supply     Dispense:  10 pen     Refill:  3    insulin aspart (NOVOLOG FLEXPEN) 100 UNIT/ML injection pen     Sig: Inject 16-20 Units into the skin 3 times daily (before meals) 340B Disp 90 day supply     Dispense:  20 pen     Refill:  3     Orders Placed This Encounter   Procedures    POCT glycosylated hemoglobin (Hb A1C)     Patient Instructions   · Keep a list of your medicines with you. List all of the prescription medicines, nonprescription medicines, supplements, natural remedies, and vitamins that you take. Tell your healthcare providers who treat you about all of the products you are taking.  Your provider can provide you with a form to keep track of them. Just ask. · Follow the directions that come with your medicine, including information about food or alcohol. Make sure you know how and when to take your medicine. Do not take more or less than you are supposed to take. · Keep all medicines out of the reach of children. · Store medicines according to the directions on the label. · Monitor yourself. Learn to know how your body reacts to your new medicine and keep track of how it makes you feel before attempting (If your provider has allowed you to do so) to drive or go to work. · Seek emergency medical attention if you think you have used too much of this medicine. An overdose of any prescription medicine can be fatal. Overdose symptoms may include extreme drowsiness, muscle weakness, confusion, cold and clammy skin, pinpoint pupils, shallow breathing, slow heart rate, fainting, or coma. · Don't share prescription medicines with others, even when they seem to have the same symptoms. What may be good for you may be harmful to others. · If you are no longer taking a prescribed medication and you have pills left please take your pills out of their original containers. Mix crushed pills with an undesirable substance, such as cat litter or used coffee grounds. Put the mixture into a disposable container with a lid, such as an empty margarine tub, or into a sealable bag. Cover up or remove any of your personal information on the empty containers by covering it with black permanent marker or duct tape. Place the sealed container with the mixture, and the empty drug containers, in the trash. · If you use a medication that is in the form of a patch, dispose of used patches by folding them in half so that the sticky sides meet, and then flushing them down a toilet. They should not be placed in the household trash where children or pets can find them.   · If you have any questions, ask your provider or pharmacist for more information. · Be sure to keep all appointments for provider visits or tests. We are committed to providing you with the best care possible. In order to help us achieve these goals please remember to bring all medications, herbal products, and over the counter supplements with you to each visit. If your provider has ordered testing for you, please be sure to follow up with our office if you have not received results within 7 days after the testing took place. *If you receive a survey after visiting one of our offices, please take time to share your experience concerning your physician office visit. These surveys are confidential and no health information about you is shared. We are eager to improve for you and we are counting on your feedback to help make that happen. Thank you for requesting your Continuity of Care Document (CCD) electronically. Please follow the instructions below to securely access your online medical record. [employer] allows you to send messages to your doctor, view your test results, renew your prescriptions, schedule appointments, and more. How Do I Access my CCD? In your Internet browser, go to [email or web address] Enter your user name and password   Click on My medical Record  --> Download Summary --> Enter Password --> Download --> Save or Open Document    Additional Information  If you have questions, please contact your physician practice where you receive care. Remember, [employer] is NOT to be used for urgent needs. For medical emergencies, dial 911. Return in about 3 months (around 6/8/2021). Lissy Alba CMA am scribing for and in the presence of EULALIA Helton on 3/29/2021 at 11:45 AM.      Bowen ESTEBAN, personally performed the services described in the documentation as scribed by Ariadne Davidson CMA, in my presence and it is both accurate and complete.

## 2021-03-18 ENCOUNTER — TELEPHONE (OUTPATIENT)
Dept: PHARMACY | Facility: HOSPITAL | Age: 71
End: 2021-03-18

## 2021-03-18 NOTE — TELEPHONE ENCOUNTER
Patient Education on Insulin  Diabetes Medication Education was provided to the pt in-person at [facility] and [facility] when she picked up her insulin today.  Provided patient with educational written material as well. The following items were verbally discussed with the pt:  -Insulin dose sheet was provided to the pt so she could keep track of her current doses of insulin and when she needs to take them each day. -Instructions on how to properly test her glucose every day. Pt was provided with instruction sheet as well that showed step-by-step how to test.  -Blood glucose targets based on ADA recommendations (before meal: 80-130mg/dl, 2 hrs after starting meal: below 180mg/dl). Explained that this is a general guideline and that her PCP may have a different goal based on her glucose levels. Pt is to follow-up with PCP regarding glucose goals. -Explained that pt should check her glucose levels if she has signs/symptoms of hyper/hypogylcemia, if she is ill, before, during, and after physical activity, and before she drives.  This is in addition to her normal glucose checks every day (Pt stated that she checks it two to three times every day). -Went over signs and symptoms of hypo/hyperglycemia with the pt. -Recommended the pt keep her meter clean and to double check her test strips to make sure they have not .    -Provided pt with blood glucose log so she can track her glucose every time she checks it.  Stressed the importance of keeping log up-to date so that provider will know how to adjust her insulin at next PCP appointment.    -Recommended that if pt's glucose is below 70mg/dl that she should use 3-4 glucose tablets, drink 1/2 cup of fruit juice or regular soda (non-diet), or eat 5 or 6 pieces of hard candy.  She should then check her glucose again in 15 minutes and if it is still below 70 mg/dl repeat steps and contact MD if needed.   -Went over insulin administration with the pt, including that she should \"prime\" her pen with 1 unit of insulin before each use to make sure she gets the full dose.  Pt was also provided step-by-step diagram on how to properly inject her insulin. Pt stated she knows how to inject insulin already via insulin pen.  -Went over storage of her insulins and how to dispose of the pen needles after each use.  Pens should be stored in the fridge until use and then can be at room temp for 28 days.  Pt verbalized understanding.  -Of note, pt stated that her glucose usually runs under 200 before meals.   Emphasized importance of being compliant with glucose log so provider will know how to adjust accordingly.     Pt verbalized understanding to all above educational information.      Osmar Garcia, PharmD

## 2021-03-24 RX ORDER — DILTIAZEM HYDROCHLORIDE EXTENDED-RELEASE TABLETS 420 MG/1
1 TABLET, EXTENDED RELEASE ORAL DAILY
Qty: 90 TABLET | Refills: 3 | Status: SHIPPED | OUTPATIENT
Start: 2021-03-24 | End: 2022-09-10

## 2021-04-08 ENCOUNTER — OFFICE VISIT (OUTPATIENT)
Dept: ENDOCRINOLOGY | Facility: CLINIC | Age: 71
End: 2021-04-08

## 2021-04-08 ENCOUNTER — LAB (OUTPATIENT)
Dept: LAB | Facility: HOSPITAL | Age: 71
End: 2021-04-08

## 2021-04-08 VITALS
OXYGEN SATURATION: 97 % | SYSTOLIC BLOOD PRESSURE: 122 MMHG | HEART RATE: 60 BPM | BODY MASS INDEX: 29.15 KG/M2 | DIASTOLIC BLOOD PRESSURE: 54 MMHG | TEMPERATURE: 97.3 F | HEIGHT: 66 IN | WEIGHT: 181.4 LBS

## 2021-04-08 DIAGNOSIS — E11.65 UNCONTROLLED TYPE 2 DIABETES MELLITUS WITH HYPERGLYCEMIA (HCC): Primary | ICD-10-CM

## 2021-04-08 DIAGNOSIS — E11.649 TYPE 2 DIABETES MELLITUS WITH HYPOGLYCEMIA WITHOUT COMA, WITH LONG-TERM CURRENT USE OF INSULIN (HCC): ICD-10-CM

## 2021-04-08 DIAGNOSIS — E89.0 POSTABLATIVE HYPOTHYROIDISM: ICD-10-CM

## 2021-04-08 DIAGNOSIS — I10 ESSENTIAL HYPERTENSION: ICD-10-CM

## 2021-04-08 DIAGNOSIS — E11.22 TYPE 2 DIABETES MELLITUS WITH CHRONIC KIDNEY DISEASE, WITH LONG-TERM CURRENT USE OF INSULIN, UNSPECIFIED CKD STAGE (HCC): ICD-10-CM

## 2021-04-08 DIAGNOSIS — Z79.4 TYPE 2 DIABETES MELLITUS WITH HYPOGLYCEMIA WITHOUT COMA, WITH LONG-TERM CURRENT USE OF INSULIN (HCC): ICD-10-CM

## 2021-04-08 DIAGNOSIS — Z79.4 INSULIN LONG-TERM USE (HCC): ICD-10-CM

## 2021-04-08 DIAGNOSIS — Z79.4 TYPE 2 DIABETES MELLITUS WITH CHRONIC KIDNEY DISEASE, WITH LONG-TERM CURRENT USE OF INSULIN, UNSPECIFIED CKD STAGE (HCC): ICD-10-CM

## 2021-04-08 DIAGNOSIS — E11.65 UNCONTROLLED TYPE 2 DIABETES MELLITUS WITH HYPERGLYCEMIA (HCC): ICD-10-CM

## 2021-04-08 LAB
ALBUMIN SERPL-MCNC: 3.9 G/DL (ref 3.5–5.2)
ALBUMIN/GLOB SERPL: 1.2 G/DL
ALP SERPL-CCNC: 54 U/L (ref 39–117)
ALT SERPL W P-5'-P-CCNC: 11 U/L (ref 1–33)
ANION GAP SERPL CALCULATED.3IONS-SCNC: 10.2 MMOL/L (ref 5–15)
AST SERPL-CCNC: 14 U/L (ref 1–32)
BILIRUB SERPL-MCNC: 0.2 MG/DL (ref 0–1.2)
BUN SERPL-MCNC: 25 MG/DL (ref 8–23)
BUN/CREAT SERPL: 15.9 (ref 7–25)
CALCIUM SPEC-SCNC: 8.9 MG/DL (ref 8.6–10.5)
CHLORIDE SERPL-SCNC: 103 MMOL/L (ref 98–107)
CO2 SERPL-SCNC: 28.8 MMOL/L (ref 22–29)
CREAT SERPL-MCNC: 1.57 MG/DL (ref 0.57–1)
EXPIRATION DATE: NORMAL
GFR SERPL CREATININE-BSD FRML MDRD: 33 ML/MIN/1.73
GLOBULIN UR ELPH-MCNC: 3.2 GM/DL
GLUCOSE SERPL-MCNC: 142 MG/DL (ref 65–99)
HBA1C MFR BLD: 6 %
Lab: NORMAL
POTASSIUM SERPL-SCNC: 4.3 MMOL/L (ref 3.5–5.2)
PROT SERPL-MCNC: 7.1 G/DL (ref 6–8.5)
SODIUM SERPL-SCNC: 142 MMOL/L (ref 136–145)
TSH SERPL DL<=0.05 MIU/L-ACNC: 2.35 UIU/ML (ref 0.27–4.2)

## 2021-04-08 PROCEDURE — 80053 COMPREHEN METABOLIC PANEL: CPT

## 2021-04-08 PROCEDURE — 99214 OFFICE O/P EST MOD 30 MIN: CPT | Performed by: INTERNAL MEDICINE

## 2021-04-08 PROCEDURE — 83036 HEMOGLOBIN GLYCOSYLATED A1C: CPT | Performed by: INTERNAL MEDICINE

## 2021-04-08 PROCEDURE — 84443 ASSAY THYROID STIM HORMONE: CPT

## 2021-04-08 RX ORDER — TRIAMCINOLONE ACETONIDE 1 MG/G
CREAM TOPICAL AS NEEDED
Status: ON HOLD | COMMUNITY
Start: 2021-02-24 | End: 2022-04-07

## 2021-04-08 RX ORDER — CHOLECALCIFEROL (VITAMIN D3) 1250 MCG
50000 CAPSULE ORAL
Status: ON HOLD | COMMUNITY
Start: 2021-03-04 | End: 2022-04-07

## 2021-04-08 RX ORDER — HYDRALAZINE HYDROCHLORIDE 50 MG/1
TABLET, FILM COATED ORAL DAILY
COMMUNITY
Start: 2021-01-23 | End: 2021-06-17

## 2021-04-08 RX ORDER — DILTIAZEM HYDROCHLORIDE EXTENDED-RELEASE TABLETS 420 MG/1
1 TABLET, EXTENDED RELEASE ORAL DAILY
COMMUNITY
Start: 2021-03-24 | End: 2021-06-17

## 2021-04-08 RX ORDER — BLOOD SUGAR DIAGNOSTIC
STRIP MISCELLANEOUS
Qty: 400 EACH | Refills: 3 | Status: SHIPPED | OUTPATIENT
Start: 2021-04-08 | End: 2021-08-31 | Stop reason: SDUPTHER

## 2021-04-08 RX ORDER — DEXLANSOPRAZOLE 60 MG/1
60 CAPSULE, DELAYED RELEASE ORAL DAILY
COMMUNITY
Start: 2020-12-03 | End: 2021-09-01

## 2021-04-08 NOTE — PROGRESS NOTES
"     Office Note      Date: 2021  Patient Name: Karen Newman  MRN: [id number]  : 1950    No chief complaint on file.      History of Present Illness:   Karen Newman is a 70 y.o. female who presents for Diabetes type 2. Diagnosed in: . Treated in past with oral agents. Current treatments: metformin and basal bolus insulin. Number of insulin shots per day: 4. Checks blood sugar 4 times a day. Has low blood sugar: occasional. Aspirin use: No - easy bleeding. Statin use: Yes. ACE-I/ARB use: Yes. Changes in health since last visit: none. Last eye exam .     She remains on T4. She is taking 137mcg qd. She is taking this correctly. She isn't taking any interfering meds concurrently. She hasn't noted any change in the size of her neck. She denies any compressive sxs. She denies any sxs of hypo- or hyperthyroidism.    Subjective      Diabetic Complications:  Eyes: No  Kidneys: Yes - microalbumin and elevated Cr  Feet: Yes  Heart: No    Diet and Exercise:  Meals per day: 2  Minutes of exercise per week: 0 mins.    Review of Systems:   Review of Systems   Constitutional: Negative.    Cardiovascular: Negative.    Gastrointestinal: Negative.    Endocrine: Negative.        The following portions of the patient's history were reviewed and updated as appropriate: allergies, current medications, past family history, past medical history, past social history, past surgical history and problem list.    Objective       Visit Vitals  /54   Pulse 60   Temp 97.3 °F (36.3 °C) (Infrared)   Ht 167.6 cm (66\")   Wt 82.3 kg (181 lb 6.4 oz)   LMP  (LMP Unknown)   SpO2 97%   BMI 29.28 kg/m²       Physical Exam:  Physical Exam  Constitutional:       Appearance: Normal appearance.   Neurological:      Mental Status: She is alert.         Labs:    HbA1c  Lab Results   Component Value Date    HGBA1C 6.0 2021       CMP  Lab Results   Component Value Date    GLUCOSE 61 (L) 10/01/2020    BUN 21 10/01/2020    " CREATININE 1.48 (H) 10/01/2020    EGFRIFNONA 35 (L) 10/01/2020    BCR 14.2 10/01/2020    K 4.2 10/01/2020    CO2 24.8 10/01/2020    CALCIUM 9.3 10/01/2020    AST 12 01/10/2020    ALT 9 01/10/2020        Lipid Panel        TSH  Lab Results   Component Value Date    TSH 4.250 (H) 12/30/2020        Hemoglobin A1C  Lab Results   Component Value Date    HGBA1C 6.0 04/08/2021        Microalbumin/Creatinine  No results found for: MALBCRERATIO, CREATINIURIN, MICROALBUR        Assessment / Plan      Assessment & Plan:  Diagnoses and all orders for this visit:    1. Uncontrolled type 2 diabetes mellitus with hyperglycemia (CMS/[facility]) (Primary)  Assessment & Plan:  Diabetes is unchanged.   Continue current treatment regimen.  Diabetes will be reassessed in 3 months.    Orders:  -     POC Glycosylated Hemoglobin (Hb A1C)  -     Cancel: POC Glycosylated Hemoglobin (Hb A1C)  -     Comprehensive Metabolic Panel; Future    2. Postablative hypothyroidism  Assessment & Plan:  Continue T4.  Check TSH today.    Orders:  -     TSH; Future    3. Type 2 diabetes mellitus with hypoglycemia without coma, with long-term current use of insulin (CMS/[facility])  Assessment & Plan:  Continue frequent FSBS.      4. Type 2 diabetes mellitus with chronic kidney disease, with long-term current use of insulin, unspecified CKD stage (CMS/[facility])  Assessment & Plan:  Check Cr today.      5. Essential hypertension  Assessment & Plan:  Hypertension is unchanged.  Continue current treatment regimen.  Blood pressure will be reassessed at the next regular appointment.      6. Insulin long-term use (CMS/[facility])    Other orders  -     OneTouch Verio test strip; Use 4x per day:  ICD-10 E11.65, E11.649, Z79.4  Dispense: 400 each; Refill: 3      Return in about 3 months (around 7/8/2021) for Recheck with A1c.    Karel Blevins MD   04/08/2021

## 2021-04-20 RX ORDER — LISINOPRIL 40 MG/1
40 TABLET ORAL DAILY
Qty: 90 TABLET | Refills: 1 | Status: SHIPPED | OUTPATIENT
Start: 2021-04-20 | End: 2021-09-10

## 2021-04-26 ENCOUNTER — TELEPHONE (OUTPATIENT)
Dept: PRIMARY CARE CLINIC | Age: 71
End: 2021-04-26

## 2021-04-26 NOTE — TELEPHONE ENCOUNTER
Called pt regarding her Diltiazem and Insulin and provided the following statement: We are unable to offer the discounted price for your medication at this time. We are thoroughly reviewing the program to ensure that appropriate controls are in place for the integrity of the program.  We remain committed to your health and are thoroughly working through the issue. At this time you will be responsible for your insurance copayment. If this is unaffordable for you we can certainly assist with applying for  programs if applicable.   Pt verbalized understanding. Pt last refilled her Diltiazem with [facility] but couldn't remember how much it cost.  Pt stated that it was due to the pharmacy filling brand name. Instructed pt to fill generic next time as it should be lower cost next time. Also instructed pt to call me if she runs into any issues or if the Diltiazem is too high. Pt verbalized understanding. Lastly, I also instructed pt to call me if her insulin is too high with [facility] when she fills it next time. I encouraged the pt to fill the rx at least 2-3 weeks in advance so we can assist with any issues if needed. Pt verbalized understanding.     Shimon Christina, JcD

## 2021-05-13 NOTE — TELEPHONE ENCOUNTER
Followed up with [facility] today. The pt's Diltiazem is $120 for 90DS for both brand and generic. The Levemir is $105 for a 90DS. The pt stated that she could afford all of these costs. Instructed pt to keep filling with insurance as she has been in the past and to call me if she runs into any issues or if her medication costs increase. Pt verbalized understanding.     Elaina Pugh, JcD

## 2021-06-01 ENCOUNTER — HOSPITAL ENCOUNTER (OUTPATIENT)
Facility: HOSPITAL | Age: 71
Discharge: HOME | End: 2021-06-01
Payer: MEDICARE

## 2021-06-01 ENCOUNTER — OFFICE VISIT (OUTPATIENT)
Dept: PRIMARY CARE CLINIC | Age: 71
End: 2021-06-01
Payer: MEDICARE

## 2021-06-01 VITALS
TEMPERATURE: 98.4 F | HEART RATE: 71 BPM | SYSTOLIC BLOOD PRESSURE: 155 MMHG | WEIGHT: 177 LBS | DIASTOLIC BLOOD PRESSURE: 74 MMHG | OXYGEN SATURATION: 97 % | BODY MASS INDEX: 28.45 KG/M2 | HEIGHT: 66 IN

## 2021-06-01 DIAGNOSIS — E55.9 VITAMIN D DEFICIENCY: ICD-10-CM

## 2021-06-01 DIAGNOSIS — I10 ESSENTIAL HYPERTENSION: ICD-10-CM

## 2021-06-01 DIAGNOSIS — E61.1 IRON DEFICIENCY: ICD-10-CM

## 2021-06-01 DIAGNOSIS — E11.8 TYPE 2 DIABETES MELLITUS WITH COMPLICATION, WITHOUT LONG-TERM CURRENT USE OF INSULIN (HCC): ICD-10-CM

## 2021-06-01 DIAGNOSIS — E11.8 TYPE 2 DIABETES MELLITUS WITH COMPLICATION, WITHOUT LONG-TERM CURRENT USE OF INSULIN (HCC): Primary | ICD-10-CM

## 2021-06-01 PROCEDURE — G8417 CALC BMI ABV UP PARAM F/U: HCPCS | Performed by: NURSE PRACTITIONER

## 2021-06-01 PROCEDURE — 83540 ASSAY OF IRON: CPT

## 2021-06-01 PROCEDURE — 99213 OFFICE O/P EST LOW 20 MIN: CPT | Performed by: NURSE PRACTITIONER

## 2021-06-01 PROCEDURE — 3017F COLORECTAL CA SCREEN DOC REV: CPT | Performed by: NURSE PRACTITIONER

## 2021-06-01 PROCEDURE — 1036F TOBACCO NON-USER: CPT | Performed by: NURSE PRACTITIONER

## 2021-06-01 PROCEDURE — 83550 IRON BINDING TEST: CPT

## 2021-06-01 PROCEDURE — 3046F HEMOGLOBIN A1C LEVEL >9.0%: CPT | Performed by: NURSE PRACTITIONER

## 2021-06-01 PROCEDURE — 1123F ACP DISCUSS/DSCN MKR DOCD: CPT | Performed by: NURSE PRACTITIONER

## 2021-06-01 PROCEDURE — 82728 ASSAY OF FERRITIN: CPT

## 2021-06-01 PROCEDURE — 80053 COMPREHEN METABOLIC PANEL: CPT

## 2021-06-01 PROCEDURE — 4040F PNEUMOC VAC/ADMIN/RCVD: CPT | Performed by: NURSE PRACTITIONER

## 2021-06-01 PROCEDURE — 82306 VITAMIN D 25 HYDROXY: CPT

## 2021-06-01 PROCEDURE — 1090F PRES/ABSN URINE INCON ASSESS: CPT | Performed by: NURSE PRACTITIONER

## 2021-06-01 PROCEDURE — 85025 COMPLETE CBC W/AUTO DIFF WBC: CPT

## 2021-06-01 PROCEDURE — 2022F DILAT RTA XM EVC RTNOPTHY: CPT | Performed by: NURSE PRACTITIONER

## 2021-06-01 PROCEDURE — G8400 PT W/DXA NO RESULTS DOC: HCPCS | Performed by: NURSE PRACTITIONER

## 2021-06-01 PROCEDURE — G8427 DOCREV CUR MEDS BY ELIG CLIN: HCPCS | Performed by: NURSE PRACTITIONER

## 2021-06-01 RX ORDER — HYDRALAZINE HYDROCHLORIDE 50 MG/1
100 TABLET, FILM COATED ORAL 2 TIMES DAILY
COMMUNITY
End: 2022-09-11

## 2021-06-01 NOTE — PROGRESS NOTES
SUBJECTIVE:    Patient ID: César Peacock is a 79 y.o. female. Medical History Review  Past Medical, Family, and Social History reviewed. Health Maintenance Due   Topic Date Due    Hepatitis C screen  Never done    Diabetic foot exam  Never done    DTaP/Tdap/Td vaccine (1 - Tdap) Never done    Shingles Vaccine (1 of 2) Never done    DEXA (modify frequency per FRAX score)  Never done    Lipid screen  06/06/2015    TSH testing  02/27/2018    Annual Wellness Visit (AWV)  Never done    COVID-19 Vaccine (2 - Sofy Gamboa 2-dose series) 03/24/2021    Diabetic retinal exam  05/22/2021       HPI:   Chief Complaint   Patient presents with    Diabetes   Her home BP is running a little high. She should be due for a nephrology f/u pretty soon. She has cardiology this month. She has been feeling \"pretty good\". No recent bad dizzy spells or recent falls. She is having issues with her eyes. She cannot see to fill out paperwork. Patient's medications, allergies, past medical, surgical, social and family histories were reviewed and updated as appropriate. Review of Systems   Constitutional: Negative for chills and fever. HENT: Negative for ear pain and sore throat. Eyes: Positive for visual disturbance. Negative for pain. Respiratory: Negative for cough and shortness of breath. Cardiovascular: Negative for chest pain, palpitations and leg swelling. Gastrointestinal: Negative for abdominal pain, nausea and vomiting. Genitourinary: Negative for dysuria and hematuria. Musculoskeletal: Negative for joint swelling. Skin: Negative for rash. Neurological: Negative for dizziness and weakness. Psychiatric/Behavioral: Negative for sleep disturbance. Reviewed and acurate. See MA note. OBJECTIVE:  BP (!) 155/74   Pulse 71   Temp 98.4 °F (36.9 °C)   Ht 5' 6\" (1.676 m)   Wt 177 lb (80.3 kg)   SpO2 97%   BMI 28.57 kg/m²    Physical Exam  Vitals reviewed.    Constitutional:       General: She is not in acute distress. Appearance: She is well-developed. HENT:      Head: Normocephalic. Mouth/Throat:      Pharynx: No oropharyngeal exudate. Eyes:      General: Lids are normal.   Cardiovascular:      Rate and Rhythm: Normal rate and regular rhythm. Heart sounds: Normal heart sounds. Pulmonary:      Effort: Pulmonary effort is normal.      Breath sounds: Normal breath sounds. Abdominal:      General: Bowel sounds are normal. There is no distension. Palpations: Abdomen is soft. Tenderness: There is no abdominal tenderness. Musculoskeletal:      Cervical back: Neck supple. Lymphadenopathy:      Cervical: No cervical adenopathy. Skin:     General: Skin is warm and dry. Neurological:      Mental Status: She is alert and oriented to person, place, and time. No results found for requested labs within last 30 days. Hemoglobin A1C (%)   Date Value   08/31/2020 6.8     LDL Calculated (mg/dL)   Date Value   06/06/2014 46       Lab Results   Component Value Date    WBC 5.2 12/02/2020    NEUTROABS 2.9 12/02/2020    HGB 11.1 (L) 12/02/2020    HCT 36.5 (L) 12/02/2020    MCV 97.9 12/02/2020     12/02/2020     Lab Results   Component Value Date    TSH 0.80 02/27/2017       Prior to Visit Medications    Medication Sig Taking?  Authorizing Provider   dilTIAZem HCl ER Coated Beads 420 MG TB24 Take 1 capsule by mouth daily Hospital program Yes EULALIA Ozuna   insulin detemir (LEVEMIR FLEXTOUCH) 100 UNIT/ML injection pen INJECT 35 UNITS INTO THE SKIN NIGHTLY 340B Disp 90 day supply Yes EULALIA Ozuna   insulin aspart (NOVOLOG FLEXPEN) 100 UNIT/ML injection pen Inject 16-20 Units into the skin 3 times daily (before meals) 340B Disp 90 day supply Yes EULALIA Ozuna   omeprazole (PRILOSEC) 40 MG delayed release capsule TAKE 1 CAPSULE BY MOUTH  DAILY Yes EULALIA Ozuna   vitamin D (ERGOCALCIFEROL) 1.25 MG (02031 UT) CAPS capsule TAKE 1 CAPSULE BY MOUTH ONCE WEEKLY Yes EULALIA Poe   dexlansoprazole (DEXILANT) 60 MG CPDR delayed release capsule Take 1 capsule by mouth daily Yes EULALIA Poe   FEROSUL 325 (65 Fe) MG tablet TAKE 1 TABLET BY MOUTH DAILY WITH BREAKFAST Yes EULALIA Poe   PARoxetine (PAXIL) 40 MG tablet TAKE 1 TABLET BY MOUTH  EVERY MORNING Yes EULALIA Poe   doxazosin (CARDURA) 8 MG tablet Take 8 mg by mouth nightly Yes Historical Provider, MD   hydroCHLOROthiazide (HYDRODIURIL) 25 MG tablet Take 1 tablet by mouth daily 340B if needed Yes EULALIA Poe   SPS 15 GM/60ML suspension Take 15 g by mouth Yes Historical Provider, MD   omega-3 acid ethyl esters (LOVAZA) 1 g capsule take 4 capsules by mouth at bedtime Yes Historical Provider, MD   hydrocortisone (ANUSOL-HC) 2.5 % rectal cream Place rectally 2 times daily. Yes EULALIA Poe   Insulin Syringe-Needle U-100 (INSULIN SYRINGE .5CC/31GX5/16\") 31G X 5/16\" 0.5 ML MISC 1 each by Does not apply route daily Please sub U50 Yes EULALIA Poe   triamcinolone (KENALOG) 0.1 % cream Apply topically 2 times daily- mix with entire jar of Cetaphil Yes EULALIA Poe   lisinopril (PRINIVIL;ZESTRIL) 40 MG tablet Take 1 tablet by mouth daily Yes EULALIA Poe   ONE TOUCH ULTRA TEST strip  Yes Historical Provider, MD   gabapentin (NEURONTIN) 800 MG tablet  Yes Historical Provider, MD   Syringe/Needle, Disp, (SYRINGE 3CC/25GX1\") 25G X 1\" 3 ML MISC Monthly for B12 injection Yes EULALIA Poe   atorvastatin (LIPITOR) 10 MG tablet Take 10 mg by mouth daily. Yes Historical Provider, MD   Needles & Syringes MISC 1 each by Does not apply route daily. Syringe for B12 IM injection Yes EULALIA Poe   furosemide (LASIX) 20 MG tablet One daily as needed for swelling. Yes EULALIA Poe   levothyroxine (SYNTHROID) 175 MCG tablet Take 1 tablet by mouth every 48 hours.   Patient taking differently: Take 175 mcg by mouth daily  Yes Jef Perez APRN   metformin (GLUCOPHAGE) 850 MG tablet Take 1 tablet by mouth 3 times daily. Yes Joan NagyEULALIA       ASSESSMENT:  No diagnosis found. PLAN:  No orders of the defined types were placed in this encounter. No orders of the defined types were placed in this encounter. Patient Instructions   · Keep a list of your medicines with you. List all of the prescription medicines, nonprescription medicines, supplements, natural remedies, and vitamins that you take. Tell your healthcare providers who treat you about all of the products you are taking. Your provider can provide you with a form to keep track of them. Just ask. · Follow the directions that come with your medicine, including information about food or alcohol. Make sure you know how and when to take your medicine. Do not take more or less than you are supposed to take. · Keep all medicines out of the reach of children. · Store medicines according to the directions on the label. · Monitor yourself. Learn to know how your body reacts to your new medicine and keep track of how it makes you feel before attempting (If your provider has allowed you to do so) to drive or go to work. · Seek emergency medical attention if you think you have used too much of this medicine. An overdose of any prescription medicine can be fatal. Overdose symptoms may include extreme drowsiness, muscle weakness, confusion, cold and clammy skin, pinpoint pupils, shallow breathing, slow heart rate, fainting, or coma. · Don't share prescription medicines with others, even when they seem to have the same symptoms. What may be good for you may be harmful to others. · If you are no longer taking a prescribed medication and you have pills left please take your pills out of their original containers. Mix crushed pills with an undesirable substance, such as cat litter or used coffee grounds.  Put the mixture into a disposable container with a lid, such as an empty margarine tub, or into a sealable bag. Cover up or remove any of your personal information on the empty containers by covering it with black permanent marker or duct tape. Place the sealed container with the mixture, and the empty drug containers, in the trash. · If you use a medication that is in the form of a patch, dispose of used patches by folding them in half so that the sticky sides meet, and then flushing them down a toilet. They should not be placed in the household trash where children or pets can find them. · If you have any questions, ask your provider or pharmacist for more information. · Be sure to keep all appointments for provider visits or tests. We are committed to providing you with the best care possible. In order to help us achieve these goals please remember to bring all medications, herbal products, and over the counter supplements with you to each visit. If your provider has ordered testing for you, please be sure to follow up with our office if you have not received results within 7 days after the testing took place. *If you receive a survey after visiting one of our offices, please take time to share your experience concerning your physician office visit. These surveys are confidential and no health information about you is shared. We are eager to improve for you and we are counting on your feedback to help make that happen. ips to Help You Stop Smoking       Cigarette smoking is a preventable cause of death in the [place]. If you have thought about quitting but haven't been able to, here are some reasons why you should and some ways to do it.    Here's Why   Quitting smoking now can decrease your risk of getting smoking-related illnesses like:   Heart disease   Stroke   Several types of cancer, including:   Lung   Mouth   Esophagus   Larynx   Bladder   Pancreas   Kidney   Chronic lung diseases:   Bronchitis   Emphysema   Asthma   Cataracts   Macular degeneration   Thyroid For example, drink milk, which many people consider incompatible with smoking. End meals or snacks with something that won't lead to a cigarette. Reach for a glass of juice instead of a cigarette for a \"pick-me-up. \"   Remember: Cutting down can help you quit, but it's not a substitute for quitting. If you're down to about seven cigarettes a day, it's time to set your target quit date, and get ready to stick to it. Don't Smoke \"Automatically\"   Smoke only those cigarettes you really want. Catch yourself before you light up a cigarette out of pure habit. Don't empty your ashtrays. This will remind you of how many cigarettes you've smoked each day, and the sight and the smell of stale cigarettes butts will be very unpleasant. Make yourself aware of each cigarette by using the opposite hand or putting cigarettes in an unfamiliar location or a different pocket to break the automatic reach. If you light up many times during the day without even thinking about it, try to look in a mirror each time you put a match to your cigarette. You may decide you don't need it. Make Smoking Inconvenient   Stop buying cigarettes by the carton. Wait until one pack is empty before you buy another. Stop carrying cigarettes with you at home or at work. Make them difficult to get to. Make Smoking Unpleasant   Smoke only under circumstances that aren't especially pleasurable for you. If you like to smoke with others, smoke alone. Turn your chair to an empty corner and focus only on the cigarette you are smoking and all its many negative effects. Collect all your cigarette butts in one large glass container as a visual reminder of the filth made by smoking. Just Before Quitting   Practice going without cigarettes. Don't think of never smoking again. Think of quitting in terms of one day at a time . Tell yourself you won't smoke today, and then don't.    Clean your clothes to rid them of the cigarette smell, which can linger a long time. On the Day You Quit   Throw away all your cigarettes and matches. Hide your lighters and ashtrays. Visit the dentist and have your teeth cleaned to get rid of tobacco stains. Notice how nice they look and resolve to keep them that way. Make a list of things you'd like to buy for yourself or someone else. Estimate the cost in terms of packs of cigarettes, and put the money aside to buy these presents. Keep very busy on the big day. Go to the movies, exercise, take long walks, or go bike riding. Remind your family and friends that this is your quit date, and ask them to help you over the rough spots of the first couple of days and weeks. Buy yourself a treat or do something special to celebrate. Telephone and Internet Support   Telephone, web-, and computer-based programs can offer you the support that you need to quit and to stay smoke-free. You can find many programs online, like the American [employer] Association's [place] from Smoking . Immediately After Quitting   Develop a clean, fresh, nonsmoking environment around yourselfat work and at home. Buy yourself flowersyou may be surprised how much you can enjoy their scent now. The first few days after you quit, spend as much free time as possible in places where smoking isn't allowed, such as [address] [place] [address], museums, theaters, department stores, and churches. Drink large quantities of water and fruit juice (but avoid sodas that contain caffeine). Try to avoid alcohol, coffee, and other beverages that you associate with cigarette smoking. Strike up conversation instead of a match for a cigarette. If you miss the sensation of having a cigarette in your hand, play with something elsea pencil, a paper clip, a marble. If you miss having something in your mouth, try toothpicks or a fake cigarette. F/U 3 months. Call if BP continues to run high after she sees nephrology and cardiology.

## 2021-06-01 NOTE — PROGRESS NOTES
Chief Complaint   Patient presents with    Diabetes       Have you seen any other physician or provider since your last visit yes - endocrinology went well a1c was 6.0    Have you had any other diagnostic tests since your last visit? yes     Have you changed or stopped any medications since your last visit? no        Patient is here for a follow up. She has been well. Her a1c was 6.0 at endocrinology. She has an appointment with [facility] soon.

## 2021-06-02 LAB
A/G RATIO: 1.2 (ref 0.8–2)
ALBUMIN SERPL-MCNC: 3.8 G/DL (ref 3.4–4.8)
ALP BLD-CCNC: 66 U/L (ref 25–100)
ALT SERPL-CCNC: 9 U/L (ref 4–36)
ANION GAP SERPL CALCULATED.3IONS-SCNC: 10 MMOL/L (ref 3–16)
AST SERPL-CCNC: 15 U/L (ref 8–33)
BASOPHILS ABSOLUTE: 0.1 K/UL (ref 0–0.1)
BASOPHILS RELATIVE PERCENT: 0.9 %
BILIRUB SERPL-MCNC: <0.2 MG/DL (ref 0.3–1.2)
BUN BLDV-MCNC: 25 MG/DL (ref 6–20)
CALCIUM SERPL-MCNC: 9.4 MG/DL (ref 8.5–10.5)
CHLORIDE BLD-SCNC: 104 MMOL/L (ref 98–107)
CO2: 29 MMOL/L (ref 20–30)
CREAT SERPL-MCNC: 1.5 MG/DL (ref 0.4–1.2)
EOSINOPHILS ABSOLUTE: 0.2 K/UL (ref 0–0.4)
EOSINOPHILS RELATIVE PERCENT: 3.5 %
FERRITIN: 78.5 NG/ML (ref 22–322)
GFR AFRICAN AMERICAN: 41
GFR NON-AFRICAN AMERICAN: 34
GLOBULIN: 3.1 G/DL
GLUCOSE BLD-MCNC: 187 MG/DL (ref 74–106)
HCT VFR BLD CALC: 36.5 % (ref 37–47)
HEMOGLOBIN: 11.4 G/DL (ref 11.5–16.5)
IMMATURE GRANULOCYTES #: 0 K/UL
IMMATURE GRANULOCYTES %: 0.2 % (ref 0–5)
IRON SATURATION: 15 % (ref 15–50)
IRON: 42 UG/DL (ref 37–145)
LYMPHOCYTES ABSOLUTE: 1.8 K/UL (ref 1.5–4)
LYMPHOCYTES RELATIVE PERCENT: 32.6 %
MCH RBC QN AUTO: 31.9 PG (ref 27–32)
MCHC RBC AUTO-ENTMCNC: 31.2 G/DL (ref 31–35)
MCV RBC AUTO: 102.2 FL (ref 80–100)
MONOCYTES ABSOLUTE: 0.5 K/UL (ref 0.2–0.8)
MONOCYTES RELATIVE PERCENT: 9.1 %
NEUTROPHILS ABSOLUTE: 3 K/UL (ref 2–7.5)
NEUTROPHILS RELATIVE PERCENT: 53.7 %
PDW BLD-RTO: 12.5 % (ref 11–16)
PLATELET # BLD: 225 K/UL (ref 150–400)
PMV BLD AUTO: 10.7 FL (ref 6–10)
POTASSIUM SERPL-SCNC: 4.4 MMOL/L (ref 3.4–5.1)
RBC # BLD: 3.57 M/UL (ref 3.8–5.8)
SODIUM BLD-SCNC: 143 MMOL/L (ref 136–145)
TOTAL IRON BINDING CAPACITY: 275 UG/DL (ref 250–450)
TOTAL PROTEIN: 6.9 G/DL (ref 6.4–8.3)
VITAMIN D 25-HYDROXY: 60 (ref 32–100)
WBC # BLD: 5.5 K/UL (ref 4–11)

## 2021-06-17 ENCOUNTER — OFFICE VISIT (OUTPATIENT)
Dept: CARDIOLOGY | Facility: CLINIC | Age: 71
End: 2021-06-17

## 2021-06-17 VITALS
OXYGEN SATURATION: 93 % | DIASTOLIC BLOOD PRESSURE: 62 MMHG | HEART RATE: 72 BPM | SYSTOLIC BLOOD PRESSURE: 152 MMHG | WEIGHT: 183 LBS | HEIGHT: 66 IN | BODY MASS INDEX: 29.41 KG/M2

## 2021-06-17 DIAGNOSIS — I38 VHD (VALVULAR HEART DISEASE): ICD-10-CM

## 2021-06-17 DIAGNOSIS — I48.0 PAROXYSMAL ATRIAL FIBRILLATION (HCC): Primary | ICD-10-CM

## 2021-06-17 DIAGNOSIS — I10 ESSENTIAL HYPERTENSION: ICD-10-CM

## 2021-06-17 PROCEDURE — 99214 OFFICE O/P EST MOD 30 MIN: CPT | Performed by: INTERNAL MEDICINE

## 2021-06-17 RX ORDER — DOXAZOSIN 8 MG/1
8 TABLET ORAL EVERY 12 HOURS SCHEDULED
Qty: 180 TABLET | Refills: 3 | Status: SHIPPED | OUTPATIENT
Start: 2021-06-17 | End: 2022-04-25

## 2021-06-17 NOTE — PROGRESS NOTES
[facility] Cardiology    Patient ID: Karen Newman is a 71 y.o. female.  : 1950   Contact: [phone]    Encounter date: 2021    PCP: Mirian Arnold APRN      Chief complaint:   Chief Complaint   Patient presents with   • Paroxysmal atrial fibrillation (CMS/HCC)       Problem List:  1. Atrial fibrillation, duration unknown; diagnosed 2013:  a. Status post ALANIS/external cardioversion, (10/18/2013), Yara Bobo M.D. with the initiation of propafenone therapy.   b. Atrial fibrillation, (2013), in Gloria Gomez’ office.  c. Normal sinus rhythm at the time of office visit, (2013).  d. Normal sinus rhythm, EKG, (2014).  e. Discontinuation of Rythmol secondary to diarrhea, (2014), with initiation of flecainide 50 mg b.i.d. and Eliquis 5 mg b.i.d.   f. CHADS Vasc= 4, (2017)   g. CT scan of the chest (2017): Mild fibrotic changes, o/w normal  h. Pulmonary Function Test (2017): FEV1/FVC  1.54/2.57 = 60%  i. Nuclear Stress Test (2017): EF>70%, no ischemia   j. Bilateral thoracoscopy and Maze procedure and clipping of the left atrial appendage (2017) Dr. Eliza wallis ALANIS 10/24/17: EF 60%, RADHA successfully closed with no evidence of a residual left atrial appendage. Mild to moderate MR  l. 14d Vicenteo, 2021: NSR. PAF <1%  2. Valvular heart disease:  a. Previous echocardiogram approximately,  (incomplete data base).  b. Echocardiogram (08/15/2002), revealing moderate concentric LVH, hyperdynamic LV, EF 81%; mild MR, trace TR.  c. Echocardiogram (10/07/2008), revealed a normal LVEF with mild to moderate MR.  d. Echocardiogram, (2011): normal LV systolic function and wall motion with mild MR and mild TR.   e. Echocardiogram, 2017: EF 60%. Moderate to severe MR. Trace to mild TR. No thrombus appears to be present within the left atrial appendage.  f. ALANIS 10/24/17: EF 60%, RADHA successfully closed with  no evidence of a residual left atrial appendage. Mild to moderate MR  g. Echo 1/10/20: EF 65%, trace MR, mild TR, mild LVH.   3. Hypertension:  a. Adenosine Cardiolite, (06/18/2009): Normal systolic function without evidence of inducible ischemia.   b. Echo 1/11/20 NL LVEF   4. Dyslipidemia.  5. Diabetes mellitus, Type 2 diagnosed, 2006.  6. Thyroid disease:  a. Hyperthyroidism status post radioactive iodine therapy.  b. Hypothyroidism on thyroid replacement.  7. Gastroesophageal reflux disease.  8. Chronic kidney disease. crea 1.7  9. History of hemorrhoids status post hemorrhoidectomy.  10. Colon polyps status post polypectomy.  11. Anxiety.  12. Iron deficiency anemia.  13. Obstructive sleep apnea on chronic CPAP therapy.  14. Surgical history:  a. Total abdominal hysterectomy  b. Squamous cell removal (right foot)    Allergies   Allergen Reactions   • Amlodipine Shortness Of Breath     Swelling and SOB   • Buspirone Nausea And Vomiting   • Lisinopril Other (See Comments)     cough   • Sertraline Hcl Other (See Comments)     nightmares   • Sular [Nisoldipine Er] Cough   • Carvedilol Other (See Comments)     SOB       Current Medications:    Current Outpatient Medications:   •  atorvastatin (LIPITOR) 10 MG tablet, Take 1 tablet by mouth Daily., Disp: 90 tablet, Rfl: 3  •  Cholecalciferol (Vitamin D3) 1.25 MG (54301 UT) capsule, Take 50,000 Units by mouth Every 7 (Seven) Days., Disp: , Rfl:   •  dexlansoprazole (DEXILANT) 60 MG capsule, Take 60 mg by mouth Daily., Disp: , Rfl:   •  diltiaZEM (TIAZAC) 420 MG 24 hr capsule, Take 420 mg by mouth Daily., Disp: , Rfl:   •  doxazosin (CARDURA) 8 MG tablet, TAKE 1 TABLET BY MOUTH  DAILY AS DIRECTED, Disp: 90 tablet, Rfl: 3  •  ferrous sulfate 324 (65 Fe) MG tablet delayed-release EC tablet, Take 324 mg by mouth Daily With Breakfast., Disp: , Rfl:   •  gabapentin (NEURONTIN) 800 MG tablet, Take 800 mg by mouth 2 (Two) Times a Day., Disp: , Rfl:   •  hydrALAZINE  "(APRESOLINE) 50 MG tablet, Take 50 mg by mouth 2 (two) times a day., Disp: , Rfl:   •  insulin aspart (NovoLOG FlexPen) 100 UNIT/ML solution pen-injector sc pen, Inject 16-20 Units under the skin into the appropriate area as directed., Disp: , Rfl:   •  insulin detemir (Levemir FlexTouch) 100 UNIT/ML injection, INJECT 35 UNITS INTO THE SKIN NIGHTLY 340B 90 day supply, Disp: , Rfl:   •  levothyroxine (SYNTHROID, LEVOTHROID) 137 MCG tablet, TAKE 1 TABLET BY MOUTH  DAILY, Disp: 90 tablet, Rfl: 3  •  lisinopril (PRINIVIL,ZESTRIL) 40 MG tablet, Take 1 tablet by mouth Daily., Disp: 90 tablet, Rfl: 1  •  metFORMIN (GLUCOPHAGE) 850 MG tablet, Take 1 tablet by mouth 3 (Three) Times a Day With Meals., Disp: 270 tablet, Rfl: 1  •  omega-3 acid ethyl esters (LOVAZA) 1 g capsule, Take 1 g by mouth Daily., Disp: , Rfl:   •  OneTouch Verio test strip, Use 4x per day:  ICD-10 E11.65, E11.649, Z79.4, Disp: 400 each, Rfl: 3  •  PARoxetine (PAXIL) 40 MG tablet, Take 40 mg by mouth Daily., Disp: , Rfl:   •  triamcinolone (KENALOG) 0.1 % cream, As Needed., Disp: , Rfl:     HPI    Karen Newman is a 71 y.o. female who presents today for an annual follow up of paroxysmal atrial fibrillation, valvular heart disease, and cardiac risk factors. Since last visit, her systolic blood pressure has been in the 150's. She does not exercise. She used to walk on the treadmill until her back pain limited her. Patient denies chest pain, shortness of breath, palpitations, edema, dizziness, and syncope.       The following portions of the patient's history were reviewed and updated as appropriate: allergies, current medications and problem list.    Pertinent positives as listed in the HPI.  All other systems reviewed are negative.         Vitals:    06/17/21 1421   BP: 126/60   BP Location: Left arm   Patient Position: Sitting   Pulse: 72   SpO2: 93%   Weight: 83 kg (183 lb)   Height: 167.6 cm (66\")       Physical Exam:  General: Alert and " oriented.  Neck: Jugular venous pressure is within normal limits. Carotids have normal upstrokes without bruits.   Cardiovascular: Heart has a nondisplaced focal PMI. Regular rate and rhythm. No murmur, gallop or rub.  Lungs: Clear, no rales or wheezes. Equal expansion is noted.   Extremities: Show no edema.  Skin: Warm and dry.  Neurologic: Nonfocal.     Diagnostic Data (reviewed with patient):     Lab Results   Component Value Date    GLUCOSE 142 (H) 04/08/2021    BUN 25 (H) 04/08/2021    CREATININE 1.57 (H) 04/08/2021    EGFRIFNONA 33 (L) 04/08/2021    BCR 15.9 04/08/2021     04/08/2021    K 4.3 04/08/2021     04/08/2021    CO2 28.8 04/08/2021    CALCIUM 8.9 04/08/2021    ALBUMIN 3.90 04/08/2021    ALKPHOS 54 04/08/2021    AST 14 04/08/2021    ALT 11 04/08/2021      Lab Results   Component Value Date    WBC 5.5 06/01/2021    RBC 3.57 (L) 06/01/2021    HGB 11.4 (L) 06/01/2021    HCT 36.5 (L) 06/01/2021    .2 (H) 06/01/2021     06/01/2021      Lab Results   Component Value Date    TSH 2.350 04/08/2021        Lab date: 6/1/2021  • CMP: Glu 187, BUN 25, Creat 1.5, eGFR 34, Na 143, K 4.4, Cl 104, CO2 29, Ca 9.4, Alk Phos 66, AST 9, ALT 15  • CBC: WBC 5.5, RBC 3.57, HGB 11.4, HCT 36.5, .2, MCH 31.9,       Procedures      Assessment:    ICD-10-CM ICD-9-CM   1. Paroxysmal atrial fibrillation (CMS/HCC)  I48.0 427.31   2. VHD (valvular heart disease)  I38 424.90   3. Essential hypertension  I10 401.9         Plan:  1. Increase doxazosin 8 mg daily to 8 mg BID due to poor control of blood pressure.  2. Begin routine aerobic exercise for at least 30 minutes 5 days per week.  3. Continue on diltiazem 420 mg for atrial fibrillation.   4. Continue on diltiazem 420 mg, hydralazine 50 mg, and lisinopril 40 mg for hypertension.   5. Continue on atorvastatin 10 mg for dyslipidemia.   6. Continue all other current medications.  7. F/up in 12 months, sooner if needed.    Scribed for Yara PERRY  MD Jihan by Windy Tesfaye. 6/17/2021 14:25 EDT          I Yara Bobo MD personally performed the services described in this documentation as scribed by the above individual in my presence, and it is both accurate and complete.    Yara Bobo MD, FACC

## 2021-06-30 ASSESSMENT — ENCOUNTER SYMPTOMS
EYE PAIN: 0
NAUSEA: 0
SHORTNESS OF BREATH: 0
VOMITING: 0
COUGH: 0
SORE THROAT: 0
ABDOMINAL PAIN: 0

## 2021-07-09 DIAGNOSIS — M54.50 LOW BACK PAIN, UNSPECIFIED BACK PAIN LATERALITY, UNSPECIFIED CHRONICITY, UNSPECIFIED WHETHER SCIATICA PRESENT: Primary | ICD-10-CM

## 2021-07-09 DIAGNOSIS — M25.551 RIGHT HIP PAIN: ICD-10-CM

## 2021-07-12 ENCOUNTER — HOSPITAL ENCOUNTER (OUTPATIENT)
Facility: HOSPITAL | Age: 71
Discharge: HOME | End: 2021-07-12
Payer: MEDICARE

## 2021-07-12 ENCOUNTER — HOSPITAL ENCOUNTER (OUTPATIENT)
Dept: GENERAL RADIOLOGY | Facility: HOSPITAL | Age: 71
Discharge: HOME | End: 2021-07-12
Payer: MEDICARE

## 2021-07-12 DIAGNOSIS — M25.551 RIGHT HIP PAIN: ICD-10-CM

## 2021-07-12 DIAGNOSIS — M54.50 LOW BACK PAIN, UNSPECIFIED BACK PAIN LATERALITY, UNSPECIFIED CHRONICITY, UNSPECIFIED WHETHER SCIATICA PRESENT: ICD-10-CM

## 2021-07-12 PROCEDURE — 73502 X-RAY EXAM HIP UNI 2-3 VIEWS: CPT

## 2021-07-12 PROCEDURE — 72110 X-RAY EXAM L-2 SPINE 4/>VWS: CPT

## 2021-08-13 ENCOUNTER — OFFICE VISIT (OUTPATIENT)
Dept: ENDOCRINOLOGY | Facility: CLINIC | Age: 71
End: 2021-08-13

## 2021-08-13 VITALS
WEIGHT: 185 LBS | SYSTOLIC BLOOD PRESSURE: 132 MMHG | DIASTOLIC BLOOD PRESSURE: 68 MMHG | BODY MASS INDEX: 30.82 KG/M2 | HEIGHT: 65 IN | OXYGEN SATURATION: 96 % | HEART RATE: 56 BPM

## 2021-08-13 DIAGNOSIS — E78.2 MIXED HYPERLIPIDEMIA: ICD-10-CM

## 2021-08-13 DIAGNOSIS — E11.649 TYPE 2 DIABETES MELLITUS WITH HYPOGLYCEMIA WITHOUT COMA, WITH LONG-TERM CURRENT USE OF INSULIN (HCC): ICD-10-CM

## 2021-08-13 DIAGNOSIS — Z79.4 INSULIN LONG-TERM USE (HCC): ICD-10-CM

## 2021-08-13 DIAGNOSIS — Z79.4 TYPE 2 DIABETES MELLITUS WITH HYPOGLYCEMIA WITHOUT COMA, WITH LONG-TERM CURRENT USE OF INSULIN (HCC): ICD-10-CM

## 2021-08-13 DIAGNOSIS — I10 ESSENTIAL HYPERTENSION: ICD-10-CM

## 2021-08-13 DIAGNOSIS — E89.0 POSTABLATIVE HYPOTHYROIDISM: ICD-10-CM

## 2021-08-13 DIAGNOSIS — E11.65 UNCONTROLLED TYPE 2 DIABETES MELLITUS WITH HYPERGLYCEMIA (HCC): Primary | ICD-10-CM

## 2021-08-13 LAB
EXPIRATION DATE: NORMAL
EXPIRATION DATE: NORMAL
GLUCOSE BLDC GLUCOMTR-MCNC: 71 MG/DL (ref 70–130)
HBA1C MFR BLD: 6.1 %
Lab: NORMAL
Lab: NORMAL

## 2021-08-13 PROCEDURE — 82947 ASSAY GLUCOSE BLOOD QUANT: CPT | Performed by: INTERNAL MEDICINE

## 2021-08-13 PROCEDURE — 99214 OFFICE O/P EST MOD 30 MIN: CPT | Performed by: INTERNAL MEDICINE

## 2021-08-13 PROCEDURE — 83036 HEMOGLOBIN GLYCOSYLATED A1C: CPT | Performed by: INTERNAL MEDICINE

## 2021-08-13 PROCEDURE — 3044F HG A1C LEVEL LT 7.0%: CPT | Performed by: INTERNAL MEDICINE

## 2021-08-13 NOTE — PROGRESS NOTES
"     Office Note      Date: 2021  Patient Name: Karen Newman  MRN: [id number]  : 1950    Chief Complaint   Patient presents with   • Diabetes       History of Present Illness:   Karen Newman is a 71 y.o. female who presents for Diabetes type 2. Diagnosed in: . Treated in past with oral agents. Current treatments: metformin and basal bolus insulin. Number of insulin shots per day: 3. Checks blood sugar 4 times a day. Has low blood sugar: occasional. Aspirin use: No - easy bleeding. Statin use: Yes. ACE-I/ARB use: Yes. Changes in health since last visit: none. Last eye exam 2021.     She remains on T4. She is taking 137mcg qd. She is taking this correctly. She isn't taking any interfering meds concurrently. She hasn't noted any change in the size of her neck. She denies any compressive sxs. She denies any sxs of hypo- or hyperthyroidism.    Subjective      Diabetic Complications:  Eyes: Yes  Kidneys: Yes - microalbumin and elevated Cr  Feet: Yes  Heart: No    Diet and Exercise:  Meals per day: 2  Minutes of exercise per week: 0 mins.    Review of Systems:   Review of Systems   Constitutional: Negative.    Cardiovascular: Negative.    Gastrointestinal: Negative.    Endocrine: Negative.        The following portions of the patient's history were reviewed and updated as appropriate: allergies, current medications, past family history, past medical history, past social history, past surgical history and problem list.    Objective       Visit Vitals  /68 (BP Location: Right arm, Patient Position: Sitting, Cuff Size: Adult)   Pulse 56   Ht 165.1 cm (65\")   Wt 83.9 kg (185 lb)   LMP  (LMP Unknown)   SpO2 96%   BMI 30.79 kg/m²       Physical Exam:  Physical Exam  Constitutional:       Appearance: Normal appearance.   Neurological:      Mental Status: She is alert.         Labs:    HbA1c  Lab Results   Component Value Date    HGBA1C 6.1 2021       CMP  Lab Results   Component Value Date "    GLUCOSE 142 (H) 04/08/2021    BUN 25 (H) 04/08/2021    CREATININE 1.57 (H) 04/08/2021    EGFRIFNONA 33 (L) 04/08/2021    BCR 15.9 04/08/2021    K 4.3 04/08/2021    CO2 28.8 04/08/2021    CALCIUM 8.9 04/08/2021    AST 14 04/08/2021    ALT 11 04/08/2021        Lipid Panel        TSH  Lab Results   Component Value Date    TSH 2.350 04/08/2021        Hemoglobin A1C  Lab Results   Component Value Date    HGBA1C 6.1 08/13/2021        Microalbumin/Creatinine  No results found for: MALBCRERATIO, CREATINIURIN, MICROALBUR        Assessment / Plan      Assessment & Plan:  Diagnoses and all orders for this visit:    1. Uncontrolled type 2 diabetes mellitus with hyperglycemia (CMS/[facility]) (Primary)  Assessment & Plan:  Diabetes is unchanged.   Continue current treatment regimen.  Diabetes will be reassessed in 3 months.      2. Type 2 diabetes mellitus with hypoglycemia without coma, with long-term current use of insulin (CMS/[facility])  Assessment & Plan:  Continue FSBS.    Orders:  -     POC Glycosylated Hemoglobin (Hb A1C)  -     POC Glucose, Blood    3. Essential hypertension  Assessment & Plan:  Hypertension is unchanged.  Continue current treatment regimen.  Blood pressure will be reassessed at the next regular appointment.      4. Mixed hyperlipidemia  Assessment & Plan:  Continue statin and omega-3's.      5. Postablative hypothyroidism  Assessment & Plan:  Continue T4 tx.  Check TSH next visit.      6. Insulin long-term use (CMS/[facility])      Return in about 3 months (around 11/13/2021) for Recheck with A1c, CMP, lipids, TSH, microalbumin.    Karel Blevins MD   08/13/2021

## 2021-08-31 RX ORDER — BLOOD SUGAR DIAGNOSTIC
STRIP MISCELLANEOUS
Qty: 400 EACH | Refills: 3 | Status: SHIPPED | OUTPATIENT
Start: 2021-08-31 | End: 2022-06-02 | Stop reason: SDUPTHER

## 2021-08-31 RX ORDER — INSULIN DETEMIR 100 [IU]/ML
35 INJECTION, SOLUTION SUBCUTANEOUS DAILY
Qty: 30 ML | Refills: 1 | Status: SHIPPED | OUTPATIENT
Start: 2021-08-31 | End: 2021-11-01

## 2021-08-31 NOTE — TELEPHONE ENCOUNTER
Patients  called stated Karen needs a prescription sent in for her insulin detemir (Levemir FlexTouch) 100 UNIT/ML injection and for OneTouch Verio test strip. Please advise.

## 2021-09-01 ENCOUNTER — OFFICE VISIT (OUTPATIENT)
Dept: CARDIOLOGY | Facility: CLINIC | Age: 71
End: 2021-09-01

## 2021-09-01 VITALS
OXYGEN SATURATION: 95 % | HEART RATE: 60 BPM | HEIGHT: 66 IN | DIASTOLIC BLOOD PRESSURE: 66 MMHG | WEIGHT: 187.8 LBS | SYSTOLIC BLOOD PRESSURE: 128 MMHG | BODY MASS INDEX: 30.18 KG/M2

## 2021-09-01 DIAGNOSIS — I48.0 PAROXYSMAL ATRIAL FIBRILLATION (HCC): Primary | ICD-10-CM

## 2021-09-01 DIAGNOSIS — I10 ESSENTIAL HYPERTENSION: ICD-10-CM

## 2021-09-01 PROCEDURE — 99213 OFFICE O/P EST LOW 20 MIN: CPT | Performed by: INTERNAL MEDICINE

## 2021-09-01 RX ORDER — OMEPRAZOLE 40 MG/1
40 CAPSULE, DELAYED RELEASE ORAL DAILY
COMMUNITY
Start: 2021-08-31 | End: 2022-10-19

## 2021-09-01 NOTE — PROGRESS NOTES
Karen Newman  1950  [phone]    09/01/2021    [facility] CARDIOLOGY     Referring Provider: No ref. provider found     Mirian Arnold APRN  [address] [facility] [place] [address]    Chief Complaint   Patient presents with   • PAF (paroxysmal atrial fibrillation) (CMS/[facility])       Problem List:     1.  Atrial fibrillation, duration unknown; diagnosed August of 2013:  a. Status post ALANIS/external cardioversion, (10/18/2013), Yara Bobo M.D. with the initiation of propafenone therapy.   b. Atrial fibrillation, (11/13/2013), in Gloria Gomez’ office.  c. Normal sinus rhythm at the time of office visit, (11/21/2013).  d. Normal sinus rhythm, EKG, (06/05/2014).  e. Discontinuation of Rythmol secondary to diarrhea, (06/05/2014), with initiation of flecainide 50 mg b.i.d. and Eliquis 5 mg b.i.d.   f. CHADS Vasc= 4, (4/18/2017)   g. CT scan of the chest (06/29/2017): Mild fibrotic changes, o/w normal  h. Pulmonary Function Test (06/29/2017): FEV1/FVC  1.54/2.57 = 60%  i. Nuclear Stress Test (06/29/2017): EF>70%, no ischemia   j. Bilateral thoracoscopy and Maze procedure and clipping of the left atrial appendage (9/26/2017) Dr. Eliza wallis ALANIS 10/24/17: EF 60%, RADHA successfully closed with no evidence of a residual left atrial appendage. Mild to moderate MR  l. 14d Vicenteo, 1/6/2021: NSR. PAF <1%  2. Valvular heart disease:  a. Previous echocardiogram approximately, 2006 (incomplete data base).  b. Echocardiogram (08/15/2002), revealing moderate concentric LVH, hyperdynamic LV, EF 81%; mild MR, trace TR.  c. Echocardiogram (10/07/2008), revealed a normal LVEF with mild to moderate MR.  d. Echocardiogram, (07/01/2011): normal LV systolic function and wall motion with mild MR and mild TR.   e. Echocardiogram, 9/22/2017: EF 60%. Moderate to severe MR. Trace to mild TR. No thrombus appears to be present within the left atrial appendage.  f. ALANIS 10/24/17: EF 60%, RADHA successfully closed with no evidence of a  residual left atrial appendage. Mild to moderate MR  g. Echo 1/10/20: EF 65%, trace MR, mild TR, mild LVH.   3. Hypertension:  a. Adenosine Cardiolite, (06/18/2009): Normal systolic function without evidence of inducible ischemia.   b. Echo 1/11/20 NL LVEF   4. Dyslipidemia.  5. Diabetes mellitus, Type 2 diagnosed, 2006.  6. Thyroid disease:  a. Hyperthyroidism status post radioactive iodine therapy.  b. Hypothyroidism on thyroid replacement.  7. Gastroesophageal reflux disease.  8. Chronic kidney disease. crea 1.7  9. History of hemorrhoids status post hemorrhoidectomy.  10. Colon polyps status post polypectomy.  11. Anxiety.  12. Iron deficiency anemia.  13. Obstructive sleep apnea on chronic CPAP therapy.  14. Surgical history:  a. Total abdominal hysterectomy  Squamous cell removal (right foot)    Allergies  Allergies   Allergen Reactions   • Amlodipine Shortness Of Breath     Swelling and SOB   • Buspirone Nausea And Vomiting   • Lisinopril Other (See Comments)     cough   • Sertraline Hcl Other (See Comments)     nightmares   • Sular [Nisoldipine Er] Cough   • Carvedilol Other (See Comments)     SOB       Current Medications    Current Outpatient Medications:   •  atorvastatin (LIPITOR) 10 MG tablet, Take 1 tablet by mouth Daily., Disp: 90 tablet, Rfl: 3  •  Cholecalciferol (Vitamin D3) 1.25 MG (97161 UT) capsule, Take 50,000 Units by mouth Every 7 (Seven) Days., Disp: , Rfl:   •  diltiaZEM (TIAZAC) 420 MG 24 hr capsule, Take 420 mg by mouth Daily., Disp: , Rfl:   •  doxazosin (CARDURA) 8 MG tablet, Take 1 tablet by mouth Every 12 (Twelve) Hours., Disp: 180 tablet, Rfl: 3  •  ferrous sulfate 324 (65 Fe) MG tablet delayed-release EC tablet, Take 324 mg by mouth Daily With Breakfast., Disp: , Rfl:   •  gabapentin (NEURONTIN) 800 MG tablet, Take 800 mg by mouth 2 (Two) Times a Day., Disp: , Rfl:   •  hydrALAZINE (APRESOLINE) 50 MG tablet, Take 50 mg by mouth 2 (two) times a day., Disp: , Rfl:   •  insulin aspart  "(NovoLOG FlexPen) 100 UNIT/ML solution pen-injector sc pen, Inject 16-20 Units under the skin into the appropriate area as directed., Disp: , Rfl:   •  insulin detemir (Levemir FlexTouch) 100 UNIT/ML injection, Inject 35 Units under the skin into the appropriate area as directed Daily., Disp: 30 mL, Rfl: 1  •  levothyroxine (SYNTHROID, LEVOTHROID) 137 MCG tablet, TAKE 1 TABLET BY MOUTH  DAILY, Disp: 90 tablet, Rfl: 3  •  lisinopril (PRINIVIL,ZESTRIL) 40 MG tablet, Take 1 tablet by mouth Daily., Disp: 90 tablet, Rfl: 1  •  metFORMIN (GLUCOPHAGE) 850 MG tablet, Take 1 tablet by mouth 3 (Three) Times a Day With Meals., Disp: 270 tablet, Rfl: 1  •  omega-3 acid ethyl esters (LOVAZA) 1 g capsule, Take 1 g by mouth Daily., Disp: , Rfl:   •  omeprazole (priLOSEC) 40 MG capsule, Take 40 mg by mouth Daily., Disp: , Rfl:   •  OneTouch Verio test strip, Use 4x per day:  ICD-10 E11.65, E11.649, Z79.4, Disp: 400 each, Rfl: 3  •  PARoxetine (PAXIL) 40 MG tablet, Take 40 mg by mouth Daily., Disp: , Rfl:   •  triamcinolone (KENALOG) 0.1 % cream, As Needed., Disp: , Rfl:     History of Present Illness     Pt presents for follow up of AF. Since we last saw the pt, pt denies any AF episodes, SOB, CP, LH, and dizziness. Denies any hospitalizations, ER visits, no bleeding issues, or TIA/CVA symptoms. Seen by Dr. Bobo in June where her doxazosin was increased, since her BP has been better controlled. Overall feels well. She did receive 2 doses of Moderna vaccination without issues.     ROS:  General:  Denies fatigue, weight gain or loss  Cardiovascular:  Denies CP, PND, syncope, near syncope, edema or palpitations.  Pulmonary:  Denies RODRIGUEZ, cough, or wheezing      Vitals:    09/01/21 1334   BP: 128/66   BP Location: Left arm   Patient Position: Sitting   Pulse: 60   SpO2: 95%   Weight: 85.2 kg (187 lb 12.8 oz)   Height: 167.6 cm (66\")     Body mass index is 30.31 kg/m².  PE:  General: NAD  Neck: no JVD, no carotid bruits, no " TM  Heart RRR, NL S1, S2, no rubs, murmurs  Lungs: CTA, no wheezes, rhonchi, or rales  Abd: soft, non-tender, NL BS  Ext: No musculoskeletal deformities, no edema, cyanosis, or clubbing  Psych: normal mood and affect    Diagnostic Data:      Procedures    1. Paroxysmal atrial fibrillation (CMS/HCC)    2. Essential hypertension        Plan:    1) PAF  -  MAZE procedure 9/2017 off AAD.   - Zio patch, January 2021, 1% AFIB and an episode of junctional bradycardia 40 bpm which were overall both asymptomatic according to the patient.  -No recurrence of palps or AF, continue to monitor.     2) Anticoagulation:   S/p RADHA ligation with confirmed closure by ALANIS. No NOAC    3) HTN  - controlled.   Wt loss, exercise, salt reduction     F/up in 12 months    Electronically signed by EUGENIE Zepeda, 09/01/21, 1:55 PM EDT.

## 2021-09-10 RX ORDER — LISINOPRIL 40 MG/1
40 TABLET ORAL DAILY
Qty: 90 TABLET | Refills: 2 | Status: SHIPPED | OUTPATIENT
Start: 2021-09-10 | End: 2022-01-11 | Stop reason: SDUPTHER

## 2021-09-14 ENCOUNTER — OFFICE VISIT (OUTPATIENT)
Dept: PRIMARY CARE CLINIC | Age: 71
End: 2021-09-14
Payer: MEDICARE

## 2021-09-14 VITALS
WEIGHT: 183 LBS | DIASTOLIC BLOOD PRESSURE: 70 MMHG | HEART RATE: 72 BPM | TEMPERATURE: 97.2 F | OXYGEN SATURATION: 96 % | SYSTOLIC BLOOD PRESSURE: 160 MMHG | BODY MASS INDEX: 29.41 KG/M2 | HEIGHT: 66 IN

## 2021-09-14 DIAGNOSIS — I10 ESSENTIAL HYPERTENSION: ICD-10-CM

## 2021-09-14 DIAGNOSIS — I48.0 PAROXYSMAL ATRIAL FIBRILLATION (HCC): ICD-10-CM

## 2021-09-14 DIAGNOSIS — E11.8 TYPE 2 DIABETES MELLITUS WITH COMPLICATION, WITHOUT LONG-TERM CURRENT USE OF INSULIN (HCC): Primary | ICD-10-CM

## 2021-09-14 PROCEDURE — 2022F DILAT RTA XM EVC RTNOPTHY: CPT | Performed by: NURSE PRACTITIONER

## 2021-09-14 PROCEDURE — 99213 OFFICE O/P EST LOW 20 MIN: CPT | Performed by: NURSE PRACTITIONER

## 2021-09-14 PROCEDURE — 90694 VACC AIIV4 NO PRSRV 0.5ML IM: CPT | Performed by: NURSE PRACTITIONER

## 2021-09-14 PROCEDURE — 3017F COLORECTAL CA SCREEN DOC REV: CPT | Performed by: NURSE PRACTITIONER

## 2021-09-14 PROCEDURE — G8427 DOCREV CUR MEDS BY ELIG CLIN: HCPCS | Performed by: NURSE PRACTITIONER

## 2021-09-14 PROCEDURE — 3046F HEMOGLOBIN A1C LEVEL >9.0%: CPT | Performed by: NURSE PRACTITIONER

## 2021-09-14 PROCEDURE — G8417 CALC BMI ABV UP PARAM F/U: HCPCS | Performed by: NURSE PRACTITIONER

## 2021-09-14 PROCEDURE — G8400 PT W/DXA NO RESULTS DOC: HCPCS | Performed by: NURSE PRACTITIONER

## 2021-09-14 PROCEDURE — 4040F PNEUMOC VAC/ADMIN/RCVD: CPT | Performed by: NURSE PRACTITIONER

## 2021-09-14 PROCEDURE — 1090F PRES/ABSN URINE INCON ASSESS: CPT | Performed by: NURSE PRACTITIONER

## 2021-09-14 PROCEDURE — G0008 ADMIN INFLUENZA VIRUS VAC: HCPCS | Performed by: NURSE PRACTITIONER

## 2021-09-14 PROCEDURE — 1036F TOBACCO NON-USER: CPT | Performed by: NURSE PRACTITIONER

## 2021-09-14 PROCEDURE — 1123F ACP DISCUSS/DSCN MKR DOCD: CPT | Performed by: NURSE PRACTITIONER

## 2021-09-14 RX ORDER — DILTIAZEM HYDROCHLORIDE 420 MG/1
CAPSULE, EXTENDED RELEASE ORAL
COMMUNITY
Start: 2021-07-20 | End: 2021-09-14 | Stop reason: SDUPTHER

## 2021-09-14 RX ORDER — LEVOTHYROXINE SODIUM 137 UG/1
TABLET ORAL
COMMUNITY
Start: 2021-08-31 | End: 2022-09-10

## 2021-09-14 RX ORDER — CHLORAL HYDRATE 500 MG
1 CAPSULE ORAL DAILY
COMMUNITY
Start: 2020-11-23 | End: 2022-09-10

## 2021-09-14 ASSESSMENT — ENCOUNTER SYMPTOMS
SHORTNESS OF BREATH: 0
EYE PAIN: 0
NAUSEA: 0
VOMITING: 0
SORE THROAT: 0
ABDOMINAL PAIN: 0
COUGH: 0

## 2021-09-14 NOTE — PROGRESS NOTES
Health Maintenance Due This Visit   Colonoscopy No   Mammogram No   Annual Wellness Visit Yes   Microalbumin No   HgbA1C No   Diabetic Eye Exam Yes    House Bill One Due This Visit   GERMNA No   UDS No   Contract No    Chief Complaint   Patient presents with    Diabetes    Hypertension     Pt here today for f/u on DM and htn     Have you seen any other physician or provider since your last visit yes - endocrinology, cardiology    Have you had any other diagnostic tests since your last visit? Yes- labs, a1c    Have you changed or stopped any medications since your last visit? no       Vaccine Information Sheet, \"Influenza - Inactivated\"  given to Nuiku, or parent/legal guardian of  Nuiku and verbalized understanding. Patient responses:    Have you ever had a reaction to a flu vaccine? No  Do you have any current illness? No  Have you ever had Guillian Irvine Syndrome? No  Do you have a serious allergy to any of the follow: Neomycin, Polymyxin, Thimerosal, eggs or egg products? No    Flu vaccine given per order. Please see immunization tab. Risks and benefits explained. Current VIS given.

## 2021-09-15 ENCOUNTER — HOSPITAL ENCOUNTER (OUTPATIENT)
Dept: MAMMOGRAPHY | Facility: HOSPITAL | Age: 71
Discharge: HOME | End: 2021-09-15
Payer: MEDICARE

## 2021-09-15 DIAGNOSIS — Z12.31 BREAST CANCER SCREENING BY MAMMOGRAM: ICD-10-CM

## 2021-09-15 PROCEDURE — 77063 BREAST TOMOSYNTHESIS BI: CPT

## 2021-10-15 DIAGNOSIS — Z79.4 TYPE 2 DIABETES MELLITUS WITH CHRONIC KIDNEY DISEASE, WITH LONG-TERM CURRENT USE OF INSULIN, UNSPECIFIED CKD STAGE (HCC): Primary | ICD-10-CM

## 2021-10-15 DIAGNOSIS — E11.22 TYPE 2 DIABETES MELLITUS WITH CHRONIC KIDNEY DISEASE, WITH LONG-TERM CURRENT USE OF INSULIN, UNSPECIFIED CKD STAGE (HCC): Primary | ICD-10-CM

## 2021-10-15 RX ORDER — GABAPENTIN 800 MG/1
800 TABLET ORAL 2 TIMES DAILY
Qty: 180 TABLET | Refills: 0 | Status: SHIPPED | OUTPATIENT
Start: 2021-10-15 | End: 2021-12-06

## 2021-11-01 RX ORDER — INSULIN DETEMIR 100 [IU]/ML
INJECTION, SOLUTION SUBCUTANEOUS
Qty: 45 ML | Refills: 1 | Status: SHIPPED | OUTPATIENT
Start: 2021-11-01 | End: 2022-06-21

## 2021-12-05 DIAGNOSIS — Z79.4 TYPE 2 DIABETES MELLITUS WITH CHRONIC KIDNEY DISEASE, WITH LONG-TERM CURRENT USE OF INSULIN, UNSPECIFIED CKD STAGE (HCC): ICD-10-CM

## 2021-12-05 DIAGNOSIS — E11.22 TYPE 2 DIABETES MELLITUS WITH CHRONIC KIDNEY DISEASE, WITH LONG-TERM CURRENT USE OF INSULIN, UNSPECIFIED CKD STAGE (HCC): ICD-10-CM

## 2021-12-06 RX ORDER — GABAPENTIN 800 MG/1
TABLET ORAL
Qty: 180 TABLET | Refills: 1 | Status: SHIPPED | OUTPATIENT
Start: 2021-12-06 | End: 2022-05-17

## 2022-01-05 ENCOUNTER — OFFICE VISIT (OUTPATIENT)
Dept: ENDOCRINOLOGY | Facility: CLINIC | Age: 72
End: 2022-01-05

## 2022-01-05 ENCOUNTER — LAB (OUTPATIENT)
Dept: LAB | Facility: HOSPITAL | Age: 72
End: 2022-01-05

## 2022-01-05 VITALS
DIASTOLIC BLOOD PRESSURE: 77 MMHG | BODY MASS INDEX: 29.57 KG/M2 | HEIGHT: 66 IN | SYSTOLIC BLOOD PRESSURE: 121 MMHG | OXYGEN SATURATION: 95 % | WEIGHT: 184 LBS | HEART RATE: 80 BPM

## 2022-01-05 DIAGNOSIS — E89.0 POSTABLATIVE HYPOTHYROIDISM: ICD-10-CM

## 2022-01-05 DIAGNOSIS — E11.65 UNCONTROLLED TYPE 2 DIABETES MELLITUS WITH HYPERGLYCEMIA: Primary | ICD-10-CM

## 2022-01-05 DIAGNOSIS — Z79.4 TYPE 2 DIABETES MELLITUS WITH HYPOGLYCEMIA WITHOUT COMA, WITH LONG-TERM CURRENT USE OF INSULIN: ICD-10-CM

## 2022-01-05 DIAGNOSIS — E11.22 TYPE 2 DIABETES MELLITUS WITH CHRONIC KIDNEY DISEASE, WITH LONG-TERM CURRENT USE OF INSULIN, UNSPECIFIED CKD STAGE: ICD-10-CM

## 2022-01-05 DIAGNOSIS — E78.2 MIXED HYPERLIPIDEMIA: ICD-10-CM

## 2022-01-05 DIAGNOSIS — Z79.4 TYPE 2 DIABETES MELLITUS WITH CHRONIC KIDNEY DISEASE, WITH LONG-TERM CURRENT USE OF INSULIN, UNSPECIFIED CKD STAGE: ICD-10-CM

## 2022-01-05 DIAGNOSIS — I10 PRIMARY HYPERTENSION: ICD-10-CM

## 2022-01-05 DIAGNOSIS — Z79.4 INSULIN LONG-TERM USE: ICD-10-CM

## 2022-01-05 DIAGNOSIS — E11.649 TYPE 2 DIABETES MELLITUS WITH HYPOGLYCEMIA WITHOUT COMA, WITH LONG-TERM CURRENT USE OF INSULIN: ICD-10-CM

## 2022-01-05 LAB
ALBUMIN UR-MCNC: 89.7 MG/DL
CHOLEST SERPL-MCNC: 124 MG/DL (ref 0–200)
CREAT UR-MCNC: 72.6 MG/DL
EXPIRATION DATE: ABNORMAL
EXPIRATION DATE: NORMAL
GLUCOSE BLDC GLUCOMTR-MCNC: 68 MG/DL (ref 70–130)
HBA1C MFR BLD: 6.7 %
HDLC SERPL-MCNC: 50 MG/DL (ref 40–60)
LDLC SERPL CALC-MCNC: 61 MG/DL (ref 0–100)
LDLC/HDLC SERPL: 1.24 {RATIO}
Lab: ABNORMAL
Lab: NORMAL
MICROALBUMIN/CREAT UR: 1235.5 MG/G
TRIGL SERPL-MCNC: 61 MG/DL (ref 0–150)
TSH SERPL DL<=0.05 MIU/L-ACNC: 8.32 UIU/ML (ref 0.27–4.2)
VLDLC SERPL-MCNC: 13 MG/DL (ref 5–40)

## 2022-01-05 PROCEDURE — 99214 OFFICE O/P EST MOD 30 MIN: CPT | Performed by: INTERNAL MEDICINE

## 2022-01-05 PROCEDURE — 80053 COMPREHEN METABOLIC PANEL: CPT

## 2022-01-05 PROCEDURE — 80061 LIPID PANEL: CPT

## 2022-01-05 PROCEDURE — 84443 ASSAY THYROID STIM HORMONE: CPT

## 2022-01-05 PROCEDURE — 3044F HG A1C LEVEL LT 7.0%: CPT | Performed by: INTERNAL MEDICINE

## 2022-01-05 PROCEDURE — 83036 HEMOGLOBIN GLYCOSYLATED A1C: CPT | Performed by: INTERNAL MEDICINE

## 2022-01-05 PROCEDURE — 82043 UR ALBUMIN QUANTITATIVE: CPT

## 2022-01-05 PROCEDURE — 82947 ASSAY GLUCOSE BLOOD QUANT: CPT | Performed by: INTERNAL MEDICINE

## 2022-01-05 PROCEDURE — 82570 ASSAY OF URINE CREATININE: CPT

## 2022-01-05 NOTE — PROGRESS NOTES
"     Office Note      Date: 2022  Patient Name: Karen Newman  MRN: [id number]  : 1950    Chief Complaint   Patient presents with   • Diabetes     type 2       History of Present Illness:   Karen Newman is a 71 y.o. female who presents for Diabetes type 2. Diagnosed in: . Treated in past with oral agents. Current treatments: metformin and basal bolus insulin. Number of insulin shots per day: 3. Checks blood sugar 4 times a day. Has low blood sugar: occasional. Aspirin use: No - easy bleeding. Statin use: Yes. ACE-I/ARB use: Yes. Changes in health since last visit: bronchitis - on steroids last week. Last eye exam 2021.     She remains on T4. She is taking 137mcg qd. She is taking this correctly. She isn't taking any interfering meds concurrently. She hasn't noted any change in the size of her neck. She denies any compressive sxs. She denies any sxs of hypo- or hyperthyroidism.    Subjective      Diabetic Complications:  Eyes: Yes  Kidneys: Yes - microalbumin and elevated Cr  Feet: Yes  Heart: No    Diet and Exercise:  Meals per day: 2  Minutes of exercise per week: 0 mins.    Review of Systems:   Review of Systems   Constitutional: Negative.    Cardiovascular: Negative.    Gastrointestinal: Negative.    Endocrine: Negative.        The following portions of the patient's history were reviewed and updated as appropriate: allergies, current medications, past family history, past medical history, past social history, past surgical history and problem list.    Objective       Visit Vitals  /77   Pulse 80   Ht 167.6 cm (66\")   Wt 83.5 kg (184 lb)   LMP  (LMP Unknown)   SpO2 95%   BMI 29.70 kg/m²       Physical Exam:  Physical Exam  Constitutional:       Appearance: Normal appearance.   Neck:      Thyroid: No thyroid mass, thyromegaly or thyroid tenderness.   Cardiovascular:      Pulses:           Dorsalis pedis pulses are 2+ on the right side and 2+ on the left side.        Posterior tibial " pulses are 2+ on the right side and 2+ on the left side.   Musculoskeletal:      Right foot: Bunion present.   Feet:      Right foot:      Protective Sensation: 5 sites tested. 5 sites sensed.      Skin integrity: Skin integrity normal.      Toenail Condition: Right toenails are normal.      Left foot:      Protective Sensation: 5 sites tested. 5 sites sensed.      Skin integrity: Skin integrity normal.      Toenail Condition: Left toenails are normal.   Lymphadenopathy:      Cervical: No cervical adenopathy.   Neurological:      Mental Status: She is alert.         Labs:    HbA1c  Lab Results   Component Value Date    HGBA1C 6.7 01/05/2022       CMP  Lab Results   Component Value Date    GLUCOSE 142 (H) 04/08/2021    BUN 25 (H) 04/08/2021    CREATININE 1.57 (H) 04/08/2021    EGFRIFNONA 33 (L) 04/08/2021    BCR 15.9 04/08/2021    K 4.3 04/08/2021    CO2 28.8 04/08/2021    CALCIUM 8.9 04/08/2021    AST 14 04/08/2021    ALT 11 04/08/2021        Lipid Panel        TSH  Lab Results   Component Value Date    TSH 2.350 04/08/2021        Hemoglobin A1C  Lab Results   Component Value Date    HGBA1C 6.7 01/05/2022        Microalbumin/Creatinine  No results found for: MALBCRERATIO, CREATINIURIN, MICROALBUR        Assessment / Plan      Assessment & Plan:  Diagnoses and all orders for this visit:    1. Uncontrolled type 2 diabetes mellitus with hyperglycemia (HCC) (Primary)  Assessment & Plan:  Diabetes is worsening.  A1c increased but okay at 6.7%.  Was on steroids recently though.  Continue current treatment regimen.  Diabetes will be reassessed in 3 months.      2. Type 2 diabetes mellitus with chronic kidney disease, with long-term current use of insulin, unspecified CKD stage (HCC)  Assessment & Plan:  Check CMP and microalbumin today.  Continue ACE-I.    Orders:  -     POC Glucose, Blood  -     POC Glycosylated Hemoglobin (Hb A1C)  -     Comprehensive Metabolic Panel; Future  -     Lipid Panel; Future  -     Microalbumin  / Creatinine Urine Ratio - Urine, Clean Catch; Future  -     TSH; Future    3. Type 2 diabetes mellitus with hypoglycemia without coma, with long-term current use of insulin (HCC)    4. Mixed hyperlipidemia  Assessment & Plan:  Continue statin and omega-3's.  Check lipids today.      5. Primary hypertension  Assessment & Plan:  Hypertension is unchanged.  Continue current treatment regimen.  Blood pressure will be reassessed at the next regular appointment.      6. Insulin long-term use (HCC)    7. Postablative hypothyroidism  Assessment & Plan:  Continue T4.  Check TSH today.        Return in about 3 months (around 4/5/2022) for Recheck with A1c, TSH.    Karel Blevins MD   01/05/2022

## 2022-01-05 NOTE — ASSESSMENT & PLAN NOTE
Diabetes is worsening.  A1c increased but okay at 6.7%.  Was on steroids recently though.  Continue current treatment regimen.  Diabetes will be reassessed in 3 months.

## 2022-01-06 LAB
ALBUMIN SERPL-MCNC: 3.4 G/DL (ref 3.5–5.2)
ALBUMIN/GLOB SERPL: 1.2 G/DL
ALP SERPL-CCNC: 50 U/L (ref 39–117)
ALT SERPL W P-5'-P-CCNC: 11 U/L (ref 1–33)
ANION GAP SERPL CALCULATED.3IONS-SCNC: 12.9 MMOL/L (ref 5–15)
AST SERPL-CCNC: 12 U/L (ref 1–32)
BILIRUB SERPL-MCNC: 0.2 MG/DL (ref 0–1.2)
BUN SERPL-MCNC: 32 MG/DL (ref 8–23)
BUN/CREAT SERPL: 20.9 (ref 7–25)
CALCIUM SPEC-SCNC: 9 MG/DL (ref 8.6–10.5)
CHLORIDE SERPL-SCNC: 102 MMOL/L (ref 98–107)
CO2 SERPL-SCNC: 26.1 MMOL/L (ref 22–29)
CREAT SERPL-MCNC: 1.53 MG/DL (ref 0.57–1)
GFR SERPL CREATININE-BSD FRML MDRD: 33 ML/MIN/1.73
GLOBULIN UR ELPH-MCNC: 2.8 GM/DL
GLUCOSE SERPL-MCNC: 43 MG/DL (ref 65–99)
POTASSIUM SERPL-SCNC: 5 MMOL/L (ref 3.5–5.2)
PROT SERPL-MCNC: 6.2 G/DL (ref 6–8.5)
SODIUM SERPL-SCNC: 141 MMOL/L (ref 136–145)

## 2022-01-06 RX ORDER — LEVOTHYROXINE SODIUM 0.15 MG/1
150 TABLET ORAL DAILY
Qty: 90 TABLET | Refills: 3 | Status: SHIPPED | OUTPATIENT
Start: 2022-01-06 | End: 2022-10-31

## 2022-01-10 RX ORDER — ATORVASTATIN CALCIUM 10 MG/1
10 TABLET, FILM COATED ORAL DAILY
Qty: 90 TABLET | Refills: 3 | Status: SHIPPED | OUTPATIENT
Start: 2022-01-10 | End: 2022-12-02

## 2022-01-11 RX ORDER — LISINOPRIL 40 MG/1
40 TABLET ORAL DAILY
Qty: 90 TABLET | Refills: 3 | Status: SHIPPED | OUTPATIENT
Start: 2022-01-11 | End: 2022-04-26

## 2022-01-26 ENCOUNTER — LAB (OUTPATIENT)
Dept: LAB | Facility: HOSPITAL | Age: 72
End: 2022-01-26

## 2022-01-26 ENCOUNTER — TRANSCRIBE ORDERS (OUTPATIENT)
Dept: LAB | Facility: HOSPITAL | Age: 72
End: 2022-01-26

## 2022-01-26 DIAGNOSIS — R80.9 PROTEINURIA, UNSPECIFIED TYPE: ICD-10-CM

## 2022-01-26 DIAGNOSIS — N18.32 CHRONIC KIDNEY DISEASE (CKD) STAGE G3B/A1, MODERATELY DECREASED GLOMERULAR FILTRATION RATE (GFR) BETWEEN 30-44 ML/MIN/1.73 SQUARE METER AND ALBUMINURIA CREATININE RATIO LESS THAN 30 MG/G (CMS/H*: Primary | ICD-10-CM

## 2022-01-26 DIAGNOSIS — N18.32 CHRONIC KIDNEY DISEASE (CKD) STAGE G3B/A1, MODERATELY DECREASED GLOMERULAR FILTRATION RATE (GFR) BETWEEN 30-44 ML/MIN/1.73 SQUARE METER AND ALBUMINURIA CREATININE RATIO LESS THAN 30 MG/G (CMS/H*: ICD-10-CM

## 2022-01-26 LAB
25(OH)D3 SERPL-MCNC: 177 NG/ML
ALBUMIN SERPL-MCNC: 3.5 G/DL (ref 3.5–5.2)
ANION GAP SERPL CALCULATED.3IONS-SCNC: 11.4 MMOL/L (ref 5–15)
BACTERIA UR QL AUTO: NORMAL /HPF
BASOPHILS # BLD AUTO: 0.04 10*3/MM3 (ref 0–0.2)
BASOPHILS NFR BLD AUTO: 0.8 % (ref 0–1.5)
BILIRUB UR QL STRIP: NEGATIVE
BUN SERPL-MCNC: 21 MG/DL (ref 8–23)
BUN/CREAT SERPL: 10.9 (ref 7–25)
CALCIUM SPEC-SCNC: 8.9 MG/DL (ref 8.6–10.5)
CHLORIDE SERPL-SCNC: 107 MMOL/L (ref 98–107)
CLARITY UR: CLEAR
CO2 SERPL-SCNC: 23.6 MMOL/L (ref 22–29)
COLOR UR: YELLOW
CREAT SERPL-MCNC: 1.93 MG/DL (ref 0.57–1)
DEPRECATED RDW RBC AUTO: 52.1 FL (ref 37–54)
EOSINOPHIL # BLD AUTO: 0.09 10*3/MM3 (ref 0–0.4)
EOSINOPHIL NFR BLD AUTO: 1.8 % (ref 0.3–6.2)
ERYTHROCYTE [DISTWIDTH] IN BLOOD BY AUTOMATED COUNT: 15.2 % (ref 12.3–15.4)
GFR SERPL CREATININE-BSD FRML MDRD: 26 ML/MIN/1.73
GLUCOSE SERPL-MCNC: 109 MG/DL (ref 65–99)
GLUCOSE UR STRIP-MCNC: NEGATIVE MG/DL
HCT VFR BLD AUTO: 29.6 % (ref 34–46.6)
HGB BLD-MCNC: 9.5 G/DL (ref 12–15.9)
HGB UR QL STRIP.AUTO: NEGATIVE
HYALINE CASTS UR QL AUTO: NORMAL /LPF
IMM GRANULOCYTES # BLD AUTO: 0.02 10*3/MM3 (ref 0–0.05)
IMM GRANULOCYTES NFR BLD AUTO: 0.4 % (ref 0–0.5)
IRON 24H UR-MRATE: 56 MCG/DL (ref 37–145)
IRON SATN MFR SERPL: 15 % (ref 20–50)
KETONES UR QL STRIP: NEGATIVE
LEUKOCYTE ESTERASE UR QL STRIP.AUTO: ABNORMAL
LYMPHOCYTES # BLD AUTO: 1.81 10*3/MM3 (ref 0.7–3.1)
LYMPHOCYTES NFR BLD AUTO: 36 % (ref 19.6–45.3)
MCH RBC QN AUTO: 30.3 PG (ref 26.6–33)
MCHC RBC AUTO-ENTMCNC: 32.1 G/DL (ref 31.5–35.7)
MCV RBC AUTO: 94.3 FL (ref 79–97)
MONOCYTES # BLD AUTO: 0.56 10*3/MM3 (ref 0.1–0.9)
MONOCYTES NFR BLD AUTO: 11.1 % (ref 5–12)
NEUTROPHILS NFR BLD AUTO: 2.51 10*3/MM3 (ref 1.7–7)
NEUTROPHILS NFR BLD AUTO: 49.9 % (ref 42.7–76)
NITRITE UR QL STRIP: NEGATIVE
NRBC BLD AUTO-RTO: 0.2 /100 WBC (ref 0–0.2)
PH UR STRIP.AUTO: 5.5 [PH] (ref 5–8)
PHOSPHATE SERPL-MCNC: 3.3 MG/DL (ref 2.5–4.5)
PLATELET # BLD AUTO: 196 10*3/MM3 (ref 140–450)
PMV BLD AUTO: 10.8 FL (ref 6–12)
POTASSIUM SERPL-SCNC: 4.5 MMOL/L (ref 3.5–5.2)
PROT ?TM UR-MCNC: 211 MG/DL
PROT UR QL STRIP: ABNORMAL
PTH-INTACT SERPL-MCNC: 13.1 PG/ML (ref 15–65)
RBC # BLD AUTO: 3.14 10*6/MM3 (ref 3.77–5.28)
RBC # UR STRIP: NORMAL /HPF
REF LAB TEST METHOD: NORMAL
SODIUM SERPL-SCNC: 142 MMOL/L (ref 136–145)
SP GR UR STRIP: 1.02 (ref 1–1.03)
SQUAMOUS #/AREA URNS HPF: NORMAL /HPF
TIBC SERPL-MCNC: 364 MCG/DL (ref 298–536)
TRANSFERRIN SERPL-MCNC: 244 MG/DL (ref 200–360)
UROBILINOGEN UR QL STRIP: ABNORMAL
WBC # UR STRIP: NORMAL /HPF
WBC NRBC COR # BLD: 5.03 10*3/MM3 (ref 3.4–10.8)

## 2022-01-26 PROCEDURE — 84156 ASSAY OF PROTEIN URINE: CPT

## 2022-01-26 PROCEDURE — 85025 COMPLETE CBC W/AUTO DIFF WBC: CPT

## 2022-01-26 PROCEDURE — 84466 ASSAY OF TRANSFERRIN: CPT

## 2022-01-26 PROCEDURE — 81001 URINALYSIS AUTO W/SCOPE: CPT

## 2022-01-26 PROCEDURE — 83540 ASSAY OF IRON: CPT

## 2022-01-26 PROCEDURE — 36415 COLL VENOUS BLD VENIPUNCTURE: CPT

## 2022-01-26 PROCEDURE — 83970 ASSAY OF PARATHORMONE: CPT

## 2022-01-26 PROCEDURE — 80069 RENAL FUNCTION PANEL: CPT

## 2022-01-26 PROCEDURE — 82306 VITAMIN D 25 HYDROXY: CPT

## 2022-02-25 ENCOUNTER — LAB (OUTPATIENT)
Dept: LAB | Facility: HOSPITAL | Age: 72
End: 2022-02-25

## 2022-02-25 ENCOUNTER — TRANSCRIBE ORDERS (OUTPATIENT)
Dept: LAB | Facility: HOSPITAL | Age: 72
End: 2022-02-25

## 2022-02-25 DIAGNOSIS — R80.9 PROTEINURIA, UNSPECIFIED TYPE: ICD-10-CM

## 2022-02-25 DIAGNOSIS — N18.30 STAGE 3 CHRONIC KIDNEY DISEASE, UNSPECIFIED WHETHER STAGE 3A OR 3B CKD: ICD-10-CM

## 2022-02-25 DIAGNOSIS — N18.30 STAGE 3 CHRONIC KIDNEY DISEASE, UNSPECIFIED WHETHER STAGE 3A OR 3B CKD: Primary | ICD-10-CM

## 2022-02-25 LAB
BACTERIA UR QL AUTO: ABNORMAL /HPF
BASOPHILS # BLD AUTO: 0.04 10*3/MM3 (ref 0–0.2)
BASOPHILS NFR BLD AUTO: 0.9 % (ref 0–1.5)
BILIRUB UR QL STRIP: NEGATIVE
CLARITY UR: CLEAR
COLOR UR: YELLOW
CREAT UR-MCNC: 219.9 MG/DL
DEPRECATED RDW RBC AUTO: 52.4 FL (ref 37–54)
EOSINOPHIL # BLD AUTO: 0.13 10*3/MM3 (ref 0–0.4)
EOSINOPHIL NFR BLD AUTO: 2.8 % (ref 0.3–6.2)
ERYTHROCYTE [DISTWIDTH] IN BLOOD BY AUTOMATED COUNT: 15.2 % (ref 12.3–15.4)
GLUCOSE UR STRIP-MCNC: NEGATIVE MG/DL
HCT VFR BLD AUTO: 31.1 % (ref 34–46.6)
HGB BLD-MCNC: 10.1 G/DL (ref 12–15.9)
HGB UR QL STRIP.AUTO: NEGATIVE
HYALINE CASTS UR QL AUTO: ABNORMAL /LPF
IMM GRANULOCYTES # BLD AUTO: 0.02 10*3/MM3 (ref 0–0.05)
IMM GRANULOCYTES NFR BLD AUTO: 0.4 % (ref 0–0.5)
KETONES UR QL STRIP: ABNORMAL
LEUKOCYTE ESTERASE UR QL STRIP.AUTO: NEGATIVE
LYMPHOCYTES # BLD AUTO: 1.42 10*3/MM3 (ref 0.7–3.1)
LYMPHOCYTES NFR BLD AUTO: 30.9 % (ref 19.6–45.3)
MCH RBC QN AUTO: 31.1 PG (ref 26.6–33)
MCHC RBC AUTO-ENTMCNC: 32.5 G/DL (ref 31.5–35.7)
MCV RBC AUTO: 95.7 FL (ref 79–97)
MONOCYTES # BLD AUTO: 0.45 10*3/MM3 (ref 0.1–0.9)
MONOCYTES NFR BLD AUTO: 9.8 % (ref 5–12)
NEUTROPHILS NFR BLD AUTO: 2.54 10*3/MM3 (ref 1.7–7)
NEUTROPHILS NFR BLD AUTO: 55.2 % (ref 42.7–76)
NITRITE UR QL STRIP: NEGATIVE
NRBC BLD AUTO-RTO: 0 /100 WBC (ref 0–0.2)
PH UR STRIP.AUTO: 5.5 [PH] (ref 5–8)
PLATELET # BLD AUTO: 185 10*3/MM3 (ref 140–450)
PMV BLD AUTO: 10.1 FL (ref 6–12)
PROT UR QL STRIP: ABNORMAL
RBC # BLD AUTO: 3.25 10*6/MM3 (ref 3.77–5.28)
RBC # UR STRIP: ABNORMAL /HPF
REF LAB TEST METHOD: ABNORMAL
SP GR UR STRIP: 1.02 (ref 1–1.03)
SQUAMOUS #/AREA URNS HPF: ABNORMAL /HPF
UROBILINOGEN UR QL STRIP: ABNORMAL
WBC # UR STRIP: ABNORMAL /HPF
WBC NRBC COR # BLD: 4.6 10*3/MM3 (ref 3.4–10.8)

## 2022-02-25 PROCEDURE — 82570 ASSAY OF URINE CREATININE: CPT

## 2022-02-25 PROCEDURE — 85025 COMPLETE CBC W/AUTO DIFF WBC: CPT

## 2022-02-25 PROCEDURE — 83970 ASSAY OF PARATHORMONE: CPT

## 2022-02-25 PROCEDURE — 84466 ASSAY OF TRANSFERRIN: CPT

## 2022-02-25 PROCEDURE — 83540 ASSAY OF IRON: CPT

## 2022-02-25 PROCEDURE — 36415 COLL VENOUS BLD VENIPUNCTURE: CPT

## 2022-02-25 PROCEDURE — 81001 URINALYSIS AUTO W/SCOPE: CPT

## 2022-02-25 PROCEDURE — 84156 ASSAY OF PROTEIN URINE: CPT

## 2022-02-25 PROCEDURE — 80069 RENAL FUNCTION PANEL: CPT

## 2022-02-25 PROCEDURE — 82306 VITAMIN D 25 HYDROXY: CPT

## 2022-02-26 LAB
25(OH)D3 SERPL-MCNC: 176 NG/ML
ALBUMIN SERPL-MCNC: 3.8 G/DL (ref 3.5–5.2)
ANION GAP SERPL CALCULATED.3IONS-SCNC: 12.4 MMOL/L (ref 5–15)
BUN SERPL-MCNC: 22 MG/DL (ref 8–23)
BUN/CREAT SERPL: 10.9 (ref 7–25)
CALCIUM SPEC-SCNC: 9.8 MG/DL (ref 8.6–10.5)
CHLORIDE SERPL-SCNC: 104 MMOL/L (ref 98–107)
CO2 SERPL-SCNC: 24.6 MMOL/L (ref 22–29)
CREAT SERPL-MCNC: 2.02 MG/DL (ref 0.57–1)
GFR SERPL CREATININE-BSD FRML MDRD: 24 ML/MIN/1.73
GLUCOSE SERPL-MCNC: 190 MG/DL (ref 65–99)
IRON 24H UR-MRATE: 54 MCG/DL (ref 37–145)
IRON SATN MFR SERPL: 13 % (ref 20–50)
PHOSPHATE SERPL-MCNC: 3.3 MG/DL (ref 2.5–4.5)
POTASSIUM SERPL-SCNC: 4.4 MMOL/L (ref 3.5–5.2)
PROT ?TM UR-MCNC: 428.9 MG/DL
PTH-INTACT SERPL-MCNC: 10 PG/ML (ref 15–65)
SODIUM SERPL-SCNC: 141 MMOL/L (ref 136–145)
TIBC SERPL-MCNC: 420 MCG/DL (ref 298–536)
TRANSFERRIN SERPL-MCNC: 282 MG/DL (ref 200–360)

## 2022-03-07 ENCOUNTER — TELEPHONE (OUTPATIENT)
Dept: ENDOCRINOLOGY | Facility: CLINIC | Age: 72
End: 2022-03-07

## 2022-03-07 NOTE — TELEPHONE ENCOUNTER
The metformin doesn't damage the kidneys but it is cleared by the kidneys.  She is okay to stop the metformin.  She can just stay on the insulin injections.

## 2022-03-07 NOTE — TELEPHONE ENCOUNTER
Patient's  called stated she saw her kidney doctor, he wants her to come off of Metformin thinks it is damaging her kidneys.  Can Dr Blevins prescribe something else?

## 2022-03-10 ENCOUNTER — TELEPHONE (OUTPATIENT)
Dept: CARDIOLOGY | Facility: CLINIC | Age: 72
End: 2022-03-10

## 2022-03-10 NOTE — TELEPHONE ENCOUNTER
PT calls to report her BP has been running elevated     160/80  170/87  164/82  144/69     HR 68-70    Diltiazem 420 mg daily in AM  Cardura 8 mg BID (10am and 8pm)  Hydralazine 50 mg 1 AM, 1 At 12 and 2 at QHS   Lisinopril 40 mg daily     PCP increased hydralazine about 2 months ago- she was on 50 mg BID. She has had her BP cuff checked and it is accurate.       Lab Results   Component Value Date    CREATININE 2.02 (H) 02/25/2022    BUN 22 02/25/2022     02/25/2022    K 4.4 02/25/2022     02/25/2022    CO2 24.6 02/25/2022

## 2022-03-11 DIAGNOSIS — I10 PRIMARY HYPERTENSION: Primary | ICD-10-CM

## 2022-03-11 RX ORDER — HYDRALAZINE HYDROCHLORIDE 100 MG/1
100 TABLET, FILM COATED ORAL EVERY 8 HOURS
Qty: 90 TABLET | Refills: 1 | Status: SHIPPED | OUTPATIENT
Start: 2022-03-11 | End: 2022-03-14 | Stop reason: SDUPTHER

## 2022-03-11 NOTE — TELEPHONE ENCOUNTER
Reviewed with Dr. Bobo. She recommends increasing Hydralazine to 100 mg every 8 hours. Renal duplex for STEPHANIE.     D/w pt's  and he verbalized understanding.

## 2022-03-14 RX ORDER — HYDRALAZINE HYDROCHLORIDE 100 MG/1
100 TABLET, FILM COATED ORAL EVERY 8 HOURS
Qty: 270 TABLET | Refills: 3 | Status: SHIPPED | OUTPATIENT
Start: 2022-03-14 | End: 2022-04-27 | Stop reason: SDUPTHER

## 2022-03-22 ENCOUNTER — HOSPITAL ENCOUNTER (OUTPATIENT)
Dept: CARDIOLOGY | Facility: HOSPITAL | Age: 72
Discharge: HOME | End: 2022-03-22
Admitting: INTERNAL MEDICINE

## 2022-03-22 VITALS — BODY MASS INDEX: 29.57 KG/M2 | WEIGHT: 184 LBS | HEIGHT: 66 IN

## 2022-03-22 DIAGNOSIS — I10 PRIMARY HYPERTENSION: ICD-10-CM

## 2022-03-22 PROCEDURE — 93975 VASCULAR STUDY: CPT

## 2022-03-23 LAB
BH CV ECHO MEAS - BSA(HAYCOCK): 2 M^2
BH CV ECHO MEAS - BSA: 1.9 M^2
BH CV ECHO MEAS - BZI_BMI: 29.7 KILOGRAMS/M^2
BH CV ECHO MEAS - BZI_METRIC_HEIGHT: 167.6 CM
BH CV ECHO MEAS - BZI_METRIC_WEIGHT: 83.5 KG
BH CV ECHO MEAS - DIST REN A EDV LEFT: 8.4 CM/SEC
BH CV ECHO MEAS - DIST REN A PSV LEFT: 35.1 CM/SEC
BH CV ECHO MEAS - DIST REN A RI LEFT: 0.76
BH CV ECHO MEAS - HILAR A EDV LEFT: 16 CM/SEC
BH CV ECHO MEAS - HILAR A PSV LEFT: 74.9 CM/SEC
BH CV ECHO MEAS - HILAR A RI LEFT: 0.79
BH CV ECHO MEAS - MID REN A EDV LEFT: 22.8 CM/SEC
BH CV ECHO MEAS - MID REN A PSV LEFT: 138 CM/SEC
BH CV ECHO MEAS - MID REN A RI LEFT: 0.8
BH CV ECHO MEAS - PROX REN A EDV LEFT: 13 CM/SEC
BH CV ECHO MEAS - PROX REN A PSV LEFT: 80.2 CM/SEC
BH CV ECHO MEAS - PROX REN A RI LEFT: 0.84
BH CV VAS BP LEFT ARM: NORMAL MMHG
BH CV VAS KIDNEY HEIGHT LEFT: 3.8 CM
BH CV VAS RENAL AORTIC MID EDV: 27.4 CM/S
BH CV VAS RENAL AORTIC MID PSV: 136 CM/S
BH CV VAS RENAL HILUM LEFT EDV: 15.3 CM/S
BH CV VAS RENAL HILUM LEFT PSV: 74.1 CM/S
BH CV VAS RENAL HILUM RIGHT EDV: 7.03 CM/S
BH CV VAS RENAL HILUM RIGHT PSV: 36.1 CM/S
BH CV XCLRA SUP ARC RI LEFT: 0.78
BH CV XLRA MEAS - KID L LEFT: 10 CM
BH CV XLRA MEAS - RENAL A ORG RI LEFT: 0.87
BH CV XLRA MEAS - SUP ARC PSV LEFT: 20.5 CM/SEC
BH CV XLRA MEAS - SUP REN AO EDV: 18.6 CM/SEC
BH CV XLRA MEAS - SUP REN AO PSV: 116.4 CM/SEC
BH CV XLRA MEAS - SUP REN AO RI: 0.84
BH CV XLRA MEAS - SUP REN AO S/D: 6.3
BH CV XLRA MEAS - SUP SEG EDV LEFT: 20.6 CM/SEC
BH CV XLRA MEAS - SUP SEG PSV LEFT: 69.5 CM/SEC
BH CV XLRA MEAS - SUP SEG RI LEFT: 0.7
BH CV XLRA MEAS DIST REN A EDV RIGHT: 7.3 CM/SEC
BH CV XLRA MEAS DIST REN A PSV RIGHT: 47.7 CM/SEC
BH CV XLRA MEAS DIST REN A RI RIGHT: 0.85
BH CV XLRA MEAS HILAR A EDV RIGHT: 7.3 CM/SEC
BH CV XLRA MEAS HILAR A PSV RIGHT: 36.4 CM/SEC
BH CV XLRA MEAS HILAR A RI RIGHT: 0.8
BH CV XLRA MEAS INF ARC EDV LEFT: 7 CM/SEC
BH CV XLRA MEAS INF ARC EDV RIGHT: 10 CM/SEC
BH CV XLRA MEAS INF ARC PSV LEFT: 26 CM/SEC
BH CV XLRA MEAS INF ARC PSV RIGHT: 33.2 CM/SEC
BH CV XLRA MEAS INF ARC RI LEFT: 0.73
BH CV XLRA MEAS INF ARC RI RIGHT: 0.7
BH CV XLRA MEAS INF SEG EDV LEFT: 17.7 CM/SEC
BH CV XLRA MEAS INF SEG EDV RIGHT: 12.7 CM/SEC
BH CV XLRA MEAS INF SEG PSV LEFT: 61.7 CM/SEC
BH CV XLRA MEAS INF SEG PSV RIGHT: 69.9 CM/SEC
BH CV XLRA MEAS INF SEG RI LEFT: 0.71
BH CV XLRA MEAS INF SEG RI RIGHT: 0.82
BH CV XLRA MEAS KID H RIGHT: 1.1 CM
BH CV XLRA MEAS KID L RIGHT: 10.7 CM
BH CV XLRA MEAS KID W RIGHT: 0.93 CM
BH CV XLRA MEAS MID REN A EDV RIGHT: 17.6 CM/SEC
BH CV XLRA MEAS MID REN A PSV RIGHT: 107 CM/SEC
BH CV XLRA MEAS MID REN A RI RIGHT: 0.81
BH CV XLRA MEAS PROX REN A EDV RIGHT: 9.29 CM/SEC
BH CV XLRA MEAS PROX REN A PSV RIGHT: 59.2 CM/SEC
BH CV XLRA MEAS PROX REN A RI RIGHT: 0.81
BH CV XLRA MEAS RAR LEFT: 1
BH CV XLRA MEAS RAR RIGHT: 0.79
BH CV XLRA MEAS RENAL A ORG EDV LEFT: 16.6 CM/SEC
BH CV XLRA MEAS RENAL A ORG EDV RIGHT: 17.1 CM/SEC
BH CV XLRA MEAS RENAL A ORG PSV LEFT: 123.2 CM/SEC
BH CV XLRA MEAS RENAL A ORG PSV RIGHT: 61.7 CM/SEC
BH CV XLRA MEAS RENAL A ORG RI RIGHT: 0.72
BH CV XLRA MEAS SUP ARC EDV RIGHT: 4.6 CM/SEC
BH CV XLRA MEAS SUP ARC PSV RIGHT: 15.9 CM/SEC
BH CV XLRA MEAS SUP ARC RI RIGHT: 0.71
BH CV XLRA MEAS SUP SEG EDV RIGHT: 5.2 CM/SEC
BH CV XLRA MEAS SUP SEG PSV RIGHT: 35.1 CM/SEC
BH CV XLRA MEAS SUP SEG RI RIGHT: 0.85
BH CV XLRA SUP ARC EDV LEFT: 4.6 CM/SEC
LEFT KIDNEY WIDTH: 4.6 CM
LEFT RENAL UPPER PARENCHYMA MAX: 25.7 CM/S
LEFT RENAL UPPER PARENCHYMA MIN: 6.72 CM/S
LEFT RENAL UPPER PARENCHYMA RI: 0.74
RIGHT RENAL UPPER PARENCHYMA MAX: 32.7 CM/S
RIGHT RENAL UPPER PARENCHYMA MIN: 9.6 CM/S
RIGHT RENAL UPPER PARENCHYMA RI: 0.71

## 2022-03-24 ENCOUNTER — LAB (OUTPATIENT)
Dept: LAB | Facility: HOSPITAL | Age: 72
End: 2022-03-24

## 2022-03-24 ENCOUNTER — TRANSCRIBE ORDERS (OUTPATIENT)
Dept: LAB | Facility: HOSPITAL | Age: 72
End: 2022-03-24

## 2022-03-24 DIAGNOSIS — N18.32 CHRONIC KIDNEY DISEASE (CKD) STAGE G3B/A1, MODERATELY DECREASED GLOMERULAR FILTRATION RATE (GFR) BETWEEN 30-44 ML/MIN/1.73 SQUARE METER AND ALBUMINURIA CREATININE RATIO LESS THAN 30 MG/G (CMS/H*: ICD-10-CM

## 2022-03-24 DIAGNOSIS — N18.32 CHRONIC KIDNEY DISEASE (CKD) STAGE G3B/A1, MODERATELY DECREASED GLOMERULAR FILTRATION RATE (GFR) BETWEEN 30-44 ML/MIN/1.73 SQUARE METER AND ALBUMINURIA CREATININE RATIO LESS THAN 30 MG/G (CMS/H*: Primary | ICD-10-CM

## 2022-03-24 LAB
BACTERIA UR QL AUTO: NORMAL /HPF
BILIRUB UR QL STRIP: NEGATIVE
CLARITY UR: CLEAR
COLOR UR: YELLOW
GLUCOSE UR STRIP-MCNC: NEGATIVE MG/DL
HGB UR QL STRIP.AUTO: NEGATIVE
HYALINE CASTS UR QL AUTO: NORMAL /LPF
KETONES UR QL STRIP: NEGATIVE
LEUKOCYTE ESTERASE UR QL STRIP.AUTO: NEGATIVE
NITRITE UR QL STRIP: NEGATIVE
PH UR STRIP.AUTO: 7 [PH] (ref 5–8)
PROT UR QL STRIP: ABNORMAL
RBC # UR STRIP: NORMAL /HPF
REF LAB TEST METHOD: NORMAL
SP GR UR STRIP: 1.01 (ref 1–1.03)
SQUAMOUS #/AREA URNS HPF: NORMAL /HPF
UROBILINOGEN UR QL STRIP: ABNORMAL
WBC # UR STRIP: NORMAL /HPF

## 2022-03-24 PROCEDURE — 84165 PROTEIN E-PHORESIS SERUM: CPT

## 2022-03-24 PROCEDURE — 36415 COLL VENOUS BLD VENIPUNCTURE: CPT

## 2022-03-24 PROCEDURE — 81001 URINALYSIS AUTO W/SCOPE: CPT

## 2022-03-24 PROCEDURE — 82570 ASSAY OF URINE CREATININE: CPT

## 2022-03-24 PROCEDURE — 84155 ASSAY OF PROTEIN SERUM: CPT

## 2022-03-24 PROCEDURE — 80069 RENAL FUNCTION PANEL: CPT

## 2022-03-24 PROCEDURE — 83521 IG LIGHT CHAINS FREE EACH: CPT

## 2022-03-24 PROCEDURE — 84156 ASSAY OF PROTEIN URINE: CPT

## 2022-03-24 PROCEDURE — 82728 ASSAY OF FERRITIN: CPT

## 2022-03-25 LAB
ALBUMIN SERPL ELPH-MCNC: 3.5 G/DL (ref 2.9–4.4)
ALBUMIN SERPL-MCNC: 4 G/DL (ref 3.5–5.2)
ALBUMIN/GLOB SERPL: 1.2 {RATIO} (ref 0.7–1.7)
ALPHA1 GLOB SERPL ELPH-MCNC: 0.2 G/DL (ref 0–0.4)
ALPHA2 GLOB SERPL ELPH-MCNC: 0.8 G/DL (ref 0.4–1)
ANION GAP SERPL CALCULATED.3IONS-SCNC: 25.1 MMOL/L (ref 5–15)
B-GLOBULIN SERPL ELPH-MCNC: 1.2 G/DL (ref 0.7–1.3)
BUN SERPL-MCNC: 23 MG/DL (ref 8–23)
BUN/CREAT SERPL: 11.7 (ref 7–25)
CALCIUM SPEC-SCNC: 9 MG/DL (ref 8.6–10.5)
CHLORIDE SERPL-SCNC: 94 MMOL/L (ref 98–107)
CO2 SERPL-SCNC: 24.9 MMOL/L (ref 22–29)
CREAT SERPL-MCNC: 1.96 MG/DL (ref 0.57–1)
CREAT UR-MCNC: 29.4 MG/DL
EGFRCR SERPLBLD CKD-EPI 2021: 26.9 ML/MIN/1.73
FERRITIN SERPL-MCNC: 39.7 NG/ML (ref 13–150)
GAMMA GLOB SERPL ELPH-MCNC: 0.7 G/DL (ref 0.4–1.8)
GLOBULIN SER CALC-MCNC: 2.9 G/DL (ref 2.2–3.9)
GLUCOSE SERPL-MCNC: 112 MG/DL (ref 65–99)
LABORATORY COMMENT REPORT: NORMAL
M PROTEIN SERPL ELPH-MCNC: NORMAL G/DL
PHOSPHATE SERPL-MCNC: 3.8 MG/DL (ref 2.5–4.5)
POTASSIUM SERPL-SCNC: 4.4 MMOL/L (ref 3.5–5.2)
PROT ?TM UR-MCNC: 54.7 MG/DL
PROT PATTERN SERPL ELPH-IMP: NORMAL
PROT SERPL-MCNC: 6.4 G/DL (ref 6–8.5)
SODIUM SERPL-SCNC: 144 MMOL/L (ref 136–145)

## 2022-03-26 LAB
KAPPA LC FREE UR-MCNC: 38.34 MG/L (ref 1.17–86.46)
KAPPA LC FREE/LAMBDA FREE UR: 5.33 (ref 1.83–14.26)
LAMBDA LC FREE UR-MCNC: 7.2 MG/L (ref 0.27–15.21)

## 2022-03-28 PROCEDURE — 84166 PROTEIN E-PHORESIS/URINE/CSF: CPT

## 2022-03-28 PROCEDURE — 84156 ASSAY OF PROTEIN URINE: CPT

## 2022-03-29 ENCOUNTER — TELEPHONE (OUTPATIENT)
Dept: CARDIOLOGY | Facility: CLINIC | Age: 72
End: 2022-03-29

## 2022-03-29 NOTE — TELEPHONE ENCOUNTER
BP continues to run 150's/70's despite increasing Hydralazine to 100 mg every 8 hours.     Hydralazine 100 mg Q 8 hours  Cardura 8 mg BID (10&8)  Diltiazem 420 mg daily  Lisinopril 40 mg daily    Renal duplex was performed and did not show any stenosis.     Lab Results   Component Value Date    GLUCOSE 112 (H) 03/24/2022    CALCIUM 9.0 03/24/2022     03/24/2022    K 4.4 03/24/2022    CO2 24.9 03/24/2022    CL 94 (L) 03/24/2022    BUN 23 03/24/2022    CREATININE 1.96 (H) 03/24/2022    EGFRIFNONA 24 (L) 02/25/2022    BCR 11.7 03/24/2022    ANIONGAP 25.1 (H) 03/24/2022

## 2022-03-29 NOTE — TELEPHONE ENCOUNTER
Lm on vm to call me back to go over recent renal artery duplex and to check on her BP. Will await her return call

## 2022-03-30 LAB
ALBUMIN 24H MFR UR ELPH: 65.5 %
ALPHA1 GLOB 24H MFR UR ELPH: 7.3 %
ALPHA2 GLOB 24H MFR UR ELPH: 6.4 %
B-GLOBULIN 24H MFR UR ELPH: 13.6 %
GAMMA GLOB 24H MFR UR ELPH: 7.3 %
LABORATORY COMMENT REPORT: ABNORMAL
M PROTEIN 24H MFR UR ELPH: ABNORMAL %
PROT 24H UR-MRATE: 1659 MG/24 HR (ref 30–150)
PROT UR-MCNC: 68 MG/DL

## 2022-03-30 RX ORDER — CLONIDINE HYDROCHLORIDE 0.1 MG/1
0.1 TABLET ORAL EVERY 12 HOURS
Qty: 60 TABLET | Refills: 3 | Status: SHIPPED | OUTPATIENT
Start: 2022-03-30 | End: 2022-03-30 | Stop reason: SDUPTHER

## 2022-03-30 RX ORDER — CLONIDINE HYDROCHLORIDE 0.1 MG/1
0.1 TABLET ORAL EVERY 12 HOURS
Qty: 60 TABLET | Refills: 3 | Status: SHIPPED | OUTPATIENT
Start: 2022-03-30 | End: 2022-05-03

## 2022-03-30 NOTE — TELEPHONE ENCOUNTER
"Reviewed with Dr. Bobo recommends     \"decrease Hydralazine back to 100 mg Q 12 hours and adding Clonidine 0.1 mg every 12 hours. Update us next week with BP and HR readings\"    Relayed recommended changes to pt. She verbalized understanding. She or her  will call me next week with BP and HR readings or sooner if needed   "

## 2022-04-01 ENCOUNTER — TELEPHONE (OUTPATIENT)
Dept: ENDOCRINOLOGY | Facility: CLINIC | Age: 72
End: 2022-04-01

## 2022-04-01 NOTE — TELEPHONE ENCOUNTER
"[facility] -[facility] are stopping her metformin today.  she ask you to send \"a replacement med\" to [facility] pharmacy please  "

## 2022-04-04 NOTE — TELEPHONE ENCOUNTER
PATIENT'S SPOUSE RETURNED CALL TO CLINIC STAFF. PATIENT IS CONCERNED THAT GLUCOSE READINGS WILL BECOME ELEVATED WITHOUT METFORMIN AND WOULD LIKE TO BE ADVISED AS TO HOW TO KEEP THIS FROM HAPPENING.

## 2022-04-04 NOTE — TELEPHONE ENCOUNTER
Spoke to pt and her .  They were concerned about the high bs's 400's so she took old medicine she had:  Glipizide 10mg bid.  She is currently taking novolog 16-20 units before meals  levemir 35 units daily.  He states since taking glipizide her bs 64, 55, 93 this am.  Please advise

## 2022-04-04 NOTE — TELEPHONE ENCOUNTER
I am concerned about having severe hypoglycemia with the glipizide and her kidney function as it is.  She should stop the glipizide.  We can increase insulin if needed.  Stay off metformin.

## 2022-04-04 NOTE — TELEPHONE ENCOUNTER
Spoke with pt's  - he voiced understanding.  They will write down blood sugars for a few days and call back with blood sugar numbers

## 2022-04-06 ENCOUNTER — APPOINTMENT (OUTPATIENT)
Dept: GENERAL RADIOLOGY | Facility: HOSPITAL | Age: 72
End: 2022-04-06

## 2022-04-06 ENCOUNTER — HOSPITAL ENCOUNTER (INPATIENT)
Facility: HOSPITAL | Age: 72
LOS: 3 days | Discharge: HOME | End: 2022-04-09
Attending: EMERGENCY MEDICINE | Admitting: FAMILY MEDICINE

## 2022-04-06 ENCOUNTER — APPOINTMENT (OUTPATIENT)
Dept: CT IMAGING | Facility: HOSPITAL | Age: 72
End: 2022-04-06

## 2022-04-06 DIAGNOSIS — J96.01 ACUTE RESPIRATORY FAILURE WITH HYPOXIA: Primary | ICD-10-CM

## 2022-04-06 DIAGNOSIS — J18.9 COMMUNITY ACQUIRED PNEUMONIA, UNSPECIFIED LATERALITY: ICD-10-CM

## 2022-04-06 DIAGNOSIS — N18.9 CHRONIC KIDNEY DISEASE, UNSPECIFIED CKD STAGE: ICD-10-CM

## 2022-04-06 LAB
A-A DO2: 90.7 MMHG
ALBUMIN SERPL-MCNC: 3.9 G/DL (ref 3.5–5.2)
ALBUMIN/GLOB SERPL: 1.1 G/DL
ALP SERPL-CCNC: 78 U/L (ref 39–117)
ALT SERPL W P-5'-P-CCNC: 24 U/L (ref 1–33)
ANION GAP SERPL CALCULATED.3IONS-SCNC: 13 MMOL/L (ref 5–15)
ARTERIAL PATENCY WRIST A: POSITIVE
AST SERPL-CCNC: 17 U/L (ref 1–32)
ATMOSPHERIC PRESS: 727 MMHG
B PARAPERT DNA SPEC QL NAA+PROBE: NOT DETECTED
B PERT DNA SPEC QL NAA+PROBE: NOT DETECTED
BACTERIA UR QL AUTO: ABNORMAL /HPF
BASE EXCESS BLDA CALC-SCNC: 0.5 MMOL/L (ref 0–2)
BASOPHILS # BLD AUTO: 0.03 10*3/MM3 (ref 0–0.2)
BASOPHILS NFR BLD AUTO: 0.4 % (ref 0–1.5)
BDY SITE: ABNORMAL
BILIRUB SERPL-MCNC: 0.6 MG/DL (ref 0–1.2)
BILIRUB UR QL STRIP: NEGATIVE
BUN SERPL-MCNC: 24 MG/DL (ref 8–23)
BUN/CREAT SERPL: 11 (ref 7–25)
C PNEUM DNA NPH QL NAA+NON-PROBE: NOT DETECTED
CALCIUM SPEC-SCNC: 8.6 MG/DL (ref 8.6–10.5)
CHLORIDE SERPL-SCNC: 101 MMOL/L (ref 98–107)
CLARITY UR: CLEAR
CO2 SERPL-SCNC: 22 MMOL/L (ref 22–29)
COHGB MFR BLD: 1.3 % (ref 0–2)
COLOR UR: YELLOW
CREAT SERPL-MCNC: 2.19 MG/DL (ref 0.57–1)
D-LACTATE SERPL-SCNC: 0.6 MMOL/L (ref 0.5–2)
DEPRECATED RDW RBC AUTO: 45.9 FL (ref 37–54)
EGFRCR SERPLBLD CKD-EPI 2021: 23.6 ML/MIN/1.73
EOSINOPHIL # BLD AUTO: 0.04 10*3/MM3 (ref 0–0.4)
EOSINOPHIL NFR BLD AUTO: 0.6 % (ref 0.3–6.2)
ERYTHROCYTE [DISTWIDTH] IN BLOOD BY AUTOMATED COUNT: 13.4 % (ref 12.3–15.4)
FLUAV SUBTYP SPEC NAA+PROBE: NOT DETECTED
FLUBV RNA ISLT QL NAA+PROBE: NOT DETECTED
GAS FLOW AIRWAY: 2 LPM
GLOBULIN UR ELPH-MCNC: 3.5 GM/DL
GLUCOSE SERPL-MCNC: 180 MG/DL (ref 65–99)
GLUCOSE UR STRIP-MCNC: NEGATIVE MG/DL
HADV DNA SPEC NAA+PROBE: NOT DETECTED
HCO3 BLDA-SCNC: 24.3 MMOL/L (ref 22–28)
HCOV 229E RNA SPEC QL NAA+PROBE: NOT DETECTED
HCOV HKU1 RNA SPEC QL NAA+PROBE: NOT DETECTED
HCOV NL63 RNA SPEC QL NAA+PROBE: NOT DETECTED
HCOV OC43 RNA SPEC QL NAA+PROBE: NOT DETECTED
HCT VFR BLD AUTO: 26.8 % (ref 34–46.6)
HCT VFR BLD CALC: 26.6 %
HGB BLD-MCNC: 8.8 G/DL (ref 12–15.9)
HGB UR QL STRIP.AUTO: NEGATIVE
HMPV RNA NPH QL NAA+NON-PROBE: NOT DETECTED
HOLD SPECIMEN: NORMAL
HOLD SPECIMEN: NORMAL
HPIV1 RNA ISLT QL NAA+PROBE: NOT DETECTED
HPIV2 RNA SPEC QL NAA+PROBE: NOT DETECTED
HPIV3 RNA NPH QL NAA+PROBE: NOT DETECTED
HPIV4 P GENE NPH QL NAA+PROBE: NOT DETECTED
HYALINE CASTS UR QL AUTO: ABNORMAL /LPF
IMM GRANULOCYTES # BLD AUTO: 0.03 10*3/MM3 (ref 0–0.05)
IMM GRANULOCYTES NFR BLD AUTO: 0.4 % (ref 0–0.5)
INHALED O2 CONCENTRATION: 28 %
KETONES UR QL STRIP: NEGATIVE
LEUKOCYTE ESTERASE UR QL STRIP.AUTO: NEGATIVE
LYMPHOCYTES # BLD AUTO: 1.13 10*3/MM3 (ref 0.7–3.1)
LYMPHOCYTES NFR BLD AUTO: 15.7 % (ref 19.6–45.3)
Lab: ABNORMAL
M PNEUMO IGG SER IA-ACNC: NOT DETECTED
MCH RBC QN AUTO: 30.4 PG (ref 26.6–33)
MCHC RBC AUTO-ENTMCNC: 32.8 G/DL (ref 31.5–35.7)
MCV RBC AUTO: 92.7 FL (ref 79–97)
METHGB BLD QL: 0.5 % (ref 0–1.5)
MODALITY: ABNORMAL
MONOCYTES # BLD AUTO: 0.82 10*3/MM3 (ref 0.1–0.9)
MONOCYTES NFR BLD AUTO: 11.4 % (ref 5–12)
NEUTROPHILS NFR BLD AUTO: 5.13 10*3/MM3 (ref 1.7–7)
NEUTROPHILS NFR BLD AUTO: 71.5 % (ref 42.7–76)
NITRITE UR QL STRIP: NEGATIVE
NOTE: ABNORMAL
NRBC BLD AUTO-RTO: 0 /100 WBC (ref 0–0.2)
NT-PROBNP SERPL-MCNC: 2248 PG/ML (ref 0–900)
OXYHGB MFR BLDV: 90.5 % (ref 94–99)
PCO2 BLDA: 34.6 MM HG (ref 35–45)
PCO2 TEMP ADJ BLD: ABNORMAL MM[HG]
PH BLDA: 7.45 PH UNITS (ref 7.35–7.45)
PH UR STRIP.AUTO: 7 [PH] (ref 5–8)
PH, TEMP CORRECTED: ABNORMAL
PLATELET # BLD AUTO: 192 10*3/MM3 (ref 140–450)
PMV BLD AUTO: 9.3 FL (ref 6–12)
PO2 BLDA: 60.9 MM HG (ref 75–100)
PO2 TEMP ADJ BLD: ABNORMAL MM[HG]
POTASSIUM SERPL-SCNC: 4.3 MMOL/L (ref 3.5–5.2)
PROCALCITONIN SERPL-MCNC: 0.07 NG/ML (ref 0–0.25)
PROT SERPL-MCNC: 7.4 G/DL (ref 6–8.5)
PROT UR QL STRIP: ABNORMAL
RBC # BLD AUTO: 2.89 10*6/MM3 (ref 3.77–5.28)
RBC # UR STRIP: ABNORMAL /HPF
REF LAB TEST METHOD: ABNORMAL
RHINOVIRUS RNA SPEC NAA+PROBE: NOT DETECTED
RSV RNA NPH QL NAA+NON-PROBE: NOT DETECTED
SAO2 % BLDCOA: 92.2 % (ref 94–100)
SARS-COV-2 RNA NPH QL NAA+NON-PROBE: NOT DETECTED
SODIUM SERPL-SCNC: 136 MMOL/L (ref 136–145)
SP GR UR STRIP: 1.01 (ref 1–1.03)
SQUAMOUS #/AREA URNS HPF: ABNORMAL /HPF
TROPONIN T SERPL-MCNC: <0.01 NG/ML (ref 0–0.03)
UROBILINOGEN UR QL STRIP: ABNORMAL
VENTILATOR MODE: ABNORMAL
WBC # UR STRIP: ABNORMAL /HPF
WBC NRBC COR # BLD: 7.18 10*3/MM3 (ref 3.4–10.8)
WHOLE BLOOD HOLD SPECIMEN: NORMAL
WHOLE BLOOD HOLD SPECIMEN: NORMAL

## 2022-04-06 PROCEDURE — 82375 ASSAY CARBOXYHB QUANT: CPT

## 2022-04-06 PROCEDURE — 85025 COMPLETE CBC W/AUTO DIFF WBC: CPT | Performed by: EMERGENCY MEDICINE

## 2022-04-06 PROCEDURE — 81001 URINALYSIS AUTO W/SCOPE: CPT

## 2022-04-06 PROCEDURE — 83050 HGB METHEMOGLOBIN QUAN: CPT

## 2022-04-06 PROCEDURE — 87040 BLOOD CULTURE FOR BACTERIA: CPT

## 2022-04-06 PROCEDURE — 99284 EMERGENCY DEPT VISIT MOD MDM: CPT

## 2022-04-06 PROCEDURE — 80053 COMPREHEN METABOLIC PANEL: CPT | Performed by: EMERGENCY MEDICINE

## 2022-04-06 PROCEDURE — 84484 ASSAY OF TROPONIN QUANT: CPT | Performed by: EMERGENCY MEDICINE

## 2022-04-06 PROCEDURE — 25010000002 CEFTRIAXONE SODIUM-DEXTROSE 1-3.74 GM-%(50ML) RECONSTITUTED SOLUTION

## 2022-04-06 PROCEDURE — 99223 1ST HOSP IP/OBS HIGH 75: CPT | Performed by: FAMILY MEDICINE

## 2022-04-06 PROCEDURE — 82805 BLOOD GASES W/O2 SATURATION: CPT

## 2022-04-06 PROCEDURE — 0202U NFCT DS 22 TRGT SARS-COV-2: CPT | Performed by: EMERGENCY MEDICINE

## 2022-04-06 PROCEDURE — 84145 PROCALCITONIN (PCT): CPT

## 2022-04-06 PROCEDURE — 36415 COLL VENOUS BLD VENIPUNCTURE: CPT

## 2022-04-06 PROCEDURE — 93005 ELECTROCARDIOGRAM TRACING: CPT | Performed by: EMERGENCY MEDICINE

## 2022-04-06 PROCEDURE — 71250 CT THORAX DX C-: CPT

## 2022-04-06 PROCEDURE — 83605 ASSAY OF LACTIC ACID: CPT

## 2022-04-06 PROCEDURE — 36600 WITHDRAWAL OF ARTERIAL BLOOD: CPT

## 2022-04-06 PROCEDURE — 25010000002 AZITHROMYCIN PER 500 MG

## 2022-04-06 PROCEDURE — 71045 X-RAY EXAM CHEST 1 VIEW: CPT

## 2022-04-06 PROCEDURE — 83880 ASSAY OF NATRIURETIC PEPTIDE: CPT | Performed by: EMERGENCY MEDICINE

## 2022-04-06 RX ORDER — ACETAMINOPHEN 500 MG
1000 TABLET ORAL ONCE
Status: COMPLETED | OUTPATIENT
Start: 2022-04-06 | End: 2022-04-06

## 2022-04-06 RX ORDER — CEFTRIAXONE 1 G/50ML
1 INJECTION, SOLUTION INTRAVENOUS ONCE
Status: COMPLETED | OUTPATIENT
Start: 2022-04-06 | End: 2022-04-06

## 2022-04-06 RX ORDER — SODIUM CHLORIDE 0.9 % (FLUSH) 0.9 %
10 SYRINGE (ML) INJECTION AS NEEDED
Status: DISCONTINUED | OUTPATIENT
Start: 2022-04-06 | End: 2022-04-09 | Stop reason: HOSPADM

## 2022-04-06 RX ADMIN — SODIUM CHLORIDE 500 MG: 900 INJECTION, SOLUTION INTRAVENOUS at 21:22

## 2022-04-06 RX ADMIN — CEFTRIAXONE 1 G: 1 INJECTION, SOLUTION INTRAVENOUS at 20:15

## 2022-04-06 RX ADMIN — ACETAMINOPHEN 1000 MG: 500 TABLET ORAL at 19:56

## 2022-04-07 ENCOUNTER — APPOINTMENT (OUTPATIENT)
Dept: CARDIOLOGY | Facility: HOSPITAL | Age: 72
End: 2022-04-07

## 2022-04-07 LAB
ANION GAP SERPL CALCULATED.3IONS-SCNC: 11.6 MMOL/L (ref 5–15)
BASOPHILS # BLD AUTO: 0.03 10*3/MM3 (ref 0–0.2)
BASOPHILS NFR BLD AUTO: 0.5 % (ref 0–1.5)
BH CV ECHO MEAS - AO MAX PG (FULL): 6.9 MMHG
BH CV ECHO MEAS - AO MAX PG: 12 MMHG
BH CV ECHO MEAS - AO MEAN PG (FULL): 2 MMHG
BH CV ECHO MEAS - AO MEAN PG: 5 MMHG
BH CV ECHO MEAS - AO ROOT AREA (BSA CORRECTED): 1.4
BH CV ECHO MEAS - AO ROOT AREA: 5.9 CM^2
BH CV ECHO MEAS - AO ROOT DIAM: 2.7 CM
BH CV ECHO MEAS - AO V2 MAX: 172 CM/SEC
BH CV ECHO MEAS - AO V2 MEAN: 107 CM/SEC
BH CV ECHO MEAS - AO V2 VTI: 38.9 CM
BH CV ECHO MEAS - ASC AORTA: 2.9 CM
BH CV ECHO MEAS - AVA(I,A): 2.4 CM^2
BH CV ECHO MEAS - AVA(I,D): 2.4 CM^2
BH CV ECHO MEAS - AVA(V,A): 2.3 CM^2
BH CV ECHO MEAS - AVA(V,D): 2.3 CM^2
BH CV ECHO MEAS - BSA(HAYCOCK): 2 M^2
BH CV ECHO MEAS - BSA: 2 M^2
BH CV ECHO MEAS - BZI_BMI: 29.4 KILOGRAMS/M^2
BH CV ECHO MEAS - BZI_METRIC_HEIGHT: 170.2 CM
BH CV ECHO MEAS - BZI_METRIC_WEIGHT: 85.3 KG
BH CV ECHO MEAS - EDV(CUBED): 113.4 ML
BH CV ECHO MEAS - EDV(MOD-SP2): 122 ML
BH CV ECHO MEAS - EDV(MOD-SP4): 117 ML
BH CV ECHO MEAS - EDV(TEICH): 109.6 ML
BH CV ECHO MEAS - EF(CUBED): 61.9 %
BH CV ECHO MEAS - EF(MOD-BP): 62.7 %
BH CV ECHO MEAS - EF(MOD-SP2): 64.7 %
BH CV ECHO MEAS - EF(MOD-SP4): 60.4 %
BH CV ECHO MEAS - EF(TEICH): 53.3 %
BH CV ECHO MEAS - ESV(CUBED): 43.2 ML
BH CV ECHO MEAS - ESV(MOD-SP2): 43.1 ML
BH CV ECHO MEAS - ESV(MOD-SP4): 46.3 ML
BH CV ECHO MEAS - ESV(TEICH): 51.2 ML
BH CV ECHO MEAS - FS: 27.5 %
BH CV ECHO MEAS - IVS/LVPW: 0.82
BH CV ECHO MEAS - IVSD: 1.5 CM
BH CV ECHO MEAS - LA DIMENSION: 3.7 CM
BH CV ECHO MEAS - LA/AO: 1.4
BH CV ECHO MEAS - LAD MAJOR: 4.9 CM
BH CV ECHO MEAS - LAT PEAK E' VEL: 9.5 CM/SEC
BH CV ECHO MEAS - LATERAL E/E' RATIO: 10.5
BH CV ECHO MEAS - LV DIASTOLIC VOL/BSA (35-75): 59.4 ML/M^2
BH CV ECHO MEAS - LV MASS(C)D: 353.4 GRAMS
BH CV ECHO MEAS - LV MASS(C)DI: 179.4 GRAMS/M^2
BH CV ECHO MEAS - LV MAX PG: 5.1 MMHG
BH CV ECHO MEAS - LV MEAN PG: 3 MMHG
BH CV ECHO MEAS - LV SYSTOLIC VOL/BSA (12-30): 23.5 ML/M^2
BH CV ECHO MEAS - LV V1 MAX: 113 CM/SEC
BH CV ECHO MEAS - LV V1 MEAN: 75.2 CM/SEC
BH CV ECHO MEAS - LV V1 VTI: 26.5 CM
BH CV ECHO MEAS - LVIDD: 4.8 CM
BH CV ECHO MEAS - LVIDS: 3.5 CM
BH CV ECHO MEAS - LVLD AP2: 9.5 CM
BH CV ECHO MEAS - LVLD AP4: 8.9 CM
BH CV ECHO MEAS - LVLS AP2: 7.7 CM
BH CV ECHO MEAS - LVLS AP4: 7.1 CM
BH CV ECHO MEAS - LVOT AREA (M): 3.5 CM^2
BH CV ECHO MEAS - LVOT AREA: 3.5 CM^2
BH CV ECHO MEAS - LVOT DIAM: 2.1 CM
BH CV ECHO MEAS - LVPWD: 1.8 CM
BH CV ECHO MEAS - MED PEAK E' VEL: 7.7 CM/SEC
BH CV ECHO MEAS - MEDIAL E/E' RATIO: 12.9
BH CV ECHO MEAS - MR ALIAS VEL: 37.3 CM/SEC
BH CV ECHO MEAS - MR ERO: 0.08 CM^2
BH CV ECHO MEAS - MR FLOW RATE: 37.5 CM^3/SEC
BH CV ECHO MEAS - MR MAX PG: 87 MMHG
BH CV ECHO MEAS - MR MAX VEL: 466 CM/SEC
BH CV ECHO MEAS - MR PISA RADIUS: 0.4 CM
BH CV ECHO MEAS - MR PISA: 1 CM^2
BH CV ECHO MEAS - MV A MAX VEL: 44.3 CM/SEC
BH CV ECHO MEAS - MV DEC TIME: 0.15 SEC
BH CV ECHO MEAS - MV E MAX VEL: 99.3 CM/SEC
BH CV ECHO MEAS - MV E/A: 2.2
BH CV ECHO MEAS - MV MAX PG: 5.7 MMHG
BH CV ECHO MEAS - MV MEAN PG: 2 MMHG
BH CV ECHO MEAS - MV V2 MAX: 119 CM/SEC
BH CV ECHO MEAS - MV V2 MEAN: 71.1 CM/SEC
BH CV ECHO MEAS - MV V2 VTI: 41.7 CM
BH CV ECHO MEAS - MVA(VTI): 2.2 CM^2
BH CV ECHO MEAS - PA ACC TIME: 0.13 SEC
BH CV ECHO MEAS - PA MAX PG (FULL): 1.5 MMHG
BH CV ECHO MEAS - PA MAX PG: 5.1 MMHG
BH CV ECHO MEAS - PA MEAN PG (FULL): 0 MMHG
BH CV ECHO MEAS - PA MEAN PG: 2 MMHG
BH CV ECHO MEAS - PA PR(ACCEL): 21.9 MMHG
BH CV ECHO MEAS - PA V2 MAX: 113 CM/SEC
BH CV ECHO MEAS - PA V2 MEAN: 74 CM/SEC
BH CV ECHO MEAS - PA V2 VTI: 31.4 CM
BH CV ECHO MEAS - PULM A REVS DUR: 0.13 SEC
BH CV ECHO MEAS - PULM A REVS VEL: 20.6 CM/SEC
BH CV ECHO MEAS - PULM DIAS VEL: 65 CM/SEC
BH CV ECHO MEAS - PULM S/D: 0.67
BH CV ECHO MEAS - PULM SYS VEL: 43.5 CM/SEC
BH CV ECHO MEAS - RAP SYSTOLE: 3 MMHG
BH CV ECHO MEAS - RV MAX PG: 3.7 MMHG
BH CV ECHO MEAS - RV MEAN PG: 2 MMHG
BH CV ECHO MEAS - RV V1 MAX: 95.6 CM/SEC
BH CV ECHO MEAS - RV V1 MEAN: 60.1 CM/SEC
BH CV ECHO MEAS - RV V1 VTI: 25.1 CM
BH CV ECHO MEAS - RVSP: 31 MMHG
BH CV ECHO MEAS - SI(AO): 115.6 ML/M^2
BH CV ECHO MEAS - SI(CUBED): 35.6 ML/M^2
BH CV ECHO MEAS - SI(LVOT): 46.6 ML/M^2
BH CV ECHO MEAS - SI(MOD-SP2): 40.1 ML/M^2
BH CV ECHO MEAS - SI(MOD-SP4): 35.9 ML/M^2
BH CV ECHO MEAS - SI(TEICH): 29.7 ML/M^2
BH CV ECHO MEAS - SV(AO): 227.7 ML
BH CV ECHO MEAS - SV(CUBED): 70.1 ML
BH CV ECHO MEAS - SV(LVOT): 91.8 ML
BH CV ECHO MEAS - SV(MOD-SP2): 78.9 ML
BH CV ECHO MEAS - SV(MOD-SP4): 70.7 ML
BH CV ECHO MEAS - SV(TEICH): 58.4 ML
BH CV ECHO MEAS - TR MAX PG: 28 MMHG
BH CV ECHO MEAS - TR MAX VEL: 265 CM/SEC
BH CV ECHO MEASUREMENTS AVERAGE E/E' RATIO: 11.55
BH CV XLRA - RV BASE: 2.9 CM
BH CV XLRA - RV LENGTH: 6.8 CM
BH CV XLRA - RV MID: 2.2 CM
BH CV XLRA - TDI S': 11.6 CM/SEC
BUN SERPL-MCNC: 25 MG/DL (ref 8–23)
BUN/CREAT SERPL: 12.6 (ref 7–25)
CALCIUM SPEC-SCNC: 8.7 MG/DL (ref 8.6–10.5)
CHLORIDE SERPL-SCNC: 104 MMOL/L (ref 98–107)
CO2 SERPL-SCNC: 24.4 MMOL/L (ref 22–29)
CREAT SERPL-MCNC: 1.99 MG/DL (ref 0.57–1)
DEPRECATED RDW RBC AUTO: 45.5 FL (ref 37–54)
EGFRCR SERPLBLD CKD-EPI 2021: 26.4 ML/MIN/1.73
EOSINOPHIL # BLD AUTO: 0.1 10*3/MM3 (ref 0–0.4)
EOSINOPHIL NFR BLD AUTO: 1.6 % (ref 0.3–6.2)
ERYTHROCYTE [DISTWIDTH] IN BLOOD BY AUTOMATED COUNT: 13.2 % (ref 12.3–15.4)
GLUCOSE BLDC GLUCOMTR-MCNC: 150 MG/DL (ref 70–130)
GLUCOSE BLDC GLUCOMTR-MCNC: 182 MG/DL (ref 70–130)
GLUCOSE BLDC GLUCOMTR-MCNC: 81 MG/DL (ref 70–130)
GLUCOSE SERPL-MCNC: 176 MG/DL (ref 65–99)
HBA1C MFR BLD: 5.9 % (ref 4.8–5.6)
HCT VFR BLD AUTO: 26.2 % (ref 34–46.6)
HGB BLD-MCNC: 8.6 G/DL (ref 12–15.9)
IMM GRANULOCYTES # BLD AUTO: 0.03 10*3/MM3 (ref 0–0.05)
IMM GRANULOCYTES NFR BLD AUTO: 0.5 % (ref 0–0.5)
L PNEUMO1 AG UR QL IA: NEGATIVE
LEFT ATRIUM VOLUME INDEX: 28.1 ML/M^2
LEFT ATRIUM VOLUME: 55.3 ML
LYMPHOCYTES # BLD AUTO: 1.12 10*3/MM3 (ref 0.7–3.1)
LYMPHOCYTES NFR BLD AUTO: 17.4 % (ref 19.6–45.3)
MCH RBC QN AUTO: 30.7 PG (ref 26.6–33)
MCHC RBC AUTO-ENTMCNC: 32.8 G/DL (ref 31.5–35.7)
MCV RBC AUTO: 93.6 FL (ref 79–97)
MONOCYTES # BLD AUTO: 0.6 10*3/MM3 (ref 0.1–0.9)
MONOCYTES NFR BLD AUTO: 9.3 % (ref 5–12)
NEUTROPHILS NFR BLD AUTO: 4.57 10*3/MM3 (ref 1.7–7)
NEUTROPHILS NFR BLD AUTO: 70.7 % (ref 42.7–76)
NRBC BLD AUTO-RTO: 0 /100 WBC (ref 0–0.2)
PLATELET # BLD AUTO: 188 10*3/MM3 (ref 140–450)
PMV BLD AUTO: 9.8 FL (ref 6–12)
POTASSIUM SERPL-SCNC: 3.9 MMOL/L (ref 3.5–5.2)
RBC # BLD AUTO: 2.8 10*6/MM3 (ref 3.77–5.28)
S PNEUM AG SPEC QL LA: NEGATIVE
SODIUM SERPL-SCNC: 140 MMOL/L (ref 136–145)
WBC NRBC COR # BLD: 6.45 10*3/MM3 (ref 3.4–10.8)

## 2022-04-07 PROCEDURE — 80048 BASIC METABOLIC PNL TOTAL CA: CPT | Performed by: FAMILY MEDICINE

## 2022-04-07 PROCEDURE — 25010000002 FUROSEMIDE PER 20 MG: Performed by: FAMILY MEDICINE

## 2022-04-07 PROCEDURE — 97161 PT EVAL LOW COMPLEX 20 MIN: CPT

## 2022-04-07 PROCEDURE — 87899 AGENT NOS ASSAY W/OPTIC: CPT | Performed by: FAMILY MEDICINE

## 2022-04-07 PROCEDURE — 63710000001 INSULIN ASPART PER 5 UNITS: Performed by: FAMILY MEDICINE

## 2022-04-07 PROCEDURE — 83036 HEMOGLOBIN GLYCOSYLATED A1C: CPT | Performed by: FAMILY MEDICINE

## 2022-04-07 PROCEDURE — 85025 COMPLETE CBC W/AUTO DIFF WBC: CPT | Performed by: FAMILY MEDICINE

## 2022-04-07 PROCEDURE — 82962 GLUCOSE BLOOD TEST: CPT

## 2022-04-07 PROCEDURE — 99232 SBSQ HOSP IP/OBS MODERATE 35: CPT | Performed by: NURSE PRACTITIONER

## 2022-04-07 PROCEDURE — 93306 TTE W/DOPPLER COMPLETE: CPT

## 2022-04-07 PROCEDURE — 25010000002 FUROSEMIDE PER 20 MG: Performed by: INTERNAL MEDICINE

## 2022-04-07 PROCEDURE — 25010000002 CEFTRIAXONE SODIUM-DEXTROSE 1-3.74 GM-%(50ML) RECONSTITUTED SOLUTION

## 2022-04-07 PROCEDURE — 93306 TTE W/DOPPLER COMPLETE: CPT | Performed by: INTERNAL MEDICINE

## 2022-04-07 PROCEDURE — 25010000002 AZITHROMYCIN PER 500 MG

## 2022-04-07 PROCEDURE — 25010000002 SULFUR HEXAFLUORIDE MICROSPH 60.7-25 MG RECONSTITUTED SUSPENSION: Performed by: FAMILY MEDICINE

## 2022-04-07 PROCEDURE — 25010000002 HEPARIN (PORCINE) PER 1000 UNITS: Performed by: FAMILY MEDICINE

## 2022-04-07 PROCEDURE — 63710000001 INSULIN DETEMIR PER 5 UNITS: Performed by: FAMILY MEDICINE

## 2022-04-07 RX ORDER — CEFTRIAXONE 1 G/50ML
1 INJECTION, SOLUTION INTRAVENOUS EVERY 24 HOURS
Status: DISCONTINUED | OUTPATIENT
Start: 2022-04-07 | End: 2022-04-08

## 2022-04-07 RX ORDER — FUROSEMIDE 10 MG/ML
40 INJECTION INTRAMUSCULAR; INTRAVENOUS ONCE
Status: COMPLETED | OUTPATIENT
Start: 2022-04-07 | End: 2022-04-07

## 2022-04-07 RX ORDER — FUROSEMIDE 10 MG/ML
40 INJECTION INTRAMUSCULAR; INTRAVENOUS EVERY 4 HOURS
Status: COMPLETED | OUTPATIENT
Start: 2022-04-07 | End: 2022-04-07

## 2022-04-07 RX ORDER — CLONIDINE HYDROCHLORIDE 0.1 MG/1
0.1 TABLET ORAL EVERY 12 HOURS
Status: DISCONTINUED | OUTPATIENT
Start: 2022-04-07 | End: 2022-04-09 | Stop reason: HOSPADM

## 2022-04-07 RX ORDER — ONDANSETRON 2 MG/ML
4 INJECTION INTRAMUSCULAR; INTRAVENOUS EVERY 6 HOURS PRN
Status: DISCONTINUED | OUTPATIENT
Start: 2022-04-07 | End: 2022-04-09 | Stop reason: HOSPADM

## 2022-04-07 RX ORDER — BISACODYL 10 MG
10 SUPPOSITORY, RECTAL RECTAL DAILY PRN
Status: DISCONTINUED | OUTPATIENT
Start: 2022-04-07 | End: 2022-04-09 | Stop reason: HOSPADM

## 2022-04-07 RX ORDER — AMOXICILLIN 250 MG
2 CAPSULE ORAL 2 TIMES DAILY
Status: DISCONTINUED | OUTPATIENT
Start: 2022-04-07 | End: 2022-04-09 | Stop reason: HOSPADM

## 2022-04-07 RX ORDER — SODIUM CHLORIDE 0.9 % (FLUSH) 0.9 %
3-10 SYRINGE (ML) INJECTION AS NEEDED
Status: DISCONTINUED | OUTPATIENT
Start: 2022-04-07 | End: 2022-04-09 | Stop reason: HOSPADM

## 2022-04-07 RX ORDER — NICOTINE POLACRILEX 4 MG
1 LOZENGE BUCCAL
Status: DISCONTINUED | OUTPATIENT
Start: 2022-04-07 | End: 2022-04-09 | Stop reason: HOSPADM

## 2022-04-07 RX ORDER — HEPARIN SODIUM 5000 [USP'U]/ML
5000 INJECTION, SOLUTION INTRAVENOUS; SUBCUTANEOUS EVERY 12 HOURS SCHEDULED
Status: DISCONTINUED | OUTPATIENT
Start: 2022-04-07 | End: 2022-04-09 | Stop reason: HOSPADM

## 2022-04-07 RX ORDER — LISINOPRIL 20 MG/1
40 TABLET ORAL DAILY
Status: DISCONTINUED | OUTPATIENT
Start: 2022-04-07 | End: 2022-04-09 | Stop reason: HOSPADM

## 2022-04-07 RX ORDER — FERROUS SULFATE TAB EC 324 MG (65 MG FE EQUIVALENT) 324 (65 FE) MG
324 TABLET DELAYED RESPONSE ORAL
Status: DISCONTINUED | OUTPATIENT
Start: 2022-04-07 | End: 2022-04-09 | Stop reason: HOSPADM

## 2022-04-07 RX ORDER — ACETAMINOPHEN 650 MG/1
650 SUPPOSITORY RECTAL EVERY 4 HOURS PRN
Status: DISCONTINUED | OUTPATIENT
Start: 2022-04-07 | End: 2022-04-09 | Stop reason: HOSPADM

## 2022-04-07 RX ORDER — HYDRALAZINE HYDROCHLORIDE 25 MG/1
100 TABLET, FILM COATED ORAL EVERY 8 HOURS
Status: DISCONTINUED | OUTPATIENT
Start: 2022-04-07 | End: 2022-04-09 | Stop reason: HOSPADM

## 2022-04-07 RX ORDER — BISACODYL 5 MG/1
5 TABLET, DELAYED RELEASE ORAL DAILY PRN
Status: DISCONTINUED | OUTPATIENT
Start: 2022-04-07 | End: 2022-04-09 | Stop reason: HOSPADM

## 2022-04-07 RX ORDER — PANTOPRAZOLE SODIUM 40 MG/1
40 TABLET, DELAYED RELEASE ORAL EVERY MORNING
Status: DISCONTINUED | OUTPATIENT
Start: 2022-04-07 | End: 2022-04-09 | Stop reason: HOSPADM

## 2022-04-07 RX ORDER — IPRATROPIUM BROMIDE AND ALBUTEROL SULFATE 2.5; .5 MG/3ML; MG/3ML
3 SOLUTION RESPIRATORY (INHALATION) EVERY 4 HOURS PRN
Status: DISCONTINUED | OUTPATIENT
Start: 2022-04-07 | End: 2022-04-09 | Stop reason: HOSPADM

## 2022-04-07 RX ORDER — ACETAMINOPHEN 160 MG/5ML
650 SOLUTION ORAL EVERY 4 HOURS PRN
Status: DISCONTINUED | OUTPATIENT
Start: 2022-04-07 | End: 2022-04-09 | Stop reason: HOSPADM

## 2022-04-07 RX ORDER — GABAPENTIN 400 MG/1
800 CAPSULE ORAL EVERY 12 HOURS SCHEDULED
Refills: 1 | Status: DISCONTINUED | OUTPATIENT
Start: 2022-04-07 | End: 2022-04-09 | Stop reason: HOSPADM

## 2022-04-07 RX ORDER — LEVOTHYROXINE SODIUM 0.15 MG/1
150 TABLET ORAL DAILY
Status: DISCONTINUED | OUTPATIENT
Start: 2022-04-07 | End: 2022-04-09 | Stop reason: HOSPADM

## 2022-04-07 RX ORDER — PAROXETINE HYDROCHLORIDE 20 MG/1
40 TABLET, FILM COATED ORAL DAILY
Status: DISCONTINUED | OUTPATIENT
Start: 2022-04-07 | End: 2022-04-09 | Stop reason: HOSPADM

## 2022-04-07 RX ORDER — SODIUM CHLORIDE 0.9 % (FLUSH) 0.9 %
3 SYRINGE (ML) INJECTION EVERY 12 HOURS SCHEDULED
Status: DISCONTINUED | OUTPATIENT
Start: 2022-04-07 | End: 2022-04-09 | Stop reason: HOSPADM

## 2022-04-07 RX ORDER — POLYETHYLENE GLYCOL 3350 17 G/17G
17 POWDER, FOR SOLUTION ORAL DAILY PRN
Status: DISCONTINUED | OUTPATIENT
Start: 2022-04-07 | End: 2022-04-09 | Stop reason: HOSPADM

## 2022-04-07 RX ORDER — ATORVASTATIN CALCIUM 10 MG/1
10 TABLET, FILM COATED ORAL DAILY
Status: DISCONTINUED | OUTPATIENT
Start: 2022-04-07 | End: 2022-04-09 | Stop reason: HOSPADM

## 2022-04-07 RX ORDER — CHOLECALCIFEROL (VITAMIN D3) 125 MCG
5 CAPSULE ORAL NIGHTLY PRN
Status: DISCONTINUED | OUTPATIENT
Start: 2022-04-07 | End: 2022-04-09 | Stop reason: HOSPADM

## 2022-04-07 RX ORDER — DEXTROSE MONOHYDRATE 25 G/50ML
25 INJECTION, SOLUTION INTRAVENOUS
Status: DISCONTINUED | OUTPATIENT
Start: 2022-04-07 | End: 2022-04-09 | Stop reason: HOSPADM

## 2022-04-07 RX ORDER — TERAZOSIN 5 MG/1
5 CAPSULE ORAL EVERY 12 HOURS SCHEDULED
Refills: 3 | Status: DISCONTINUED | OUTPATIENT
Start: 2022-04-07 | End: 2022-04-09 | Stop reason: HOSPADM

## 2022-04-07 RX ORDER — ACETAMINOPHEN 325 MG/1
650 TABLET ORAL EVERY 4 HOURS PRN
Status: DISCONTINUED | OUTPATIENT
Start: 2022-04-07 | End: 2022-04-09 | Stop reason: HOSPADM

## 2022-04-07 RX ADMIN — LEVOTHYROXINE SODIUM 150 MCG: 150 TABLET ORAL at 09:20

## 2022-04-07 RX ADMIN — INSULIN DETEMIR 35 UNITS: 100 INJECTION, SOLUTION SUBCUTANEOUS at 03:30

## 2022-04-07 RX ADMIN — GABAPENTIN 800 MG: 400 CAPSULE ORAL at 09:20

## 2022-04-07 RX ADMIN — SULFUR HEXAFLUORIDE 5 ML: KIT at 10:00

## 2022-04-07 RX ADMIN — FUROSEMIDE 40 MG: 10 INJECTION, SOLUTION INTRAVENOUS at 11:37

## 2022-04-07 RX ADMIN — HEPARIN SODIUM 5000 UNITS: 5000 INJECTION INTRAVENOUS; SUBCUTANEOUS at 03:21

## 2022-04-07 RX ADMIN — TERAZOSIN HYDROCHLORIDE 5 MG: 5 CAPSULE ORAL at 00:46

## 2022-04-07 RX ADMIN — HEPARIN SODIUM 5000 UNITS: 5000 INJECTION INTRAVENOUS; SUBCUTANEOUS at 09:21

## 2022-04-07 RX ADMIN — TERAZOSIN HYDROCHLORIDE 5 MG: 5 CAPSULE ORAL at 09:20

## 2022-04-07 RX ADMIN — Medication 3 ML: at 09:21

## 2022-04-07 RX ADMIN — PAROXETINE HYDROCHLORIDE 40 MG: 20 TABLET, FILM COATED ORAL at 09:20

## 2022-04-07 RX ADMIN — TERAZOSIN HYDROCHLORIDE 5 MG: 5 CAPSULE ORAL at 21:06

## 2022-04-07 RX ADMIN — CLONIDINE HYDROCHLORIDE 0.1 MG: 0.1 TABLET ORAL at 14:04

## 2022-04-07 RX ADMIN — PANTOPRAZOLE SODIUM 40 MG: 40 TABLET, DELAYED RELEASE ORAL at 06:34

## 2022-04-07 RX ADMIN — CEFTRIAXONE 1 G: 1 INJECTION, SOLUTION INTRAVENOUS at 22:29

## 2022-04-07 RX ADMIN — HEPARIN SODIUM 5000 UNITS: 5000 INJECTION INTRAVENOUS; SUBCUTANEOUS at 21:06

## 2022-04-07 RX ADMIN — LISINOPRIL 40 MG: 20 TABLET ORAL at 09:21

## 2022-04-07 RX ADMIN — SENNOSIDES AND DOCUSATE SODIUM 2 TABLET: 50; 8.6 TABLET ORAL at 09:19

## 2022-04-07 RX ADMIN — CLONIDINE HYDROCHLORIDE 0.1 MG: 0.1 TABLET ORAL at 00:45

## 2022-04-07 RX ADMIN — DILTIAZEM HYDROCHLORIDE 420 MG: 240 CAPSULE, COATED, EXTENDED RELEASE ORAL at 09:20

## 2022-04-07 RX ADMIN — GABAPENTIN 800 MG: 400 CAPSULE ORAL at 21:06

## 2022-04-07 RX ADMIN — ATORVASTATIN CALCIUM 10 MG: 10 TABLET, FILM COATED ORAL at 09:20

## 2022-04-07 RX ADMIN — Medication 3 ML: at 03:24

## 2022-04-07 RX ADMIN — HYDRALAZINE HYDROCHLORIDE 100 MG: 25 TABLET, FILM COATED ORAL at 00:45

## 2022-04-07 RX ADMIN — GABAPENTIN 800 MG: 400 CAPSULE ORAL at 00:46

## 2022-04-07 RX ADMIN — Medication 3 ML: at 21:12

## 2022-04-07 RX ADMIN — SENNOSIDES AND DOCUSATE SODIUM 2 TABLET: 50; 8.6 TABLET ORAL at 21:06

## 2022-04-07 RX ADMIN — FUROSEMIDE 40 MG: 10 INJECTION, SOLUTION INTRAVENOUS at 03:21

## 2022-04-07 RX ADMIN — HYDRALAZINE HYDROCHLORIDE 100 MG: 25 TABLET, FILM COATED ORAL at 09:21

## 2022-04-07 RX ADMIN — HYDRALAZINE HYDROCHLORIDE 100 MG: 25 TABLET, FILM COATED ORAL at 17:01

## 2022-04-07 RX ADMIN — FERROUS SULFATE TAB EC 324 MG (65 MG FE EQUIVALENT) 324 MG: 324 (65 FE) TABLET DELAYED RESPONSE at 09:20

## 2022-04-07 RX ADMIN — INSULIN DETEMIR 35 UNITS: 100 INJECTION, SOLUTION SUBCUTANEOUS at 21:24

## 2022-04-07 RX ADMIN — INSULIN ASPART 3 UNITS: 100 INJECTION, SOLUTION INTRAVENOUS; SUBCUTANEOUS at 11:38

## 2022-04-07 RX ADMIN — FUROSEMIDE 40 MG: 10 INJECTION, SOLUTION INTRAVENOUS at 14:57

## 2022-04-07 NOTE — PROGRESS NOTES
"Adult Nutrition  Assessment/PES    Patient Name:  Karen Newman  YOB: 1950  MRN: [id number]  Admit Date:  4/6/2022    Assessment Date:  4/7/2022    Comments:      Recommend:    1. Continue current diet as medically appropriate and tolerated.   2. Establish and encourage PO intake.   3. Consider renal MVI with minerals daily.   4. Continue to monitor and replace electrolytes PRN.    RD to follow pt and available PRN.      Reason for Assessment     Row Name 04/07/22 1449          Reason for Assessment    Reason For Assessment per organizational policy;diagnosis/disease state     Diagnosis cardiac disease;diabetes diagnosis/complications;renal disease;hematological/related complications;other (see comments)  JORDAN on CKD, iron-deficiency anemia, HTN, HLD, T2DM     Identified At Risk by Screening Criteria need for education                  Anthropometrics     Row Name 04/07/22 1456 04/07/22 0959       Anthropometrics    Height 170.2 cm (67.01\") 170.2 cm (67.01\")    Weight 85.4 kg (188 lb 4.4 oz) 85.4 kg (188 lb 4.4 oz)    Age for Calculations 71 --    Height for Calculation 1.702 m (5' 7.01\") --    Weight for Calculation 83 kg (183 lb)  est dry BW --       Ideal Body Weight (IBW)    Retired Ideal Body Weight (IBW) (kg) 61.88 61.88    Retired % Ideal Body Weight 138 138.01       Retired Body Mass Index (BMI)    Retired BMI (kg/m2) 29.54 29.54               Labs/Tests/Procedures/Meds     Row Name 04/07/22 1450          Labs/Procedures/Meds    Lab Results Reviewed reviewed, pertinent     Lab Results Comments high: glu, BUN, A1c, Cr            Medications    Pertinent Medications Reviewed reviewed, pertinent     Pertinent Medications Comments lipitor, ferrous sulfate, lasix, novolog, levemir, protonix, NaCl, pericolace                Physical Findings     Row Name 04/07/22 1450          Physical Findings    Overall Physical Appearance overweight, SOB, on O2 therapy, trace edema BLE, missing teeth           " "     Estimated/Assessed Needs - Anthropometrics     Row Name 04/07/22 1456 04/07/22 0959       Anthropometrics    Height 170.2 cm (67.01\") 170.2 cm (67.01\")    Weight 85.4 kg (188 lb 4.4 oz) 85.4 kg (188 lb 4.4 oz)    Age for Calculations 71 --    Height for Calculation 1.702 m (5' 7.01\") --    Weight for Calculation 83 kg (183 lb)  est dry BW --       Estimated/Assessed Needs    Additional Documentation Estimated Calorie Needs (Group);[place]-St. Jeor Equation (Group);Fluid Requirements (Group);KCAL/KG (Group);Protein Requirements (Group) --       Estimated Calorie Needs    Estimated Calorie Requirement (kcal/day) 2075  25 kcal/kg --    Estimated Calorie Need Method [place]-St Jeor;kcal/kg --       KCAL/KG    KCAL/KG 25 Kcal/Kg (kcal);20 Kcal/Kg (kcal);30 Kcal/Kg (kcal) --    20 Kcal/Kg (kcal) 1660.16 --    25 Kcal/Kg (kcal) 2075.2 --    30 Kcal/Kg (kcal) 2490.24 --       [place]-St. Jeor Equation    RMR ([place]-St. Jeor Equation) [phone] --    [place]-St. Jeor Activity Factors 1.4 - 1.5 --    Activity Factors ([place]-St. Jeor) [phone] - [phone] --       Protein Requirements    Weight Used For Protein Calculations 83 kg (183 lb)  est dry bw --    Est Protein Requirement Amount (gms/kg) 0.9 gm protein  75-83 --    Estimated Protein Requirements (gms/day) 74.71 --       Fluid Requirements    Fluid Requirements (mL/day) 2075 --    Estimated Fluid Requirement Method other (see comments)  1 mL/kcal --    RDA Method (mL) 2075 --               Nutrition Prescription Ordered     Row Name 04/07/22 1458          Nutrition Prescription PO    Current PO Diet Regular     Common Modifiers Cardiac;Consistent Carbohydrate;Renal                Evaluation of Received Nutrient/Fluid Intake     Row Name 04/07/22 1456          PO Evaluation    Number of Days PO Intake Evaluated Insufficient Data                     Problem/Interventions:   Problem 1     Row Name 04/07/22 1459          Nutrition Diagnoses Problem 1    " Problem 1 Altered Nutrition Related to Labs     Etiology (related to) Medical Diagnosis     Endocrine DM2     Signs/Symptoms (evidenced by) Biochemical     Labs Reviewed Done     Specific Labs Noted Glucose;HgbA1C                      Intervention Goal     Row Name 04/07/22 1459          Intervention Goal    General Maintain nutrition;Disease management/therapy;Provide information regarding MNT for treatment/condition;Reduce/improve symptoms;Improved nutrition related lab(s);Meet nutritional needs for age/condition     PO Meet estimated needs;Establish PO;Tolerate PO     Weight Maintain weight                Nutrition Intervention     Row Name 04/07/22 1459          Nutrition Intervention    RD/Tech Action Follow Tx progress;Care plan reviewd;Encourage intake;Recommend/ordered     Recommended/Ordered Snack                Nutrition Prescription     Row Name 04/07/22 1459          Nutrition Prescription PO    PO Prescription Other (comment)  Continue current diet as medically appropriate and tolerated     New PO Prescription Ordered? No, recommended            Other Orders    Obtain Weight Daily     Obtain Weight Ordered? No, recommended     Supplement Vitamin mineral supplement  renal MVI     Supplement Ordered? No, recommended     Other Continue current diet as medically appropriate and tolerated                Education/Evaluation     Row Name 04/07/22 1500          Education    Education Will Instruct as appropriate            Monitor/Evaluation    Monitor Per protocol;I&O;PO intake;Pertinent labs;Weight;Symptoms                 Electronically signed by:  Sylvia Recio RD  04/07/22 15:00 EDT

## 2022-04-07 NOTE — PROGRESS NOTES
[facility]    Name:  Karen Newman   Age:  71 y.o.  Sex:  female  :  1950  MRN:  [id number]   Visit Number:  [id number]  Admission Date:  2022  Date Of Service:  22  Primary Care Physician:  Mirian Arnold APRN     LOS: 1 day :    Chief Complaint:      Shortness of breath    Subjective:    Patient seen and evaluated today at bedside after reviewing admission orders from overnight.  Patient tells me that she usually wears 2L at night but does not wear oxygen all the time. Patient states that she has felt bad over the past week and couldn't breathe.  States she was hospitalized for the same about 3-4 years ago.  Patient is currently utilizing 4L with stable saturations.  Discussed course of care with patient and  who was at bedside.  Patient is eating and drinking well.  States that overall she is feeling better than at admission.  States that she sees Dr. Leslie in [place] for her sleep apnea but denies any diagnosis of asthma or COPD but states that she did smoke quitting about 20+ years ago.    Hospital Course:    Patient is a 71 years old female with a past medical history significant for atrial fibrillation not on anticoagulation status post ablation, hypertension, CKD, diabetes mellitus insulin-dependent, hyperlipidemia, iron deficiency anemia, acquired hypothyroidism and sleep apnea who presented to the ER 2022 with complaints of shortness of breath.  Patient reports that her symptoms of shortness of breath, chills and subjective fever started around 1 week ago and has progressively worsed.  Patient denied cough or chest pain.  No sick contacts.  Patient has a history of CKD and follows up with nephrology.  Patient also has a history of A. fib status post ablation.      Upon ER evaluation, patient was found to be hypoxic with saturations in the 70s, patient was placed on 4 L of nasal cannula to keep sats above 90%.  Patient was also  noted to be febrile at 102.6 F.  Blood pressure was elevated upon arrival with a systolic in the 180s. Labs were significant for proBNP of 2248, glucose 180, creatinine 2.19 (baseline of 1.53), BUN 24, hemoglobin 8.8, hematocrit 26.8, the rest of her labs was unremarkable including normal lactate, pro-Trace and troponin.  Respiratory panel negative. ABG with a pH of 7.454/PCO2 34/PO2 60/HCO3 24.3/saturation 92% on 2 L nasal cannula.  Chest x-ray was concerning for right perihilar pneumonia, CT chest without contrast showed findings of bilateral groundglass opacification that are compatible with viral pneumonia but pulmonary edema is possible.  Urinalysis showed protein 3+ with trace bacteria. Blood cultures obtained, patient was started on Azithromycin and Rocephin while in the ER.     Review of Systems:     All systems were reviewed and negative except as mentioned in subjective, assessment and plan.    Vital Signs:    Temp:  [97.8 °F (36.6 °C)-102.6 °F (39.2 °C)] 98.4 °F (36.9 °C)  Heart Rate:  [57-84] 70  Resp:  [16-18] 18  BP: (127-186)/(62-92) 143/62    Intake and output:    I/O last 3 completed shifts:  In: 50 [IV Piggyback:50]  Out: 1820 [Urine:1820]  I/O this shift:  In: 60 [P.O.:60]  Out: 850 [Urine:850]    Physical Examination:    General Appearance:  Alert and cooperative, pleasant elderly female, no acute distress noted, appears chronically ill   Head:  Atraumatic and normocephalic.   Eyes: Conjunctivae and sclerae normal, no icterus. No pallor.   Throat: No oral lesions, no thrush, oral mucosa moist.   Neck: Supple, trachea midline, no thyromegaly.   Lungs:   Breath sounds heard bilaterally equally but diminished throughout.  No wheezing or crackles. Currently utilizing 4L with stable saturations   Heart:  Normal S1 and S2, no murmur, no gallop, no rub. No JVD.   Abdomen:   Normal bowel sounds, no masses, no organomegaly. Soft, nontender, nondistended, no rebound tenderness.   Extremities: Supple, trace  lower extremity edema, no cyanosis, no clubbing.   Skin: No bleeding or rash.   Neurologic: Alert and oriented x 3. No facial asymmetry. Moves all four limbs.      Laboratory results:    Results from last 7 days   Lab Units 04/07/22  0526 04/06/22  1936   SODIUM mmol/L 140 136   POTASSIUM mmol/L 3.9 4.3   CHLORIDE mmol/L 104 101   CO2 mmol/L 24.4 22.0   BUN mg/dL 25* 24*   CREATININE mg/dL 1.99* 2.19*   CALCIUM mg/dL 8.7 8.6   BILIRUBIN mg/dL  --  0.6   ALK PHOS U/L  --  78   ALT (SGPT) U/L  --  24   AST (SGOT) U/L  --  17   GLUCOSE mg/dL 176* 180*     Results from last 7 days   Lab Units 04/07/22  0526 04/06/22  1936   WBC 10*3/mm3 6.45 7.18   HEMOGLOBIN g/dL 8.6* 8.8*   HEMATOCRIT % 26.2* 26.8*   PLATELETS 10*3/mm3 188 192         Results from last 7 days   Lab Units 04/06/22  1936   TROPONIN T ng/mL <0.010         Results from last 7 days   Lab Units 04/06/22  2020   PH, ARTERIAL pH units 7.454*   PO2 ART mm Hg 60.9*   PCO2, ARTERIAL mm Hg 34.6*   HCO3 ART mmol/L 24.3     I have reviewed the patient's laboratory results.    Radiology results:    CT Chest Without Contrast Diagnostic    Result Date: 4/6/2022  FINAL REPORT TECHNIQUE: Axial CT images were performed through the chest without contrast.  This study was performed with techniques to keep radiation doses as low as reasonably achievable (ALARA). Individualized dose reduction techniques using automated exposure control or adjustment of mA and/or kV according to the patient's size were employed. CLINICAL HISTORY: Fever, possible right pneumonia FINDINGS: CHEST:  Lungs/pleura: There is bilateral groundglass opacification, worse in the dependent right upper lobe and right lower lobe.  Heart, vessels and mediastinum: There is cardiomegaly.  There is a left atrial appendage clipping.  No pericardial effusion.  The aorta and pulmonary arteries are normal in caliber.  There is coronary artery disease.  Lymph nodes: No pathologically enlarged thoracic lymph nodes  within the limits of a noncontrast enhanced examination.  Chest wall: No acute findings.  Bones: No acute fracture.  Upper abdomen: No acute findings in the upper abdomen.     Impression: Findings are compatible with viral pneumonia but pulmonary edema is possible. Authenticated by Gildardo Espinosa MD on 04/06/2022 10:10:35 PM    XR Chest 1 View    Result Date: 4/7/2022  PROCEDURE: XR CHEST 1 VW-  HISTORY: SOA Triage Protocol  COMPARISON: None.  FINDINGS: The heart is normal in size. The mediastinum is unremarkable. There is interstitial pulmonary edema with small bilateral pleural effusions. There is no pneumothorax.  There are no acute osseous abnormalities.      Impression: Interstitial pulmonary edema and small effusions.  Continued followup is recommended.  This report was signed and finalized on 4/7/2022 9:57 AM by Alonso Best M.D..    I have reviewed the patient's radiology reports.    Medication Review:     I have reviewed the patient's active and prn medications.     Problem List:      Acute respiratory failure with hypoxia (HCC)      Assessment:      Acute respiratory failure with hypoxia, secondary to #2, POA  Community-acquired multifocal pneumonia, POA  Pulmonary edema, POA  JORDAN on CKD  Hx of A. fib not on anticoagulation status post ablation  Hypertension  Diabetes mellitus insulin-dependent  Iron deficiency anemia  Acquired hypothyroidism  Sleep apnea    Plan:    Acute respiratory failure with hypoxia,   Community-acquired multifocal pneumonia,   pulmonary edema  - Could be viral versus bacterial versus pulmonary edema, will continue patient on azithromycin and Rocephin for now.  -Continue titrating oxygen as tolerated to keep saturation above 90%.  Patient currently utilizing 4 L per nasal cannula.  -Will check Legionella and strep pneumonia antigens.  -DuoNebs as needed  -Received Lasix X1 with nephrology planning to diurese more today with additional 2 doses of Lasix 40mg IV  -Daily weight,  strict ins and outs, last 2D echo on file from 1/2022 with a EF of 65%.  Echo done and pending today     JORDAN on CKD  -Nephrology consulted ([facility]) and appreciate recommendations.  -Renal function improved since admission but still higher than baseline (1.5)  -Avoid nephrotoxic drugs     Hypertension  -Continue home meds     Diabetes mellitus insulin-dependent  -Sliding scale insulin in addition to Levemir 35 units nightly.  -Carbohydrate consistent diet.  -A1c-5.9 on admission     Iron deficiency anemia  -Chronic  -No obvious source of bleeding.  -Continue Ferrous Sulfate     Acquired hypothyroidism  -Continue levothyroxine     Sleep apnea   -on CPAP at night.    DVT Prophylaxis: Heparin SQ  Code Status: Full Code  Diet: Cardiac/carbohydrate consistent/renal  Discharge Plan: Home when stable    Alexand[place] Em, EUGENIE  04/07/22  12:22 EDT    Dictated utilizing Dragon dictation.

## 2022-04-07 NOTE — THERAPY EVALUATION
Pt reports she is able to walk to the bathroom, take care of her self care needs and has no need for skilled OT at this time.  If pts needs change, please reconsult therapy at that time.

## 2022-04-07 NOTE — PLAN OF CARE
Goal Outcome Evaluation:  Plan of Care Reviewed With: patient      Titrated to 3 liters pnc and is maintaining SAT.  No reports of pain.  Vitals unremarkable.  No acute events this shift.

## 2022-04-07 NOTE — PLAN OF CARE
Goal Outcome Evaluation:  Plan of Care Reviewed With: patient        Progress: improving  Outcome Evaluation: Pt admitted from the ED. VSS. Pt is maintaining on 4 liters nasal cannula.

## 2022-04-07 NOTE — ED PROVIDER NOTES
Subjective   History of Present Illness  Karen is a 71-year-old female here today for shortness of breath.  Her symptoms started yesterday.  On arrival to the emergency department she was found to have an O2 saturation in the 70s.  She was immediately placed on supplemental oxygen via nasal cannula and has improved to the mid to upper 90s.  She denies having any chronic lung disease including COPD or having a history of pneumonia.  Denies having a history of heart failure.  She reports that her chronic history includes heart disease, hypertension, thyroid disease, and diabetes.  She did have a history of atrial fibrillation but this was resolved with a cardiac ablation.  She denies having any cough or chest pain.  She does report having a fever.  No recent exposures that she is aware of and no body else at home is sick.    Review of Systems   Constitutional: Positive for fever. Negative for chills.   HENT: Negative for congestion, ear pain, postnasal drip, rhinorrhea, sinus pressure, sinus pain and sore throat.    Respiratory: Positive for cough and shortness of breath. Negative for chest tightness.    Cardiovascular: Negative for chest pain, palpitations and leg swelling.   Gastrointestinal: Negative for abdominal pain, diarrhea, nausea and vomiting.   Genitourinary: Negative for dysuria and flank pain.   Musculoskeletal: Negative for back pain.   Skin: Negative for color change.   Neurological: Negative for dizziness, light-headedness and headaches.       Past Medical History:   Diagnosis Date   • Anxiety    • Arthritis    • Atrial fibrillation (HCC)     Patient reported HX of cardiac ablation and that she had a small clot that formed in a vessel prior to this procedure   • Benign hypertension    • Body piercing     ears   • Cancer (HCC) 10/22/2018    RIGHT FOOT STILL HAS SOME SKIN CANCER IN PLACE AT THIS TIME- WILL BE GETTING REMOVED OCTOBER 22ND 2018   • CKD (chronic kidney disease)     IGA - GETS CHECKED  "YEARLY FROM NEPHRO    • Colon polyps    • Diabetes mellitus (HCC)     SINCE 2005 - CHECKS 3 TIMES PER DAY    • Disorder of nervous system     due to type 2 diabetes mellitus-Abstracted from [facility]    • Elevated cholesterol    • GERD (gastroesophageal reflux disease)    • Graves' disease    • Hemorrhoids    • History of bronchitis    • History of kidney disease     IgA nephropathy   • History of pneumonia    • History of transfusion     Reports no reaction to transfusion   • Hypertension    • Hypoglycemia due to type 2 diabetes mellitus (HCC)    • Impaired functional mobility, balance, gait, and endurance    • Iron deficiency anemia    • Long term (current) use of insulin (HCC)    • Mixed hyperlipidemia    • Obesity     body mass index 30+-obesity   • On home O2     2L AT NIGHT WITH CPAP    • Postoperative hypothyroidism    • Renal disorder     due to type 2 diabetes mellitus-Abstracted from [facility]    • Sleep apnea     CPAP HS with oxygen at 2L    • SOB (shortness of breath) on exertion    • Thyroid disease    • Type 2 diabetes mellitus, uncontrolled    • VHD (valvular heart disease)    • Wears glasses        Allergies   Allergen Reactions   • Amlodipine Shortness Of Breath     Swelling and SOB   • Buspirone Nausea And Vomiting   • Lisinopril Other (See Comments)     cough   • Sertraline Hcl Other (See Comments)     nightmares   • Sular [Nisoldipine Er] Cough   • Carvedilol Other (See Comments)     SOB       Past Surgical History:   Procedure Laterality Date   • CARDIAC ABLATION     • CATARACT EXTRACTION     • COLONOSCOPY     • COLONOSCOPY W/ POLYPECTOMY     • COLONOSCOPY W/ POLYPECTOMY     • ENDOSCOPY     • EYE CAPSULOTOMY WITH LASER Left 5/7/2019    Procedure: EYE CAPSULOTOMY WITH LASER LEFT;  Surgeon: Cecily Maria MD;  Location: [facility];  Service: Ophthalmology   • EYE SURGERY Bilateral     cataract extraction   • EYE SURGERY Right     Reported \"they did a little surgery for Graves Disease where " "they chopped the bone away\"   • EYE SURGERY Left     Reported \"they took some scar tissue\"   • HEMORRHOIDECTOMY     • OTHER SURGICAL HISTORY      PT HAD BLOOD CLOT IN HEART SURGERY AND WENT IN THROUGH NECK BUT UNSURE WHAT CALLED - possible santa?   • SKIN BIOPSY      RIGHT FOOT FOR SKIN CANCER   • THORACOSCOPY Left 2017    Procedure: THORASOSCOPY VIDEO ASSISTED WITH LEFT ATRIAL APPENDAGE ABLATION;  Surgeon: Alec Kay MD;  Location:  [facility];  Service:    • TOTAL ABDOMINAL HYSTERECTOMY     • TRANSESOPHAGEAL ECHOCARDIOGRAM (SANTA) Bilateral 2017    Procedure: TRANSESOPHAGEAL ECHOCARDIOGRAM AND BRONCHOSCOPY ;  Surgeon: Alec Kay MD;  Location:  [facility];  Service:    • TRIGGER FINGER RELEASE     • TUBAL ABDOMINAL LIGATION     • VAGINAL DELIVERY      x3   • VENA CAVA FILTER INSERTION     • WISDOM TOOTH EXTRACTION         Family History   Problem Relation Age of Onset   • Hypertension Mother    • Coronary artery disease Mother    • Kidney disease Father        Social History     Socioeconomic History   • Marital status:    • Number of children: 3   Tobacco Use   • Smoking status: Former Smoker     Packs/day: 0.50     Years: 15.00     Pack years: 7.50     Types: Cigarettes     Quit date: 10/18/2016     Years since quittin.5   • Smokeless tobacco: Never Used   Vaping Use   • Vaping Use: Never used   Substance and Sexual Activity   • Alcohol use: No   • Drug use: No   • Sexual activity: Defer           Objective   Physical Exam  Vitals and nursing note reviewed.   Constitutional:       Appearance: Normal appearance.   HENT:      Head: Normocephalic and atraumatic.      Right Ear: Tympanic membrane, ear canal and external ear normal.      Left Ear: Tympanic membrane, ear canal and external ear normal.      Nose: Nose normal.      Mouth/Throat:      Mouth: Mucous membranes are moist.      Pharynx: Oropharynx is clear.   Eyes:      Extraocular Movements: Extraocular movements intact.      " Conjunctiva/sclera: Conjunctivae normal.      Pupils: Pupils are equal, round, and reactive to light.   Neck:      Vascular: No carotid bruit.   Cardiovascular:      Rate and Rhythm: Normal rate and regular rhythm.      Pulses: Normal pulses.      Heart sounds: Normal heart sounds.   Pulmonary:      Effort: Pulmonary effort is normal.      Breath sounds: Rhonchi present.   Abdominal:      General: Abdomen is flat. Bowel sounds are normal. There is no distension.      Palpations: Abdomen is soft.      Tenderness: There is no abdominal tenderness.   Musculoskeletal:      Cervical back: Neck supple. No tenderness.      Right lower leg: No edema.      Left lower leg: No edema.   Lymphadenopathy:      Cervical: No cervical adenopathy.   Skin:     General: Skin is warm and dry.      Capillary Refill: Capillary refill takes less than 2 seconds.      Coloration: Skin is pale.   Neurological:      General: No focal deficit present.      Mental Status: She is alert and oriented to person, place, and time.   Psychiatric:         Mood and Affect: Mood normal.         Behavior: Behavior normal.         Procedures           ED Course  ED Course as of 04/21/22 2001 Wed Apr 06, 2022 2050 Dr. Donovan notified about possible admission for right-sided pneumonia.  After discussing patient, he would like a urinalysis and CT to evaluate her pneumonia. [TA]   2139 EKG interpreted by me reveals sinus rhythm rate of 77.  Nonspecific diffuse T wave change.  No ectopy no ischemic changes [PF]   2159 Preliminary report of CT chest shows bilateral groundglass opacification, worse in the dependent right upper lobe and right lower lobe per radiology report.  Their impression is findings are compatible with viral pneumonia but pulmonary edema is possible.  Dr. Donovan notified of results and is agreeable to admit. [TA]      ED Course User Index  [PF] Hamilton Alegria, DO  [TA] Babar Pierre, APRN                                                  MDM  Number of Diagnoses or Management Options  Acute respiratory failure with hypoxia (HCC)  Chronic kidney disease, unspecified CKD stage  Community acquired pneumonia, unspecified laterality  Diagnosis management comments: Karen arrived via POV in acute respiratory distress.  Initial oxygen saturation in the 70s.  She was placed on 2 L nasal cannula which improved her oxygen saturation.  Although she was sitting in bed, she reports that she still felt short of breath.  Protocol labs and x-ray ordered.  ABG, lactic acid, blood cultures, and procalcitonin ordered as well.  She was febrile of 102 degrees, treated with Tylenol.  Initiated antibiotic treatment with azithromycin and Rocephin.    Chest x-ray showed possible right perihilar pneumonia, although her lactic acid, procalcitonin, and white blood count were normal.  Dr. Donovan notified about admitting the patient.  He requested a urinalysis and a CT scan to evaluate her chest to help find source of fever.    The patient began to desaturate to the 80s and was increased to 4 L nasal cannula which improved her oxygenation to 91 to 92%.    Urinalysis normal and CT chest shows viral pneumonia but pulmonary edema is possible per radiology report.  Dr. Donovan notified of results and is agreeable to admit the patient.  Patient admitted on telemetry.       Amount and/or Complexity of Data Reviewed  Clinical lab tests: reviewed  Tests in the radiology section of CPT®: reviewed  Tests in the medicine section of CPT®: reviewed  Discuss the patient with other providers: yes  Independent visualization of images, tracings, or specimens: yes    Patient Progress  Patient progress: improved      Final diagnoses:   Acute respiratory failure with hypoxia (HCC)   Community acquired pneumonia, unspecified laterality   Chronic kidney disease, unspecified CKD stage       ED Disposition  ED Disposition     ED Disposition   Decision to Admit    Condition   --    Comment   Level  of Care: Telemetry [5]   Diagnosis: Acute respiratory failure with hypoxia (HCC) [799631]   Admitting Physician: JULIAN MENDEZ [[id number]]   Attending Physician: JULIAN MENDEZ [[id number]]   Isolate for COVID?: No [0]   Certification: I Certify That Inpatient Hospital Services Are Medically Necessary For Greater Than 2 Midnights               Mirian Arnold APRN  [address] [place] [address] [id number] [phone]    Follow up in 1 week(s)  please make appointment if able before discharge.    Cardiology/Nephrology    Follow up in 2 week(s)           Medication List      New Prescriptions    furosemide 20 MG tablet  Commonly known as: Lasix  Take 1 tablet by mouth 2 (Two) Times a Day. Take 1 tab po prn worsening SOA/edema        Changed    hydrALAZINE 100 MG tablet  Commonly known as: APRESOLINE  Take 1 tablet by mouth Every 8 (Eight) Hours.  What changed:   · how much to take  · when to take this        ASK your doctor about these medications    azithromycin 250 MG tablet  Commonly known as: Zithromax  Take 1 tablet by mouth Daily for 2 days.  Ask about: Should I take this medication?           Where to Get Your Medications      These medications were sent to [facility] [address] [place] [address] - [phone]  - [phone] [address] [address]    Phone: [phone]   · azithromycin 250 MG tablet  · furosemide 20 MG tablet          Babar Pierre APRN  04/06/22 2204       Babar Pierre APRN  04/21/22 2001

## 2022-04-07 NOTE — PLAN OF CARE
Goal Outcome Evaluation:  Plan of Care Reviewed With: patient        Progress: no change  Outcome Evaluation: Patient participates well in PT and demonstrates safe independent functional mobility with 4 LPM of oxygen via nasal cannula.  Patient reports she has been up walking in the room independently and she feels safe.  Patient does not require the skills of PT at this time; therefore, PT will sign off at this time.

## 2022-04-07 NOTE — THERAPY DISCHARGE NOTE
Patient Name: Karen Newman  : 1950    MRN: [id number]                              Today's Date: 2022       Admit Date: 2022    Visit Dx:     ICD-10-CM ICD-9-CM   1. Acute respiratory failure with hypoxia (HCC)  J96.01 518.81   2. Community acquired pneumonia, unspecified laterality  J18.9 486   3. Chronic kidney disease, unspecified CKD stage  N18.9 585.9     Patient Active Problem List   Diagnosis   • Paroxysmal atrial fibrillation (HCC)   • VHD (valvular heart disease)   • Hypertension   • Mixed hyperlipidemia   • Thyroid disease   • GERD (gastroesophageal reflux disease)   • Stage 3 chronic kidney disease (HCC)   • Hemorrhoids   • Colon polyps   • Iron deficiency anemia   • KRISTEN on CPAP   • Blood loss anemia   • Postoperative respiratory failure (HCC)   • Status post Maze operation for atrial fibrillation   • VHD (valvular heart disease)   • Acute renal failure superimposed on stage 3 chronic kidney disease (HCC)   • Type 2 diabetes mellitus with chronic kidney disease, with long-term current use of insulin (HCC)   • Iron deficiency anemia, unspecified   • Chronic kidney disease, stage III (moderate) (HCC)   • Uncontrolled type 2 diabetes mellitus with hyperglycemia (HCC)   • Type 2 diabetes mellitus with hypoglycemia without coma (HCC)   • Diabetic neuropathy with neurologic complication (HCC)   • Postablative hypothyroidism   • History of Graves' disease   • Insulin long-term use (HCC)   • Acute respiratory failure with hypoxia (HCC)     Past Medical History:   Diagnosis Date   • Anxiety    • Arthritis    • Atrial fibrillation (HCC)     Patient reported HX of cardiac ablation and that she had a small clot that formed in a vessel prior to this procedure   • Benign hypertension    • Body piercing     ears   • Cancer (HCC) 10/22/2018    RIGHT FOOT STILL HAS SOME SKIN CANCER IN PLACE AT THIS TIME- WILL BE GETTING REMOVED    • CKD (chronic kidney disease)     IGA - GETS CHECKED  "YEARLY FROM NEPHRO    • Colon polyps    • Diabetes mellitus (HCC)     SINCE 2005 - CHECKS 3 TIMES PER DAY    • Disorder of nervous system     due to type 2 diabetes mellitus-Abstracted from [facility]    • Elevated cholesterol    • GERD (gastroesophageal reflux disease)    • Graves' disease    • Hemorrhoids    • History of bronchitis    • History of kidney disease     IgA nephropathy   • History of pneumonia    • History of transfusion     Reports no reaction to transfusion   • Hypertension    • Hypoglycemia due to type 2 diabetes mellitus (HCC)    • Impaired functional mobility, balance, gait, and endurance    • Iron deficiency anemia    • Long term (current) use of insulin (HCC)    • Mixed hyperlipidemia    • Obesity     body mass index 30+-obesity   • On home O2     2L AT NIGHT WITH CPAP    • Postoperative hypothyroidism    • Renal disorder     due to type 2 diabetes mellitus-Abstracted from [facility]    • Sleep apnea     CPAP HS with oxygen at 2L    • SOB (shortness of breath) on exertion    • Thyroid disease    • Type 2 diabetes mellitus, uncontrolled    • VHD (valvular heart disease)    • Wears glasses      Past Surgical History:   Procedure Laterality Date   • CARDIAC ABLATION     • CATARACT EXTRACTION     • COLONOSCOPY     • COLONOSCOPY W/ POLYPECTOMY     • COLONOSCOPY W/ POLYPECTOMY     • ENDOSCOPY     • EYE CAPSULOTOMY WITH LASER Left 5/7/2019    Procedure: EYE CAPSULOTOMY WITH LASER LEFT;  Surgeon: Cecily Maria MD;  Location: [facility];  Service: Ophthalmology   • EYE SURGERY Bilateral     cataract extraction   • EYE SURGERY Right     Reported \"they did a little surgery for Graves Disease where they chopped the bone away\"   • EYE SURGERY Left     Reported \"they took some scar tissue\"   • HEMORRHOIDECTOMY     • OTHER SURGICAL HISTORY      PT HAD BLOOD CLOT IN HEART SURGERY AND WENT IN THROUGH NECK BUT UNSURE WHAT CALLED - possible santa?   • SKIN BIOPSY      RIGHT FOOT FOR SKIN CANCER   • THORACOSCOPY " Left 9/25/2017    Procedure: THORASOSCOPY VIDEO ASSISTED WITH LEFT ATRIAL APPENDAGE ABLATION;  Surgeon: Alec Kay MD;  Location:  [facility];  Service:    • TOTAL ABDOMINAL HYSTERECTOMY     • TRANSESOPHAGEAL ECHOCARDIOGRAM (ALANIS) Bilateral 7/31/2017    Procedure: TRANSESOPHAGEAL ECHOCARDIOGRAM AND BRONCHOSCOPY ;  Surgeon: Alec Kay MD;  Location:  [facility];  Service:    • TRIGGER FINGER RELEASE     • TUBAL ABDOMINAL LIGATION     • VAGINAL DELIVERY      x3   • VENA CAVA FILTER INSERTION     • WISDOM TOOTH EXTRACTION        General Information     Row Name 04/07/22 1327          Physical Therapy Time and Intention    Document Type discharge evaluation/summary  -     Mode of Treatment physical therapy  -     Row Name 04/07/22 1327          General Information    Patient Profile Reviewed yes  -JR     Prior Level of Function independent:;all household mobility;community mobility  -     Barriers to Rehab none identified  -     Row Name 04/07/22 1327          Living Environment    People in Home spouse  -     Row Name 04/07/22 1327          Home Main Entrance    Number of Stairs, Main Entrance none  -     Row Name 04/07/22 1327          Stairs Within Home, Primary    Stairs, Within Home, Primary laundry is in basement  -     Number of Stairs, Within Home, Primary twelve  to basement  -     Row Name 04/07/22 1327          Cognition    Orientation Status (Cognition) oriented x 4  -           User Key  (r) = Recorded By, (t) = Taken By, (c) = Cosigned By    Initials Name Provider Type    JR Gaye Riley, PT Physical Therapist               Mobility     Row Name 04/07/22 1327          Bed Mobility    Bed Mobility bed mobility (all) activities  -     All Activities, [place] (Bed Mobility) modified independence  -     Assistive Device (Bed Mobility) head of bed elevated  -     Row Name 04/07/22 1327          Sit-Stand Transfer    Sit-Stand [place] (Transfers) supervision  -      Assistive Device (Sit-Stand Transfers) --  gait belt  -JR     [facility] Name 04/07/22 1327          Gait/Stairs (Locomotion)    [place] Level (Gait) supervision  -JR     Assistive Device (Gait) --  gait belt  -JR     Distance in Feet (Gait) 40  -JR           User Key  (r) = Recorded By, (t) = Taken By, (c) = Cosigned By    Initials Name Provider Type    Gaye Tobias PT Physical Therapist               Obj/Interventions     [facility] Name 04/07/22 1327          Range of Motion Comprehensive    General Range of Motion no range of motion deficits identified  -JR     Row Name 04/07/22 1327          Strength Comprehensive (MMT)    General Manual Muscle Testing (MMT) Assessment no strength deficits identified  -[facility] Name 04/07/22 [facility]          Balance    Balance Assessment sitting static balance;sitting dynamic balance;sit to stand dynamic balance;standing static balance;standing dynamic balance  -JR     Static Sitting Balance independent  -JR     Dynamic Sitting Balance independent  -JR     Position, Sitting Balance unsupported;sitting edge of bed  -JR     Sit to Stand Dynamic Balance supervision  -JR     Static Standing Balance supervision  -JR     Dynamic Standing Balance supervision  -JR     Position/Device Used, Standing Balance --  gait belt  -JR           User Key  (r) = Recorded By, (t) = Taken By, (c) = Cosigned By    Initials Name Provider Type    Gaye Tobias, PT Physical Therapist               Goals/Plan    No documentation.                Clinical Impression     Row Name 04/07/22 1327          Pain    Pretreatment Pain Rating 0/10 - no pain  -     Posttreatment Pain Rating 0/10 - no pain  -JR     Row Name 04/07/22 [facility]7          Plan of Care Review    Plan of Care Reviewed With patient  -JR     Progress no change  -     Outcome Evaluation Patient participates well in PT and demonstrates safe independent functional mobility with 4 LPM of oxygen via nasal cannula.  Patient reports she has been up  walking in the room independently and she feels safe.  Patient does not require the skills of PT at this time; therefore, PT will sign off at this time.  -     Row Name 04/07/22 1327          Therapy Assessment/Plan (PT)    Patient/Family Therapy Goals Statement (PT) Patient is eager to go home  -     Criteria for Skilled Interventions Met (PT) no;no problems identified which require skilled intervention  -     Row Name 04/07/22 1327          Positioning and Restraints    Pre-Treatment Position in bed  -JR     Post Treatment Position bed  -JR     In Bed fowlers;call light within reach;encouraged to call for assist;with family/caregiver  -           User Key  (r) = Recorded By, (t) = Taken By, (c) = Cosigned By    Initials Name Provider Type    Gaye Tobias PT Physical Therapist               Outcome Measures     Row Name 04/07/22 1327          How much help from another person do you currently need...    Turning from your back to your side while in flat bed without using bedrails? 4  -JR     Moving from lying on back to sitting on the side of a flat bed without bedrails? 4  -JR     Moving to and from a bed to a chair (including a wheelchair)? 4  -JR     Standing up from a chair using your arms (e.g., wheelchair, bedside chair)? 4  -JR     Climbing 3-5 steps with a railing? 4  -JR     To walk in hospital room? 3  -JR     AM-PAC 6 Clicks Score (PT) 23  -     Row Name 04/07/22 1327          Functional Assessment    Outcome Measure Options AM-PAC 6 Clicks Basic Mobility (PT)  -           User Key  (r) = Recorded By, (t) = Taken By, (c) = Cosigned By    Initials Name Provider Type    Gaye Tobias, REGLA Physical Therapist              Physical Therapy Education                 Title: PT OT SLP Therapies (Resolved)     Topic: Physical Therapy (Resolved)     Point: Mobility training (Resolved)     Learning Progress Summary           Patient Acceptance, E,TB, VU by  at 4/7/2022 6515    Comment: Importance  of mobility to recovery                               User Key     Initials Effective Dates Name Provider Type Discipline     03/23/22 -  Gaye Riley, PT Physical Therapist PT              PT Recommendation and Plan     Plan of Care Reviewed With: patient  Progress: no change  Outcome Evaluation: Patient participates well in PT and demonstrates safe independent functional mobility with 4 LPM of oxygen via nasal cannula.  Patient reports she has been up walking in the room independently and she feels safe.  Patient does not require the skills of PT at this time; therefore, PT will sign off at this time.     Time Calculation:    PT Charges     Row Name 04/07/22 1751             Time Calculation    Start Time 1327  -JR      PT Received On 04/07/22  -JR              Untimed Charges    PT Eval/Re-eval Minutes 30  -JR              Total Minutes    Untimed Charges Total Minutes 30  -JR       Total Minutes 30  -JR            User Key  (r) = Recorded By, (t) = Taken By, (c) = Cosigned By    Initials Name Provider Type     Gaye Riley, PT Physical Therapist              Therapy Charges for Today     Code Description Service Date Service Provider Modifiers Qty    [id number] HC PT EVAL LOW COMPLEXITY 2 4/7/2022 Gaye Riley, PT GP 1          PT G-Codes  Outcome Measure Options: AM-PAC 6 Clicks Basic Mobility (PT)  AM-PAC 6 Clicks Score (PT): 23    PT Discharge Summary  Anticipated Discharge Disposition (PT): home    Gaye Riley PT  4/7/2022

## 2022-04-07 NOTE — H&P
[facility]   HISTORY AND PHYSICAL      Name:  Karen Newman   Age:  71 y.o.  Sex:  female  :  1950  MRN:  [id number]   Visit Number:  [id number]  Admission Date:  2022  Date Of Service:  22  Primary Care Physician:  Mirian Arnold APRN    Chief Complaint:     Shortness of breath    History Of Presenting Illness:      Patient is a 71 years old female with a past medical history of A. fib not on anticoagulation status post ablation, hypertension, CKD, diabetes mellitus insulin-dependent, hyperlipidemia, iron deficiency anemia, acquired hypothyroidism and sleep apnea who presented to the ER with a chief complaint of shortness of breath.  Patient reports that her symptoms of shortness of breath, chills and subjective fever started around 1 week ago and has progressively gotten worse.  Patient denies cough or chest pain.  Patient denies any sick contacts that she is aware of.  Patient has a history of CKD and follows up with nephrology.  Patient also has a history of A. fib status post ablation.  Patient denies any nausea, vomiting, diarrhea or abdominal pain.    Upon ER evaluation, patient was found to be hypoxic with saturation down in 70s percent, patient was placed on 4 L of nasal cannula to keep sats above 90%.  Patient was also febrile with a temperature of 102.6 F.  Blood pressure was elevated upon arrival with a systolic in the 180s. Labs were significant for proBNP of 2248, glucose 180, creatinine 2.19 (baseline of 1.53), BUN 24, hemoglobin 8.8, hematocrit 26.8, the rest of her labs was unremarkable including normal lactate, pro-Trace and troponin.  Respiratory panel negative.  ABG with a pH of 7.454/PCO2 34/PO2 60/HCO3 24.3/saturation 92% on 2 L nasal cannula.  Chest x-ray was concerning for right perihilar pneumonia, CT chest without contrast showed Findings of bilateral groundglass opacification that are compatible with viral pneumonia but pulmonary edema is  possible.  Urinalysis showed protein 3+ with trace bacteria.  Blood cultures obtained, patient received azithromycin and Rocephin while in the ER. Hospitalist consulted for admission, further evaluation treatment.    Review Of Systems:    All systems were reviewed and negative except as mentioned in history of presenting illness, assessment and plan.    Past Medical History: Patient  has a past medical history of Anxiety, Arthritis, Atrial fibrillation (HCC), Benign hypertension, Body piercing, Cancer (HCC) (10/22/2018), CKD (chronic kidney disease), Colon polyps, Diabetes mellitus (HCC), Disorder of nervous system, Elevated cholesterol, GERD (gastroesophageal reflux disease), Graves' disease, Hemorrhoids, History of bronchitis, History of kidney disease, History of pneumonia, History of transfusion, Hypertension, Hypoglycemia due to type 2 diabetes mellitus (HCC), Iron deficiency anemia, Long term (current) use of insulin (HCC), Mixed hyperlipidemia, Obesity, On home O2, Postoperative hypothyroidism, Renal disorder, Sleep apnea, SOB (shortness of breath) on exertion, Thyroid disease, Type 2 diabetes mellitus, uncontrolled, VHD (valvular heart disease), and Wears glasses.    Past Surgical History: Patient  has a past surgical history that includes Colonoscopy w/ polypectomy; transesophageal echocardiogram (santa) (Bilateral, 7/31/2017); Other surgical history; Total abdominal hysterectomy; Skin biopsy; [place] tooth extraction; Colonoscopy w/ polypectomy; Esophagogastroduodenoscopy; Thoracoscopy (Left, 9/25/2017); Colonoscopy; Cardiac Ablation; Vaginal delivery; Tubal ligation; Eye surgery (Bilateral); Eye surgery (Right); Eye surgery (Left); Eye capsulotomy (Left, 5/7/2019); Trigger finger release; Vena Cava Filter Insertion; Hemorrhoid surgery; and Cataract Extraction.    Social History: Patient  reports that she quit smoking about 5 years ago. Her smoking use included cigarettes. She has a 7.50 pack-year smoking  history. She has never used smokeless tobacco. She reports that she does not drink alcohol and does not use drugs.    Family History: Patient's family history includes Coronary artery disease in her mother; Hypertension in her mother; Kidney disease in her father.    Allergies:      Amlodipine, Buspirone, Lisinopril, Sertraline hcl, Sular [nisoldipine er], and Carvedilol    Home Medications:    Prior to Admission Medications     Prescriptions Last Dose Informant Patient Reported? Taking?    atorvastatin (LIPITOR) 10 MG tablet   No Yes    TAKE 1 TABLET BY MOUTH  DAILY    cloNIDine (Catapres) 0.1 MG tablet   No Yes    Take 1 tablet by mouth Every 12 (Twelve) Hours.    diltiaZEM (TIAZAC) 420 MG 24 hr capsule  Self Yes Yes    Take 420 mg by mouth Daily.    doxazosin (CARDURA) 8 MG tablet   No Yes    Take 1 tablet by mouth Every 12 (Twelve) Hours.    ferrous sulfate 324 (65 Fe) MG tablet delayed-release EC tablet   Yes Yes    Take 324 mg by mouth Daily With Breakfast.    gabapentin (NEURONTIN) 800 MG tablet   No Yes    TAKE 1 TABLET BY MOUTH  TWICE DAILY    hydrALAZINE (APRESOLINE) 100 MG tablet   No Yes    Take 1 tablet by mouth Every 8 (Eight) Hours.    insulin aspart (NovoLOG FlexPen) 100 UNIT/ML solution pen-injector sc pen   Yes Yes    Inject 16-20 Units under the skin into the appropriate area as directed.    Levemir FlexTouch 100 UNIT/ML injection   No Yes    INJECT SUBCUTANEOUSLY 35  UNITS  AS DIRECTED DAILY    levothyroxine (SYNTHROID, LEVOTHROID) 150 MCG tablet   No Yes    Take 1 tablet by mouth Daily.    lisinopril (PRINIVIL,ZESTRIL) 40 MG tablet   No Yes    Take 1 tablet by mouth Daily.    omega-3 acid ethyl esters (LOVAZA) 1 g capsule   Yes Yes    Take 1 g by mouth Daily.    omeprazole (priLOSEC) 40 MG capsule   Yes Yes    Take 40 mg by mouth Daily.    PARoxetine (PAXIL) 40 MG tablet  Self Yes Yes    Take 40 mg by mouth Daily.    Cholecalciferol (Vitamin D3) 1.25 MG (96978 UT) capsule   Yes No    Take 50,000  "Units by mouth Every 7 (Seven) Days.    OneTouch Verio test strip   No No    Use 4x per day:  ICD-10 E11.65, E11.649, Z79.4    triamcinolone (KENALOG) 0.1 % cream   Yes No    As Needed.        ED Medications:    Medications   sodium chloride 0.9 % flush 10 mL (has no administration in time range)   acetaminophen (TYLENOL) tablet 1,000 mg (1,000 mg Oral Given 4/6/22 1956)   azithromycin (ZITHROMAX) 500 mg 0.9% NaCl (Add-vantage) 250 mL (500 mg Intravenous New Bag 4/6/22 2122)   cefTRIAXone (ROCEPHIN) IVPB 1 g/50ml dextrose (premix) (0 g Intravenous Stopped 4/6/22 2228)     Vital Signs:  Temp:  [97.8 °F (36.6 °C)-102.6 °F (39.2 °C)] 97.8 °F (36.6 °C)  Heart Rate:  [57-84] 57  Resp:  [16] 16  BP: (133-186)/(66-92) 136/69        04/06/22  1935 04/06/22 2315   Weight: 83 kg (183 lb) 85.4 kg (188 lb 4.4 oz)     Body mass index is 29.48 kg/m².    Physical Exam:     Most recent vital Signs: /69 (BP Location: Right arm, Patient Position: Lying)   Pulse 57   Temp 97.8 °F (36.6 °C) (Oral)   Resp 16   Ht 170.2 cm (67.01\")   Wt 85.4 kg (188 lb 4.4 oz)   LMP  (LMP Unknown)   SpO2 94%   BMI 29.48 kg/m²     Physical Exam  Vitals and nursing note reviewed.   Constitutional:       General: She is not in acute distress.     Comments: Chronically ill-appearing.   HENT:      Head: Normocephalic and atraumatic.      Right Ear: External ear normal.      Left Ear: External ear normal.      Nose: Nose normal.      Mouth/Throat:      Mouth: Mucous membranes are moist.   Eyes:      Extraocular Movements: Extraocular movements intact.      Conjunctiva/sclera: Conjunctivae normal.      Pupils: Pupils are equal, round, and reactive to light.   Cardiovascular:      Rate and Rhythm: Normal rate and regular rhythm.      Pulses: Normal pulses.      Heart sounds: Normal heart sounds.   Pulmonary:      Effort: Pulmonary effort is normal. No respiratory distress.      Breath sounds: No wheezing.      Comments: Bibasilar crackles heard.  " Decreased air movement bilaterally.  Abdominal:      General: Bowel sounds are normal. There is no distension.      Palpations: Abdomen is soft.      Tenderness: There is no abdominal tenderness.   Musculoskeletal:         General: Normal range of motion.      Cervical back: Normal range of motion and neck supple.      Right lower leg: Edema present.      Left lower leg: Edema present.      Comments: +1 bilateral lower extremities edema   Skin:     General: Skin is warm and dry.      Findings: No rash.   Neurological:      General: No focal deficit present.      Mental Status: She is alert and oriented to person, place, and time. Mental status is at baseline.      Motor: No weakness.   Psychiatric:         Mood and Affect: Mood normal.         Behavior: Behavior normal.         Thought Content: Thought content normal.         Laboratory data:    I have reviewed the labs done in the emergency room.    Results from last 7 days   Lab Units 04/06/22  1936   SODIUM mmol/L 136   POTASSIUM mmol/L 4.3   CHLORIDE mmol/L 101   CO2 mmol/L 22.0   BUN mg/dL 24*   CREATININE mg/dL 2.19*   CALCIUM mg/dL 8.6   BILIRUBIN mg/dL 0.6   ALK PHOS U/L 78   ALT (SGPT) U/L 24   AST (SGOT) U/L 17   GLUCOSE mg/dL 180*     Results from last 7 days   Lab Units 04/06/22  1936   WBC 10*3/mm3 7.18   HEMOGLOBIN g/dL 8.8*   HEMATOCRIT % 26.8*   PLATELETS 10*3/mm3 192         Results from last 7 days   Lab Units 04/06/22  1936   TROPONIN T ng/mL <0.010     Results from last 7 days   Lab Units 04/06/22  1936   PROBNP pg/mL 2,248.0*             Results from last 7 days   Lab Units 04/06/22  2020   PH, ARTERIAL pH units 7.454*   PO2 ART mm Hg 60.9*   PCO2, ARTERIAL mm Hg 34.6*   HCO3 ART mmol/L 24.3     Results from last 7 days   Lab Units 04/06/22 2125   COLOR UA  Yellow   GLUCOSE UA  Negative   KETONES UA  Negative   LEUKOCYTES UA  Negative   PH, URINE  7.0   BILIRUBIN UA  Negative   UROBILINOGEN UA  1.0 E.U./dL   RBC UA /HPF None Seen   WBC UA /HPF  0-2*       Pain Management Panel     Pain Management Panel Latest Ref Rng & Units 3/24/2022 2/25/2022    CREATININE UR mg/dL 29.4 219.9          EKG:      Sinus rhythm, heart rate 77, first-degree AV block, nonspecific ST/T wave changes.    Radiology:    CT Chest Without Contrast Diagnostic    Result Date: 4/6/2022  FINAL REPORT TECHNIQUE: Axial CT images were performed through the chest without contrast.  This study was performed with techniques to keep radiation doses as low as reasonably achievable (ALARA). Individualized dose reduction techniques using automated exposure control or adjustment of mA and/or kV according to the patient's size were employed. CLINICAL HISTORY: Fever, possible right pneumonia FINDINGS: CHEST:  Lungs/pleura: There is bilateral groundglass opacification, worse in the dependent right upper lobe and right lower lobe.  Heart, vessels and mediastinum: There is cardiomegaly.  There is a left atrial appendage clipping.  No pericardial effusion.  The aorta and pulmonary arteries are normal in caliber.  There is coronary artery disease.  Lymph nodes: No pathologically enlarged thoracic lymph nodes within the limits of a noncontrast enhanced examination.  Chest wall: No acute findings.  Bones: No acute fracture.  Upper abdomen: No acute findings in the upper abdomen.     Findings are compatible with viral pneumonia but pulmonary edema is possible. Authenticated by Gildardo Espinosa MD on 04/06/2022 10:10:35 PM      Assessment:    Acute respiratory failure with hypoxia, secondary to #2, POA  Community-acquired multifocal pneumonia, POA  Pulmonary edema, POA  JORDAN on CKD  Hx of A. fib not on anticoagulation status post ablation  Hypertension  Diabetes mellitus insulin-dependent  Iron deficiency anemia  Acquired hypothyroidism  Sleep apnea     Plan:    Patient admitted to telemetry for further evaluation treatment.    Acute respiratory failure with hypoxia,   Community-acquired multifocal pneumonia,    pulmonary edema  - Could be viral versus bacterial versus pulmonary edema, will continue patient on azithromycin and Rocephin for now.  -Continue titrating oxygen to keep saturation above 90%.  Patient currently requiring 4 L per nasal cannula.  -Will check Legionella and strep pneumonia antigens.  -DuoNebs as needed  -Will give a dose of Lasix.  -Daily weight, strict ins and outs, last 2D echo on file from 1/2022 with a EF of 65%.  Will order 2D echo.    JORDAN on CKD  -consulted nephrology and appreciate recommendations.  -Avoid nephrotoxic drugs    Hypertension  -Continue home meds    Diabetes mellitus insulin-dependent  -Sliding scale insulin in addition to Levemir 35 units nightly.  -Carbohydrate consistent diet.  -We will check hemoglobin A1c.    Iron deficiency anemia  -Chronic  -No obvious source of bleeding.  -Continue ferrous sulfate    Acquired hypothyroidism  -Continue levothyroxine    Sleep apnea   -on CPAP at night.      Risk Assessment: High  DVT Prophylaxis: Heparin prophylaxis  Code Status: Full  Diet: Cardiac/carbohydrate consistent/renal    Advance Care Planning   ACP discussion was held with the patient during this visit. Patient does not have an advance directive, information provided.           Byron Donovan MD  04/06/22  23:19 EDT    Dictated utilizing Dragon dictation.

## 2022-04-07 NOTE — CONSULTS
[facility]      Nephrology Consultation      Referring Provider:   No ref. provider found    Reason for Consultation:  Acute Kidney Injury and associated problems.  CKD stage 3 b      Subjective:  Chief complaint   Chief Complaint   Patient presents with   • Shortness of Breath     History of present illness:    Patient is 71-year-old female with multiple medical problems including chronic kidney disease stage III and associated problems including anemia of chronic kidney disease which is being treated by Dr. Hastings of [facility].  She has history of chronic A. fib not on anticoagulation status post ablation, hypertension, diabetes mellitus requiring insulin, hyperlipidemia, history of iron deficiency has received IV iron in the past.  Hypothyroidism and sleep apnea.  She presented to the ER with about a week of gradually worsening shortness of breath and occasional episodes of fever and chills.  She denies having any nausea vomiting abdominal pain or diarrhea.  She denies any chest pain.  In the ER she was noted to be hypoxic and temperature of 102.6 with worsening renal function baseline creatinine of 1.53 and on admission was 2.19, she also mentioned that recently she had been told that she will get Procrit injections at the outpatient infusion center.  I have reviewed labs/imaging/records from this hospitalization, including ER staff and admitting/attending physicians H/P's and progress notes to establish a comprehensive understanding of this patient's clinical hospital course, as well as to establish plan of care appropriately.   Past Medical History:   Diagnosis Date   • Anxiety    • Arthritis    • Atrial fibrillation (HCC)     Patient reported HX of cardiac ablation and that she had a small clot that formed in a vessel prior to this procedure   • Benign hypertension    • Body piercing     ears   • Cancer (HCC) 10/22/2018    RIGHT FOOT STILL HAS SOME SKIN CANCER  "IN PLACE AT THIS TIME- WILL BE GETTING REMOVED OCTOBER 22ND 2018   • CKD (chronic kidney disease)     IGA - GETS CHECKED YEARLY FROM [facility]    • Colon polyps    • Diabetes mellitus (HCC)     SINCE 2005 - CHECKS 3 TIMES PER DAY    • Disorder of nervous system     due to type 2 diabetes mellitus-Abstracted from [facility]    • Elevated cholesterol    • GERD (gastroesophageal reflux disease)    • Graves' disease    • Hemorrhoids    • History of bronchitis    • History of kidney disease     IgA nephropathy   • History of pneumonia    • History of transfusion     Reports no reaction to transfusion   • Hypertension    • Hypoglycemia due to type 2 diabetes mellitus (HCC)    • Iron deficiency anemia    • Long term (current) use of insulin (HCC)    • Mixed hyperlipidemia    • Obesity     body mass index 30+-obesity   • On home O2     2L AT NIGHT WITH CPAP    • Postoperative hypothyroidism    • Renal disorder     due to type 2 diabetes mellitus-Abstracted from [facility]    • Sleep apnea     CPAP HS with oxygen at 2L    • SOB (shortness of breath) on exertion    • Thyroid disease    • Type 2 diabetes mellitus, uncontrolled    • VHD (valvular heart disease)    • Wears glasses        Past Surgical History:   Procedure Laterality Date   • CARDIAC ABLATION     • CATARACT EXTRACTION     • COLONOSCOPY     • COLONOSCOPY W/ POLYPECTOMY     • COLONOSCOPY W/ POLYPECTOMY     • ENDOSCOPY     • EYE CAPSULOTOMY WITH LASER Left 5/7/2019    Procedure: EYE CAPSULOTOMY WITH LASER LEFT;  Surgeon: Cecily Maria MD;  Location: [facility];  Service: Ophthalmology   • EYE SURGERY Bilateral     cataract extraction   • EYE SURGERY Right     Reported \"they did a little surgery for Graves Disease where they chopped the bone away\"   • EYE SURGERY Left     Reported \"they took some scar tissue\"   • HEMORRHOIDECTOMY     • OTHER SURGICAL HISTORY      PT HAD BLOOD CLOT IN HEART SURGERY AND WENT IN THROUGH NECK BUT UNSURE WHAT CALLED - possible santa?   • " SKIN BIOPSY      RIGHT FOOT FOR SKIN CANCER   • THORACOSCOPY Left 2017    Procedure: THORASOSCOPY VIDEO ASSISTED WITH LEFT ATRIAL APPENDAGE ABLATION;  Surgeon: Alec Kay MD;  Location:  [facility];  Service:    • TOTAL ABDOMINAL HYSTERECTOMY     • TRANSESOPHAGEAL ECHOCARDIOGRAM (ALANIS) Bilateral 2017    Procedure: TRANSESOPHAGEAL ECHOCARDIOGRAM AND BRONCHOSCOPY ;  Surgeon: Alec Kay MD;  Location:  [facility];  Service:    • TRIGGER FINGER RELEASE     • TUBAL ABDOMINAL LIGATION     • VAGINAL DELIVERY      x3   • VENA CAVA FILTER INSERTION     • WISDOM TOOTH EXTRACTION       Family History   Problem Relation Age of Onset   • Hypertension Mother    • Coronary artery disease Mother    • Kidney disease Father      negative h/o ESRD     Social History     Tobacco Use   • Smoking status: Former Smoker     Packs/day: 0.50     Years: 15.00     Pack years: 7.50     Types: Cigarettes     Quit date: 10/18/2016     Years since quittin.4   • Smokeless tobacco: Never Used   Vaping Use   • Vaping Use: Never used   Substance Use Topics   • Alcohol use: No   • Drug use: No     Home medications:   Prior to Admission Medications     Prescriptions Last Dose Informant Patient Reported? Taking?    atorvastatin (LIPITOR) 10 MG tablet   No Yes    TAKE 1 TABLET BY MOUTH  DAILY    cloNIDine (Catapres) 0.1 MG tablet   No Yes    Take 1 tablet by mouth Every 12 (Twelve) Hours.    diltiaZEM (TIAZAC) 420 MG 24 hr capsule  Self Yes Yes    Take 420 mg by mouth Daily.    doxazosin (CARDURA) 8 MG tablet   No Yes    Take 1 tablet by mouth Every 12 (Twelve) Hours.    ferrous sulfate 324 (65 Fe) MG tablet delayed-release EC tablet   Yes Yes    Take 324 mg by mouth Daily With Breakfast.    gabapentin (NEURONTIN) 800 MG tablet   No Yes    TAKE 1 TABLET BY MOUTH  TWICE DAILY    hydrALAZINE (APRESOLINE) 100 MG tablet   No Yes    Take 1 tablet by mouth Every 8 (Eight) Hours.    insulin aspart (NovoLOG FlexPen) 100 UNIT/ML solution  pen-injector sc pen   Yes Yes    Inject 16-20 Units under the skin into the appropriate area as directed.    Levemir FlexTouch 100 UNIT/ML injection   No Yes    INJECT SUBCUTANEOUSLY 35  UNITS  AS DIRECTED DAILY    levothyroxine (SYNTHROID, LEVOTHROID) 150 MCG tablet   No Yes    Take 1 tablet by mouth Daily.    lisinopril (PRINIVIL,ZESTRIL) 40 MG tablet   No Yes    Take 1 tablet by mouth Daily.    omega-3 acid ethyl esters (LOVAZA) 1 g capsule   Yes Yes    Take 1 g by mouth Daily.    omeprazole (priLOSEC) 40 MG capsule   Yes Yes    Take 40 mg by mouth Daily.    PARoxetine (PAXIL) 40 MG tablet  Self Yes Yes    Take 40 mg by mouth Daily.    Cholecalciferol (Vitamin D3) 1.25 MG (79390 UT) capsule   Yes No    Take 50,000 Units by mouth Every 7 (Seven) Days.    OneTouch Verio test strip   No No    Use 4x per day:  ICD-10 E11.65, E11.649, Z79.4    triamcinolone (KENALOG) 0.1 % cream   Yes No    As Needed.        Emergency department medications:   Medications   sodium chloride 0.9 % flush 10 mL (has no administration in time range)   atorvastatin (LIPITOR) tablet 10 mg (has no administration in time range)   cloNIDine (CATAPRES) tablet 0.1 mg (0.1 mg Oral Given 4/7/22 0045)   dilTIAZem CD (CARDIZEM CD) 24 hr capsule 420 mg (has no administration in time range)   terazosin (HYTRIN) capsule 5 mg (5 mg Oral Given 4/7/22 0046)   ferrous sulfate EC tablet 324 mg (has no administration in time range)   gabapentin (NEURONTIN) capsule 800 mg (800 mg Oral Given 4/7/22 0046)   hydrALAZINE (APRESOLINE) tablet 100 mg (100 mg Oral Given 4/7/22 0045)   levothyroxine (SYNTHROID, LEVOTHROID) tablet 150 mcg (has no administration in time range)   lisinopril (PRINIVIL,ZESTRIL) tablet 40 mg (has no administration in time range)   pantoprazole (PROTONIX) EC tablet 40 mg (40 mg Oral Given 4/7/22 0634)   PARoxetine (PAXIL) tablet 40 mg (has no administration in time range)   azithromycin (ZITHROMAX) 500 mg 0.9% NaCl (Add-vantage) 250 mL (has  no administration in time range)   cefTRIAXone (ROCEPHIN) IVPB 1 g/50ml dextrose (premix) (has no administration in time range)   sodium chloride 0.9 % flush 3 mL (3 mL Intravenous Given 4/7/22 0324)   sodium chloride 0.9 % flush 3-10 mL (has no administration in time range)   acetaminophen (TYLENOL) tablet 650 mg (has no administration in time range)     Or   acetaminophen (TYLENOL) 160 MG/5ML solution 650 mg (has no administration in time range)     Or   acetaminophen (TYLENOL) suppository 650 mg (has no administration in time range)   ondansetron (ZOFRAN) injection 4 mg (has no administration in time range)   heparin (porcine) 5000 UNIT/ML injection 5,000 Units (5,000 Units Subcutaneous Given 4/7/22 0321)   sennosides-docusate (PERICOLACE) 8.6-50 MG per tablet 2 tablet (has no administration in time range)     And   polyethylene glycol (MIRALAX) packet 17 g (has no administration in time range)     And   bisacodyl (DULCOLAX) EC tablet 5 mg (has no administration in time range)     And   bisacodyl (DULCOLAX) suppository 10 mg (has no administration in time range)   melatonin tablet 5 mg (has no administration in time range)   ipratropium-albuterol (DUO-NEB) nebulizer solution 3 mL (has no administration in time range)   dextrose (GLUTOSE) oral gel 1 tube (has no administration in time range)   dextrose (D50W) (25 g/50 mL) IV injection 25 g (has no administration in time range)   glucagon (human recombinant) (GLUCAGEN DIAGNOSTIC) injection 1 mg (has no administration in time range)   insulin aspart (novoLOG) injection 0-14 Units (0 Units Subcutaneous Not Given 4/7/22 0633)   insulin detemir (LEVEMIR) injection 35 Units (35 Units Subcutaneous Given 4/7/22 0330)   acetaminophen (TYLENOL) tablet 1,000 mg (1,000 mg Oral Given 4/6/22 1956)   azithromycin (ZITHROMAX) 500 mg 0.9% NaCl (Add-vantage) 250 mL (500 mg Intravenous New Bag 4/6/22 2122)   cefTRIAXone (ROCEPHIN) IVPB 1 g/50ml dextrose (premix) (0 g Intravenous  "Stopped 4/6/22 2228)   furosemide (LASIX) injection 40 mg (40 mg Intravenous Given 4/7/22 0321)       Allergies:  Amlodipine, Buspirone, Lisinopril, Sertraline hcl, Sular [nisoldipine er], and Carvedilol    Review of Systems  All review of system were reviewed and are negative except as mentioned in the history of present illness and assessment and plan.    Objective:  Vital Signs  /64 (BP Location: Left arm, Patient Position: Lying)   Pulse 58   Temp 98.5 °F (36.9 °C) (Oral)   Resp 17   Ht 170.2 cm (67.01\")   Wt 85.4 kg (188 lb 4.4 oz)   LMP  (LMP Unknown)   SpO2 93%   BMI 29.48 kg/m²          I/O this shift:  In: -   Out: 850 [Urine:850]    Intake/Output Summary (Last 24 hours) at 4/7/2022 0848  Last data filed at 4/7/2022 0843  Gross per 24 hour   Intake 50 ml   Output 2670 ml   Net -2620 ml       Physical Exam:  General Appearance:   Alert, cooperative, in no acute distress.     Head:   Normocephalic, without obvious abnormality, atraumatic.     Eyes:      Normal, conjunctivae and sclerae, no icterus, no pallor, corneas clear, PERRLA        Throat:   Oral mucosa dry      Neck:  No adenopathy, supple, trachea midline, no thyromegaly, no carotid bruit, no JVD        Lungs:    Clear to auscultation and fair air movement noted.      Heart::   Regular rhythm and normal rate, normal S1 and S2.       Abdomen:   Obese. Normal bowel sounds, no masses, no organomegaly, soft non-tender, non-distended, no guarding, no rebound tenderness      Genital urinary:   No urinary bladder palpable      Extremities:  Moves all extremities, trace to 1+ edema, no cyanosis, no redness.     Pulses:  Pulses palpable and equal bilaterally but weak.     Skin:  No bleeding, bruising or rash        Neurologic:  Cranial nerves grossly intact, move all extremities         Results Review:   Results from last 7 days   Lab Units 04/07/22  0526 04/06/22  1936   SODIUM mmol/L 140 136   POTASSIUM mmol/L 3.9 4.3   CHLORIDE mmol/L 104 101 "   CO2 mmol/L 24.4 22.0   BUN mg/dL 25* 24*   CREATININE mg/dL 1.99* 2.19*   CALCIUM mg/dL 8.7 8.6   ALBUMIN g/dL  --  3.90   BILIRUBIN mg/dL  --  0.6   ALK PHOS U/L  --  78   ALT (SGPT) U/L  --  24   AST (SGOT) U/L  --  17   GLUCOSE mg/dL 176* 180*     Estimated Creatinine Clearance: 29.1 mL/min (A) (by C-G formula based on SCr of 1.99 mg/dL (H)).          Results from last 7 days   Lab Units 04/07/22  0526 04/06/22  1936   WBC 10*3/mm3 6.45 7.18   HEMOGLOBIN g/dL 8.6* 8.8*   PLATELETS 10*3/mm3 188 192         Brief Urine Lab Results  (Last result in the past 365 days)      Color   Clarity   Blood   Leuk Est   Nitrite   Protein   CREAT   Urine HCG        04/06/22 2125 Yellow   Clear   Negative   Negative   Negative   >=300 mg/dL (3+)               No results found for: UTPCR  Imaging Results (Last 24 Hours)     Procedure Component Value Units Date/Time    CT Chest Without Contrast Diagnostic [[id number]] Collected: 04/06/22 2210     Updated: 04/06/22 2211    Narrative:      FINAL REPORT    TECHNIQUE:  Axial CT images were performed through the chest without  contrast.  This study was performed with techniques to keep  radiation doses as low as reasonably achievable (ALARA).  Individualized dose reduction techniques using automated  exposure control or adjustment of mA and/or kV according to the  patient's size were employed.    CLINICAL HISTORY:  Fever, possible right pneumonia    FINDINGS:  CHEST:  Lungs/pleura: There is bilateral groundglass  opacification, worse in the dependent right upper lobe and right  lower lobe.  Heart, vessels and mediastinum: There is  cardiomegaly.  There is a left atrial appendage clipping.  No  pericardial effusion.  The aorta and pulmonary arteries are  normal in caliber.  There is coronary artery disease.  Lymph  nodes: No pathologically enlarged thoracic lymph nodes within  the limits of a noncontrast enhanced examination.  Chest wall:  No acute findings.  Bones: No acute fracture.   Upper abdomen: No  acute findings in the upper abdomen.      Impression:      Findings are compatible with viral pneumonia but pulmonary edema  is possible.    Authenticated by Gildardo Espinosa MD on 04/06/2022 10:10:35 PM    XR Chest 1 View [[id number]] Resulted: 04/06/22 2056     Updated: 04/06/22 2057        atorvastatin, 10 mg, Oral, Daily  azithromycin, 500 mg, Intravenous, Q24H  cefTRIAXone, 1 g, Intravenous, Q24H  cloNIDine, 0.1 mg, Oral, Q12H  dilTIAZem CD, 420 mg, Oral, Q24H  ferrous sulfate, 324 mg, Oral, Daily With Breakfast  gabapentin, 800 mg, Oral, Q12H  heparin (porcine), 5,000 Units, Subcutaneous, Q12H  hydrALAZINE, 100 mg, Oral, Q8H  insulin aspart, 0-14 Units, Subcutaneous, TID AC  insulin detemir, 35 Units, Subcutaneous, Nightly  levothyroxine, 150 mcg, Oral, Daily  lisinopril, 40 mg, Oral, Daily  pantoprazole, 40 mg, Oral, QAM  PARoxetine, 40 mg, Oral, Daily  senna-docusate sodium, 2 tablet, Oral, BID  sodium chloride, 3 mL, Intravenous, Q12H  terazosin, 5 mg, Oral, Q12H           Assessment/Plan:    1. Chronic kidney disease stage III: The patient is well aware of her chronic kidney disease and said she is known about her stage III chronic kidney disease for a while she does appear to have proteinuria.  I will check a spot protein creatinine ratio.  Last documented is on 3/28/2022 of about 1700 mg per 24 hours.  Continue to optimize medications.  2. Anemia of chronic kidney disease: She clearly does have iron deficiency from the blood work, will continue with oral supplement for now and see if she can tolerate it and likely will help.  3. Type 2 diabetes requiring insulin: Last hemoglobin A1c noted to be 5.9.  Continue as per hospitalist service.  4. Congestive heart failure/pulmonary edema: I think she will benefit from getting some more diuretics, it will be evaluated and adjusted on a daily basis.  5. Acute respiratory failure with hypoxia (HCC): Likely combination of pneumonia versus CHF is  making it worse currently appears to be doing fine with oxygen.  6. Acquired hypothyroidism: Longstanding history of continue home medications.  7. Sleep apnea: Continue to encourage and use of CPAP at night.      Risk and complexity: High       Plan:  · I will go ahead and give 2 more doses of Lasix 40 mg IV today and reassess again in the morning.  Renal function is already starting to improve.  She is on oral iron that can be continued.  Because of acute infection cannot be given IV iron at this point.  · Continue with rest of the current treatment plan and surveillance labs.  · Details were discussed with the patient no family in the room.    · Details were also discussed with the hospitalist service.   · Further recommendations will depend on clinical course of the patient during the current hospitalization.    · I also discussed the details with the nursing staff.  · Rest as ordered.    In closing, I sincerely appreciate opportunity to participate in care of this patient. If I can be of any further assistance with the management of this patient, please don’t hesitate to contact me.    Devon Metcalf MD    04/07/22  08:48 EDT    Dictated using Dragon.

## 2022-04-07 NOTE — CASE MANAGEMENT/SOCIAL WORK
Discharge Planning Assessment  [facility]     Patient Name: Karen Newman  MRN: [id number]  Today's Date: 4/7/2022    Admit Date: 4/6/2022     Discharge Needs Assessment     Row Name 04/07/22 1129       Living Environment    Primary Care Provided by self    Provides Primary Care For no one    Family Caregiver if Needed spouse    Quality of Family Relationships involved    Able to Return to Prior Arrangements yes       Transition Planning    Patient/Family Anticipates Transition to home with family    Transportation Anticipated family or friend will provide       Discharge Needs Assessment    Readmission Within the Last 30 Days no previous admission in last 30 days    Equipment Currently Used at Home oxygen;cpap;glucometer    Concerns to be Addressed discharge planning    Provided Post Acute Provider List? N/A    Provided Post Acute Provider Quality & Resource List? N/A    Row Name 04/07/22 1126       Living Environment    People in Home spouse    Current Living Arrangements home               Discharge Plan     Row Name 04/07/22 1131       Plan    Plan Spoke to pt  since pt was sleeping soundly .Confirmed address and phone number .She is independent with ADLS .She has oxygen 2 LNC nocturnal through her CPAP with [facility] .Called  [facility]  the original order is for 2 LINC continuously .Plan is to return home  to transport              Continued Care and Services - Admitted Since 4/6/2022     Durable Medical Equipment     Service Provider Request Status Selected Services Address Phone Fax Patient Preferred    [facility]  Pending - No Request Sent N/A [address] [facility] [id number] [phone] --              Expected Discharge Date and Time     Expected Discharge Date Expected Discharge Time    Apr 11, 2022          Demographic Summary     Row Name 04/07/22 1124       General Information    Admission Type inpatient    Referral Source admission list                Functional Status     Row Name 04/07/22 1125       Functional Status    Usual Activity Tolerance good       Functional Status, IADL    Medications independent    Meal Preparation independent    Housekeeping independent    Laundry independent    Shopping independent       Mental Status    General Appearance WDL WDL               Psychosocial    No documentation.                Abuse/Neglect    No documentation.                Legal    No documentation.                Substance Abuse    No documentation.                Patient Forms    No documentation.                   Tamiko Goodwin RN

## 2022-04-08 LAB
ANION GAP SERPL CALCULATED.3IONS-SCNC: 13.8 MMOL/L (ref 5–15)
BUN SERPL-MCNC: 34 MG/DL (ref 8–23)
BUN/CREAT SERPL: 13.1 (ref 7–25)
CALCIUM SPEC-SCNC: 9 MG/DL (ref 8.6–10.5)
CHLORIDE SERPL-SCNC: 103 MMOL/L (ref 98–107)
CO2 SERPL-SCNC: 25.2 MMOL/L (ref 22–29)
CREAT SERPL-MCNC: 2.6 MG/DL (ref 0.57–1)
DEPRECATED RDW RBC AUTO: 45.7 FL (ref 37–54)
EGFRCR SERPLBLD CKD-EPI 2021: 19.2 ML/MIN/1.73
ERYTHROCYTE [DISTWIDTH] IN BLOOD BY AUTOMATED COUNT: 13.2 % (ref 12.3–15.4)
GLUCOSE BLDC GLUCOMTR-MCNC: 151 MG/DL (ref 70–130)
GLUCOSE BLDC GLUCOMTR-MCNC: 180 MG/DL (ref 70–130)
GLUCOSE BLDC GLUCOMTR-MCNC: 185 MG/DL (ref 70–130)
GLUCOSE BLDC GLUCOMTR-MCNC: 61 MG/DL (ref 70–130)
GLUCOSE SERPL-MCNC: 55 MG/DL (ref 65–99)
HCT VFR BLD AUTO: 27.3 % (ref 34–46.6)
HGB BLD-MCNC: 8.7 G/DL (ref 12–15.9)
MCH RBC QN AUTO: 30.1 PG (ref 26.6–33)
MCHC RBC AUTO-ENTMCNC: 31.9 G/DL (ref 31.5–35.7)
MCV RBC AUTO: 94.5 FL (ref 79–97)
PLATELET # BLD AUTO: 214 10*3/MM3 (ref 140–450)
PMV BLD AUTO: 9.7 FL (ref 6–12)
POTASSIUM SERPL-SCNC: 3.5 MMOL/L (ref 3.5–5.2)
RBC # BLD AUTO: 2.89 10*6/MM3 (ref 3.77–5.28)
SODIUM SERPL-SCNC: 142 MMOL/L (ref 136–145)
WBC NRBC COR # BLD: 5.67 10*3/MM3 (ref 3.4–10.8)

## 2022-04-08 PROCEDURE — 80048 BASIC METABOLIC PNL TOTAL CA: CPT | Performed by: NURSE PRACTITIONER

## 2022-04-08 PROCEDURE — 63710000001 INSULIN DETEMIR PER 5 UNITS: Performed by: FAMILY MEDICINE

## 2022-04-08 PROCEDURE — 82962 GLUCOSE BLOOD TEST: CPT

## 2022-04-08 PROCEDURE — 25010000002 AZITHROMYCIN PER 500 MG

## 2022-04-08 PROCEDURE — 94799 UNLISTED PULMONARY SVC/PX: CPT

## 2022-04-08 PROCEDURE — 63710000001 INSULIN ASPART PER 5 UNITS: Performed by: FAMILY MEDICINE

## 2022-04-08 PROCEDURE — 25010000002 HEPARIN (PORCINE) PER 1000 UNITS: Performed by: FAMILY MEDICINE

## 2022-04-08 PROCEDURE — 94660 CPAP INITIATION&MGMT: CPT

## 2022-04-08 PROCEDURE — 99232 SBSQ HOSP IP/OBS MODERATE 35: CPT | Performed by: NURSE PRACTITIONER

## 2022-04-08 PROCEDURE — 85027 COMPLETE CBC AUTOMATED: CPT | Performed by: NURSE PRACTITIONER

## 2022-04-08 RX ADMIN — GABAPENTIN 800 MG: 400 CAPSULE ORAL at 20:45

## 2022-04-08 RX ADMIN — Medication 3 ML: at 20:46

## 2022-04-08 RX ADMIN — PAROXETINE HYDROCHLORIDE 40 MG: 20 TABLET, FILM COATED ORAL at 09:05

## 2022-04-08 RX ADMIN — HYDRALAZINE HYDROCHLORIDE 100 MG: 25 TABLET, FILM COATED ORAL at 09:06

## 2022-04-08 RX ADMIN — Medication 3 ML: at 09:07

## 2022-04-08 RX ADMIN — GABAPENTIN 800 MG: 400 CAPSULE ORAL at 09:40

## 2022-04-08 RX ADMIN — LEVOTHYROXINE SODIUM 150 MCG: 150 TABLET ORAL at 09:06

## 2022-04-08 RX ADMIN — CLONIDINE HYDROCHLORIDE 0.1 MG: 0.1 TABLET ORAL at 00:44

## 2022-04-08 RX ADMIN — HYDRALAZINE HYDROCHLORIDE 100 MG: 25 TABLET, FILM COATED ORAL at 17:07

## 2022-04-08 RX ADMIN — HEPARIN SODIUM 5000 UNITS: 5000 INJECTION INTRAVENOUS; SUBCUTANEOUS at 20:45

## 2022-04-08 RX ADMIN — TERAZOSIN HYDROCHLORIDE 5 MG: 5 CAPSULE ORAL at 20:45

## 2022-04-08 RX ADMIN — INSULIN ASPART 3 UNITS: 100 INJECTION, SOLUTION INTRAVENOUS; SUBCUTANEOUS at 17:06

## 2022-04-08 RX ADMIN — SENNOSIDES AND DOCUSATE SODIUM 2 TABLET: 50; 8.6 TABLET ORAL at 09:06

## 2022-04-08 RX ADMIN — HEPARIN SODIUM 5000 UNITS: 5000 INJECTION INTRAVENOUS; SUBCUTANEOUS at 09:06

## 2022-04-08 RX ADMIN — CLONIDINE HYDROCHLORIDE 0.1 MG: 0.1 TABLET ORAL at 13:35

## 2022-04-08 RX ADMIN — TERAZOSIN HYDROCHLORIDE 5 MG: 5 CAPSULE ORAL at 09:07

## 2022-04-08 RX ADMIN — INSULIN DETEMIR 35 UNITS: 100 INJECTION, SOLUTION SUBCUTANEOUS at 20:52

## 2022-04-08 RX ADMIN — ATORVASTATIN CALCIUM 10 MG: 10 TABLET, FILM COATED ORAL at 09:06

## 2022-04-08 RX ADMIN — FERROUS SULFATE TAB EC 324 MG (65 MG FE EQUIVALENT) 324 MG: 324 (65 FE) TABLET DELAYED RESPONSE at 09:06

## 2022-04-08 RX ADMIN — ACETAMINOPHEN 650 MG: 325 TABLET ORAL at 20:45

## 2022-04-08 RX ADMIN — SENNOSIDES AND DOCUSATE SODIUM 2 TABLET: 50; 8.6 TABLET ORAL at 20:45

## 2022-04-08 RX ADMIN — LISINOPRIL 40 MG: 20 TABLET ORAL at 09:06

## 2022-04-08 RX ADMIN — DILTIAZEM HYDROCHLORIDE 420 MG: 240 CAPSULE, COATED, EXTENDED RELEASE ORAL at 09:05

## 2022-04-08 RX ADMIN — HYDRALAZINE HYDROCHLORIDE 100 MG: 25 TABLET, FILM COATED ORAL at 00:43

## 2022-04-08 RX ADMIN — PANTOPRAZOLE SODIUM 40 MG: 40 TABLET, DELAYED RELEASE ORAL at 06:33

## 2022-04-08 NOTE — PROGRESS NOTES
[facility] Progress Note  [facility]. [place]        Patient Name: Karen Newman  : 1950  MRN: [id number]   LOS: 2 days    Patient Care Team:  Mirian Arnold APRN as PCP - General (Family Medicine)  Alec Kay MD as Surgeon (Cardiothoracic Surgery)  Yara Bobo MD as Consulting Physician (Cardiology)  Karel Blevins MD as Consulting Physician (Endocrinology)    Chief Complaint:    Chief Complaint   Patient presents with   • Shortness of Breath     Primary Care Physician:  Mirian Arnold APRN  Date of admission: 2022    Subjective     Interval History:   Follow-up acute on chronic kidney disease stage III .  Events noted from last 24 hours.  Clinically patient feels better.  She was able to get up and walk around without any problem denies any chest pain or shortness of breath.  She denies any fever or chills as well.  She said she has problem with her memory cannot remember things but still can recall lot of old memories.    Review of Systems:   As noted above/unobtainable.    Objective     Vitals:   Temp:  [97.6 °F (36.4 °C)-98.8 °F (37.1 °C)] 97.9 °F (36.6 °C)  Heart Rate:  [63-72] 63  Resp:  [16-18] 16  BP: (132-143)/(54-68) 133/57  Flow (L/min):  [2-4] 2    Intake/Output Summary (Last 24 hours) at 2022 0907  Last data filed at 2022 0549  Gross per 24 hour   Intake 500 ml   Output 3050 ml   Net -2550 ml       Physical Exam:    General Appearance: alert, oriented x 3, no acute distress   Skin: warm and dry  HEENT: oral mucosa dry, nonicteric sclera  Neck: supple, no JVD  Lungs: Decreased breath sounds but clear to auscultation   Heart: RRR, normal S1 and S2  Abdomen: soft, nontender, nondistended  : no palpable bladder  Extremities: Trace edema, no cyanosis or clubbing  Neuro: normal speech and mental status     Scheduled Meds:     Current Facility-Administered Medications   Medication Dose Route Frequency Provider Last Rate  Last Admin   • acetaminophen (TYLENOL) tablet 650 mg  650 mg Oral Q4H PRN Byron Donovan MD        Or   • acetaminophen (TYLENOL) 160 MG/5ML solution 650 mg  650 mg Oral Q4H PRN Byron Donovan MD        Or   • acetaminophen (TYLENOL) suppository 650 mg  650 mg Rectal Q4H PRN Byron Donoavn MD       • atorvastatin (LIPITOR) tablet 10 mg  10 mg Oral Daily Byron Donovan MD   10 mg at 04/08/22 0906   • azithromycin (ZITHROMAX) 500 mg 0.9% NaCl (Add-vantage) 250 mL  500 mg Intravenous Q24H Brice Almeida, [facility]   500 mg at 04/07/22 2113   • sennosides-docusate (PERICOLACE) 8.6-50 MG per tablet 2 tablet  2 tablet Oral BID Byron Donovan MD   2 tablet at 04/08/22 0906    And   • polyethylene glycol (MIRALAX) packet 17 g  17 g Oral Daily PRN Byron Donovan MD        And   • bisacodyl (DULCOLAX) EC tablet 5 mg  5 mg Oral Daily PRN Byron Donovan MD        And   • bisacodyl (DULCOLAX) suppository 10 mg  10 mg Rectal Daily PRN Byron Donovan MD       • cefTRIAXone (ROCEPHIN) IVPB 1 g/50ml dextrose (premix)  1 g Intravenous Q24H Brice Almeida, [facility] 100 mL/hr at 04/07/22 2229 1 g at 04/07/22 2229   • cloNIDine (CATAPRES) tablet 0.1 mg  0.1 mg Oral Q12H Byron Donovan MD   0.1 mg at 04/08/22 0044   • dextrose (D50W) (25 g/50 mL) IV injection 25 g  25 g Intravenous Q15 Min PRN Byron Donovan MD       • dextrose (GLUTOSE) oral gel 1 tube  1 tube Oral Q15 Min PRN Byron Donovan MD       • dilTIAZem CD (CARDIZEM CD) 24 hr capsule 420 mg  420 mg Oral Q24H Byron Donovan MD   420 mg at 04/08/22 0905   • ferrous sulfate EC tablet 324 mg  324 mg Oral Daily With Breakfast Byron Donovan MD   324 mg at 04/08/22 0906   • gabapentin (NEURONTIN) capsule 800 mg  800 mg Oral Q12H Byron Donovan MD   800 mg at 04/07/22 2106   • glucagon (human recombinant) (GLUCAGEN DIAGNOSTIC) injection 1 mg  1 mg Subcutaneous PRN Byron Donovan MD       • heparin (porcine) 5000 UNIT/ML injection 5,000 Units  5,000 Units Subcutaneous  Q12H Byron Donovan MD   5,000 Units at 04/08/22 0906   • hydrALAZINE (APRESOLINE) tablet 100 mg  100 mg Oral Q8H Byron Donovan MD   100 mg at 04/08/22 0906   • insulin aspart (novoLOG) injection 0-14 Units  0-14 Units Subcutaneous TID AC Byron Donovan MD   3 Units at 04/07/22 1138   • insulin detemir (LEVEMIR) injection 35 Units  35 Units Subcutaneous Nightly Byron Donovan MD   35 Units at 04/07/22 2124   • ipratropium-albuterol (DUO-NEB) nebulizer solution 3 mL  3 mL Nebulization Q4H PRN Byron Donovan MD       • levothyroxine (SYNTHROID, LEVOTHROID) tablet 150 mcg  150 mcg Oral Daily Byron Donovan MD   150 mcg at 04/08/22 0906   • lisinopril (PRINIVIL,ZESTRIL) tablet 40 mg  40 mg Oral Daily Byron Donovan MD   40 mg at 04/08/22 0906   • melatonin tablet 5 mg  5 mg Oral Nightly PRN Byron Donovan MD       • ondansetron (ZOFRAN) injection 4 mg  4 mg Intravenous Q6H PRN Byron Donovan MD       • pantoprazole (PROTONIX) EC tablet 40 mg  40 mg Oral QAM Byron Donovan MD   40 mg at 04/08/22 0633   • PARoxetine (PAXIL) tablet 40 mg  40 mg Oral Daily Byron Donovan MD   40 mg at 04/08/22 0905   • sodium chloride 0.9 % flush 10 mL  10 mL Intravenous PRN Byron Donovan MD       • sodium chloride 0.9 % flush 3 mL  3 mL Intravenous Q12H Byron Donovan MD   3 mL at 04/07/22 2112   • sodium chloride 0.9 % flush 3-10 mL  3-10 mL Intravenous PRN Byron Donovan MD       • terazosin (HYTRIN) capsule 5 mg  5 mg Oral Q12H Byron Donovan MD   5 mg at 04/07/22 2106       atorvastatin, 10 mg, Oral, Daily  azithromycin, 500 mg, Intravenous, Q24H  cefTRIAXone, 1 g, Intravenous, Q24H  cloNIDine, 0.1 mg, Oral, Q12H  dilTIAZem CD, 420 mg, Oral, Q24H  ferrous sulfate, 324 mg, Oral, Daily With Breakfast  gabapentin, 800 mg, Oral, Q12H  heparin (porcine), 5,000 Units, Subcutaneous, Q12H  hydrALAZINE, 100 mg, Oral, Q8H  insulin aspart, 0-14 Units, Subcutaneous, TID AC  insulin detemir, 35 Units, Subcutaneous,  Nightly  levothyroxine, 150 mcg, Oral, Daily  lisinopril, 40 mg, Oral, Daily  pantoprazole, 40 mg, Oral, QAM  PARoxetine, 40 mg, Oral, Daily  senna-docusate sodium, 2 tablet, Oral, BID  sodium chloride, 3 mL, Intravenous, Q12H  terazosin, 5 mg, Oral, Q12H        IV Meds:        Results Reviewed:   I have personally reviewed the results from the time of this admission to 4/8/2022 09:07 EDT     Results from last 7 days   Lab Units 04/08/22  0539 04/07/22  0526 04/06/22  1936   SODIUM mmol/L 142 140 136   POTASSIUM mmol/L 3.5 3.9 4.3   CHLORIDE mmol/L 103 104 101   CO2 mmol/L 25.2 24.4 22.0   BUN mg/dL 34* 25* 24*   CREATININE mg/dL 2.60* 1.99* 2.19*   CALCIUM mg/dL 9.0 8.7 8.6   BILIRUBIN mg/dL  --   --  0.6   ALK PHOS U/L  --   --  78   ALT (SGPT) U/L  --   --  24   AST (SGOT) U/L  --   --  17   GLUCOSE mg/dL 55* 176* 180*       Estimated Creatinine Clearance: 22.3 mL/min (A) (by C-G formula based on SCr of 2.6 mg/dL (H)).                Results from last 7 days   Lab Units 04/08/22  0539 04/07/22  0526 04/06/22  1936   WBC 10*3/mm3 5.67 6.45 7.18   HEMOGLOBIN g/dL 8.7* 8.6* 8.8*   PLATELETS 10*3/mm3 214 188 192             Brief Urine Lab Results  (Last result in the past 365 days)      Color   Clarity   Blood   Leuk Est   Nitrite   Protein   CREAT   Urine HCG        04/06/22 2125 Yellow   Clear   Negative   Negative   Negative   >=300 mg/dL (3+)                 No results found for: UTPCR    Imaging Results (Last 24 Hours)     Procedure Component Value Units Date/Time    XR Chest 1 View [[id number]] Collected: 04/07/22 0956     Updated: 04/07/22 1000    Narrative:      PROCEDURE: XR CHEST 1 VW-     HISTORY: SOA Triage Protocol     COMPARISON: None.     FINDINGS: The heart is normal in size. The mediastinum is unremarkable.  There is interstitial pulmonary edema with small bilateral pleural  effusions. There is no pneumothorax.  There are no acute osseous  abnormalities.       Impression:      Interstitial pulmonary  edema and small effusions.     Continued followup is recommended.     This report was signed and finalized on 4/7/2022 9:57 AM by Alonso Best M.D..              Assessment / Plan     ASSESSMENT:  1. Chronic kidney disease stage III: The patient is well aware of her chronic kidney disease and said she is known about her stage III chronic kidney disease for a while she does appear to have proteinuria.  I will check a spot protein creatinine ratio.  Last documented is on 3/28/2022 of about 1700 mg per 24 hours.  Continue to optimize medications.  2. Anemia of chronic kidney disease: She clearly does have iron deficiency from the blood work, will continue with oral supplement for now and see if she can tolerate it and likely will help.  3. Type 2 diabetes requiring insulin: Last hemoglobin A1c noted to be 5.9.  Continue as per hospitalist service.  4. Congestive heart failure/pulmonary edema: I think she will benefit from getting some more diuretics, it will be evaluated and adjusted on a daily basis.  5. Acute respiratory failure with hypoxia (HCC): Likely combination of pneumonia versus CHF is making it worse currently appears to be doing fine with oxygen.  6. Acquired hypothyroidism: Longstanding history of continue home medications.  7. Sleep apnea: Continue to encourage and use of CPAP at night.      PLAN:  · Her renal function is slightly worse, clinically appears dry will hold off giving any more diuretics.  I think she will settle down to her baseline renal function over the next few days.  I still feel that she would benefit from long-term use of diuretic.  Once she is ready to go home will assess for as needed dose of her diuretic.  · Details were discussed with the patient no family in the room.    · Details were also discussed with the hospitalist service.   · Continue with rest of the current treatment plan, and monitor with surveillance labs.  · Further recommendations will depend on clinical  course of the patient during the current hospitalization.     Thank you for involving us in the care of Karen Newman.  Please feel free to call with any questions.    Devon Metcalf MD  04/08/22  09:07 EDT    [facility]  [phone]      Much of this encounter note is an electronic transcription/translation of spoken language to printed text. The electronic translation of spoken language may permit erroneous, or at times, nonsensical words or phrases to be inadvertently transcribed; Although I have reviewed the note for such errors, some may still exist.

## 2022-04-08 NOTE — PLAN OF CARE
Goal Outcome Evaluation:  Plan of Care Reviewed With: patient      No acute events this shift.

## 2022-04-08 NOTE — PROGRESS NOTES
[facility]    Name:  Karen Newman   Age:  71 y.o.  Sex:  female  :  1950  MRN:  [id number]   Visit Number:  [id number]  Admission Date:  2022  Date Of Service:  22  Primary Care Physician:  Mirian Arnold APRN     LOS: 2 days :    Chief Complaint:      Shortness of breath    Subjective:    Patient seen and evaluated today at bedside.  Patient is sitting up in bed and is on room air on my exam.  Patient is requesting a Cpap for tonight due to not being able to sleep well last night because of her snoring.  Patient is 96% on room air.  Patient states she feels good today.  Patient did have 3L+ UOP noted yesterday.  Creatinine is slightly more elevated today.  Appreciate nephrology consulting and making recommendations.  Vital signs have been stable.      Hospital Course:    Patient is a 71 years old female with a past medical history significant for atrial fibrillation not on anticoagulation status post ablation, hypertension, CKD, diabetes mellitus insulin-dependent, hyperlipidemia, iron deficiency anemia, acquired hypothyroidism and sleep apnea who presented to the ER 2022 with complaints of shortness of breath.  Patient reports that her symptoms of shortness of breath, chills and subjective fever started around 1 week ago and has progressively worsed.  Patient denied cough or chest pain.  No sick contacts.  Patient has a history of CKD and follows up with nephrology.  Patient also has a history of A. fib status post ablation.      Upon ER evaluation, patient was found to be hypoxic with saturations in the 70s, patient was placed on 4 L of nasal cannula to keep sats above 90%.  Patient was also noted to be febrile at 102.6 F.  Blood pressure was elevated upon arrival with a systolic in the 180s. Labs were significant for proBNP of 2248, glucose 180, creatinine 2.19 (baseline of 1.53), BUN 24, hemoglobin 8.8, hematocrit 26.8, the rest of her labs  was unremarkable including normal lactate, pro-Trace and troponin.  Respiratory panel negative. ABG with a pH of 7.454/PCO2 34/PO2 60/HCO3 24.3/saturation 92% on 2 L nasal cannula.  Chest x-ray was concerning for right perihilar pneumonia, CT chest without contrast showed findings of bilateral groundglass opacification that are compatible with viral pneumonia but pulmonary edema is possible.  Urinalysis showed protein 3+ with trace bacteria. Blood cultures obtained, patient was started on Azithromycin and Rocephin while in the ER.     Review of Systems:     All systems were reviewed and negative except as mentioned in subjective, assessment and plan.    Vital Signs:    Temp:  [97.6 °F (36.4 °C)-98.8 °F (37.1 °C)] 97.9 °F (36.6 °C)  Heart Rate:  [63-72] 63  Resp:  [16-18] 16  BP: (132-139)/(54-68) 133/57    Intake and output:    I/O last 3 completed shifts:  In: 860 [P.O.:810; IV Piggyback:50]  Out: 5720 [Urine:5720]  I/O this shift:  In: 360 [P.O.:360]  Out: 350 [Urine:350]    Physical Examination:    General Appearance:  Alert and cooperative, pleasant elderly female, no acute distress noted, appears chronically ill   Head:  Atraumatic and normocephalic.   Eyes: Conjunctivae and sclerae normal, no icterus. No pallor.   Throat: No oral lesions, no thrush, oral mucosa moist.   Neck: Supple, trachea midline, no thyromegaly.   Lungs:   Breath sounds heard bilaterally equally but diminished throughout.  No wheezing or crackles. Currently on room air with stable saturations   Heart:  Normal S1 and S2, no murmur, no gallop, no rub. No JVD.   Abdomen:   Normal bowel sounds, no masses, no organomegaly. Soft, nontender, nondistended, no rebound tenderness.   Extremities: Supple, trace lower extremity edema, no cyanosis, no clubbing.   Skin: No bleeding or rash.   Neurologic: Alert and oriented x 3. No facial asymmetry. Moves all four limbs.      Laboratory results:    Results from last 7 days   Lab Units 04/08/22  0571  04/07/22 0526 04/06/22 1936   SODIUM mmol/L 142 140 136   POTASSIUM mmol/L 3.5 3.9 4.3   CHLORIDE mmol/L 103 104 101   CO2 mmol/L 25.2 24.4 22.0   BUN mg/dL 34* 25* 24*   CREATININE mg/dL 2.60* 1.99* 2.19*   CALCIUM mg/dL 9.0 8.7 8.6   BILIRUBIN mg/dL  --   --  0.6   ALK PHOS U/L  --   --  78   ALT (SGPT) U/L  --   --  24   AST (SGOT) U/L  --   --  17   GLUCOSE mg/dL 55* 176* 180*     Results from last 7 days   Lab Units 04/08/22  0539 04/07/22 0526 04/06/22 1936   WBC 10*3/mm3 5.67 6.45 7.18   HEMOGLOBIN g/dL 8.7* 8.6* 8.8*   HEMATOCRIT % 27.3* 26.2* 26.8*   PLATELETS 10*3/mm3 214 188 192         Results from last 7 days   Lab Units 04/06/22 1936   TROPONIN T ng/mL <0.010     Results from last 7 days   Lab Units 04/06/22 2009 04/06/22 2005   BLOODCX  No growth at 24 hours No growth at 24 hours     Results from last 7 days   Lab Units 04/06/22 2020   PH, ARTERIAL pH units 7.454*   PO2 ART mm Hg 60.9*   PCO2, ARTERIAL mm Hg 34.6*   HCO3 ART mmol/L 24.3     I have reviewed the patient's laboratory results.    Radiology results:    Adult Transthoracic Echo Complete w/ Color, Spectral and Contrast if necessary per protocol    Result Date: 4/7/2022  1.  Normal left ventricular size and systolic function, LVEF 60-65%. 2.  Mild concentric LVH. 3.  Normal LV diastolic filling pattern. 4.  Normal right ventricular size and systolic function. 5.  Mild mitral regurgitation.    CT Chest Without Contrast Diagnostic    Result Date: 4/6/2022  FINAL REPORT TECHNIQUE: Axial CT images were performed through the chest without contrast.  This study was performed with techniques to keep radiation doses as low as reasonably achievable (ALARA). Individualized dose reduction techniques using automated exposure control or adjustment of mA and/or kV according to the patient's size were employed. CLINICAL HISTORY: Fever, possible right pneumonia FINDINGS: CHEST:  Lungs/pleura: There is bilateral groundglass opacification, worse in the  dependent right upper lobe and right lower lobe.  Heart, vessels and mediastinum: There is cardiomegaly.  There is a left atrial appendage clipping.  No pericardial effusion.  The aorta and pulmonary arteries are normal in caliber.  There is coronary artery disease.  Lymph nodes: No pathologically enlarged thoracic lymph nodes within the limits of a noncontrast enhanced examination.  Chest wall: No acute findings.  Bones: No acute fracture.  Upper abdomen: No acute findings in the upper abdomen.     Impression: Findings are compatible with viral pneumonia but pulmonary edema is possible. Authenticated by Gildardo Espinosa MD on 04/06/2022 10:10:35 PM    XR Chest 1 View    Result Date: 4/7/2022  PROCEDURE: XR CHEST 1 VW-  HISTORY: SOA Triage Protocol  COMPARISON: None.  FINDINGS: The heart is normal in size. The mediastinum is unremarkable. There is interstitial pulmonary edema with small bilateral pleural effusions. There is no pneumothorax.  There are no acute osseous abnormalities.      Impression: Interstitial pulmonary edema and small effusions.  Continued followup is recommended.  This report was signed and finalized on 4/7/2022 9:57 AM by Alonso Best M.D..    I have reviewed the patient's radiology reports.    Medication Review:     I have reviewed the patient's active and prn medications.     Problem List:      Acute respiratory failure with hypoxia (HCC)      Assessment:  Acute respiratory failure with hypoxia, secondary to #2, POA  Community-acquired multifocal pneumonia, POA  Pulmonary edema, POA  JORDAN on CKD  Hx of A. fib not on anticoagulation status post ablation  Hypertension  Diabetes mellitus insulin-dependent  Iron deficiency anemia  Acquired hypothyroidism  Sleep apnea    Plan:    Acute respiratory failure with hypoxia/  Community-acquired multifocal pneumonia/  pulmonary edema  - Could be viral versus bacterial versus pulmonary edema--was admitted and placed on Azithromycin and  Rocephin  -Procalcitonin is negative, respiratory panel is negative, WBC is improved, and patient's oxygen requirements dramatically decreased with diuresis.  This respiratory failure was likely due to pulmonary edema and possibly viral pneumonia.  Low likelihood of bacterial source, but given patient's co-morbidities and presentation will complete 5 day course of Azithromycin to cover for bacterial sources and discontinue Rocephin.  -Continue titrating oxygen as tolerated to keep saturation above 90%.  Patient currently utilizing 1L or on room air.  -Legionella and strep pneumonia antigens are negative  -DuoNebs as needed  -Diuresed well with Lasix IV yesterday--3L+ with oxygen requirements improved  -Daily weight, strict ins and outs, last 2D echo on file from 1/2022 with a EF of 65%.  Echo done 4/7 and noted LVEF-60-65% and normal diastolic filling pattern.     JORDAN on CKD  -Nephrology consulted (Tea) and appreciate recommendations.  -Renal function elevated today likely due to IV Lasix with known baseline (1.5)  -Avoid nephrotoxic drugs  -Hold further diuresis today as recommended by nephrology     Hypertension  -Continue home meds--blood pressure is controlled     Diabetes mellitus insulin-dependent  -Sliding scale insulin in addition to Levemir 35 units nightly.  -Carbohydrate consistent diet.  -A1c-5.9 on admission     Iron deficiency anemia  -Chronic  -No obvious source of bleeding.  -Continue Ferrous Sulfate     Acquired hypothyroidism  -Continue levothyroxine     Sleep apnea   -Ordered Cpap for tonight      **As long as lab work is stable patient should be able to discharge home tomorrow.  Would complete Azithromycin course at discharge to cover for bacterial pneumonia.  Patient is interested in transferring care to Dr. Metcalf if that is possible.  Per Nephrology would likely benefit from diuretic daily at discharge.    DVT Prophylaxis: Heparin SQ  Code Status: Full Code  Diet: Cardiac/Carbohydrate  Consistent/Renal  Discharge Plan: Home tomorrow    Alexandria Em, EUGENIE  04/08/22  11:27 EDT    Dictated utilizing Dragon dictation.

## 2022-04-09 ENCOUNTER — READMISSION MANAGEMENT (OUTPATIENT)
Dept: CALL CENTER | Facility: HOSPITAL | Age: 72
End: 2022-04-09

## 2022-04-09 VITALS
RESPIRATION RATE: 17 BRPM | DIASTOLIC BLOOD PRESSURE: 66 MMHG | SYSTOLIC BLOOD PRESSURE: 149 MMHG | HEART RATE: 64 BPM | OXYGEN SATURATION: 93 % | BODY MASS INDEX: 29.55 KG/M2 | WEIGHT: 188.27 LBS | TEMPERATURE: 98 F | HEIGHT: 67 IN

## 2022-04-09 PROBLEM — J18.9 CAP (COMMUNITY ACQUIRED PNEUMONIA): Status: ACTIVE | Noted: 2022-04-09

## 2022-04-09 LAB
ANION GAP SERPL CALCULATED.3IONS-SCNC: 12.3 MMOL/L (ref 5–15)
BUN SERPL-MCNC: 35 MG/DL (ref 8–23)
BUN/CREAT SERPL: 15.5 (ref 7–25)
CALCIUM SPEC-SCNC: 9.1 MG/DL (ref 8.6–10.5)
CHLORIDE SERPL-SCNC: 102 MMOL/L (ref 98–107)
CO2 SERPL-SCNC: 24.7 MMOL/L (ref 22–29)
CREAT SERPL-MCNC: 2.26 MG/DL (ref 0.57–1)
DEPRECATED RDW RBC AUTO: 45.6 FL (ref 37–54)
EGFRCR SERPLBLD CKD-EPI 2021: 22.7 ML/MIN/1.73
ERYTHROCYTE [DISTWIDTH] IN BLOOD BY AUTOMATED COUNT: 13.4 % (ref 12.3–15.4)
GLUCOSE BLDC GLUCOMTR-MCNC: 132 MG/DL (ref 70–130)
GLUCOSE SERPL-MCNC: 134 MG/DL (ref 65–99)
HCT VFR BLD AUTO: 26.2 % (ref 34–46.6)
HGB BLD-MCNC: 8.5 G/DL (ref 12–15.9)
MCH RBC QN AUTO: 30.1 PG (ref 26.6–33)
MCHC RBC AUTO-ENTMCNC: 32.4 G/DL (ref 31.5–35.7)
MCV RBC AUTO: 92.9 FL (ref 79–97)
PLATELET # BLD AUTO: 240 10*3/MM3 (ref 140–450)
PMV BLD AUTO: 9.2 FL (ref 6–12)
POTASSIUM SERPL-SCNC: 4 MMOL/L (ref 3.5–5.2)
RBC # BLD AUTO: 2.82 10*6/MM3 (ref 3.77–5.28)
SODIUM SERPL-SCNC: 139 MMOL/L (ref 136–145)
WBC NRBC COR # BLD: 4.96 10*3/MM3 (ref 3.4–10.8)

## 2022-04-09 PROCEDURE — 82962 GLUCOSE BLOOD TEST: CPT

## 2022-04-09 PROCEDURE — 25010000002 HEPARIN (PORCINE) PER 1000 UNITS: Performed by: FAMILY MEDICINE

## 2022-04-09 PROCEDURE — 85027 COMPLETE CBC AUTOMATED: CPT | Performed by: NURSE PRACTITIONER

## 2022-04-09 PROCEDURE — 99239 HOSP IP/OBS DSCHRG MGMT >30: CPT | Performed by: NURSE PRACTITIONER

## 2022-04-09 PROCEDURE — 80048 BASIC METABOLIC PNL TOTAL CA: CPT | Performed by: NURSE PRACTITIONER

## 2022-04-09 RX ORDER — FUROSEMIDE 20 MG/1
20 TABLET ORAL 2 TIMES DAILY
Qty: 30 TABLET | Refills: 0 | Status: SHIPPED | OUTPATIENT
Start: 2022-04-09 | End: 2022-07-07 | Stop reason: SDUPTHER

## 2022-04-09 RX ORDER — AZITHROMYCIN 250 MG/1
250 TABLET, FILM COATED ORAL DAILY
Qty: 2 TABLET | Refills: 0 | Status: SHIPPED | OUTPATIENT
Start: 2022-04-09 | End: 2022-04-11

## 2022-04-09 RX ADMIN — DILTIAZEM HYDROCHLORIDE 420 MG: 240 CAPSULE, COATED, EXTENDED RELEASE ORAL at 09:24

## 2022-04-09 RX ADMIN — GABAPENTIN 800 MG: 400 CAPSULE ORAL at 09:24

## 2022-04-09 RX ADMIN — ATORVASTATIN CALCIUM 10 MG: 10 TABLET, FILM COATED ORAL at 09:25

## 2022-04-09 RX ADMIN — PANTOPRAZOLE SODIUM 40 MG: 40 TABLET, DELAYED RELEASE ORAL at 06:28

## 2022-04-09 RX ADMIN — SENNOSIDES AND DOCUSATE SODIUM 2 TABLET: 50; 8.6 TABLET ORAL at 09:24

## 2022-04-09 RX ADMIN — HEPARIN SODIUM 5000 UNITS: 5000 INJECTION INTRAVENOUS; SUBCUTANEOUS at 09:25

## 2022-04-09 RX ADMIN — HYDRALAZINE HYDROCHLORIDE 100 MG: 25 TABLET, FILM COATED ORAL at 09:23

## 2022-04-09 RX ADMIN — FERROUS SULFATE TAB EC 324 MG (65 MG FE EQUIVALENT) 324 MG: 324 (65 FE) TABLET DELAYED RESPONSE at 09:24

## 2022-04-09 RX ADMIN — LISINOPRIL 40 MG: 20 TABLET ORAL at 09:23

## 2022-04-09 RX ADMIN — PAROXETINE HYDROCHLORIDE 40 MG: 20 TABLET, FILM COATED ORAL at 09:23

## 2022-04-09 RX ADMIN — Medication 3 ML: at 09:23

## 2022-04-09 RX ADMIN — TERAZOSIN HYDROCHLORIDE 5 MG: 5 CAPSULE ORAL at 09:25

## 2022-04-09 RX ADMIN — LEVOTHYROXINE SODIUM 150 MCG: 150 TABLET ORAL at 09:23

## 2022-04-09 NOTE — CASE MANAGEMENT/SOCIAL WORK
Case Management Discharge Note           Provided Post Acute Provider List?: N/A  Provided Post Acute Provider Quality & Resource List?: N/A    Selected Continued Care - Discharged on 4/9/2022 Admission date: 4/6/2022 - Discharge disposition: Home or Self Care    Destination    No services have been selected for the patient.              Durable Medical Equipment    No services have been selected for the patient.              Dialysis/Infusion    No services have been selected for the patient.              Home Medical Care    No services have been selected for the patient.              Therapy    No services have been selected for the patient.              Community Resources    No services have been selected for the patient.              Community & DME    No services have been selected for the patient.                  Transportation Services  Private: Car    Final Discharge Disposition Code: 01 - home or self-care

## 2022-04-09 NOTE — PLAN OF CARE
Goal Outcome Evaluation:              Outcome Evaluation: VSS  No acute events noted .  PT unable to tolerate Bipap.  Tried wearing for approx 1 hr.

## 2022-04-09 NOTE — CASE MANAGEMENT/SOCIAL WORK
Discharge Planning Assessment  [facility]     Patient Name: Karen Newman  MRN: [id number]  Today's Date: 4/9/2022    Admit Date: 4/6/2022     Discharge Needs Assessment    No documentation.                Discharge Plan     Row Name 04/09/22 1746       Plan    Final Discharge Disposition Code 01 - home or self-care    Row Name 04/09/22 1737       Plan    Plan Orders for discharge home with O2 noted    Pt currently on O2@2L continuously and managed by [facility]  Confirmed with spouse and pt that it was [facility] [place] and not [facility]   Faxed updated O2 order to [facility] with success   New order was O2@2Lper [place] with exertion  Spouse to transport home              Continued Care and Services - Discharged on 4/9/2022 Admission date: 4/6/2022 - Discharge disposition: Home or Self Care    Durable Medical Equipment     Service Provider Request Status Selected Services Address Phone Fax Patient Preferred    [facility] [place]  Pending - No Request Sent N/A [address] [id number] [phone] --              Expected Discharge Date and Time     Expected Discharge Date Expected Discharge Time    Apr 9, 2022          Demographic Summary    No documentation.                Functional Status    No documentation.                Psychosocial    No documentation.                Abuse/Neglect    No documentation.                Legal    No documentation.                Substance Abuse    No documentation.                Patient Forms    No documentation.                   Ning Haque RN

## 2022-04-09 NOTE — DISCHARGE INSTRUCTIONS
To be discharged home today.  Patient Zithromax to do tonight at 10 PM.  Patient to complete Zithromax tomorrow.  We will prescribe Lasix as needed for shortness of air and worsening edema.  Patient to return to the emergency department for any worsening edema, shortness of air or any other concerns.  Patient to follow-up with PCP within 1 week  Patient to follow with cardiology and nephrology within 2 weeks.

## 2022-04-09 NOTE — DISCHARGE SUMMARY
[facility]   DISCHARGE SUMMARY      Name:  Karen Newman   Age:  71 y.o.  Sex:  female  :  1950  MRN:  [id number]   Visit Number:  [id number]    Admission Date:  2022  Date of Discharge:  2022  Primary Care Physician:  Mirian Arnold APRN    Important issues to note:    - Probnp > 2,000 with admission with improvement noted with diuresis with lasix.  - Echo with EF 60-65% with normal Systolic and Diastolic Function  - Azithromycin continued for possible viral/superimposed bacterial PNA  - To follow with Nephrology-Cardiology in 2 weeks.  - To follow with PCP in 1 week.  - Prn Lasix for SOA/Edema  -Given dyspnea improvement with diuresis, would recommend 1500 fluid restriction daily    Discharge Diagnoses:     Acute respiratory failure with hypoxia, secondary to #2, POA  Community-acquired multifocal pneumonia, POA  Pulmonary edema, POA  JORDAN on CKD  Hx of Atrial Fibrillation not on anticoagulation status post ablation  Hypertension  Diabetes mellitus insulin-dependent  Iron deficiency anemia  Acquired hypothyroidism  Sleep apnea    Problem List:     Active Hospital Problems    Diagnosis  POA   • **Acute respiratory failure with hypoxia (HCC) [J96.01]  Yes   • CAP (community acquired pneumonia) [J18.9]  Yes   • Chronic kidney disease, stage III (moderate) (HCC) [N18.30]  Yes   • KRISTEN on CPAP [G47.33, Z99.89]  Not Applicable   • Paroxysmal atrial fibrillation (HCC) [I48.0]  Yes      Resolved Hospital Problems   No resolved problems to display.     Presenting Problem:    Chief Complaint   Patient presents with   • Shortness of Breath      Consults:     Consulting Physician(s)  Chat With All Active Members    Provider Relationship Specialty    Devon Metcalf MD  Consulting Physician Nephrology        Procedures Performed:        History of presenting illness/Hospital Course:    Patient is a 71 years old female with a past medical history significant for atrial fibrillation  not on anticoagulation status post ablation, hypertension, CKD, diabetes mellitus insulin-dependent, hyperlipidemia, iron deficiency anemia, acquired hypothyroidism and sleep apnea who presented to the ER 4/6/2022 with complaints of shortness of breath.  Patient reported that her symptoms of shortness of breath, chills and subjective fever started around 1 week ago and has progressively worsened.  Patient denied cough or chest pain.  No sick contacts.  Patient has a history of CKD and follows up with nephrology. Patient also follows with Cardiology outpatient.     Upon ER evaluation, patient was found to be hypoxic with saturations in the 70s. Patient was placed on 4 L of nasal cannula to keep sats above 90%.  Patient was also noted to be febrile at 102.6 F.  Blood pressure was elevated upon arrival with a systolic in the 180s. Labs were significant for proBNP of 2248, glucose 180, creatinine 2.19 (baseline of 1.53), BUN 24, hemoglobin 8.8, hematocrit 26.8, the rest of her labs was unremarkable including normal lactate, pro-Trace and troponin.  Respiratory panel negative. ABG with a pH of 7.454/PCO2 34/PO2 60/HCO3 24.3/saturation 92% on 2 L nasal cannula.  Chest x-ray was concerning for right perihilar pneumonia, CT chest without contrast showed findings of bilateral groundglass opacification that are compatible with viral pneumonia but pulmonary edema is possible.  Urinalysis showed protein 3+ with trace bacteria. Blood cultures obtained, patient was started on Azithromycin and Rocephin while in the ER. Given negative procalcitonin with remarkable improvement with diuresis, Rocephin was de-escalated. Patient creatinine mildly above baseline currently at 2.2.  Patient baseline oxygenation currently only at night.  Walk oximetry prior to discharge revealed decrease in SPO2 to 87% with exertion.  Home oxygen ordered 2 L nasal cannula with exertion.  Nephrology agreeable to continue Azithromycin x 2 days and lasix prn  for edema/SOA. Patient to follow with Cardiology/Nephrology in 2 weeks. Patient to follow with PCP in 1 week. Strict return precautions given.  Patient agreeable with plan of care.    Vital Signs:    Temp:  [97.3 °F (36.3 °C)-98 °F (36.7 °C)] 98 °F (36.7 °C)  Heart Rate:  [60-69] 64  Resp:  [17-18] 17  BP: (127-156)/(54-67) 149/66    Physical Exam:    General Appearance:  Alert and cooperative. Chronically ill appearing. Obesity noted.   Head:  Atraumatic and normocephalic.   Eyes: Conjunctivae and sclerae normal, no icterus. No pallor.   Ears:  Ears with no abnormalities noted.   Throat: No oral lesions, no thrush, oral mucosa moist.   Neck: Supple, trachea midline, no thyromegaly.   Back:   No kyphoscoliosis present. No tenderness to palpation.   Lungs:   Breath sounds heard bilaterally equally.  No crackles or wheezing. No Pleural rub or bronchial breathing. Mild coarseness throughout. Unlabored. On room air.   Heart:  Normal S1 and S2, no murmur, no gallop, no rub. No JVD.   Abdomen:   Normal bowel sounds, no masses, no organomegaly. Soft, nontender, nondistended, no rebound tenderness.   Extremities: Supple, no edema, no cyanosis, no clubbing.   Pulses: Pulses palpable bilaterally.   Skin: No bleeding or rash.   Neurologic: Alert and oriented x 3. No facial asymmetry. Moves all four limbs. No tremors.     Pertinent Lab Results:     Results from last 7 days   Lab Units 04/09/22  0547 04/08/22  0539 04/07/22  0526 04/06/22  1936   SODIUM mmol/L 139 142 140 136   POTASSIUM mmol/L 4.0 3.5 3.9 4.3   CHLORIDE mmol/L 102 103 104 101   CO2 mmol/L 24.7 25.2 24.4 22.0   BUN mg/dL 35* 34* 25* 24*   CREATININE mg/dL 2.26* 2.60* 1.99* 2.19*   CALCIUM mg/dL 9.1 9.0 8.7 8.6   BILIRUBIN mg/dL  --   --   --  0.6   ALK PHOS U/L  --   --   --  78   ALT (SGPT) U/L  --   --   --  24   AST (SGOT) U/L  --   --   --  17   GLUCOSE mg/dL 134* 55* 176* 180*     Results from last 7 days   Lab Units 04/09/22  0547 04/08/22  0539  04/07/22  0526   WBC 10*3/mm3 4.96 5.67 6.45   HEMOGLOBIN g/dL 8.5* 8.7* 8.6*   HEMATOCRIT % 26.2* 27.3* 26.2*   PLATELETS 10*3/mm3 240 214 188         Results from last 7 days   Lab Units 04/06/22  1936   TROPONIN T ng/mL <0.010     Results from last 7 days   Lab Units 04/06/22  1936   PROBNP pg/mL 2,248.0*             Results from last 7 days   Lab Units 04/06/22 2020   PH, ARTERIAL pH units 7.454*   PO2 ART mm Hg 60.9*   PCO2, ARTERIAL mm Hg 34.6*   HCO3 ART mmol/L 24.3     Results from last 7 days   Lab Units 04/06/22 2009 04/06/22 2005   BLOODCX  No growth at 2 days No growth at 2 days       Pertinent Radiology Results:    Imaging Results (All)     Procedure Component Value Units Date/Time    XR Chest 1 View [[id number]] Collected: 04/07/22 0956     Updated: 04/07/22 1000    Narrative:      PROCEDURE: XR CHEST 1 VW-     HISTORY: SOA Triage Protocol     COMPARISON: None.     FINDINGS: The heart is normal in size. The mediastinum is unremarkable.  There is interstitial pulmonary edema with small bilateral pleural  effusions. There is no pneumothorax.  There are no acute osseous  abnormalities.       Impression:      Interstitial pulmonary edema and small effusions.     Continued followup is recommended.     This report was signed and finalized on 4/7/2022 9:57 AM by Alonso Best M.D..    CT Chest Without Contrast Diagnostic [[id number]] Collected: 04/06/22 2210     Updated: 04/06/22 2211    Narrative:      FINAL REPORT    TECHNIQUE:  Axial CT images were performed through the chest without  contrast.  This study was performed with techniques to keep  radiation doses as low as reasonably achievable (ALARA).  Individualized dose reduction techniques using automated  exposure control or adjustment of mA and/or kV according to the  patient's size were employed.    CLINICAL HISTORY:  Fever, possible right pneumonia    FINDINGS:  CHEST:  Lungs/pleura: There is bilateral groundglass  opacification, worse in the  dependent right upper lobe and right  lower lobe.  Heart, vessels and mediastinum: There is  cardiomegaly.  There is a left atrial appendage clipping.  No  pericardial effusion.  The aorta and pulmonary arteries are  normal in caliber.  There is coronary artery disease.  Lymph  nodes: No pathologically enlarged thoracic lymph nodes within  the limits of a noncontrast enhanced examination.  Chest wall:  No acute findings.  Bones: No acute fracture.  Upper abdomen: No  acute findings in the upper abdomen.      Impression:      Findings are compatible with viral pneumonia but pulmonary edema  is possible.    Authenticated by Gildardo Espinosa MD on 04/06/2022 10:10:35 PM          Echo:    Results for orders placed during the hospital encounter of 04/06/22    Adult Transthoracic Echo Complete w/ Color, Spectral and Contrast if necessary per protocol    Interpretation Summary  1.  Normal left ventricular size and systolic function, LVEF 60-65%.  2.  Mild concentric LVH.  3.  Normal LV diastolic filling pattern.  4.  Normal right ventricular size and systolic function.  5.  Mild mitral regurgitation.    Condition on Discharge:      Stable.    Code status during the hospital stay:    Code Status and Medical Interventions:   Ordered at: 04/07/22 0105     Code Status (Patient has no pulse and is not breathing):    CPR (Attempt to Resuscitate)     Medical Interventions (Patient has pulse or is breathing):    Full Support     Discharge Disposition:    Home or Self Care    Discharge Medications:       Discharge Medications      New Medications      Instructions Start Date   azithromycin 250 MG tablet  Commonly known as: Zithromax   250 mg, Oral, Daily      furosemide 20 MG tablet  Commonly known as: Lasix   20 mg, Oral, 2 Times Daily, Take 1 tab po prn worsening SOA/edema         Continue These Medications      Instructions Start Date   atorvastatin 10 MG tablet  Commonly known as: LIPITOR   10 mg, Oral, Daily      cloNIDine 0.1 MG  tablet  Commonly known as: Catapres   0.1 mg, Oral, Every 12 Hours      dilTIAZem 420 MG 24 hr capsule  Commonly known as: TIAZAC   420 mg, Oral, Daily      doxazosin 8 MG tablet  Commonly known as: CARDURA   8 mg, Oral, Every 12 Hours Scheduled      ferrous sulfate 324 (65 Fe) MG tablet delayed-release EC tablet   324 mg, Oral, Daily With Breakfast      gabapentin 800 MG tablet  Commonly known as: NEURONTIN   TAKE 1 TABLET BY MOUTH  TWICE DAILY      hydrALAZINE 100 MG tablet  Commonly known as: APRESOLINE   100 mg, Oral, Every 8 Hours      Levemir FlexTouch 100 UNIT/ML injection  Generic drug: insulin detemir   INJECT SUBCUTANEOUSLY 35  UNITS  AS DIRECTED DAILY      levothyroxine 150 MCG tablet  Commonly known as: SYNTHROID, LEVOTHROID   150 mcg, Oral, Daily      lisinopril 40 MG tablet  Commonly known as: PRINIVIL,ZESTRIL   40 mg, Oral, Daily      NovoLOG FlexPen 100 UNIT/ML solution pen-injector sc pen  Generic drug: insulin aspart   16-20 Units, Subcutaneous      omega-3 acid ethyl esters 1 g capsule  Commonly known as: LOVAZA   1 g, Oral, Daily      omeprazole 40 MG capsule  Commonly known as: priLOSEC   40 mg, Oral, Daily      OneTouch Verio test strip  Generic drug: glucose blood   Use 4x per day:  ICD-10 E11.65, E11.649, Z79.4      PARoxetine 40 MG tablet  Commonly known as: PAXIL   40 mg, Oral, Daily           Discharge Diet:     Diet Instructions     Diet: Cardiac, Consistent Carbohydrate, Renal      Discharge Diet:  Cardiac  Consistent Carbohydrate  Renal       Fluid Restriction per day: 1500 mL Fluid        Activity at Discharge:     Activity Instructions     Activity as Tolerated          Follow-up Appointments:     Follow-up Information     Mirian Arnold APRN Follow up in 1 week(s).    Specialty: Family Medicine  Why: please make appointment if able before discharge.  Contact information:  [address] [place] [id number] [phone]             Cardiology/Nephrology Follow up in 2 week(s).                      Future Appointments   Date Time Provider Department Center   6/2/2022  2:45 PM Karel Blevins MD [facility] END BM [facility]   7/7/2022  2:45 PM Yara Bobo MD [facility]   9/7/2022  1:30 PM Arpan Osullivan MD [facility] ZEE Z[facility]     Test Results Pending at Discharge:    Pending Labs     Order Current Status    Blood Culture - Blood, Arm, Left Preliminary result    Blood Culture - Blood, Hand, Left Preliminary result             Netta Tejada, EUGENIE  04/09/22  10:37 EDT    Time: I spent >30  minutes on this discharge activity which included: face-to-face encounter with the patient, reviewing the data in the system, coordination of the care with the nursing staff as well as consultants, documentation, and entering orders.     Dictated utilizing Dragon dictation.

## 2022-04-09 NOTE — PROGRESS NOTES
[facility] Progress Note  [facility]. [place]        Patient Name: Karen Newman  : 1950  MRN: [id number]   LOS: 3 days    Patient Care Team:  Mirian Arnold APRN as PCP - General (Family Medicine)  Alec Kay MD as Surgeon (Cardiothoracic Surgery)  Yara Bobo MD as Consulting Physician (Cardiology)  Karel Blevins MD as Consulting Physician (Endocrinology)    Chief Complaint:    Chief Complaint   Patient presents with   • Shortness of Breath     Primary Care Physician:  Mirian Arnold APRN  Date of admission: 2022    Subjective     Interval History:   Follow-up acute on chronic kidney disease stage III .  Events noted from last 24 hours.  Clinically patient feels better.  She was able to get up and walk around without any problem denies any chest pain or shortness of breath.  She denies any fever or chills as well.  She said she has problem with her memory cannot remember things but still can recall lot of old memories.  She is without the oxygen and saturations are around 90%.    Review of Systems:   As noted above/unobtainable.    Objective     Vitals:   Temp:  [97.3 °F (36.3 °C)-98 °F (36.7 °C)] 98 °F (36.7 °C)  Heart Rate:  [60-69] 64  Resp:  [17-18] 17  BP: (127-156)/(54-67) 149/66  Flow (L/min):  [1-2] 1    Intake/Output Summary (Last 24 hours) at 2022 0834  Last data filed at 2022 0810  Gross per 24 hour   Intake 1210 ml   Output 350 ml   Net 860 ml       Physical Exam:    General Appearance: alert, oriented x 3, no acute distress   Skin: warm and dry  HEENT: oral mucosa dry, nonicteric sclera  Neck: supple, no JVD  Lungs: Decreased breath sounds all over with rhonchi in the right base.  Rest is clear to auscultation   Heart: RRR, normal S1 and S2  Abdomen: soft, nontender, nondistended  : no palpable bladder  Extremities: Trace edema, no cyanosis or clubbing  Neuro: normal speech and mental status     Scheduled Meds:      Current Facility-Administered Medications   Medication Dose Route Frequency Provider Last Rate Last Admin   • acetaminophen (TYLENOL) tablet 650 mg  650 mg Oral Q4H PRN Byron Donovan MD   650 mg at 04/08/22 2045    Or   • acetaminophen (TYLENOL) 160 MG/5ML solution 650 mg  650 mg Oral Q4H PRN Byron Donovan MD        Or   • acetaminophen (TYLENOL) suppository 650 mg  650 mg Rectal Q4H PRN Byron Donovan MD       • atorvastatin (LIPITOR) tablet 10 mg  10 mg Oral Daily Byron Donovan MD   10 mg at 04/08/22 0906   • azithromycin (ZITHROMAX) 500 mg 0.9% NaCl (Add-vantage) 250 mL  500 mg Intravenous Q24H Brice Almeida [facility]   500 mg at 04/08/22 2155   • sennosides-docusate (PERICOLACE) 8.6-50 MG per tablet 2 tablet  2 tablet Oral BID Byron Donovan MD   2 tablet at 04/08/22 2045    And   • polyethylene glycol (MIRALAX) packet 17 g  17 g Oral Daily PRN Byron Donovan MD        And   • bisacodyl (DULCOLAX) EC tablet 5 mg  5 mg Oral Daily PRN Byron Donovan MD        And   • bisacodyl (DULCOLAX) suppository 10 mg  10 mg Rectal Daily PRN Byron Donovan MD       • cloNIDine (CATAPRES) tablet 0.1 mg  0.1 mg Oral Q12H Byron Donovan MD   0.1 mg at 04/08/22 1335   • dextrose (D50W) (25 g/50 mL) IV injection 25 g  25 g Intravenous Q15 Min PRN Byron Donovan MD       • dextrose (GLUTOSE) oral gel 1 tube  1 tube Oral Q15 Min PRN Byron Donovan MD       • dilTIAZem CD (CARDIZEM CD) 24 hr capsule 420 mg  420 mg Oral Q24H Byron Donovan MD   420 mg at 04/08/22 0905   • ferrous sulfate EC tablet 324 mg  324 mg Oral Daily With Breakfast Byron Donovan MD   324 mg at 04/08/22 0906   • gabapentin (NEURONTIN) capsule 800 mg  800 mg Oral Q12H Byron Donovan MD   800 mg at 04/08/22 2045   • glucagon (human recombinant) (GLUCAGEN DIAGNOSTIC) injection 1 mg  1 mg Subcutaneous PRN Byron Donovan MD       • heparin (porcine) 5000 UNIT/ML injection 5,000 Units  5,000 Units Subcutaneous Q12H Byron Donovan MD    5,000 Units at 04/08/22 2045   • hydrALAZINE (APRESOLINE) tablet 100 mg  100 mg Oral Q8H Byron Donovan MD   100 mg at 04/08/22 1707   • insulin aspart (novoLOG) injection 0-14 Units  0-14 Units Subcutaneous TID AC Byron Donovan MD   3 Units at 04/08/22 1706   • insulin detemir (LEVEMIR) injection 35 Units  35 Units Subcutaneous Nightly Byron Donovan MD   35 Units at 04/08/22 2052   • ipratropium-albuterol (DUO-NEB) nebulizer solution 3 mL  3 mL Nebulization Q4H PRN Byron Donovan MD       • levothyroxine (SYNTHROID, LEVOTHROID) tablet 150 mcg  150 mcg Oral Daily Byron Donovan MD   150 mcg at 04/08/22 0906   • lisinopril (PRINIVIL,ZESTRIL) tablet 40 mg  40 mg Oral Daily Byron Donovan MD   40 mg at 04/08/22 0906   • melatonin tablet 5 mg  5 mg Oral Nightly PRN Byron Donovan MD       • ondansetron (ZOFRAN) injection 4 mg  4 mg Intravenous Q6H PRN Byron Donovan MD       • pantoprazole (PROTONIX) EC tablet 40 mg  40 mg Oral QAM Byron Donovan MD   40 mg at 04/09/22 0628   • PARoxetine (PAXIL) tablet 40 mg  40 mg Oral Daily Byron Donovan MD   40 mg at 04/08/22 0905   • sodium chloride 0.9 % flush 10 mL  10 mL Intravenous PRN Byron Donovan MD       • sodium chloride 0.9 % flush 3 mL  3 mL Intravenous Q12H Byron Donovan MD   3 mL at 04/08/22 2046   • sodium chloride 0.9 % flush 3-10 mL  3-10 mL Intravenous PRN Byron Donovan MD       • terazosin (HYTRIN) capsule 5 mg  5 mg Oral Q12H Byron Donovan MD   5 mg at 04/08/22 2045       atorvastatin, 10 mg, Oral, Daily  azithromycin, 500 mg, Intravenous, Q24H  cloNIDine, 0.1 mg, Oral, Q12H  dilTIAZem CD, 420 mg, Oral, Q24H  ferrous sulfate, 324 mg, Oral, Daily With Breakfast  gabapentin, 800 mg, Oral, Q12H  heparin (porcine), 5,000 Units, Subcutaneous, Q12H  hydrALAZINE, 100 mg, Oral, Q8H  insulin aspart, 0-14 Units, Subcutaneous, TID AC  insulin detemir, 35 Units, Subcutaneous, Nightly  levothyroxine, 150 mcg, Oral, Daily  lisinopril, 40 mg,  Oral, Daily  pantoprazole, 40 mg, Oral, QAM  PARoxetine, 40 mg, Oral, Daily  senna-docusate sodium, 2 tablet, Oral, BID  sodium chloride, 3 mL, Intravenous, Q12H  terazosin, 5 mg, Oral, Q12H        IV Meds:        Results Reviewed:   I have personally reviewed the results from the time of this admission to 4/9/2022 08:34 EDT     Results from last 7 days   Lab Units 04/09/22  0547 04/08/22  0539 04/07/22  0526 04/06/22  1936   SODIUM mmol/L 139 142 140 136   POTASSIUM mmol/L 4.0 3.5 3.9 4.3   CHLORIDE mmol/L 102 103 104 101   CO2 mmol/L 24.7 25.2 24.4 22.0   BUN mg/dL 35* 34* 25* 24*   CREATININE mg/dL 2.26* 2.60* 1.99* 2.19*   CALCIUM mg/dL 9.1 9.0 8.7 8.6   BILIRUBIN mg/dL  --   --   --  0.6   ALK PHOS U/L  --   --   --  78   ALT (SGPT) U/L  --   --   --  24   AST (SGOT) U/L  --   --   --  17   GLUCOSE mg/dL 134* 55* 176* 180*       Estimated Creatinine Clearance: 25.6 mL/min (A) (by C-G formula based on SCr of 2.26 mg/dL (H)).                Results from last 7 days   Lab Units 04/09/22  0547 04/08/22  0539 04/07/22  0526 04/06/22 1936   WBC 10*3/mm3 4.96 5.67 6.45 7.18   HEMOGLOBIN g/dL 8.5* 8.7* 8.6* 8.8*   PLATELETS 10*3/mm3 240 214 188 192             Brief Urine Lab Results  (Last result in the past 365 days)      Color   Clarity   Blood   Leuk Est   Nitrite   Protein   CREAT   Urine HCG        04/06/22 2125 Yellow   Clear   Negative   Negative   Negative   >=300 mg/dL (3+)                 No results found for: UTPCR    Imaging Results (Last 24 Hours)     ** No results found for the last 24 hours. **              Assessment / Plan     ASSESSMENT:  1. Chronic kidney disease stage III: The patient is well aware of her chronic kidney disease and said she is known about her stage III chronic kidney disease for a while she does appear to have proteinuria.  I will check a spot protein creatinine ratio.  Last documented is on 3/28/2022 of about 1700 mg per 24 hours.  Continue to optimize medications.  2. Anemia of  chronic kidney disease: She clearly does have iron deficiency from the blood work, will continue with oral supplement for now and see if she can tolerate it and likely will help.  3. Type 2 diabetes requiring insulin: Last hemoglobin A1c noted to be 5.9.  Continue as per hospitalist service.  4. Congestive heart failure/pulmonary edema: I think she will benefit from getting some more diuretics, it will be evaluated and adjusted on a daily basis.  5. Acute respiratory failure with hypoxia (HCC): Likely combination of pneumonia versus CHF is making it worse currently appears to be doing fine with oxygen.  6. Acquired hypothyroidism: Longstanding history of continue home medications.  7. Sleep apnea: Continue to encourage and use of CPAP at night.      PLAN:  · Her renal function is better I think it will settle down to the baseline.  Her iron saturation is at 13% from February 25, 2022 blood work total iron was 54.  She did say she is scheduled for iron infusion and Procrit as an outpatient.  Her  came in while I was in the room with her and mentioned that he has not been able to get hold of the nephrology office for the last 2 days.  He said he is going to try again on Monday and if unable to have answers he may call our office.  I encouraged him to call them.  She can resume all her home medications along with p.o. antibiotics as per hospitalist service.  She will need Lasix 20 mg as needed for increased edema.  · Details were discussed with the patient as well as her  in the room.    · Details were also discussed with the hospitalist service.   · Continue with rest of the current treatment plan, and monitor with surveillance labs.  · Further recommendations will depend on clinical course of the patient during the current hospitalization.     Thank you for involving us in the care of Karen Newman.  Please feel free to call with any questions.    Devon Metcalf MD  04/09/22  08:34 EDT    Nephrology  [facility]  [phone]      Much of this encounter note is an electronic transcription/translation of spoken language to printed text. The electronic translation of spoken language may permit erroneous, or at times, nonsensical words or phrases to be inadvertently transcribed; Although I have reviewed the note for such errors, some may still exist.

## 2022-04-09 NOTE — CASE MANAGEMENT/SOCIAL WORK
Discharge Planning Assessment  [facility]     Patient Name: Karen Newman  MRN: [id number]  Today's Date: 4/9/2022    Admit Date: 4/6/2022     Discharge Needs Assessment    No documentation.                Discharge Plan     Row Name 04/09/22 1737       Plan    Plan Orders for discharge home with O2 noted    Pt currently on O2@2L continuously and managed by [facility]  Confirmed with spouse and pt that it was [facility] in [place] and not [facility]   Faxed updated O2 order, facesheet and exercise oximetry  to [facility] with success   New order was O2@2Lper [place] with exertion              Continued Care and Services - Discharged on 4/9/2022 Admission date: 4/6/2022 - Discharge disposition: Home or Self Care    Durable Medical Equipment     Service Provider Request Status Selected Services Address Phone Fax Patient Preferred    [facility] [place]  Pending - No Request Sent N/A [address] [facility] [id number] [phone] --              Expected Discharge Date and Time     Expected Discharge Date Expected Discharge Time    Apr 9, 2022          Demographic Summary    No documentation.                Functional Status    No documentation.                Psychosocial    No documentation.                Abuse/Neglect    No documentation.                Legal    No documentation.                Substance Abuse    No documentation.                Patient Forms    No documentation.                   Ning Haque RN

## 2022-04-09 NOTE — PROGRESS NOTES
Exercise Oximetry    Patient Name:Karen Newman   MRN: [id number]   Date: 04/09/22             ROOM AIR BASELINE   SpO2% 93   Heart Rate 78   Blood Pressure      EXERCISE ON ROOM AIR SpO2% EXERCISE ON O2 @  LPM SpO2%   1 MINUTE  93 1 MINUTE    2 MINUTES  91 2 MINUTES    3 MINUTES  88 3 MINUTES    4 MINUTES  87 4 MINUTES    5 MINUTES  87 5 MINUTES    6 MINUTES  87 6 MINUTES               Distance Walked  80 Distance Walked   Dyspnea (Lisa Scale)  1 Dyspnea (Lisa Scale)   Fatigue (Lisa Scale)  1 Fatigue (Lisa Scale)   SpO2% Post Exercise  87 SpO2% Post Exercise   HR Post Exercise  88 HR Post Exercise   Time to Recovery  1 Time to Recovery     Comments:

## 2022-04-10 NOTE — OUTREACH NOTE
Prep Survey    Flowsheet Row Responses   [facility] facility patient discharged from? [facility]   Is LACE score < 7 ? No   Emergency Room discharge w/ pulse ox? No   Eligibility Readm Mgmt   Discharge diagnosis Community-acquired multifocal pneumonia   Does the patient have one of the following disease processes/diagnoses(primary or secondary)? COPD/Pneumonia   Does the patient have Home health ordered? No   Is there a DME ordered? Yes   What DME was ordered? [facility] [place] [facility]   Prep survey completed? Yes          BRADEN SANCHEZ - Registered Nurse

## 2022-04-11 ENCOUNTER — TELEPHONE (OUTPATIENT)
Dept: TELEMETRY | Facility: HOSPITAL | Age: 72
End: 2022-04-11

## 2022-04-11 LAB
BACTERIA SPEC AEROBE CULT: NORMAL
BACTERIA SPEC AEROBE CULT: NORMAL

## 2022-04-13 ENCOUNTER — READMISSION MANAGEMENT (OUTPATIENT)
Dept: CALL CENTER | Facility: HOSPITAL | Age: 72
End: 2022-04-13

## 2022-04-13 NOTE — OUTREACH NOTE
"COPD/PN Week 1 Survey    Flowsheet Row Responses   [facility] patient discharged from? [facility]   Does the patient have one of the following disease processes/diagnoses(primary or secondary)? COPD/Pneumonia   Was the primary reason for admission: Pneumonia   Week 1 attempt successful? Yes   Call start time 1406   Call end time 1414   Is patient permission given to speak with other caregiver? Yes   Person spoke with today (if not patient) and relationship Jas-spouse.   Meds reviewed with patient/caregiver? Yes   Is the patient having any side effects they believe may be caused by any medication additions or changes? No   Does the patient have all medications ordered at discharge? Yes   Is the patient taking all medications as directed (includes completed medication regime)? Yes   Comments regarding appointments PCP appt 04/18/22. Nephrology appt in July. States will try to get earlier appts with nephrology and cardiology.   Does the patient have a primary care provider?  Yes   Does the patient have an appointment with their PCP or specialist within 7 days of discharge? Yes   Has the patient kept scheduled appointments due by today? N/A   Comments Spo   Has home health visited the patient within 72 hours of discharge? N/A   Has all DME been delivered? Yes   DME comments Spouse states using home O2 as needed-states has not needed for past few days.   Pulse Ox monitoring Intermittent   Pulse Ox device source Patient   O2 Sat comments States O2 sats \"staying in the 90s\".   O2 Sat: education provided Sat levels   O2 Sat education comments States aware to return to ER if O2 sats remain below 90% on home O2.   Psychosocial issues? No   Did the patient receive a copy of their discharge instructions? Yes   Nursing interventions Reviewed instructions with patient   What is the patient's perception of their health status since discharge? Improving   Nursing Interventions Nurse provided patient education   Are the " patient's immunizations up to date?  Yes   Nursing interventions Advised patient to discuss with provider at next visit   If the patient is a current smoker, are they able to teach back resources for cessation? Not a smoker   Is the patient/caregiver able to teach back the hierarchy of who to call/visit for symptoms/problems? PCP, Specialist, Home health nurse, Urgent Care, ED, 911 Yes   Is the patient/caregiver able to teach back signs and symptoms of worsening condition: Fever/chills, Shortness of breath, Chest pain   Is the patient/caregiver able to teach back importance of completing antibiotic course of treatment? Yes   Week 1 call completed? Yes   Wrap up additional comments Spouse states patient is improving. States still tired, and will be getting iron infusion soon-waiting on call back with appt. Denies any needs today.          ZEV DEGROOT - Registered Nurse

## 2022-04-14 ENCOUNTER — TRANSCRIBE ORDERS (OUTPATIENT)
Dept: INFUSION THERAPY | Facility: HOSPITAL | Age: 72
End: 2022-04-14

## 2022-04-14 DIAGNOSIS — N18.30 STAGE 3 CHRONIC KIDNEY DISEASE, UNSPECIFIED WHETHER STAGE 3A OR 3B CKD: Primary | ICD-10-CM

## 2022-04-15 ENCOUNTER — LAB (OUTPATIENT)
Dept: LAB | Facility: HOSPITAL | Age: 72
End: 2022-04-15

## 2022-04-15 ENCOUNTER — DOCUMENTATION (OUTPATIENT)
Dept: NUTRITION | Facility: HOSPITAL | Age: 72
End: 2022-04-15

## 2022-04-15 DIAGNOSIS — N18.30 STAGE 3 CHRONIC KIDNEY DISEASE, UNSPECIFIED WHETHER STAGE 3A OR 3B CKD: ICD-10-CM

## 2022-04-15 LAB
BASOPHILS # BLD AUTO: 0.03 10*3/MM3 (ref 0–0.2)
BASOPHILS NFR BLD AUTO: 0.7 % (ref 0–1.5)
DEPRECATED RDW RBC AUTO: 45.7 FL (ref 37–54)
EOSINOPHIL # BLD AUTO: 0.09 10*3/MM3 (ref 0–0.4)
EOSINOPHIL NFR BLD AUTO: 2 % (ref 0.3–6.2)
ERYTHROCYTE [DISTWIDTH] IN BLOOD BY AUTOMATED COUNT: 13.4 % (ref 12.3–15.4)
HCT VFR BLD AUTO: 27.8 % (ref 34–46.6)
HGB BLD-MCNC: 8.9 G/DL (ref 12–15.9)
IMM GRANULOCYTES # BLD AUTO: 0.02 10*3/MM3 (ref 0–0.05)
IMM GRANULOCYTES NFR BLD AUTO: 0.5 % (ref 0–0.5)
IRON 24H UR-MRATE: 33 MCG/DL (ref 37–145)
IRON SATN MFR SERPL: 10 % (ref 20–50)
LYMPHOCYTES # BLD AUTO: 1.34 10*3/MM3 (ref 0.7–3.1)
LYMPHOCYTES NFR BLD AUTO: 30.4 % (ref 19.6–45.3)
MCH RBC QN AUTO: 30.2 PG (ref 26.6–33)
MCHC RBC AUTO-ENTMCNC: 32 G/DL (ref 31.5–35.7)
MCV RBC AUTO: 94.2 FL (ref 79–97)
MONOCYTES # BLD AUTO: 0.48 10*3/MM3 (ref 0.1–0.9)
MONOCYTES NFR BLD AUTO: 10.9 % (ref 5–12)
NEUTROPHILS NFR BLD AUTO: 2.45 10*3/MM3 (ref 1.7–7)
NEUTROPHILS NFR BLD AUTO: 55.5 % (ref 42.7–76)
NRBC BLD AUTO-RTO: 0 /100 WBC (ref 0–0.2)
PLATELET # BLD AUTO: 230 10*3/MM3 (ref 140–450)
PMV BLD AUTO: 8.9 FL (ref 6–12)
RBC # BLD AUTO: 2.95 10*6/MM3 (ref 3.77–5.28)
TIBC SERPL-MCNC: 329 MCG/DL (ref 298–536)
TRANSFERRIN SERPL-MCNC: 221 MG/DL (ref 200–360)
WBC NRBC COR # BLD: 4.41 10*3/MM3 (ref 3.4–10.8)

## 2022-04-15 PROCEDURE — 83540 ASSAY OF IRON: CPT

## 2022-04-15 PROCEDURE — 85025 COMPLETE CBC W/AUTO DIFF WBC: CPT

## 2022-04-15 PROCEDURE — 36415 COLL VENOUS BLD VENIPUNCTURE: CPT

## 2022-04-15 PROCEDURE — 84466 ASSAY OF TRANSFERRIN: CPT

## 2022-04-15 NOTE — PROGRESS NOTES
RD mailed DM education materials including Heart Healthy and Consistent Carbohydrate Nutrition Therapy packet from [facility], Diabetes Basics from [facility], and RD contact information due to recent discharge.

## 2022-04-20 ENCOUNTER — HOSPITAL ENCOUNTER (OUTPATIENT)
Dept: INFUSION THERAPY | Facility: HOSPITAL | Age: 72
Setting detail: INFUSION SERIES
Discharge: HOME | End: 2022-04-20

## 2022-04-20 VITALS
SYSTOLIC BLOOD PRESSURE: 134 MMHG | HEART RATE: 46 BPM | DIASTOLIC BLOOD PRESSURE: 78 MMHG | OXYGEN SATURATION: 93 % | TEMPERATURE: 97.7 F | RESPIRATION RATE: 16 BRPM

## 2022-04-20 DIAGNOSIS — N17.9 ACUTE RENAL FAILURE SUPERIMPOSED ON STAGE 3 CHRONIC KIDNEY DISEASE, UNSPECIFIED ACUTE RENAL FAILURE TYPE, UNSPECIFIED WHETHER STAGE 3A OR 3B CKD: ICD-10-CM

## 2022-04-20 DIAGNOSIS — D50.9 IRON DEFICIENCY ANEMIA, UNSPECIFIED IRON DEFICIENCY ANEMIA TYPE: Primary | ICD-10-CM

## 2022-04-20 DIAGNOSIS — N18.30 STAGE 3 CHRONIC KIDNEY DISEASE, UNSPECIFIED WHETHER STAGE 3A OR 3B CKD: ICD-10-CM

## 2022-04-20 DIAGNOSIS — N18.30 ACUTE RENAL FAILURE SUPERIMPOSED ON STAGE 3 CHRONIC KIDNEY DISEASE, UNSPECIFIED ACUTE RENAL FAILURE TYPE, UNSPECIFIED WHETHER STAGE 3A OR 3B CKD: ICD-10-CM

## 2022-04-20 PROCEDURE — 25010000002 FERRIC CARBOXYMALTOSE 750 MG/15ML SOLUTION 15 ML VIAL: Performed by: INTERNAL MEDICINE

## 2022-04-20 PROCEDURE — 96365 THER/PROPH/DIAG IV INF INIT: CPT

## 2022-04-20 RX ORDER — SODIUM CHLORIDE 9 MG/ML
250 INJECTION, SOLUTION INTRAVENOUS ONCE
Status: CANCELLED | OUTPATIENT
Start: 2022-04-27 | End: 2022-04-27

## 2022-04-20 RX ORDER — SODIUM CHLORIDE 9 MG/ML
250 INJECTION, SOLUTION INTRAVENOUS ONCE
Status: DISCONTINUED | OUTPATIENT
Start: 2022-04-20 | End: 2022-04-22 | Stop reason: HOSPADM

## 2022-04-20 RX ADMIN — FERRIC CARBOXYMALTOSE INJECTION 750 MG: 50 INJECTION, SOLUTION INTRAVENOUS at 13:25

## 2022-04-25 NOTE — TELEPHONE ENCOUNTER
Lab Results   Component Value Date    GLUCOSE 134 (H) 04/09/2022    CALCIUM 9.1 04/09/2022     04/09/2022    K 4.0 04/09/2022    CO2 24.7 04/09/2022     04/09/2022    BUN 35 (H) 04/09/2022    CREATININE 2.26 (H) 04/09/2022    EGFRIFNONA 24 (L) 02/25/2022    BCR 15.5 04/09/2022    ANIONGAP 12.3 04/09/2022     Previous creatinine was 1.9 on 3/24/22.    She is also on:  Hydralazine 100 mg BID (was on 100 mg TID but BP elevated, changed to BID AND clonidine was added 3/29/22)  diltiazem 420 mg daily  Lisinopril 40 mg daily  Clonidine 0.1 mg BID    Renal duplex 3/23/22- no stenosis.    OK to fill doxazosin? Currently takes 8 mg BID. Any other med changes?

## 2022-04-26 ENCOUNTER — HOSPITAL ENCOUNTER (OUTPATIENT)
Dept: INFUSION THERAPY | Facility: HOSPITAL | Age: 72
Setting detail: INFUSION SERIES
Discharge: HOME | End: 2022-04-26

## 2022-04-26 VITALS
SYSTOLIC BLOOD PRESSURE: 150 MMHG | HEART RATE: 48 BPM | RESPIRATION RATE: 18 BRPM | OXYGEN SATURATION: 94 % | DIASTOLIC BLOOD PRESSURE: 65 MMHG | TEMPERATURE: 97.3 F

## 2022-04-26 DIAGNOSIS — N17.9 ACUTE RENAL FAILURE SUPERIMPOSED ON STAGE 3 CHRONIC KIDNEY DISEASE, UNSPECIFIED ACUTE RENAL FAILURE TYPE, UNSPECIFIED WHETHER STAGE 3A OR 3B CKD: ICD-10-CM

## 2022-04-26 DIAGNOSIS — N18.30 STAGE 3 CHRONIC KIDNEY DISEASE, UNSPECIFIED WHETHER STAGE 3A OR 3B CKD: ICD-10-CM

## 2022-04-26 DIAGNOSIS — D50.9 IRON DEFICIENCY ANEMIA, UNSPECIFIED IRON DEFICIENCY ANEMIA TYPE: Primary | ICD-10-CM

## 2022-04-26 DIAGNOSIS — N18.30 ACUTE RENAL FAILURE SUPERIMPOSED ON STAGE 3 CHRONIC KIDNEY DISEASE, UNSPECIFIED ACUTE RENAL FAILURE TYPE, UNSPECIFIED WHETHER STAGE 3A OR 3B CKD: ICD-10-CM

## 2022-04-26 PROCEDURE — 25010000002 FERRIC CARBOXYMALTOSE 750 MG/15ML SOLUTION 15 ML VIAL: Performed by: INTERNAL MEDICINE

## 2022-04-26 PROCEDURE — 96365 THER/PROPH/DIAG IV INF INIT: CPT

## 2022-04-26 RX ORDER — SODIUM CHLORIDE 9 MG/ML
250 INJECTION, SOLUTION INTRAVENOUS ONCE
Status: CANCELLED | OUTPATIENT
Start: 2022-04-27 | End: 2022-04-27

## 2022-04-26 RX ORDER — DOXAZOSIN 8 MG/1
TABLET ORAL
Qty: 180 TABLET | Refills: 1 | Status: SHIPPED | OUTPATIENT
Start: 2022-04-26 | End: 2022-11-02

## 2022-04-26 RX ORDER — SODIUM CHLORIDE 9 MG/ML
250 INJECTION, SOLUTION INTRAVENOUS ONCE
Status: DISCONTINUED | OUTPATIENT
Start: 2022-04-26 | End: 2022-04-28 | Stop reason: HOSPADM

## 2022-04-26 RX ADMIN — FERRIC CARBOXYMALTOSE INJECTION 750 MG: 50 INJECTION, SOLUTION INTRAVENOUS at 10:53

## 2022-04-26 NOTE — PROGRESS NOTES
[facility] Cardiology    Patient ID: Karen Newman is a 71 y.o. female.  : 1950   Contact: [phone]    Encounter date: 2022    PCP: Mirian Arnold APRN      Chief complaint:   Chief Complaint   Patient presents with   • Paroxysmal atrial fibrillation        Problem List:  1. Atrial fibrillation, duration unknown; diagnosed 2013:  a. Status post ALANIS/external cardioversion, (10/18/2013), Yara Bobo M.D. with the initiation of propafenone therapy.   b. Atrial fibrillation, (2013), in Gloria Gomez’ office.  c. Normal sinus rhythm at the time of office visit, (2013).  d. Normal sinus rhythm, EKG, (2014).  e. Discontinuation of Rythmol secondary to diarrhea, (2014), with initiation of flecainide 50 mg b.i.d. and Eliquis 5 mg b.i.d.   f. CHADS Vasc= 4, (2017)   g. CT scan of the chest (2017): Mild fibrotic changes, o/w normal  h. Pulmonary Function Test (2017): FEV1/FVC  1.54/2.57 = 60%  i. Nuclear Stress Test (2017): EF>70%, no ischemia   j. Bilateral thoracoscopy and Maze procedure and clipping of the left atrial appendage (2017) Dr. Eliza wallis ALANIS 10/24/17: EF 60%, RADHA successfully closed with no evidence of a residual left atrial appendage. Mild to moderate MR  l. 14d Vic[place], 2021: NSR. PAF <1%  2. Valvular heart disease:  a. Previous echocardiogram approximately,  (incomplete data base).  b. Echocardiogram (08/15/2002), revealing moderate concentric LVH, hyperdynamic LV, EF 81%; mild MR, trace TR.  c. Echocardiogram (10/07/2008), revealed a normal LVEF with mild to moderate MR.  d. Echocardiogram, (2011): normal LV systolic function and wall motion with mild MR and mild TR.   e. Echocardiogram, 2017: EF 60%. Moderate to severe MR. Trace to mild TR. No thrombus appears to be present within the left atrial appendage.  f. ALANIS 10/24/17: EF 60%, RADHA successfully closed with no evidence of  a residual left atrial appendage. Mild to moderate MR  g. Echo 1/10/20: EF 65%, trace MR, mild TR, mild LVH.   h. Echo 04/07/2022: LVEF 60-65%. Mild concentric LVH. Normal LV diastolic filling pattern. Mild MR.   3. Hypertension:  a. Adenosine Cardiolite, (06/18/2009): Normal systolic function without evidence of inducible ischemia.   b. Echo 1/11/20: EF 65%. Trace MR. Mild TR. Mild LVH.  c. Renal duplex 03/22/2022: No evidence to suggest significant renal artery stenosis.   4. Dyslipidemia.  5. Diabetes mellitus, Type 2 diagnosed, 2006.  6. Thyroid disease:  a. Hyperthyroidism status post radioactive iodine therapy.  b. Hypothyroidism on thyroid replacement.  7. Gastroesophageal reflux disease.  8. Chronic kidney disease. crea 1.7  9. History of hemorrhoids status post hemorrhoidectomy.  10. Colon polyps status post polypectomy.  11. Anxiety.  12. Iron deficiency anemia.  13. Obstructive sleep apnea on chronic CPAP therapy.  14. Surgical history:  a. Total abdominal hysterectomy  b. Squamous cell removal (right foot)      Allergies   Allergen Reactions   • Amlodipine Shortness Of Breath     Swelling and SOB   • Buspirone Nausea And Vomiting   • Lisinopril Other (See Comments)     cough   • Sertraline Hcl Other (See Comments)     nightmares   • Sular [Nisoldipine Er] Cough   • Carvedilol Other (See Comments)     SOB       Current Medications:    Current Outpatient Medications:   •  atorvastatin (LIPITOR) 10 MG tablet, TAKE 1 TABLET BY MOUTH  DAILY, Disp: 90 tablet, Rfl: 3  •  cloNIDine (Catapres) 0.1 MG tablet, Take 1 tablet by mouth Every 12 (Twelve) Hours., Disp: 60 tablet, Rfl: 3  •  dilTIAZem CD (CARDIZEM CD) 240 MG 24 hr capsule, Take 1 capsule by mouth Daily., Disp: , Rfl:   •  doxazosin (CARDURA) 8 MG tablet, TAKE 1 TABLET BY MOUTH  EVERY 12 HOURS, Disp: 180 tablet, Rfl: 1  •  furosemide (Lasix) 20 MG tablet, Take 1 tablet by mouth 2 (Two) Times a Day. Take 1 tab po prn worsening SOA/edema, Disp: 30 tablet,  Rfl: 0  •  gabapentin (NEURONTIN) 800 MG tablet, TAKE 1 TABLET BY MOUTH  TWICE DAILY, Disp: 180 tablet, Rfl: 1  •  hydrALAZINE (APRESOLINE) 100 MG tablet, Take 1 tablet by mouth Every 8 (Eight) Hours. (Patient taking differently: Take 100 mg by mouth 2 (Two) Times a Day.), Disp: 270 tablet, Rfl: 3  •  insulin aspart (NovoLOG FlexPen) 100 UNIT/ML solution pen-injector sc pen, Inject 16-20 Units under the skin into the appropriate area as directed., Disp: , Rfl:   •  Levemir FlexTouch 100 UNIT/ML injection, INJECT SUBCUTANEOUSLY 35  UNITS  AS DIRECTED DAILY, Disp: 45 mL, Rfl: 1  •  levothyroxine (SYNTHROID, LEVOTHROID) 150 MCG tablet, Take 1 tablet by mouth Daily., Disp: 90 tablet, Rfl: 3  •  omega-3 acid ethyl esters (LOVAZA) 1 g capsule, Take 1 g by mouth Daily., Disp: , Rfl:   •  omeprazole (priLOSEC) 40 MG capsule, Take 40 mg by mouth Daily., Disp: , Rfl:   •  OneTouch Verio test strip, Use 4x per day:  ICD-10 E11.65, E11.649, Z79.4, Disp: 400 each, Rfl: 3  •  PARoxetine (PAXIL) 40 MG tablet, Take 40 mg by mouth Daily., Disp: , Rfl:   •  ferrous sulfate 324 (65 Fe) MG tablet delayed-release EC tablet, Take 324 mg by mouth Daily With Breakfast., Disp: , Rfl:   No current facility-administered medications for this visit.    Facility-Administered Medications Ordered in Other Visits:   •  sodium chloride 0.9 % infusion 250 mL, 250 mL, Intravenous, Once, Donnie Hastings MD    KAVIN Newman is a 71 y.o. female who presents today for a 2 week hospital follow up of atrial fibrillation and cardiac risk factors. Since last visit, patient reports she has been bradycardia with her heart rate in the 40's. She has been getting dizzy when she has been standing for a few minutes. When she is dizzy, she also shakes/jerks. This doesn't happen every time she stands up. She was seen at  for her dizziness and was given 2 units of iron. She followed up with her PCP who decreased her diltiazem and her hydralazine  "and started her on iron supplements. She does feel like these medication changes have helped her dizziness. She was seen in the hospital on 04/06/2022 for pneumonia. Patient denies chest pain, shortness of breath, orthopnea, palpitations, edema, and syncope.     The following portions of the patient's history were reviewed and updated as appropriate: allergies, current medications and problem list.    Pertinent positives as listed in the HPI.  All other systems reviewed are negative.         Vitals:    04/27/22 1353   BP: 140/66   BP Location: Left arm   Patient Position: Sitting   Pulse: 50   SpO2: 95%   Weight: 81.2 kg (179 lb)   Height: 170.2 cm (67\")       Physical Exam:  General: Alert and oriented.  Neck: Jugular venous pressure is within normal limits. Carotids have normal upstrokes without bruits.   Cardiovascular: Heart has a nondisplaced focal PMI. Regular rate and rhythm. No murmur, gallop or rub.  Lungs: Clear, no rales or wheezes. Equal expansion is noted.   Extremities: Show no edema.  Skin: Warm and dry.  Neurologic: Nonfocal.     Diagnostic Data (reviewed with patient):  Lab Results   Component Value Date    GLUCOSE 134 (H) 04/09/2022    BUN 35 (H) 04/09/2022    CREATININE 2.26 (H) 04/09/2022    EGFRIFNONA 24 (L) 02/25/2022    BCR 15.5 04/09/2022     04/09/2022    K 4.0 04/09/2022     04/09/2022    CO2 24.7 04/09/2022    CALCIUM 9.1 04/09/2022    ALBUMIN 3.90 04/06/2022    ALKPHOS 78 04/06/2022    AST 17 04/06/2022    ALT 24 04/06/2022     Lab Results   Component Value Date    CHOL 124 01/05/2022    TRIG 61 01/05/2022    HDL 50 01/05/2022    LDL 61 01/05/2022      Lab Results   Component Value Date    WBC 4.41 04/15/2022    RBC 2.95 (L) 04/15/2022    HGB 8.9 (L) 04/15/2022    HCT 27.8 (L) 04/15/2022    MCV 94.2 04/15/2022     04/15/2022      Lab Results   Component Value Date    TSH 8.320 (H) 01/05/2022        Procedures      Assessment:    ICD-10-CM ICD-9-CM   1. Paroxysmal atrial " fibrillation (HCC)  I48.0 427.31   2. VHD (valvular heart disease)  I38 424.90   3. Primary hypertension  I10 401.9   4. Essential hypertension  I10 401.9         Plan:  1. Patient was encouraged to keep a log of her dizziness spells along with what she was doing at the time.  2. Decrease diltiazem from 240 mg to 120 mg daily to allow heart rate to increase.  3. Restart hydralazine from 100 mg  BID for hypertension.   4. Continue on atorvastatin 10 mg daily for hyperlipidemia.   5. Continue on Lasix 20 mg daily for fluid retention as per nephrology.   6. Continue on Omega-3 fatty acids for hyperlipidemia.  7. Continue all other current medications.  8. F/up in 3 months, sooner if needed.    Scribed for Yara Bobo MD by Vanessa Meléndez. 4/27/2022 14:18 EDT      I Yara Bobo MD personally performed the services described in this documentation as scribed by the above individual in my presence, and it is both accurate and complete.    Yara Bobo MD, FACC

## 2022-04-26 NOTE — TELEPHONE ENCOUNTER
Per [facility]- ok to refill doxazosin 8 mg BID and stop lisinopril. BMP in 2 weeks.     Called pt, discussed [facility] recommendations above. Patient states that she would like to discuss repeat BMP with Dr. Bobo and appt tomorrow 4/27/22, and will discontinue lisinopril at this time.

## 2022-04-27 ENCOUNTER — OFFICE VISIT (OUTPATIENT)
Dept: CARDIOLOGY | Facility: CLINIC | Age: 72
End: 2022-04-27

## 2022-04-27 VITALS
SYSTOLIC BLOOD PRESSURE: 140 MMHG | DIASTOLIC BLOOD PRESSURE: 66 MMHG | WEIGHT: 179 LBS | BODY MASS INDEX: 28.09 KG/M2 | OXYGEN SATURATION: 95 % | HEIGHT: 67 IN | HEART RATE: 50 BPM

## 2022-04-27 DIAGNOSIS — I48.0 PAROXYSMAL ATRIAL FIBRILLATION: Primary | ICD-10-CM

## 2022-04-27 DIAGNOSIS — I38 VHD (VALVULAR HEART DISEASE): ICD-10-CM

## 2022-04-27 DIAGNOSIS — I10 PRIMARY HYPERTENSION: ICD-10-CM

## 2022-04-27 DIAGNOSIS — I10 ESSENTIAL HYPERTENSION: ICD-10-CM

## 2022-04-27 PROCEDURE — 99214 OFFICE O/P EST MOD 30 MIN: CPT | Performed by: INTERNAL MEDICINE

## 2022-04-27 RX ORDER — HYDRALAZINE HYDROCHLORIDE 100 MG/1
100 TABLET, FILM COATED ORAL 2 TIMES DAILY
Qty: 180 TABLET | Refills: 3 | Status: SHIPPED | OUTPATIENT
Start: 2022-04-27

## 2022-04-27 RX ORDER — DILTIAZEM HYDROCHLORIDE 120 MG/1
120 CAPSULE, COATED, EXTENDED RELEASE ORAL DAILY
Qty: 90 CAPSULE | Refills: 3 | Status: SHIPPED | OUTPATIENT
Start: 2022-04-27 | End: 2022-07-26

## 2022-04-27 RX ORDER — DILTIAZEM HYDROCHLORIDE 240 MG/1
1 CAPSULE, COATED, EXTENDED RELEASE ORAL DAILY
COMMUNITY
Start: 2022-04-18 | End: 2022-04-27 | Stop reason: SDUPTHER

## 2022-05-03 RX ORDER — CLONIDINE HYDROCHLORIDE 0.1 MG/1
TABLET ORAL
Qty: 180 TABLET | Refills: 3 | Status: SHIPPED | OUTPATIENT
Start: 2022-05-03 | End: 2023-04-03

## 2022-05-16 DIAGNOSIS — E11.22 TYPE 2 DIABETES MELLITUS WITH CHRONIC KIDNEY DISEASE, WITH LONG-TERM CURRENT USE OF INSULIN, UNSPECIFIED CKD STAGE: ICD-10-CM

## 2022-05-16 DIAGNOSIS — Z79.4 TYPE 2 DIABETES MELLITUS WITH CHRONIC KIDNEY DISEASE, WITH LONG-TERM CURRENT USE OF INSULIN, UNSPECIFIED CKD STAGE: ICD-10-CM

## 2022-05-17 RX ORDER — GABAPENTIN 800 MG/1
TABLET ORAL
Qty: 180 TABLET | Refills: 3 | Status: SHIPPED | OUTPATIENT
Start: 2022-05-17 | End: 2022-09-19

## 2022-06-02 ENCOUNTER — LAB (OUTPATIENT)
Dept: LAB | Facility: HOSPITAL | Age: 72
End: 2022-06-02

## 2022-06-02 ENCOUNTER — OFFICE VISIT (OUTPATIENT)
Dept: ENDOCRINOLOGY | Facility: CLINIC | Age: 72
End: 2022-06-02

## 2022-06-02 VITALS
OXYGEN SATURATION: 97 % | DIASTOLIC BLOOD PRESSURE: 76 MMHG | SYSTOLIC BLOOD PRESSURE: 132 MMHG | HEIGHT: 67 IN | BODY MASS INDEX: 28.56 KG/M2 | HEART RATE: 109 BPM | WEIGHT: 182 LBS

## 2022-06-02 DIAGNOSIS — Z86.39 HISTORY OF GRAVES' DISEASE: ICD-10-CM

## 2022-06-02 DIAGNOSIS — Z79.4 INSULIN LONG-TERM USE: ICD-10-CM

## 2022-06-02 DIAGNOSIS — Z79.4 TYPE 2 DIABETES MELLITUS WITH HYPOGLYCEMIA WITHOUT COMA, WITH LONG-TERM CURRENT USE OF INSULIN: ICD-10-CM

## 2022-06-02 DIAGNOSIS — E11.40 DIABETIC NEUROPATHY WITH NEUROLOGIC COMPLICATION: ICD-10-CM

## 2022-06-02 DIAGNOSIS — I10 PRIMARY HYPERTENSION: ICD-10-CM

## 2022-06-02 DIAGNOSIS — E89.0 POSTABLATIVE HYPOTHYROIDISM: ICD-10-CM

## 2022-06-02 DIAGNOSIS — E11.65 UNCONTROLLED TYPE 2 DIABETES MELLITUS WITH HYPERGLYCEMIA: Primary | ICD-10-CM

## 2022-06-02 DIAGNOSIS — E11.649 TYPE 2 DIABETES MELLITUS WITH HYPOGLYCEMIA WITHOUT COMA, WITH LONG-TERM CURRENT USE OF INSULIN: ICD-10-CM

## 2022-06-02 DIAGNOSIS — E78.2 MIXED HYPERLIPIDEMIA: ICD-10-CM

## 2022-06-02 DIAGNOSIS — E11.49 DIABETIC NEUROPATHY WITH NEUROLOGIC COMPLICATION: ICD-10-CM

## 2022-06-02 LAB — TSH SERPL DL<=0.05 MIU/L-ACNC: 1.47 UIU/ML (ref 0.27–4.2)

## 2022-06-02 PROCEDURE — 84443 ASSAY THYROID STIM HORMONE: CPT

## 2022-06-02 PROCEDURE — 99214 OFFICE O/P EST MOD 30 MIN: CPT | Performed by: INTERNAL MEDICINE

## 2022-06-02 RX ORDER — BLOOD-GLUCOSE METER
1 EACH MISCELLANEOUS DAILY
Qty: 1 KIT | Refills: 0 | Status: SHIPPED | OUTPATIENT
Start: 2022-06-02 | End: 2022-06-16 | Stop reason: SDUPTHER

## 2022-06-02 RX ORDER — BLOOD SUGAR DIAGNOSTIC
STRIP MISCELLANEOUS
Qty: 400 EACH | Refills: 3 | Status: SHIPPED | OUTPATIENT
Start: 2022-06-02 | End: 2022-11-02 | Stop reason: SDUPTHER

## 2022-06-02 NOTE — PROGRESS NOTES
"     Office Note      Date: 2022  Patient Name: Karen Newman  MRN: [id number]  : 1950    Chief Complaint   Patient presents with   • Diabetes       History of Present Illness:   Karen Newman is a 71 y.o. female who presents for Diabetes type 2. Diagnosed in: . Treated in past with oral agents. Current treatments: basal bolus insulin. Number of insulin shots per day: 3. Checks blood sugar 4 times a day. Has low blood sugar: occasional. Aspirin use: No - easy bleeding. Statin use: Yes. ACE-I/ARB use: Yes. Changes in health since last visit: worsening renal function - metformin was stopped per nephrology; also had hypoxic respiratory failure - admitted 2 months ago to [facility]. Last eye exam 2021.     She remains on T4. She is taking 150mcg qd. She is taking this correctly. She isn't taking any interfering meds concurrently. She hasn't noted any change in the size of her neck. She denies any compressive sxs. She denies any sxs of hypo- or hyperthyroidism, aside from cold intolerance.    Subjective      Diabetic Complications:  Eyes: Yes  Kidneys: Yes - microalbumin and elevated Cr  Feet: Yes  Heart: No    Diet and Exercise:  Meals per day: 2  Minutes of exercise per week: 0 mins.    Review of Systems:   Review of Systems   Constitutional: Negative.    Cardiovascular: Negative.    Gastrointestinal: Negative.    Endocrine: Negative.        The following portions of the patient's history were reviewed and updated as appropriate: allergies, current medications, past family history, past medical history, past social history, past surgical history and problem list.    Objective       Visit Vitals  /76   Pulse 109   Ht 170.2 cm (67\")   Wt 82.6 kg (182 lb)   LMP  (LMP Unknown)   SpO2 97%   BMI 28.51 kg/m²       Physical Exam:  Physical Exam  Constitutional:       Appearance: Normal appearance.   Neurological:      Mental Status: She is alert.         Labs:    HbA1c  Lab Results   Component Value " Date    HGBA1C 5.90 (H) 04/07/2022       CMP  Lab Results   Component Value Date    GLUCOSE 134 (H) 04/09/2022    BUN 35 (H) 04/09/2022    CREATININE 2.26 (H) 04/09/2022    EGFRIFNONA 24 (L) 02/25/2022    BCR 15.5 04/09/2022    K 4.0 04/09/2022    CO2 24.7 04/09/2022    CALCIUM 9.1 04/09/2022    PROTENTOTREF 6.4 03/24/2022    LABIL2 1.2 03/24/2022    AST 17 04/06/2022    ALT 24 04/06/2022        Lipid Panel  Lab Results   Component Value Date    HDL 50 01/05/2022    LDL 61 01/05/2022    TRIG 61 01/05/2022        TSH  Lab Results   Component Value Date    TSH 8.320 (H) 01/05/2022        Hemoglobin A1C  Lab Results   Component Value Date    HGBA1C 5.90 (H) 04/07/2022        Microalbumin/Creatinine  Lab Results   Component Value Date    MALBCRERATIO 1,235.5 01/05/2022    MICROALBUR 89.7 01/05/2022           Assessment / Plan      Assessment & Plan:  Diagnoses and all orders for this visit:    1. Uncontrolled type 2 diabetes mellitus with hyperglycemia (HCC) (Primary)  Assessment & Plan:  Diabetes is unchanged. Last A1c looked good.  Continue current treatment regimen.  Ok to stay off metformin.  Diabetes will be reassessed in 3 months.      2. Type 2 diabetes mellitus with hypoglycemia without coma, with long-term current use of insulin (HCC)  Assessment & Plan:  Continue frequent FSBS.      3. Diabetic neuropathy with neurologic complication (HCC)    4. Primary hypertension  Assessment & Plan:  Hypertension is improving with treatment.  Continue current treatment regimen.  Blood pressure will be reassessed at the next regular appointment.      5. Mixed hyperlipidemia  Assessment & Plan:  Continue statin.      6. Postablative hypothyroidism  Assessment & Plan:  Continue T4.  Check TSH.    Orders:  -     TSH; Future    7. History of Graves' disease    8. Insulin long-term use (HCC)    Other orders  -     Blood Glucose Monitoring Suppl (OneTouch Verio) w/Device kit; 1 each Daily.  Dispense: 1 kit; Refill: 0  -      OneTouch Verio test strip; Use 4x per day:  ICD-10 E11.65, E11.649, Z79.4  Dispense: 400 each; Refill: 3      Return in about 3 months (around 9/2/2022) for Recheck with A1c, TSH.    Karel Blevins MD   06/02/2022

## 2022-06-02 NOTE — ASSESSMENT & PLAN NOTE
Diabetes is unchanged. Last A1c looked good.  Continue current treatment regimen.  Ok to stay off metformin.  Diabetes will be reassessed in 3 months.

## 2022-06-16 RX ORDER — BLOOD-GLUCOSE METER
1 EACH MISCELLANEOUS DAILY
Qty: 1 KIT | Refills: 0 | Status: SHIPPED | OUTPATIENT
Start: 2022-06-16

## 2022-06-21 RX ORDER — INSULIN DETEMIR 100 [IU]/ML
INJECTION, SOLUTION SUBCUTANEOUS
Qty: 45 ML | Refills: 3 | Status: SHIPPED | OUTPATIENT
Start: 2022-06-21

## 2022-07-07 ENCOUNTER — HOSPITAL ENCOUNTER (OUTPATIENT)
Dept: GENERAL RADIOLOGY | Facility: HOSPITAL | Age: 72
Discharge: HOME | End: 2022-07-07
Payer: MEDICARE

## 2022-07-07 ENCOUNTER — HOSPITAL ENCOUNTER (OUTPATIENT)
Facility: HOSPITAL | Age: 72
Discharge: HOME | End: 2022-07-07
Payer: MEDICARE

## 2022-07-07 ENCOUNTER — OFFICE VISIT (OUTPATIENT)
Dept: CARDIOLOGY | Facility: CLINIC | Age: 72
End: 2022-07-07

## 2022-07-07 VITALS
HEIGHT: 67 IN | SYSTOLIC BLOOD PRESSURE: 138 MMHG | OXYGEN SATURATION: 94 % | HEART RATE: 85 BPM | BODY MASS INDEX: 28.88 KG/M2 | DIASTOLIC BLOOD PRESSURE: 70 MMHG | WEIGHT: 184 LBS

## 2022-07-07 DIAGNOSIS — R06.09 DOE (DYSPNEA ON EXERTION): ICD-10-CM

## 2022-07-07 DIAGNOSIS — I10 PRIMARY HYPERTENSION: ICD-10-CM

## 2022-07-07 DIAGNOSIS — I38 VHD (VALVULAR HEART DISEASE): ICD-10-CM

## 2022-07-07 DIAGNOSIS — I10 ESSENTIAL HYPERTENSION: ICD-10-CM

## 2022-07-07 DIAGNOSIS — I48.0 PAROXYSMAL ATRIAL FIBRILLATION: Primary | ICD-10-CM

## 2022-07-07 PROCEDURE — 99214 OFFICE O/P EST MOD 30 MIN: CPT | Performed by: INTERNAL MEDICINE

## 2022-07-07 PROCEDURE — 71046 X-RAY EXAM CHEST 2 VIEWS: CPT

## 2022-07-07 RX ORDER — FUROSEMIDE 20 MG/1
20 TABLET ORAL EVERY OTHER DAY
Qty: 15 TABLET | Refills: 11 | Status: SHIPPED | OUTPATIENT
Start: 2022-07-07

## 2022-07-07 NOTE — PROGRESS NOTES
[facility] Cardiology    Patient ID: Karen Newman is a 72 y.o. female.  : 1950   Contact: [phone]    Encounter date: 2022    PCP: Mirian Arnold APRN      Chief complaint:   Chief Complaint   Patient presents with   • Paroxysmal Atrial Fibrillation       Problem List:  1. Atrial fibrillation, duration unknown; diagnosed 2013:  a. Status post ALANIS/external cardioversion, (10/18/2013), Yara Bobo M.D. with the initiation of propafenone therapy.   b. Atrial fibrillation, (2013), in Gloria Gomez’ office.  c. Normal sinus rhythm at the time of office visit, (2013).  d. Normal sinus rhythm, EKG, (2014).  e. Discontinuation of Rythmol secondary to diarrhea, (2014), with initiation of flecainide 50 mg b.i.d. and Eliquis 5 mg b.i.d.   f. CHADS Vasc= 4, (2017)   g. CT scan of the chest (2017): Mild fibrotic changes, o/w normal  h. Pulmonary Function Test (2017): FEV1/FVC  1.54/2.57 = 60%  i. Nuclear Stress Test (2017): EF>70%, no ischemia   j. Bilateral thoracoscopy and Maze procedure and clipping of the left atrial appendage (2017) Dr. Eliza wallis ALANIS 10/24/17: EF 60%, RADHA successfully closed with no evidence of a residual left atrial appendage. Mild to moderate MR  l. 14d Vicenteo, 2021: NSR. PAF <1%  2. Valvular heart disease:  a. Previous echocardiogram approximately,  (incomplete data base).  b. Echocardiogram (08/15/2002), revealing moderate concentric LVH, hyperdynamic LV, EF 81%; mild MR, trace TR.  c. Echocardiogram (10/07/2008), revealed a normal LVEF with mild to moderate MR.  d. Echocardiogram, (2011): normal LV systolic function and wall motion with mild MR and mild TR.   e. Echocardiogram, 2017: EF 60%. Moderate to severe MR. Trace to mild TR. No thrombus appears to be present within the left atrial appendage.  f. ALANIS 10/24/17: EF 60%, RADHA successfully closed with no evidence of a  residual left atrial appendage. Mild to moderate MR  g. Echo 1/10/20: EF 65%, trace MR, mild TR, mild LVH.   h. Echo 04/07/2022: LVEF 60-65%. Mild concentric LVH. Normal LV diastolic filling pattern. Mild MR.   3. Hypertension:  a. Adenosine Cardiolite, (06/18/2009): Normal systolic function without evidence of inducible ischemia.   b. Echo 1/11/20: EF 65%. Trace MR. Mild TR. Mild LVH.  c. Renal duplex 03/22/2022: No evidence to suggest significant renal artery stenosis.   4. Dyslipidemia.  5. Diabetes mellitus, Type 2 diagnosed, 2006.  6. Thyroid disease:  a. Hyperthyroidism status post radioactive iodine therapy.  b. Hypothyroidism on thyroid replacement.  7. Gastroesophageal reflux disease.  8. Chronic kidney disease. crea 1.7  9. History of hemorrhoids status post hemorrhoidectomy.  10. Colon polyps status post polypectomy.  11. Anxiety.  12. Iron deficiency anemia.  13. Obstructive sleep apnea on chronic CPAP therapy.  14. Surgical history:  a. Total abdominal hysterectomy  b. Squamous cell removal (right foot)      Allergies   Allergen Reactions   • Amlodipine Shortness Of Breath     Swelling and SOB   • Buspirone Nausea And Vomiting   • Lisinopril Other (See Comments)     cough   • Sertraline Hcl Other (See Comments)     nightmares   • Sular [Nisoldipine Er] Cough   • Carvedilol Other (See Comments)     SOB       Current Medications:    Current Outpatient Medications:   •  atorvastatin (LIPITOR) 10 MG tablet, TAKE 1 TABLET BY MOUTH  DAILY, Disp: 90 tablet, Rfl: 3  •  Blood Glucose Monitoring Suppl (OneTouch Verio) w/Device kit, 1 each Daily. DX: E11.65, Disp: 1 kit, Rfl: 0  •  cloNIDine (CATAPRES) 0.1 MG tablet, TAKE 1 TABLET BY MOUTH  EVERY 12 HOURS, Disp: 180 tablet, Rfl: 3  •  dilTIAZem CD (CARDIZEM CD) 120 MG 24 hr capsule, Take 1 capsule by mouth Daily., Disp: 90 capsule, Rfl: 3  •  doxazosin (CARDURA) 8 MG tablet, TAKE 1 TABLET BY MOUTH  EVERY 12 HOURS, Disp: 180 tablet, Rfl: 1  •  ferrous sulfate 324  (65 Fe) MG tablet delayed-release EC tablet, Take 324 mg by mouth Daily With Breakfast., Disp: , Rfl:   •  furosemide (Lasix) 20 MG tablet, Take 1 tablet by mouth 2 (Two) Times a Day. Take 1 tab po prn worsening SOA/edema, Disp: 30 tablet, Rfl: 0  •  gabapentin (NEURONTIN) 800 MG tablet, TAKE 1 TABLET BY MOUTH  TWICE DAILY, Disp: 180 tablet, Rfl: 3  •  hydrALAZINE (APRESOLINE) 100 MG tablet, Take 1 tablet by mouth 2 (Two) Times a Day., Disp: 180 tablet, Rfl: 3  •  insulin aspart (NovoLOG FlexPen) 100 UNIT/ML solution pen-injector sc pen, Inject 16-20 Units under the skin into the appropriate area as directed., Disp: , Rfl:   •  Levemir FlexTouch 100 UNIT/ML injection, INJECT SUBCUTANEOUSLY 35  UNITS AS DIRECTED DAILY, Disp: 45 mL, Rfl: 3  •  levothyroxine (SYNTHROID, LEVOTHROID) 150 MCG tablet, Take 1 tablet by mouth Daily., Disp: 90 tablet, Rfl: 3  •  omega-3 acid ethyl esters (LOVAZA) 1 g capsule, Take 1 g by mouth Daily., Disp: , Rfl:   •  omeprazole (priLOSEC) 40 MG capsule, Take 40 mg by mouth Daily., Disp: , Rfl:   •  OneTouch Verio test strip, Use 4x per day:  ICD-10 E11.65, E11.649, Z79.4, Disp: 400 each, Rfl: 3  •  PARoxetine (PAXIL) 40 MG tablet, Take 40 mg by mouth Daily., Disp: , Rfl:     HPI    Karen Newman is a 72 y.o. female who presents today for a 3 month follow up on complaints of dizziness at last visit and hypertension since some medications were changed. She reports since April, her dizziness has resolved. She does not monitor her blood pressure. She has been short of breath, mostly with exertion for 3-4 weeks. She states this is not constant. It comes and goes. No cough, wheezing, fevers. She has some lower extremity edema. This has been present for three weeks as well. She has been out of lasix for about a week. She does not check HR or O2 when she feels short of breath but does note that her O2 will drop to high 80's on spot checks. She denies PND, orthopnea. She has occasional  "palpitations.        The following portions of the patient's history were reviewed and updated as appropriate: allergies, current medications and problem list.    Pertinent positives as listed in the HPI.  All other systems reviewed are negative.         Vitals:    07/07/22 1419   BP: 138/70   BP Location: Left arm   Patient Position: Sitting   Pulse: 85   SpO2: 94%   Weight: 83.5 kg (184 lb)   Height: 170.2 cm (67\")       Physical Exam:  General: Alert and oriented.  Neck: Jugular venous pressure is within normal limits. Carotids have normal upstrokes without bruits.   Cardiovascular: Heart has a nondisplaced focal PMI. Regular rate and rhythm. No murmur, gallop or rub.  Lungs: Clear, no rales or wheezes. Equal expansion is noted.   Extremities: Show no edema.  Skin: Warm and dry.  Neurologic: Nonfocal.     Diagnostic Data (reviewed with patient):  Lab Results   Component Value Date    GLUCOSE 134 (H) 04/09/2022    BUN 35 (H) 04/09/2022    CREATININE 2.26 (H) 04/09/2022    EGFRIFNONA 24 (L) 02/25/2022    BCR 15.5 04/09/2022     04/09/2022    K 4.0 04/09/2022     04/09/2022    CO2 24.7 04/09/2022    CALCIUM 9.1 04/09/2022    ALBUMIN 3.90 04/06/2022    ALKPHOS 78 04/06/2022    AST 17 04/06/2022    ALT 24 04/06/2022     Lab Results   Component Value Date    CHOL 124 01/05/2022    TRIG 61 01/05/2022    HDL 50 01/05/2022    LDL 61 01/05/2022      Lab Results   Component Value Date    WBC 4.41 04/15/2022    RBC 2.95 (L) 04/15/2022    HGB 8.9 (L) 04/15/2022    HCT 27.8 (L) 04/15/2022    MCV 94.2 04/15/2022     04/15/2022      Lab Results   Component Value Date    TSH 1.470 06/02/2022        Procedures      Assessment:    ICD-10-CM ICD-9-CM   1. Paroxysmal atrial fibrillation (HCC)  I48.0 427.31   2. VHD (valvular heart disease)  I38 424.90   3. Primary hypertension  I10 401.9   4. Essential hypertension  I10 401.9         Plan:  1. Monitor BP at least weekly.   2. Continue Clonidine, Cardizem, " Doxazosin, Hydralazine for HTN.   3. CXR, BNP. Call with results and further recommendations.   4. Continue all other current medications.  5. F/up in 6 months, sooner if needed.      Scribed for Yara Bobo MD by MAGDALENE Plummer, APRN. 7/7/2022  14:37 EDT    I Yara Bobo MD personally performed the services described in this documentation as scribed by the above individual in my presence, and it is both accurate and complete.    Yara Bobo MD, FACC

## 2022-07-08 ENCOUNTER — TRANSCRIBE ORDERS (OUTPATIENT)
Dept: LAB | Facility: HOSPITAL | Age: 72
End: 2022-07-08

## 2022-07-08 ENCOUNTER — LAB (OUTPATIENT)
Dept: LAB | Facility: HOSPITAL | Age: 72
End: 2022-07-08

## 2022-07-08 DIAGNOSIS — D47.2 MONOCLONAL PARAPROTEINEMIA: ICD-10-CM

## 2022-07-08 DIAGNOSIS — D64.9 ANEMIA, UNSPECIFIED TYPE: ICD-10-CM

## 2022-07-08 DIAGNOSIS — D64.9 ANEMIA, UNSPECIFIED TYPE: Primary | ICD-10-CM

## 2022-07-08 DIAGNOSIS — N18.4 CHRONIC KIDNEY DISEASE, STAGE IV (SEVERE): ICD-10-CM

## 2022-07-08 LAB
ALBUMIN SERPL-MCNC: 3.9 G/DL (ref 3.5–5.2)
ANION GAP SERPL CALCULATED.3IONS-SCNC: 12 MMOL/L (ref 5–15)
BACTERIA UR QL AUTO: ABNORMAL /HPF
BASOPHILS # BLD AUTO: 0.04 10*3/MM3 (ref 0–0.2)
BASOPHILS NFR BLD AUTO: 1.2 % (ref 0–1.5)
BILIRUB UR QL STRIP: NEGATIVE
BUN SERPL-MCNC: 24 MG/DL (ref 8–23)
BUN/CREAT SERPL: 10.3 (ref 7–25)
CALCIUM SPEC-SCNC: 8.7 MG/DL (ref 8.6–10.5)
CHLORIDE SERPL-SCNC: 101 MMOL/L (ref 98–107)
CLARITY UR: ABNORMAL
CO2 SERPL-SCNC: 26 MMOL/L (ref 22–29)
COLOR UR: YELLOW
CREAT SERPL-MCNC: 2.33 MG/DL (ref 0.57–1)
CREAT UR-MCNC: 101.6 MG/DL
DEPRECATED RDW RBC AUTO: 44 FL (ref 37–54)
EGFRCR SERPLBLD CKD-EPI 2021: 21.7 ML/MIN/1.73
EOSINOPHIL # BLD AUTO: 0.1 10*3/MM3 (ref 0–0.4)
EOSINOPHIL NFR BLD AUTO: 3 % (ref 0.3–6.2)
ERYTHROCYTE [DISTWIDTH] IN BLOOD BY AUTOMATED COUNT: 13.2 % (ref 12.3–15.4)
GLUCOSE SERPL-MCNC: 232 MG/DL (ref 65–99)
GLUCOSE UR STRIP-MCNC: NEGATIVE MG/DL
HBA1C MFR BLD: 6.7 % (ref 4.8–5.6)
HCT VFR BLD AUTO: 31 % (ref 34–46.6)
HGB BLD-MCNC: 10.5 G/DL (ref 12–15.9)
HGB UR QL STRIP.AUTO: NEGATIVE
HYALINE CASTS UR QL AUTO: ABNORMAL /LPF
IGA1 MFR SER: 917 MG/DL (ref 70–400)
IGG1 SER-MCNC: 818 MG/DL (ref 700–1600)
IGM SERPL-MCNC: 84 MG/DL (ref 40–230)
IMM GRANULOCYTES # BLD AUTO: 0.01 10*3/MM3 (ref 0–0.05)
IMM GRANULOCYTES NFR BLD AUTO: 0.3 % (ref 0–0.5)
IRON 24H UR-MRATE: 51 MCG/DL (ref 37–145)
IRON SATN MFR SERPL: 16 % (ref 20–50)
KETONES UR QL STRIP: NEGATIVE
LEUKOCYTE ESTERASE UR QL STRIP.AUTO: ABNORMAL
LYMPHOCYTES # BLD AUTO: 1.12 10*3/MM3 (ref 0.7–3.1)
LYMPHOCYTES NFR BLD AUTO: 33.2 % (ref 19.6–45.3)
MCH RBC QN AUTO: 31.5 PG (ref 26.6–33)
MCHC RBC AUTO-ENTMCNC: 33.9 G/DL (ref 31.5–35.7)
MCV RBC AUTO: 93.1 FL (ref 79–97)
MONOCYTES # BLD AUTO: 0.33 10*3/MM3 (ref 0.1–0.9)
MONOCYTES NFR BLD AUTO: 9.8 % (ref 5–12)
NEUTROPHILS NFR BLD AUTO: 1.77 10*3/MM3 (ref 1.7–7)
NEUTROPHILS NFR BLD AUTO: 52.5 % (ref 42.7–76)
NITRITE UR QL STRIP: NEGATIVE
NRBC BLD AUTO-RTO: 0 /100 WBC (ref 0–0.2)
PH UR STRIP.AUTO: 5.5 [PH] (ref 5–8)
PHOSPHATE SERPL-MCNC: 4.5 MG/DL (ref 2.5–4.5)
PLATELET # BLD AUTO: 149 10*3/MM3 (ref 140–450)
PMV BLD AUTO: 10.3 FL (ref 6–12)
POTASSIUM SERPL-SCNC: 4 MMOL/L (ref 3.5–5.2)
PROT ?TM UR-MCNC: 120.6 MG/DL
PROT UR QL STRIP: ABNORMAL
RBC # BLD AUTO: 3.33 10*6/MM3 (ref 3.77–5.28)
RBC # UR STRIP: ABNORMAL /HPF
REF LAB TEST METHOD: ABNORMAL
SODIUM SERPL-SCNC: 139 MMOL/L (ref 136–145)
SP GR UR STRIP: 1.01 (ref 1–1.03)
SQUAMOUS #/AREA URNS HPF: ABNORMAL /HPF
TIBC SERPL-MCNC: 328 MCG/DL (ref 298–536)
TRANSFERRIN SERPL-MCNC: 220 MG/DL (ref 200–360)
UROBILINOGEN UR QL STRIP: ABNORMAL
WBC # UR STRIP: ABNORMAL /HPF
WBC NRBC COR # BLD: 3.37 10*3/MM3 (ref 3.4–10.8)

## 2022-07-08 PROCEDURE — 82784 ASSAY IGA/IGD/IGG/IGM EACH: CPT

## 2022-07-08 PROCEDURE — 82570 ASSAY OF URINE CREATININE: CPT

## 2022-07-08 PROCEDURE — 83540 ASSAY OF IRON: CPT

## 2022-07-08 PROCEDURE — 83036 HEMOGLOBIN GLYCOSYLATED A1C: CPT

## 2022-07-08 PROCEDURE — 80069 RENAL FUNCTION PANEL: CPT

## 2022-07-08 PROCEDURE — 84155 ASSAY OF PROTEIN SERUM: CPT

## 2022-07-08 PROCEDURE — 84466 ASSAY OF TRANSFERRIN: CPT

## 2022-07-08 PROCEDURE — 85025 COMPLETE CBC W/AUTO DIFF WBC: CPT

## 2022-07-08 PROCEDURE — 84165 PROTEIN E-PHORESIS SERUM: CPT

## 2022-07-08 PROCEDURE — 84156 ASSAY OF PROTEIN URINE: CPT

## 2022-07-08 PROCEDURE — 36415 COLL VENOUS BLD VENIPUNCTURE: CPT

## 2022-07-08 PROCEDURE — 81001 URINALYSIS AUTO W/SCOPE: CPT

## 2022-07-11 LAB
ALBUMIN SERPL ELPH-MCNC: 3.6 G/DL (ref 2.9–4.4)
ALBUMIN/GLOB SERPL: 1.1 {RATIO} (ref 0.7–1.7)
ALPHA1 GLOB SERPL ELPH-MCNC: 0.2 G/DL (ref 0–0.4)
ALPHA2 GLOB SERPL ELPH-MCNC: 0.8 G/DL (ref 0.4–1)
B-GLOBULIN SERPL ELPH-MCNC: 1.2 G/DL (ref 0.7–1.3)
GAMMA GLOB SERPL ELPH-MCNC: 1.1 G/DL (ref 0.4–1.8)
GLOBULIN SER CALC-MCNC: 3.3 G/DL (ref 2.2–3.9)
LABORATORY COMMENT REPORT: NORMAL
M PROTEIN SERPL ELPH-MCNC: NORMAL G/DL
PROT SERPL-MCNC: 6.9 G/DL (ref 6–8.5)

## 2022-07-15 ENCOUNTER — HOSPITAL ENCOUNTER (OUTPATIENT)
Facility: HOSPITAL | Age: 72
Discharge: HOME | End: 2022-07-15
Payer: MEDICARE

## 2022-07-15 DIAGNOSIS — R06.09 DOE (DYSPNEA ON EXERTION): ICD-10-CM

## 2022-07-15 LAB — PRO-BNP: 1126 PG/ML (ref 0–1800)

## 2022-07-15 PROCEDURE — 36415 COLL VENOUS BLD VENIPUNCTURE: CPT

## 2022-07-15 PROCEDURE — 83880 ASSAY OF NATRIURETIC PEPTIDE: CPT

## 2022-07-21 PROBLEM — N18.4 CHRONIC KIDNEY DISEASE, STAGE IV (SEVERE) (HCC): Status: ACTIVE | Noted: 2022-07-21

## 2022-07-26 RX ORDER — DILTIAZEM HYDROCHLORIDE 120 MG/1
CAPSULE, COATED, EXTENDED RELEASE ORAL
Qty: 90 CAPSULE | Refills: 1 | Status: SHIPPED | OUTPATIENT
Start: 2022-07-26

## 2022-07-27 ENCOUNTER — HOSPITAL ENCOUNTER (OUTPATIENT)
Dept: INFUSION THERAPY | Facility: HOSPITAL | Age: 72
Setting detail: INFUSION SERIES
Discharge: HOME | End: 2022-07-27

## 2022-07-27 VITALS
OXYGEN SATURATION: 92 % | TEMPERATURE: 97.7 F | DIASTOLIC BLOOD PRESSURE: 59 MMHG | SYSTOLIC BLOOD PRESSURE: 112 MMHG | HEART RATE: 66 BPM | RESPIRATION RATE: 18 BRPM

## 2022-07-27 DIAGNOSIS — N18.4 CHRONIC KIDNEY DISEASE, STAGE IV (SEVERE): Primary | ICD-10-CM

## 2022-07-27 DIAGNOSIS — D50.9 IRON DEFICIENCY ANEMIA, UNSPECIFIED IRON DEFICIENCY ANEMIA TYPE: ICD-10-CM

## 2022-07-27 PROCEDURE — 25010000002 IRON SUCROSE PER 1 MG: Performed by: INTERNAL MEDICINE

## 2022-07-27 PROCEDURE — 96365 THER/PROPH/DIAG IV INF INIT: CPT

## 2022-07-27 PROCEDURE — 96366 THER/PROPH/DIAG IV INF ADDON: CPT

## 2022-07-27 RX ORDER — SODIUM CHLORIDE 9 MG/ML
250 INJECTION, SOLUTION INTRAVENOUS ONCE
Status: CANCELLED | OUTPATIENT
Start: 2022-07-28

## 2022-07-27 RX ORDER — SODIUM CHLORIDE 9 MG/ML
250 INJECTION, SOLUTION INTRAVENOUS ONCE
Status: DISCONTINUED | OUTPATIENT
Start: 2022-07-27 | End: 2022-07-29 | Stop reason: HOSPADM

## 2022-07-27 RX ADMIN — IRON SUCROSE 175 MG: 20 INJECTION, SOLUTION INTRAVENOUS at 12:42

## 2022-07-27 RX ADMIN — IRON SUCROSE 25 MG: 20 INJECTION, SOLUTION INTRAVENOUS at 11:36

## 2022-08-04 ENCOUNTER — APPOINTMENT (OUTPATIENT)
Dept: INFUSION THERAPY | Facility: HOSPITAL | Age: 72
End: 2022-08-04

## 2022-08-09 ENCOUNTER — TELEPHONE (OUTPATIENT)
Dept: CARDIOLOGY | Facility: CLINIC | Age: 72
End: 2022-08-09

## 2022-08-09 NOTE — TELEPHONE ENCOUNTER
Called to check in on pt today and to see about her shortness of breath-  states that they both have covid right now - she has pneumonia as well. they will check in with me in a few weeks and we will see how she is doing. Appt with EP in september

## 2022-08-11 ENCOUNTER — APPOINTMENT (OUTPATIENT)
Dept: INFUSION THERAPY | Facility: HOSPITAL | Age: 72
End: 2022-08-11

## 2022-08-18 ENCOUNTER — HOSPITAL ENCOUNTER (OUTPATIENT)
Dept: INFUSION THERAPY | Facility: HOSPITAL | Age: 72
Setting detail: INFUSION SERIES
Discharge: HOME | End: 2022-08-18

## 2022-08-18 VITALS — TEMPERATURE: 98.1 F | HEART RATE: 54 BPM | SYSTOLIC BLOOD PRESSURE: 144 MMHG | DIASTOLIC BLOOD PRESSURE: 75 MMHG

## 2022-08-18 DIAGNOSIS — N18.4 CHRONIC KIDNEY DISEASE, STAGE IV (SEVERE): Primary | ICD-10-CM

## 2022-08-18 DIAGNOSIS — D50.9 IRON DEFICIENCY ANEMIA, UNSPECIFIED IRON DEFICIENCY ANEMIA TYPE: ICD-10-CM

## 2022-08-18 PROCEDURE — 96365 THER/PROPH/DIAG IV INF INIT: CPT

## 2022-08-18 PROCEDURE — 25010000002 IRON SUCROSE PER 1 MG: Performed by: INTERNAL MEDICINE

## 2022-08-18 RX ORDER — SODIUM CHLORIDE 9 MG/ML
250 INJECTION, SOLUTION INTRAVENOUS ONCE
Status: CANCELLED | OUTPATIENT
Start: 2022-08-19

## 2022-08-18 RX ORDER — SODIUM CHLORIDE 9 MG/ML
250 INJECTION, SOLUTION INTRAVENOUS ONCE
Status: DISCONTINUED | OUTPATIENT
Start: 2022-08-18 | End: 2022-08-20 | Stop reason: HOSPADM

## 2022-08-18 RX ADMIN — IRON SUCROSE 200 MG: 20 INJECTION, SOLUTION INTRAVENOUS at 13:47

## 2022-08-24 ENCOUNTER — HOSPITAL ENCOUNTER (OUTPATIENT)
Facility: HOSPITAL | Age: 72
Discharge: HOME | End: 2022-08-24
Payer: MEDICARE

## 2022-08-24 ENCOUNTER — HOSPITAL ENCOUNTER (OUTPATIENT)
Dept: GENERAL RADIOLOGY | Facility: HOSPITAL | Age: 72
Discharge: HOME | End: 2022-08-24
Payer: MEDICARE

## 2022-08-24 DIAGNOSIS — R05.9 COUGH: ICD-10-CM

## 2022-08-24 PROCEDURE — 71046 X-RAY EXAM CHEST 2 VIEWS: CPT

## 2022-08-25 ENCOUNTER — HOSPITAL ENCOUNTER (OUTPATIENT)
Dept: INFUSION THERAPY | Facility: HOSPITAL | Age: 72
Setting detail: INFUSION SERIES
Discharge: HOME | End: 2022-08-25

## 2022-08-25 VITALS
OXYGEN SATURATION: 94 % | HEART RATE: 58 BPM | SYSTOLIC BLOOD PRESSURE: 165 MMHG | RESPIRATION RATE: 16 BRPM | TEMPERATURE: 97.3 F | DIASTOLIC BLOOD PRESSURE: 72 MMHG

## 2022-08-25 DIAGNOSIS — N18.4 CHRONIC KIDNEY DISEASE, STAGE IV (SEVERE): Primary | ICD-10-CM

## 2022-08-25 DIAGNOSIS — D50.9 IRON DEFICIENCY ANEMIA, UNSPECIFIED IRON DEFICIENCY ANEMIA TYPE: ICD-10-CM

## 2022-08-25 PROCEDURE — 96365 THER/PROPH/DIAG IV INF INIT: CPT

## 2022-08-25 PROCEDURE — 25010000002 IRON SUCROSE PER 1 MG: Performed by: INTERNAL MEDICINE

## 2022-08-25 RX ORDER — SODIUM CHLORIDE 9 MG/ML
250 INJECTION, SOLUTION INTRAVENOUS ONCE
Status: CANCELLED | OUTPATIENT
Start: 2022-08-26

## 2022-08-25 RX ORDER — SODIUM CHLORIDE 9 MG/ML
250 INJECTION, SOLUTION INTRAVENOUS ONCE
Status: DISCONTINUED | OUTPATIENT
Start: 2022-08-25 | End: 2022-08-27 | Stop reason: HOSPADM

## 2022-08-25 RX ADMIN — IRON SUCROSE 200 MG: 20 INJECTION, SOLUTION INTRAVENOUS at 13:29

## 2022-09-01 ENCOUNTER — HOSPITAL ENCOUNTER (OUTPATIENT)
Dept: INFUSION THERAPY | Facility: HOSPITAL | Age: 72
Setting detail: INFUSION SERIES
Discharge: HOME | End: 2022-09-01

## 2022-09-01 VITALS
OXYGEN SATURATION: 91 % | TEMPERATURE: 98.6 F | DIASTOLIC BLOOD PRESSURE: 69 MMHG | HEART RATE: 52 BPM | RESPIRATION RATE: 16 BRPM | SYSTOLIC BLOOD PRESSURE: 165 MMHG

## 2022-09-01 DIAGNOSIS — N18.4 CHRONIC KIDNEY DISEASE, STAGE IV (SEVERE): Primary | ICD-10-CM

## 2022-09-01 DIAGNOSIS — D50.9 IRON DEFICIENCY ANEMIA, UNSPECIFIED IRON DEFICIENCY ANEMIA TYPE: ICD-10-CM

## 2022-09-01 PROCEDURE — 96365 THER/PROPH/DIAG IV INF INIT: CPT

## 2022-09-01 PROCEDURE — 25010000002 IRON SUCROSE PER 1 MG: Performed by: INTERNAL MEDICINE

## 2022-09-01 RX ORDER — SODIUM CHLORIDE 9 MG/ML
250 INJECTION, SOLUTION INTRAVENOUS ONCE
OUTPATIENT
Start: 2022-09-02

## 2022-09-01 RX ORDER — SODIUM CHLORIDE 9 MG/ML
250 INJECTION, SOLUTION INTRAVENOUS ONCE
Status: DISCONTINUED | OUTPATIENT
Start: 2022-09-01 | End: 2022-09-03 | Stop reason: HOSPADM

## 2022-09-01 RX ADMIN — IRON SUCROSE 200 MG: 20 INJECTION, SOLUTION INTRAVENOUS at 13:43

## 2022-09-07 ENCOUNTER — OFFICE VISIT (OUTPATIENT)
Dept: CARDIOLOGY | Facility: CLINIC | Age: 72
End: 2022-09-07

## 2022-09-07 VITALS
WEIGHT: 179.8 LBS | SYSTOLIC BLOOD PRESSURE: 150 MMHG | DIASTOLIC BLOOD PRESSURE: 64 MMHG | HEART RATE: 65 BPM | BODY MASS INDEX: 28.22 KG/M2 | OXYGEN SATURATION: 94 % | HEIGHT: 67 IN

## 2022-09-07 DIAGNOSIS — Z86.79 STATUS POST MAZE OPERATION FOR ATRIAL FIBRILLATION: ICD-10-CM

## 2022-09-07 DIAGNOSIS — Z98.890 STATUS POST MAZE OPERATION FOR ATRIAL FIBRILLATION: ICD-10-CM

## 2022-09-07 DIAGNOSIS — I48.0 PAROXYSMAL ATRIAL FIBRILLATION: Primary | ICD-10-CM

## 2022-09-07 DIAGNOSIS — I10 PRIMARY HYPERTENSION: ICD-10-CM

## 2022-09-07 PROCEDURE — 99213 OFFICE O/P EST LOW 20 MIN: CPT | Performed by: INTERNAL MEDICINE

## 2022-09-07 RX ORDER — LEVOFLOXACIN 500 MG/1
500 TABLET, FILM COATED ORAL DAILY
COMMUNITY
Start: 2022-08-04

## 2022-09-07 RX ORDER — MOLNUPIRAVIR 200 MG/1
CAPSULE ORAL
COMMUNITY
Start: 2022-08-04 | End: 2023-01-19

## 2022-09-07 RX ORDER — FAMOTIDINE 20 MG/1
20 TABLET, FILM COATED ORAL 2 TIMES DAILY
COMMUNITY
Start: 2022-07-15 | End: 2023-01-19

## 2022-09-08 ENCOUNTER — LAB (OUTPATIENT)
Dept: LAB | Facility: HOSPITAL | Age: 72
End: 2022-09-08

## 2022-09-08 ENCOUNTER — TRANSCRIBE ORDERS (OUTPATIENT)
Dept: LAB | Facility: HOSPITAL | Age: 72
End: 2022-09-08

## 2022-09-08 DIAGNOSIS — N18.4 CHRONIC KIDNEY DISEASE, STAGE IV (SEVERE): ICD-10-CM

## 2022-09-08 DIAGNOSIS — D64.9 ANEMIA, UNSPECIFIED TYPE: ICD-10-CM

## 2022-09-08 DIAGNOSIS — D64.9 ANEMIA, UNSPECIFIED TYPE: Primary | ICD-10-CM

## 2022-09-08 LAB
BILIRUB UR QL STRIP: NEGATIVE
CLARITY UR: CLEAR
COLOR UR: YELLOW
EOSINOPHIL SPEC QL MICRO: 0 % EOS/100 CELLS (ref 0–0)
GLUCOSE UR STRIP-MCNC: NEGATIVE MG/DL
HGB UR QL STRIP.AUTO: NEGATIVE
KETONES UR QL STRIP: NEGATIVE
LEUKOCYTE ESTERASE UR QL STRIP.AUTO: NEGATIVE
NITRITE UR QL STRIP: NEGATIVE
PH UR STRIP.AUTO: 6.5 [PH] (ref 5–8)
PROT UR QL STRIP: ABNORMAL
SP GR UR STRIP: 1.01 (ref 1–1.03)
UROBILINOGEN UR QL STRIP: ABNORMAL

## 2022-09-08 PROCEDURE — 83540 ASSAY OF IRON: CPT

## 2022-09-08 PROCEDURE — 87205 SMEAR GRAM STAIN: CPT

## 2022-09-08 PROCEDURE — 82550 ASSAY OF CK (CPK): CPT

## 2022-09-08 PROCEDURE — 81001 URINALYSIS AUTO W/SCOPE: CPT

## 2022-09-08 PROCEDURE — 82728 ASSAY OF FERRITIN: CPT

## 2022-09-08 PROCEDURE — 82570 ASSAY OF URINE CREATININE: CPT

## 2022-09-08 PROCEDURE — 85025 COMPLETE CBC W/AUTO DIFF WBC: CPT

## 2022-09-08 PROCEDURE — 36415 COLL VENOUS BLD VENIPUNCTURE: CPT

## 2022-09-08 PROCEDURE — 84156 ASSAY OF PROTEIN URINE: CPT

## 2022-09-08 PROCEDURE — 84466 ASSAY OF TRANSFERRIN: CPT

## 2022-09-08 PROCEDURE — 80069 RENAL FUNCTION PANEL: CPT

## 2022-09-09 LAB
ALBUMIN SERPL-MCNC: 3.8 G/DL (ref 3.5–5.2)
ANION GAP SERPL CALCULATED.3IONS-SCNC: 12.7 MMOL/L (ref 5–15)
BACTERIA UR QL AUTO: NORMAL /HPF
BASOPHILS # BLD AUTO: 0.04 10*3/MM3 (ref 0–0.2)
BASOPHILS NFR BLD AUTO: 0.8 % (ref 0–1.5)
BUN SERPL-MCNC: 25 MG/DL (ref 8–23)
BUN/CREAT SERPL: 13.5 (ref 7–25)
CALCIUM SPEC-SCNC: 9.4 MG/DL (ref 8.6–10.5)
CHLORIDE SERPL-SCNC: 98 MMOL/L (ref 98–107)
CK SERPL-CCNC: 58 U/L (ref 20–180)
CO2 SERPL-SCNC: 28.3 MMOL/L (ref 22–29)
CREAT SERPL-MCNC: 1.85 MG/DL (ref 0.57–1)
CREAT UR-MCNC: 27.7 MG/DL
DEPRECATED RDW RBC AUTO: 45.8 FL (ref 37–54)
EGFRCR SERPLBLD CKD-EPI 2021: 28.7 ML/MIN/1.73
EOSINOPHIL # BLD AUTO: 0.14 10*3/MM3 (ref 0–0.4)
EOSINOPHIL NFR BLD AUTO: 2.7 % (ref 0.3–6.2)
ERYTHROCYTE [DISTWIDTH] IN BLOOD BY AUTOMATED COUNT: 13.4 % (ref 12.3–15.4)
FERRITIN SERPL-MCNC: 747 NG/ML (ref 13–150)
GLUCOSE SERPL-MCNC: 161 MG/DL (ref 65–99)
HCT VFR BLD AUTO: 34.7 % (ref 34–46.6)
HGB BLD-MCNC: 11.5 G/DL (ref 12–15.9)
HYALINE CASTS UR QL AUTO: NORMAL /LPF
IMM GRANULOCYTES # BLD AUTO: 0.01 10*3/MM3 (ref 0–0.05)
IMM GRANULOCYTES NFR BLD AUTO: 0.2 % (ref 0–0.5)
IRON 24H UR-MRATE: 74 MCG/DL (ref 37–145)
IRON SATN MFR SERPL: 25 % (ref 20–50)
LYMPHOCYTES # BLD AUTO: 2.07 10*3/MM3 (ref 0.7–3.1)
LYMPHOCYTES NFR BLD AUTO: 40.4 % (ref 19.6–45.3)
MCH RBC QN AUTO: 30.5 PG (ref 26.6–33)
MCHC RBC AUTO-ENTMCNC: 33.1 G/DL (ref 31.5–35.7)
MCV RBC AUTO: 92 FL (ref 79–97)
MONOCYTES # BLD AUTO: 0.52 10*3/MM3 (ref 0.1–0.9)
MONOCYTES NFR BLD AUTO: 10.1 % (ref 5–12)
NEUTROPHILS NFR BLD AUTO: 2.35 10*3/MM3 (ref 1.7–7)
NEUTROPHILS NFR BLD AUTO: 45.8 % (ref 42.7–76)
NRBC BLD AUTO-RTO: 0 /100 WBC (ref 0–0.2)
PHOSPHATE SERPL-MCNC: 3 MG/DL (ref 2.5–4.5)
PLATELET # BLD AUTO: 148 10*3/MM3 (ref 140–450)
PMV BLD AUTO: 10 FL (ref 6–12)
POTASSIUM SERPL-SCNC: 4 MMOL/L (ref 3.5–5.2)
PROT ?TM UR-MCNC: 126.3 MG/DL
RBC # BLD AUTO: 3.77 10*6/MM3 (ref 3.77–5.28)
RBC # UR STRIP: NORMAL /HPF
REF LAB TEST METHOD: NORMAL
SODIUM SERPL-SCNC: 139 MMOL/L (ref 136–145)
SQUAMOUS #/AREA URNS HPF: NORMAL /HPF
TIBC SERPL-MCNC: 299 MCG/DL (ref 298–536)
TRANSFERRIN SERPL-MCNC: 201 MG/DL (ref 200–360)
WBC # UR STRIP: NORMAL /HPF
WBC NRBC COR # BLD: 5.13 10*3/MM3 (ref 3.4–10.8)

## 2022-09-10 ENCOUNTER — HOSPITAL ENCOUNTER (EMERGENCY)
Facility: HOSPITAL | Age: 72
Discharge: HOME | End: 2022-09-11
Attending: FAMILY MEDICINE
Payer: MEDICARE

## 2022-09-10 DIAGNOSIS — I10 UNCONTROLLED HYPERTENSION: Primary | ICD-10-CM

## 2022-09-10 DIAGNOSIS — R51.9 ACUTE NONINTRACTABLE HEADACHE, UNSPECIFIED HEADACHE TYPE: ICD-10-CM

## 2022-09-10 DIAGNOSIS — N18.4 CHRONIC RENAL FAILURE, STAGE 4 (SEVERE) (HCC): ICD-10-CM

## 2022-09-10 DIAGNOSIS — R11.0 NAUSEA: ICD-10-CM

## 2022-09-10 LAB
ANION GAP SERPL CALCULATED.3IONS-SCNC: 11 MMOL/L (ref 3–16)
BASOPHILS ABSOLUTE: 0 K/UL (ref 0–0.1)
BASOPHILS RELATIVE PERCENT: 0.8 %
BUN BLDV-MCNC: 32 MG/DL (ref 6–20)
CALCIUM SERPL-MCNC: 9.5 MG/DL (ref 8.5–10.5)
CHLORIDE BLD-SCNC: 102 MMOL/L (ref 98–107)
CO2: 27 MMOL/L (ref 20–30)
CREAT SERPL-MCNC: 2 MG/DL (ref 0.4–1.2)
EOSINOPHILS ABSOLUTE: 0.2 K/UL (ref 0–0.4)
EOSINOPHILS RELATIVE PERCENT: 3 %
GFR AFRICAN AMERICAN: 30
GFR NON-AFRICAN AMERICAN: 24
GLUCOSE BLD-MCNC: 88 MG/DL (ref 74–106)
HCT VFR BLD CALC: 39 % (ref 37–47)
HEMOGLOBIN: 12.5 G/DL (ref 11.5–16.5)
IMMATURE GRANULOCYTES #: 0 K/UL
IMMATURE GRANULOCYTES %: 0.2 % (ref 0–5)
LYMPHOCYTES ABSOLUTE: 2.1 K/UL (ref 1.5–4)
LYMPHOCYTES RELATIVE PERCENT: 41.4 %
MCH RBC QN AUTO: 30.4 PG (ref 27–32)
MCHC RBC AUTO-ENTMCNC: 32.1 G/DL (ref 31–35)
MCV RBC AUTO: 94.9 FL (ref 80–100)
MONOCYTES ABSOLUTE: 0.5 K/UL (ref 0.2–0.8)
MONOCYTES RELATIVE PERCENT: 9.8 %
NEUTROPHILS ABSOLUTE: 2.2 K/UL (ref 2–7.5)
NEUTROPHILS RELATIVE PERCENT: 44.8 %
PDW BLD-RTO: 14.3 % (ref 11–16)
PLATELET # BLD: 153 K/UL (ref 150–400)
PMV BLD AUTO: 9.7 FL (ref 6–10)
POTASSIUM SERPL-SCNC: 3.7 MMOL/L (ref 3.4–5.1)
RBC # BLD: 4.11 M/UL (ref 3.8–5.8)
SODIUM BLD-SCNC: 140 MMOL/L (ref 136–145)
WBC # BLD: 5 K/UL (ref 4–11)

## 2022-09-10 PROCEDURE — 6360000002 HC RX W HCPCS: Performed by: FAMILY MEDICINE

## 2022-09-10 PROCEDURE — 96376 TX/PRO/DX INJ SAME DRUG ADON: CPT

## 2022-09-10 PROCEDURE — 36415 COLL VENOUS BLD VENIPUNCTURE: CPT

## 2022-09-10 PROCEDURE — 96374 THER/PROPH/DIAG INJ IV PUSH: CPT

## 2022-09-10 PROCEDURE — 6370000000 HC RX 637 (ALT 250 FOR IP): Performed by: FAMILY MEDICINE

## 2022-09-10 PROCEDURE — 99284 EMERGENCY DEPT VISIT MOD MDM: CPT

## 2022-09-10 PROCEDURE — 96375 TX/PRO/DX INJ NEW DRUG ADDON: CPT

## 2022-09-10 PROCEDURE — 80048 BASIC METABOLIC PNL TOTAL CA: CPT

## 2022-09-10 PROCEDURE — 85025 COMPLETE CBC W/AUTO DIFF WBC: CPT

## 2022-09-10 RX ORDER — ONDANSETRON 2 MG/ML
4 INJECTION INTRAMUSCULAR; INTRAVENOUS ONCE
Status: COMPLETED | OUTPATIENT
Start: 2022-09-10 | End: 2022-09-10

## 2022-09-10 RX ORDER — DILTIAZEM HYDROCHLORIDE 120 MG/1
120 CAPSULE, EXTENDED RELEASE ORAL DAILY
COMMUNITY

## 2022-09-10 RX ORDER — LEVOTHYROXINE SODIUM 0.15 MG/1
150 TABLET ORAL DAILY
COMMUNITY

## 2022-09-10 RX ORDER — MORPHINE SULFATE 2 MG/ML
2 INJECTION, SOLUTION INTRAMUSCULAR; INTRAVENOUS ONCE
Status: COMPLETED | OUTPATIENT
Start: 2022-09-10 | End: 2022-09-10

## 2022-09-10 RX ORDER — HYDRALAZINE HYDROCHLORIDE 20 MG/ML
10 INJECTION INTRAMUSCULAR; INTRAVENOUS ONCE
Status: COMPLETED | OUTPATIENT
Start: 2022-09-10 | End: 2022-09-10

## 2022-09-10 RX ORDER — CLONIDINE HYDROCHLORIDE 0.1 MG/1
0.1 TABLET ORAL 2 TIMES DAILY
COMMUNITY
End: 2022-09-11

## 2022-09-10 RX ORDER — CLONIDINE HYDROCHLORIDE 0.1 MG/1
0.1 TABLET ORAL ONCE
Status: COMPLETED | OUTPATIENT
Start: 2022-09-10 | End: 2022-09-10

## 2022-09-10 RX ADMIN — MORPHINE SULFATE 2 MG: 2 INJECTION, SOLUTION INTRAMUSCULAR; INTRAVENOUS at 23:27

## 2022-09-10 RX ADMIN — CLONIDINE HYDROCHLORIDE 0.1 MG: 0.1 TABLET ORAL at 23:51

## 2022-09-10 RX ADMIN — ONDANSETRON 4 MG: 2 INJECTION INTRAMUSCULAR; INTRAVENOUS at 23:27

## 2022-09-10 RX ADMIN — HYDRALAZINE HYDROCHLORIDE 10 MG: 20 INJECTION INTRAMUSCULAR; INTRAVENOUS at 23:26

## 2022-09-11 ENCOUNTER — APPOINTMENT (OUTPATIENT)
Dept: CT IMAGING | Facility: HOSPITAL | Age: 72
End: 2022-09-11
Payer: MEDICARE

## 2022-09-11 VITALS
OXYGEN SATURATION: 96 % | RESPIRATION RATE: 15 BRPM | DIASTOLIC BLOOD PRESSURE: 72 MMHG | WEIGHT: 181.7 LBS | SYSTOLIC BLOOD PRESSURE: 166 MMHG | HEIGHT: 66 IN | HEART RATE: 69 BPM | TEMPERATURE: 97.7 F | BODY MASS INDEX: 29.2 KG/M2

## 2022-09-11 PROCEDURE — 96375 TX/PRO/DX INJ NEW DRUG ADDON: CPT

## 2022-09-11 PROCEDURE — 70450 CT HEAD/BRAIN W/O DYE: CPT

## 2022-09-11 PROCEDURE — 6360000002 HC RX W HCPCS: Performed by: FAMILY MEDICINE

## 2022-09-11 PROCEDURE — 6360000002 HC RX W HCPCS

## 2022-09-11 RX ORDER — HYDRALAZINE HYDROCHLORIDE 50 MG/1
TABLET, FILM COATED ORAL
Qty: 150 TABLET | Refills: 0 | Status: SHIPPED | OUTPATIENT
Start: 2022-09-11

## 2022-09-11 RX ORDER — MORPHINE SULFATE 4 MG/ML
4 INJECTION, SOLUTION INTRAMUSCULAR; INTRAVENOUS ONCE
Status: DISCONTINUED | OUTPATIENT
Start: 2022-09-11 | End: 2022-09-11 | Stop reason: HOSPADM

## 2022-09-11 RX ORDER — CLONIDINE HYDROCHLORIDE 0.1 MG/1
0.1 TABLET ORAL 3 TIMES DAILY
Qty: 90 TABLET | Refills: 0 | Status: SHIPPED | OUTPATIENT
Start: 2022-09-11

## 2022-09-11 RX ORDER — ONDANSETRON 2 MG/ML
4 INJECTION INTRAMUSCULAR; INTRAVENOUS ONCE
Status: COMPLETED | OUTPATIENT
Start: 2022-09-11 | End: 2022-09-11

## 2022-09-11 RX ORDER — HYDRALAZINE HYDROCHLORIDE 20 MG/ML
10 INJECTION INTRAMUSCULAR; INTRAVENOUS ONCE
Status: COMPLETED | OUTPATIENT
Start: 2022-09-11 | End: 2022-09-11

## 2022-09-11 RX ADMIN — HYDRALAZINE HYDROCHLORIDE 10 MG: 20 INJECTION INTRAMUSCULAR; INTRAVENOUS at 00:29

## 2022-09-11 RX ADMIN — ONDANSETRON 4 MG: 2 INJECTION INTRAMUSCULAR; INTRAVENOUS at 00:52

## 2022-09-11 RX ADMIN — HYDROMORPHONE HYDROCHLORIDE 0.5 MG: 1 INJECTION, SOLUTION INTRAMUSCULAR; INTRAVENOUS; SUBCUTANEOUS at 00:52

## 2022-09-11 RX ADMIN — Medication 12.5 MG: at 01:53

## 2022-09-11 ASSESSMENT — PAIN SCALES - GENERAL: PAINLEVEL_OUTOF10: 5

## 2022-09-11 ASSESSMENT — ENCOUNTER SYMPTOMS: NAUSEA: 1

## 2022-09-11 NOTE — ED TRIAGE NOTES
Patient arrives to ER from home with chief complaint of high blood pressure and a headache. She states \"they have been messing with my blood pressure medicines and what they are doing is not working\". Patient's  at the bedside and states \"it's up and down a lot and it got extremely high tonight\". BP was 228/90 with  during triage. HR 65.

## 2022-09-11 NOTE — ED NOTES
2L NC was applied to patient after Phenergan infusion. Oxygen saturation on room air was 86-88%.       Sue Floyd RN  09/11/22 9706

## 2022-09-11 NOTE — ED PROVIDER NOTES
Plain radiographic images are visualized andpreliminarily interpreted by the emergency physician with the below findings:    CT Head - See Below    Interpretationper the Radiologist below, if available at the time of this note:    CT Head WO Contrast   Final Result   1. Normal brain   2. Bilateral exophthalmos            ED BEDSIDE ULTRASOUND:   Performed by ED Physician - none    LABS:  Labs Reviewed   BASIC METABOLIC PANEL - Abnormal; Notable for the following components:       Result Value    BUN 32 (*)     Creatinine 2.0 (*)     GFR Non- 24 (*)     GFR  30 (*)     All other components within normal limits   CBC WITH AUTO DIFFERENTIAL       All other labs were within normal range or not returned as of this dictation. EMERGENCY DEPARTMENT COURSE and DIFFERENTIAL DIAGNOSIS/MDM:   :    Vitals:    09/11/22 0145 09/11/22 0201 09/11/22 0215 09/11/22 0219   BP: (!) 163/75 (!) 179/75 (!) 167/66    Pulse: 72 74  71   Resp: 18 20  13   Temp:       TempSrc:       SpO2: 93% 91%  94%   Weight:       Height:               CRITICAL CARE TIME   Total Critical Care time was 0 minutes, excluding separatelyreportable procedures. There was a high probability ofclinically significant/life threatening deterioration in the patient's condition which required my urgent intervention. CONSULTS:  None    PROCEDURES:  None    PROGRESS NOTES:    Pt with elevated /90. Went through her medications to see what she takes. HR down to the upper 60's. Gave 10 mg Hydralazine. Pt had some what of a response down to 170/80's. Gave 0.1 mg Clonidine since she takes it BID at home. BP went up to 190/90's again. Ordered another 10 mg Hydralazine. Pt complained of worsening headache. Gave 2 mg morphine from the first meds, then gave 0.5 mg Dilaudid and gave another dose of zofran to keep it from complicating nausea/vomiting from medication. Decided to get CT head with elevated BP and worsening headache.  CT negative. Gave phenergan. Discussed about admission since she was nauseated. Having trouble getting her BP down. No evidence that she is having a SAH since headache for quite some time and worse with increased BP. BP coming down now to last 2-3-4 reads in 160/80's. This is baseline for the patient. Declined admission multiple times. Will increase her Clonidine to TID, increase Hydralazine to midday dose of 50 mg. Discussed signs and symptoms to return for. Is this patient to be included in the SEP-1 Core Measure due to severe sepsis or septic shock? No   Exclusion criteria - the patient is NOT to be included for SEP-1 Core Measure due to: Infection is not suspected     FINAL IMPRESSION      1. Uncontrolled hypertension New Problem   2. Acute nonintractable headache, unspecified headache type New Problem   3. Nausea New Problem   4. Chronic renal failure, stage 4 (severe) ([facility]) Uncertain Status         DISPOSITION/PLAN   DISPOSITION Decision To Discharge 09/11/2022 02:37:12 AM      PATIENT REFERRED TO:    See PCP early this week for blood pressure. Pt to return if blood pressure remains high, still nauseated and has headache all day today.  Pt to increase her clonidine 0.1 to three times a day, and take 50 mg hydralazine midday between morning/night doses        DISCHARGE MEDICATIONS:  Current Discharge Medication List          (Please note that portions of this note were completed with a voice recognition program.  Efforts were made to edit the dictations but occasionallywords are mis-transcribed.)    Fredi Darnell DO (electronically signed)  Attending Emergency Physician          Fredi Darnell DO  09/11/22 5084

## 2022-09-16 DIAGNOSIS — Z79.4 TYPE 2 DIABETES MELLITUS WITH CHRONIC KIDNEY DISEASE, WITH LONG-TERM CURRENT USE OF INSULIN, UNSPECIFIED CKD STAGE: ICD-10-CM

## 2022-09-16 DIAGNOSIS — E11.22 TYPE 2 DIABETES MELLITUS WITH CHRONIC KIDNEY DISEASE, WITH LONG-TERM CURRENT USE OF INSULIN, UNSPECIFIED CKD STAGE: ICD-10-CM

## 2022-09-16 NOTE — TELEPHONE ENCOUNTER
A NEUROLOGY OFFICE CALLED STATING THEY REDUCED THIS PT'S GABAPENTIN RX DUE TO THEM HAVING LEG CRAMPS AND FALLING. THE OFFICE IS TO SEND THEIR NOTE FOR THE PT'S VISIT TODAY. THEY REQUESTED WE CONTACT THE PT AND SEND IN A NEW RX REFLECTING THEIR CHANGE.

## 2022-09-19 RX ORDER — GABAPENTIN 100 MG/1
100 CAPSULE ORAL 3 TIMES DAILY
Qty: 90 CAPSULE | Refills: 0 | Status: SHIPPED | OUTPATIENT
Start: 2022-09-19 | End: 2022-11-01 | Stop reason: SDUPTHER

## 2022-09-20 ENCOUNTER — HOSPITAL ENCOUNTER (OUTPATIENT)
Dept: CT IMAGING | Facility: HOSPITAL | Age: 72
Discharge: HOME | End: 2022-09-20
Payer: MEDICARE

## 2022-09-20 DIAGNOSIS — R10.84 ABDOMINAL PAIN, GENERALIZED: ICD-10-CM

## 2022-09-20 DIAGNOSIS — K59.00 CONSTIPATION, UNSPECIFIED CONSTIPATION TYPE: ICD-10-CM

## 2022-09-20 PROCEDURE — 74176 CT ABD & PELVIS W/O CONTRAST: CPT

## 2022-10-19 ENCOUNTER — OFFICE VISIT (OUTPATIENT)
Dept: ENDOCRINOLOGY | Facility: CLINIC | Age: 72
End: 2022-10-19

## 2022-10-19 ENCOUNTER — LAB (OUTPATIENT)
Dept: LAB | Facility: HOSPITAL | Age: 72
End: 2022-10-19

## 2022-10-19 VITALS
OXYGEN SATURATION: 96 % | HEART RATE: 75 BPM | SYSTOLIC BLOOD PRESSURE: 150 MMHG | WEIGHT: 191.2 LBS | BODY MASS INDEX: 30.01 KG/M2 | HEIGHT: 67 IN | DIASTOLIC BLOOD PRESSURE: 76 MMHG

## 2022-10-19 DIAGNOSIS — Z86.39 HISTORY OF GRAVES' DISEASE: ICD-10-CM

## 2022-10-19 DIAGNOSIS — E11.49 DIABETIC NEUROPATHY WITH NEUROLOGIC COMPLICATION: ICD-10-CM

## 2022-10-19 DIAGNOSIS — E11.40 DIABETIC NEUROPATHY WITH NEUROLOGIC COMPLICATION: ICD-10-CM

## 2022-10-19 DIAGNOSIS — E89.0 POSTABLATIVE HYPOTHYROIDISM: ICD-10-CM

## 2022-10-19 DIAGNOSIS — Z79.4 TYPE 2 DIABETES MELLITUS WITH CHRONIC KIDNEY DISEASE, WITH LONG-TERM CURRENT USE OF INSULIN, UNSPECIFIED CKD STAGE: ICD-10-CM

## 2022-10-19 DIAGNOSIS — Z79.4 TYPE 2 DIABETES MELLITUS WITH HYPOGLYCEMIA WITHOUT COMA, WITH LONG-TERM CURRENT USE OF INSULIN: ICD-10-CM

## 2022-10-19 DIAGNOSIS — E11.649 TYPE 2 DIABETES MELLITUS WITH HYPOGLYCEMIA WITHOUT COMA, WITH LONG-TERM CURRENT USE OF INSULIN: ICD-10-CM

## 2022-10-19 DIAGNOSIS — I10 PRIMARY HYPERTENSION: ICD-10-CM

## 2022-10-19 DIAGNOSIS — E78.2 MIXED HYPERLIPIDEMIA: ICD-10-CM

## 2022-10-19 DIAGNOSIS — Z79.4 INSULIN LONG-TERM USE: ICD-10-CM

## 2022-10-19 DIAGNOSIS — N18.30 STAGE 3 CHRONIC KIDNEY DISEASE, UNSPECIFIED WHETHER STAGE 3A OR 3B CKD: ICD-10-CM

## 2022-10-19 DIAGNOSIS — E11.65 UNCONTROLLED TYPE 2 DIABETES MELLITUS WITH HYPERGLYCEMIA: Primary | ICD-10-CM

## 2022-10-19 DIAGNOSIS — E11.22 TYPE 2 DIABETES MELLITUS WITH CHRONIC KIDNEY DISEASE, WITH LONG-TERM CURRENT USE OF INSULIN, UNSPECIFIED CKD STAGE: ICD-10-CM

## 2022-10-19 LAB
EXPIRATION DATE: ABNORMAL
EXPIRATION DATE: NORMAL
GLUCOSE BLDC GLUCOMTR-MCNC: 136 MG/DL (ref 70–130)
HBA1C MFR BLD: 7.2 %
Lab: ABNORMAL
Lab: NORMAL

## 2022-10-19 PROCEDURE — 99214 OFFICE O/P EST MOD 30 MIN: CPT | Performed by: INTERNAL MEDICINE

## 2022-10-19 PROCEDURE — 83036 HEMOGLOBIN GLYCOSYLATED A1C: CPT | Performed by: INTERNAL MEDICINE

## 2022-10-19 PROCEDURE — 82947 ASSAY GLUCOSE BLOOD QUANT: CPT | Performed by: INTERNAL MEDICINE

## 2022-10-19 PROCEDURE — 3051F HG A1C>EQUAL 7.0%<8.0%: CPT | Performed by: INTERNAL MEDICINE

## 2022-10-19 PROCEDURE — 84443 ASSAY THYROID STIM HORMONE: CPT

## 2022-10-19 NOTE — ASSESSMENT & PLAN NOTE
Diabetes is worsening.  A1c increased but okay at 7.2%.  Continue current treatment regimen.  Diabetes will be reassessed in 3 months.

## 2022-10-19 NOTE — PROGRESS NOTES
"     Office Note      Date: 10/19/2022  Patient Name: Karen Newman  MRN: [id number]  : 1950    Chief Complaint   Patient presents with   • Diabetes       History of Present Illness:   Karen Newman is a 72 y.o. female who presents for Diabetes type 2. Diagnosed in: . Treated in past with oral agents. Current treatments: basal bolus insulin. Number of insulin shots per day: 3. Checks blood sugar 4 times a day. She is making frequent adjustments in insulin based on glucose readings.  Has low blood sugar: rare. Aspirin use: No - easy bleeding. Statin use: Yes. ACE-I/ARB use: No - per nephrology. Changes in health since last visit: ER visit for HTN; COVID-19 infection. Last eye exam 2022.     She remains on T4. She is taking 150mcg qd. She is taking this correctly. She isn't taking any interfering meds concurrently. She hasn't noted any change in the size of her neck. She denies any compressive sxs. She denies any sxs of hypo- or hyperthyroidism, aside from cold intolerance.    Subjective      Diabetic Complications:  Eyes: Yes  Kidneys: Yes - microalbumin and elevated Cr  Feet: Yes  Heart: No    Diet and Exercise:  Meals per day: 2  Minutes of exercise per week: 0 mins.    Review of Systems:   Review of Systems   Constitutional: Negative.    Cardiovascular: Negative.    Gastrointestinal: Negative.    Endocrine: Positive for cold intolerance.       The following portions of the patient's history were reviewed and updated as appropriate: allergies, current medications, past family history, past medical history, past social history, past surgical history and problem list.    Objective       Visit Vitals  /76   Pulse 75   Ht 170.2 cm (67\")   Wt 86.7 kg (191 lb 3.2 oz)   LMP  (LMP Unknown)   SpO2 96%   BMI 29.95 kg/m²       Physical Exam:  Physical Exam  Constitutional:       Appearance: Normal appearance.   Neurological:      Mental Status: She is alert.         Labs:    HbA1c  Lab Results "   Component Value Date    HGBA1C 7.2 10/19/2022       CMP  Lab Results   Component Value Date    GLUCOSE 161 (H) 09/08/2022    BUN 25 (H) 09/08/2022    CREATININE 1.85 (H) 09/08/2022    EGFRIFNONA 24 (L) 02/25/2022    BCR 13.5 09/08/2022    K 4.0 09/08/2022    CO2 28.3 09/08/2022    CALCIUM 9.4 09/08/2022    PROTENTOTREF 6.9 07/08/2022    LABIL2 1.1 07/08/2022    AST 17 04/06/2022    ALT 24 04/06/2022        Lipid Panel  Lab Results   Component Value Date    HDL 50 01/05/2022    LDL 61 01/05/2022    TRIG 61 01/05/2022        TSH  Lab Results   Component Value Date    TSH 1.470 06/02/2022        Hemoglobin A1C  Lab Results   Component Value Date    HGBA1C 7.2 10/19/2022        Microalbumin/Creatinine  Lab Results   Component Value Date    MALBCRERATIO 1,235.5 01/05/2022    MICROALBUR 89.7 01/05/2022           Assessment / Plan      Assessment & Plan:  Diagnoses and all orders for this visit:    1. Uncontrolled type 2 diabetes mellitus with hyperglycemia (HCC) (Primary)  Assessment & Plan:  Diabetes is worsening.  A1c increased but okay at 7.2%.  Continue current treatment regimen.  Diabetes will be reassessed in 3 months.      2. Type 2 diabetes mellitus with chronic kidney disease, with long-term current use of insulin, unspecified CKD stage (HCC)  Assessment & Plan:  Continue nephrology follow up.    Orders:  -     POC Glucose, Blood  -     POC Glycosylated Hemoglobin (Hb A1C)    3. Diabetic neuropathy with neurologic complication (HCC)    4. Stage 3 chronic kidney disease, unspecified whether stage 3a or 3b CKD (HCC)    5. Type 2 diabetes mellitus with hypoglycemia without coma, with long-term current use of insulin (HCC)  Assessment & Plan:  We discussed CGM today.  Will send Rx to see if this is covered.      6. Primary hypertension  Assessment & Plan:  Hypertension is improving with treatment.  Continue current treatment regimen.  Continue nephrology follow up.  Blood pressure will be reassessed at the next  regular appointment.      7. Mixed hyperlipidemia  Assessment & Plan:  Continue statin.      8. Postablative hypothyroidism  Assessment & Plan:  Continue T4 tx.  Check TSH.      Orders:  -     TSH; Future    9. History of Graves' disease    10. Insulin long-term use (HCC)    Other orders  -     Continuous Blood Gluc Sensor (FreeStyle Lucille 2 Sensor) misc; 1 each Every 14 (Fourteen) Days.  Dispense: 2 each; Refill: 5  -     Continuous Blood Gluc  (FreeStyle Lucille 2 [place]) device; 1 each Daily.  Dispense: 1 each; Refill: 0      Return in about 3 months (around 1/19/2023) for Recheck with A1c, CMP, lipids, TSH, microalbumin, foot exam.    Karel Blevins MD   10/19/2022

## 2022-10-19 NOTE — ASSESSMENT & PLAN NOTE
Hypertension is improving with treatment.  Continue current treatment regimen.  Continue nephrology follow up.  Blood pressure will be reassessed at the next regular appointment.

## 2022-10-20 LAB — TSH SERPL DL<=0.05 MIU/L-ACNC: 3.12 UIU/ML (ref 0.27–4.2)

## 2022-10-21 ENCOUNTER — HOSPITAL ENCOUNTER (OUTPATIENT)
Dept: MAMMOGRAPHY | Facility: HOSPITAL | Age: 72
Discharge: HOME | End: 2022-10-21
Payer: MEDICARE

## 2022-10-21 VITALS — BODY MASS INDEX: 29.09 KG/M2 | WEIGHT: 181 LBS | HEIGHT: 66 IN

## 2022-10-21 DIAGNOSIS — Z12.31 BREAST CANCER SCREENING BY MAMMOGRAM: ICD-10-CM

## 2022-10-21 PROCEDURE — 77063 BREAST TOMOSYNTHESIS BI: CPT

## 2022-10-31 DIAGNOSIS — E11.22 TYPE 2 DIABETES MELLITUS WITH CHRONIC KIDNEY DISEASE, WITH LONG-TERM CURRENT USE OF INSULIN, UNSPECIFIED CKD STAGE: ICD-10-CM

## 2022-10-31 DIAGNOSIS — Z79.4 TYPE 2 DIABETES MELLITUS WITH CHRONIC KIDNEY DISEASE, WITH LONG-TERM CURRENT USE OF INSULIN, UNSPECIFIED CKD STAGE: ICD-10-CM

## 2022-10-31 RX ORDER — LEVOTHYROXINE SODIUM 0.15 MG/1
150 TABLET ORAL DAILY
Qty: 90 TABLET | Refills: 1 | Status: SHIPPED | OUTPATIENT
Start: 2022-10-31 | End: 2023-03-04 | Stop reason: DRUGHIGH

## 2022-11-01 DIAGNOSIS — Z79.4 TYPE 2 DIABETES MELLITUS WITH CHRONIC KIDNEY DISEASE, WITH LONG-TERM CURRENT USE OF INSULIN, UNSPECIFIED CKD STAGE: ICD-10-CM

## 2022-11-01 DIAGNOSIS — E11.22 TYPE 2 DIABETES MELLITUS WITH CHRONIC KIDNEY DISEASE, WITH LONG-TERM CURRENT USE OF INSULIN, UNSPECIFIED CKD STAGE: ICD-10-CM

## 2022-11-01 RX ORDER — GABAPENTIN 800 MG/1
TABLET ORAL
Qty: 180 TABLET | Refills: 0 | OUTPATIENT
Start: 2022-11-01

## 2022-11-01 RX ORDER — GABAPENTIN 100 MG/1
100 CAPSULE ORAL 3 TIMES DAILY
Qty: 90 CAPSULE | Refills: 1 | Status: SHIPPED | OUTPATIENT
Start: 2022-11-01 | End: 2022-11-01 | Stop reason: SDUPTHER

## 2022-11-01 RX ORDER — GABAPENTIN 100 MG/1
100 CAPSULE ORAL 3 TIMES DAILY
Qty: 90 CAPSULE | Refills: 1 | Status: SHIPPED | OUTPATIENT
Start: 2022-11-01 | End: 2022-12-27

## 2022-11-01 NOTE — TELEPHONE ENCOUNTER
Per note 9/22 this dose was reduced to 100 mg tid after neurology called and noted patient was falling on higher dose

## 2022-11-02 ENCOUNTER — TELEPHONE (OUTPATIENT)
Dept: ENDOCRINOLOGY | Facility: CLINIC | Age: 72
End: 2022-11-02

## 2022-11-02 RX ORDER — BLOOD SUGAR DIAGNOSTIC
STRIP MISCELLANEOUS
Qty: 400 EACH | Refills: 3 | Status: SHIPPED | OUTPATIENT
Start: 2022-11-02 | End: 2022-12-06 | Stop reason: SDUPTHER

## 2022-11-02 RX ORDER — DOXAZOSIN 8 MG/1
TABLET ORAL
Qty: 180 TABLET | Refills: 3 | Status: SHIPPED | OUTPATIENT
Start: 2022-11-02

## 2022-11-02 NOTE — TELEPHONE ENCOUNTER
PT'S  CALLED REQUESTING WE SEND IN NEW RX FOR TEST STRIPS FOR PT TO CHECK BS 4x A DAY IN TO [facility] IN [place], [place]

## 2022-11-14 ENCOUNTER — TELEPHONE (OUTPATIENT)
Dept: ENDOCRINOLOGY | Facility: CLINIC | Age: 72
End: 2022-11-14

## 2022-11-14 NOTE — TELEPHONE ENCOUNTER
Pt's  called. Pt needs prior authorization for insulin pump and sensors. Please send to [facility] pharmacy:      Phone: [phone] Fax: [phone]      Call  andrew at [phone]

## 2022-12-02 RX ORDER — ATORVASTATIN CALCIUM 10 MG/1
10 TABLET, FILM COATED ORAL DAILY
Qty: 90 TABLET | Refills: 3 | Status: SHIPPED | OUTPATIENT
Start: 2022-12-02

## 2022-12-06 RX ORDER — BLOOD SUGAR DIAGNOSTIC
STRIP MISCELLANEOUS
Qty: 400 EACH | Refills: 3 | Status: SHIPPED | OUTPATIENT
Start: 2022-12-06

## 2022-12-06 RX ORDER — INSULIN ASPART 100 [IU]/ML
16-20 INJECTION, SOLUTION INTRAVENOUS; SUBCUTANEOUS 3 TIMES DAILY
Qty: 18 ML | Refills: 1 | Status: SHIPPED | OUTPATIENT
Start: 2022-12-06 | End: 2023-01-31 | Stop reason: SDUPTHER

## 2022-12-06 NOTE — TELEPHONE ENCOUNTER
PT'S  CALLED REQUESTING TO CONSULT ON THIS PT'S RECENT BS's, GETTING PA FOR CGM, AND HE WANTS TO KNOW IF THE PT IS ON NOVOLOG OR HUMALOG AND HAVE RX SENT IN TO PHARM.

## 2022-12-06 NOTE — TELEPHONE ENCOUNTER
I call the patient back. The patient said her blood sugars has been in the 300s the patient said all the time in the mid day. She had she was taken off of the metformin and weants to know if the is something else she can have for her diabetes

## 2022-12-14 ENCOUNTER — TELEPHONE (OUTPATIENT)
Dept: ENDOCRINOLOGY | Facility: CLINIC | Age: 72
End: 2022-12-14

## 2022-12-14 NOTE — TELEPHONE ENCOUNTER
Spoke with patient.  Advised form received from [employer] 12/14/2022.  Form has been signed and faxed to [employer]

## 2022-12-14 NOTE — TELEPHONE ENCOUNTER
Pt. Called. States [facility] sent message stating they need more information on prescription, Novalog.    Pt's CB is [phone]

## 2022-12-27 DIAGNOSIS — E11.22 TYPE 2 DIABETES MELLITUS WITH CHRONIC KIDNEY DISEASE, WITH LONG-TERM CURRENT USE OF INSULIN, UNSPECIFIED CKD STAGE: ICD-10-CM

## 2022-12-27 DIAGNOSIS — Z79.4 TYPE 2 DIABETES MELLITUS WITH CHRONIC KIDNEY DISEASE, WITH LONG-TERM CURRENT USE OF INSULIN, UNSPECIFIED CKD STAGE: ICD-10-CM

## 2022-12-27 RX ORDER — GABAPENTIN 100 MG/1
100 CAPSULE ORAL 3 TIMES DAILY
Qty: 90 CAPSULE | Refills: 1 | Status: SHIPPED | OUTPATIENT
Start: 2022-12-27 | End: 2023-03-09

## 2023-01-13 ENCOUNTER — TRANSCRIBE ORDERS (OUTPATIENT)
Dept: LAB | Facility: HOSPITAL | Age: 73
End: 2023-01-13
Payer: MEDICARE

## 2023-01-13 ENCOUNTER — LAB (OUTPATIENT)
Dept: LAB | Facility: HOSPITAL | Age: 73
End: 2023-01-13
Payer: MEDICARE

## 2023-01-13 DIAGNOSIS — N18.4 CHRONIC KIDNEY DISEASE, STAGE IV (SEVERE): ICD-10-CM

## 2023-01-13 DIAGNOSIS — R80.9 PROTEINURIA, UNSPECIFIED TYPE: ICD-10-CM

## 2023-01-13 DIAGNOSIS — R80.9 PROTEINURIA, UNSPECIFIED TYPE: Primary | ICD-10-CM

## 2023-01-13 LAB
ALBUMIN SERPL-MCNC: 3.5 G/DL (ref 3.5–5.2)
ANION GAP SERPL CALCULATED.3IONS-SCNC: 10.1 MMOL/L (ref 5–15)
BACTERIA UR QL AUTO: ABNORMAL /HPF
BILIRUB UR QL STRIP: NEGATIVE
BUN SERPL-MCNC: 31 MG/DL (ref 8–23)
BUN/CREAT SERPL: 11.4 (ref 7–25)
CALCIUM SPEC-SCNC: 8.7 MG/DL (ref 8.6–10.5)
CHLORIDE SERPL-SCNC: 100 MMOL/L (ref 98–107)
CLARITY UR: CLEAR
CO2 SERPL-SCNC: 24.9 MMOL/L (ref 22–29)
COLOR UR: YELLOW
CREAT SERPL-MCNC: 2.73 MG/DL (ref 0.57–1)
CREAT UR-MCNC: 142.6 MG/DL
EGFRCR SERPLBLD CKD-EPI 2021: 18 ML/MIN/1.73
GLUCOSE SERPL-MCNC: 170 MG/DL (ref 65–99)
GLUCOSE UR STRIP-MCNC: NEGATIVE MG/DL
HGB UR QL STRIP.AUTO: NEGATIVE
HYALINE CASTS UR QL AUTO: ABNORMAL /LPF
KETONES UR QL STRIP: NEGATIVE
LEUKOCYTE ESTERASE UR QL STRIP.AUTO: NEGATIVE
NITRITE UR QL STRIP: NEGATIVE
PH UR STRIP.AUTO: 5.5 [PH] (ref 5–8)
PHOSPHATE SERPL-MCNC: 4.3 MG/DL (ref 2.5–4.5)
POTASSIUM SERPL-SCNC: 4.4 MMOL/L (ref 3.5–5.2)
PROT ?TM UR-MCNC: 260.9 MG/DL
PROT UR QL STRIP: ABNORMAL
RBC # UR STRIP: ABNORMAL /HPF
REF LAB TEST METHOD: ABNORMAL
SODIUM SERPL-SCNC: 135 MMOL/L (ref 136–145)
SP GR UR STRIP: 1.02 (ref 1–1.03)
SQUAMOUS #/AREA URNS HPF: ABNORMAL /HPF
UROBILINOGEN UR QL STRIP: ABNORMAL
WBC # UR STRIP: ABNORMAL /HPF

## 2023-01-13 PROCEDURE — 84155 ASSAY OF PROTEIN SERUM: CPT

## 2023-01-13 PROCEDURE — 84165 PROTEIN E-PHORESIS SERUM: CPT

## 2023-01-13 PROCEDURE — 36415 COLL VENOUS BLD VENIPUNCTURE: CPT

## 2023-01-13 PROCEDURE — 84156 ASSAY OF PROTEIN URINE: CPT

## 2023-01-13 PROCEDURE — 82570 ASSAY OF URINE CREATININE: CPT

## 2023-01-13 PROCEDURE — 81001 URINALYSIS AUTO W/SCOPE: CPT

## 2023-01-13 PROCEDURE — 80069 RENAL FUNCTION PANEL: CPT

## 2023-01-16 LAB
ALBUMIN SERPL ELPH-MCNC: 3 G/DL (ref 2.9–4.4)
ALBUMIN/GLOB SERPL: 0.8 {RATIO} (ref 0.7–1.7)
ALPHA1 GLOB SERPL ELPH-MCNC: 0.3 G/DL (ref 0–0.4)
ALPHA2 GLOB SERPL ELPH-MCNC: 1 G/DL (ref 0.4–1)
B-GLOBULIN SERPL ELPH-MCNC: 1.5 G/DL (ref 0.7–1.3)
GAMMA GLOB SERPL ELPH-MCNC: 1.1 G/DL (ref 0.4–1.8)
GLOBULIN SER CALC-MCNC: 4 G/DL (ref 2.2–3.9)
LABORATORY COMMENT REPORT: ABNORMAL
M PROTEIN SERPL ELPH-MCNC: ABNORMAL G/DL
PROT SERPL-MCNC: 7 G/DL (ref 6–8.5)

## 2023-01-18 NOTE — PROGRESS NOTES
[facility] Cardiology    Patient ID: Karen Newman is a 72 y.o. female.  : 1950   Contact: [phone]    Encounter date: 2023    PCP: Mirian Arnold APRN      Chief complaint: PAF.      Problem List:  1. Atrial fibrillation, duration unknown; diagnosed 2013:  a. Status post ALANIS/external cardioversion, (10/18/2013), Yara Bobo M.D. with the initiation of propafenone therapy.   b. Atrial fibrillation, (2013), in Gloria Gomez’ office.  c. Normal sinus rhythm at the time of office visit, (2013).  d. Normal sinus rhythm, EKG, (2014).  e. Discontinuation of Rythmol secondary to diarrhea, (2014), with initiation of flecainide 50 mg b.i.d. and Eliquis 5 mg b.i.d.   f. CHADS Vasc= 4, (2017)   g. CT scan of the chest (2017): Mild fibrotic changes, o/w normal  h. Pulmonary Function Test (2017): FEV1/FVC  1.54/2.57 = 60%  i. Nuclear Stress Test (2017): EF>70%, no ischemia   j. Bilateral thoracoscopy and Maze procedure and clipping of the left atrial appendage (2017) Dr. Eliza wallis ALANIS 10/24/17: EF 60%, RADHA successfully closed with no evidence of a residual left atrial appendage. Mild to moderate MR  l. 14d Vicenteo, 2021: NSR. PAF <1%  2. Valvular heart disease:  a. Previous echocardiogram approximately,  (incomplete data base).  b. Echocardiogram (08/15/2002), revealing moderate concentric LVH, hyperdynamic LV, EF 81%; mild MR, trace TR.  c. Echocardiogram (10/07/2008), revealed a normal LVEF with mild to moderate MR.  d. Echocardiogram, (2011): normal LV systolic function and wall motion with mild MR and mild TR.   e. Echocardiogram, 2017: EF 60%. Moderate to severe MR. Trace to mild TR. No thrombus appears to be present within the left atrial appendage.  f. ALANIS 10/24/17: EF 60%, RADHA successfully closed with no evidence of a residual left atrial appendage. Mild to moderate MR  g. Echo 1/10/20: EF  65%, trace MR, mild TR, mild LVH.   h. Echo 04/07/2022: LVEF 60-65%. Mild concentric LVH. Normal LV diastolic filling pattern. Mild MR.   3. Hypertension:  a. Adenosine Cardiolite, (06/18/2009): Normal systolic function without evidence of inducible ischemia.   b. Echo 1/11/20: EF 65%. Trace MR. Mild TR. Mild LVH.  c. Renal duplex 03/22/2022: No evidence to suggest significant renal artery stenosis.   4. Dyslipidemia.  5. Diabetes mellitus, Type 2 diagnosed, 2006.  6. Thyroid disease:  a. Hyperthyroidism status post radioactive iodine therapy.  b. Hypothyroidism on thyroid replacement.  7. Gastroesophageal reflux disease.  8. Chronic kidney disease. crea 1.7  9. History of hemorrhoids status post hemorrhoidectomy.  10. Colon polyps status post polypectomy.  11. Anxiety.  12. Iron deficiency anemia.  13. Obstructive sleep apnea on chronic CPAP therapy.  14. Surgical history:  a. Total abdominal hysterectomy  b. Squamous cell removal (right foot)    Allergies   Allergen Reactions   • Amlodipine Shortness Of Breath     Swelling and SOB   • Buspirone Nausea And Vomiting   • Lisinopril Other (See Comments)     cough   • Sertraline Hcl Other (See Comments)     nightmares   • Sular [Nisoldipine Er] Cough   • Carvedilol Other (See Comments)     SOB       Current Medications:    Current Outpatient Medications:   •  atorvastatin (LIPITOR) 10 MG tablet, TAKE 1 TABLET BY MOUTH  DAILY, Disp: 90 tablet, Rfl: 3  •  Blood Glucose Monitoring Suppl (OneTouch Verio) w/Device kit, 1 each Daily. DX: E11.65, Disp: 1 kit, Rfl: 0  •  cloNIDine (CATAPRES) 0.1 MG tablet, TAKE 1 TABLET BY MOUTH  EVERY 12 HOURS, Disp: 180 tablet, Rfl: 3  •  Continuous Blood Gluc  (FreeStyle Lucille 2 [place]) device, 1 each Daily., Disp: 1 each, Rfl: 0  •  Continuous Blood Gluc Sensor (FreeStyle Lucille 2 Sensor) misc, 1 each Every 14 (Fourteen) Days., Disp: 2 each, Rfl: 5  •  dilTIAZem CD (CARDIZEM CD) 120 MG 24 hr capsule, TAKE ONE CAPSULE BY MOUTH EVERY  DAY, Disp: 90 capsule, Rfl: 1  •  doxazosin (CARDURA) 8 MG tablet, TAKE 1 TABLET BY MOUTH  EVERY 12 HOURS, Disp: 180 tablet, Rfl: 3  •  furosemide (Lasix) 20 MG tablet, Take 1 tablet by mouth Every Other Day. Take 1 tab po prn worsening SOA/edema, Disp: 15 tablet, Rfl: 11  •  gabapentin (NEURONTIN) 100 MG capsule, TAKE 1 CAPSULE BY MOUTH 3 (THREE) TIMES A DAY. REPLACES 800 MG DOSING, Disp: 90 capsule, Rfl: 1  •  hydrALAZINE (APRESOLINE) 100 MG tablet, Take 1 tablet by mouth 2 (Two) Times a Day., Disp: 180 tablet, Rfl: 3  •  insulin aspart (NovoLOG FlexPen) 100 UNIT/ML solution pen-injector sc pen, Inject 16-20 Units under the skin into the appropriate area as directed 3 (Three) Times a Day., Disp: 18 mL, Rfl: 1  •  Levemir FlexTouch 100 UNIT/ML injection, INJECT SUBCUTANEOUSLY 35  UNITS AS DIRECTED DAILY, Disp: 45 mL, Rfl: 3  •  levoFLOXacin (LEVAQUIN) 500 MG tablet, Take 500 mg by mouth Daily., Disp: , Rfl:   •  levothyroxine (SYNTHROID, LEVOTHROID) 150 MCG tablet, TAKE 1 TABLET BY MOUTH  DAILY, Disp: 90 tablet, Rfl: 1  •  multivitamin with minerals (Centrum Silver 50+Women) tablet tablet, Take 1 tablet by mouth Daily., Disp: , Rfl:   •  omeprazole (priLOSEC) 40 MG capsule, Daily., Disp: , Rfl:   •  OneTouch Verio test strip, Use 4x per day:  ICD-10 E11.65, E11.649, Z79.4, Disp: 400 each, Rfl: 3  •  PARoxetine (PAXIL) 40 MG tablet, Take 40 mg by mouth Daily., Disp: , Rfl:     HPI    Karen Newman is a 72 y.o. female who presents today for a 6 month follow up of paroxysmal atrial fibrillation, valvular heart disease, and cardiac risk factors. Since last visit, patient has been doing well from a cardiac standpoint.  Blood pressure well controlled at home, averaging 120-130 systolic.  She denies chest pain, palpitations, lower extremity edema, lightheadedness or syncope.  Notes she recently had pneumonia and is taking her last dose of antibiotics tomorrow.  Still has some mild shortness of breath but feels it is  "getting better slowly. Has follow up with nephrology tomorrow.       The following portions of the patient's history were reviewed and updated as appropriate: allergies, current medications and problem list.    Pertinent positives as listed in the HPI.  All other systems reviewed are negative.         Vitals:    01/19/23 1342   BP: 124/64   BP Location: Right arm   Patient Position: Sitting   Pulse: 67   SpO2: 90%   Weight: 87.1 kg (192 lb)   Height: 170.2 cm (67\")       Physical Exam:  General: Alert and oriented.  Neck: Jugular venous pressure is within normal limits. Carotids have normal upstrokes without bruits.   Cardiovascular: Heart has a nondisplaced focal PMI. Regular rate and rhythm. No murmur, gallop or rub.  Lungs: Clear, no rales or wheezes. Equal expansion is noted.   Extremities: Show no edema.  Skin: Warm and dry.  Neurologic: Nonfocal.     Diagnostic Data (reviewed with patient):  Lab Results   Component Value Date    GLUCOSE 170 (H) 01/13/2023    BUN 31 (H) 01/13/2023    CREATININE 2.73 (H) 01/13/2023    EGFRIFNONA 24 (L) 02/25/2022    BCR 11.4 01/13/2023     (L) 01/13/2023    K 4.4 01/13/2023     01/13/2023    CO2 24.9 01/13/2023    CALCIUM 8.7 01/13/2023    ALBUMIN 3.0 01/13/2023    ALBUMIN 3.5 01/13/2023    ALKPHOS 78 04/06/2022    AST 17 04/06/2022    ALT 24 04/06/2022     Lab Results   Component Value Date    CHOL 124 01/05/2022    TRIG 61 01/05/2022    HDL 50 01/05/2022    LDL 61 01/05/2022      Lab Results   Component Value Date    WBC 5.0 09/10/2022    RBC 4.11 09/10/2022    HGB 12.5 09/10/2022    HCT 39.0 09/10/2022    MCV 94.9 09/10/2022     09/10/2022      Lab Results   Component Value Date    TSH 3.120 10/19/2022        Advance Care Planning   ACP discussion was held with the patient during this visit. Patient does not have an advance directive, information provided.       Procedures      Assessment:    ICD-10-CM ICD-9-CM   1. Paroxysmal atrial fibrillation (HCC)  " I48.0 427.31   2. VHD (valvular heart disease)  I38 424.90   3. Essential hypertension  I10 401.9   4. Mixed hyperlipidemia  E78.2 272.2         Plan:  1. Stable cardiac status.  2. Continue on atorvastatin 10 mg daily for hyperlipidemia.  Repeat lipid panel with next blood draw.  Order entered for [facility] lab.  3. Continue on diltiazem 120 mg daily for rate control and hypertension.   4. Continue on doxazosin 8 mg q12h for hypertension.   5. Continue on Lasix 20 mg daily for fluid retention.   6. Continue all other current medications.  7. F/up in 6 months, sooner if needed.      Electronically signed by EUGENIE Kurtz, 01/19/23, 2:08 PM EST.

## 2023-01-19 ENCOUNTER — OFFICE VISIT (OUTPATIENT)
Dept: CARDIOLOGY | Facility: CLINIC | Age: 73
End: 2023-01-19
Payer: MEDICARE

## 2023-01-19 VITALS
HEART RATE: 67 BPM | HEIGHT: 67 IN | BODY MASS INDEX: 30.13 KG/M2 | DIASTOLIC BLOOD PRESSURE: 64 MMHG | WEIGHT: 192 LBS | SYSTOLIC BLOOD PRESSURE: 124 MMHG | OXYGEN SATURATION: 90 %

## 2023-01-19 DIAGNOSIS — I38 VHD (VALVULAR HEART DISEASE): ICD-10-CM

## 2023-01-19 DIAGNOSIS — E78.2 MIXED HYPERLIPIDEMIA: ICD-10-CM

## 2023-01-19 DIAGNOSIS — I48.0 PAROXYSMAL ATRIAL FIBRILLATION: Primary | ICD-10-CM

## 2023-01-19 DIAGNOSIS — I10 ESSENTIAL HYPERTENSION: ICD-10-CM

## 2023-01-19 PROCEDURE — 99213 OFFICE O/P EST LOW 20 MIN: CPT | Performed by: INTERNAL MEDICINE

## 2023-01-19 RX ORDER — MULTIPLE VITAMINS W/ MINERALS TAB 9MG-400MCG
1 TAB ORAL DAILY
COMMUNITY

## 2023-01-19 RX ORDER — OMEPRAZOLE 40 MG/1
CAPSULE, DELAYED RELEASE ORAL DAILY
COMMUNITY
Start: 2022-11-27

## 2023-01-31 RX ORDER — INSULIN ASPART 100 [IU]/ML
16-20 INJECTION, SOLUTION INTRAVENOUS; SUBCUTANEOUS 3 TIMES DAILY
Qty: 18 ML | Refills: 3 | Status: SHIPPED | OUTPATIENT
Start: 2023-01-31 | End: 2023-02-06 | Stop reason: ALTCHOICE

## 2023-01-31 RX ORDER — INSULIN LISPRO 100 [IU]/ML
INJECTION, SOLUTION INTRAVENOUS; SUBCUTANEOUS
Qty: 30 ML | Refills: 6 | OUTPATIENT
Start: 2023-01-31

## 2023-02-06 RX ORDER — INSULIN ASPART 100 [IU]/ML
16-20 INJECTION, SOLUTION INTRAVENOUS; SUBCUTANEOUS 3 TIMES DAILY
Qty: 18 ML | Refills: 3 | Status: CANCELLED | OUTPATIENT
Start: 2023-02-06

## 2023-02-06 RX ORDER — INSULIN LISPRO 100 [IU]/ML
INJECTION, SOLUTION INTRAVENOUS; SUBCUTANEOUS
Qty: 30 ML | Refills: 6 | Status: SHIPPED | OUTPATIENT
Start: 2023-02-06

## 2023-02-06 NOTE — TELEPHONE ENCOUNTER
Dx code E11.65,  Refill Humalog 16 - 20 units tid with additional sliding scale.Refill to [facility].  Last office visit 10/19/22.  Next office visit 03/03/23

## 2023-02-06 NOTE — TELEPHONE ENCOUNTER
Pt's  called. Insurance will not cover novolog.     Insurance will approve humalog.    Please send humalog insulin to [facility] today. Pt is almost out.    [facility]:  Phone: [phone] Fax: [phone]    Call andrew  can leave message:  [phone] or home:  [phone]

## 2023-03-03 ENCOUNTER — OFFICE VISIT (OUTPATIENT)
Dept: ENDOCRINOLOGY | Facility: CLINIC | Age: 73
End: 2023-03-03
Payer: MEDICARE

## 2023-03-03 VITALS
BODY MASS INDEX: 30.92 KG/M2 | WEIGHT: 197 LBS | SYSTOLIC BLOOD PRESSURE: 120 MMHG | DIASTOLIC BLOOD PRESSURE: 60 MMHG | OXYGEN SATURATION: 96 % | HEIGHT: 67 IN | HEART RATE: 50 BPM

## 2023-03-03 DIAGNOSIS — Z79.4 TYPE 2 DIABETES MELLITUS WITH CHRONIC KIDNEY DISEASE, WITH LONG-TERM CURRENT USE OF INSULIN, UNSPECIFIED CKD STAGE: ICD-10-CM

## 2023-03-03 DIAGNOSIS — E11.40 DIABETIC NEUROPATHY WITH NEUROLOGIC COMPLICATION: ICD-10-CM

## 2023-03-03 DIAGNOSIS — E11.22 TYPE 2 DIABETES MELLITUS WITH CHRONIC KIDNEY DISEASE, WITH LONG-TERM CURRENT USE OF INSULIN, UNSPECIFIED CKD STAGE: ICD-10-CM

## 2023-03-03 DIAGNOSIS — Z86.39 HISTORY OF GRAVES' DISEASE: ICD-10-CM

## 2023-03-03 DIAGNOSIS — Z79.4 INSULIN LONG-TERM USE: ICD-10-CM

## 2023-03-03 DIAGNOSIS — E11.49 DIABETIC NEUROPATHY WITH NEUROLOGIC COMPLICATION: ICD-10-CM

## 2023-03-03 DIAGNOSIS — E11.65 UNCONTROLLED TYPE 2 DIABETES MELLITUS WITH HYPERGLYCEMIA: Primary | ICD-10-CM

## 2023-03-03 DIAGNOSIS — E89.0 POSTABLATIVE HYPOTHYROIDISM: ICD-10-CM

## 2023-03-03 DIAGNOSIS — E11.649 TYPE 2 DIABETES MELLITUS WITH HYPOGLYCEMIA WITHOUT COMA, WITH LONG-TERM CURRENT USE OF INSULIN: ICD-10-CM

## 2023-03-03 DIAGNOSIS — I10 PRIMARY HYPERTENSION: ICD-10-CM

## 2023-03-03 DIAGNOSIS — Z79.4 TYPE 2 DIABETES MELLITUS WITH HYPOGLYCEMIA WITHOUT COMA, WITH LONG-TERM CURRENT USE OF INSULIN: ICD-10-CM

## 2023-03-03 DIAGNOSIS — E78.2 MIXED HYPERLIPIDEMIA: ICD-10-CM

## 2023-03-03 LAB
ALBUMIN SERPL-MCNC: 3.7 G/DL (ref 3.5–5.2)
ALBUMIN/GLOB SERPL: 1 G/DL
ALP SERPL-CCNC: 85 U/L (ref 39–117)
ALT SERPL W P-5'-P-CCNC: 15 U/L (ref 1–33)
ANION GAP SERPL CALCULATED.3IONS-SCNC: 10 MMOL/L (ref 5–15)
AST SERPL-CCNC: 17 U/L (ref 1–32)
BILIRUB SERPL-MCNC: 0.2 MG/DL (ref 0–1.2)
BUN SERPL-MCNC: 31 MG/DL (ref 8–23)
BUN/CREAT SERPL: 12.6 (ref 7–25)
CALCIUM SPEC-SCNC: 9.5 MG/DL (ref 8.6–10.5)
CHLORIDE SERPL-SCNC: 102 MMOL/L (ref 98–107)
CHOLEST SERPL-MCNC: 158 MG/DL (ref 0–200)
CO2 SERPL-SCNC: 27 MMOL/L (ref 22–29)
CREAT SERPL-MCNC: 2.46 MG/DL (ref 0.57–1)
EGFRCR SERPLBLD CKD-EPI 2021: 20.4 ML/MIN/1.73
EXPIRATION DATE: NORMAL
EXPIRATION DATE: NORMAL
GLOBULIN UR ELPH-MCNC: 3.7 GM/DL
GLUCOSE BLDC GLUCOMTR-MCNC: 73 MG/DL (ref 70–130)
GLUCOSE SERPL-MCNC: 93 MG/DL (ref 65–99)
HBA1C MFR BLD: 6.8 %
HDLC SERPL-MCNC: 46 MG/DL (ref 40–60)
LDLC SERPL CALC-MCNC: 91 MG/DL (ref 0–100)
LDLC/HDLC SERPL: 1.94 {RATIO}
Lab: NORMAL
Lab: NORMAL
POTASSIUM SERPL-SCNC: 4 MMOL/L (ref 3.5–5.2)
PROT SERPL-MCNC: 7.4 G/DL (ref 6–8.5)
SODIUM SERPL-SCNC: 139 MMOL/L (ref 136–145)
TRIGL SERPL-MCNC: 114 MG/DL (ref 0–150)
TSH SERPL DL<=0.05 MIU/L-ACNC: 12.4 UIU/ML (ref 0.27–4.2)
VLDLC SERPL-MCNC: 21 MG/DL (ref 5–40)

## 2023-03-03 PROCEDURE — 95251 CONT GLUC MNTR ANALYSIS I&R: CPT | Performed by: INTERNAL MEDICINE

## 2023-03-03 PROCEDURE — 80061 LIPID PANEL: CPT | Performed by: INTERNAL MEDICINE

## 2023-03-03 PROCEDURE — 3044F HG A1C LEVEL LT 7.0%: CPT | Performed by: INTERNAL MEDICINE

## 2023-03-03 PROCEDURE — 36415 COLL VENOUS BLD VENIPUNCTURE: CPT | Performed by: INTERNAL MEDICINE

## 2023-03-03 PROCEDURE — 83036 HEMOGLOBIN GLYCOSYLATED A1C: CPT | Performed by: INTERNAL MEDICINE

## 2023-03-03 PROCEDURE — 80053 COMPREHEN METABOLIC PANEL: CPT | Performed by: INTERNAL MEDICINE

## 2023-03-03 PROCEDURE — 84443 ASSAY THYROID STIM HORMONE: CPT | Performed by: INTERNAL MEDICINE

## 2023-03-03 PROCEDURE — 99214 OFFICE O/P EST MOD 30 MIN: CPT | Performed by: INTERNAL MEDICINE

## 2023-03-03 NOTE — ASSESSMENT & PLAN NOTE
Diabetes is improving with treatment.   Continue current treatment regimen.  Diabetes will be reassessed in 3 months.    FreeStyle Lucille 2 CGM was downloaded today.  Data was reviewed from 2/16/23 to 3/1/23.  This showed some variability with some postprandial spikes but no patterns for medication adjustments at this time.

## 2023-03-03 NOTE — PROGRESS NOTES
"     Office Note      Date: 2023  Patient Name: Karen Newman  MRN: [id number]  : 1950    Chief Complaint   Patient presents with   • Diabetes     Type II       History of Present Illness:   Karen Newman is a 72 y.o. female who presents for Diabetes type 2. Diagnosed in: . Treated in past with oral agents. Current treatments: basal bolus insulin. Number of insulin shots per day: 3. Checks blood sugar 288 times a day - on Free Style Lucille 2. She is making frequent adjustments in insulin based on glucose readings.  Has low blood sugar: rare. Aspirin use: No - easy bleeding. Statin use: Yes. ACE-I/ARB use: No - per nephrology. Changes in health since last visit: none. Last eye exam 2022.     She remains on T4. She is taking 150mcg qd. She is taking this correctly. She isn't taking any interfering meds concurrently. She hasn't noted any change in the size of her neck. She denies any compressive sxs. She denies any sxs of hypo- or hyperthyroidism, aside from cold intolerance.    Subjective      Diabetic Complications:  Eyes: Yes  Kidneys: Yes - microalbumin and elevated Cr  Feet: Yes  Heart: No    Diet and Exercise:  Meals per day: 2  Minutes of exercise per week: 0 mins.    Review of Systems:   Review of Systems   Constitutional: Negative.    Cardiovascular: Negative.    Gastrointestinal: Negative.    Endocrine: Positive for cold intolerance.       The following portions of the patient's history were reviewed and updated as appropriate: allergies, current medications, past family history, past medical history, past social history, past surgical history and problem list.    Objective       Visit Vitals  /60 (BP Location: Left arm, Patient Position: Sitting, Cuff Size: Adult)   Pulse 50   Ht 170.2 cm (67\")   Wt 89.4 kg (197 lb)   LMP  (LMP Unknown)   SpO2 96%   BMI 30.85 kg/m²       Physical Exam:  Physical Exam  Constitutional:       Appearance: Normal appearance.   Cardiovascular:      " Pulses:           Dorsalis pedis pulses are 2+ on the right side and 2+ on the left side.        Posterior tibial pulses are 2+ on the right side and 2+ on the left side.   Feet:      Right foot:      Protective Sensation: 5 sites tested. 5 sites sensed.      Skin integrity: Skin integrity normal.      Toenail Condition: Right toenails are abnormally thick.      Left foot:      Protective Sensation: 5 sites tested. 5 sites sensed.      Skin integrity: Skin integrity normal.      Toenail Condition: Left toenails are abnormally thick.   Neurological:      Mental Status: She is alert.         Labs:    HbA1c  Lab Results   Component Value Date    HGBA1C 6.8 03/03/2023       CMP  Lab Results   Component Value Date    GLUCOSE 170 (H) 01/13/2023    BUN 31 (H) 01/13/2023    CREATININE 2.73 (H) 01/13/2023    EGFRIFNONA 24 (L) 02/25/2022    BCR 11.4 01/13/2023    K 4.4 01/13/2023    CO2 24.9 01/13/2023    CALCIUM 8.7 01/13/2023    PROTENTOTREF 7.0 01/13/2023    LABIL2 0.8 01/13/2023    AST 17 04/06/2022    ALT 24 04/06/2022        Lipid Panel  Lab Results   Component Value Date    HDL 50 01/05/2022    LDL 61 01/05/2022    TRIG 61 01/05/2022        TSH  Lab Results   Component Value Date    TSH 3.120 10/19/2022        Hemoglobin A1C  Lab Results   Component Value Date    HGBA1C 6.8 03/03/2023        Microalbumin/Creatinine  Lab Results   Component Value Date    MALBCRERATIO 1,235.5 01/05/2022    MICROALBUR 89.7 01/05/2022           Assessment / Plan      Assessment & Plan:  Diagnoses and all orders for this visit:    1. Uncontrolled type 2 diabetes mellitus with hyperglycemia (HCC) (Primary)  Assessment & Plan:  Diabetes is improving with treatment.   Continue current treatment regimen.  Diabetes will be reassessed in 3 months.    FreeStyle Lucille 2 CGM was downloaded today.  Data was reviewed from 2/16/23 to 3/1/23.  This showed some variability with some postprandial spikes but no patterns for medication adjustments at this  time.    Orders:  -     POC Glucose, Blood  -     POC Glycosylated Hemoglobin (Hb A1C)  -     Comprehensive Metabolic Panel; Future  -     Lipid Panel; Future    2. Type 2 diabetes mellitus with hypoglycemia without coma, with long-term current use of insulin (HCC)  Assessment & Plan:  Continue CGM.      3. Diabetic neuropathy with neurologic complication (HCC)    4. Type 2 diabetes mellitus with chronic kidney disease, with long-term current use of insulin, unspecified CKD stage (HCC)  Assessment & Plan:  Check CMP today.      5. Primary hypertension  Assessment & Plan:  Hypertension is unchanged.  Continue current treatment regimen.  Blood pressure will be reassessed at the next regular appointment.      6. Mixed hyperlipidemia  Assessment & Plan:  Continue statin.  Check lipids today.      7. Postablative hypothyroidism  Assessment & Plan:  Continue T4 tx.  Check TSH.    Orders:  -     TSH; Future    8. History of Graves' disease    9. Insulin long-term use (HCC)    Current Outpatient Medications   Medication Instructions   • atorvastatin (LIPITOR) 10 mg, Oral, Daily   • Blood Glucose Monitoring Suppl (OneTouch Verio) w/Device kit 1 each, Does not apply, Daily, DX: E11.65   • cloNIDine (CATAPRES) 0.1 MG tablet TAKE 1 TABLET BY MOUTH  EVERY 12 HOURS   • Continuous Blood Gluc  (FreeStyle Lucille 2 [place]) device 1 each, Does not apply, Daily   • Continuous Blood Gluc Sensor (FreeStyle Lucille 2 Sensor) misc 1 each, Does not apply, Every 14 Days   • dilTIAZem CD (CARDIZEM CD) 120 MG 24 hr capsule TAKE ONE CAPSULE BY MOUTH EVERY DAY   • doxazosin (CARDURA) 8 MG tablet TAKE 1 TABLET BY MOUTH  EVERY 12 HOURS   • furosemide (LASIX) 20 mg, Oral, Every Other Day, Take 1 tab po prn worsening SOA/edema   • gabapentin (NEURONTIN) 100 mg, Oral, 3 Times Daily, Replaces 800 mg dosing   • HumaLOG KwikPen 100 UNIT/ML solution pen-injector INJECT SUBCUTANEOUSLY 16 TO 20  UNITS 3 TIMES DAILY WITH MEALS   • hydrALAZINE  (APRESOLINE) 100 mg, Oral, 2 Times Daily   • Levemir FlexTouch 100 UNIT/ML injection INJECT SUBCUTANEOUSLY 35  UNITS AS DIRECTED DAILY   • levoFLOXacin (LEVAQUIN) 500 mg, Oral, Daily   • levothyroxine (SYNTHROID, LEVOTHROID) 150 mcg, Oral, Daily   • multivitamin with minerals tablet tablet 1 tablet, Oral, Daily   • omeprazole (priLOSEC) 40 MG capsule Daily   • OneTouch Verio test strip Use 4x per day:  ICD-10 E11.65, E11.649, Z79.4   • PARoxetine (PAXIL) 40 mg, Oral, Daily      Return in about 3 months (around 6/3/2023) for Recheck with A1c.    Karel Blevins MD   03/03/2023

## 2023-03-04 RX ORDER — LEVOTHYROXINE SODIUM 175 UG/1
175 TABLET ORAL DAILY
Qty: 90 TABLET | Refills: 3 | Status: SHIPPED | OUTPATIENT
Start: 2023-03-04

## 2023-03-09 DIAGNOSIS — Z79.4 TYPE 2 DIABETES MELLITUS WITH CHRONIC KIDNEY DISEASE, WITH LONG-TERM CURRENT USE OF INSULIN, UNSPECIFIED CKD STAGE: ICD-10-CM

## 2023-03-09 DIAGNOSIS — E11.22 TYPE 2 DIABETES MELLITUS WITH CHRONIC KIDNEY DISEASE, WITH LONG-TERM CURRENT USE OF INSULIN, UNSPECIFIED CKD STAGE: ICD-10-CM

## 2023-03-09 RX ORDER — GABAPENTIN 100 MG/1
100 CAPSULE ORAL 3 TIMES DAILY
Qty: 90 CAPSULE | Refills: 5 | Status: SHIPPED | OUTPATIENT
Start: 2023-03-09

## 2023-03-09 NOTE — TELEPHONE ENCOUNTER
Refill Gabapentin 100 mg tid.  Last office visit 03/03/23.  Next scheduled appt 07/14/23.  Refill to [facility].

## 2023-03-10 ENCOUNTER — TRANSCRIBE ORDERS (OUTPATIENT)
Dept: INTERVENTIONAL RADIOLOGY/VASCULAR | Facility: HOSPITAL | Age: 73
End: 2023-03-10
Payer: MEDICARE

## 2023-03-10 ENCOUNTER — LAB (OUTPATIENT)
Dept: LAB | Facility: HOSPITAL | Age: 73
End: 2023-03-10
Payer: MEDICARE

## 2023-03-10 DIAGNOSIS — E78.2 MIXED HYPERLIPIDEMIA: ICD-10-CM

## 2023-03-10 DIAGNOSIS — N18.4 CHRONIC KIDNEY DISEASE, STAGE IV (SEVERE): ICD-10-CM

## 2023-03-10 DIAGNOSIS — N18.30 STAGE 3 CHRONIC KIDNEY DISEASE, UNSPECIFIED WHETHER STAGE 3A OR 3B CKD: Primary | ICD-10-CM

## 2023-03-10 DIAGNOSIS — N18.30 STAGE 3 CHRONIC KIDNEY DISEASE, UNSPECIFIED WHETHER STAGE 3A OR 3B CKD: ICD-10-CM

## 2023-03-10 LAB
ALBUMIN SERPL-MCNC: 3.6 G/DL (ref 3.5–5.2)
ANION GAP SERPL CALCULATED.3IONS-SCNC: 12 MMOL/L (ref 5–15)
BASOPHILS # BLD AUTO: 0.04 10*3/MM3 (ref 0–0.2)
BASOPHILS NFR BLD AUTO: 0.8 % (ref 0–1.5)
BUN SERPL-MCNC: 32 MG/DL (ref 8–23)
BUN/CREAT SERPL: 12.5 (ref 7–25)
CALCIUM SPEC-SCNC: 8.5 MG/DL (ref 8.6–10.5)
CHLORIDE SERPL-SCNC: 98 MMOL/L (ref 98–107)
CHOLEST SERPL-MCNC: 146 MG/DL (ref 0–200)
CO2 SERPL-SCNC: 26 MMOL/L (ref 22–29)
CREAT SERPL-MCNC: 2.57 MG/DL (ref 0.57–1)
CREAT UR-MCNC: 65.7 MG/DL
DEPRECATED RDW RBC AUTO: 42.8 FL (ref 37–54)
EGFRCR SERPLBLD CKD-EPI 2021: 19.3 ML/MIN/1.73
EOSINOPHIL # BLD AUTO: 0.25 10*3/MM3 (ref 0–0.4)
EOSINOPHIL NFR BLD AUTO: 4.7 % (ref 0.3–6.2)
ERYTHROCYTE [DISTWIDTH] IN BLOOD BY AUTOMATED COUNT: 12.6 % (ref 12.3–15.4)
FERRITIN SERPL-MCNC: 291 NG/ML (ref 13–150)
GLUCOSE SERPL-MCNC: 204 MG/DL (ref 65–99)
HCT VFR BLD AUTO: 30.9 % (ref 34–46.6)
HDLC SERPL-MCNC: 44 MG/DL (ref 40–60)
HGB BLD-MCNC: 10.2 G/DL (ref 12–15.9)
IMM GRANULOCYTES # BLD AUTO: 0.02 10*3/MM3 (ref 0–0.05)
IMM GRANULOCYTES NFR BLD AUTO: 0.4 % (ref 0–0.5)
IRON 24H UR-MRATE: 42 MCG/DL (ref 37–145)
IRON SATN MFR SERPL: 14 % (ref 20–50)
LDLC SERPL CALC-MCNC: 84 MG/DL (ref 0–100)
LDLC/HDLC SERPL: 1.87 {RATIO}
LYMPHOCYTES # BLD AUTO: 0.89 10*3/MM3 (ref 0.7–3.1)
LYMPHOCYTES NFR BLD AUTO: 16.9 % (ref 19.6–45.3)
MCH RBC QN AUTO: 31.1 PG (ref 26.6–33)
MCHC RBC AUTO-ENTMCNC: 33 G/DL (ref 31.5–35.7)
MCV RBC AUTO: 94.2 FL (ref 79–97)
MONOCYTES # BLD AUTO: 0.73 10*3/MM3 (ref 0.1–0.9)
MONOCYTES NFR BLD AUTO: 13.8 % (ref 5–12)
NEUTROPHILS NFR BLD AUTO: 3.35 10*3/MM3 (ref 1.7–7)
NEUTROPHILS NFR BLD AUTO: 63.4 % (ref 42.7–76)
NRBC BLD AUTO-RTO: 0 /100 WBC (ref 0–0.2)
PHOSPHATE SERPL-MCNC: 3.5 MG/DL (ref 2.5–4.5)
PLATELET # BLD AUTO: 187 10*3/MM3 (ref 140–450)
PMV BLD AUTO: 9.8 FL (ref 6–12)
POTASSIUM SERPL-SCNC: 4.6 MMOL/L (ref 3.5–5.2)
PROT ?TM UR-MCNC: 119.4 MG/DL
RBC # BLD AUTO: 3.28 10*6/MM3 (ref 3.77–5.28)
SODIUM SERPL-SCNC: 136 MMOL/L (ref 136–145)
TIBC SERPL-MCNC: 295 MCG/DL (ref 298–536)
TRANSFERRIN SERPL-MCNC: 198 MG/DL (ref 200–360)
TRIGL SERPL-MCNC: 99 MG/DL (ref 0–150)
VLDLC SERPL-MCNC: 18 MG/DL (ref 5–40)
WBC NRBC COR # BLD: 5.28 10*3/MM3 (ref 3.4–10.8)

## 2023-03-10 PROCEDURE — 84466 ASSAY OF TRANSFERRIN: CPT

## 2023-03-10 PROCEDURE — 83540 ASSAY OF IRON: CPT

## 2023-03-10 PROCEDURE — 82570 ASSAY OF URINE CREATININE: CPT

## 2023-03-10 PROCEDURE — 84156 ASSAY OF PROTEIN URINE: CPT

## 2023-03-10 PROCEDURE — 85025 COMPLETE CBC W/AUTO DIFF WBC: CPT

## 2023-03-10 PROCEDURE — 80069 RENAL FUNCTION PANEL: CPT

## 2023-03-10 PROCEDURE — 82728 ASSAY OF FERRITIN: CPT

## 2023-03-10 PROCEDURE — 80061 LIPID PANEL: CPT

## 2023-03-10 PROCEDURE — 36415 COLL VENOUS BLD VENIPUNCTURE: CPT

## 2023-04-03 RX ORDER — CLONIDINE HYDROCHLORIDE 0.1 MG/1
TABLET ORAL
Qty: 180 TABLET | Refills: 3 | Status: SHIPPED | OUTPATIENT
Start: 2023-04-03

## 2023-04-17 ENCOUNTER — TRANSCRIBE ORDERS (OUTPATIENT)
Dept: LAB | Facility: HOSPITAL | Age: 73
End: 2023-04-17
Payer: MEDICARE

## 2023-04-17 ENCOUNTER — LAB (OUTPATIENT)
Dept: LAB | Facility: HOSPITAL | Age: 73
End: 2023-04-17
Payer: MEDICARE

## 2023-04-17 DIAGNOSIS — N18.4 CHRONIC KIDNEY DISEASE, STAGE IV (SEVERE): ICD-10-CM

## 2023-04-17 DIAGNOSIS — N18.4 CHRONIC KIDNEY DISEASE, STAGE IV (SEVERE): Primary | ICD-10-CM

## 2023-04-17 LAB
ANION GAP SERPL CALCULATED.3IONS-SCNC: 9.2 MMOL/L (ref 5–15)
BUN SERPL-MCNC: 28 MG/DL (ref 8–23)
BUN/CREAT SERPL: 11.2 (ref 7–25)
CALCIUM SPEC-SCNC: 9.2 MG/DL (ref 8.6–10.5)
CHLORIDE SERPL-SCNC: 101 MMOL/L (ref 98–107)
CO2 SERPL-SCNC: 25.8 MMOL/L (ref 22–29)
CREAT SERPL-MCNC: 2.51 MG/DL (ref 0.57–1)
EGFRCR SERPLBLD CKD-EPI 2021: 19.9 ML/MIN/1.73
GLUCOSE SERPL-MCNC: 394 MG/DL (ref 65–99)
POTASSIUM SERPL-SCNC: 5.1 MMOL/L (ref 3.5–5.2)
SODIUM SERPL-SCNC: 136 MMOL/L (ref 136–145)

## 2023-04-17 PROCEDURE — 80048 BASIC METABOLIC PNL TOTAL CA: CPT

## 2023-04-17 PROCEDURE — 36415 COLL VENOUS BLD VENIPUNCTURE: CPT

## 2023-04-19 RX ORDER — LEVOTHYROXINE SODIUM 0.15 MG/1
150 TABLET ORAL DAILY
Qty: 90 TABLET | Refills: 3 | OUTPATIENT
Start: 2023-04-19

## 2023-04-24 ENCOUNTER — HOSPITAL ENCOUNTER (OUTPATIENT)
Dept: GENERAL RADIOLOGY | Facility: HOSPITAL | Age: 73
Discharge: HOME | End: 2023-04-24
Payer: MEDICARE

## 2023-04-24 ENCOUNTER — HOSPITAL ENCOUNTER (OUTPATIENT)
Facility: HOSPITAL | Age: 73
Discharge: HOME | End: 2023-04-24
Payer: MEDICARE

## 2023-04-24 DIAGNOSIS — R05.9 COUGH, UNSPECIFIED TYPE: ICD-10-CM

## 2023-04-24 PROCEDURE — 71046 X-RAY EXAM CHEST 2 VIEWS: CPT

## 2023-05-26 RX ORDER — INSULIN DETEMIR 100 [IU]/ML
INJECTION, SOLUTION SUBCUTANEOUS
Qty: 45 ML | Refills: 3 | Status: SHIPPED | OUTPATIENT
Start: 2023-05-26

## 2023-05-26 NOTE — TELEPHONE ENCOUNTER
Rx Refill Note    Requested Prescriptions     Pending Prescriptions Disp Refills   • Levemir FlexTouch 100 UNIT/ML injection [Pharmacy Med Name: Levemir FlexTouch 100 UNIT/ML Subcutaneous Solution Pen-injector] 45 mL 3     Sig: INJECT SUBCUTANEOUSLY 35  UNITS AS DIRECTED DAILY        Last office visit with prescribing clinician: 3/3/2023       Next office visit with prescribing clinician: 7/14/2023     {    Bhavana Danielson MA  05/26/23, 08:08 EDT

## 2023-07-20 ENCOUNTER — HOSPITAL ENCOUNTER (OUTPATIENT)
Facility: HOSPITAL | Age: 73
Discharge: HOME | End: 2023-07-20
Payer: MEDICARE

## 2023-07-20 PROBLEM — R60.0 BILATERAL LOWER EXTREMITY EDEMA: Status: ACTIVE | Noted: 2023-07-20

## 2023-07-20 PROBLEM — R06.09 DYSPNEA ON EXERTION: Status: ACTIVE | Noted: 2023-07-20

## 2023-07-20 PROBLEM — R00.2 PALPITATIONS: Status: ACTIVE | Noted: 2023-07-20

## 2023-07-20 PROBLEM — R06.02 INCREASING SHORTNESS OF BREATH: Status: ACTIVE | Noted: 2023-07-20

## 2023-07-20 PROBLEM — J95.821 POSTOPERATIVE RESPIRATORY FAILURE: Status: RESOLVED | Noted: 2017-09-25 | Resolved: 2023-07-20

## 2023-07-20 PROBLEM — J18.9 CAP (COMMUNITY ACQUIRED PNEUMONIA): Status: RESOLVED | Noted: 2022-04-09 | Resolved: 2023-07-20

## 2023-07-20 PROBLEM — J96.01 ACUTE RESPIRATORY FAILURE WITH HYPOXIA: Status: RESOLVED | Noted: 2022-04-06 | Resolved: 2023-07-20

## 2023-07-20 PROBLEM — R53.83 OTHER FATIGUE: Status: ACTIVE | Noted: 2023-07-20

## 2023-07-20 PROCEDURE — 93271 ECG/MONITORING AND ANALYSIS: CPT

## 2023-08-08 RX ORDER — LEVOTHYROXINE SODIUM 175 UG/1
175 TABLET ORAL DAILY
Qty: 90 TABLET | Refills: 3 | Status: SHIPPED | OUTPATIENT
Start: 2023-08-08 | End: 2023-08-11 | Stop reason: SDUPTHER

## 2023-08-08 RX ORDER — LEVOTHYROXINE SODIUM 175 UG/1
175 TABLET ORAL DAILY
Qty: 90 TABLET | Refills: 1 | OUTPATIENT
Start: 2023-08-08

## 2023-08-08 NOTE — TELEPHONE ENCOUNTER
Rx Refill Note  Requested Prescriptions     Pending Prescriptions Disp Refills    levothyroxine (SYNTHROID, LEVOTHROID) 175 MCG tablet 90 tablet 3     Sig: Take 1 tablet by mouth Daily.      Last office visit with prescribing clinician: 7/14/2023   Last telemedicine visit with prescribing clinician: Visit date not found   Next office visit with prescribing clinician: 8/8/2023                         Would you like a call back once the refill request has been completed: [] Yes [] No    If the office needs to give you a call back, can they leave a voicemail: [] Yes [] No    Susie Colindres MA  08/08/23, 12:56 EDT

## 2023-08-08 NOTE — TELEPHONE ENCOUNTER
Caller: [facility] ([facility]) - [place], [place] - [address] [place] [address] [phone] [facility] [phone] FX    Relationship: Pharmacy    Best call back number:  [phone]    Requested Prescriptions:      levothyroxine (SYNTHROID, LEVOTHROID) 175 MCG tablet     Pharmacy where request should be sent: [facility]) - [place], [place] - [address] [phone] [facility] [phone] FX     Last office visit with prescribing clinician: 7/14/2023      Next office visit with prescribing clinician: 12/22/2023     PHARMACY REACHED OUT REQUESTING A REFILL ORDER FOR LEVOTHYROXINE.  PLEASE CONTACT PHARMACY, THANK YOU.    Zuleyka Prince Rep   08/08/23 12:19 EDT

## 2023-08-08 NOTE — TELEPHONE ENCOUNTER
Caller: Jas Newman    Relationship: Emergency Contact    Best call back number: [phone]    Requested Prescriptions:   Requested Prescriptions     Pending Prescriptions Disp Refills    levothyroxine (SYNTHROID, LEVOTHROID) 175 MCG tablet 90 tablet 3     Sig: Take 1 tablet by mouth Daily.        Pharmacy where request should be sent:  [facility] FOR A SHORT SUPPLY AND [facility] FOR 90 DAY SUPPLY    Last office visit with prescribing clinician: 7/14/2023   Last telemedicine visit with prescribing clinician: Visit date not found   Next office visit with prescribing clinician: 12/22/2023       Does the patient have less than a 3 day supply:  [x] Yes  [] No    Would you like a call back once the refill request has been completed: [] Yes [x] No    If the office needs to give you a call back, can they leave a voicemail: [x] Yes [] No    Zuleyka Souza   08/08/23 12:00 EDT

## 2023-08-11 RX ORDER — LEVOTHYROXINE SODIUM 175 UG/1
175 TABLET ORAL DAILY
Qty: 90 TABLET | Refills: 3 | Status: SHIPPED | OUTPATIENT
Start: 2023-08-11

## 2023-08-11 NOTE — TELEPHONE ENCOUNTER
Rx Refill Note  Requested Prescriptions     Pending Prescriptions Disp Refills    levothyroxine (SYNTHROID, LEVOTHROID) 175 MCG tablet 90 tablet 3     Sig: Take 1 tablet by mouth Daily.          Last office visit with prescribing clinician: 7/14/2023     Next office visit with prescribing clinician: 12/22/2023         Brit Hale MA  08/11/23, 15:44 EDT

## 2023-08-24 ENCOUNTER — TELEPHONE (OUTPATIENT)
Dept: CARDIOLOGY | Facility: CLINIC | Age: 73
End: 2023-08-24
Payer: MEDICARE

## 2023-08-24 NOTE — TELEPHONE ENCOUNTER
Called and spoke with Ms. Newman to let her know her MCOT results which revealed sinus rhythm with occasional PVCs and frequent PACs. Her events correlated with isolated PACs. Average HR 63 bpm. Informed her that her results are benign and not something to be concerned about at this time.  She denies any worsening of her symptoms at this time. Discussed that we will continue monitoring her palpitations and if they worsened to let us know.  Questions answered and patient voiced understanding.      Marci Mcpherson PA-C

## 2023-09-18 RX ORDER — INSULIN LISPRO 100 [IU]/ML
INJECTION, SOLUTION INTRAVENOUS; SUBCUTANEOUS
Qty: 60 ML | Refills: 3 | Status: SHIPPED | OUTPATIENT
Start: 2023-09-18

## 2023-09-18 NOTE — TELEPHONE ENCOUNTER
Rx Refill Note  Requested Prescriptions     Pending Prescriptions Disp Refills    Insulin Lispro, 1 Unit Dial, (HumaLOG KwikPen) 100 UNIT/ML solution pen-injector [Pharmacy Med Name: HumaLOG KwikPen 100 UNIT/ML Subcutaneous Solution Pen-injector] 60 mL 3     Sig: INJECT SUBCUTANEOUSLY 16 TO 20  UNITS 3 TIMES DAILY WITH MEALS      Last office visit with prescribing clinician: 7/14/2023   Last telemedicine visit with prescribing clinician: Visit date not found   Next office visit with prescribing clinician: 12/22/2023                         Would you like a call back once the refill request has been completed: [] Yes [] No    If the office needs to give you a call back, can they leave a voicemail: [] Yes [] No    Mariola Do MA  09/18/23, 13:07 EDTRx Refill Note  Requested Prescriptions     Pending Prescriptions Disp Refills    Insulin Lispro, 1 Unit Dial, (HumaLOG KwikPen) 100 UNIT/ML solution pen-injector [Pharmacy Med Name: HumaLOG KwikPen 100 UNIT/ML Subcutaneous Solution Pen-injector] 60 mL 3     Sig: INJECT SUBCUTANEOUSLY 16 TO 20  UNITS 3 TIMES DAILY WITH MEALS      Last office visit with prescribing clinician: 7/14/2023   Last telemedicine visit with prescribing clinician: Visit date not found   Next office visit with prescribing clinician: 12/22/2023                         Would you like a call back once the refill request has been completed: [] Yes [] No    If the office needs to give you a call back, can they leave a voicemail: [] Yes [] No    Mariola Do MA  09/18/23, 13:06 EDT

## 2023-09-18 NOTE — TELEPHONE ENCOUNTER
Rx Refill Note  Requested Prescriptions     Pending Prescriptions Disp Refills    Insulin Lispro, 1 Unit Dial, (HumaLOG KwikPen) 100 UNIT/ML solution pen-injector [Pharmacy Med Name: HumaLOG KwikPen 100 UNIT/ML Subcutaneous Solution Pen-injector] 60 mL 3     Sig: INJECT SUBCUTANEOUSLY 16 TO 20  UNITS 3 TIMES DAILY WITH MEALS      Last office visit with prescribing clinician: 7/14/2023   Last telemedicine visit with prescribing clinician: Visit date not found   Next office visit with prescribing clinician: 12/22/2023                         Would you like a call back once the refill request has been completed: [] Yes [] No    If the office needs to give you a call back, can they leave a voicemail: [] Yes [] No    Mariola Do MA  09/18/23, 13:03 EDT

## 2023-09-20 ENCOUNTER — OFFICE VISIT (OUTPATIENT)
Dept: CARDIOLOGY | Facility: CLINIC | Age: 73
End: 2023-09-20
Payer: MEDICARE

## 2023-09-20 VITALS
SYSTOLIC BLOOD PRESSURE: 142 MMHG | HEART RATE: 89 BPM | BODY MASS INDEX: 30.45 KG/M2 | WEIGHT: 194 LBS | DIASTOLIC BLOOD PRESSURE: 60 MMHG | OXYGEN SATURATION: 92 % | HEIGHT: 67 IN

## 2023-09-20 DIAGNOSIS — I48.0 PAROXYSMAL ATRIAL FIBRILLATION: Primary | ICD-10-CM

## 2023-09-20 DIAGNOSIS — I10 ESSENTIAL HYPERTENSION: ICD-10-CM

## 2023-09-20 NOTE — PROGRESS NOTES
Karen Newman  1950  [phone]    09/20/2023    [facility] CARDIOLOGY     Referring Provider: No ref. provider found     Mirian Arnold APRSTEPHEN  [address] [place] [id number]    Chief Complaint   Patient presents with    Atrial Fibrillation       Problem List:       Atrial fibrillation, duration unknown; diagnosed August of 2013:  Status post ALANIS/external cardioversion, (10/18/2013), Yara Bobo M.D. with the initiation of propafenone therapy.   Atrial fibrillation, (11/13/2013), in Gloria Gomez’ office.  Normal sinus rhythm at the time of office visit, (11/21/2013).  Normal sinus rhythm, EKG, (06/05/2014).  Discontinuation of Rythmol secondary to diarrhea, (06/05/2014), with initiation of flecainide 50 mg b.i.d. and Eliquis 5 mg b.i.d.   CHADS Vasc= 4, (4/18/2017)   CT scan of the chest (06/29/2017): Mild fibrotic changes, o/w normal  Pulmonary Function Test (06/29/2017): FEV1/FVC  1.54/2.57 = 60%  Nuclear Stress Test (06/29/2017): EF>70%, no ischemia   Bilateral thoracoscopy and Maze procedure and clipping of the left atrial appendage (9/26/2017) Dr. Kay  ALANIS 10/24/17: EF 60%, RADHA successfully closed with no evidence of a residual left atrial appendage. Mild to moderate MR  14d Zio, 1/6/2021: NSR. PAF <1%  MCOT 7/20-8/18/2023: SB/SR with occasional PVCs, frequent PACs, one ventricular couplet. Events correlate with isolated PACs  Valvular heart disease:  Previous echocardiogram approximately, 2006 (incomplete data base).  Echocardiogram (08/15/2002), revealing moderate concentric LVH, hyperdynamic LV, EF 81%; mild MR, trace TR.  Echocardiogram (10/07/2008), revealed a normal LVEF with mild to moderate MR.  Echocardiogram, (07/01/2011): normal LV systolic function and wall motion with mild MR and mild TR.   Echocardiogram, 9/22/2017: EF 60%. Moderate to severe MR. Trace to mild TR. No thrombus appears to be present within the left atrial appendage.  ALANIS 10/24/17: EF 60%, RADHA  successfully closed with no evidence of a residual left atrial appendage. Mild to moderate MR  Echo 1/10/20: EF 65%, trace MR, mild TR, mild LVH.   Echo 4/7/22: EF 60-65%, mild MR  Hypertension:  Adenosine Cardiolite, (06/18/2009): Normal systolic function without evidence of inducible ischemia.   Echo 1/11/20 NL LVEF   Dyslipidemia.  Diabetes mellitus, Type 2 diagnosed, 2006.  Thyroid disease:  Hyperthyroidism status post radioactive iodine therapy.  Hypothyroidism on thyroid replacement.  Gastroesophageal reflux disease.  Chronic kidney disease. crea 1.7  History of hemorrhoids status post hemorrhoidectomy.  Colon polyps status post polypectomy.  Anxiety.  Iron deficiency anemia.  Obstructive sleep apnea on chronic CPAP therapy.  Surgical history:  Total abdominal hysterectomy  Squamous cell removal (right foot)    Allergies  Allergies   Allergen Reactions    Amlodipine Shortness Of Breath     Swelling and SOB    Buspirone Nausea And Vomiting    Lisinopril Other (See Comments)     cough    Sertraline Hcl Other (See Comments)     nightmares    Sular [Nisoldipine Er] Cough    Carvedilol Other (See Comments)     SOB       Current Medications    Current Outpatient Medications:     atorvastatin (LIPITOR) 10 MG tablet, Take 1 tablet by mouth Daily., Disp: 90 tablet, Rfl: 3    Blood Glucose Monitoring Suppl (OneTouch Verio) w/Device kit, 1 each Daily. DX: E11.65, Disp: 1 kit, Rfl: 0    Continuous Blood Gluc  (FreeStyle [place] 2 [place]) device, 1 each Daily., Disp: 1 each, Rfl: 0    Continuous Blood Gluc Sensor (FreeStyle [place] 2 Sensor) misc, 1 each Every 14 (Fourteen) Days., Disp: 2 each, Rfl: 5    dilTIAZem CD (CARDIZEM CD) 120 MG 24 hr capsule, Take 2 capsules by mouth Daily., Disp: 90 capsule, Rfl: 3    doxazosin (CARDURA) 8 MG tablet, Take 1 tablet by mouth Every 12 (Twelve) Hours., Disp: 180 tablet, Rfl: 3    furosemide (Lasix) 20 MG tablet, Take 1 tablet by mouth Every Other Day. Take 1 tab po prn worsening  "SOA/edema, Disp: 15 tablet, Rfl: 11    hydrALAZINE (APRESOLINE) 100 MG tablet, Take 1 tablet by mouth 2 (Two) Times a Day., Disp: 180 tablet, Rfl: 3    Insulin Lispro, 1 Unit Dial, (HumaLOG KwikPen) 100 UNIT/ML solution pen-injector, INJECT SUBCUTANEOUSLY 16 TO 20  UNITS 3 TIMES DAILY WITH MEALS, Disp: 60 mL, Rfl: 3    Levemir FlexTouch 100 UNIT/ML injection, INJECT SUBCUTANEOUSLY 35  UNITS AS DIRECTED DAILY, Disp: 45 mL, Rfl: 3    levoFLOXacin (LEVAQUIN) 500 MG tablet, Take 1 tablet by mouth Daily., Disp: , Rfl:     levothyroxine (SYNTHROID, LEVOTHROID) 175 MCG tablet, Take 1 tablet by mouth Daily., Disp: 90 tablet, Rfl: 3    multivitamin with minerals tablet tablet, Take 1 tablet by mouth Daily., Disp: , Rfl:     omeprazole (priLOSEC) 40 MG capsule, Daily., Disp: , Rfl:     OneTouch Verio test strip, Use 4x per day:  ICD-10 E11.65, E11.649, Z79.4, Disp: 400 each, Rfl: 3    PARoxetine (PAXIL) 40 MG tablet, Take 1 tablet by mouth Daily., Disp: , Rfl:     History of Present Illness     Pt presents for follow up of atrial fibrillation, HTN, VHD. Since we last saw the pt, she wore an event monitor 8/2023 which showed NSR. She was complaining of palpitations which is why she wore it. She states she has good days and bad days with fatigue. She denies syncope, CP.  She has SOB some days but not other days. She is not feeling SOB today. Her O2 sat is 92% today, her normal range is 90-93%. She denies swelling.Denies any hospitalizations, ER visits, bleeding, or TIA/CVA symptoms. She did have COVID a month ago, and is still tired from that. She had a moderate to severe cough.     ROS:  General:  + fatigue, - weight gain or loss  Cardiovascular:  Denies CP, PND, syncope, near syncope, edema or palpitations.  Pulmonary:  Denies RODRIGUEZ, + cough, -  wheezing      Vitals:    09/20/23 1255   BP: 142/60   BP Location: Left arm   Patient Position: Sitting   Pulse: 89   SpO2: 92%   Weight: 88 kg (194 lb)   Height: 170.2 cm (67\") "     Body mass index is 30.38 kg/m².  PE:  General: NAD. A & O x 3   Neck: no JVD, no carotid bruits, no TM  Heart: irr, irr, NL S1, S2, S4 present, no rubs, murmurs  Lungs: CTA, no wheezes, rhonchi, or rales  Abd: soft, non-tender, NL BS  Ext: No musculoskeletal deformities, no edema, cyanosis, or clubbing  Psych: normal mood and affect    Diagnostic Data:        ECG 12 Lead    Date/Time: 9/20/2023 1:33 PM  Performed by: Keri Gibbons PA  Authorized by: Keri Gibbons PA   Comparison: compared with previous ECG from 4/6/2022  Comparison to previous ECG: Now in atrial flutter   Rhythm: atrial flutter  BPM: 89            1. Paroxysmal atrial fibrillation    2. Essential hypertension          Plan:  1) PAF:   -  MAZE procedure 9/2017 off AAD.   - Zio patch 7/2023, no atrial fibrillation  - today, in atrial fibrillation/atrial flutter on EKG. Had COVID one month ago.   - will check echocardiogram and schedule ECV. Start Xarelto x 1 week prior, and 2 weeks after.      2) Anticoagulation:   S/p RADHA ligation with confirmed closure by ALANIS. No NOAC. Off ASA due to CRI  - as per #1      3) HTN  - controlled, followed by Dr Bobo and nephrology following for BP and has been altering medications for better control  - Wt loss, exercise, salt reduction    F/up in 6 months    Electronically signed by MASOOD Glez, 09/20/23, 1:34 PM EDT.

## 2023-09-29 ENCOUNTER — PREP FOR SURGERY (OUTPATIENT)
Dept: OTHER | Facility: HOSPITAL | Age: 73
End: 2023-09-29
Payer: MEDICARE

## 2023-09-29 DIAGNOSIS — I48.4 ATYPICAL ATRIAL FLUTTER: Primary | ICD-10-CM

## 2023-09-29 RX ORDER — SODIUM CHLORIDE 0.9 % (FLUSH) 0.9 %
10 SYRINGE (ML) INJECTION AS NEEDED
OUTPATIENT
Start: 2023-09-29

## 2023-09-29 RX ORDER — SODIUM CHLORIDE 0.9 % (FLUSH) 0.9 %
3 SYRINGE (ML) INJECTION EVERY 12 HOURS SCHEDULED
OUTPATIENT
Start: 2023-09-29

## 2023-09-29 RX ORDER — NITROGLYCERIN 0.4 MG/1
0.4 TABLET SUBLINGUAL
OUTPATIENT
Start: 2023-09-29

## 2023-09-29 RX ORDER — ACETAMINOPHEN 325 MG/1
650 TABLET ORAL EVERY 4 HOURS PRN
OUTPATIENT
Start: 2023-09-29

## 2023-09-29 RX ORDER — SODIUM CHLORIDE 9 MG/ML
40 INJECTION, SOLUTION INTRAVENOUS AS NEEDED
OUTPATIENT
Start: 2023-09-29

## 2023-10-04 ENCOUNTER — TRANSCRIBE ORDERS (OUTPATIENT)
Dept: ADMINISTRATIVE | Age: 73
End: 2023-10-04

## 2023-10-12 ENCOUNTER — HOSPITAL ENCOUNTER (OUTPATIENT)
Dept: CARDIOLOGY | Facility: HOSPITAL | Age: 73
Discharge: HOME | End: 2023-10-12
Attending: INTERNAL MEDICINE | Admitting: INTERNAL MEDICINE
Payer: MEDICARE

## 2023-10-12 ENCOUNTER — APPOINTMENT (OUTPATIENT)
Dept: CARDIOLOGY | Facility: HOSPITAL | Age: 73
End: 2023-10-12
Payer: MEDICARE

## 2023-10-12 VITALS
OXYGEN SATURATION: 91 % | TEMPERATURE: 97.5 F | RESPIRATION RATE: 18 BRPM | WEIGHT: 198 LBS | HEART RATE: 69 BPM | DIASTOLIC BLOOD PRESSURE: 64 MMHG | HEIGHT: 66 IN | SYSTOLIC BLOOD PRESSURE: 141 MMHG | BODY MASS INDEX: 31.82 KG/M2

## 2023-10-12 DIAGNOSIS — I48.4 ATYPICAL ATRIAL FLUTTER: ICD-10-CM

## 2023-10-12 DIAGNOSIS — I48.0 PAROXYSMAL ATRIAL FIBRILLATION: ICD-10-CM

## 2023-10-12 LAB
ANION GAP SERPL CALCULATED.3IONS-SCNC: 14 MMOL/L (ref 5–15)
BH CV ECHO MEAS - AO MAX PG: 12.3 MMHG
BH CV ECHO MEAS - AO MEAN PG: 7 MMHG
BH CV ECHO MEAS - AO ROOT DIAM: 3.6 CM
BH CV ECHO MEAS - AO V2 MAX: 175 CM/SEC
BH CV ECHO MEAS - AO V2 VTI: 36.4 CM
BH CV ECHO MEAS - AVA(I,D): 2.5 CM2
BH CV ECHO MEAS - EDV(CUBED): 166.4 ML
BH CV ECHO MEAS - EDV(MOD-SP2): 78.5 ML
BH CV ECHO MEAS - EDV(MOD-SP4): 92 ML
BH CV ECHO MEAS - EF(MOD-BP): 55.5 %
BH CV ECHO MEAS - EF(MOD-SP2): 57.1 %
BH CV ECHO MEAS - EF(MOD-SP4): 53.3 %
BH CV ECHO MEAS - ESV(CUBED): 54.9 ML
BH CV ECHO MEAS - ESV(MOD-SP2): 33.7 ML
BH CV ECHO MEAS - ESV(MOD-SP4): 43 ML
BH CV ECHO MEAS - FS: 30.9 %
BH CV ECHO MEAS - IVS/LVPW: 1 CM
BH CV ECHO MEAS - IVSD: 1.1 CM
BH CV ECHO MEAS - LA DIMENSION: 4 CM
BH CV ECHO MEAS - LAT PEAK E' VEL: 8.8 CM/SEC
BH CV ECHO MEAS - LV MASS(C)D: 242 GRAMS
BH CV ECHO MEAS - LV MAX PG: 7.6 MMHG
BH CV ECHO MEAS - LV MEAN PG: 4 MMHG
BH CV ECHO MEAS - LV V1 MAX: 138 CM/SEC
BH CV ECHO MEAS - LV V1 VTI: 29.1 CM
BH CV ECHO MEAS - LVIDD: 5.5 CM
BH CV ECHO MEAS - LVIDS: 3.8 CM
BH CV ECHO MEAS - LVOT AREA: 3.1 CM2
BH CV ECHO MEAS - LVOT DIAM: 2 CM
BH CV ECHO MEAS - LVPWD: 1.1 CM
BH CV ECHO MEAS - MED PEAK E' VEL: 7.2 CM/SEC
BH CV ECHO MEAS - MV A MAX VEL: 38.3 CM/SEC
BH CV ECHO MEAS - MV DEC SLOPE: 574 CM/SEC2
BH CV ECHO MEAS - MV DEC TIME: 0.23 SEC
BH CV ECHO MEAS - MV E MAX VEL: 99 CM/SEC
BH CV ECHO MEAS - MV E/A: 2.6
BH CV ECHO MEAS - MV MAX PG: 6.3 MMHG
BH CV ECHO MEAS - MV MEAN PG: 3 MMHG
BH CV ECHO MEAS - MV P1/2T: 66.8 MSEC
BH CV ECHO MEAS - MV V2 VTI: 28.9 CM
BH CV ECHO MEAS - MVA(P1/2T): 3.3 CM2
BH CV ECHO MEAS - MVA(VTI): 3.2 CM2
BH CV ECHO MEAS - PA ACC TIME: 0.07 SEC
BH CV ECHO MEAS - RAP SYSTOLE: 3 MMHG
BH CV ECHO MEAS - RVSP: 22 MMHG
BH CV ECHO MEAS - SV(LVOT): 91.4 ML
BH CV ECHO MEAS - SV(MOD-SP2): 44.8 ML
BH CV ECHO MEAS - SV(MOD-SP4): 49 ML
BH CV ECHO MEAS - TAPSE (>1.6): 1.88 CM
BH CV ECHO MEAS - TR MAX PG: 18.5 MMHG
BH CV ECHO MEAS - TR MAX VEL: 214 CM/SEC
BH CV ECHO MEASUREMENTS AVERAGE E/E' RATIO: 12.38
BH CV XLRA - RV BASE: 3.9 CM
BH CV XLRA - RV LENGTH: 7.7 CM
BH CV XLRA - RV MID: 2.8 CM
BH CV XLRA - TDI S': 12 CM/SEC
BUN SERPL-MCNC: 37 MG/DL (ref 8–23)
BUN/CREAT SERPL: 15.7 (ref 7–25)
CALCIUM SPEC-SCNC: 9.2 MG/DL (ref 8.6–10.5)
CHLORIDE SERPL-SCNC: 102 MMOL/L (ref 98–107)
CO2 SERPL-SCNC: 25 MMOL/L (ref 22–29)
CREAT SERPL-MCNC: 2.36 MG/DL (ref 0.57–1)
DEPRECATED RDW RBC AUTO: 44.9 FL (ref 37–54)
EGFRCR SERPLBLD CKD-EPI 2021: 21.3 ML/MIN/1.73
ERYTHROCYTE [DISTWIDTH] IN BLOOD BY AUTOMATED COUNT: 12.6 % (ref 12.3–15.4)
GLUCOSE SERPL-MCNC: 163 MG/DL (ref 65–99)
HBA1C MFR BLD: 8.4 % (ref 4.8–5.6)
HCT VFR BLD AUTO: 33.7 % (ref 34–46.6)
HGB BLD-MCNC: 11 G/DL (ref 12–15.9)
LEFT ATRIUM VOLUME INDEX: 32 ML/M2
LV EF 2D ECHO EST: 60 %
MCH RBC QN AUTO: 32 PG (ref 26.6–33)
MCHC RBC AUTO-ENTMCNC: 32.6 G/DL (ref 31.5–35.7)
MCV RBC AUTO: 98 FL (ref 79–97)
PLATELET # BLD AUTO: 187 10*3/MM3 (ref 140–450)
PMV BLD AUTO: 9.5 FL (ref 6–12)
POTASSIUM SERPL-SCNC: 3.9 MMOL/L (ref 3.5–5.2)
RBC # BLD AUTO: 3.44 10*6/MM3 (ref 3.77–5.28)
SODIUM SERPL-SCNC: 141 MMOL/L (ref 136–145)
WBC NRBC COR # BLD: 5.18 10*3/MM3 (ref 3.4–10.8)

## 2023-10-12 PROCEDURE — 25010000002 MIDAZOLAM PER 1 MG: Performed by: INTERNAL MEDICINE

## 2023-10-12 PROCEDURE — 92960 CARDIOVERSION ELECTRIC EXT: CPT

## 2023-10-12 PROCEDURE — 85027 COMPLETE CBC AUTOMATED: CPT | Performed by: PHYSICIAN ASSISTANT

## 2023-10-12 PROCEDURE — 83036 HEMOGLOBIN GLYCOSYLATED A1C: CPT | Performed by: PHYSICIAN ASSISTANT

## 2023-10-12 PROCEDURE — 93005 ELECTROCARDIOGRAM TRACING: CPT | Performed by: INTERNAL MEDICINE

## 2023-10-12 PROCEDURE — 36415 COLL VENOUS BLD VENIPUNCTURE: CPT

## 2023-10-12 PROCEDURE — 93306 TTE W/DOPPLER COMPLETE: CPT | Performed by: INTERNAL MEDICINE

## 2023-10-12 PROCEDURE — 92960 CARDIOVERSION ELECTRIC EXT: CPT | Performed by: INTERNAL MEDICINE

## 2023-10-12 PROCEDURE — 80048 BASIC METABOLIC PNL TOTAL CA: CPT | Performed by: PHYSICIAN ASSISTANT

## 2023-10-12 PROCEDURE — 93306 TTE W/DOPPLER COMPLETE: CPT

## 2023-10-12 RX ORDER — MIDAZOLAM HYDROCHLORIDE 1 MG/ML
2-8 INJECTION INTRAMUSCULAR; INTRAVENOUS ONCE AS NEEDED
Status: DISCONTINUED | OUTPATIENT
Start: 2023-10-12 | End: 2023-10-12 | Stop reason: HOSPADM

## 2023-10-12 RX ORDER — NITROGLYCERIN 0.4 MG/1
0.4 TABLET SUBLINGUAL
Status: DISCONTINUED | OUTPATIENT
Start: 2023-10-12 | End: 2023-10-12 | Stop reason: HOSPADM

## 2023-10-12 RX ORDER — SODIUM CHLORIDE 0.9 % (FLUSH) 0.9 %
3 SYRINGE (ML) INJECTION EVERY 12 HOURS SCHEDULED
Status: DISCONTINUED | OUTPATIENT
Start: 2023-10-12 | End: 2023-10-12 | Stop reason: HOSPADM

## 2023-10-12 RX ORDER — CLONIDINE HYDROCHLORIDE 0.1 MG/1
0.1 TABLET ORAL 2 TIMES DAILY
COMMUNITY

## 2023-10-12 RX ORDER — SODIUM CHLORIDE 9 MG/ML
40 INJECTION, SOLUTION INTRAVENOUS AS NEEDED
Status: DISCONTINUED | OUTPATIENT
Start: 2023-10-12 | End: 2023-10-12 | Stop reason: HOSPADM

## 2023-10-12 RX ORDER — SODIUM CHLORIDE 0.9 % (FLUSH) 0.9 %
10 SYRINGE (ML) INJECTION AS NEEDED
Status: DISCONTINUED | OUTPATIENT
Start: 2023-10-12 | End: 2023-10-12 | Stop reason: HOSPADM

## 2023-10-12 RX ORDER — FENTANYL CITRATE 50 UG/ML
50-100 INJECTION, SOLUTION INTRAMUSCULAR; INTRAVENOUS ONCE AS NEEDED
Status: DISCONTINUED | OUTPATIENT
Start: 2023-10-12 | End: 2023-10-12 | Stop reason: HOSPADM

## 2023-10-12 RX ORDER — FLUMAZENIL 0.1 MG/ML
0.5 INJECTION INTRAVENOUS ONCE AS NEEDED
Status: DISCONTINUED | OUTPATIENT
Start: 2023-10-12 | End: 2023-10-12 | Stop reason: HOSPADM

## 2023-10-12 RX ORDER — NALOXONE HCL 0.4 MG/ML
0.4 VIAL (ML) INJECTION ONCE AS NEEDED
Status: DISCONTINUED | OUTPATIENT
Start: 2023-10-12 | End: 2023-10-12 | Stop reason: HOSPADM

## 2023-10-12 RX ORDER — MIDAZOLAM HYDROCHLORIDE 1 MG/ML
INJECTION INTRAMUSCULAR; INTRAVENOUS
Status: COMPLETED | OUTPATIENT
Start: 2023-10-12 | End: 2023-10-12

## 2023-10-12 RX ORDER — ACETAMINOPHEN 325 MG/1
650 TABLET ORAL EVERY 4 HOURS PRN
Status: DISCONTINUED | OUTPATIENT
Start: 2023-10-12 | End: 2023-10-12 | Stop reason: HOSPADM

## 2023-10-12 RX ADMIN — MIDAZOLAM HYDROCHLORIDE 1 MG: 1 INJECTION, SOLUTION INTRAMUSCULAR; INTRAVENOUS at 10:38

## 2023-10-12 NOTE — H&P
Cardiology Consult/H&P     Karen AFSHIN Paty  1950  [phone]  There is no work phone number on file.    10/12/23    DATE OF ADMISSION: 10/12/2023  [facility] Mirian Gonzalez, APRN  [address] [place] [address] / [facility] [id number]  Referring Provider: Keri Gibbons PA     CC: afib    Problem List:       Atrial fibrillation, duration unknown; diagnosed August of 2013:  Status post ALANIS/external cardioversion, (10/18/2013), Yara Bobo M.D. with the initiation of propafenone therapy.   Atrial fibrillation, (11/13/2013), in Gloria Gomez’ office.  Normal sinus rhythm at the time of office visit, (11/21/2013).  Normal sinus rhythm, EKG, (06/05/2014).  Discontinuation of Rythmol secondary to diarrhea, (06/05/2014), with initiation of flecainide 50 mg b.i.d. and Eliquis 5 mg b.i.d.   CHADS Vasc= 4, (4/18/2017)   CT scan of the chest (06/29/2017): Mild fibrotic changes, o/w normal  Pulmonary Function Test (06/29/2017): FEV1/FVC  1.54/2.57 = 60%  Nuclear Stress Test (06/29/2017): EF>70%, no ischemia   Bilateral thoracoscopy and Maze procedure and clipping of the left atrial appendage (9/26/2017) Dr. Kay  ALANIS 10/24/17: EF 60%, RADHA successfully closed with no evidence of a residual left atrial appendage. Mild to moderate MR  14d Vic[place]o, 1/6/2021: NSR. PAF <1%  MCOT 7/20-8/18/2023: SB/SR with occasional PVCs, frequent PACs, one ventricular couplet. Events correlate with isolated PACs  Valvular heart disease:  Previous echocardiogram approximately, 2006 (incomplete data base).  Echocardiogram (08/15/2002), revealing moderate concentric LVH, hyperdynamic LV, EF 81%; mild MR, trace TR.  Echocardiogram (10/07/2008), revealed a normal LVEF with mild to moderate MR.  Echocardiogram, (07/01/2011): normal LV systolic function and wall motion with mild MR and mild TR.   Echocardiogram, 9/22/2017: EF 60%. Moderate to severe MR. Trace to mild TR. No thrombus appears to be present within the left atrial appendage.  ALANIS  10/24/17: EF 60%, RADHA successfully closed with no evidence of a residual left atrial appendage. Mild to moderate MR  Echo 1/10/20: EF 65%, trace MR, mild TR, mild LVH.   Echo 4/7/22: EF 60-65%, mild MR  Hypertension:  Adenosine Cardiolite, (06/18/2009): Normal systolic function without evidence of inducible ischemia.   Echo 1/11/20 NL LVEF   Dyslipidemia.  Diabetes mellitus, Type 2 diagnosed, 2006.  Thyroid disease:  Hyperthyroidism status post radioactive iodine therapy.  Hypothyroidism on thyroid replacement.  Gastroesophageal reflux disease.  Chronic kidney disease. crea 1.7  History of hemorrhoids status post hemorrhoidectomy.  Colon polyps status post polypectomy.  Anxiety.  Iron deficiency anemia.  Obstructive sleep apnea on chronic CPAP therapy.  Surgical history:  Total abdominal hysterectomy  Squamous cell removal (right foot)    History of Present Illness:   Karen Newman is a 73 year old female with above PMHx presents today for scheduled external cardioversion.  She complained of palpitations on and off for a few months.  She wore a Holter monitor in August that showed normal sinus rhythm with few PVCs/PACs.  However when she presented to office visit 9/20/2023 she was found to be in A-fib.  She complains mostly of fatigue and palpitations.  She denies shortness of breath, swelling, dizziness, lightheadedness.  She initiated Xarelto 10/5/2023 in preparation for procedure today.  She has had some minimal bleeding from hemorrhoids since starting Xarelto.  She denies fever, chills, hospitalization, signs of CVA/TIA.      Allergies   Allergen Reactions    Amlodipine Shortness Of Breath     Swelling and SOB    Buspirone Nausea And Vomiting    Lisinopril Other (See Comments)     cough    Sertraline Hcl Other (See Comments)     nightmares    Sular [Nisoldipine Er] Cough    Carvedilol Other (See Comments)     SOB       Prior to Admission Medications       Prescriptions Last Dose Informant Patient Reported?  Taking?    atorvastatin (LIPITOR) 10 MG tablet   No No    Take 1 tablet by mouth Daily.    Blood Glucose Monitoring Suppl (OneTouch Verio) w/Device kit   No No    1 each Daily. DX: E11.65    Continuous Blood Gluc  (FreeStyle [place] 2 [place]) device   No No    1 each Daily.    Continuous Blood Gluc Sensor (FreeStyle [place] 2 Sensor) misc   No No    1 each Every 14 (Fourteen) Days.    dilTIAZem CD (CARDIZEM CD) 120 MG 24 hr capsule   No No    Take 2 capsules by mouth Daily.    doxazosin (CARDURA) 8 MG tablet   No No    Take 1 tablet by mouth Every 12 (Twelve) Hours.    furosemide (Lasix) 20 MG tablet   No No    Take 1 tablet by mouth Every Other Day. Take 1 tab po prn worsening SOA/edema    hydrALAZINE (APRESOLINE) 100 MG tablet   No No    Take 1 tablet by mouth 2 (Two) Times a Day.    Insulin Lispro, 1 Unit Dial, (HumaLOG KwikPen) 100 UNIT/ML solution pen-injector   No No    INJECT SUBCUTANEOUSLY 16 TO 20  UNITS 3 TIMES DAILY WITH MEALS    Levemir FlexTouch 100 UNIT/ML injection   No No    INJECT SUBCUTANEOUSLY 35  UNITS AS DIRECTED DAILY    levoFLOXacin (LEVAQUIN) 500 MG tablet   Yes No    Take 1 tablet by mouth Daily.    levothyroxine (SYNTHROID, LEVOTHROID) 175 MCG tablet   No No    Take 1 tablet by mouth Daily.    multivitamin with minerals tablet tablet   Yes No    Take 1 tablet by mouth Daily.    omeprazole (priLOSEC) 40 MG capsule   Yes No    Daily.    OneTouch Verio test strip   No No    Use 4x per day:  ICD-10 E11.65, E11.649, Z79.4    PARoxetine (PAXIL) 40 MG tablet  Self Yes No    Take 1 tablet by mouth Daily.              Current Facility-Administered Medications:     acetaminophen (TYLENOL) tablet 650 mg, 650 mg, Oral, Q4H PRN, Maritza Horne APRN    fentaNYL citrate (PF) (SUBLIMAZE) injection  mcg,  mcg, Intravenous, Once PRN, Arpan Osullivan MD    flumazenil (ROMAZICON) injection 0.5 mg, 0.5 mg, Intravenous, Once PRN, Arpan Osullivan MD    methohexital (BREVITAL)  injection  mg,  mg, Intravenous, Once PRN, Arpan Osullivan MD    methohexital (BREVITAL) injection, , , Code / Trauma / Sedation Medication, Arpan Osullivan MD, 20 mg at 10/12/23 1041    midazolam (VERSED) injection 2-8 mg, 2-8 mg, Intravenous, Once PRN, Arpan Osullivan MD    midazolam (VERSED) injection, , , Code / Trauma / Sedation Medication, Arpan Osullivan MD, 1 mg at 10/12/23 1038    naloxone (NARCAN) injection 0.4 mg, 0.4 mg, Intravenous, Once PRN, Arpan Osullivan MD    nitroglycerin (NITROSTAT) SL tablet 0.4 mg, 0.4 mg, Sublingual, Q5 Min PRN, HorneMaritzandra, APRN    sodium chloride 0.9 % flush 10 mL, 10 mL, Intravenous, PRN, HorneMaritzara, APRN    sodium chloride 0.9 % flush 3 mL, 3 mL, Intravenous, Q12H, HorneMaritzara, APRN    sodium chloride 0.9 % infusion 40 mL, 40 mL, Intravenous, PRN, Maritza Hornera, APRN    Social History     Socioeconomic History    Marital status:     Number of children: 3   Tobacco Use    Smoking status: Former     Packs/day: 0.50     Years: 15.00     Additional pack years: 0.00     Total pack years: 7.50     Types: Cigarettes     Quit date: 10/18/2016     Years since quittin.9    Smokeless tobacco: Never   Vaping Use    Vaping Use: Never used   Substance and Sexual Activity    Alcohol use: No    Drug use: No    Sexual activity: Defer       Family History   Problem Relation Age of Onset    Hypertension Mother     Coronary artery disease Mother     Kidney disease Father        REVIEW OF SYSTEMS:   CONSTITUTIONAL:         No weight loss, fever, chills, +weakness + fatigue.   HEENT:                            No visual loss, blurred vision, double vision, yellow sclerae.                                             No hearing loss, congestion, sore throat.   SKIN:                                No rashes, urticaria, ulcers, sores.     RESPIRATORY:               No shortness of breath, hemoptysis, cough, sputum.    GI:                                     No anorexia, nausea, vomiting, diarrhea. No abdominal pain, melena.   :                                   No burning on urination, hematuria or increased frequency.  ENDOCRINE:                   No diaphoresis, cold or heat intolerance. No polyuria or polydipsia.   NEURO:                            No headache, dizziness, syncope, paralysis, ataxia, or parasthesias.                                            No change in bowel or bladder control. No history of CVA/TIA  MUSCULOSKELETAL:    No muscle, back pain, joint pain or stiffness.   HEMATOLOGY:               No anemia, bleeding, bruising. No history of DVT/PE.  PSYCH:                            No history of depression, anxiety    Vitals:    10/12/23 0822 10/12/23 0823 10/12/23 0955 10/12/23 1000   BP: 145/79 171/76     BP Location: Right arm Left arm     Patient Position: Lying Lying     Pulse:  92  71   Resp:  20     Temp:  97.5 °F (36.4 °C)     TempSrc:  Temporal     SpO2:  95% (!) 89% 93%   Weight:       Height:             Vital Sign Min/Max for last 24 hours  Temp  Min: 97.5 °F (36.4 °C)  Max: 97.5 °F (36.4 °C)   BP  Min: 145/79  Max: 171/76   Pulse  Min: 71  Max: 92   Resp  Min: 20  Max: 20   SpO2  Min: 89 %  Max: 95 %   Flow (L/min)  Min: 2  Max: 2    No intake or output data in the 24 hours ending 10/12/23 1044          Physical Exam:  GEN: Well nourished, Well- developed  No acute distress  HEENT: Normocephalic, Atraumatic  NECK: supple, NO JVD  CARDIAC: S1S2 IRR, IRR, no murmur, gallop, rub  LUNGS: Clear to ausculation, normal respiratory effort  ABDOMEN: Soft, nontender, normal bowel sounds  EXTREMITIES:No gross deformities,  No clubbing, cyanosis, or edema  SKIN: Warm, dry  NEURO: No focal deficits  PSYCHIATRIC: Normal affect and mood      I personally viewed and interpreted the patient's EKG/Telemetry/lab data    Data:   Results from last 7 days   Lab Units 10/12/23  0827   WBC 10*3/mm3 5.18   HEMOGLOBIN  g/dL 11.0*   HEMATOCRIT % 33.7*   PLATELETS 10*3/mm3 187     Results from last 7 days   Lab Units 10/12/23  0827   SODIUM mmol/L 141   POTASSIUM mmol/L 3.9   CHLORIDE mmol/L 102   CO2 mmol/L 25.0   BUN mg/dL 37*   CREATININE mg/dL 2.36*   GLUCOSE mg/dL 163*      Results from last 7 days   Lab Units 10/12/23  0827   HEMOGLOBIN A1C % 8.40*                             No intake or output data in the 24 hours ending 10/12/23 1044      Telemetry: A-fib, heart rate 71-92  bpm      Assessment and Plan:   1) PAF:   -  MAZE procedure 9/2017 off AAD.   - Zio patch 7/2023, no atrial fibrillation  - in atrial fibrillation/atrial flutter at office visit 9/20/2023 on EKG. Had COVID one month prior.  Remains in A-fib today.  -Plan for ECV today.  The risks, benefits, and alternatives of the procedure have been reviewed and the patient wishes to proceed. She started Xarelto 10/5/2023, and will remain on Xarelto 2 weeks after ECV.  Will check echo after cardioversion to evaluate LV function.     2) Anticoagulation:   S/p RADHA ligation with confirmed closure by ALANIS. No NOAC. Off ASA due to CRI  - as per #1 short term use of Xarelto periprocedurally      3) HTN  - controlled, followed by Dr Bobo and nephrology following for BP and has been altering medications for better control  - Wt loss, exercise, salt reduction     Electronically signed by EUGENIE Hines, 10/12/23, 8:44 AM EDT.    I have read the above note and agree

## 2023-10-14 LAB
QT INTERVAL: 380 MS
QTC INTERVAL: 449 MS

## 2023-10-30 ENCOUNTER — HOSPITAL ENCOUNTER (OUTPATIENT)
Dept: MAMMOGRAPHY | Facility: HOSPITAL | Age: 73
Discharge: HOME | End: 2023-10-30
Payer: MEDICARE

## 2023-10-30 VITALS — BODY MASS INDEX: 30.61 KG/M2 | HEIGHT: 67 IN | WEIGHT: 195 LBS

## 2023-10-30 DIAGNOSIS — Z12.31 ENCOUNTER FOR SCREENING MAMMOGRAM FOR MALIGNANT NEOPLASM OF BREAST: ICD-10-CM

## 2023-10-30 PROCEDURE — 77063 BREAST TOMOSYNTHESIS BI: CPT

## 2023-11-01 ENCOUNTER — LAB (OUTPATIENT)
Dept: LAB | Facility: HOSPITAL | Age: 73
End: 2023-11-01
Payer: MEDICARE

## 2023-11-01 ENCOUNTER — TRANSCRIBE ORDERS (OUTPATIENT)
Dept: LAB | Facility: HOSPITAL | Age: 73
End: 2023-11-01
Payer: MEDICARE

## 2023-11-01 DIAGNOSIS — D64.9 ANEMIA, UNSPECIFIED TYPE: ICD-10-CM

## 2023-11-01 DIAGNOSIS — N18.4 CHRONIC RENAL DISEASE, STAGE IV: ICD-10-CM

## 2023-11-01 DIAGNOSIS — D64.9 ANEMIA, UNSPECIFIED TYPE: Primary | ICD-10-CM

## 2023-11-01 LAB
25(OH)D3 SERPL-MCNC: 33.5 NG/ML (ref 30–100)
ALBUMIN SERPL-MCNC: 4 G/DL (ref 3.5–5.2)
ANION GAP SERPL CALCULATED.3IONS-SCNC: 11.3 MMOL/L (ref 5–15)
BACTERIA UR QL AUTO: ABNORMAL /HPF
BASOPHILS # BLD AUTO: 0.05 10*3/MM3 (ref 0–0.2)
BASOPHILS NFR BLD AUTO: 1.1 % (ref 0–1.5)
BILIRUB UR QL STRIP: NEGATIVE
BUN SERPL-MCNC: 19 MG/DL (ref 8–23)
BUN/CREAT SERPL: 8.2 (ref 7–25)
CALCIUM SPEC-SCNC: 9.1 MG/DL (ref 8.6–10.5)
CHLORIDE SERPL-SCNC: 103 MMOL/L (ref 98–107)
CLARITY UR: CLEAR
CO2 SERPL-SCNC: 25.7 MMOL/L (ref 22–29)
COLOR UR: YELLOW
CREAT SERPL-MCNC: 2.31 MG/DL (ref 0.57–1)
CREAT UR-MCNC: 49.6 MG/DL
DEPRECATED RDW RBC AUTO: 45.7 FL (ref 37–54)
EGFRCR SERPLBLD CKD-EPI 2021: 21.8 ML/MIN/1.73
EOSINOPHIL # BLD AUTO: 0.09 10*3/MM3 (ref 0–0.4)
EOSINOPHIL NFR BLD AUTO: 2 % (ref 0.3–6.2)
ERYTHROCYTE [DISTWIDTH] IN BLOOD BY AUTOMATED COUNT: 12.9 % (ref 12.3–15.4)
FERRITIN SERPL-MCNC: 201 NG/ML (ref 13–150)
GLUCOSE SERPL-MCNC: 80 MG/DL (ref 65–99)
GLUCOSE UR STRIP-MCNC: NEGATIVE MG/DL
HCT VFR BLD AUTO: 30.3 % (ref 34–46.6)
HGB BLD-MCNC: 9.9 G/DL (ref 12–15.9)
HGB UR QL STRIP.AUTO: NEGATIVE
HYALINE CASTS UR QL AUTO: ABNORMAL /LPF
IMM GRANULOCYTES # BLD AUTO: 0.02 10*3/MM3 (ref 0–0.05)
IMM GRANULOCYTES NFR BLD AUTO: 0.4 % (ref 0–0.5)
IRON 24H UR-MRATE: 44 MCG/DL (ref 37–145)
IRON SATN MFR SERPL: 13 % (ref 20–50)
KETONES UR QL STRIP: NEGATIVE
LEUKOCYTE ESTERASE UR QL STRIP.AUTO: NEGATIVE
LYMPHOCYTES # BLD AUTO: 0.62 10*3/MM3 (ref 0.7–3.1)
LYMPHOCYTES NFR BLD AUTO: 13.7 % (ref 19.6–45.3)
MCH RBC QN AUTO: 31.6 PG (ref 26.6–33)
MCHC RBC AUTO-ENTMCNC: 32.7 G/DL (ref 31.5–35.7)
MCV RBC AUTO: 96.8 FL (ref 79–97)
MONOCYTES # BLD AUTO: 0.32 10*3/MM3 (ref 0.1–0.9)
MONOCYTES NFR BLD AUTO: 7.1 % (ref 5–12)
NEUTROPHILS NFR BLD AUTO: 3.41 10*3/MM3 (ref 1.7–7)
NEUTROPHILS NFR BLD AUTO: 75.7 % (ref 42.7–76)
NITRITE UR QL STRIP: NEGATIVE
NRBC BLD AUTO-RTO: 0 /100 WBC (ref 0–0.2)
PH UR STRIP.AUTO: 6.5 [PH] (ref 5–8)
PHOSPHATE SERPL-MCNC: 3.4 MG/DL (ref 2.5–4.5)
PLATELET # BLD AUTO: 197 10*3/MM3 (ref 140–450)
PMV BLD AUTO: 10.3 FL (ref 6–12)
POTASSIUM SERPL-SCNC: 4.2 MMOL/L (ref 3.5–5.2)
PROT ?TM UR-MCNC: 62.5 MG/DL
PROT UR QL STRIP: ABNORMAL
PTH-INTACT SERPL-MCNC: 115 PG/ML (ref 15–65)
RBC # BLD AUTO: 3.13 10*6/MM3 (ref 3.77–5.28)
RBC # UR STRIP: ABNORMAL /HPF
REF LAB TEST METHOD: ABNORMAL
SODIUM SERPL-SCNC: 140 MMOL/L (ref 136–145)
SP GR UR STRIP: 1.01 (ref 1–1.03)
SQUAMOUS #/AREA URNS HPF: ABNORMAL /HPF
TIBC SERPL-MCNC: 340 MCG/DL (ref 298–536)
TRANSFERRIN SERPL-MCNC: 228 MG/DL (ref 200–360)
UROBILINOGEN UR QL STRIP: ABNORMAL
WBC # UR STRIP: ABNORMAL /HPF
WBC NRBC COR # BLD: 4.51 10*3/MM3 (ref 3.4–10.8)

## 2023-11-01 PROCEDURE — 36415 COLL VENOUS BLD VENIPUNCTURE: CPT

## 2023-11-01 PROCEDURE — 83970 ASSAY OF PARATHORMONE: CPT

## 2023-11-01 PROCEDURE — 81001 URINALYSIS AUTO W/SCOPE: CPT

## 2023-11-01 PROCEDURE — 83540 ASSAY OF IRON: CPT

## 2023-11-01 PROCEDURE — 80069 RENAL FUNCTION PANEL: CPT

## 2023-11-01 PROCEDURE — 82728 ASSAY OF FERRITIN: CPT

## 2023-11-01 PROCEDURE — 82570 ASSAY OF URINE CREATININE: CPT

## 2023-11-01 PROCEDURE — 82306 VITAMIN D 25 HYDROXY: CPT

## 2023-11-01 PROCEDURE — 84466 ASSAY OF TRANSFERRIN: CPT

## 2023-11-01 PROCEDURE — 85025 COMPLETE CBC W/AUTO DIFF WBC: CPT

## 2023-11-01 PROCEDURE — 84156 ASSAY OF PROTEIN URINE: CPT

## 2023-11-14 ENCOUNTER — TRANSCRIBE ORDERS (OUTPATIENT)
Dept: INFUSION THERAPY | Facility: HOSPITAL | Age: 73
End: 2023-11-14
Payer: MEDICARE

## 2023-11-14 DIAGNOSIS — N18.4 CHRONIC KIDNEY DISEASE, STAGE IV (SEVERE): Primary | ICD-10-CM

## 2023-11-14 RX ORDER — CLONIDINE HYDROCHLORIDE 0.1 MG/1
0.1 TABLET ORAL 2 TIMES DAILY
Qty: 180 TABLET | Refills: 2 | Status: SHIPPED | OUTPATIENT
Start: 2023-11-14

## 2023-11-16 ENCOUNTER — OFFICE VISIT (OUTPATIENT)
Dept: CARDIOLOGY | Facility: CLINIC | Age: 73
End: 2023-11-16
Payer: MEDICARE

## 2023-11-16 ENCOUNTER — HOSPITAL ENCOUNTER (OUTPATIENT)
Facility: HOSPITAL | Age: 73
Discharge: HOME | End: 2023-11-16
Payer: MEDICARE

## 2023-11-16 VITALS
OXYGEN SATURATION: 95 % | DIASTOLIC BLOOD PRESSURE: 52 MMHG | HEIGHT: 66 IN | WEIGHT: 199 LBS | SYSTOLIC BLOOD PRESSURE: 100 MMHG | BODY MASS INDEX: 31.98 KG/M2 | HEART RATE: 70 BPM

## 2023-11-16 DIAGNOSIS — G47.33 OSA ON CPAP: ICD-10-CM

## 2023-11-16 DIAGNOSIS — I10 ESSENTIAL HYPERTENSION: ICD-10-CM

## 2023-11-16 DIAGNOSIS — E78.2 MIXED HYPERLIPIDEMIA: ICD-10-CM

## 2023-11-16 DIAGNOSIS — I38 VHD (VALVULAR HEART DISEASE): ICD-10-CM

## 2023-11-16 DIAGNOSIS — I48.0 PAROXYSMAL ATRIAL FIBRILLATION: Primary | ICD-10-CM

## 2023-11-16 PROCEDURE — 3078F DIAST BP <80 MM HG: CPT | Performed by: INTERNAL MEDICINE

## 2023-11-16 PROCEDURE — 93000 ELECTROCARDIOGRAM COMPLETE: CPT | Performed by: INTERNAL MEDICINE

## 2023-11-16 PROCEDURE — 93005 ELECTROCARDIOGRAM TRACING: CPT

## 2023-11-16 PROCEDURE — 3074F SYST BP LT 130 MM HG: CPT | Performed by: INTERNAL MEDICINE

## 2023-11-16 PROCEDURE — 1159F MED LIST DOCD IN RCRD: CPT | Performed by: INTERNAL MEDICINE

## 2023-11-16 PROCEDURE — 1160F RVW MEDS BY RX/DR IN RCRD: CPT | Performed by: INTERNAL MEDICINE

## 2023-11-16 PROCEDURE — 99214 OFFICE O/P EST MOD 30 MIN: CPT | Performed by: INTERNAL MEDICINE

## 2023-11-16 NOTE — PROGRESS NOTES
[facility] Cardiology    Patient ID: Karen Newman is a 73 y.o. female.  : 1950   Contact: [phone]    Encounter date: 2023    PCP: Mirian Arnold APRN      Chief complaint:   Chief Complaint   Patient presents with    Increasing shortness of breath       Problem List:  Atrial fibrillation, duration unknown; diagnosed 2013:  Status post ALANIS/external cardioversion, 10/18/2013, Yara Bobo M.D. with the initiation of propafenone therapy.   Atrial fibrillation, 2013, in Gloria Gomez’ office.  Normal sinus rhythm at the time of office visit, 2013  Normal sinus rhythm, EKG, 2014  Discontinuation of Rythmol secondary to diarrhea, 2014, with initiation of flecainide 50 mg b.i.d. and Eliquis 5 mg b.i.d.   CHADS Vasc= 4, 2017  CT scan of the chest 2017: Mild fibrotic changes, o/w normal  Pulmonary Function Test 2017: FEV1/FVC  1.54/2.57 = 60%  Nuclear Stress Test 2017: EF>70%, no ischemia   Bilateral thoracoscopy and Maze procedure and clipping of the left atrial appendage 2017 Dr. Kay  ALANIS 10/24/17: EF 60%, RADHA successfully closed with no evidence of a residual left atrial appendage. Mild to moderate MR  14d Zio, 2021: NSR. PAF <1%  MCOT, 2023: SB/SR. Occasional PVC's. Frequent PAC's. One ventricular triplet. Events correlate with isolated PAC's.  Successful ECV, 10/12/2023: Dr. Osullivan.  Valvular heart disease:  Previous echo approximately,  (incomplete data base).  Echo 08/15/2002, revealing moderate concentric LVH, hyperdynamic LV, EF 81%; mild MR, trace TR.  Echo 10/07/2008, revealed a normal LVEF with mild to moderate MR.  Echo 2011: normal LV systolic function and wall motion with mild MR and mild TR.   Echo, 2017: EF 60%. Moderate to severe MR. Trace to mild TR. No thrombus appears to be present within the left atrial appendage.  ALANIS 10/24/2017: EF 60%, RADHA  successfully closed with no evidence of a residual left atrial appendage. Mild to moderate MR  Echo 01/10/2020: EF 65%, trace MR, mild TR, mild LVH.   Echo 04/07/2022: LVEF 60-65%. Mild concentric LVH. Normal LV diastolic filling pattern. Mild MR.   Echo, 10/12/2023: EF 60%. Trace to mild MR. Trace TR. RVSP is 22 mmHg.  Hypertension:  Adenosine Cardiolite, 06/18/2009: Normal systolic function without evidence of inducible ischemia.   Echo 1/11/20: EF 65%. Trace MR. Mild TR. Mild LVH.  Renal duplex 03/22/2022: No evidence to suggest significant renal artery stenosis.   Dyslipidemia.  Diabetes mellitus, Type 2 diagnosed, 2006.  Thyroid disease:  Hyperthyroidism status post radioactive iodine therapy.  Hypothyroidism on thyroid replacement.  Gastroesophageal reflux disease.  Chronic kidney disease. crea 1.7  History of hemorrhoids status post hemorrhoidectomy.  Colon polyps status post polypectomy.  Anxiety.  Chronic Iron deficiency anemia.  Obstructive sleep apnea on chronic CPAP therapy.  Surgical history:  Total abdominal hysterectomy  Squamous cell removal (right foot)  Allergies   Allergen Reactions    Amlodipine Shortness Of Breath     Swelling and SOB    Buspirone Nausea And Vomiting    Lisinopril Other (See Comments)     cough    Sertraline Hcl Other (See Comments)     nightmares    Sular [Nisoldipine Er] Cough    Carvedilol Other (See Comments)     SOB       Current Medications:    Current Outpatient Medications:     atorvastatin (LIPITOR) 10 MG tablet, Take 1 tablet by mouth Daily., Disp: 90 tablet, Rfl: 3    Blood Glucose Monitoring Suppl (OneTouch Verio) w/Device kit, 1 each Daily. DX: E11.65, Disp: 1 kit, Rfl: 0    cloNIDine (CATAPRES) 0.1 MG tablet, Take 1 tablet by mouth 2 (Two) Times a Day., Disp: 180 tablet, Rfl: 2    Continuous Blood Gluc  (FreeStyle Lucille 2 [place]) device, 1 each Daily., Disp: 1 each, Rfl: 0    Continuous Blood Gluc Sensor (FreeStyle Lucille 2 Sensor) mis, 1 each Every 14  (Fourteen) Days., Disp: 2 each, Rfl: 5    dilTIAZem CD (CARDIZEM CD) 120 MG 24 hr capsule, Take 2 capsules by mouth Daily., Disp: 90 capsule, Rfl: 3    doxazosin (CARDURA) 8 MG tablet, Take 1 tablet by mouth Every 12 (Twelve) Hours., Disp: 180 tablet, Rfl: 3    furosemide (Lasix) 20 MG tablet, Take 1 tablet by mouth Every Other Day. Take 1 tab po prn worsening SOA/edema, Disp: 15 tablet, Rfl: 11    hydrALAZINE (APRESOLINE) 100 MG tablet, Take 1 tablet by mouth 2 (Two) Times a Day., Disp: 180 tablet, Rfl: 3    Insulin Lispro, 1 Unit Dial, (HumaLOG KwikPen) 100 UNIT/ML solution pen-injector, INJECT SUBCUTANEOUSLY 16 TO 20  UNITS 3 TIMES DAILY WITH MEALS, Disp: 60 mL, Rfl: 3    Levemir FlexTouch 100 UNIT/ML injection, INJECT SUBCUTANEOUSLY 35  UNITS AS DIRECTED DAILY, Disp: 45 mL, Rfl: 3    levothyroxine (SYNTHROID, LEVOTHROID) 175 MCG tablet, Take 1 tablet by mouth Daily., Disp: 90 tablet, Rfl: 3    omeprazole (priLOSEC) 40 MG capsule, Take 1 capsule by mouth Daily., Disp: , Rfl:     OneTouch Verio test strip, Use 4x per day:  ICD-10 E11.65, E11.649, Z79.4, Disp: 400 each, Rfl: 3    PARoxetine (PAXIL) 40 MG tablet, Take 1 tablet by mouth Daily., Disp: , Rfl:     rivaroxaban (XARELTO) 20 MG tablet, Take 1 tablet by mouth Daily., Disp: , Rfl:     HPI    Karen Newman is a 73 y.o. female who presents today for a follow up of PAF, VHD, and cardiac risk factors. Since last visit, patient has been doing well overall from a cardiovascular standpoint. However, she presented with atrial fibrillation at an appointment with Dr. Osullivan in October that resulted in her receiving a external cardioversion. No AAD added.  Patient monitors her blood pressure frequently at home. She experiences edema mostly in her right leg attributed to a previous surgery and arthritis. Patient states she has been feeling fatigued and short of breath. She denies chest pain, orthopnea, palpitations, dizziness, and syncope.       The following  "portions of the patient's history were reviewed and updated as appropriate: allergies, current medications and problem list.    Pertinent positives as listed in the HPI.  All other systems reviewed are negative.         Vitals:    11/16/23 1343   BP: 100/52   BP Location: Left arm   Patient Position: Sitting   Pulse: 70   SpO2: 95%   Weight: 90.3 kg (199 lb)   Height: 167.6 cm (66\")       Physical Exam:  General: Alert and oriented.  Neck: Jugular venous pressure is within normal limits. Carotids have normal upstrokes without bruits.   Cardiovascular: Heart has a nondisplaced focal PMI. Irregularly irregular rate and rhythm. No murmur, gallop or rub.  Lungs: Clear, no rales or wheezes. Equal expansion is noted.   Extremities: Trace edema.  Skin: Warm and dry.  Neurologic: Nonfocal.     Diagnostic Data (reviewed with patient):  Lab Results   Component Value Date    GLUCOSE 80 11/01/2023    BUN 19 11/01/2023    CREATININE 2.31 (H) 11/01/2023    BCR 8.2 11/01/2023     11/01/2023    K 4.2 11/01/2023     11/01/2023    CO2 25.7 11/01/2023    CALCIUM 9.1 11/01/2023    ALBUMIN 4.0 11/01/2023    ALKPHOS 85 03/03/2023    AST 17 03/03/2023    ALT 15 03/03/2023     Lab Results   Component Value Date    CHOL 146 03/10/2023    TRIG 99 03/10/2023    HDL 44 03/10/2023    LDL 84 03/10/2023      Lab Results   Component Value Date    WBC 4.51 11/01/2023    RBC 3.13 (L) 11/01/2023    HGB 9.9 (L) 11/01/2023    HCT 30.3 (L) 11/01/2023    MCV 96.8 11/01/2023     11/01/2023      Lab Results   Component Value Date    TSH 0.682 07/14/2023        Advance Care Planning   ACP discussion was declined by the patient. Patient has an advance directive in EMR which is still valid.            ECG 12 Lead    Date/Time: 11/16/2023 2:19 PM  Performed by: Yara Bobo MD    Authorized by: Yara Bobo MD  Comparison: compared with previous ECG from 10/12/2023  Comparison to previous ECG: Afib  Rhythm: atrial " fibrillation  BPM: 63  Other findings: non-specific ST-T wave changes    Clinical impression: abnormal EKG            Assessment:    ICD-10-CM ICD-9-CM   1. Paroxysmal atrial fibrillation  I48.0 427.31   2. VHD (valvular heart disease)  I38 424.90   3. Essential hypertension  I10 401.9   4. Mixed hyperlipidemia  E78.2 272.2   5. KRISTEN on CPAP  G47.33 327.23         Plan:    Discuss recurrent afib w Dr. Osullivan.  Pt has had MAZE in past.  Best choice for AAD at [facility].  Continue on atorvastatin 10 mg daily for hyperlipidemia.   Continue on diltiazem 240 mg daily for rate control and hypertension.   Continue on Lasix 20 mg every other day for fluid retention.   Continue on Xarelto 20 mg daily for stroke prophylaxis.   Continue all other current medications.  F/up in 3 months, sooner if needed.      Scribed for Yara Bobo MD by Margaret Shields. 11/16/2023 14:04 EST    I Yara Bobo MD personally performed the services described in this documentation as scribed by the above individual in my presence, and it is both accurate and complete.    Yara Bobo MD, FACC

## 2023-11-17 ENCOUNTER — HOSPITAL ENCOUNTER (OUTPATIENT)
Dept: INFUSION THERAPY | Facility: HOSPITAL | Age: 73
Discharge: HOME | End: 2023-11-17
Payer: MEDICARE

## 2023-11-17 VITALS
HEART RATE: 72 BPM | RESPIRATION RATE: 18 BRPM | DIASTOLIC BLOOD PRESSURE: 53 MMHG | TEMPERATURE: 98 F | SYSTOLIC BLOOD PRESSURE: 110 MMHG | OXYGEN SATURATION: 95 %

## 2023-11-17 DIAGNOSIS — D63.1 ANEMIA OF CHRONIC RENAL FAILURE, UNSPECIFIED CKD STAGE: Primary | ICD-10-CM

## 2023-11-17 DIAGNOSIS — N18.9 ANEMIA OF CHRONIC RENAL FAILURE, UNSPECIFIED CKD STAGE: Primary | ICD-10-CM

## 2023-11-17 PROCEDURE — 96365 THER/PROPH/DIAG IV INF INIT: CPT

## 2023-11-17 PROCEDURE — 25010000002 FERRIC CARBOXYMALTOSE 750 MG/15ML SOLUTION 15 ML VIAL: Performed by: INTERNAL MEDICINE

## 2023-11-17 RX ORDER — SODIUM CHLORIDE 9 MG/ML
250 INJECTION, SOLUTION INTRAVENOUS ONCE
OUTPATIENT
Start: 2023-11-24 | End: 2023-11-24

## 2023-11-17 RX ORDER — SODIUM CHLORIDE 9 MG/ML
250 INJECTION, SOLUTION INTRAVENOUS ONCE
Status: DISCONTINUED | OUTPATIENT
Start: 2023-11-17 | End: 2023-11-19 | Stop reason: HOSPADM

## 2023-11-17 RX ADMIN — FERRIC CARBOXYMALTOSE INJECTION 750 MG: 50 INJECTION, SOLUTION INTRAVENOUS at 12:12

## 2023-11-27 ENCOUNTER — HOSPITAL ENCOUNTER (OUTPATIENT)
Dept: INFUSION THERAPY | Facility: HOSPITAL | Age: 73
Discharge: HOME | End: 2023-11-27
Admitting: INTERNAL MEDICINE
Payer: MEDICARE

## 2023-11-27 VITALS
DIASTOLIC BLOOD PRESSURE: 55 MMHG | WEIGHT: 196 LBS | SYSTOLIC BLOOD PRESSURE: 107 MMHG | TEMPERATURE: 97.5 F | BODY MASS INDEX: 31.64 KG/M2 | OXYGEN SATURATION: 92 % | RESPIRATION RATE: 18 BRPM | HEART RATE: 82 BPM

## 2023-11-27 DIAGNOSIS — N18.9 ANEMIA OF CHRONIC RENAL FAILURE, UNSPECIFIED CKD STAGE: Primary | ICD-10-CM

## 2023-11-27 DIAGNOSIS — D63.1 ANEMIA OF CHRONIC RENAL FAILURE, UNSPECIFIED CKD STAGE: Primary | ICD-10-CM

## 2023-11-27 PROCEDURE — 96367 TX/PROPH/DG ADDL SEQ IV INF: CPT

## 2023-11-27 PROCEDURE — 96366 THER/PROPH/DIAG IV INF ADDON: CPT

## 2023-11-27 PROCEDURE — 25810000003 SODIUM CHLORIDE 0.9 % SOLUTION: Performed by: INTERNAL MEDICINE

## 2023-11-27 PROCEDURE — 25010000002 FERRIC CARBOXYMALTOSE 750 MG/15ML SOLUTION 15 ML VIAL: Performed by: INTERNAL MEDICINE

## 2023-11-27 PROCEDURE — 96365 THER/PROPH/DIAG IV INF INIT: CPT

## 2023-11-27 RX ORDER — DILTIAZEM HYDROCHLORIDE 120 MG/1
240 CAPSULE, COATED, EXTENDED RELEASE ORAL DAILY
Qty: 180 CAPSULE | Refills: 3 | Status: SHIPPED | OUTPATIENT
Start: 2023-11-27

## 2023-11-27 RX ORDER — SODIUM CHLORIDE 9 MG/ML
250 INJECTION, SOLUTION INTRAVENOUS ONCE
Status: CANCELLED | OUTPATIENT
Start: 2023-11-27 | End: 2023-11-27

## 2023-11-27 RX ORDER — SODIUM CHLORIDE 9 MG/ML
250 INJECTION, SOLUTION INTRAVENOUS ONCE
Status: COMPLETED | OUTPATIENT
Start: 2023-11-27 | End: 2023-11-27

## 2023-11-27 RX ADMIN — FERRIC CARBOXYMALTOSE INJECTION 750 MG: 50 INJECTION, SOLUTION INTRAVENOUS at 09:19

## 2023-11-27 RX ADMIN — SODIUM CHLORIDE 250 ML: 9 INJECTION, SOLUTION INTRAVENOUS at 09:42

## 2023-11-27 NOTE — ADDENDUM NOTE
Encounter addended by: Shelley Robbins, RN on: 11/27/2023 10:46 AM   Actions taken: Flowsheet accepted

## 2023-12-06 ENCOUNTER — DOCUMENTATION (OUTPATIENT)
Dept: CARDIOLOGY | Facility: CLINIC | Age: 73
End: 2023-12-06
Payer: MEDICARE

## 2023-12-06 NOTE — PROGRESS NOTES
I spoke with Dr. Osullivan regarding Karen's recurrent A-fib.  She has a history of a Maze procedure in 2017 and underwent external cardioversion on 10/12/2023 but was not placed on an antiarrhythmic drug at that time.    Her only good drug option at this point is amiodarone as her baseline creatinine is 2.3.  Alternatively Dr. Osullivan could perform a PVA.    I spoke with Karen de leon.  She says that she will see Dr. Osullivan in January and will speak with him regarding those options at that time.    Yara Bobo MD, FACC

## 2023-12-22 ENCOUNTER — OFFICE VISIT (OUTPATIENT)
Dept: ENDOCRINOLOGY | Facility: CLINIC | Age: 73
End: 2023-12-22
Payer: MEDICARE

## 2023-12-22 VITALS
HEART RATE: 102 BPM | BODY MASS INDEX: 30.92 KG/M2 | SYSTOLIC BLOOD PRESSURE: 122 MMHG | HEIGHT: 67 IN | DIASTOLIC BLOOD PRESSURE: 65 MMHG | OXYGEN SATURATION: 94 % | WEIGHT: 197 LBS

## 2023-12-22 DIAGNOSIS — E11.649 TYPE 2 DIABETES MELLITUS WITH HYPOGLYCEMIA WITHOUT COMA, WITH LONG-TERM CURRENT USE OF INSULIN: ICD-10-CM

## 2023-12-22 DIAGNOSIS — E78.2 MIXED HYPERLIPIDEMIA: ICD-10-CM

## 2023-12-22 DIAGNOSIS — E11.22 TYPE 2 DIABETES MELLITUS WITH CHRONIC KIDNEY DISEASE, WITH LONG-TERM CURRENT USE OF INSULIN, UNSPECIFIED CKD STAGE: ICD-10-CM

## 2023-12-22 DIAGNOSIS — E11.65 UNCONTROLLED TYPE 2 DIABETES MELLITUS WITH HYPERGLYCEMIA: Primary | ICD-10-CM

## 2023-12-22 DIAGNOSIS — Z79.4 TYPE 2 DIABETES MELLITUS WITH CHRONIC KIDNEY DISEASE, WITH LONG-TERM CURRENT USE OF INSULIN, UNSPECIFIED CKD STAGE: ICD-10-CM

## 2023-12-22 DIAGNOSIS — I10 ESSENTIAL HYPERTENSION: ICD-10-CM

## 2023-12-22 DIAGNOSIS — E89.0 POSTABLATIVE HYPOTHYROIDISM: ICD-10-CM

## 2023-12-22 DIAGNOSIS — Z79.4 TYPE 2 DIABETES MELLITUS WITH HYPOGLYCEMIA WITHOUT COMA, WITH LONG-TERM CURRENT USE OF INSULIN: ICD-10-CM

## 2023-12-22 LAB
EXPIRATION DATE: ABNORMAL
EXPIRATION DATE: ABNORMAL
GLUCOSE BLDC GLUCOMTR-MCNC: 166 MG/DL (ref 70–130)
HBA1C MFR BLD: 7.8 % (ref 4.5–5.7)
Lab: ABNORMAL
Lab: ABNORMAL
TSH SERPL DL<=0.05 MIU/L-ACNC: 0.82 UIU/ML (ref 0.27–4.2)

## 2023-12-22 PROCEDURE — 84443 ASSAY THYROID STIM HORMONE: CPT | Performed by: INTERNAL MEDICINE

## 2023-12-22 NOTE — PROGRESS NOTES
"     Office Note      Date: 2023  Patient Name: Karen Newman  MRN: [id number]  : 1950    Chief Complaint   Patient presents with    Diabetes       History of Present Illness:   Karen Newman is a 73 y.o. female who presents for Diabetes type 2. Diagnosed in: . Treated in past with oral agents. Current treatments: basal bolus insulin. Number of insulin shots per day: 3. Checks blood sugar 288 times a day - on Free Style Lucille 2. She is making frequent adjustments in insulin based on glucose readings.  Has low blood sugar: rare. Aspirin use: No - easy bleeding. Statin use: Yes. ACE-I/ARB use: No - per nephrology. Changes in health since last visit: cardioversion for a.fib; COVID-19 infection. Last eye exam 2022.     She remains on T4. She is taking 175mcg qd. She is taking this correctly. She isn't taking any interfering meds concurrently. She hasn't noted any change in the size of her neck. She denies any compressive sxs. She denies any sxs of hypo- or hyperthyroidism.    Subjective      Diabetic Complications:  Eyes: Yes  Kidneys: Yes - microalbumin and elevated Cr  Feet: Yes  Heart: No    Diet and Exercise:  Meals per day: 2  Minutes of exercise per week: 0 mins.    Review of Systems:   Review of Systems   Constitutional: Negative.    Cardiovascular: Negative.    Gastrointestinal: Negative.    Endocrine: Negative.        The following portions of the patient's history were reviewed and updated as appropriate: allergies, current medications, past family history, past medical history, past social history, past surgical history, and problem list.    Objective     Visit Vitals  /65   Pulse 102   Ht 170.2 cm (67\")   Wt 89.4 kg (197 lb)   LMP  (LMP Unknown)   SpO2 94%   BMI 30.85 kg/m²       Physical Exam:  Physical Exam  Constitutional:       Appearance: Normal appearance.   Neurological:      Mental Status: She is alert.         Labs:    HbA1c  Lab Results   Component Value Date    " HGBA1C 7.8 (A) 12/22/2023       CMP  Lab Results   Component Value Date    GLUCOSE 80 11/01/2023    BUN 19 11/01/2023    CREATININE 2.31 (H) 11/01/2023    EGFRIFNONA 24 (L) 02/25/2022    BCR 8.2 11/01/2023    K 4.2 11/01/2023    CO2 25.7 11/01/2023    CALCIUM 9.1 11/01/2023    PROTENTOTREF 7.0 01/13/2023    LABIL2 0.8 01/13/2023    AST 17 03/03/2023    ALT 15 03/03/2023        Lipid Panel  Lab Results   Component Value Date    HDL 44 03/10/2023    LDL 84 03/10/2023    TRIG 99 03/10/2023        TSH  Lab Results   Component Value Date    TSH 0.682 07/14/2023        Hemoglobin A1C  Lab Results   Component Value Date    HGBA1C 7.8 (A) 12/22/2023        Microalbumin/Creatinine  Lab Results   Component Value Date    MALBCRERATIO 1,235.5 01/05/2022    MICROALBUR 89.7 01/05/2022           Assessment / Plan      Assessment & Plan:  Diagnoses and all orders for this visit:    1. Uncontrolled type 2 diabetes mellitus with hyperglycemia (Primary)  Assessment & Plan:  Diabetes is unchanged.   She asked about GLP-1 RA treatment today.    Diabetes will be reassessed in 3 months.    FreeStyle Lucille 2 CGM was downloaded today.  Data was reviewed from 12/9/23 to 12/22/23.  This showed better overnight glucose but continued postprandial spikes.  Time in range was 55%.    Will send Rx for mounjaro 2.5mg then titrate up to 5mg weekly.      2. Type 2 diabetes mellitus with hypoglycemia without coma, with long-term current use of insulin  Assessment & Plan:  Continue CGM.      3. Type 2 diabetes mellitus with chronic kidney disease, with long-term current use of insulin, unspecified CKD stage  -     POC Glucose, Blood  -     POC Glycosylated Hemoglobin (Hb A1C)    4. Essential hypertension  Assessment & Plan:  Hypertension is unchanged.  Continue current treatment regimen.  Blood pressure will be reassessed at the next regular appointment.      5. Mixed hyperlipidemia  Assessment & Plan:  Continue statin.      6. Postablative  hypothyroidism  Assessment & Plan:  Continue T4.  Check TSH today.    Orders:  -     TSH; Future    Other orders  -     Tirzepatide (Mounjaro) 2.5 MG/0.5ML solution pen-injector pen; Inject 0.5 mL under the skin into the appropriate area as directed 1 (One) Time Per Week.  Dispense: 2 mL; Refill: 0  -     Tirzepatide (Mounjaro) 5 MG/0.5ML solution pen-injector pen; Inject 0.5 mL under the skin into the appropriate area as directed 1 (One) Time Per Week.  Dispense: 6 mL; Refill: 3      Current Outpatient Medications   Medication Instructions    atorvastatin (LIPITOR) 10 mg, Oral, Daily    Blood Glucose Monitoring Suppl (OneTouch Verio) w/Device kit 1 each, Does not apply, Daily, DX: E11.65    cloNIDine (CATAPRES) 0.1 mg, Oral, 2 Times Daily    Continuous Blood Gluc  (FreeStyle Lucille 2 [place]) device 1 each, Does not apply, Daily    Continuous Blood Gluc Sensor (FreeStyle Lucille 2 Sensor) misc 1 each, Does not apply, Every 14 Days    dilTIAZem CD (CARDIZEM CD) 240 mg, Oral, Daily    doxazosin (CARDURA) 8 mg, Oral, Every 12 Hours    furosemide (LASIX) 20 mg, Oral, Every Other Day, Take 1 tab po prn worsening SOA/edema    hydrALAZINE (APRESOLINE) 100 mg, Oral, 2 Times Daily    Insulin Lispro, 1 Unit Dial, (HumaLOG KwikPen) 100 UNIT/ML solution pen-injector INJECT SUBCUTANEOUSLY 16 TO 20  UNITS 3 TIMES DAILY WITH MEALS    Levemir FlexTouch 100 UNIT/ML injection INJECT SUBCUTANEOUSLY 35  UNITS AS DIRECTED DAILY    levothyroxine (SYNTHROID, LEVOTHROID) 175 mcg, Oral, Daily    omeprazole (PRILOSEC) 40 mg, Oral, Daily    OneTouch Verio test strip Use 4x per day:  ICD-10 E11.65, E11.649, Z79.4    PARoxetine (PAXIL) 40 mg, Oral, Daily    rivaroxaban (XARELTO) 20 mg, Oral, Daily    Tirzepatide (MOUNJARO) 2.5 mg, Subcutaneous, Weekly    Tirzepatide (MOUNJARO) 5 mg, Subcutaneous, Weekly      Return in about 3 months (around 3/22/2024) for Recheck with A1c, CMP, lipid, TSH, microalbumin, foot exam.    Karel Blevins,  MD   12/22/2023

## 2023-12-22 NOTE — ASSESSMENT & PLAN NOTE
Diabetes is unchanged.   She asked about GLP-1 RA treatment today.    Diabetes will be reassessed in 3 months.    FreeStyle Lucille 2 CGM was downloaded today.  Data was reviewed from 12/9/23 to 12/22/23.  This showed better overnight glucose but continued postprandial spikes.  Time in range was 55%.    Will send Rx for mounjaro 2.5mg then titrate up to 5mg weekly.

## 2023-12-26 ENCOUNTER — TELEPHONE (OUTPATIENT)
Dept: ENDOCRINOLOGY | Facility: CLINIC | Age: 73
End: 2023-12-26
Payer: MEDICARE

## 2023-12-26 NOTE — TELEPHONE ENCOUNTER
Caller: Karen Newman    Relationship to patient: Self    Best call back number:     [phone]       Patient is needing: PT IS NEEDING HER MEDICATION. PHARMACY STATED THAT THE DX CODE WAS LEFT OFF OF THE MEDICATION PRESCRIPTION. PLEASE SEND IN  TO THE [facility] - [place], [place] - [address] [place] [address] - [phone]  - [phone]  [address] [place] [id number] Phone: [phone] Fax: [phone] Hours: Not open 24 hours     FOR THE MOUNJARO. PT DOES NOT HAVE ANY MEDICATION.

## 2024-01-17 ENCOUNTER — OFFICE VISIT (OUTPATIENT)
Dept: CARDIOLOGY | Facility: CLINIC | Age: 74
End: 2024-01-17
Payer: MEDICARE

## 2024-01-17 VITALS
WEIGHT: 190 LBS | DIASTOLIC BLOOD PRESSURE: 60 MMHG | SYSTOLIC BLOOD PRESSURE: 112 MMHG | HEART RATE: 67 BPM | BODY MASS INDEX: 30.53 KG/M2 | OXYGEN SATURATION: 96 % | HEIGHT: 66 IN

## 2024-01-17 DIAGNOSIS — I10 ESSENTIAL HYPERTENSION: ICD-10-CM

## 2024-01-17 DIAGNOSIS — R06.09 DYSPNEA ON EXERTION: Primary | ICD-10-CM

## 2024-01-17 DIAGNOSIS — I48.0 PAROXYSMAL ATRIAL FIBRILLATION: ICD-10-CM

## 2024-01-17 DIAGNOSIS — I38 VHD (VALVULAR HEART DISEASE): ICD-10-CM

## 2024-01-17 NOTE — PROGRESS NOTES
[facility] Cardiology    Patient ID: Karen Newman is a 73 y.o. female.  : 1950   Contact: [phone]        PCP: Mirian Arnold APRN      Chief complaint:   Chief Complaint   Patient presents with    VHD (valvular heart disease)       Problem List:  Atrial fibrillation, duration unknown; diagnosed 2013:  Status post ALANIS/external cardioversion, 10/18/2013, Yara Bobo M.D. with the initiation of propafenone therapy.   Atrial fibrillation, 2013, in Gloria Gomez’ office.  Normal sinus rhythm at the time of office visit, 2013  Normal sinus rhythm, EKG, 2014  Discontinuation of Rythmol secondary to diarrhea, 2014, with initiation of flecainide 50 mg b.i.d. and Eliquis 5 mg b.i.d.   CHADS Vasc= 4, 2017  CT scan of the chest 2017: Mild fibrotic changes, o/w normal  Pulmonary Function Test 2017: FEV1/FVC  1.54/2.57 = 60%  Nuclear Stress Test 2017: EF>70%, no ischemia   Bilateral thoracoscopy and Maze procedure and clipping of the left atrial appendage 2017 Dr. Kay  ALANIS 10/24/17: EF 60%, RADHA successfully closed with no evidence of a residual left atrial appendage. Mild to moderate MR  14d Zio, 2021: NSR. PAF <1%  MCOT, 2023: SB/SR. Occasional PVC's. Frequent PAC's. One ventricular triplet. Events correlate with isolated PAC's.  Successful ECV, 10/12/2023: Dr. Osullivan.  Valvular heart disease:  Previous echo approximately,  (incomplete data base).  Echo 08/15/2002, revealing moderate concentric LVH, hyperdynamic LV, EF 81%; mild MR, trace TR.  Echo 10/07/2008, revealed a normal LVEF with mild to moderate MR.  Echo 2011: normal LV systolic function and wall motion with mild MR and mild TR.   Echo, 2017: EF 60%. Moderate to severe MR. Trace to mild TR. No thrombus appears to be present within the left atrial appendage.  ALANIS 10/24/2017: EF 60%, RADHA successfully closed with no evidence  of a residual left atrial appendage. Mild to moderate MR  Echo 01/10/2020: EF 65%, trace MR, mild TR, mild LVH.   Echo 04/07/2022: LVEF 60-65%. Mild concentric LVH. Normal LV diastolic filling pattern. Mild MR.   Echo, 10/12/2023: EF 60%. Trace to mild MR. Trace TR. RVSP is 22 mmHg.  Hypertension:  Adenosine Cardiolite, 06/18/2009: Normal systolic function without evidence of inducible ischemia.   Echo 1/11/20: EF 65%. Trace MR. Mild TR. Mild LVH.  Renal duplex 03/22/2022: No evidence to suggest significant renal artery stenosis.   Dyslipidemia.  Diabetes mellitus, Type 2 diagnosed, 2006.  Thyroid disease:  Hyperthyroidism status post radioactive iodine therapy.  Hypothyroidism on thyroid replacement.  Gastroesophageal reflux disease.  Chronic kidney disease. crea 1.7  History of hemorrhoids status post hemorrhoidectomy.  Colon polyps status post polypectomy.  Anxiety.  Chronic Iron deficiency anemia.  Obstructive sleep apnea on chronic CPAP therapy.  Surgical history:  Total abdominal hysterectomy  Squamous cell removal (right foot)  Allergies   Allergen Reactions    Amlodipine Shortness Of Breath     Swelling and SOB    Buspirone Nausea And Vomiting    Lisinopril Other (See Comments)     cough    Sertraline Hcl Other (See Comments)     nightmares    Sular [Nisoldipine Er] Cough    Carvedilol Other (See Comments)     SOB       Current Medications:    Current Outpatient Medications:     atorvastatin (LIPITOR) 10 MG tablet, Take 1 tablet by mouth Daily., Disp: 90 tablet, Rfl: 3    Blood Glucose Monitoring Suppl (OneTouch Verio) w/Device kit, 1 each Daily. DX: E11.65, Disp: 1 kit, Rfl: 0    cloNIDine (CATAPRES) 0.1 MG tablet, Take 1 tablet by mouth 2 (Two) Times a Day., Disp: 180 tablet, Rfl: 2    Continuous Blood Gluc  (FreeStyle Lucille 2 Joaquin) device, 1 each Daily., Disp: 1 each, Rfl: 0    Continuous Blood Gluc Sensor (FreeStyle Lucille 2 Sensor) misc, 1 each Every 14 (Fourteen) Days., Disp: 2 each, Rfl: 5     dilTIAZem CD (CARDIZEM CD) 120 MG 24 hr capsule, TAKE 2 CAPSULES BY MOUTH DAILY, Disp: 180 capsule, Rfl: 3    doxazosin (CARDURA) 8 MG tablet, Take 1 tablet by mouth Every 12 (Twelve) Hours., Disp: 180 tablet, Rfl: 3    furosemide (Lasix) 20 MG tablet, Take 1 tablet by mouth Every Other Day. Take 1 tab po prn worsening SOA/edema, Disp: 15 tablet, Rfl: 11    hydrALAZINE (APRESOLINE) 100 MG tablet, Take 1 tablet by mouth 2 (Two) Times a Day., Disp: 180 tablet, Rfl: 3    Insulin Lispro, 1 Unit Dial, (HumaLOG KwikPen) 100 UNIT/ML solution pen-injector, INJECT SUBCUTANEOUSLY 16 TO 20  UNITS 3 TIMES DAILY WITH MEALS, Disp: 60 mL, Rfl: 3    Levemir FlexTouch 100 UNIT/ML injection, INJECT SUBCUTANEOUSLY 35  UNITS AS DIRECTED DAILY, Disp: 45 mL, Rfl: 3    levothyroxine (SYNTHROID, LEVOTHROID) 175 MCG tablet, Take 1 tablet by mouth Daily., Disp: 90 tablet, Rfl: 3    omeprazole (priLOSEC) 40 MG capsule, Take 1 capsule by mouth Daily., Disp: , Rfl:     OneTouch Verio test strip, Use 4x per day:  ICD-10 E11.65, E11.649, Z79.4, Disp: 400 each, Rfl: 3    PARoxetine (PAXIL) 40 MG tablet, Take 1 tablet by mouth Daily., Disp: , Rfl:     rivaroxaban (XARELTO) 20 MG tablet, Take 1 tablet by mouth Daily., Disp: , Rfl:     Tirzepatide (Mounjaro) 2.5 MG/0.5ML solution pen-injector pen, Inject 0.5 mL under the skin into the appropriate area as directed 1 (One) Time Per Week. Dx Ell.65, Disp: 2 mL, Rfl: 0    Tirzepatide (Mounjaro) 5 MG/0.5ML solution pen-injector pen, Inject 0.5 mL under the skin into the appropriate area as directed 1 (One) Time Per Week. (Patient not taking: Reported on 1/17/2024), Disp: 6 mL, Rfl: 3    HPI    Karen Newman is a 73 y.o. female who presents today for a follow up of atrial fibrillation.  She was last seen by Dr. Bobo November 16, 2023.  She had a previous cardioversion procedure October 12, 2023 but returned to atrial fibrillation.  She had a previous maze procedure in 2017, chronic kidney  "disease.  Following cardioversion she states she did feel somewhat better.  She had more energy.  She denies dizziness near syncope or racing heart failure symptoms.  She wished to pursue considering a PVA as she is not a good candidate for other antiarrhythmic drugs as well as she does not wish to take amiodarone as she is aware of the side effects with this medication.  She presents today for further consideration of a PVA to reestablish normal rhythm prove upon her symptoms of fatigue shortness of breath.      The following portions of the patient's history were reviewed and updated as appropriate: allergies, current medications and problem list.    Pertinent positives as listed in the HPI.  All other systems reviewed are negative.         Vitals:    01/17/24 1256   BP: 112/60   BP Location: Right arm   Patient Position: Sitting   Pulse: 67   SpO2: 96%   Weight: 86.2 kg (190 lb)   Height: 167.6 cm (66\")       Physical Exam:  General: Alert and oriented.  Neck: Jugular venous pressure is within normal limits. Carotids have normal upstrokes without bruits.   Cardiovascular: Heart has a nondisplaced focal PMI. Irregularly irregular rate and rhythm. No murmur, gallop or rub.  Lungs: Clear, no rales or wheezes. Equal expansion is noted.   Extremities: Trace edema.  Skin: Warm and dry.  Neurologic: Nonfocal.     Diagnostic Data (reviewed with patient):  Lab Results   Component Value Date    GLUCOSE 80 11/01/2023    BUN 19 11/01/2023    CREATININE 2.31 (H) 11/01/2023    BCR 8.2 11/01/2023     11/01/2023    K 4.2 11/01/2023     11/01/2023    CO2 25.7 11/01/2023    CALCIUM 9.1 11/01/2023    ALBUMIN 4.0 11/01/2023    ALKPHOS 85 03/03/2023    AST 17 03/03/2023    ALT 15 03/03/2023     Lab Results   Component Value Date    CHOL 146 03/10/2023    TRIG 99 03/10/2023    HDL 44 03/10/2023    LDL 84 03/10/2023      Lab Results   Component Value Date    WBC 4.51 11/01/2023    RBC 3.13 (L) 11/01/2023    HGB 9.9 (L) " 11/01/2023    HCT 30.3 (L) 11/01/2023    MCV 96.8 11/01/2023     11/01/2023      Lab Results   Component Value Date    TSH 0.822 12/22/2023        Advance Care Planning   ACP discussion was declined by the patient. Patient has an advance directive in EMR which is still valid.            ECG 12 Lead    Date/Time: 1/17/2024 4:58 PM  Performed by: Александр Larsen PA    Authorized by: Александр Larsen PA  Comparison: compared with previous ECG from 11/16/2023  Similar to previous ECG  Rhythm: atrial flutter  Rate: normal  Conduction: conduction normal  QRS axis: normal  Other findings: T wave abnormality            Assessment:    ICD-10-CM ICD-9-CM   1. Dyspnea on exertion  R06.09 786.09   2. Essential hypertension  I10 401.9   3. Paroxysmal atrial fibrillation  I48.0 427.31   4. VHD (valvular heart disease)  I38 424.90           Plan:    PAF/atrial flutter:MAZE and left atrial appendage closure Dr. Kay 9/2017. ECV 10/12/2023.  EKG today atrial fibrillation/flutter.  Patient symptomatic with fatigue low energy.  2. VHD: followed by Dr. Bobo.  Echocardiogram October 12, 2023 EF 60% mild LVH mild MR    3.  Anticoagulation: RADHA ligation confirmed closure by ALANIS.  Patient remains on Xarelto therapy.    4.  Hypertension: Controlled on current medical therapy      Long discussion with patient today.  Went over several options including amiodarone therapy to maintain sinus rhythm.  She does feel better in sinus rhythm she reports.  She does not wish to pursue amiodarone she concerned about the side effects.  She would like to consider pulmonary vein ablation procedure.We discussed the procedure in great detail including risks, benefits, and alternatives. The patient understands that risks include bleeding, infection, stroke, MI, cardiac perforation, and even death.  She would like to consider the these options and make a decision she will let us know how she would like to proceed.  Procedure does  carry significant increased due to kidney disease with contrast dye.     Electronically signed by MASOOD Elise, 01/17/24, 4:59 PM EST.

## 2024-01-18 RX ORDER — TIRZEPATIDE 2.5 MG/.5ML
INJECTION, SOLUTION SUBCUTANEOUS
Qty: 2 ML | Refills: 0 | OUTPATIENT
Start: 2024-01-18

## 2024-01-18 NOTE — TELEPHONE ENCOUNTER
Rx Refill Note  Requested Prescriptions     Pending Prescriptions Disp Refills    Mounjaro 2.5 MG/0.5ML solution pen-injector pen [Pharmacy Med Name: MOUNJARO 2.5 MG/0.5ML SOPN 2.5 Solution Pen-injector] 2 mL 0     Sig: INJECT 0.5 ML UNDER THE SKIN INTO THE APPROPRIATE AREA AS DIRECTED 1 (ONE) TIME PER WEEK.      Last office visit with prescribing clinician: 12/22/2023     Next office visit with prescribing clinician: 5/22/2024       Jai Arnold MA  01/18/24, 11:01 EST

## 2024-01-19 ENCOUNTER — TELEPHONE (OUTPATIENT)
Dept: ENDOCRINOLOGY | Facility: CLINIC | Age: 74
End: 2024-01-19
Payer: MEDICARE

## 2024-01-19 RX ORDER — TIRZEPATIDE 2.5 MG/.5ML
INJECTION, SOLUTION SUBCUTANEOUS
Qty: 2 ML | Refills: 0 | OUTPATIENT
Start: 2024-01-19

## 2024-01-19 NOTE — TELEPHONE ENCOUNTER
Rx Refill Note  Requested Prescriptions     Pending Prescriptions Disp Refills    Mounjaro 2.5 MG/0.5ML solution pen-injector pen [Pharmacy Med Name: MOUNJARO 2.5 MG/0.5ML SOPN 2.5 Solution Pen-injector] 2 mL 0     Sig: INJECT 0.5 ML UNDER THE SKIN INTO THE APPROPRIATE AREA AS DIRECTED 1 (ONE) TIME PER WEEK. DX E11.65          Last office visit with prescribing clinician: 12/22/2023     Next office visit with prescribing clinician: 5/22/2024         Brit Hale MA  01/19/24, 12:10 EST

## 2024-01-19 NOTE — TELEPHONE ENCOUNTER
Spoke with patient.  She did fine with the 2.5mg dose and is okay with the 5mg dose.  Spoke with pharmacy.  They asked that rx be resent with diagnosis code.  Rx sent per protocol.

## 2024-01-19 NOTE — TELEPHONE ENCOUNTER
Pharmacy called, stated that patient is needing a refill on her mounjaro. Apparently patients  called pharmacy and stated that they were not aware of an increased dose that was called in [facility] for mounjaro 5mg. Pharmacy is needing correct order with dosage and dx code for refill. Thank you

## 2024-01-25 ENCOUNTER — LAB (OUTPATIENT)
Dept: LAB | Facility: HOSPITAL | Age: 74
End: 2024-01-25
Payer: MEDICARE

## 2024-01-25 ENCOUNTER — TRANSCRIBE ORDERS (OUTPATIENT)
Dept: LAB | Facility: HOSPITAL | Age: 74
End: 2024-01-25
Payer: MEDICARE

## 2024-01-25 DIAGNOSIS — N02.B9 PRIMARY IGA NEPHROPATHY: ICD-10-CM

## 2024-01-25 DIAGNOSIS — D64.9 ANEMIA, UNSPECIFIED TYPE: ICD-10-CM

## 2024-01-25 DIAGNOSIS — N18.4 CHRONIC KIDNEY DISEASE, STAGE IV (SEVERE): ICD-10-CM

## 2024-01-25 DIAGNOSIS — D64.9 ANEMIA, UNSPECIFIED TYPE: Primary | ICD-10-CM

## 2024-01-25 LAB
ALBUMIN SERPL-MCNC: 4.1 G/DL (ref 3.5–5.2)
ANION GAP SERPL CALCULATED.3IONS-SCNC: 12.1 MMOL/L (ref 5–15)
BACTERIA UR QL AUTO: ABNORMAL /HPF
BASOPHILS # BLD AUTO: 0.06 10*3/MM3 (ref 0–0.2)
BASOPHILS NFR BLD AUTO: 1.1 % (ref 0–1.5)
BILIRUB UR QL STRIP: NEGATIVE
BUN SERPL-MCNC: 32 MG/DL (ref 8–23)
BUN/CREAT SERPL: 12.5 (ref 7–25)
CALCIUM SPEC-SCNC: 9.2 MG/DL (ref 8.6–10.5)
CHLORIDE SERPL-SCNC: 99 MMOL/L (ref 98–107)
CLARITY UR: CLEAR
CO2 SERPL-SCNC: 26.9 MMOL/L (ref 22–29)
COLOR UR: YELLOW
CREAT SERPL-MCNC: 2.55 MG/DL (ref 0.57–1)
CREAT UR-MCNC: 47.2 MG/DL
DEPRECATED RDW RBC AUTO: 44.9 FL (ref 37–54)
EGFRCR SERPLBLD CKD-EPI 2021: 19.4 ML/MIN/1.73
EOSINOPHIL # BLD AUTO: 0.62 10*3/MM3 (ref 0–0.4)
EOSINOPHIL NFR BLD AUTO: 11.2 % (ref 0.3–6.2)
ERYTHROCYTE [DISTWIDTH] IN BLOOD BY AUTOMATED COUNT: 13.3 % (ref 12.3–15.4)
GLUCOSE SERPL-MCNC: 161 MG/DL (ref 65–99)
GLUCOSE UR STRIP-MCNC: NEGATIVE MG/DL
HCT VFR BLD AUTO: 36.6 % (ref 34–46.6)
HGB BLD-MCNC: 11.9 G/DL (ref 12–15.9)
HGB UR QL STRIP.AUTO: NEGATIVE
HYALINE CASTS UR QL AUTO: ABNORMAL /LPF
IMM GRANULOCYTES # BLD AUTO: 0.01 10*3/MM3 (ref 0–0.05)
IMM GRANULOCYTES NFR BLD AUTO: 0.2 % (ref 0–0.5)
IRON 24H UR-MRATE: 75 MCG/DL (ref 37–145)
IRON SATN MFR SERPL: 29 % (ref 20–50)
KETONES UR QL STRIP: NEGATIVE
LEUKOCYTE ESTERASE UR QL STRIP.AUTO: ABNORMAL
LYMPHOCYTES # BLD AUTO: 1.49 10*3/MM3 (ref 0.7–3.1)
LYMPHOCYTES NFR BLD AUTO: 26.9 % (ref 19.6–45.3)
MCH RBC QN AUTO: 30.5 PG (ref 26.6–33)
MCHC RBC AUTO-ENTMCNC: 32.5 G/DL (ref 31.5–35.7)
MCV RBC AUTO: 93.8 FL (ref 79–97)
MONOCYTES # BLD AUTO: 0.55 10*3/MM3 (ref 0.1–0.9)
MONOCYTES NFR BLD AUTO: 9.9 % (ref 5–12)
NEUTROPHILS NFR BLD AUTO: 2.81 10*3/MM3 (ref 1.7–7)
NEUTROPHILS NFR BLD AUTO: 50.7 % (ref 42.7–76)
NITRITE UR QL STRIP: NEGATIVE
NRBC BLD AUTO-RTO: 0.2 /100 WBC (ref 0–0.2)
PH UR STRIP.AUTO: 6.5 [PH] (ref 5–8)
PHOSPHATE SERPL-MCNC: 3.5 MG/DL (ref 2.5–4.5)
PLATELET # BLD AUTO: 187 10*3/MM3 (ref 140–450)
PMV BLD AUTO: 10.5 FL (ref 6–12)
POTASSIUM SERPL-SCNC: 3.8 MMOL/L (ref 3.5–5.2)
PROT UR QL STRIP: ABNORMAL
RBC # BLD AUTO: 3.9 10*6/MM3 (ref 3.77–5.28)
RBC # UR STRIP: ABNORMAL /HPF
REF LAB TEST METHOD: ABNORMAL
SODIUM SERPL-SCNC: 138 MMOL/L (ref 136–145)
SP GR UR STRIP: 1.01 (ref 1–1.03)
SQUAMOUS #/AREA URNS HPF: ABNORMAL /HPF
TIBC SERPL-MCNC: 259 MCG/DL (ref 298–536)
TRANSFERRIN SERPL-MCNC: 174 MG/DL (ref 200–360)
UROBILINOGEN UR QL STRIP: ABNORMAL
WBC # UR STRIP: ABNORMAL /HPF
WBC NRBC COR # BLD AUTO: 5.54 10*3/MM3 (ref 3.4–10.8)

## 2024-01-25 PROCEDURE — 84466 ASSAY OF TRANSFERRIN: CPT

## 2024-01-25 PROCEDURE — 85025 COMPLETE CBC W/AUTO DIFF WBC: CPT

## 2024-01-25 PROCEDURE — 80069 RENAL FUNCTION PANEL: CPT

## 2024-01-25 PROCEDURE — 36415 COLL VENOUS BLD VENIPUNCTURE: CPT

## 2024-01-25 PROCEDURE — 81001 URINALYSIS AUTO W/SCOPE: CPT

## 2024-01-25 PROCEDURE — 83540 ASSAY OF IRON: CPT

## 2024-01-25 PROCEDURE — 82570 ASSAY OF URINE CREATININE: CPT

## 2024-02-08 ENCOUNTER — OFFICE VISIT (OUTPATIENT)
Dept: CARDIOLOGY | Facility: CLINIC | Age: 74
End: 2024-02-08
Payer: MEDICARE

## 2024-02-08 VITALS
HEIGHT: 66 IN | BODY MASS INDEX: 28.93 KG/M2 | SYSTOLIC BLOOD PRESSURE: 132 MMHG | DIASTOLIC BLOOD PRESSURE: 62 MMHG | OXYGEN SATURATION: 94 % | WEIGHT: 180 LBS | HEART RATE: 107 BPM

## 2024-02-08 DIAGNOSIS — I48.0 PAROXYSMAL ATRIAL FIBRILLATION: Primary | Chronic | ICD-10-CM

## 2024-02-08 DIAGNOSIS — I10 ESSENTIAL HYPERTENSION: Chronic | ICD-10-CM

## 2024-02-08 RX ORDER — LINACLOTIDE 145 UG/1
1 CAPSULE, GELATIN COATED ORAL DAILY
COMMUNITY
Start: 2024-01-23

## 2024-02-08 NOTE — PROGRESS NOTES
Electrophysiology Clinic Consult     Karen Newman  [id number]  1950    Referring Provider: No ref. provider found   PCP: Mirian Arnold, APRN  [address] / [facility] [address]    Date of Service: 02/08/24    Chief Complaint   Patient presents with    Dyspnea on exertion     Problem List  Problem List:  Atrial fibrillation, duration unknown; diagnosed August of 2013:  Status post ALANIS/external cardioversion, 10/18/2013, Yara Bobo M.D. with the initiation of propafenone therapy.   Atrial fibrillation, 11/13/2013, in Gloria Gomez’ office.  Normal sinus rhythm at the time of office visit, 11/21/2013  Normal sinus rhythm, EKG, 06/05/2014  Discontinuation of Rythmol secondary to diarrhea, 06/05/2014, with initiation of flecainide 50 mg b.i.d. and Eliquis 5 mg b.i.d.   CHADS Vasc= 4, 04/18/2017  CT scan of the chest 06/29/2017: Mild fibrotic changes, o/w normal  Pulmonary Function Test 06/29/2017: FEV1/FVC  1.54/2.57 = 60%  Nuclear Stress Test 06/29/2017: EF>70%, no ischemia   Bilateral thoracoscopy and Maze procedure and clipping of the left atrial appendage 09/26/2017 Dr. Kay  ALANIS 10/24/17: EF 60%, RADHA successfully closed with no evidence of a residual left atrial appendage. Mild to moderate MR  14d Zio, 1/6/2021: NSR. PAF <1%  MCOT, 07/20/2023: SB/SR. Occasional PVC's. Frequent PAC's. One ventricular triplet. Events correlate with isolated PAC's.  Successful ECV, 10/12/2023: Dr. Osullivan.  Valvular heart disease:  Previous echo approximately, 2006 (incomplete data base).  Echo 08/15/2002, revealing moderate concentric LVH, hyperdynamic LV, EF 81%; mild MR, trace TR.  Echo 10/07/2008, revealed a normal LVEF with mild to moderate MR.  Echo 07/01/2011: normal LV systolic function and wall motion with mild MR and mild TR.   Echo, 09/22/2017: EF 60%. Moderate to severe MR. Trace to mild TR. No thrombus appears to be present within the left atrial appendage.  ALANIS 10/24/2017: EF 60%, RADHA successfully closed  with no evidence of a residual left atrial appendage. Mild to moderate MR  Echo 01/10/2020: EF 65%, trace MR, mild TR, mild LVH.   Echo 04/07/2022: LVEF 60-65%. Mild concentric LVH. Normal LV diastolic filling pattern. Mild MR.   Echo, 10/12/2023: EF 60%. Trace to mild MR. Trace TR. RVSP is 22 mmHg.  Hypertension:  Adenosine Cardiolite, 06/18/2009: Normal systolic function without evidence of inducible ischemia.   Echo 1/11/20: EF 65%. Trace MR. Mild TR. Mild LVH.  Renal duplex 03/22/2022: No evidence to suggest significant renal artery stenosis.   Dyslipidemia.  Diabetes mellitus, Type 2 diagnosed, 2006.  Thyroid disease:  Hyperthyroidism status post radioactive iodine therapy.  Hypothyroidism on thyroid replacement.  Gastroesophageal reflux disease.  Chronic kidney disease. crea 1.7  History of hemorrhoids status post hemorrhoidectomy.  Colon polyps status post polypectomy.  Anxiety.  Chronic Iron deficiency anemia.  Obstructive sleep apnea on chronic CPAP therapy.  Surgical history:  Total abdominal hysterectomy  Squamous cell removal (right foot)        Allergies   Allergen Reactions    Amlodipine Shortness Of Breath       Swelling and SOB    Buspirone Nausea And Vomiting    Lisinopril Other (See Comments)       cough    Sertraline Hcl Other (See Comments)       nightmares    Sular [Nisoldipine Er] Cough    Carvedilol Other (See Comments)       SOB       History of Present Illness  Karen Newman is a 73 y.o. female who presents to my electrophysiology clinic for evaluation of AF.  Patient with history of cardiac procedure in 2017 with concomitant maze procedure and left atrial appendage clipping.  She did well for the past few years however started developing recurrent atrial fibrillation episodes.  Antiarrhythmic options are limited at this time due to her valvular heart disease status post repair status as well as CKD.  Patient referred to my office to further discuss options to manage her atrial  fibrillation including ablation.     Review of Systems   Constitutional:  Positive for fatigue. Negative for activity change and fever.   Respiratory:  Positive for shortness of breath. Negative for chest tightness.    Cardiovascular:  Positive for palpitations. Negative for chest pain and leg swelling.   Gastrointestinal:  Negative for constipation and diarrhea.   Genitourinary:  Negative for decreased urine volume and difficulty urinating.   Skin:  Negative for wound.   Neurological:  Negative for dizziness, syncope, weakness and light-headedness.   Psychiatric/Behavioral:  Negative for suicidal ideas.        Outpatient Medications Marked as Taking for the 2/8/24 encounter (Office Visit) with Alec Gonzalez MD   Medication Sig Dispense Refill    atorvastatin (LIPITOR) 10 MG tablet Take 1 tablet by mouth Daily. 90 tablet 3    Blood Glucose Monitoring Suppl (OneTouch Verio) w/Device kit 1 each Daily. DX: E11.65 1 kit 0    cloNIDine (CATAPRES) 0.1 MG tablet Take 1 tablet by mouth 2 (Two) Times a Day. 180 tablet 2    Continuous Blood Gluc  (FreeStyle Lucille 2 Linwood) device 1 each Daily. 1 each 0    Continuous Blood Gluc Sensor (FreeStyle Lucille 2 Sensor) misc 1 each Every 14 (Fourteen) Days. 2 each 5    dilTIAZem CD (CARDIZEM CD) 120 MG 24 hr capsule TAKE 2 CAPSULES BY MOUTH DAILY 180 capsule 3    doxazosin (CARDURA) 8 MG tablet Take 1 tablet by mouth Every 12 (Twelve) Hours. 180 tablet 3    furosemide (Lasix) 20 MG tablet Take 1 tablet by mouth Every Other Day. Take 1 tab po prn worsening SOA/edema 15 tablet 11    hydrALAZINE (APRESOLINE) 100 MG tablet Take 1 tablet by mouth 2 (Two) Times a Day. 180 tablet 3    Insulin Lispro, 1 Unit Dial, (HumaLOG KwikPen) 100 UNIT/ML solution pen-injector INJECT SUBCUTANEOUSLY 16 TO 20  UNITS 3 TIMES DAILY WITH MEALS 60 mL 3    Levemir FlexTouch 100 UNIT/ML injection INJECT SUBCUTANEOUSLY 35  UNITS AS DIRECTED DAILY 45 mL 3    levothyroxine (SYNTHROID, LEVOTHROID) 175 MCG  "tablet Take 1 tablet by mouth Daily. 90 tablet 3    Linzess 145 MCG capsule capsule Take 1 capsule by mouth Daily.      omeprazole (priLOSEC) 40 MG capsule Take 1 capsule by mouth Daily.      OneTouch Verio test strip Use 4x per day:  ICD-10 E11.65, E11.649, Z79.4 400 each 3    PARoxetine (PAXIL) 40 MG tablet Take 1 tablet by mouth Daily.      rivaroxaban (XARELTO) 20 MG tablet Take 1 tablet by mouth Daily.      Tirzepatide (Mounjaro) 5 MG/0.5ML solution pen-injector pen Inject 0.5 mL under the skin into the appropriate area as directed 1 (One) Time Per Week. Dx E11.65 6 mL 1       Physical Exam  Vitals:    02/08/24 1055   BP: 132/62   BP Location: Left arm   Patient Position: Sitting   Pulse: 107   SpO2: 94%   Weight: 81.6 kg (180 lb)   Height: 167.6 cm (66\")     GENERAL: Well-developed, well-nourished patient in no acute distress.  HEENT: NC, AC, PERRLA. MMM  NECK: No JVD. No carotid bruits auscultated.  LUNGS: Clear to auscultation bilaterally.  CARDIOVASCULAR: RRR No murmurs, gallops or rubs noted.   ABDOMEN: Soft, nontender. Positive bowel sounds.  MUSCULOSKELETAL: No gross deformities. No clubbing, cyanosis  EXT: pulses intact, No edema  SKIN: Pink, warm  Neuro: Nonfocal exam. Gait intact    Diagnostic Data  Procedures    Lab Results   Component Value Date    GLUCOSE 161 (H) 01/25/2024    CALCIUM 9.2 01/25/2024     01/25/2024    K 3.8 01/25/2024    CO2 26.9 01/25/2024    CL 99 01/25/2024    BUN 32 (H) 01/25/2024    CREATININE 2.55 (H) 01/25/2024    EGFRIFNONA 24 (L) 02/25/2022    BCR 12.5 01/25/2024    ANIONGAP 12.1 01/25/2024     Lab Results   Component Value Date    WBC 5.54 01/25/2024    HGB 11.9 (L) 01/25/2024    HCT 36.6 01/25/2024    MCV 93.8 01/25/2024     01/25/2024     Lab Results   Component Value Date    INR 0.98 09/27/2017    INR 1.02 09/26/2017    INR 0.93 09/22/2017    PROTIME 10.7 09/27/2017    PROTIME 11.1 09/26/2017    PROTIME 10.1 09/22/2017     Lab Results   Component Value Date "    TSH 0.822 12/22/2023       Cardiac Testing:  Echocardiogram October 2023    Left ventricular systolic function is normal. Estimated left ventricular EF = 60%    Left ventricular wall thickness is consistent with mild concentric hypertrophy.    Left ventricular diastolic function was normal.    Trace to mild mitral regurgitation.    Trace tricuspid regurgitation.    Calculated right ventricular systolic pressure from tricuspid regurgitation is 22 mmHg.    I personally viewed and interpreted the patient's EKG/Telemetry/lab data      Assessment and Plan   Diagnoses and all orders for this visit:    1. Paroxysmal atrial fibrillation (Primary)  Assessment & Plan:  Paroxysmal symptomatic atrial fibrillation  History of maze in 2017  Having recurrence with symptoms  After extensive conversation regarding risks, benefits, or alternatives to the therapy, patient elected to proceed with the AF ablation procedure   - no CTA prior given her CKD   - AF ablation with Carto          2. Essential hypertension  Assessment & Plan:  Moderately controlled today  Continue monitor  Continue current regimen        Body mass index is 29.05 kg/m².    ACP discussion was declined by the patient. Patient has an advance directive (not in EMR), copy requested.    Follow Up  Return in about 3 months (around 5/8/2024) for Follow up after Procedure.    Thank you for allowing me to participate in the care of your patient. Please to not hesitate to contact me with additional questions or concerns.        Alec Gonzalez MD  Cardiac Electrophysiologist  [facility] / [facility]

## 2024-02-08 NOTE — ASSESSMENT & PLAN NOTE
Paroxysmal symptomatic atrial fibrillation  History of maze in 2017  Having recurrence with symptoms  After extensive conversation regarding risks, benefits, or alternatives to the therapy, patient elected to proceed with the AF ablation procedure   - no CTA prior given her CKD   - AF ablation with Carto

## 2024-02-09 DIAGNOSIS — I48.0 PAROXYSMAL ATRIAL FIBRILLATION: Primary | ICD-10-CM

## 2024-02-09 RX ORDER — NITROGLYCERIN 0.4 MG/1
0.4 TABLET SUBLINGUAL
OUTPATIENT
Start: 2024-02-09

## 2024-02-09 RX ORDER — ACETAMINOPHEN 325 MG/1
650 TABLET ORAL EVERY 4 HOURS PRN
OUTPATIENT
Start: 2024-02-09

## 2024-02-09 RX ORDER — ONDANSETRON 2 MG/ML
4 INJECTION INTRAMUSCULAR; INTRAVENOUS EVERY 6 HOURS PRN
OUTPATIENT
Start: 2024-02-09

## 2024-02-09 RX ORDER — SODIUM CHLORIDE 9 MG/ML
40 INJECTION, SOLUTION INTRAVENOUS AS NEEDED
OUTPATIENT
Start: 2024-02-09

## 2024-02-15 ENCOUNTER — OFFICE VISIT (OUTPATIENT)
Dept: CARDIOLOGY | Facility: CLINIC | Age: 74
End: 2024-02-15
Payer: MEDICARE

## 2024-02-15 VITALS
DIASTOLIC BLOOD PRESSURE: 52 MMHG | BODY MASS INDEX: 27.97 KG/M2 | HEIGHT: 66 IN | OXYGEN SATURATION: 96 % | SYSTOLIC BLOOD PRESSURE: 124 MMHG | HEART RATE: 91 BPM | WEIGHT: 174 LBS

## 2024-02-15 DIAGNOSIS — E78.2 MIXED HYPERLIPIDEMIA: ICD-10-CM

## 2024-02-15 DIAGNOSIS — I38 VHD (VALVULAR HEART DISEASE): ICD-10-CM

## 2024-02-15 DIAGNOSIS — I10 ESSENTIAL HYPERTENSION: ICD-10-CM

## 2024-02-15 DIAGNOSIS — G47.33 OSA ON CPAP: ICD-10-CM

## 2024-02-15 DIAGNOSIS — I48.0 PAROXYSMAL ATRIAL FIBRILLATION: Primary | ICD-10-CM

## 2024-03-06 NOTE — NURSING NOTE
" PRE-PVA ASSESSMENT  Karen Newman 1950   [address] [facility] [address]   [phone]    PCP: Mirian Arnold APRN   Information obtained from: [x] Medical record review  [x] Patient report  Scheduled for: PVA on 3/11/24 with Dr. Gonzalez  Allergies   Allergen Reactions    Amlodipine Shortness Of Breath     Swelling and SOB    Buspirone Nausea And Vomiting    Lisinopril Other (See Comments)     cough    Sertraline Hcl Other (See Comments)     nightmares    Sular [Nisoldipine Er] Cough    Carvedilol Other (See Comments)     SOB       AFib Specific History:  AFIBTYPE: paroxysmal    CHADS-VASc Risk Assessment               4 Total Score    1 Hypertension    1 DM    1 Age 65-74    1 Sex: Female    Criteria that do not apply:    CHF    Age >/= 75    PRIOR STROKE/TIA/THROMBO    Vascular Disease            Anticoagulation: Xarelto Per patient not on a blood thinner   Cardioversion x 1  Failed AAD(s): propafenone, rythmol, flecainide   Prior Ablation: Bilateral thoracoscopy and Maze procedure and clipping of the left atrial appendage 09/26/2017 Dr. Kay     Is Ms. Newman aware of her AFib? Yes   Onset: 2013      Exacerbations: none   Frequency: \"I can't tell-atleast weekly   Alleviations: none      Symptoms:   [x] Palpitations:    [] Chest Discomfort:    [] Dizziness:    [] Presyncope:    [] Lightheadedness:   [] Syncope:    [x] Fatigue:    [] Other:     [] Short of Breath:     Last Echo(s):  [x]Echo, 10/12/2023: EF 60%. Trace to mild MR. Trace TR. RVSP is 22 mmHg.          ALANIS 10/24/17: EF 60%, RADHA successfully closed with no evidence of a residual left atrial appendage. Mild to moderate MR      Past medical History:   [x] Diabetes             Tx mounjaro, insulin                Hemoglobin A1C   Date Value Ref Range Status   12/22/2023 7.8 (A) 4.5 - 5.7 % Final   10/12/2023 8.40 (H) 4.80 - 5.60 % Final          [x] HYPOthyroidism  [x] HYPERthyroidism      Tx synthroid          TSH   Date Value Ref Range Status "   12/22/2023 0.822 0.270 - 4.200 uIU/mL Final   07/14/2023 0.682 0.270 - 4.200 uIU/mL Final     [x] HTN        [x] Controlled    [] Heart Failure  No   [] CVA    No                            [] TIA  No         [] Ischemic         [] Hemorrhagic         [] Nonischemic         [] Embolic        [] Diastolic    [] CAD   No       [] MI   No           [x] Dyslipidemia on lipitor   [] Statin indicated    [x] Ischemic Evaluation       [x] Stress Test: Nuclear Stress Test 06/29/2017: EF>70%, no ischemia        [] Heart Cath: No    [x] Sleep Apnea Diagnosed       Device: CPAP        Compliance: compliance all of the time    [] Obesity No BMI 29.05    Other Pertinent PMH: GERD, CKD, iron def anemia     Summary of Patient Contact:    I spoke with Ms. Newman about her upcoming PVA.   She was well informed about the procedure from prior discussion with Dr. Gonzalez and from reading the provided literature.  We discussed the procedure at length including risks, anesthesia, intra-op procedures, recovery, bedrest, sheath removal, discharge criteria, normal post-procedure expectations, and success rates.  I answered a few remaining questions. Ms. Newman verbalized understanding and she is ready to proceed.       Patient is not on anticoagulation.  VO Dr. Gonzalez check PAT labs and plan for NOAC to follow.  Patient with a h/o of CKD.  Attempted to call PCP office to obtain CBC and BMP tomorrow-no answer.  Faxed order to [phone] .  Will continue to monitor.      3/8 labs are back Cr 2.0, GFR 26, Crcl 32.  VO Dr. Gonzalez start Xarelto 15 mg daily today DNS.  Patient advised and verbalized understanding.  Rx sent to [facility].    Susi Epperson RN

## 2024-03-07 ENCOUNTER — TELEPHONE (OUTPATIENT)
Dept: CARDIOLOGY | Facility: CLINIC | Age: 74
End: 2024-03-07

## 2024-03-07 ENCOUNTER — HOSPITAL ENCOUNTER (OUTPATIENT)
Facility: HOSPITAL | Age: 74
Discharge: HOME | End: 2024-03-07
Payer: MEDICARE

## 2024-03-07 DIAGNOSIS — E11.22 TYPE 2 DIABETES MELLITUS WITH CHRONIC KIDNEY DISEASE, WITH LONG-TERM CURRENT USE OF INSULIN, UNSPECIFIED CKD STAGE: ICD-10-CM

## 2024-03-07 DIAGNOSIS — Z79.4 TYPE 2 DIABETES MELLITUS WITH CHRONIC KIDNEY DISEASE, WITH LONG-TERM CURRENT USE OF INSULIN, UNSPECIFIED CKD STAGE: ICD-10-CM

## 2024-03-07 DIAGNOSIS — I48.0 PAROXYSMAL ATRIAL FIBRILLATION: Primary | ICD-10-CM

## 2024-03-07 LAB
ANION GAP SERPL CALCULATED.3IONS-SCNC: 9 MMOL/L (ref 3–16)
BUN SERPL-MCNC: 18 MG/DL (ref 6–20)
CALCIUM SERPL-MCNC: 9.5 MG/DL (ref 8.5–10.5)
CHLORIDE SERPL-SCNC: 103 MMOL/L (ref 98–107)
CO2 SERPL-SCNC: 29 MMOL/L (ref 20–30)
CREAT SERPL-MCNC: 2 MG/DL (ref 0.4–1.2)
ERYTHROCYTE [DISTWIDTH] IN BLOOD BY AUTOMATED COUNT: 12.9 % (ref 11–16)
GFR SERPLBLD CREATININE-BSD FMLA CKD-EPI: 26 ML/MIN/{1.73_M2}
GLUCOSE SERPL-MCNC: 124 MG/DL (ref 74–106)
HCT VFR BLD AUTO: 36.2 % (ref 37–47)
HGB BLD-MCNC: 11.9 G/DL (ref 11.5–16.5)
MCH RBC QN AUTO: 32.1 PG (ref 27–32)
MCHC RBC AUTO-ENTMCNC: 32.9 G/DL (ref 31–35)
MCV RBC AUTO: 97.6 FL (ref 80–100)
PLATELET # BLD AUTO: 154 K/UL (ref 150–400)
PMV BLD AUTO: 9.1 FL (ref 6–10)
POTASSIUM SERPL-SCNC: 4.4 MMOL/L (ref 3.4–5.1)
RBC # BLD AUTO: 3.71 M/UL (ref 3.8–5.8)
SODIUM SERPL-SCNC: 141 MMOL/L (ref 136–145)
WBC # BLD AUTO: 5.5 K/UL (ref 4–11)

## 2024-03-07 PROCEDURE — 85027 COMPLETE CBC AUTOMATED: CPT

## 2024-03-07 PROCEDURE — 80048 BASIC METABOLIC PNL TOTAL CA: CPT

## 2024-03-07 PROCEDURE — 36415 COLL VENOUS BLD VENIPUNCTURE: CPT

## 2024-03-07 NOTE — TELEPHONE ENCOUNTER
Caller: Karen Newman    Relationship: Self    Best call back number: [phone] (home)       What is the best time to reach you: ANY    Who are you requesting to speak with (clinical staff, provider,  specific staff member): CLINICAL      What was the call regarding: PATIENT CALLED TO ADVISE THAT DR VILLALPANDO HAS ORDERED LAB WORK FOR HER PROCEDURE ON MONDAY 03-11-24 AND WANTED THE PATIENT TO HAVE LAB WORK BEFORE. PATIENT STATES THAT THERE WAS SUPPOSED TO BE A LAB ORDER FAXED OVER TO OSMAN OBANDO OFFICE AT [facility]. PLEASE CONTACT PATIENT AND ADVISE WHEN THIS HAS BEEN COMPLETED.  FAX NUMBER [phone]    Is it okay if the provider responds through [facility]: PLEASE CALL

## 2024-03-11 ENCOUNTER — ANESTHESIA EVENT (OUTPATIENT)
Dept: CARDIOLOGY | Facility: HOSPITAL | Age: 74
End: 2024-03-11
Payer: MEDICARE

## 2024-03-11 ENCOUNTER — HOSPITAL ENCOUNTER (OUTPATIENT)
Facility: HOSPITAL | Age: 74
Setting detail: HOSPITAL OUTPATIENT SURGERY
Discharge: HOME | End: 2024-03-11
Attending: INTERNAL MEDICINE | Admitting: INTERNAL MEDICINE
Payer: MEDICARE

## 2024-03-11 ENCOUNTER — ANESTHESIA (OUTPATIENT)
Dept: CARDIOLOGY | Facility: HOSPITAL | Age: 74
End: 2024-03-11
Payer: MEDICARE

## 2024-03-11 VITALS
BODY MASS INDEX: 28.25 KG/M2 | TEMPERATURE: 97.9 F | HEIGHT: 66 IN | RESPIRATION RATE: 20 BRPM | HEART RATE: 79 BPM | DIASTOLIC BLOOD PRESSURE: 59 MMHG | SYSTOLIC BLOOD PRESSURE: 118 MMHG | OXYGEN SATURATION: 94 % | WEIGHT: 175.8 LBS

## 2024-03-11 DIAGNOSIS — I48.0 PAROXYSMAL ATRIAL FIBRILLATION: ICD-10-CM

## 2024-03-11 LAB
ANION GAP SERPL CALCULATED.3IONS-SCNC: 9 MMOL/L (ref 5–15)
BUN SERPL-MCNC: 26 MG/DL (ref 8–23)
BUN/CREAT SERPL: 11.2 (ref 7–25)
CALCIUM SPEC-SCNC: 9.3 MG/DL (ref 8.6–10.5)
CHLORIDE SERPL-SCNC: 98 MMOL/L (ref 98–107)
CO2 SERPL-SCNC: 30 MMOL/L (ref 22–29)
CREAT SERPL-MCNC: 2.32 MG/DL (ref 0.57–1)
DEPRECATED RDW RBC AUTO: 44.5 FL (ref 37–54)
EGFRCR SERPLBLD CKD-EPI 2021: 21.7 ML/MIN/1.73
ERYTHROCYTE [DISTWIDTH] IN BLOOD BY AUTOMATED COUNT: 12.7 % (ref 12.3–15.4)
GLUCOSE SERPL-MCNC: 132 MG/DL (ref 65–99)
HCT VFR BLD AUTO: 35.3 % (ref 34–46.6)
HGB BLD-MCNC: 11.8 G/DL (ref 12–15.9)
MCH RBC QN AUTO: 32.1 PG (ref 26.6–33)
MCHC RBC AUTO-ENTMCNC: 33.4 G/DL (ref 31.5–35.7)
MCV RBC AUTO: 95.9 FL (ref 79–97)
PLATELET # BLD AUTO: 164 10*3/MM3 (ref 140–450)
PMV BLD AUTO: 9.8 FL (ref 6–12)
POTASSIUM SERPL-SCNC: 3.6 MMOL/L (ref 3.5–5.2)
QT INTERVAL: 444 MS
QTC INTERVAL: 475 MS
RBC # BLD AUTO: 3.68 10*6/MM3 (ref 3.77–5.28)
SODIUM SERPL-SCNC: 137 MMOL/L (ref 136–145)
WBC NRBC COR # BLD AUTO: 6.01 10*3/MM3 (ref 3.4–10.8)

## 2024-03-11 PROCEDURE — 93657 TX L/R ATRIAL FIB ADDL: CPT | Performed by: INTERNAL MEDICINE

## 2024-03-11 PROCEDURE — 93655 ICAR CATH ABLTJ DSCRT ARRHYT: CPT | Performed by: INTERNAL MEDICINE

## 2024-03-11 PROCEDURE — C1730 CATH, EP, 19 OR FEW ELECT: HCPCS | Performed by: INTERNAL MEDICINE

## 2024-03-11 PROCEDURE — 25010000002 ONDANSETRON PER 1 MG: Performed by: NURSE ANESTHETIST, CERTIFIED REGISTERED

## 2024-03-11 PROCEDURE — S0260 H&P FOR SURGERY: HCPCS | Performed by: PHYSICIAN ASSISTANT

## 2024-03-11 PROCEDURE — 25010000002 ESMOLOL 100 MG/10ML SOLUTION: Performed by: NURSE ANESTHETIST, CERTIFIED REGISTERED

## 2024-03-11 PROCEDURE — C1732 CATH, EP, DIAG/ABL, 3D/VECT: HCPCS | Performed by: INTERNAL MEDICINE

## 2024-03-11 PROCEDURE — C1760 CLOSURE DEV, VASC: HCPCS | Performed by: INTERNAL MEDICINE

## 2024-03-11 PROCEDURE — C1893 INTRO/SHEATH, FIXED,NON-PEEL: HCPCS | Performed by: INTERNAL MEDICINE

## 2024-03-11 PROCEDURE — 93622 COMP EP EVAL L VENTR PAC&REC: CPT | Performed by: INTERNAL MEDICINE

## 2024-03-11 PROCEDURE — 25010000002 SUGAMMADEX 200 MG/2ML SOLUTION: Performed by: NURSE ANESTHETIST, CERTIFIED REGISTERED

## 2024-03-11 PROCEDURE — 93623 PRGRMD STIMJ&PACG IV RX NFS: CPT | Performed by: INTERNAL MEDICINE

## 2024-03-11 PROCEDURE — 93656 COMPRE EP EVAL ABLTJ ATR FIB: CPT | Performed by: INTERNAL MEDICINE

## 2024-03-11 PROCEDURE — 80048 BASIC METABOLIC PNL TOTAL CA: CPT | Performed by: PHYSICIAN ASSISTANT

## 2024-03-11 PROCEDURE — 85027 COMPLETE CBC AUTOMATED: CPT | Performed by: PHYSICIAN ASSISTANT

## 2024-03-11 PROCEDURE — 25010000002 PROPOFOL 10 MG/ML EMULSION: Performed by: NURSE ANESTHETIST, CERTIFIED REGISTERED

## 2024-03-11 PROCEDURE — C1894 INTRO/SHEATH, NON-LASER: HCPCS | Performed by: INTERNAL MEDICINE

## 2024-03-11 PROCEDURE — 25010000002 LIDOCAINE 1 % SOLUTION: Performed by: NURSE ANESTHETIST, CERTIFIED REGISTERED

## 2024-03-11 PROCEDURE — 25010000002 HEPARIN (PORCINE) PER 1000 UNITS: Performed by: INTERNAL MEDICINE

## 2024-03-11 PROCEDURE — 25010000002 PROTAMINE SULFATE PER 10 MG: Performed by: INTERNAL MEDICINE

## 2024-03-11 PROCEDURE — 93005 ELECTROCARDIOGRAM TRACING: CPT | Performed by: INTERNAL MEDICINE

## 2024-03-11 PROCEDURE — 93010 ELECTROCARDIOGRAM REPORT: CPT | Performed by: INTERNAL MEDICINE

## 2024-03-11 PROCEDURE — 85347 COAGULATION TIME ACTIVATED: CPT

## 2024-03-11 PROCEDURE — 25010000002 DEXAMETHASONE PER 1 MG: Performed by: NURSE ANESTHETIST, CERTIFIED REGISTERED

## 2024-03-11 PROCEDURE — C1759 CATH, INTRA ECHOCARDIOGRAPHY: HCPCS | Performed by: INTERNAL MEDICINE

## 2024-03-11 PROCEDURE — C1766 INTRO/SHEATH,STRBLE,NON-PEEL: HCPCS | Performed by: INTERNAL MEDICINE

## 2024-03-11 PROCEDURE — 25810000003 SODIUM CHLORIDE 0.9 % SOLUTION: Performed by: INTERNAL MEDICINE

## 2024-03-11 PROCEDURE — 25810000003 SODIUM CHLORIDE 0.9 % SOLUTION: Performed by: NURSE ANESTHETIST, CERTIFIED REGISTERED

## 2024-03-11 RX ORDER — ROCURONIUM BROMIDE 10 MG/ML
INJECTION, SOLUTION INTRAVENOUS AS NEEDED
Status: DISCONTINUED | OUTPATIENT
Start: 2024-03-11 | End: 2024-03-11 | Stop reason: SURG

## 2024-03-11 RX ORDER — FAMOTIDINE 20 MG/1
20 TABLET, FILM COATED ORAL ONCE
Status: DISCONTINUED | OUTPATIENT
Start: 2024-03-11 | End: 2024-03-11 | Stop reason: HOSPADM

## 2024-03-11 RX ORDER — ONDANSETRON 2 MG/ML
4 INJECTION INTRAMUSCULAR; INTRAVENOUS EVERY 6 HOURS PRN
Status: DISCONTINUED | OUTPATIENT
Start: 2024-03-11 | End: 2024-03-11 | Stop reason: HOSPADM

## 2024-03-11 RX ORDER — SODIUM CHLORIDE 0.9 % (FLUSH) 0.9 %
10 SYRINGE (ML) INJECTION AS NEEDED
Status: DISCONTINUED | OUTPATIENT
Start: 2024-03-11 | End: 2024-03-11 | Stop reason: HOSPADM

## 2024-03-11 RX ORDER — ACETAMINOPHEN 325 MG/1
650 TABLET ORAL EVERY 4 HOURS PRN
Status: DISCONTINUED | OUTPATIENT
Start: 2024-03-11 | End: 2024-03-11 | Stop reason: HOSPADM

## 2024-03-11 RX ORDER — MIDAZOLAM HYDROCHLORIDE 1 MG/ML
0.5 INJECTION INTRAMUSCULAR; INTRAVENOUS
Status: DISCONTINUED | OUTPATIENT
Start: 2024-03-11 | End: 2024-03-11 | Stop reason: HOSPADM

## 2024-03-11 RX ORDER — ESMOLOL HYDROCHLORIDE 10 MG/ML
INJECTION INTRAVENOUS AS NEEDED
Status: DISCONTINUED | OUTPATIENT
Start: 2024-03-11 | End: 2024-03-11 | Stop reason: SURG

## 2024-03-11 RX ORDER — FENTANYL CITRATE 50 UG/ML
50 INJECTION, SOLUTION INTRAMUSCULAR; INTRAVENOUS
Status: CANCELLED | OUTPATIENT
Start: 2024-03-11

## 2024-03-11 RX ORDER — NITROGLYCERIN 0.4 MG/1
0.4 TABLET SUBLINGUAL
Status: DISCONTINUED | OUTPATIENT
Start: 2024-03-11 | End: 2024-03-11 | Stop reason: HOSPADM

## 2024-03-11 RX ORDER — LIDOCAINE HYDROCHLORIDE 10 MG/ML
INJECTION, SOLUTION INFILTRATION; PERINEURAL AS NEEDED
Status: DISCONTINUED | OUTPATIENT
Start: 2024-03-11 | End: 2024-03-11 | Stop reason: SURG

## 2024-03-11 RX ORDER — HEPARIN SODIUM 1000 [USP'U]/ML
INJECTION, SOLUTION INTRAVENOUS; SUBCUTANEOUS
Status: DISCONTINUED | OUTPATIENT
Start: 2024-03-11 | End: 2024-03-11 | Stop reason: HOSPADM

## 2024-03-11 RX ORDER — DEXAMETHASONE SODIUM PHOSPHATE 4 MG/ML
INJECTION, SOLUTION INTRA-ARTICULAR; INTRALESIONAL; INTRAMUSCULAR; INTRAVENOUS; SOFT TISSUE AS NEEDED
Status: DISCONTINUED | OUTPATIENT
Start: 2024-03-11 | End: 2024-03-11 | Stop reason: SURG

## 2024-03-11 RX ORDER — SODIUM CHLORIDE, SODIUM LACTATE, POTASSIUM CHLORIDE, CALCIUM CHLORIDE 600; 310; 30; 20 MG/100ML; MG/100ML; MG/100ML; MG/100ML
9 INJECTION, SOLUTION INTRAVENOUS CONTINUOUS
Status: DISCONTINUED | OUTPATIENT
Start: 2024-03-11 | End: 2024-03-11 | Stop reason: HOSPADM

## 2024-03-11 RX ORDER — SODIUM CHLORIDE 9 MG/ML
40 INJECTION, SOLUTION INTRAVENOUS AS NEEDED
Status: DISCONTINUED | OUTPATIENT
Start: 2024-03-11 | End: 2024-03-11 | Stop reason: HOSPADM

## 2024-03-11 RX ORDER — ONDANSETRON 2 MG/ML
4 INJECTION INTRAMUSCULAR; INTRAVENOUS ONCE AS NEEDED
Status: CANCELLED | OUTPATIENT
Start: 2024-03-11

## 2024-03-11 RX ORDER — SODIUM CHLORIDE 9 MG/ML
INJECTION, SOLUTION INTRAVENOUS
Status: COMPLETED | OUTPATIENT
Start: 2024-03-11 | End: 2024-03-11

## 2024-03-11 RX ORDER — SODIUM CHLORIDE 9 MG/ML
INJECTION, SOLUTION INTRAVENOUS CONTINUOUS PRN
Status: DISCONTINUED | OUTPATIENT
Start: 2024-03-11 | End: 2024-03-11 | Stop reason: SURG

## 2024-03-11 RX ORDER — SODIUM CHLORIDE 0.9 % (FLUSH) 0.9 %
10 SYRINGE (ML) INJECTION EVERY 12 HOURS SCHEDULED
Status: DISCONTINUED | OUTPATIENT
Start: 2024-03-11 | End: 2024-03-11 | Stop reason: HOSPADM

## 2024-03-11 RX ORDER — PROTAMINE SULFATE 10 MG/ML
INJECTION, SOLUTION INTRAVENOUS
Status: DISCONTINUED | OUTPATIENT
Start: 2024-03-11 | End: 2024-03-11 | Stop reason: HOSPADM

## 2024-03-11 RX ORDER — FAMOTIDINE 10 MG/ML
20 INJECTION, SOLUTION INTRAVENOUS ONCE
Status: COMPLETED | OUTPATIENT
Start: 2024-03-11 | End: 2024-03-11

## 2024-03-11 RX ORDER — LIDOCAINE HYDROCHLORIDE 10 MG/ML
0.5 INJECTION, SOLUTION EPIDURAL; INFILTRATION; INTRACAUDAL; PERINEURAL ONCE AS NEEDED
Status: DISCONTINUED | OUTPATIENT
Start: 2024-03-11 | End: 2024-03-11 | Stop reason: HOSPADM

## 2024-03-11 RX ORDER — ONDANSETRON 2 MG/ML
INJECTION INTRAMUSCULAR; INTRAVENOUS AS NEEDED
Status: DISCONTINUED | OUTPATIENT
Start: 2024-03-11 | End: 2024-03-11 | Stop reason: SURG

## 2024-03-11 RX ORDER — PROPOFOL 10 MG/ML
VIAL (ML) INTRAVENOUS AS NEEDED
Status: DISCONTINUED | OUTPATIENT
Start: 2024-03-11 | End: 2024-03-11 | Stop reason: SURG

## 2024-03-11 RX ADMIN — LIDOCAINE HYDROCHLORIDE 50 MG: 10 INJECTION, SOLUTION INFILTRATION; PERINEURAL at 11:00

## 2024-03-11 RX ADMIN — ESMOLOL HYDROCHLORIDE 20 MG: 10 INJECTION, SOLUTION INTRAVENOUS at 11:00

## 2024-03-11 RX ADMIN — ROCURONIUM BROMIDE 50 MG: 10 SOLUTION INTRAVENOUS at 11:00

## 2024-03-11 RX ADMIN — ONDANSETRON 4 MG: 2 INJECTION INTRAMUSCULAR; INTRAVENOUS at 12:20

## 2024-03-11 RX ADMIN — SODIUM CHLORIDE: 9 INJECTION, SOLUTION INTRAVENOUS at 10:51

## 2024-03-11 RX ADMIN — ROCURONIUM BROMIDE 20 MG: 10 SOLUTION INTRAVENOUS at 11:51

## 2024-03-11 RX ADMIN — SUGAMMADEX 200 MG: 100 INJECTION, SOLUTION INTRAVENOUS at 12:20

## 2024-03-11 RX ADMIN — PROPOFOL 200 MG: 10 INJECTION, EMULSION INTRAVENOUS at 11:00

## 2024-03-11 RX ADMIN — FAMOTIDINE 20 MG: 10 INJECTION, SOLUTION INTRAVENOUS at 10:44

## 2024-03-11 RX ADMIN — DEXAMETHASONE SODIUM PHOSPHATE 4 MG: 4 INJECTION, SOLUTION INTRAMUSCULAR; INTRAVENOUS at 11:10

## 2024-03-11 NOTE — CONSULTS
"Diabetes Education  Assessment/Teaching    Patient Name:  Karen Newman  YOB: 1950  MRN: [id number]  Admit Date:  3/11/2024      Assessment Date:  3/11/2024  Flowsheet Row Most Recent Value   General Information     Referral From: A1c   Height 167.6 cm (66\")   Height Method Stated   Weight 79.7 kg (175 lb 12.8 oz)   Weight Method Standing scale   Pregnancy Assessment    Diabetes History    What type of diabetes do you have? Type 2   Length of Diabetes Diagnosis 6 - 10 years   Current DM knowledge fair   Have you had diabetes education/teaching in the past? yes   When and where was your diabetes education? MD office   Do you test your blood sugar at home? yes   Frequency of checks Lucille CGM   Who performs the test? self   Typical readings States in range most of the time   Have you had low blood sugar? (<70mg/dl) yes   How often do you have low blood sugar? rare   Have you had high blood sugar? (>140mg/dl) yes   How often do you have high blood sugar? rare   When was your last high blood sugar? Unable to remember   Education Preferences    What areas of diabetes would you like to learn about? avoiding high blood sugar, avoiding low blood sugar   Nutrition Information    Assessment Topics    Problem Solving - Assessment Needs education   Reducing Risk - Assessment Needs education   DM Goals             Flowsheet Row Most Recent Value   DM Education Needs    Meter Has own  [Primarily uses Lucille CGM]   Frequency of Testing Other (comment)  [CGM]   Blood Glucose Target Range Range per ADA guidelines   Medication Other injectables, Insulin, Administration, Pen   Problem Solving Hypoglycemia, Hyperglycemia, Signs, Symptoms, Sick days, Treatment   Reducing Risks A1C testing, Blood pressure, Cardiovascular   Physical Activity None   Healthy Coping Appropriate   Discharge Plan Home   Motivation Moderate   Teaching Method Explanation, Discussion, Handouts, Teach back   Patient Response Needs reinforcement "        Ms. Newman gave permission for diabetes education. Her  was also present.Latest A1c 7.8% in December 2023. She reports she takes levemir, lispro and was started on mounjaro in December. She sees Dr. Karel Blevins at  endocrinology for her diabetes needs. States her next appointment is in May.   Reviewed T2DM self-management, risk factors, and importance of blood glucose control to reduce complications. Target blood glucose readings and A1c goals per ADA were reviewed. Reviewed latest A1c and discussed its significance. Signs, symptoms and treatment of hyperglycemia and hypoglycemia were discussed. Lifestyle changes such as physical activity with MD approval and healthy eating were encouraged. Discussed using the plate method for carb and portion control. Handouts provided:  What is Diabetes?, A1c goals, BG goals, Plate method. Thank you for this referral.      Electronically signed by:  Amanda Burt RN  03/11/24 14:00 EDT

## 2024-03-11 NOTE — H&P
Cardiology H&P     Karen AFSHIN Paty  1950  [phone]  There is no work phone number on file.    03/11/24    DATE OF ADMISSION: 3/11/2024  [facility] Mirian Gonzalez, APRN  [address] [place] [address] / [facility] [address]  Referring Provider: Alec Gonzalez MD     CC: Atrial Fibrillation     Problem List:  Atrial fibrillation, duration unknown; diagnosed August of 2013:  Status post ALANIS/external cardioversion, 10/18/2013, Yara Bobo M.D. with the initiation of propafenone therapy.   Atrial fibrillation, 11/13/2013, in Gloria Gomez’ office.  Normal sinus rhythm at the time of office visit, 11/21/2013  Normal sinus rhythm, EKG, 06/05/2014  Discontinuation of Rythmol secondary to diarrhea, 06/05/2014, with initiation of flecainide 50 mg b.i.d. and Eliquis 5 mg b.i.d.   CHADS Vasc= 4, 04/18/2017  CT scan of the chest 06/29/2017: Mild fibrotic changes, o/w normal  Pulmonary Function Test 06/29/2017: FEV1/FVC  1.54/2.57 = 60%  Nuclear Stress Test 06/29/2017: EF>70%, no ischemia   Bilateral thoracoscopy and Maze procedure and clipping of the left atrial appendage 09/26/2017 Dr. Kay  ALANIS 10/24/17: EF 60%, RADHA successfully closed with no evidence of a residual left atrial appendage. Mild to moderate MR  14d Zio, 1/6/2021: NSR. PAF <1%  MCOT, 07/20/2023: SB/SR. Occasional PVC's. Frequent PAC's. One ventricular triplet. Events correlate with isolated PAC's.  Successful ECV, 10/12/2023: Dr. Osullivan.  Valvular heart disease:  Previous echo approximately, 2006 (incomplete data base).  Echo 08/15/2002, revealing moderate concentric LVH, hyperdynamic LV, EF 81%; mild MR, trace TR.  Echo 10/07/2008, revealed a normal LVEF with mild to moderate MR.  Echo 07/01/2011: normal LV systolic function and wall motion with mild MR and mild TR.   Echo, 09/22/2017: EF 60%. Moderate to severe MR. Trace to mild TR. No thrombus appears to be present within the left atrial appendage.  ALANIS 10/24/2017: EF 60%, RADHA successfully  closed with no evidence of a residual left atrial appendage. Mild to moderate MR  Echo 01/10/2020: EF 65%, trace MR, mild TR, mild LVH.   Echo 04/07/2022: LVEF 60-65%. Mild concentric LVH. Normal LV diastolic filling pattern. Mild MR.   Echo, 10/12/2023: EF 60%. Trace to mild MR. Trace TR. RVSP is 22 mmHg.  Hypertension:  Adenosine Cardiolite, 06/18/2009: Normal systolic function without evidence of inducible ischemia.   Echo 1/11/20: EF 65%. Trace MR. Mild TR. Mild LVH.  Renal duplex 03/22/2022: No evidence to suggest significant renal artery stenosis.   Dyslipidemia.  Diabetes mellitus, Type 2 diagnosed, 2006.  Thyroid disease:  Hyperthyroidism status post radioactive iodine therapy.  Hypothyroidism on thyroid replacement.  Gastroesophageal reflux disease.  Chronic kidney disease. crea 1.7  History of hemorrhoids status post hemorrhoidectomy.  Colon polyps status post polypectomy.  Anxiety.  Chronic Iron deficiency anemia-previously requiring blood transfusions  Obstructive sleep apnea on chronic CPAP therapy.  Surgical history:  Total abdominal hysterectomy  Squamous cell removal (right foot)    History of Present Illness:   Karen Newman is a 73 y.o. female who presents to my electrophysiology clinic for evaluation of AF.   She has previously not undergone pulmonary vein ablation due to inability to take anticoagulation and therefore underwent epicardial surgical ablation and left atrial appendage closure/clip Dr. Kay in 2017.  She did well for the past few years however started developing recurrent atrial fibrillation episodes.  Antiarrhythmic options are limited at this time due to her valvular heart disease status post repair status as well as CKD. She started on Xarelto 15 mg on 3/8/2024.  She denies strokelike symptoms.  She denies bleeding issues.  She has been symptomatic with her atrial fibrillation with fatigue and dizziness with mild shortness of breath.  Today she is in atrial fibrillation.  Her  rates have been controlled.  She denies any recent illnesses, fevers, chills, vomiting, diarrhea.  She denies any recent hospitalizations or ER visits.      Allergies   Allergen Reactions    Amlodipine Shortness Of Breath     Swelling and SOB    Buspirone Nausea And Vomiting    Lisinopril Other (See Comments)     cough    Sertraline Hcl Other (See Comments)     nightmares    Sular [Nisoldipine Er] Cough    Carvedilol Other (See Comments)     SOB       Prior to Admission Medications       Prescriptions Last Dose Informant Patient Reported? Taking?    atorvastatin (LIPITOR) 10 MG tablet 3/11/2024  No Yes    Take 1 tablet by mouth Daily.    cloNIDine (CATAPRES) 0.1 MG tablet 3/11/2024  No Yes    Take 1 tablet by mouth 2 (Two) Times a Day.    dilTIAZem CD (CARDIZEM CD) 120 MG 24 hr capsule 3/11/2024  No Yes    TAKE 2 CAPSULES BY MOUTH DAILY    doxazosin (CARDURA) 8 MG tablet 3/11/2024  No Yes    Take 1 tablet by mouth Every 12 (Twelve) Hours.    furosemide (Lasix) 20 MG tablet 3/10/2024  No Yes    Take 1 tablet by mouth Every Other Day. Take 1 tab po prn worsening SOA/edema    hydrALAZINE (APRESOLINE) 100 MG tablet 3/11/2024  No Yes    Take 1 tablet by mouth 2 (Two) Times a Day.    Insulin Lispro, 1 Unit Dial, (HumaLOG KwikPen) 100 UNIT/ML solution pen-injector 3/10/2024  No Yes    INJECT SUBCUTANEOUSLY 16 TO 20  UNITS 3 TIMES DAILY WITH MEALS    Levemir FlexTouch 100 UNIT/ML injection 3/10/2024  No Yes    INJECT SUBCUTANEOUSLY 35  UNITS AS DIRECTED DAILY    levothyroxine (SYNTHROID, LEVOTHROID) 175 MCG tablet 3/11/2024  No Yes    Take 1 tablet by mouth Daily.    omeprazole (priLOSEC) 40 MG capsule 3/11/2024  Yes Yes    Take 1 capsule by mouth Daily.    PARoxetine (PAXIL) 40 MG tablet 3/10/2024 Self Yes Yes    Take 1 tablet by mouth Daily.    rivaroxaban (XARELTO) 15 MG tablet 3/11/2024  No Yes    Take 1 tablet by mouth Daily.    Blood Glucose Monitoring Suppl (OneTouch Verio) w/Device kit   No No    1 each Daily. DX:  E11.65    Continuous Blood Gluc  (FreeStyle Lucille 2 [place]) device   No No    1 each Daily.    Continuous Blood Gluc Sensor (FreeStyle Lucille 2 Sensor) misc   No No    1 each Every 14 (Fourteen) Days.    Linzess 145 MCG capsule capsule 3/4/2024  Yes No    Take 1 capsule by mouth Daily.    OneTouch Verio test strip   No No    Use 4x per day:  ICD-10 E11.65, E11.649, Z79.4    Tirzepatide (Mounjaro) 5 MG/0.5ML solution pen-injector pen 3/6/2024  No No    Inject 0.5 mL under the skin into the appropriate area as directed 1 (One) Time Per Week. Dx E11.65              Current Facility-Administered Medications:     acetaminophen (TYLENOL) tablet 650 mg, 650 mg, Oral, Q4H PRN, Александр Fisher PA    famotidine (PEPCID) tablet 20 mg, 20 mg, Oral, Once, Morgan Wilhelm MD    lactated ringers infusion, 9 mL/hr, Intravenous, Continuous, Morgan Wilhelm MD    lidocaine PF 1% (XYLOCAINE) injection 0.5 mL, 0.5 mL, Injection, Once PRN, Morgan Wilhelm MD    midazolam (VERSED) injection 0.5 mg, 0.5 mg, Intravenous, Q10 Min PRN, Morgan Wilhelm MD    nitroglycerin (NITROSTAT) SL tablet 0.4 mg, 0.4 mg, Sublingual, Q5 Min PRN, Александр Fisher PA    ondansetron (ZOFRAN) injection 4 mg, 4 mg, Intravenous, Q6H PRN, Алекснадр Fisher PA    sodium chloride 0.9 % flush 10 mL, 10 mL, Intravenous, Q12H, Morgan Wilhelm MD    sodium chloride 0.9 % flush 10 mL, 10 mL, Intravenous, PRN, Morgan Wilhelm MD    sodium chloride 0.9 % infusion 40 mL, 40 mL, Intravenous, PRN, Александр Fisher PA    sodium chloride 0.9 % infusion 40 mL, 40 mL, Intravenous, PRN, Morgan Wilhelm MD    Social History     Socioeconomic History    Marital status:     Number of children: 3   Tobacco Use    Smoking status: Former     Current packs/day: 0.00     Average packs/day: 0.5 packs/day for 41.8 years (20.9 ttl pk-yrs)     Types: Cigarettes     Start date:      Quit date: 10/18/2016     Years since quittin.4     Passive  "exposure: Past    Smokeless tobacco: Never   Vaping Use    Vaping status: Never Used   Substance and Sexual Activity    Alcohol use: No    Drug use: No    Sexual activity: Defer       Family History   Problem Relation Age of Onset    Hypertension Mother     Coronary artery disease Mother     Kidney disease Father        REVIEW OF SYSTEMS:   CONSTITUTIONAL:         No weight loss, fever, chills, weakness + fatigue.   HEENT:                            No visual loss, blurred vision, double vision, yellow sclerae.                                             No hearing loss, congestion, sore throat.   SKIN:                                No rashes, urticaria, ulcers, sores.     RESPIRATORY:               No shortness of breath, hemoptysis, cough, sputum.   GI:                                     No anorexia, nausea, vomiting, diarrhea. No abdominal pain, melena.   :                                   No burning on urination, hematuria or increased frequency.  ENDOCRINE:                   No diaphoresis, cold or heat intolerance. No polyuria or polydipsia.   NEURO:                            No headache, + dizziness,-  syncope, paralysis, ataxia, or parasthesias.                                            No change in bowel or bladder control. No history of CVA/TIA  MUSCULOSKELETAL:    No muscle, back pain, joint pain or stiffness.   HEMATOLOGY:               No anemia, bleeding, bruising. No history of DVT/PE.  PSYCH:                            No history of depression, anxiety    Vitals:    03/11/24 1002 03/11/24 1008   BP: 130/72 131/79   BP Location: Left arm Right arm   Patient Position: Lying Lying   Pulse: 82 78   Temp: 97.2 °F (36.2 °C)    TempSrc: Temporal    SpO2: 95% 91%   Weight: 79.7 kg (175 lb 12.8 oz)    Height: 167.6 cm (66\")          Vital Sign Min/Max for last 24 hours  Temp  Min: 97.2 °F (36.2 °C)  Max: 97.2 °F (36.2 °C)   BP  Min: 130/72  Max: 131/79   Pulse  Min: 78  Max: 82   No data recorded "   SpO2  Min: 91 %  Max: 95 %   No data recorded    No intake or output data in the 24 hours ending 03/11/24 1048          Physical Exam:  GEN: Well nourished, Well- developed  No acute distress  HEENT: Normocephalic, Atraumatic, PERRLA, moist mucous membranes  NECK: supple, NO JVD, no thyromegaly, no lymphadenopathy  CARDIAC: S1S2, irr, irr,  no murmur, gallop, rub  LUNGS: Clear to ausculation, normal respiratory effort  ABDOMEN: Soft, nontender, normal bowel sounds  EXTREMITIES:No gross deformities,  No clubbing, cyanosis, or edema  SKIN: Warm, dry  NEURO: No focal deficits  PSYCHIATRIC: Normal affect and mood      I personally viewed and interpreted the patient's EKG/Telemetry/lab data    Data:   Results from last 7 days   Lab Units 03/11/24  1007 03/07/24  1359   WBC 10*3/mm3 6.01 5.5   HEMOGLOBIN g/dL 11.8* 11.9   HEMATOCRIT % 35.3 36.2*   PLATELETS 10*3/mm3 164 154     Results from last 7 days   Lab Units 03/11/24  1007   SODIUM mmol/L 137   POTASSIUM mmol/L 3.6   CHLORIDE mmol/L 98   CO2 mmol/L 30.0*   BUN mg/dL 26*   CREATININE mg/dL 2.32*   GLUCOSE mg/dL 132*                                  No intake or output data in the 24 hours ending 03/11/24 1048      Telemetry: Atrial fibrillation          Assessment and Plan:   Paroxysmal atrial fibrillation:  -Previous Maze procedure in September 2017 and left atrial appendage clip, subsequently confirmed by ALANIS to be closed.  She is now having more recurrent atrial fibrillation symptomatic in nature.  She will undergo pulmonary vein ablation today with Dr. Gonzalez. The risks, benefits, and alternatives of the procedure have been reviewed and the patient wishes to proceed.   -CHADSVasc = 4 for with previously closed RADHA, confirmed by ALANIS.  She started on Xarelto 15 mg on 3/8/2024 in anticipation of the procedure today and has been taking without issues.    2.  History of iron deficiency anemia:  -H&H currently stable, of note patient has a history of iron deficient and  see anemia requiring blood transfusions in the past which is why she underwent left atrial appendage closure in 2017.  -Will monitor closely on Xarelto during the postoperative period after her pulmonary vein ablation.    3.  Valvular heart disease:  -Followed by Dr. Bobo    4.  Chronic kidney disease  -Stable creatinine with baseline 2.3-2.5          Electronically signed by MASOOD Glez, 03/11/24, 10:48 AM EDT.

## 2024-03-11 NOTE — Clinical Note
Hemostasis started on the right femoral vein. Perclose was used in achieving hemostasis. Closure device deployed in the vessel. Hemostasis achieved successfully.

## 2024-03-11 NOTE — ANESTHESIA POSTPROCEDURE EVALUATION
Patient: Karen Newman    Procedure Summary       Date: 03/11/24 Room / Location: [facility] CATH/EP LAB F / [facility] EP INVASIVE LOCATION    Anesthesia Start: 1051 Anesthesia Stop: 1237    Procedure: PVA (paroxysmal), Carto, no CTA, pt to only take Eliquis for 4 weeks AFTER ablation Diagnosis:       Paroxysmal atrial fibrillation      (AF)    Providers: Alec Gonzalez MD Provider: Darcy Johnson MD    Anesthesia Type: general ASA Status: 3            Anesthesia Type: general    Vitals  No vitals data found for the desired time range.          Post Anesthesia Care and Evaluation    Patient location during evaluation: PACU  Patient participation: complete - patient participated  Level of consciousness: sleepy but conscious  Pain management: adequate    Airway patency: patent  Anesthetic complications: No anesthetic complications  PONV Status: none  Cardiovascular status: hemodynamically stable and acceptable  Respiratory status: nonlabored ventilation, acceptable and nasal cannula  Hydration status: acceptable

## 2024-03-11 NOTE — Clinical Note
Replaced previous sheath in the right femoral vein. 8FR RFV SHEATH EXCHANGED FOR [employer] OVER [employer] WIRE

## 2024-03-11 NOTE — ANESTHESIA PROCEDURE NOTES
Airway  Urgency: elective    Date/Time: 3/11/2024 11:20 AM  Airway not difficult    General Information and Staff    Patient location during procedure: OR    Indications and Patient Condition  Indications for airway management: airway protection    Preoxygenated: yes  MILS not maintained throughout  Mask difficulty assessment: 1 - vent by mask    Final Airway Details  Final airway type: endotracheal airway      Successful airway: ETT  Cuffed: yes   Successful intubation technique: video laryngoscopy  Endotracheal tube insertion site: oral  Blade: Palm  Blade size: 3  ETT size (mm): 7.0  Cormack-Lehane Classification: grade I - full view of glottis  Placement verified by: chest auscultation and capnometry   Measured from: lips  ETT/EBT  to lips (cm): 21  Number of attempts at approach: 1  Assessment: lips, teeth, and gum same as pre-op and atraumatic intubation    Additional Comments  Negative epigastric sounds, Breath sound equal bilaterally with symmetric chest rise and fall

## 2024-03-11 NOTE — Clinical Note
Replaced previous sheath in the right femoral vein. 8FR RFV SHEATH EXCHANGED FOR [employer] OVER [place] WIRE

## 2024-03-11 NOTE — ANESTHESIA PREPROCEDURE EVALUATION
Anesthesia Evaluation                  Airway   Mallampati: I  TM distance: >3 FB  Neck ROM: full  No difficulty expected  Dental      Pulmonary    (+) ,shortness of breath, sleep apnea  Cardiovascular     ECG reviewed    (+) hypertension, dysrhythmias Atrial Fib      Neuro/Psych  (+) psychiatric history Anxiety and Depression  GI/Hepatic/Renal/Endo    (+) GERD, renal disease-, diabetes mellitus, thyroid problem     Musculoskeletal     Abdominal    Substance History      OB/GYN          Other   arthritis,   history of cancer                Anesthesia Plan    ASA 3     general     intravenous induction     Anesthetic plan, risks, benefits, and alternatives have been provided, discussed and informed consent has been obtained with: patient.    Plan discussed with CRNA.    CODE STATUS:

## 2024-03-12 ENCOUNTER — CALL CENTER PROGRAMS (OUTPATIENT)
Dept: CALL CENTER | Facility: HOSPITAL | Age: 74
End: 2024-03-12
Payer: MEDICARE

## 2024-03-12 LAB — ACT BLD: 493 SECONDS (ref 82–152)

## 2024-03-12 NOTE — OUTREACH NOTE
PCI/Device Survey      Flowsheet Row Responses   Facility patient discharged from? [facility]   Procedure date 03/11/24   Procedure (if device, specify in description) Ablation   Performing MD Other (annotate)  [Dr Gonzalez]   Attempt successful? Yes   Call start time 1200   Call end time 1210   Person spoke with today (if not patient) and relationship Patient   Has the patient had any of the following symptoms since discharge? --  [Denies any symptoms.]   Is the patient taking prescribed medications: --  [Xarelto]   Nursing intervention Reminded to continue to take prescribed medications   Medication comments No changes made to medications at discharge.   Does the patient have any of the following symptoms related to the cath/surgical site? --  [patient reports dressing clean and intact to groin]   Does the patient have an appointment scheduled with the cardiologist? Yes   Appointment comments Hospital Follow Up with Alec Gonzalez Thursday Jun 13, 2024 11:30 AM   If the patient is a current smoker, are they able to teach back resources for cessation? Not a smoker   Did the patient feel prepared to go home on the same day as the procedure? Yes   Is the patient satisfied with the same day discharge process? Yes   PCI/Device call completed Yes   Wrap up additional comments Patient reports doing well today. No concerns.            KIRTI TORRES - Registered Nurse

## 2024-03-12 NOTE — OUTREACH NOTE
PCI/Device Survey      Flowsheet Row Responses   Facility patient discharged from? [facility]   Procedure date 03/11/24   Procedure (if device, specify in description) Ablation   Performing MD Other (annotate)  [Dr Gonzalez]   Attempt successful? No   Unsuccessful attempts Attempt 1            KIRTI TORRES - Registered Nurse

## 2024-03-26 ENCOUNTER — TELEPHONE (OUTPATIENT)
Dept: CARDIOLOGY | Facility: CLINIC | Age: 74
End: 2024-03-26
Payer: MEDICARE

## 2024-03-26 NOTE — TELEPHONE ENCOUNTER
Caller: Jas Newman    Relationship: Emergency Contact    Best call back number: [phone]    What is the best time to reach you: ANY    Who are you requesting to speak with (clinical staff, provider,  specific staff member): ANY    What was the call regarding: PT HAS BEEN HAVING LOW BLOOD PRESSURE AND DIZZINESS WHEN SHE STANDS UP  118/61 - SITTING  82/51 - STANDS UP  109/54 HR -58  WONDERING IF SHE NEEDS TO STOP TAKING ONE OF ANDREI BLOOD PRESSURE MEDICATIONS.   (CLONIDINE, DILTIAZEM, HYDRALAZINE, DOXAZOSIN)     Is it okay if the provider responds through MyChart: NO       No

## 2024-03-27 NOTE — TELEPHONE ENCOUNTER
Spoke with patient.  She is encouraged to stop the clonidine.  And continue checking her blood pressure 1.5hours after taking morning medication.  She is to call back if she continues to have symptoms at the first of the week as we can decrease other medications.  She verbalizes understanding.

## 2024-04-30 NOTE — TELEPHONE ENCOUNTER
Rx Refill Note  Requested Prescriptions      No prescriptions requested or ordered in this encounter        Last office visit with prescribing clinician: 12/22/2023      Next office visit with prescribing clinician: 5/22/2024       Homa Aranda (Jodi)  04/30/24, 12:57 EDT

## 2024-05-06 RX ORDER — DOXAZOSIN 8 MG/1
8 TABLET ORAL EVERY 12 HOURS
Qty: 180 TABLET | Refills: 3 | Status: SHIPPED | OUTPATIENT
Start: 2024-05-06

## 2024-05-22 ENCOUNTER — OFFICE VISIT (OUTPATIENT)
Dept: ENDOCRINOLOGY | Facility: CLINIC | Age: 74
End: 2024-05-22
Payer: MEDICARE

## 2024-05-22 VITALS
DIASTOLIC BLOOD PRESSURE: 73 MMHG | OXYGEN SATURATION: 99 % | HEART RATE: 70 BPM | WEIGHT: 166 LBS | HEIGHT: 66 IN | SYSTOLIC BLOOD PRESSURE: 118 MMHG | BODY MASS INDEX: 26.68 KG/M2

## 2024-05-22 DIAGNOSIS — I10 ESSENTIAL HYPERTENSION: ICD-10-CM

## 2024-05-22 DIAGNOSIS — E11.40 DIABETIC NEUROPATHY WITH NEUROLOGIC COMPLICATION: ICD-10-CM

## 2024-05-22 DIAGNOSIS — Z86.39 HISTORY OF GRAVES' DISEASE: ICD-10-CM

## 2024-05-22 DIAGNOSIS — E78.2 MIXED HYPERLIPIDEMIA: ICD-10-CM

## 2024-05-22 DIAGNOSIS — Z79.4 TYPE 2 DIABETES MELLITUS WITH HYPOGLYCEMIA WITHOUT COMA, WITH LONG-TERM CURRENT USE OF INSULIN: ICD-10-CM

## 2024-05-22 DIAGNOSIS — E11.65 UNCONTROLLED TYPE 2 DIABETES MELLITUS WITH HYPERGLYCEMIA: Primary | ICD-10-CM

## 2024-05-22 DIAGNOSIS — E11.649 TYPE 2 DIABETES MELLITUS WITH HYPOGLYCEMIA WITHOUT COMA, WITH LONG-TERM CURRENT USE OF INSULIN: ICD-10-CM

## 2024-05-22 DIAGNOSIS — E11.49 DIABETIC NEUROPATHY WITH NEUROLOGIC COMPLICATION: ICD-10-CM

## 2024-05-22 DIAGNOSIS — N18.30 STAGE 3 CHRONIC KIDNEY DISEASE, UNSPECIFIED WHETHER STAGE 3A OR 3B CKD: ICD-10-CM

## 2024-05-22 DIAGNOSIS — E89.0 POSTABLATIVE HYPOTHYROIDISM: ICD-10-CM

## 2024-05-22 LAB
ALBUMIN SERPL-MCNC: 4.3 G/DL (ref 3.5–5.2)
ALBUMIN UR-MCNC: 11.5 MG/DL
ALBUMIN/GLOB SERPL: 1.7 G/DL
ALP SERPL-CCNC: 92 U/L (ref 39–117)
ALT SERPL W P-5'-P-CCNC: 11 U/L (ref 1–33)
ANION GAP SERPL CALCULATED.3IONS-SCNC: 12 MMOL/L (ref 5–15)
AST SERPL-CCNC: 13 U/L (ref 1–32)
BILIRUB SERPL-MCNC: 0.3 MG/DL (ref 0–1.2)
BUN SERPL-MCNC: 22 MG/DL (ref 8–23)
BUN/CREAT SERPL: 11.3 (ref 7–25)
CALCIUM SPEC-SCNC: 9.1 MG/DL (ref 8.6–10.5)
CHLORIDE SERPL-SCNC: 106 MMOL/L (ref 98–107)
CHOLEST SERPL-MCNC: 136 MG/DL (ref 0–200)
CO2 SERPL-SCNC: 26 MMOL/L (ref 22–29)
CREAT SERPL-MCNC: 1.95 MG/DL (ref 0.57–1)
CREAT UR-MCNC: 39.4 MG/DL
EGFRCR SERPLBLD CKD-EPI 2021: 26.7 ML/MIN/1.73
EXPIRATION DATE: ABNORMAL
EXPIRATION DATE: ABNORMAL
GLOBULIN UR ELPH-MCNC: 2.6 GM/DL
GLUCOSE BLDC GLUCOMTR-MCNC: 135 MG/DL (ref 70–130)
GLUCOSE SERPL-MCNC: 111 MG/DL (ref 65–99)
HBA1C MFR BLD: 5.8 % (ref 4.5–5.7)
HDLC SERPL-MCNC: 44 MG/DL (ref 40–60)
LDLC SERPL CALC-MCNC: 77 MG/DL (ref 0–100)
LDLC/HDLC SERPL: 1.76 {RATIO}
Lab: ABNORMAL
Lab: ABNORMAL
MICROALBUMIN/CREAT UR: 291.9 MG/G (ref 0–29)
POTASSIUM SERPL-SCNC: 4 MMOL/L (ref 3.5–5.2)
PROT SERPL-MCNC: 6.9 G/DL (ref 6–8.5)
SODIUM SERPL-SCNC: 144 MMOL/L (ref 136–145)
TRIGL SERPL-MCNC: 73 MG/DL (ref 0–150)
TSH SERPL DL<=0.05 MIU/L-ACNC: 0.01 UIU/ML (ref 0.27–4.2)
VLDLC SERPL-MCNC: 15 MG/DL (ref 5–40)

## 2024-05-22 PROCEDURE — 84443 ASSAY THYROID STIM HORMONE: CPT | Performed by: INTERNAL MEDICINE

## 2024-05-22 PROCEDURE — 82043 UR ALBUMIN QUANTITATIVE: CPT | Performed by: INTERNAL MEDICINE

## 2024-05-22 PROCEDURE — 80061 LIPID PANEL: CPT | Performed by: INTERNAL MEDICINE

## 2024-05-22 PROCEDURE — 82570 ASSAY OF URINE CREATININE: CPT | Performed by: INTERNAL MEDICINE

## 2024-05-22 PROCEDURE — 80053 COMPREHEN METABOLIC PANEL: CPT | Performed by: INTERNAL MEDICINE

## 2024-05-22 RX ORDER — LEVOTHYROXINE SODIUM 0.15 MG/1
150 TABLET ORAL DAILY
Qty: 90 TABLET | Refills: 3 | Status: SHIPPED | OUTPATIENT
Start: 2024-05-22

## 2024-05-22 NOTE — PROGRESS NOTES
"     Office Note      Date: 2024  Patient Name: Karen Newman  MRN: [id number]  : 1950    Chief Complaint   Patient presents with    Diabetes       History of Present Illness:   Karen Newman is a 73 y.o. female who presents for Diabetes type 2. Diagnosed in: . Treated in past with oral agents. Current treatments: mounjaro and basal bolus insulin. Number of insulin shots per day: 3. Checks blood sugar 288 times a day - on Free Style Lucille 2. She is making frequent adjustments in insulin based on glucose readings.  Has low blood sugar: rare. Aspirin use: No - easy bleeding. Statin use: Yes. ACE-I/ARB use: No - per nephrology. Changes in health since last visit: admission for a.fib. Last eye exam 2024.     She remains on T4. She is taking 175mcg qd. She is taking this correctly. She isn't taking any interfering meds concurrently. She hasn't noted any change in the size of her neck. She denies any compressive sxs. She denies any sxs of hypo- or hyperthyroidism.    Subjective      Diabetic Complications:  Eyes: Yes  Kidneys: Yes - microalbumin and elevated Cr  Feet: Yes  Heart: No    Diet and Exercise:  Meals per day: 2  Minutes of exercise per week: 0 mins.    Review of Systems:   Review of Systems   Constitutional: Negative.    Cardiovascular: Negative.    Gastrointestinal: Negative.    Endocrine: Negative.        The following portions of the patient's history were reviewed and updated as appropriate: allergies, current medications, past family history, past medical history, past social history, past surgical history, and problem list.    Objective     Visit Vitals  /73   Pulse 70   Ht 167.6 cm (66\")   Wt 75.3 kg (166 lb)   LMP  (LMP Unknown)   SpO2 99%   BMI 26.79 kg/m²       Physical Exam:  Physical Exam  Constitutional:       Appearance: Normal appearance.   Cardiovascular:      Pulses:           Dorsalis pedis pulses are 2+ on the right side and 2+ on the left side.        Posterior " tibial pulses are 2+ on the right side and 2+ on the left side.   Musculoskeletal:      Right foot: Bunion present.   Feet:      Right foot:      Protective Sensation: 5 sites tested.  5 sites sensed.      Skin integrity: Skin integrity normal.      Toenail Condition: Right toenails are normal.      Left foot:      Protective Sensation: 5 sites tested.        Skin integrity: Skin integrity normal.      Toenail Condition: Left toenails are normal.   Neurological:      Mental Status: She is alert.         Labs:    HbA1c  Lab Results   Component Value Date    HGBA1C 5.8 (A) 05/22/2024       CMP  Lab Results   Component Value Date    GLUCOSE 132 (H) 03/11/2024    BUN 26 (H) 03/11/2024    CREATININE 2.32 (H) 03/11/2024    EGFRIFNONA 24 (L) 02/25/2022    BCR 11.2 03/11/2024    K 3.6 03/11/2024    CO2 30.0 (H) 03/11/2024    CALCIUM 9.3 03/11/2024    PROTENTOTREF 7.0 01/13/2023    LABIL2 0.8 01/13/2023    AST 17 03/03/2023    ALT 15 03/03/2023        Lipid Panel  Lab Results   Component Value Date    HDL 44 03/10/2023    LDL 84 03/10/2023    TRIG 99 03/10/2023        TSH  Lab Results   Component Value Date    TSH 0.822 12/22/2023        Hemoglobin A1C  Lab Results   Component Value Date    HGBA1C 5.8 (A) 05/22/2024        Microalbumin/Creatinine  Lab Results   Component Value Date    MALBCRERATIO 1,235.5 01/05/2022    MICROALBUR 89.7 01/05/2022           Assessment / Plan      Assessment & Plan:  Diagnoses and all orders for this visit:    1. Uncontrolled type 2 diabetes mellitus with hyperglycemia (Primary)  Assessment & Plan:  Diabetes is improving with treatment.   Continue current treatment regimen.  Diabetes will be reassessed in 6 months.    Orders:  -     Comprehensive Metabolic Panel; Future  -     Lipid Panel; Future  -     Microalbumin / Creatinine Urine Ratio - Urine, Clean Catch; Future  -     TSH; Future    2. Type 2 diabetes mellitus with hypoglycemia without coma, with long-term current use of  insulin  Assessment & Plan:  Continue CGM.    Orders:  -     POC Glucose, Blood  -     POC Glycosylated Hemoglobin (Hb A1C)    3. Essential hypertension  Assessment & Plan:  Hypertension is stable and controlled  Continue current treatment regimen.  Blood pressure will be reassessed in 6 months.      4. Mixed hyperlipidemia  Assessment & Plan:  Continue statin.  Check lipids.      5. Stage 3 chronic kidney disease, unspecified whether stage 3a or 3b CKD  Assessment & Plan:  Check Cr today.  Continue nephrology follow up.      6. Diabetic neuropathy with neurologic complication  Assessment & Plan:  Foot exam okay today.      7. Postablative hypothyroidism  Assessment & Plan:  Continue T4 tx.  Check TSH today.      8. History of Graves' disease      Current Outpatient Medications   Medication Instructions    atorvastatin (LIPITOR) 10 mg, Oral, Daily    Blood Glucose Monitoring Suppl (OneTouch Verio) w/Device kit 1 each, Does not apply, Daily, DX: E11.65    cloNIDine (CATAPRES) 0.1 mg, Oral, 2 Times Daily    Continuous Blood Gluc  (FreeStyle Lucille 2 [place]) device 1 each, Does not apply, Daily    Continuous Blood Gluc Sensor (FreeStyle Lucille 2 Sensor) misc 1 each, Does not apply, Every 14 Days    dilTIAZem CD (CARDIZEM CD) 240 mg, Oral, Daily    doxazosin (CARDURA) 8 mg, Oral, Every 12 Hours    furosemide (LASIX) 20 mg, Oral, Every Other Day, Take 1 tab po prn worsening SOA/edema    hydrALAZINE (APRESOLINE) 100 mg, Oral, 2 Times Daily    Insulin Lispro, 1 Unit Dial, (HumaLOG KwikPen) 100 UNIT/ML solution pen-injector INJECT SUBCUTANEOUSLY 16 TO 20  UNITS 3 TIMES DAILY WITH MEALS    Levemir FlexTouch 100 UNIT/ML injection INJECT SUBCUTANEOUSLY 35  UNITS AS DIRECTED DAILY    levothyroxine (SYNTHROID, LEVOTHROID) 175 mcg, Oral, Daily    Linzess 145 MCG capsule capsule 1 capsule, Oral, Daily    omeprazole (PRILOSEC) 40 mg, Oral, Daily    OneTouch Verio test strip Use 4x per day:  ICD-10 E11.65, E11.649, Z79.4     PARoxetine (PAXIL) 40 mg, Oral, Daily    rivaroxaban (XARELTO) 15 mg, Oral, Daily    Tirzepatide (MOUNJARO) 5 mg, Subcutaneous, Weekly, Dx E11.65      Return in about 6 months (around 11/22/2024) for Recheck with A1c, TSH.    Electronically signed by: Karel Blevins MD  05/22/2024

## 2024-05-24 RX ORDER — HYDRALAZINE HYDROCHLORIDE 100 MG/1
100 TABLET, FILM COATED ORAL 2 TIMES DAILY
Qty: 180 TABLET | Refills: 3 | Status: SHIPPED | OUTPATIENT
Start: 2024-05-24

## 2024-05-29 RX ORDER — HYDRALAZINE HYDROCHLORIDE 100 MG/1
100 TABLET, FILM COATED ORAL 2 TIMES DAILY
Qty: 180 TABLET | Refills: 3 | Status: SHIPPED | OUTPATIENT
Start: 2024-05-29

## 2024-06-11 RX ORDER — HYDRALAZINE HYDROCHLORIDE 100 MG/1
100 TABLET, FILM COATED ORAL 2 TIMES DAILY
Qty: 180 TABLET | Refills: 3 | Status: SHIPPED | OUTPATIENT
Start: 2024-06-11

## 2024-06-17 RX ORDER — ATORVASTATIN CALCIUM 10 MG/1
10 TABLET, FILM COATED ORAL DAILY
Qty: 90 TABLET | Refills: 3 | Status: SHIPPED | OUTPATIENT
Start: 2024-06-17

## 2024-06-17 NOTE — TELEPHONE ENCOUNTER
Lab Results   Component Value Date    CHOL 136 05/22/2024    TRIG 73 05/22/2024    HDL 44 05/22/2024    LDL 77 05/22/2024

## 2024-06-17 NOTE — PROGRESS NOTES
Karen Newman  [id number]  1950  [phone]      06/20/2024      [facility] CARDIOLOGY     Referring Provider: No ref. provider found     Mirian Arnold APRN  [address] [place] [id number]    Chief Complaint   Patient presents with    Paroxysmal atrial fibrillation    Dyspnea on exertion       Problem List  Atrial fibrillation, duration unknown; diagnosed August of 2013  ALANIS/external cardioversion, 10/18/2013, with the initiation of propafenone therapy.   Atrial fibrillation, 11/13/2013  Normal sinus rhythm at the time of office visit, 11/21/2013  Discontinuation of Rythmol secondary to diarrhea, 06/05/2014, with initiation of flecainide 50 mg b.i.d. and Eliquis 5 mg b.i.d.   CHADS Vasc= 4, 04/18/2017  CT scan of the chest 06/29/2017: Mild fibrotic changes, o/w normal  Pulmonary Function Test 06/29/2017: FEV1/FVC  1.54/2.57 = 60%  Nuclear Stress Test 06/29/2017: EF>70%, no ischemia   Bilateral thoracoscopy and Maze procedure and clipping of the left atrial appendage 09/26/2017 Dr. Kay  ALANIS 10/24/17: EF 60%, RADHA successfully closed with no evidence of a residual left atrial appendage. Mild to moderate MR  14d Zio, 1/6/2021: NSR. PAF <1%  MCOT, 07/20/2023: SB/SR. Occasional PVC's. Frequent PAC's. One ventricular triplet. Events correlate with isolated PAC's.  Successful ECV, 10/12/2023: Dr. Osullivan.  RFA of pulmonary veins, posterior wall isolation and CTI, 3/11/24 by Dr. Gonzalez  Valvular heart disease  Previous echo approximately, 2006 (incomplete data base).  Echo 08/15/2002, revealing moderate concentric LVH, hyperdynamic LV, EF 81%; mild MR, trace TR.  Echo 10/07/2008, revealed a normal LVEF with mild to moderate MR.  Echo 07/01/2011: normal LV systolic function and wall motion with mild MR and mild TR.   Echo, 09/22/2017: EF 60%. Moderate to severe MR. Trace to mild TR. No thrombus appears to be present within the left atrial appendage.  ALANIS 10/24/2017: EF 60%, RADHA successfully  closed with no evidence of a residual left atrial appendage. Mild to moderate MR  Echo 01/10/2020: EF 65%, trace MR, mild TR, mild LVH.   Echo 04/07/2022: LVEF 60-65%. Mild concentric LVH. Normal LV diastolic filling pattern. Mild MR.   Echo, 10/12/2023: EF 60%. Trace to mild MR. Trace TR. RVSP is 22 mmHg.  Hypertension  Adenosine Cardiolite, 06/18/2009: Normal systolic function without evidence of inducible ischemia.   Echo 1/11/20: EF 65%. Trace MR. Mild TR. Mild LVH.  Renal duplex 03/22/2022: No evidence to suggest significant renal artery stenosis.   Dyslipidemia  Diabetes mellitus, Type 2 diagnosed, 2006  Thyroid disease  Hyperthyroidism status post radioactive iodine therapy.  Hypothyroidism on thyroid replacement.  Gastroesophageal reflux disease.  Chronic kidney disease  History of hemorrhoids status post hemorrhoidectomy.  Colon polyps status post polypectomy.  Anxiety  Chronic Iron deficiency anemia.  Obstructive sleep apnea on chronic CPAP therapy.  Surgical history:  Total abdominal hysterectomy  Squamous cell removal (right foot)      History of Present Illness   Karen Newman is a 74 y.o. female who presents to my electrophysiology clinic for follow up of AF/HTN. In March, patient underwent RFA of pulmonary veins, posterior wall isolation and CTI. Since the procedure, patient has been doing well with no recurrence of atrial fibrillation.  Today's EKG showing sinus bradycardia with first-degree AV block.    Outpatient Medications Marked as Taking for the 6/20/24 encounter (Office Visit) with Alec Gonzalez MD   Medication Sig Dispense Refill    atorvastatin (LIPITOR) 10 MG tablet Take 1 tablet by mouth Daily. 90 tablet 3    Blood Glucose Monitoring Suppl (OneTouch Verio) w/Device kit 1 each Daily. DX: E11.65 1 kit 0    Continuous Blood Gluc  (FreeStyle Lucille 2 [place]) device 1 each Daily. 1 each 0    Continuous Blood Gluc Sensor (FreeStyle Lucille 2 Sensor) misc 1 each Every 14 (Fourteen) Days. 2  "each 5    dilTIAZem CD (CARDIZEM CD) 120 MG 24 hr capsule Take 1 capsule by mouth Daily. 180 capsule 3    doxazosin (CARDURA) 8 MG tablet Take 1 tablet by mouth Every 12 (Twelve) Hours. 180 tablet 3    furosemide (Lasix) 20 MG tablet Take 1 tablet by mouth Every Other Day. Take 1 tab po prn worsening SOA/edema 15 tablet 11    hydrALAZINE (APRESOLINE) 100 MG tablet Take 1 tablet by mouth 2 (Two) Times a Day. 180 tablet 3    Insulin Lispro, 1 Unit Dial, (HumaLOG KwikPen) 100 UNIT/ML solution pen-injector INJECT SUBCUTANEOUSLY 16 TO 20  UNITS 3 TIMES DAILY WITH MEALS 60 mL 3    Levemir FlexTouch 100 UNIT/ML injection INJECT SUBCUTANEOUSLY 35  UNITS AS DIRECTED DAILY 45 mL 3    levothyroxine (SYNTHROID, LEVOTHROID) 150 MCG tablet Take 1 tablet by mouth Daily. 90 tablet 3    Linzess 145 MCG capsule capsule Take 1 capsule by mouth Daily.      omeprazole (priLOSEC) 40 MG capsule Take 1 capsule by mouth Daily.      OneTouch Verio test strip Use 4x per day:  ICD-10 E11.65, E11.649, Z79.4 400 each 3    PARoxetine (PAXIL) 40 MG tablet Take 1 tablet by mouth Daily.      Tirzepatide (Mounjaro) 5 MG/0.5ML solution pen-injector pen Inject 0.5 mL under the skin into the appropriate area as directed 1 (One) Time Per Week. Dx E11.65 6 mL 0    [DISCONTINUED] dilTIAZem CD (CARDIZEM CD) 120 MG 24 hr capsule TAKE 2 CAPSULES BY MOUTH DAILY 180 capsule 3            Physical Exam  Vitals:    06/20/24 1018   BP: 108/48   BP Location: Left arm   Patient Position: Sitting   Cuff Size: Adult   Pulse: 53   SpO2: 96%   Weight: 73 kg (161 lb)   Height: 167.6 cm (65.98\")     GENERAL: Well-developed, well-nourished patient in no acute distress.  HEENT: NC, AC, PERRLA. MMM  NECK: No JVD. No carotid bruits auscultated.  LUNGS: Clear to auscultation bilaterally.  CARDIOVASCULAR: RRR No murmurs, gallops or rubs noted.   ABDOMEN: Soft, nontender. Positive bowel sounds.  MUSCULOSKELETAL: No gross deformities. No clubbing, cyanosis  EXT: pulses intact, No " edema  SKIN: Pink, warm  Neuro: Nonfocal exam. Gait intact    Diagnostic Data  ECG 6/20/2024  Sinus bradycardia with first-degree AV block 53 bpm   QRS 84     Lab Results   Component Value Date    GLUCOSE 111 (H) 05/22/2024    CALCIUM 9.1 05/22/2024     05/22/2024    K 4.0 05/22/2024    CO2 26.0 05/22/2024     05/22/2024    BUN 22 05/22/2024    CREATININE 1.95 (H) 05/22/2024    EGFRIFNONA 24 (L) 02/25/2022    BCR 11.3 05/22/2024    ANIONGAP 12.0 05/22/2024     Lab Results   Component Value Date    WBC 6.01 03/11/2024    HGB 11.8 (L) 03/11/2024    HCT 35.3 03/11/2024    MCV 95.9 03/11/2024     03/11/2024     Lab Results   Component Value Date    INR 0.98 09/27/2017    INR 1.02 09/26/2017    INR 0.93 09/22/2017    PROTIME 10.7 09/27/2017    PROTIME 11.1 09/26/2017    PROTIME 10.1 09/22/2017     Lab Results   Component Value Date    TSH 0.014 (L) 05/22/2024       I personally viewed and interpreted the patient's EKG/Telemetry/lab data      Assessment and Plan  Diagnoses and all orders for this visit:    1. Paroxysmal atrial fibrillation (Primary)    2. Essential hypertension    Other orders  -     dilTIAZem CD (CARDIZEM CD) 120 MG 24 hr capsule; Take 1 capsule by mouth Daily.  Dispense: 180 capsule; Refill: 3      Paroxysmal atrial fibrillation  -CHADS Vasc 4, patient can come off of Xarelto given surgical appendage clipping.   -Continue rate control with Cardizem; lower Cardizem 220 mg p.o. daily    Hypertension  -Well controlled, continue current medications      Body mass index is 26 kg/m².    Follow-Up  No follow-ups on file.    Thank you for allowing me to participate in the care of your patient. Please to not hesitate to contact me with additional questions or concerns.     Alec Gonzalez MD [facility]  Cardiac Electrophysiologist  [facility] / [facility]

## 2024-06-20 ENCOUNTER — OFFICE VISIT (OUTPATIENT)
Dept: CARDIOLOGY | Facility: CLINIC | Age: 74
End: 2024-06-20
Payer: MEDICARE

## 2024-06-20 VITALS
BODY MASS INDEX: 25.88 KG/M2 | WEIGHT: 161 LBS | DIASTOLIC BLOOD PRESSURE: 48 MMHG | OXYGEN SATURATION: 96 % | SYSTOLIC BLOOD PRESSURE: 108 MMHG | HEIGHT: 66 IN | HEART RATE: 53 BPM

## 2024-06-20 DIAGNOSIS — I10 ESSENTIAL HYPERTENSION: Chronic | ICD-10-CM

## 2024-06-20 DIAGNOSIS — I48.0 PAROXYSMAL ATRIAL FIBRILLATION: Primary | Chronic | ICD-10-CM

## 2024-06-20 RX ORDER — DILTIAZEM HYDROCHLORIDE 120 MG/1
120 CAPSULE, COATED, EXTENDED RELEASE ORAL DAILY
Qty: 180 CAPSULE | Refills: 3 | Status: SHIPPED | OUTPATIENT
Start: 2024-06-20

## 2024-06-20 RX ORDER — RIVAROXABAN 15 MG/1
1 TABLET, FILM COATED ORAL DAILY
COMMUNITY
Start: 2024-05-25 | End: 2024-06-20

## 2024-06-25 ENCOUNTER — LAB (OUTPATIENT)
Dept: LAB | Facility: HOSPITAL | Age: 74
End: 2024-06-25
Payer: MEDICARE

## 2024-06-25 ENCOUNTER — TRANSCRIBE ORDERS (OUTPATIENT)
Dept: LAB | Facility: HOSPITAL | Age: 74
End: 2024-06-25
Payer: MEDICARE

## 2024-06-25 DIAGNOSIS — D50.9 IRON DEFICIENCY ANEMIA, UNSPECIFIED IRON DEFICIENCY ANEMIA TYPE: ICD-10-CM

## 2024-06-25 DIAGNOSIS — N18.4 CHRONIC KIDNEY DISEASE, STAGE IV (SEVERE): ICD-10-CM

## 2024-06-25 DIAGNOSIS — E11.9 DIABETES MELLITUS, STABLE: ICD-10-CM

## 2024-06-25 DIAGNOSIS — D50.9 IRON DEFICIENCY ANEMIA, UNSPECIFIED IRON DEFICIENCY ANEMIA TYPE: Primary | ICD-10-CM

## 2024-06-25 LAB
25(OH)D3 SERPL-MCNC: 43.4 NG/ML (ref 30–100)
ALBUMIN SERPL-MCNC: 4.1 G/DL (ref 3.5–5.2)
ANION GAP SERPL CALCULATED.3IONS-SCNC: 11 MMOL/L (ref 5–15)
BACTERIA UR QL AUTO: ABNORMAL /HPF
BASOPHILS # BLD AUTO: 0.05 10*3/MM3 (ref 0–0.2)
BASOPHILS NFR BLD AUTO: 1.2 % (ref 0–1.5)
BILIRUB UR QL STRIP: NEGATIVE
BUN SERPL-MCNC: 35 MG/DL (ref 8–23)
BUN/CREAT SERPL: 15.8 (ref 7–25)
CALCIUM SPEC-SCNC: 9.1 MG/DL (ref 8.6–10.5)
CHLORIDE SERPL-SCNC: 102 MMOL/L (ref 98–107)
CLARITY UR: ABNORMAL
CO2 SERPL-SCNC: 26 MMOL/L (ref 22–29)
COLOR UR: YELLOW
CREAT SERPL-MCNC: 2.21 MG/DL (ref 0.57–1)
CREAT UR-MCNC: 108 MG/DL
DEPRECATED RDW RBC AUTO: 40.2 FL (ref 37–54)
EGFRCR SERPLBLD CKD-EPI 2021: 22.9 ML/MIN/1.73
EOSINOPHIL # BLD AUTO: 0.12 10*3/MM3 (ref 0–0.4)
EOSINOPHIL NFR BLD AUTO: 2.9 % (ref 0.3–6.2)
ERYTHROCYTE [DISTWIDTH] IN BLOOD BY AUTOMATED COUNT: 11.5 % (ref 12.3–15.4)
GLUCOSE SERPL-MCNC: 192 MG/DL (ref 65–99)
GLUCOSE UR STRIP-MCNC: NEGATIVE MG/DL
HBA1C MFR BLD: 6.3 % (ref 4.8–5.6)
HCT VFR BLD AUTO: 33.9 % (ref 34–46.6)
HGB BLD-MCNC: 11.2 G/DL (ref 12–15.9)
HGB UR QL STRIP.AUTO: NEGATIVE
HYALINE CASTS UR QL AUTO: ABNORMAL /LPF
IMM GRANULOCYTES # BLD AUTO: 0.01 10*3/MM3 (ref 0–0.05)
IMM GRANULOCYTES NFR BLD AUTO: 0.2 % (ref 0–0.5)
IRON 24H UR-MRATE: 59 MCG/DL (ref 37–145)
IRON SATN MFR SERPL: 21 % (ref 20–50)
KETONES UR QL STRIP: NEGATIVE
LEUKOCYTE ESTERASE UR QL STRIP.AUTO: ABNORMAL
LYMPHOCYTES # BLD AUTO: 1.19 10*3/MM3 (ref 0.7–3.1)
LYMPHOCYTES NFR BLD AUTO: 29 % (ref 19.6–45.3)
MCH RBC QN AUTO: 31.9 PG (ref 26.6–33)
MCHC RBC AUTO-ENTMCNC: 33 G/DL (ref 31.5–35.7)
MCV RBC AUTO: 96.6 FL (ref 79–97)
MONOCYTES # BLD AUTO: 0.31 10*3/MM3 (ref 0.1–0.9)
MONOCYTES NFR BLD AUTO: 7.6 % (ref 5–12)
NEUTROPHILS NFR BLD AUTO: 2.42 10*3/MM3 (ref 1.7–7)
NEUTROPHILS NFR BLD AUTO: 59.1 % (ref 42.7–76)
NITRITE UR QL STRIP: NEGATIVE
NRBC BLD AUTO-RTO: 0 /100 WBC (ref 0–0.2)
PH UR STRIP.AUTO: 6 [PH] (ref 5–8)
PHOSPHATE SERPL-MCNC: 4 MG/DL (ref 2.5–4.5)
PLATELET # BLD AUTO: 189 10*3/MM3 (ref 140–450)
PMV BLD AUTO: 10.3 FL (ref 6–12)
POTASSIUM SERPL-SCNC: 4.2 MMOL/L (ref 3.5–5.2)
PROT ?TM UR-MCNC: 29.4 MG/DL
PROT UR QL STRIP: ABNORMAL
PTH-INTACT SERPL-MCNC: 91.4 PG/ML (ref 15–65)
RBC # BLD AUTO: 3.51 10*6/MM3 (ref 3.77–5.28)
RBC # UR STRIP: ABNORMAL /HPF
REF LAB TEST METHOD: ABNORMAL
SODIUM SERPL-SCNC: 139 MMOL/L (ref 136–145)
SP GR UR STRIP: 1.02 (ref 1–1.03)
SQUAMOUS #/AREA URNS HPF: ABNORMAL /HPF
TIBC SERPL-MCNC: 280 MCG/DL (ref 298–536)
TRANSFERRIN SERPL-MCNC: 188 MG/DL (ref 200–360)
UROBILINOGEN UR QL STRIP: ABNORMAL
WBC # UR STRIP: ABNORMAL /HPF
WBC NRBC COR # BLD AUTO: 4.1 10*3/MM3 (ref 3.4–10.8)

## 2024-06-25 PROCEDURE — 83970 ASSAY OF PARATHORMONE: CPT

## 2024-06-25 PROCEDURE — 81001 URINALYSIS AUTO W/SCOPE: CPT

## 2024-06-25 PROCEDURE — 82306 VITAMIN D 25 HYDROXY: CPT

## 2024-06-25 PROCEDURE — 85025 COMPLETE CBC W/AUTO DIFF WBC: CPT

## 2024-06-25 PROCEDURE — 84156 ASSAY OF PROTEIN URINE: CPT

## 2024-06-25 PROCEDURE — 83036 HEMOGLOBIN GLYCOSYLATED A1C: CPT

## 2024-06-25 PROCEDURE — 80069 RENAL FUNCTION PANEL: CPT

## 2024-06-25 PROCEDURE — 84466 ASSAY OF TRANSFERRIN: CPT

## 2024-06-25 PROCEDURE — 36415 COLL VENOUS BLD VENIPUNCTURE: CPT

## 2024-06-25 PROCEDURE — 82570 ASSAY OF URINE CREATININE: CPT

## 2024-06-25 PROCEDURE — 83540 ASSAY OF IRON: CPT

## 2024-07-01 RX ORDER — HYDRALAZINE HYDROCHLORIDE 100 MG/1
100 TABLET, FILM COATED ORAL 2 TIMES DAILY
Qty: 180 TABLET | Refills: 3 | Status: SHIPPED | OUTPATIENT
Start: 2024-07-01

## 2024-07-05 RX ORDER — TIRZEPATIDE 5 MG/.5ML
INJECTION, SOLUTION SUBCUTANEOUS
Qty: 6 ML | Refills: 1 | Status: SHIPPED | OUTPATIENT
Start: 2024-07-05

## 2024-07-05 NOTE — TELEPHONE ENCOUNTER
Rx Refill Note  Requested Prescriptions     Pending Prescriptions Disp Refills    Mounjaro 5 MG/0.5ML solution pen-injector pen [Pharmacy Med Name: MOUNJARO PEN 5MG/0.5ML] 6 mL 3     Sig: INJECT THE CONTENTS OF ONE PEN  SUBCUTANEOUSLY WEEKLY AS  DIRECTED        Last office visit with prescribing clinician: 5/22/2024      Next office visit with prescribing clinician: 12/13/2024       Homa Aranda (Jodi)  07/05/24, 08:27 EDT

## 2024-08-01 ENCOUNTER — OFFICE VISIT (OUTPATIENT)
Dept: CARDIOLOGY | Facility: CLINIC | Age: 74
End: 2024-08-01
Payer: MEDICARE

## 2024-08-01 VITALS
BODY MASS INDEX: 25.99 KG/M2 | OXYGEN SATURATION: 97 % | DIASTOLIC BLOOD PRESSURE: 54 MMHG | WEIGHT: 156 LBS | HEART RATE: 64 BPM | SYSTOLIC BLOOD PRESSURE: 124 MMHG | HEIGHT: 65 IN

## 2024-08-01 DIAGNOSIS — I10 ESSENTIAL HYPERTENSION: ICD-10-CM

## 2024-08-01 DIAGNOSIS — E78.2 MIXED HYPERLIPIDEMIA: ICD-10-CM

## 2024-08-01 DIAGNOSIS — I38 VHD (VALVULAR HEART DISEASE): ICD-10-CM

## 2024-08-01 DIAGNOSIS — G47.33 OSA ON CPAP: ICD-10-CM

## 2024-08-01 DIAGNOSIS — I48.0 PAROXYSMAL ATRIAL FIBRILLATION: Primary | ICD-10-CM

## 2024-08-01 PROCEDURE — 99214 OFFICE O/P EST MOD 30 MIN: CPT | Performed by: INTERNAL MEDICINE

## 2024-08-01 PROCEDURE — 1159F MED LIST DOCD IN RCRD: CPT | Performed by: INTERNAL MEDICINE

## 2024-08-01 PROCEDURE — 1160F RVW MEDS BY RX/DR IN RCRD: CPT | Performed by: INTERNAL MEDICINE

## 2024-08-01 PROCEDURE — 3074F SYST BP LT 130 MM HG: CPT | Performed by: INTERNAL MEDICINE

## 2024-08-01 PROCEDURE — 3078F DIAST BP <80 MM HG: CPT | Performed by: INTERNAL MEDICINE

## 2024-08-01 RX ORDER — CALCITRIOL 0.25 UG/1
0.25 CAPSULE, LIQUID FILLED ORAL EVERY OTHER DAY
COMMUNITY
Start: 2024-07-03

## 2024-08-01 NOTE — PROGRESS NOTES
[facility]    Patient ID: Karen Newman is a 74 y.o. female.  : 1950   Contact: [phone]    Encounter date: 2024    PCP: Mirian Arnold APRN      Chief complaint:   Chief Complaint   Patient presents with    Paroxysmal atrial fibrillation       Problem List:  Atrial fibrillation, duration unknown; diagnosed 2013:  Status post ALANIS/external cardioversion, 10/18/2013, Yara Bobo M.D. with the initiation of propafenone therapy.   Atrial fibrillation, 2013, in Gloria Gomez’ office.  Normal sinus rhythm at the time of office visit, 2013  Normal sinus rhythm, EKG, 2014  Discontinuation of Rythmol secondary to diarrhea, 2014, with initiation of flecainide 50 mg b.i.d. and Eliquis 5 mg b.i.d.   CHADS Vasc= 4, 2017  CT scan of the chest 2017: Mild fibrotic changes, o/w normal  Pulmonary Function Test 2017: FEV1/FVC  1.54/2.57 = 60%  Nuclear Stress Test 2017: EF>70%, no ischemia   Bilateral thoracoscopy and Maze procedure and clipping of the left atrial appendage 2017 Dr. Kay  ALANIS 10/24/17: EF 60%, RADHA successfully closed with no evidence of a residual left atrial appendage. Mild to moderate MR  14d Zio, 2021: NSR. PAF <1%  MCOT, 2023: SB/SR. Occasional PVC's. Frequent PAC's. One ventricular triplet. Events correlate with isolated PAC's.  Successful ECV, 10/12/2023: Dr. Osullivan.  PVA, 2024: Comprehensive EP study with LV pacing including on and off of isoproterenol. Successful radiofrequency ablation of the pulmonary veins. Successful radiofrequency ablation of the posterior wall isolation given persistent AF. Successful radiofrequency ablation of the cavotricuspid isthmus given atrial flutter.  Valvular heart disease:  Previous echo approximately,  (incomplete data base).  Echo 08/15/2002, revealing moderate concentric LVH, hyperdynamic LV, EF 81%; mild MR, trace  TR.  Echo 10/07/2008, revealed a normal LVEF with mild to moderate MR.  Echo 07/01/2011: normal LV systolic function and wall motion with mild MR and mild TR.   Echo, 09/22/2017: EF 60%. Moderate to severe MR. Trace to mild TR. No thrombus appears to be present within the left atrial appendage.  ALANIS 10/24/2017: EF 60%, RADHA successfully closed with no evidence of a residual left atrial appendage. Mild to moderate MR  Echo 01/10/2020: EF 65%, trace MR, mild TR, mild LVH.   Echo 04/07/2022: LVEF 60-65%. Mild concentric LVH. Normal LV diastolic filling pattern. Mild MR.   Echo, 10/12/2023: EF 60%. Trace to mild MR. Trace TR. RVSP is 22 mmHg.  Hypertension:  Adenosine Cardiolite, 06/18/2009: Normal systolic function without evidence of inducible ischemia.   Echo 1/11/20: EF 65%. Trace MR. Mild TR. Mild LVH.  Renal duplex 03/22/2022: No evidence to suggest significant renal artery stenosis.   Dyslipidemia.  Diabetes mellitus, Type 2 diagnosed, 2006.  Thyroid disease:  Hyperthyroidism status post radioactive iodine therapy.  Hypothyroidism on thyroid replacement.  Gastroesophageal reflux disease.  Chronic kidney disease. crea 1.7  History of hemorrhoids status post hemorrhoidectomy.  Colon polyps status post polypectomy.  Anxiety.  Chronic Iron deficiency anemia.  Obstructive sleep apnea on chronic CPAP therapy.  Surgical history:  Total abdominal hysterectomy  Squamous cell removal (right foot)    Allergies   Allergen Reactions    Amlodipine Shortness Of Breath     Swelling and SOB    Buspirone Nausea And Vomiting    Lisinopril Other (See Comments)     cough    Sertraline Hcl Other (See Comments)     nightmares    Sular [Nisoldipine Er] Cough    Carvedilol Other (See Comments)     SOB       Current Medications:    Current Outpatient Medications:     atorvastatin (LIPITOR) 10 MG tablet, Take 1 tablet by mouth Daily., Disp: 90 tablet, Rfl: 3    Blood Glucose Monitoring Suppl (OneTouch Verio) w/Device kit, 1 each Daily. DX:  E11.65, Disp: 1 kit, Rfl: 0    calcitriol (ROCALTROL) 0.25 MCG capsule, Take 1 capsule by mouth Every Other Day., Disp: , Rfl:     Continuous Blood Gluc  (FreeStyle Lucille 2 [place]) device, 1 each Daily., Disp: 1 each, Rfl: 0    Continuous Blood Gluc Sensor (FreeStyle Lucille 2 Sensor) misc, 1 each Every 14 (Fourteen) Days., Disp: 2 each, Rfl: 5    dilTIAZem CD (CARDIZEM CD) 120 MG 24 hr capsule, Take 1 capsule by mouth Daily., Disp: 180 capsule, Rfl: 3    doxazosin (CARDURA) 8 MG tablet, Take 1 tablet by mouth Every 12 (Twelve) Hours., Disp: 180 tablet, Rfl: 3    furosemide (Lasix) 20 MG tablet, Take 1 tablet by mouth Every Other Day. Take 1 tab po prn worsening SOA/edema, Disp: 15 tablet, Rfl: 11    hydrALAZINE (APRESOLINE) 100 MG tablet, Take 1 tablet by mouth 2 (Two) Times a Day., Disp: 180 tablet, Rfl: 3    Insulin Lispro, 1 Unit Dial, (HumaLOG KwikPen) 100 UNIT/ML solution pen-injector, INJECT SUBCUTANEOUSLY 16 TO 20  UNITS 3 TIMES DAILY WITH MEALS, Disp: 60 mL, Rfl: 3    Levemir FlexTouch 100 UNIT/ML injection, INJECT SUBCUTANEOUSLY 35  UNITS AS DIRECTED DAILY, Disp: 45 mL, Rfl: 3    levothyroxine (SYNTHROID, LEVOTHROID) 150 MCG tablet, Take 1 tablet by mouth Daily., Disp: 90 tablet, Rfl: 3    Linzess 145 MCG capsule capsule, Take 1 capsule by mouth Daily., Disp: , Rfl:     Mounjaro 5 MG/0.5ML solution pen-injector pen, INJECT THE CONTENTS OF ONE PEN  SUBCUTANEOUSLY WEEKLY AS  DIRECTED, Disp: 6 mL, Rfl: 1    omeprazole (priLOSEC) 40 MG capsule, Take 1 capsule by mouth Daily., Disp: , Rfl:     OneTouch Verio test strip, Use 4x per day:  ICD-10 E11.65, E11.649, Z79.4, Disp: 400 each, Rfl: 3    PARoxetine (PAXIL) 40 MG tablet, Take 1 tablet by mouth Daily., Disp: , Rfl:     HPI    Karen Newman is a 74 y.o. female who presents today for a 6 month follow up of PAF, VHD, and cardiac risk factors. Since last visit, patient has been doing well overall from a cardiovascular standpoint. She has not felt any  "palpitations since her PVA. 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 She denies chest pain, shortness of breath, orthopnea, palpitations, edema, dizziness, and syncope.       The following portions of the patient's history were reviewed and updated as appropriate: allergies, current medications and problem list.    Pertinent positives as listed in the HPI.  All other systems reviewed are negative.         Vitals:    08/01/24 1308   BP: 124/54   BP Location: Left arm   Patient Position: Sitting   Pulse: 64   SpO2: 97%   Weight: 70.8 kg (156 lb)   Height: 165.1 cm (65\")       Physical Exam:  General: Alert and oriented.  Neck: Jugular venous pressure is within normal limits. Carotids have normal upstrokes without bruits.   Cardiovascular: Heart has a nondisplaced focal PMI. Regular rate and rhythm. No murmur, gallop or rub.  Lungs: Clear, no rales or wheezes. Equal expansion is noted.   Extremities: Show no edema.  Skin: Warm and dry.  Neurologic: Nonfocal.     Diagnostic Data (reviewed with patient):  Lab Results   Component Value Date    GLUCOSE 192 (H) 06/25/2024    BUN 35 (H) 06/25/2024    CREATININE 2.21 (H) 06/25/2024    BCR 15.8 06/25/2024     06/25/2024    K 4.2 06/25/2024     06/25/2024    CO2 26.0 06/25/2024    CALCIUM 9.1 06/25/2024    ALBUMIN 4.1 06/25/2024    ALKPHOS 92 05/22/2024    AST 13 05/22/2024    ALT 11 05/22/2024     Lab Results   Component Value Date    CHOL 136 05/22/2024    TRIG 73 05/22/2024    HDL 44 05/22/2024    LDL 77 05/22/2024      Lab Results   Component Value Date    WBC 4.10 06/25/2024    RBC 3.51 (L) 06/25/2024    HGB 11.2 (L) 06/25/2024    HCT 33.9 (L) 06/25/2024    MCV 96.6 06/25/2024     06/25/2024      Lab Results   Component Value Date    TSH 0.014 (L) 05/22/2024        Advance Care Planning   ACP discussion was held with the patient during this visit. Patient has an advance directive in EMR " which is still valid.          Procedures      Assessment:    ICD-10-CM ICD-9-CM   1. Paroxysmal atrial fibrillation  I48.0 427.31   2. VHD (valvular heart disease)  I38 424.90   3. Essential hypertension  I10 401.9   4. Mixed hyperlipidemia  E78.2 272.2   5. KRISTEN on CPAP  G47.33 327.23         Plan:  Begin routine aerobic exercise for at least 30 minutes 5 days per week.  Continue on atorvastatin 10 mg daily for hyperlipidemia.   Continue on diltiazem 120 mg daily for rate control and hypertension.   Continue on Lasix 20 mg as directed for fluid retention.   Try to decrease due to renal insufficiency  Continue all other current medications.  F/up in 6 months, sooner if needed.      Scribed for Yara Bobo MD by Margaret Shields. 8/1/2024 13:33 EDT    I Yara Bobo MD personally performed the services described in this documentation as scribed by the above individual in my presence, and it is both accurate and complete.    Yara Bobo MD, FACC

## 2024-08-26 RX ORDER — INSULIN DETEMIR 100 [IU]/ML
INJECTION, SOLUTION SUBCUTANEOUS
Qty: 45 ML | Refills: 1 | Status: SHIPPED | OUTPATIENT
Start: 2024-08-26

## 2024-08-26 NOTE — TELEPHONE ENCOUNTER
Rx Refill Note  Requested Prescriptions     Pending Prescriptions Disp Refills    insulin detemir (Levemir FlexPen) 100 UNIT/ML injection [Pharmacy Med Name: LEVEMIR  5ML SOLUTION PEN-INJECTOR  ML FLEXPEN 100 U 3 U X] 45 mL 1     Sig: INJECT SUBCUTANEOUSLY 35 UNITS  DAILY AS DIRECTED      Last office visit with prescribing clinician: 5/22/2024     Next office visit with prescribing clinician: 12/13/2024                           Meenu Marcos MA  08/26/24, 08:37 EDT

## 2024-08-29 NOTE — PROGRESS NOTES
Karen PEPE Pippamunir  [id number]  1950  [phone]      9/12/24      [facility]     Referring Provider: No ref. provider found     Mirian Arnold APRN  [address] [place] [id number]    Chief Complaint   Patient presents with    Paroxysmal atrial fibrillation       Problem List  Atrial fibrillation, duration unknown; diagnosed August of 2013  ALANIS/external cardioversion, 10/18/2013, with the initiation of propafenone therapy.   Atrial fibrillation, 11/13/2013  Normal sinus rhythm at the time of office visit, 11/21/2013  Discontinuation of Rythmol secondary to diarrhea, 06/05/2014, with initiation of flecainide 50 mg b.i.d. and Eliquis 5 mg b.i.d.   CHADS Vasc= 4, 04/18/2017  CT scan of the chest 06/29/2017: Mild fibrotic changes, o/w normal  Pulmonary Function Test 06/29/2017: FEV1/FVC  1.54/2.57 = 60%  Nuclear Stress Test 06/29/2017: EF>70%, no ischemia   Bilateral thoracoscopy and Maze procedure and clipping of the left atrial appendage 09/26/2017 Dr. Kay  ALANIS 10/24/17: EF 60%, RADHA successfully closed with no evidence of a residual left atrial appendage. Mild to moderate MR  14d Zio, 1/6/2021: NSR. PAF <1%  MCOT, 07/20/2023: SB/SR. Occasional PVC's. Frequent PAC's. One ventricular triplet. Events correlate with isolated PAC's.  Successful ECV, 10/12/2023: Dr. Osullivan.  RFA of pulmonary veins, posterior wall isolation and CTI, 3/11/24 by Dr. Gonzalez  Valvular heart disease  Previous echo approximately, 2006 (incomplete data base).  Echo 08/15/2002, revealing moderate concentric LVH, hyperdynamic LV, EF 81%; mild MR, trace TR.  Echo 10/07/2008, revealed a normal LVEF with mild to moderate MR.  Echo 07/01/2011: normal LV systolic function and wall motion with mild MR and mild TR.   Echo, 09/22/2017: EF 60%. Moderate to severe MR. Trace to mild TR. No thrombus appears to be present within the left atrial appendage.  ALANIS 10/24/2017: EF 60%, RADHA successfully closed with no evidence of a  residual left atrial appendage. Mild to moderate MR  Echo 01/10/2020: EF 65%, trace MR, mild TR, mild LVH.   Echo 04/07/2022: LVEF 60-65%. Mild concentric LVH. Normal LV diastolic filling pattern. Mild MR.   Echo, 10/12/2023: EF 60%. Trace to mild MR. Trace TR. RVSP is 22 mmHg.  Hypertension  Adenosine Cardiolite, 06/18/2009: Normal systolic function without evidence of inducible ischemia.   Echo 1/11/20: EF 65%. Trace MR. Mild TR. Mild LVH.  Renal duplex 03/22/2022: No evidence to suggest significant renal artery stenosis.   Dyslipidemia  Diabetes mellitus, Type 2 diagnosed, 2006  Thyroid disease  Hyperthyroidism status post radioactive iodine therapy.  Hypothyroidism on thyroid replacement.  Gastroesophageal reflux disease.  Chronic kidney disease  History of hemorrhoids status post hemorrhoidectomy.  Colon polyps status post polypectomy.  Anxiety  Chronic Iron deficiency anemia.  Obstructive sleep apnea on chronic CPAP therapy.  Surgical history:  Total abdominal hysterectomy  Squamous cell removal (right foot)      History of Present Illness   Karen Newman is a 74 y.o. female who presents to my electrophysiology clinic for follow up of AF/HTN. In March, patient underwent RFA of pulmonary veins, posterior wall isolation and CTI. Since we last saw the patient, been doing well from a cardiac standpoint and has no complaints.  His chest pain, shortness of breath, lightheadedness, dizziness, syncope and palpitations.  She has had a left atrial appendage clip and she is not on anticoagulation.    Outpatient Medications Marked as Taking for the 9/12/24 encounter (Office Visit) with Alec Gonzalez MD   Medication Sig Dispense Refill    atorvastatin (LIPITOR) 10 MG tablet Take 1 tablet by mouth Daily. 90 tablet 3    Blood Glucose Monitoring Suppl (OneTouch Verio) w/Device kit 1 each Daily. DX: E11.65 1 kit 0    calcitriol (ROCALTROL) 0.25 MCG capsule Take 1 capsule by mouth Every Other Day.      Continuous Blood Gluc  " (FreeStyle Lucille 2 [place]) device 1 each Daily. 1 each 0    Continuous Blood Gluc Sensor (FreeStyle [place] 2 Sensor) misc 1 each Every 14 (Fourteen) Days. 2 each 5    dilTIAZem CD (CARDIZEM CD) 120 MG 24 hr capsule Take 1 capsule by mouth Daily. 180 capsule 3    doxazosin (CARDURA) 8 MG tablet Take 1 tablet by mouth Every 12 (Twelve) Hours. 180 tablet 3    furosemide (Lasix) 20 MG tablet Take 1 tablet by mouth Every Other Day. Take 1 tab po prn worsening SOA/edema 15 tablet 11    hydrALAZINE (APRESOLINE) 100 MG tablet Take 1 tablet by mouth 2 (Two) Times a Day. 180 tablet 3    Insulin Lispro, 1 Unit Dial, (HumaLOG KwikPen) 100 UNIT/ML solution pen-injector INJECT SUBCUTANEOUSLY 16 TO 20  UNITS 3 TIMES DAILY WITH MEALS 60 mL 3    Levemir FlexPen 100 UNIT/ML injection INJECT SUBCUTANEOUSLY 35 UNITS  DAILY AS DIRECTED 45 mL 1    levothyroxine (SYNTHROID, LEVOTHROID) 150 MCG tablet Take 1 tablet by mouth Daily. 90 tablet 3    Linzess 145 MCG capsule capsule Take 1 capsule by mouth Daily.      Mounjaro 5 MG/0.5ML solution pen-injector pen INJECT THE CONTENTS OF ONE PEN  SUBCUTANEOUSLY WEEKLY AS  DIRECTED 6 mL 1    omeprazole (priLOSEC) 40 MG capsule Take 1 capsule by mouth Daily.      OneTouch Verio test strip Use 4x per day:  ICD-10 E11.65, E11.649, Z79.4 400 each 3    PARoxetine (PAXIL) 40 MG tablet Take 1 tablet by mouth Daily.              Physical Exam  Vitals:    09/12/24 1037   BP: 130/54   BP Location: Left arm   Patient Position: Sitting   Cuff Size: Adult   Pulse: 61   SpO2: 95%   Weight: 71.3 kg (157 lb 3.2 oz)   Height: 167.6 cm (66\")       Constitutional:       Appearance: Healthy appearance.   Neck:      Vascular: JVD normal.   Pulmonary:      Effort: Pulmonary effort is normal.      Breath sounds: Normal breath sounds.   Cardiovascular:      Normal rate. Regular rhythm. Normal S1. Normal S2.       Murmurs: There is no murmur.      No gallop.  No rub.   Pulses:     Intact distal pulses.   Edema:     " Peripheral edema absent.   Neurological:      General: No focal deficit present.           Diagnostic Data  EKG sinus rhythm first-degree AV block, 61 bpm, , QRS 84,     Lab Results   Component Value Date    GLUCOSE 192 (H) 06/25/2024    CALCIUM 9.1 06/25/2024     06/25/2024    K 4.2 06/25/2024    CO2 26.0 06/25/2024     06/25/2024    BUN 35 (H) 06/25/2024    CREATININE 2.21 (H) 06/25/2024    EGFRIFNONA 24 (L) 02/25/2022    BCR 15.8 06/25/2024    ANIONGAP 11.0 06/25/2024     Lab Results   Component Value Date    WBC 4.10 06/25/2024    HGB 11.2 (L) 06/25/2024    HCT 33.9 (L) 06/25/2024    MCV 96.6 06/25/2024     06/25/2024     Lab Results   Component Value Date    INR 0.98 09/27/2017    INR 1.02 09/26/2017    INR 0.93 09/22/2017    PROTIME 10.7 09/27/2017    PROTIME 11.1 09/26/2017    PROTIME 10.1 09/22/2017     Lab Results   Component Value Date    TSH 0.014 (L) 05/22/2024       I personally viewed and interpreted the patient's EKG/Telemetry/lab data      Assessment and Plan  Diagnoses and all orders for this visit:    1. Paroxysmal atrial fibrillation (Primary)    2. Essential hypertension      Paroxysmal atrial fibrillation  -CHADS Vasc 4, RADHA clip  -Tolerating Cardizem 120 mg p.o. daily  -RFA of pulmonary veins, posterior wall isolation and CTI, 3/11/24 by Dr. Gonzalez  -Patient denies fast heart rates and overall is doing well.     Hypertension  -Well controlled, continue current medications      Body mass index is 25.37 kg/m².    Follow-Up  Return for Next scheduled follow up.    Thank you for allowing me to participate in the care of your patient. Please to not hesitate to contact me with additional questions or concerns.       EUGENIE Jefferson

## 2024-09-07 NOTE — PLAN OF CARE
67 yo F h/o HTN and bladder CA, s/p cystectomy/IC creation on 8/6/2024 at [facility] with Dr. Thompson (d/c on 8/10) presented to ED 8/16 w/ generalized weakness and some lower abdominal pain. Since d/c, only have 1-2 bowel movement, and had significantly decrease PO intake due to decreased appetite, intermittent fevers/chills, +bloody discharge from urethra, some burning. Pt stated stents fell out yesterday.  BP 80s/50s for EMS.  Pt remained hypotensive in ED, started on pressors, cultures sent, placed on zosyn and BiPAP and admitted to SICU. CT revealed: Expected postoperative changes status post cystectomy with ileal conduit creation including small amount of complex fluid and free air in the pelvis. No organized fluid collection. Distended ileal conduit with narrowing at the exiting ostomy site and proximally at the site of ureteral insertion. Mild bilateral hydroureteronephrosis with delayed nephrograms. No significant perinephric stranding.    8/16: still requiring pressor support and on BiPAP, pt states she feels better,14fr catheter placed in stoma, Bcx with GPC/GNR, Cr to 4.42 from 5.34, abx changed to leila and diflucan  8/17: still requiring pressor support and supplement oxygen but feels well, decr UO overnight, given albumin and IVF restarted, Cr up slightly to 4.68, electrolytes normal  8/18: RBUS done, no signs of hydro. IR consulted, no need for NTs as no hydro. UOP still very low (overnight 10 cc/hour), given 2 boluses with no improvement. Cr up to 4.95 from 4.68. CT loopogram of conduit pending today to evaluate for leak. still requiring pressors. Ucx from conduit now growing e.coli, sensitivities pending, PT recs rehab   8/19: CT loopogram w/ no anastamotic leak. Cr continues to rise w/ low UOP. UA on admission w/ coarse granular cast. Constellation of signs and symptoms suggestive of Acute tubular necrosis  8/20: Urine culture w/ e.coli sensitivities w/ resistance to floroquinolones. Pt back on abx. Pt w/ slightly increased urine output  8/21: Pt with acute onset of tachypnea, and hypoxia requiring intubation and sedation, CT w/ probably PE, hep gtt started. Nephrology consult obtained and started on CRRT, meropenem restarted  8/22: still intubated and sedated, pressor requirement decreasing, UO increasing, PTT remains supratherapeutic, hep gtt held for one hour this AM, on tube feeds, febrile to 100.9 at 4am  8/23: changed TF as per nutrition recs to start tomorrow AM, check w/ dietician, PTT therapeutic x1 --> repeat PTT at 4:45 AM 50.7, hep gtt @4ml/hr, tolerating CPAP. awake, UO still only 110/shift, still requiring pressor support, CRRT  8//24: extubated, now on RA, still requiring CRRT, /shift and small amount of pressor support, Procal >100, repeat Bcx and Ucx ordered, leila d/c, vanc x 2, then d/c now on zosyn, passed beside S&S now on reg diet, pt offers no complaints, + flatus/BM, will do bedside pelvic to evaluate for retained material  8/25: CRRT, UO less overnight, zosyn, tolerating reg diet, blood tinged vaginal discharge   8/26: UOP 39 overnight, on zosyn and vanc, tolerating reg diet. poss transfer to [facility] today - SICU to discuss on rounds.   8/27: UOP increased to 310 after dose of lasix. off pressors, still getting midodrine.   8/28: Got more lasix and albumin overnight with OP of 642, still down from 1.5 yesterday during the day. Cr scott from 1.98 to 2.77. Will suggest RBUS. Bcx NGTD at 4 days. Off vanc today, still on zosyn.   8/29: stable, midodrine increased,  overnight, still with some blood from vagina (no pads documented) RBUS no hydro, Bcx neg final, IC Cx neg  8/30: stable, midodrine to bid, no OB pads documented, good UO yellow, was transfused 1u pRBC overnight with appropriate response, zosyn d/c, listed for floor  8/31: feels well, good UO, transferred to floor  9/1: still having some pain in joints - medicine consulted and will ask their recs (NSAIDs not feasible due to kidney function). Will also f/u medicine recs for continuing midodrine. Going to give another unit for suboptimal response ot u pRBCs given yesterday (Hgb 7.1 from 7.5).  9/2: Reports black stools. Received 2u pRBC yesterday with little response in H/H. No complaints; GI consulted  9/3 - no further stools; no c/o; received another unit blood yesterday; had EGD, normal study; colonoscopy tomorrow  9/4 - not able to drink all the golytely, but has some watery stool, transfused 2u pRBC for acute blood loss anemia, seen by ostomy specialist, taylor removed from stoma  9/5 - still trying to drink moviprep, watery BMs w/ some BRBPR, for colonoscopy today, per GI Colonoscopy performed without ability to get to the TI given sharp colonic turns, lots of old and bright red blood, recommend video capsule study 9/6 9/6 - still with watery BMs w/ some BRBPR, c/o weakness, decr appetite, for VCE today, hypomagnesemia, repleted magnesium      Plan:  - AM labs reviewed  - NPO for VCE  - PT recs rehab  - f/u GI   - protonix decreased to daily per GI recs  - neph signed off  - will attempt to contact Dr. Thompson at [facility] again today Goal Outcome Evaluation:  Plan of Care Reviewed With: patient        Progress: improving  Outcome Evaluation: Pt resting well. VSS. Pt currently on 1 liter nasal cannula. Pt is ambulating to the restroom without oxygen in place and having no issues with that. IV antibiotics infused as ordered.   67 yo F h/o HTN and bladder CA, s/p cystectomy/IC creation on 8/6/2024 at [facility] with Dr. Thompson (d/c on 8/10) presented to ED 8/16 w/ generalized weakness and some lower abdominal pain. Since d/c, only have 1-2 bowel movement, and had significantly decrease PO intake due to decreased appetite, intermittent fevers/chills, +bloody discharge from urethra, some burning. Pt stated stents fell out yesterday.  BP 80s/50s for EMS.  Pt remained hypotensive in ED, started on pressors, cultures sent, placed on zosyn and BiPAP and admitted to SICU. CT revealed: Expected postoperative changes status post cystectomy with ileal conduit creation including small amount of complex fluid and free air in the pelvis. No organized fluid collection. Distended ileal conduit with narrowing at the exiting ostomy site and proximally at the site of ureteral insertion. Mild bilateral hydroureteronephrosis with delayed nephrograms. No significant perinephric stranding.    8/16: still requiring pressor support and on BiPAP, pt states she feels better,14fr catheter placed in stoma, Bcx with GPC/GNR, Cr to 4.42 from 5.34, abx changed to leila and diflucan  8/17: still requiring pressor support and supplement oxygen but feels well, decr UO overnight, given albumin and IVF restarted, Cr up slightly to 4.68, electrolytes normal  8/18: RBUS done, no signs of hydro. IR consulted, no need for NTs as no hydro. UOP still very low (overnight 10 cc/hour), given 2 boluses with no improvement. Cr up to 4.95 from 4.68. CT loopogram of conduit pending today to evaluate for leak. still requiring pressors. Ucx from conduit now growing e.coli, sensitivities pending, PT recs rehab   8/19: CT loopogram w/ no anastamotic leak. Cr continues to rise w/ low UOP. UA on admission w/ coarse granular cast. Constellation of signs and symptoms suggestive of Acute tubular necrosis  8/20: Urine culture w/ e.coli sensitivities w/ resistance to floroquinolones. Pt back on abx. Pt w/ slightly increased urine output  8/21: Pt with acute onset of tachypnea, and hypoxia requiring intubation and sedation, CT w/ probably PE, hep gtt started. Nephrology consult obtained and started on CRRT, meropenem restarted  8/22: still intubated and sedated, pressor requirement decreasing, UO increasing, PTT remains supratherapeutic, hep gtt held for one hour this AM, on tube feeds, febrile to 100.9 at 4am  8/23: changed TF as per nutrition recs to start tomorrow AM, check w/ dietician, PTT therapeutic x1 --> repeat PTT at 4:45 AM 50.7, hep gtt @4ml/hr, tolerating CPAP. awake, UO still only 110/shift, still requiring pressor support, CRRT  8//24: extubated, now on RA, still requiring CRRT, /shift and small amount of pressor support, Procal >100, repeat Bcx and Ucx ordered, leila d/c, vanc x 2, then d/c now on zosyn, passed beside S&S now on reg diet, pt offers no complaints, + flatus/BM, will do bedside pelvic to evaluate for retained material  8/25: CRRT, UO less overnight, zosyn, tolerating reg diet, blood tinged vaginal discharge   8/26: UOP 39 overnight, on zosyn and vanc, tolerating reg diet. poss transfer to [facility] today - SICU to discuss on rounds.   8/27: UOP increased to 310 after dose of lasix. off pressors, still getting midodrine.   8/28: Got more lasix and albumin overnight with OP of 642, still down from 1.5 yesterday during the day. Cr scott from 1.98 to 2.77. Will suggest RBUS. Bcx NGTD at 4 days. Off vanc today, still on zosyn.   8/29: stable, midodrine increased,  overnight, still with some blood from vagina (no pads documented) RBUS no hydro, Bcx neg final, IC Cx neg  8/30: stable, midodrine to bid, no OB pads documented, good UO yellow, was transfused 1u pRBC overnight with appropriate response, zosyn d/c, listed for floor  8/31: feels well, good UO, transferred to floor  9/1: still having some pain in joints - medicine consulted and will ask their recs (NSAIDs not feasible due to kidney function). Will also f/u medicine recs for continuing midodrine. Going to give another unit for suboptimal response ot u pRBCs given yesterday (Hgb 7.1 from 7.5)  9/2: Reports black stools. Received 2u pRBC yesterday with little response in H/H. No complaints; GI consulted  9/3 - no further stools; no c/o; received another unit blood yesterday; had EGD, normal study; colonoscopy tomorrow, eliquis d/c  9/4 - not able to drink all the golytely, but has some watery stool, transfused 2u pRBC for acute blood loss anemia, seen by ostomy specialist, taylor removed from stoma  9/5 - still trying to drink moviprep, watery BMs w/ some BRBPR, for colonoscopy today, per GI Colonoscopy performed without ability to get to the TI given sharp colonic turns, lots of old and bright red blood, recommend video capsule study 9/6 9/6 - still with watery BMs w/ some BRBPR, c/o weakness, decr appetite, for VCE today, hypomagnesemia, repleted magnesium, contacted Dr. Thompson who will accept transfer, pt wants to await results of VCE  9/7 - one dark BM this AM, Hbg to 10.6, pt otherwise stable, made NPO per GI note, awaiting VCE results      Plan:  - AM labs reviewed  - NPO until VCE reviewed  - continue to hold eliquis  - repeat CTPA once VCE reviewed  - PT recs rehab  - f/u GI   - protonix decreased to daily per GI recs  - neph signed off  - possible tranfer to Dr. Thompson at [facility] service on Monday

## 2024-09-12 ENCOUNTER — OFFICE VISIT (OUTPATIENT)
Dept: CARDIOLOGY | Facility: CLINIC | Age: 74
End: 2024-09-12
Payer: MEDICARE

## 2024-09-12 ENCOUNTER — PATIENT ROUNDING (BHMG ONLY) (OUTPATIENT)
Dept: CARDIOLOGY | Facility: CLINIC | Age: 74
End: 2024-09-12
Payer: MEDICARE

## 2024-09-12 VITALS
HEART RATE: 61 BPM | OXYGEN SATURATION: 95 % | WEIGHT: 157.2 LBS | BODY MASS INDEX: 25.26 KG/M2 | HEIGHT: 66 IN | DIASTOLIC BLOOD PRESSURE: 54 MMHG | SYSTOLIC BLOOD PRESSURE: 130 MMHG

## 2024-09-12 DIAGNOSIS — I10 ESSENTIAL HYPERTENSION: Chronic | ICD-10-CM

## 2024-09-12 DIAGNOSIS — I48.0 PAROXYSMAL ATRIAL FIBRILLATION: Primary | Chronic | ICD-10-CM

## 2024-09-12 NOTE — PROGRESS NOTES
September 12, 2024    Hello, may I speak with Karen Newman?    My name is Sera JAMA      I am  with [facility]  [address]  [place] [address] [phone].    Before we get started may I verify your date of birth? 1950    I am calling to officially welcome you to our practice and ask about your recent visit. Is this a good time to talk? yes    Tell me about your visit with us. What things went well?  Everything was good.       We're always looking for ways to make our patients' experiences even better. Do you have recommendations on ways we may improve?  no    Overall were you satisfied with your first visit to our practice? Yes, very satisfied.        I appreciate you taking the time to speak with me today. Is there anything else I can do for you? no      Thank you, and have a great day.

## 2024-10-07 RX ORDER — DILTIAZEM HYDROCHLORIDE 120 MG/1
240 CAPSULE, COATED, EXTENDED RELEASE ORAL DAILY
Qty: 180 CAPSULE | Refills: 3 | OUTPATIENT
Start: 2024-10-07

## 2024-10-28 RX ORDER — DILTIAZEM HYDROCHLORIDE 120 MG/1
240 CAPSULE, COATED, EXTENDED RELEASE ORAL DAILY
Qty: 180 CAPSULE | Refills: 0 | Status: SHIPPED | OUTPATIENT
Start: 2024-10-28

## 2024-10-28 NOTE — TELEPHONE ENCOUNTER
Rx Refill Note  Requested Prescriptions     Pending Prescriptions Disp Refills    levothyroxine (SYNTHROID, LEVOTHROID) 150 MCG tablet 90 tablet 0     Sig: Take 1 tablet by mouth Daily.      Last office visit with prescribing clinician: 5/22/2024   Last telemedicine visit with prescribing clinician: Visit date not found   Next office visit with prescribing clinician: 12/13/2024                         Would you like a call back once the refill request has been completed: [] Yes [] No    If the office needs to give you a call back, can they leave a voicemail: [] Yes [] No    Susie Colindres MA  10/28/24, 15:35 EDT

## 2024-10-29 RX ORDER — LEVOTHYROXINE SODIUM 150 UG/1
150 TABLET ORAL DAILY
Qty: 90 TABLET | Refills: 3 | Status: SHIPPED | OUTPATIENT
Start: 2024-10-29

## 2024-10-29 RX ORDER — LEVOTHYROXINE SODIUM 150 UG/1
150 TABLET ORAL DAILY
Qty: 90 TABLET | Refills: 3 | OUTPATIENT
Start: 2024-10-29

## 2024-11-06 ENCOUNTER — HOSPITAL ENCOUNTER (OUTPATIENT)
Dept: MAMMOGRAPHY | Facility: HOSPITAL | Age: 74
Discharge: HOME | End: 2024-11-06
Payer: MEDICARE

## 2024-11-06 DIAGNOSIS — Z12.31 SCREENING MAMMOGRAM, ENCOUNTER FOR: ICD-10-CM

## 2024-11-06 PROCEDURE — 77063 BREAST TOMOSYNTHESIS BI: CPT

## 2024-11-11 ENCOUNTER — TRANSCRIBE ORDERS (OUTPATIENT)
Dept: LAB | Facility: HOSPITAL | Age: 74
End: 2024-11-11
Payer: MEDICARE

## 2024-11-11 ENCOUNTER — LAB (OUTPATIENT)
Dept: LAB | Facility: HOSPITAL | Age: 74
End: 2024-11-11
Payer: MEDICARE

## 2024-11-11 DIAGNOSIS — N25.81 HYPERPARATHYROIDISM, SECONDARY: ICD-10-CM

## 2024-11-11 DIAGNOSIS — N18.4 CHRONIC KIDNEY DISEASE, STAGE IV (SEVERE): ICD-10-CM

## 2024-11-11 DIAGNOSIS — D64.9 ANEMIA, UNSPECIFIED TYPE: ICD-10-CM

## 2024-11-11 DIAGNOSIS — R82.81 PYURIA: ICD-10-CM

## 2024-11-11 DIAGNOSIS — D64.9 ANEMIA, UNSPECIFIED TYPE: Primary | ICD-10-CM

## 2024-11-11 LAB
ALBUMIN SERPL-MCNC: 3.7 G/DL (ref 3.5–5.2)
ANION GAP SERPL CALCULATED.3IONS-SCNC: 9.5 MMOL/L (ref 5–15)
BACTERIA UR QL AUTO: NORMAL /HPF
BASOPHILS # BLD AUTO: 0.05 10*3/MM3 (ref 0–0.2)
BASOPHILS NFR BLD AUTO: 1 % (ref 0–1.5)
BILIRUB UR QL STRIP: NEGATIVE
BUN SERPL-MCNC: 31 MG/DL (ref 8–23)
BUN/CREAT SERPL: 11.3 (ref 7–25)
CALCIUM SPEC-SCNC: 8.8 MG/DL (ref 8.6–10.5)
CHLORIDE SERPL-SCNC: 102 MMOL/L (ref 98–107)
CLARITY UR: CLEAR
CO2 SERPL-SCNC: 26.5 MMOL/L (ref 22–29)
COLOR UR: YELLOW
CREAT SERPL-MCNC: 2.74 MG/DL (ref 0.57–1)
DEPRECATED RDW RBC AUTO: 41.8 FL (ref 37–54)
EGFRCR SERPLBLD CKD-EPI 2021: 17.7 ML/MIN/1.73
EOSINOPHIL # BLD AUTO: 0.14 10*3/MM3 (ref 0–0.4)
EOSINOPHIL NFR BLD AUTO: 2.7 % (ref 0.3–6.2)
ERYTHROCYTE [DISTWIDTH] IN BLOOD BY AUTOMATED COUNT: 11.9 % (ref 12.3–15.4)
GLUCOSE SERPL-MCNC: 132 MG/DL (ref 65–99)
GLUCOSE UR STRIP-MCNC: NEGATIVE MG/DL
HCT VFR BLD AUTO: 30.9 % (ref 34–46.6)
HGB BLD-MCNC: 10.3 G/DL (ref 12–15.9)
HGB UR QL STRIP.AUTO: NEGATIVE
HYALINE CASTS UR QL AUTO: NORMAL /LPF
IMM GRANULOCYTES # BLD AUTO: 0.01 10*3/MM3 (ref 0–0.05)
IMM GRANULOCYTES NFR BLD AUTO: 0.2 % (ref 0–0.5)
IRON 24H UR-MRATE: 38 MCG/DL (ref 37–145)
IRON SATN MFR SERPL: 14 % (ref 20–50)
KETONES UR QL STRIP: NEGATIVE
LEUKOCYTE ESTERASE UR QL STRIP.AUTO: ABNORMAL
LYMPHOCYTES # BLD AUTO: 1.37 10*3/MM3 (ref 0.7–3.1)
LYMPHOCYTES NFR BLD AUTO: 26.7 % (ref 19.6–45.3)
MCH RBC QN AUTO: 32 PG (ref 26.6–33)
MCHC RBC AUTO-ENTMCNC: 33.3 G/DL (ref 31.5–35.7)
MCV RBC AUTO: 96 FL (ref 79–97)
MONOCYTES # BLD AUTO: 0.54 10*3/MM3 (ref 0.1–0.9)
MONOCYTES NFR BLD AUTO: 10.5 % (ref 5–12)
NEUTROPHILS NFR BLD AUTO: 3.02 10*3/MM3 (ref 1.7–7)
NEUTROPHILS NFR BLD AUTO: 58.9 % (ref 42.7–76)
NITRITE UR QL STRIP: NEGATIVE
NRBC BLD AUTO-RTO: 0 /100 WBC (ref 0–0.2)
PH UR STRIP.AUTO: 7 [PH] (ref 5–8)
PHOSPHATE SERPL-MCNC: 3.1 MG/DL (ref 2.5–4.5)
PLATELET # BLD AUTO: 164 10*3/MM3 (ref 140–450)
PMV BLD AUTO: 10 FL (ref 6–12)
POTASSIUM SERPL-SCNC: 4.3 MMOL/L (ref 3.5–5.2)
PROT UR QL STRIP: NEGATIVE
PTH-INTACT SERPL-MCNC: 105 PG/ML (ref 15–65)
RBC # BLD AUTO: 3.22 10*6/MM3 (ref 3.77–5.28)
RBC # UR STRIP: NORMAL /HPF
REF LAB TEST METHOD: NORMAL
SODIUM SERPL-SCNC: 138 MMOL/L (ref 136–145)
SP GR UR STRIP: 1.01 (ref 1–1.03)
SQUAMOUS #/AREA URNS HPF: NORMAL /HPF
TIBC SERPL-MCNC: 276 MCG/DL (ref 298–536)
TRANSFERRIN SERPL-MCNC: 185 MG/DL (ref 200–360)
UROBILINOGEN UR QL STRIP: ABNORMAL
WBC # UR STRIP: NORMAL /HPF
WBC NRBC COR # BLD AUTO: 5.13 10*3/MM3 (ref 3.4–10.8)

## 2024-11-11 PROCEDURE — 36415 COLL VENOUS BLD VENIPUNCTURE: CPT

## 2024-11-11 PROCEDURE — 85025 COMPLETE CBC W/AUTO DIFF WBC: CPT

## 2024-11-11 PROCEDURE — 80069 RENAL FUNCTION PANEL: CPT

## 2024-11-11 PROCEDURE — 81001 URINALYSIS AUTO W/SCOPE: CPT

## 2024-11-11 PROCEDURE — 87086 URINE CULTURE/COLONY COUNT: CPT

## 2024-11-11 PROCEDURE — 83970 ASSAY OF PARATHORMONE: CPT

## 2024-11-11 PROCEDURE — 83540 ASSAY OF IRON: CPT

## 2024-11-11 PROCEDURE — 84466 ASSAY OF TRANSFERRIN: CPT

## 2024-11-13 LAB — BACTERIA SPEC AEROBE CULT: NO GROWTH

## 2024-12-10 RX ORDER — TIRZEPATIDE 5 MG/.5ML
INJECTION, SOLUTION SUBCUTANEOUS
Qty: 6 ML | Refills: 3 | OUTPATIENT
Start: 2024-12-10

## 2024-12-10 NOTE — TELEPHONE ENCOUNTER
Rx Refill Note  Requested Prescriptions     Pending Prescriptions Disp Refills    Mounjaro 5 MG/0.5ML solution auto-injector [Pharmacy Med Name: MOUNJARO PEN 5MG/0.5ML] 6 mL 3     Sig: INJECT THE CONTENTS OF ONE PEN  SUBCUTANEOUSLY WEEKLY AS  DIRECTED      Last office visit with prescribing clinician: 5/22/2024     Next office visit with prescribing clinician: 12/13/2024

## 2024-12-13 ENCOUNTER — OFFICE VISIT (OUTPATIENT)
Dept: ENDOCRINOLOGY | Facility: CLINIC | Age: 74
End: 2024-12-13
Payer: MEDICARE

## 2024-12-13 VITALS
HEIGHT: 66 IN | OXYGEN SATURATION: 98 % | HEART RATE: 50 BPM | DIASTOLIC BLOOD PRESSURE: 48 MMHG | SYSTOLIC BLOOD PRESSURE: 108 MMHG | BODY MASS INDEX: 24.98 KG/M2 | WEIGHT: 155.4 LBS

## 2024-12-13 DIAGNOSIS — E89.0 POSTABLATIVE HYPOTHYROIDISM: ICD-10-CM

## 2024-12-13 DIAGNOSIS — E11.65 UNCONTROLLED TYPE 2 DIABETES MELLITUS WITH HYPERGLYCEMIA: Primary | ICD-10-CM

## 2024-12-13 DIAGNOSIS — Z86.39 HISTORY OF GRAVES' DISEASE: ICD-10-CM

## 2024-12-13 DIAGNOSIS — E11.40 DIABETIC NEUROPATHY WITH NEUROLOGIC COMPLICATION: ICD-10-CM

## 2024-12-13 DIAGNOSIS — I10 ESSENTIAL HYPERTENSION: ICD-10-CM

## 2024-12-13 DIAGNOSIS — E78.2 MIXED HYPERLIPIDEMIA: ICD-10-CM

## 2024-12-13 DIAGNOSIS — E11.49 DIABETIC NEUROPATHY WITH NEUROLOGIC COMPLICATION: ICD-10-CM

## 2024-12-13 DIAGNOSIS — N18.4 CHRONIC KIDNEY DISEASE, STAGE IV (SEVERE): ICD-10-CM

## 2024-12-13 LAB
EXPIRATION DATE: ABNORMAL
EXPIRATION DATE: ABNORMAL
GLUCOSE BLDC GLUCOMTR-MCNC: 187 MG/DL (ref 70–130)
HBA1C MFR BLD: 6.4 % (ref 4.5–5.7)
Lab: ABNORMAL
Lab: ABNORMAL
TSH SERPL DL<=0.05 MIU/L-ACNC: 0.16 UIU/ML (ref 0.27–4.2)

## 2024-12-13 PROCEDURE — 84443 ASSAY THYROID STIM HORMONE: CPT | Performed by: INTERNAL MEDICINE

## 2024-12-13 NOTE — ASSESSMENT & PLAN NOTE
Diabetes is stable.   Continue current treatment regimen.  Titrate up mounjaro.  Diabetes will be reassessed in 6 months.    FreeStyle Lucille 2 CGM was downloaded today.  Data was reviewed from 11/30/24 to 12/13/24.  This showed overall fairly good control.  Occ postprandial hyperglycemic excursion but no patterns for insulin adjustments.  Time in range was 86%.

## 2024-12-13 NOTE — PROGRESS NOTES
"     Office Note      Date: 2024  Patient Name: Karen Newman  MRN: [id number]  : 1950    Chief Complaint   Patient presents with    Diabetes     Type II    Hyperlipidemia     Mixed    Hypertension     Essential    Graves' Disease    Hypothyroidism     Postablative       History of Present Illness:   Karen Newman is a 74 y.o. female who presents for Diabetes type 2. Diagnosed in: . Treated in past with oral agents. Current treatments: mounjaro and basal bolus insulin. Number of insulin shots per day: 3. Checks blood sugar 288 times a day - on Free Style Lucille 2. She is making frequent adjustments in insulin based on glucose readings.  Has low blood sugar: rare. Aspirin use: No - easy bleeding. Statin use: Yes. ACE-I/ARB use: No - per nephrology. Changes in health since last visit: none. Last eye exam 2024.     She remains on T4. She is taking 150mcg qd. She is taking this correctly. She isn't taking any interfering meds concurrently. She hasn't noted any change in the size of her neck. She denies any compressive sxs. She denies any sxs of hypo- or hyperthyroidism.    Subjective      Diabetic Complications:  Eyes: Yes  Kidneys: Yes - microalbumin and elevated Cr  Feet: Yes  Heart: No    Diet and Exercise:  Meals per day: 2  Minutes of exercise per week: 0 mins.    Review of Systems:   Review of Systems   Constitutional: Negative.    Cardiovascular: Negative.    Gastrointestinal: Negative.    Endocrine: Negative.        The following portions of the patient's history were reviewed and updated as appropriate: allergies, current medications, past family history, past medical history, past social history, past surgical history, and problem list.    Objective     Visit Vitals  /48 (BP Location: Left arm, Patient Position: Sitting, Cuff Size: Adult)   Pulse 50   Ht 167.6 cm (65.98\")   Wt 70.5 kg (155 lb 6.4 oz)   LMP  (LMP Unknown)   SpO2 98%   BMI 25.09 kg/m²       Physical Exam:  Physical " "Exam  Constitutional:       Appearance: Normal appearance.   Neurological:      Mental Status: She is alert.         Labs:    HbA1c  Lab Results   Component Value Date    HGBA1C 6.4 (A) 12/13/2024       CMP  Lab Results   Component Value Date    GLUCOSE 132 (H) 11/11/2024    BUN 31 (H) 11/11/2024    CREATININE 2.74 (H) 11/11/2024    EGFRIFNONA 24 (L) 02/25/2022    BCR 11.3 11/11/2024    K 4.3 11/11/2024    CO2 26.5 11/11/2024    CALCIUM 8.8 11/11/2024    PROTENTOTREF 7.0 01/13/2023    LABIL2 0.8 01/13/2023    AST 13 05/22/2024    ALT 11 05/22/2024        Lipid Panel  Lab Results   Component Value Date    HDL Cholesterol 44 05/22/2024    LDL Cholesterol  77 05/22/2024    LDL/HDL Ratio 1.76 05/22/2024    Triglycerides 73 05/22/2024        TSH  Lab Results   Component Value Date    TSH 0.014 (L) 05/22/2024        Hemoglobin A1C  No components found for: \"HGBA1C\"     Microalbumin/Creatinine  Lab Results   Component Value Date    MALBCRERATIO 291.9 (H) 05/22/2024    MICROALBUR 11.5 05/22/2024           Assessment / Plan      Assessment & Plan:  Diagnoses and all orders for this visit:    1. Uncontrolled type 2 diabetes mellitus with hyperglycemia (Primary)  Assessment & Plan:  Diabetes is stable.   Continue current treatment regimen.  Titrate up mounjaro.  Diabetes will be reassessed in 6 months.    FreeStyle Lucille 2 CGM was downloaded today.  Data was reviewed from 11/30/24 to 12/13/24.  This showed overall fairly good control.  Occ postprandial hyperglycemic excursion but no patterns for insulin adjustments.  Time in range was 86%.    Orders:  -     POC Glucose, Blood  -     POC Glycosylated Hemoglobin (Hb A1C)    2. Essential hypertension  Assessment & Plan:  Hypertension is stable and controlled  Continue current treatment regimen.  Blood pressure will be reassessed in 6 months.      3. Mixed hyperlipidemia  Assessment & Plan:  Continue statin.  Plan to check lipids next visit.      4. Postablative " hypothyroidism  Assessment & Plan:  Continue T4 tx.  Check TSH.    Orders:  -     TSH; Future    5. History of Graves' disease    6. Diabetic neuropathy with neurologic complication    7. Chronic kidney disease, stage IV (severe)  Assessment & Plan:  Continue nephrology follow up.        Current Outpatient Medications   Medication Instructions    atorvastatin (LIPITOR) 10 mg, Oral, Daily    Blood Glucose Monitoring Suppl (OneTouch Verio) w/Device kit 1 each, Not Applicable, Daily, DX: E11.65    calcitriol (ROCALTROL) 0.25 mcg, Every Other Day    Continuous Blood Gluc  (FreeStyle Lucille 2 [place]) device 1 each, Not Applicable, Daily    Continuous Blood Gluc Sensor (FreeStyle Lucille 2 Sensor) misc 1 each, Not Applicable, Every 14 Days    dilTIAZem CD (CARDIZEM CD) 240 mg, Oral, Daily    doxazosin (CARDURA) 8 mg, Oral, Every 12 Hours    furosemide (LASIX) 20 mg, Oral, Every Other Day, Take 1 tab po prn worsening SOA/edema    hydrALAZINE (APRESOLINE) 100 mg, Oral, 2 Times Daily    Insulin Lispro, 1 Unit Dial, (HumaLOG KwikPen) 100 UNIT/ML solution pen-injector INJECT SUBCUTANEOUSLY 16 TO 20  UNITS 3 TIMES DAILY WITH MEALS    Levemir FlexPen 100 UNIT/ML injection INJECT SUBCUTANEOUSLY 35 UNITS  DAILY AS DIRECTED    levothyroxine (SYNTHROID, LEVOTHROID) 150 mcg, Oral, Daily    Linzess 145 MCG capsule capsule 1 capsule, Daily    Mounjaro 5 MG/0.5ML solution pen-injector pen INJECT THE CONTENTS OF ONE PEN  SUBCUTANEOUSLY WEEKLY AS  DIRECTED    omeprazole (PRILOSEC) 40 mg, Daily    OneTouch Verio test strip Use 4x per day:  ICD-10 E11.65, E11.649, Z79.4    PARoxetine (PAXIL) 40 mg, Daily      Return in about 6 months (around 6/13/2025) for Recheck with A1c, CMP, lipid, TSH, microalbumin, foot exam.    Electronically signed by: Karel Blevins MD  12/13/2024

## 2024-12-15 RX ORDER — LEVOTHYROXINE SODIUM 137 UG/1
137 TABLET ORAL DAILY
Qty: 90 TABLET | Refills: 3 | Status: SHIPPED | OUTPATIENT
Start: 2024-12-15

## 2024-12-23 RX ORDER — DILTIAZEM HYDROCHLORIDE 120 MG/1
240 CAPSULE, COATED, EXTENDED RELEASE ORAL DAILY
Qty: 180 CAPSULE | Refills: 3 | Status: SHIPPED | OUTPATIENT
Start: 2024-12-23

## 2025-01-31 ENCOUNTER — LAB (OUTPATIENT)
Dept: LAB | Facility: HOSPITAL | Age: 75
End: 2025-01-31
Payer: MEDICARE

## 2025-01-31 ENCOUNTER — TRANSCRIBE ORDERS (OUTPATIENT)
Dept: LAB | Facility: HOSPITAL | Age: 75
End: 2025-01-31
Payer: MEDICARE

## 2025-01-31 DIAGNOSIS — N18.4 CKD STAGE G4/A3, GFR 15-29 AND ALBUMIN CREATININE RATIO >300 MG/G: ICD-10-CM

## 2025-01-31 DIAGNOSIS — N18.4 CKD STAGE G4/A3, GFR 15-29 AND ALBUMIN CREATININE RATIO >300 MG/G: Primary | ICD-10-CM

## 2025-01-31 LAB
BACTERIA UR QL AUTO: NORMAL /HPF
BILIRUB UR QL STRIP: NEGATIVE
CLARITY UR: CLEAR
COLOR UR: YELLOW
GLUCOSE UR STRIP-MCNC: NEGATIVE MG/DL
HGB UR QL STRIP.AUTO: NEGATIVE
HYALINE CASTS UR QL AUTO: NORMAL /LPF
KETONES UR QL STRIP: NEGATIVE
LEUKOCYTE ESTERASE UR QL STRIP.AUTO: NEGATIVE
NITRITE UR QL STRIP: NEGATIVE
PH UR STRIP.AUTO: 6.5 [PH] (ref 5–8)
PROT UR QL STRIP: NEGATIVE
RBC # UR STRIP: NORMAL /HPF
REF LAB TEST METHOD: NORMAL
SP GR UR STRIP: 1.01 (ref 1–1.03)
SQUAMOUS #/AREA URNS HPF: NORMAL /HPF
UROBILINOGEN UR QL STRIP: NORMAL
WBC # UR STRIP: NORMAL /HPF

## 2025-01-31 PROCEDURE — 36415 COLL VENOUS BLD VENIPUNCTURE: CPT

## 2025-01-31 PROCEDURE — 80069 RENAL FUNCTION PANEL: CPT

## 2025-01-31 PROCEDURE — 82570 ASSAY OF URINE CREATININE: CPT

## 2025-01-31 PROCEDURE — 84156 ASSAY OF PROTEIN URINE: CPT

## 2025-01-31 PROCEDURE — 81001 URINALYSIS AUTO W/SCOPE: CPT

## 2025-01-31 PROCEDURE — 85025 COMPLETE CBC W/AUTO DIFF WBC: CPT

## 2025-02-01 LAB
ALBUMIN SERPL-MCNC: 3.7 G/DL (ref 3.5–5.2)
ANION GAP SERPL CALCULATED.3IONS-SCNC: 12.3 MMOL/L (ref 5–15)
BASOPHILS # BLD AUTO: 0.04 10*3/MM3 (ref 0–0.2)
BASOPHILS NFR BLD AUTO: 0.9 % (ref 0–1.5)
BUN SERPL-MCNC: 33 MG/DL (ref 8–23)
BUN/CREAT SERPL: 12.3 (ref 7–25)
CALCIUM SPEC-SCNC: 9.1 MG/DL (ref 8.6–10.5)
CHLORIDE SERPL-SCNC: 98 MMOL/L (ref 98–107)
CO2 SERPL-SCNC: 25.7 MMOL/L (ref 22–29)
CREAT SERPL-MCNC: 2.68 MG/DL (ref 0.57–1)
CREAT UR-MCNC: 30.2 MG/DL
DEPRECATED RDW RBC AUTO: 43.4 FL (ref 37–54)
EGFRCR SERPLBLD CKD-EPI 2021: 18.1 ML/MIN/1.73
EOSINOPHIL # BLD AUTO: 0.15 10*3/MM3 (ref 0–0.4)
EOSINOPHIL NFR BLD AUTO: 3.5 % (ref 0.3–6.2)
ERYTHROCYTE [DISTWIDTH] IN BLOOD BY AUTOMATED COUNT: 12.6 % (ref 12.3–15.4)
GLUCOSE SERPL-MCNC: 132 MG/DL (ref 65–99)
HCT VFR BLD AUTO: 35.1 % (ref 34–46.6)
HGB BLD-MCNC: 11.4 G/DL (ref 12–15.9)
IMM GRANULOCYTES # BLD AUTO: 0.01 10*3/MM3 (ref 0–0.05)
IMM GRANULOCYTES NFR BLD AUTO: 0.2 % (ref 0–0.5)
LYMPHOCYTES # BLD AUTO: 1.34 10*3/MM3 (ref 0.7–3.1)
LYMPHOCYTES NFR BLD AUTO: 31.5 % (ref 19.6–45.3)
MCH RBC QN AUTO: 31 PG (ref 26.6–33)
MCHC RBC AUTO-ENTMCNC: 32.5 G/DL (ref 31.5–35.7)
MCV RBC AUTO: 95.4 FL (ref 79–97)
MONOCYTES # BLD AUTO: 0.42 10*3/MM3 (ref 0.1–0.9)
MONOCYTES NFR BLD AUTO: 9.9 % (ref 5–12)
NEUTROPHILS NFR BLD AUTO: 2.3 10*3/MM3 (ref 1.7–7)
NEUTROPHILS NFR BLD AUTO: 54 % (ref 42.7–76)
NRBC BLD AUTO-RTO: 0 /100 WBC (ref 0–0.2)
PHOSPHATE SERPL-MCNC: 3.8 MG/DL (ref 2.5–4.5)
PLATELET # BLD AUTO: 181 10*3/MM3 (ref 140–450)
PMV BLD AUTO: 10.1 FL (ref 6–12)
POTASSIUM SERPL-SCNC: 4.2 MMOL/L (ref 3.5–5.2)
PROT ?TM UR-MCNC: 8.1 MG/DL
RBC # BLD AUTO: 3.68 10*6/MM3 (ref 3.77–5.28)
SODIUM SERPL-SCNC: 136 MMOL/L (ref 136–145)
WBC NRBC COR # BLD AUTO: 4.26 10*3/MM3 (ref 3.4–10.8)

## 2025-02-04 ENCOUNTER — TELEPHONE (OUTPATIENT)
Dept: ENDOCRINOLOGY | Facility: CLINIC | Age: 75
End: 2025-02-04
Payer: MEDICARE

## 2025-02-04 RX ORDER — TIRZEPATIDE 5 MG/.5ML
INJECTION, SOLUTION SUBCUTANEOUS
Qty: 6 ML | Refills: 1 | Status: SHIPPED | OUTPATIENT
Start: 2025-02-04

## 2025-02-04 NOTE — TELEPHONE ENCOUNTER
Caller: Karen Newman    Relationship: Self    Best call back number: [phone]    What is the best time to reach you: ANYTIME    Who are you requesting to speak with (clinical staff, provider,  specific staff member): CLINICAL STAFF      What was the call regarding: [facility] IS NEEDING A PRESCRIPTION SENT TO THEM FOR MOUNJARO 5 MG. PT IS ALSO WANTING TO CHECK ON PAPERWORK THAT WAS SUPPOSED TO BE SENT TO INSURANCE COMPANY FOR PRE AUTHORIZATION.PHONE [phone] PLEASE CALL AND ADVISE.

## 2025-02-04 NOTE — TELEPHONE ENCOUNTER
Last office visit 12/13/2024.  Follow up scheduled for 06/27/2025.      Left message to return call.  Need denial from pharmacy. Will send rx and that should generate denial.

## 2025-02-11 ENCOUNTER — TELEPHONE (OUTPATIENT)
Dept: ENDOCRINOLOGY | Facility: CLINIC | Age: 75
End: 2025-02-11

## 2025-02-11 NOTE — TELEPHONE ENCOUNTER
Pt's spouse called to ask us to be on the lookout for a fax for a Prior Auth to start for Mounjaro. Stated he thinks patient is out of this medication.

## 2025-02-13 NOTE — TELEPHONE ENCOUNTER
PT'S  CALLED TO F/U ON PA. I TOLD HIM WE HAVE YET TO RECEIVE ANYTHING.    Head is atraumatic. Head shape is symmetrical.

## 2025-02-14 NOTE — TELEPHONE ENCOUNTER
Called the  and he is either going to fax us a copy of both cards or he will bring it by the office.   Detail Level: Detailed

## 2025-02-17 ENCOUNTER — TELEPHONE (OUTPATIENT)
Dept: ENDOCRINOLOGY | Facility: CLINIC | Age: 75
End: 2025-02-17
Payer: MEDICARE

## 2025-02-17 NOTE — TELEPHONE ENCOUNTER
He was calling to see if we received a fax from [facility] for the pa for her mounjaro they told him they were going to fax this to us    Pts number  [phone]

## 2025-02-19 ENCOUNTER — TELEPHONE (OUTPATIENT)
Dept: CARDIOLOGY | Facility: CLINIC | Age: 75
End: 2025-02-19
Payer: MEDICARE

## 2025-02-19 NOTE — TELEPHONE ENCOUNTER
Spoke to patient. Advised patient that 3/6/25 appointment in [place] is now scheduled with Dr. Alec Johnson.     Patient voiced understanding

## 2025-02-20 RX ORDER — TIRZEPATIDE 5 MG/.5ML
5 INJECTION, SOLUTION SUBCUTANEOUS WEEKLY
Qty: 8 ML | Refills: 0 | Status: SHIPPED | OUTPATIENT
Start: 2025-02-20

## 2025-02-20 RX ORDER — TIRZEPATIDE 5 MG/.5ML
5 INJECTION, SOLUTION SUBCUTANEOUS WEEKLY
Qty: 6 ML | Refills: 1 | Status: SHIPPED | OUTPATIENT
Start: 2025-02-20 | End: 2025-02-20 | Stop reason: SDUPTHER

## 2025-02-20 NOTE — TELEPHONE ENCOUNTER
SJ WITH [facility] NEEDS US TO RESEND THE MOUNJARO PRESCRIPTION WITH A DIAGNOSIS CODE SO IT CAN BE RAN THROUGH INSURANCE. THEY HAVE RECEIVED 2 PRESCRIPTIONS FOR MOUNJARO BUT NO DIAGNOSIS WITH IT OR ON THE PRESCRIPTION. PLEASE RESEND TO THEY CAN FILE IT WITH INSURANCE. PHONE NUMBER -[phone]

## 2025-02-20 NOTE — TELEPHONE ENCOUNTER
Please add dx code and send to phar.    Script is pending.    Last office visit with prescribing clinician: 12/13/2024       Next office visit with prescribing clinician: 6/27/2025     {

## 2025-02-20 NOTE — TELEPHONE ENCOUNTER
Rx Refill Note  Requested Prescriptions     Pending Prescriptions Disp Refills    Tirzepatide (Mounjaro) 5 MG/0.5ML solution auto-injector 8 mL 0     Sig: Inject 5 mg under the skin into the appropriate area as directed 1 (One) Time Per Week. Dx E11.65      Last office visit with prescribing clinician: 12/13/2024     Next office visit with prescribing clinician: 2/20/2025   {Archana Clements MA  02/20/25, 13:35 EST

## 2025-02-20 NOTE — TELEPHONE ENCOUNTER
Patients  called, stated that we called in a script for mounjaro this morning to [facility]. Stated that the script is needing the dx code so that insurance will pay for it. Needing new script with dx code faxed to pharmacy.

## 2025-02-20 NOTE — TELEPHONE ENCOUNTER
Spoke with insurance.  They state that claim was paid at [facility] on 01/24/2025 but It was reversed.  Tran with [facility] called [facility] while I was on the phone.  They state just ran a test claim and it went through.  Spoke with patients .  He requested rx be resent to [facility].  Rx resent per protocol.

## 2025-03-04 ENCOUNTER — PRIOR AUTHORIZATION (OUTPATIENT)
Dept: ENDOCRINOLOGY | Facility: CLINIC | Age: 75
End: 2025-03-04
Payer: MEDICARE

## 2025-03-06 ENCOUNTER — OFFICE VISIT (OUTPATIENT)
Dept: CARDIOLOGY | Facility: CLINIC | Age: 75
End: 2025-03-06
Payer: MEDICARE

## 2025-03-06 VITALS
BODY MASS INDEX: 26.16 KG/M2 | SYSTOLIC BLOOD PRESSURE: 138 MMHG | DIASTOLIC BLOOD PRESSURE: 76 MMHG | WEIGHT: 157 LBS | OXYGEN SATURATION: 95 % | HEART RATE: 65 BPM | HEIGHT: 65 IN

## 2025-03-06 DIAGNOSIS — I48.0 PAROXYSMAL ATRIAL FIBRILLATION: Primary | ICD-10-CM

## 2025-03-06 DIAGNOSIS — E78.2 MIXED HYPERLIPIDEMIA: ICD-10-CM

## 2025-03-06 DIAGNOSIS — Z86.79 STATUS POST MAZE OPERATION FOR ATRIAL FIBRILLATION: ICD-10-CM

## 2025-03-06 DIAGNOSIS — I10 ESSENTIAL HYPERTENSION: ICD-10-CM

## 2025-03-06 DIAGNOSIS — Z98.890 STATUS POST MAZE OPERATION FOR ATRIAL FIBRILLATION: ICD-10-CM

## 2025-03-06 RX ORDER — FUROSEMIDE 40 MG/1
20 TABLET ORAL DAILY
COMMUNITY
Start: 2025-01-21

## 2025-03-06 NOTE — PROGRESS NOTES
[facility]  Office visit  Karen Newman  1950  [phone]  There is no work phone number on file.    VISIT DATE:  3/6/2025    PCP: Mirian Arnold APRN  [address] [place] [id number]    CC:  Chief Complaint   Patient presents with    Paroxysmal atrial fibrillation     Problem List  Atrial fibrillation, duration unknown; diagnosed August of 2013  ALANIS/external cardioversion, 10/18/2013, with the initiation of propafenone therapy.   Atrial fibrillation, 11/13/2013  Normal sinus rhythm at the time of office visit, 11/21/2013  Discontinuation of Rythmol secondary to diarrhea, 06/05/2014, with initiation of flecainide 50 mg b.i.d. and Eliquis 5 mg b.i.d.   CHADS Vasc= 4, 04/18/2017  CT scan of the chest 06/29/2017: Mild fibrotic changes, o/w normal  Pulmonary Function Test 06/29/2017: FEV1/FVC  1.54/2.57 = 60%  Nuclear Stress Test 06/29/2017: EF>70%, no ischemia   Bilateral thoracoscopy and Maze procedure and clipping of the left atrial appendage 09/26/2017 Dr. Kay  ALANIS 10/24/17: EF 60%, RADHA successfully closed with no evidence of a residual left atrial appendage. Mild to moderate MR  14d Zio, 1/6/2021: NSR. PAF <1%  MCOT, 07/20/2023: SB/SR. Occasional PVC's. Frequent PAC's. One ventricular triplet. Events correlate with isolated PAC's.  Successful ECV, 10/12/2023: Dr. Osullivan.  RFA of pulmonary veins, posterior wall isolation and CTI, 3/11/24 by Dr. Gonzalez  Valvular heart disease  Previous echo approximately, 2006 (incomplete data base).  Echo 08/15/2002, revealing moderate concentric LVH, hyperdynamic LV, EF 81%; mild MR, trace TR.  Echo 10/07/2008, revealed a normal LVEF with mild to moderate MR.  Echo 07/01/2011: normal LV systolic function and wall motion with mild MR and mild TR.   Echo, 09/22/2017: EF 60%. Moderate to severe MR. Trace to mild TR. No thrombus appears to be present within the left atrial appendage.  ALANIS 10/24/2017: EF 60%, RADHA successfully closed  with no evidence of a residual left atrial appendage. Mild to moderate MR  Echo 01/10/2020: EF 65%, trace MR, mild TR, mild LVH.   Echo 04/07/2022: LVEF 60-65%. Mild concentric LVH. Normal LV diastolic filling pattern. Mild MR.   Echo, 10/12/2023: EF 60%. Trace to mild MR. Trace TR. RVSP is 22 mmHg.  Hypertension  Adenosine Cardiolite, 06/18/2009: Normal systolic function without evidence of inducible ischemia.   Echo 1/11/20: EF 65%. Trace MR. Mild TR. Mild LVH.  Renal duplex 03/22/2022: No evidence to suggest significant renal artery stenosis.   Dyslipidemia  Diabetes mellitus, Type 2 diagnosed, 2006  Thyroid disease  Hyperthyroidism status post radioactive iodine therapy.  Hypothyroidism on thyroid replacement.  Gastroesophageal reflux disease.  Chronic kidney disease  History of hemorrhoids status post hemorrhoidectomy.  Colon polyps status post polypectomy.  Anxiety  Chronic Iron deficiency anemia.  Obstructive sleep apnea on chronic CPAP therapy.  Surgical history:  Total abdominal hysterectomy  Squamous cell removal (right foot)        ASSESSMENT:   Diagnosis Plan   1. Paroxysmal atrial fibrillation        2. Mixed hyperlipidemia        3. Essential hypertension        4. Status post Maze operation for atrial fibrillation            PLAN:  Paroxysmal atrial fibrillation: Asymptomatic.  Maintaining sinus rhythm following repeat PVI.  Anticoagulation not indicated following left atrial appendage clipping.  Continue follow-up with EP colleague.    Hypertension: Goal less than 130/80 mmHg.  Currently with reasonable control.  Continue current medical therapy.    Hyperlipidemia: Goal LDL less than 100, ideally less than 70.  Continue atorvastatin 10 mg p.o. daily.    Subjective  Stable functional capacity.  No limitations.  Denies chest pain, palpitations or dyspnea.  Blood pressures running less than 130/80 mmHg.  She is compliant with medical therapy.    PHYSICAL EXAMINATION:  Vitals:    03/06/25 1105   BP:  "138/76   BP Location: Right arm   Pulse: 65   SpO2: 95%   Weight: 71.2 kg (157 lb)   Height: 165.1 cm (65\")     General Appearance:    Alert, cooperative, no distress, appears stated age   Head:    Normocephalic, without obvious abnormality, atraumatic   Eyes:    conjunctiva/corneas clear   Nose:   Nares normal, septum midline, mucosa normal, no drainage   Throat:   Lips, teeth and gums normal   Neck:   Supple, symmetrical, trachea midline, no carotid    bruit or JVD   Lungs:     Clear to auscultation bilaterally, respirations unlabored   Chest Wall:    No tenderness or deformity    Heart:    Regular rate and rhythm, S1 and S2 normal, no murmur, rub   or gallop, normal carotid impulse bilaterally without bruit.   Abdomen:     Soft, non-tender   Extremities:   Extremities normal, atraumatic, no cyanosis or edema   Pulses:   2+ and symmetric all extremities   Skin:   Skin color, texture, turgor normal, no rashes or lesions       Diagnostic Data:  Procedures  Lab Results   Component Value Date    TRIG 73 05/22/2024    HDL 44 05/22/2024     Lab Results   Component Value Date    GLUCOSE 132 (H) 01/31/2025    BUN 33 (H) 01/31/2025    CREATININE 2.68 (H) 01/31/2025     01/31/2025    K 4.2 01/31/2025    CL 98 01/31/2025    CO2 25.7 01/31/2025     Lab Results   Component Value Date    HGBA1C 6.4 (A) 12/13/2024     Lab Results   Component Value Date    WBC 4.26 01/31/2025    HGB 11.4 (L) 01/31/2025    HCT 35.1 01/31/2025     01/31/2025       Allergies  Allergies   Allergen Reactions    Amlodipine Shortness Of Breath     Swelling and SOB    Buspirone Nausea And Vomiting    Lisinopril Other (See Comments)     cough    Sertraline Hcl Other (See Comments)     nightmares    Sular [Nisoldipine Er] Cough    Carvedilol Other (See Comments)     SOB       Current Medications    Current Outpatient Medications:     atorvastatin (LIPITOR) 10 MG tablet, Take 1 tablet by mouth Daily., Disp: 90 tablet, Rfl: 3    Blood Glucose " Monitoring Suppl (OneTouch Verio) w/Device kit, 1 each Daily. DX: E11.65, Disp: 1 kit, Rfl: 0    calcitriol (ROCALTROL) 0.25 MCG capsule, Take 1 capsule by mouth Every Other Day., Disp: , Rfl:     Continuous Blood Gluc  (FreeStyle Lucille 2 Negley) device, 1 each Daily., Disp: 1 each, Rfl: 0    Continuous Blood Gluc Sensor (FreeStyle Lucille 2 Sensor) misc, 1 each Every 14 (Fourteen) Days., Disp: 2 each, Rfl: 5    dilTIAZem CD (CARDIZEM CD) 120 MG 24 hr capsule, TAKE 2 CAPSULES BY MOUTH DAILY, Disp: 180 capsule, Rfl: 3    doxazosin (CARDURA) 8 MG tablet, Take 1 tablet by mouth Every 12 (Twelve) Hours., Disp: 180 tablet, Rfl: 3    furosemide (Lasix) 20 MG tablet, Take 1 tablet by mouth Every Other Day. Take 1 tab po prn worsening SOA/edema, Disp: 15 tablet, Rfl: 11    furosemide (LASIX) 40 MG tablet, Take 0.5 tablets by mouth Daily., Disp: , Rfl:     hydrALAZINE (APRESOLINE) 100 MG tablet, Take 1 tablet by mouth 2 (Two) Times a Day., Disp: 180 tablet, Rfl: 3    Insulin Lispro, 1 Unit Dial, (HumaLOG KwikPen) 100 UNIT/ML solution pen-injector, INJECT SUBCUTANEOUSLY 16 TO 20  UNITS 3 TIMES DAILY WITH MEALS, Disp: 60 mL, Rfl: 3    Levemir FlexPen 100 UNIT/ML injection, INJECT SUBCUTANEOUSLY 35 UNITS  DAILY AS DIRECTED, Disp: 45 mL, Rfl: 1    levothyroxine (SYNTHROID, LEVOTHROID) 137 MCG tablet, Take 1 tablet by mouth Daily., Disp: 90 tablet, Rfl: 3    Linzess 145 MCG capsule capsule, Take 1 capsule by mouth Daily., Disp: , Rfl:     omeprazole (priLOSEC) 40 MG capsule, Take 1 capsule by mouth Daily., Disp: , Rfl:     OneTouch Verio test strip, Use 4x per day:  ICD-10 E11.65, E11.649, Z79.4, Disp: 400 each, Rfl: 3    PARoxetine (PAXIL) 40 MG tablet, Take 1 tablet by mouth Daily., Disp: , Rfl:     Tirzepatide (Mounjaro) 5 MG/0.5ML solution auto-injector, Inject 5 mg under the skin into the appropriate area as directed 1 (One) Time Per Week. Dx E11.65, Disp: 8 mL, Rfl: 0          ROS  ROS      SOCIAL HX  Social History      Socioeconomic History    Marital status:     Number of children: 3   Tobacco Use    Smoking status: Former     Current packs/day: 0.00     Average packs/day: 0.5 packs/day for 41.8 years (20.9 ttl pk-yrs)     Types: Cigarettes     Start date:      Quit date: 10/18/2016     Years since quittin.3     Passive exposure: Past    Smokeless tobacco: Never   Vaping Use    Vaping status: Never Used   Substance and Sexual Activity    Alcohol use: No    Drug use: No    Sexual activity: Defer       FAMILY HX  Family History   Problem Relation Age of Onset    Hypertension Mother     Coronary artery disease Mother     Kidney disease Father              Alec Johnson III, MD, FACC

## 2025-03-12 ENCOUNTER — TELEPHONE (OUTPATIENT)
Dept: CARDIOLOGY | Facility: CLINIC | Age: 75
End: 2025-03-12
Payer: MEDICARE

## 2025-03-12 NOTE — TELEPHONE ENCOUNTER
Name: PippamunirJas    Relationship: Emergency Contact    Best Callback Number: [phone]    Incoming call to the Hub, requesting to  Reschedule their HFU appointment on 3/13/25.     Per Hub workflow, further review is needed before the task can be completed.    Result of Call: Transferred to CARLOS at the practice

## 2025-03-14 ENCOUNTER — APPOINTMENT (OUTPATIENT)
Dept: CT IMAGING | Facility: HOSPITAL | Age: 75
End: 2025-03-14
Attending: FAMILY MEDICINE
Payer: MEDICARE

## 2025-03-14 ENCOUNTER — APPOINTMENT (OUTPATIENT)
Dept: GENERAL RADIOLOGY | Facility: HOSPITAL | Age: 75
End: 2025-03-14
Attending: FAMILY MEDICINE
Payer: MEDICARE

## 2025-03-14 ENCOUNTER — HOSPITAL ENCOUNTER (EMERGENCY)
Facility: HOSPITAL | Age: 75
Discharge: ANOTHER HOSPITAL | End: 2025-03-15
Attending: FAMILY MEDICINE
Payer: MEDICARE

## 2025-03-14 DIAGNOSIS — S72.002A CLOSED FRACTURE OF NECK OF LEFT FEMUR, INITIAL ENCOUNTER: Primary | ICD-10-CM

## 2025-03-14 DIAGNOSIS — R42 DIZZINESS: ICD-10-CM

## 2025-03-14 LAB
BASOPHILS # BLD: 0 K/UL (ref 0–0.1)
BASOPHILS NFR BLD: 0.1 %
EOSINOPHIL # BLD: 0 K/UL (ref 0–0.4)
EOSINOPHIL NFR BLD: 0.4 %
ERYTHROCYTE [DISTWIDTH] IN BLOOD BY AUTOMATED COUNT: 13.6 % (ref 11–16)
HCT VFR BLD AUTO: 33.8 % (ref 37–47)
HGB BLD-MCNC: 11.1 G/DL (ref 11.5–16.5)
IMM GRANULOCYTES # BLD: 0 K/UL
IMM GRANULOCYTES NFR BLD: 0.5 % (ref 0–5)
LYMPHOCYTES # BLD: 1.3 K/UL (ref 1.5–4)
LYMPHOCYTES NFR BLD: 15.4 %
MCH RBC QN AUTO: 30.7 PG (ref 27–32)
MCHC RBC AUTO-ENTMCNC: 32.8 G/DL (ref 31–35)
MCV RBC AUTO: 93.4 FL (ref 80–100)
MONOCYTES # BLD: 0.8 K/UL (ref 0.2–0.8)
MONOCYTES NFR BLD: 9.4 %
NEUTROPHILS # BLD: 6.2 K/UL (ref 2–7.5)
NEUTS SEG NFR BLD: 74.2 %
PLATELET # BLD AUTO: 186 K/UL (ref 150–400)
PMV BLD AUTO: 9.3 FL (ref 6–10)
RBC # BLD AUTO: 3.62 M/UL (ref 3.8–5.8)
WBC # BLD AUTO: 8.3 K/UL (ref 4–11)

## 2025-03-14 PROCEDURE — 84484 ASSAY OF TROPONIN QUANT: CPT

## 2025-03-14 PROCEDURE — 73503 X-RAY EXAM HIP UNI 4/> VIEWS: CPT

## 2025-03-14 PROCEDURE — 36415 COLL VENOUS BLD VENIPUNCTURE: CPT

## 2025-03-14 PROCEDURE — 80048 BASIC METABOLIC PNL TOTAL CA: CPT

## 2025-03-14 PROCEDURE — 99285 EMERGENCY DEPT VISIT HI MDM: CPT

## 2025-03-14 PROCEDURE — 70450 CT HEAD/BRAIN W/O DYE: CPT

## 2025-03-14 PROCEDURE — 85025 COMPLETE CBC W/AUTO DIFF WBC: CPT

## 2025-03-14 RX ORDER — KETOROLAC TROMETHAMINE 30 MG/ML
INJECTION, SOLUTION INTRAMUSCULAR; INTRAVENOUS
Status: DISCONTINUED
Start: 2025-03-14 | End: 2025-03-15 | Stop reason: HOSPADM

## 2025-03-14 ASSESSMENT — PAIN DESCRIPTION - LOCATION: LOCATION: HIP

## 2025-03-14 ASSESSMENT — PAIN DESCRIPTION - ORIENTATION: ORIENTATION: LEFT

## 2025-03-14 ASSESSMENT — PAIN DESCRIPTION - FREQUENCY: FREQUENCY: CONTINUOUS

## 2025-03-14 ASSESSMENT — PAIN DESCRIPTION - ONSET: ONSET: SUDDEN

## 2025-03-14 ASSESSMENT — LIFESTYLE VARIABLES
HOW MANY STANDARD DRINKS CONTAINING ALCOHOL DO YOU HAVE ON A TYPICAL DAY: PATIENT DOES NOT DRINK
HOW OFTEN DO YOU HAVE A DRINK CONTAINING ALCOHOL: NEVER

## 2025-03-14 ASSESSMENT — PAIN DESCRIPTION - PAIN TYPE: TYPE: ACUTE PAIN

## 2025-03-14 ASSESSMENT — PAIN - FUNCTIONAL ASSESSMENT: PAIN_FUNCTIONAL_ASSESSMENT: 0-10

## 2025-03-14 ASSESSMENT — PAIN DESCRIPTION - DESCRIPTORS: DESCRIPTORS: SHOOTING;CRAMPING

## 2025-03-14 ASSESSMENT — PAIN SCALES - GENERAL: PAINLEVEL_OUTOF10: 8

## 2025-03-14 NOTE — TELEPHONE ENCOUNTER
LVM W THE APPT DATE, TIME, LOCATION & WHO SHE WILL BE SEEING. MAILED OUT THE APPT REMINDER PAPER.

## 2025-03-15 VITALS
TEMPERATURE: 98.2 F | DIASTOLIC BLOOD PRESSURE: 74 MMHG | RESPIRATION RATE: 15 BRPM | SYSTOLIC BLOOD PRESSURE: 159 MMHG | BODY MASS INDEX: 25.49 KG/M2 | OXYGEN SATURATION: 90 % | WEIGHT: 153 LBS | HEART RATE: 68 BPM | HEIGHT: 65 IN

## 2025-03-15 LAB
ANION GAP SERPL CALCULATED.3IONS-SCNC: 14 MMOL/L (ref 3–16)
BUN SERPL-MCNC: 55 MG/DL (ref 6–20)
CALCIUM SERPL-MCNC: 9.1 MG/DL (ref 8.3–10.6)
CHLORIDE SERPL-SCNC: 100 MMOL/L (ref 98–107)
CO2 SERPL-SCNC: 23 MMOL/L (ref 20–30)
CREAT SERPL-MCNC: 3 MG/DL (ref 0.4–1.2)
GFR SERPLBLD CREATININE-BSD FMLA CKD-EPI: 16 ML/MIN/{1.73_M2}
GLUCOSE SERPL-MCNC: 127 MG/DL (ref 74–106)
POTASSIUM SERPL-SCNC: 4 MMOL/L (ref 3.4–5.1)
SODIUM SERPL-SCNC: 137 MMOL/L (ref 136–145)
TROPONIN, HIGH SENSITIVITY: 16 NG/L (ref 0–14)

## 2025-03-15 PROCEDURE — 6360000002 HC RX W HCPCS: Performed by: FAMILY MEDICINE

## 2025-03-15 PROCEDURE — 6360000002 HC RX W HCPCS

## 2025-03-15 PROCEDURE — 96374 THER/PROPH/DIAG INJ IV PUSH: CPT

## 2025-03-15 RX ORDER — ONDANSETRON 2 MG/ML
INJECTION INTRAMUSCULAR; INTRAVENOUS
Status: COMPLETED
Start: 2025-03-15 | End: 2025-03-15

## 2025-03-15 RX ORDER — MORPHINE SULFATE 2 MG/ML
2 INJECTION, SOLUTION INTRAMUSCULAR; INTRAVENOUS ONCE
Refills: 0 | Status: COMPLETED | OUTPATIENT
Start: 2025-03-15 | End: 2025-03-15

## 2025-03-15 RX ORDER — ONDANSETRON 2 MG/ML
4 INJECTION INTRAMUSCULAR; INTRAVENOUS ONCE
Status: DISCONTINUED | OUTPATIENT
Start: 2025-03-15 | End: 2025-03-15 | Stop reason: HOSPADM

## 2025-03-15 RX ADMIN — ONDANSETRON 4 MG: 2 INJECTION, SOLUTION INTRAMUSCULAR; INTRAVENOUS at 01:07

## 2025-03-15 RX ADMIN — MORPHINE SULFATE 2 MG: 2 INJECTION, SOLUTION INTRAMUSCULAR; INTRAVENOUS at 00:51

## 2025-03-15 ASSESSMENT — PAIN DESCRIPTION - ORIENTATION: ORIENTATION: LEFT

## 2025-03-15 ASSESSMENT — PAIN DESCRIPTION - LOCATION: LOCATION: HIP

## 2025-03-15 ASSESSMENT — PAIN SCALES - GENERAL: PAINLEVEL_OUTOF10: 5

## 2025-03-15 NOTE — ED TRIAGE NOTES
Patient arrives via EMS from home with c/o left hip pain s/p fall from standing position in kitchen. She reports she got dizzy. She denies hitting head or LOC. Patient arrives with #20 g in RAC. Patient given Toradol 30mg IVP PTA by EMS.

## 2025-03-15 NOTE — ED NOTES
Patient provided with mouth swabs. Warm blankets provided. Daughter at bedside. Patient and daughter updated on POC. Verbalized understanding. Denied further questions or concerns at this time.

## 2025-03-15 NOTE — ED PROVIDER NOTES
[facility] DEPARTMENT  EMERGENCY DEPARTMENT ENCOUNTER        Pt Name: Jayleen Peters  MRN: [id number]  Birthdate 1950  Date of evaluation: 3/14/2025  Provider: Ayaz Dunn MD  PCP: Aysha Luciano APRN  Note Started: 10:56 PM EDT 3/14/25    CHIEF COMPLAINT       Chief Complaint   Patient presents with    Fall     Fall from standing position. C/O left hip pain.       HISTORY OF PRESENT ILLNESS: 1 or more Elements     History from : Patient    Limitations to history : None    Jayleen Peters is a 74 y.o. female who presents with past history of hypertension, hyperlipidemia, diabetes, A-fib with ablation.  Who had a fall today.  Patient said she was dizzy when she about to get to the fridge and then fell like she did not pass out and she made close to the fridge.  She to maybe she slipped but not sure.  She fell on her left hip.  Rated the pain in the hip to be about 10/10 in severity, no movement in hip due to pain    Nursing Notes were all reviewed and agreed with or any disagreements were addressed in the HPI.    REVIEW OF SYSTEMS :      Review of Systems     systems reviewed and negative except as in HPI/MDM    PAST MEDICAL HISTORY     Past Medical History:   Diagnosis Date    CAD (coronary artery disease)     Hypertension     Hyperthyroidism     IgA nephropathy     Type II or unspecified type diabetes mellitus without mention of complication, not stated as uncontrolled        SURGICAL HISTORY     Past Surgical History:   Procedure Laterality Date    COLONOSCOPY  2000    polypectomy    HYSTERECTOMY (CERVIX STATUS UNKNOWN)  1982       CURRENTMEDICATIONS       Previous Medications    ATORVASTATIN (LIPITOR) 10 MG TABLET    Take 10 mg by mouth daily.    CLONIDINE (CATAPRES) 0.1 MG TABLET    Take 1 tablet by mouth 3 times daily    DILTIAZEM (DILACOR XR) 120 MG EXTENDED RELEASE CAPSULE    Take 120 mg by mouth daily    DOXAZOSIN (CARDURA) 8 MG TABLET    Take 8 mg by mouth nightly

## 2025-03-15 NOTE — ED NOTES
Spoke with Ofelia at [facility] to initiate possible transfer to .

## 2025-03-15 NOTE — ED NOTES
Report called to MONTSE Thomas at  ED. William verbalized understanding. Denied further questions or concerns.

## 2025-03-17 RX ORDER — DOXAZOSIN 8 MG/1
8 TABLET ORAL EVERY 12 HOURS
Qty: 180 TABLET | Refills: 3 | Status: SHIPPED | OUTPATIENT
Start: 2025-03-17

## 2025-03-17 NOTE — TELEPHONE ENCOUNTER
Lab Results   Component Value Date    CREATININE 2.68 (H) 01/31/2025    BUN 33 (H) 01/31/2025     01/31/2025    K 4.2 01/31/2025    CL 98 01/31/2025    CO2 25.7 01/31/2025

## 2025-03-19 ENCOUNTER — HOSPITAL ENCOUNTER (INPATIENT)
Facility: HOSPITAL | Age: 75
LOS: 3 days | Discharge: ANOTHER HOSPITAL | End: 2025-03-22
Attending: INTERNAL MEDICINE | Admitting: INTERNAL MEDICINE
Payer: MEDICARE

## 2025-03-19 PROBLEM — R53.81 DECLINING FUNCTIONAL STATUS: Status: ACTIVE | Noted: 2025-03-19

## 2025-03-19 LAB
GLUCOSE BLD-MCNC: 176 MG/DL (ref 74–106)
GLUCOSE BLD-MCNC: 267 MG/DL (ref 74–106)
PERFORMED ON: ABNORMAL
PERFORMED ON: ABNORMAL

## 2025-03-19 PROCEDURE — 6370000000 HC RX 637 (ALT 250 FOR IP): Performed by: INTERNAL MEDICINE

## 2025-03-19 PROCEDURE — 6360000002 HC RX W HCPCS: Performed by: INTERNAL MEDICINE

## 2025-03-19 PROCEDURE — 1200000002 HC SEMI PRIVATE SWING BED

## 2025-03-19 RX ORDER — FUROSEMIDE 40 MG/1
40 TABLET ORAL DAILY PRN
Status: ON HOLD | COMMUNITY

## 2025-03-19 RX ORDER — CALCITRIOL 0.25 UG/1
0.25 CAPSULE, LIQUID FILLED ORAL
Status: ON HOLD | COMMUNITY

## 2025-03-19 RX ORDER — DOXAZOSIN 4 MG/1
8 TABLET ORAL 2 TIMES DAILY
Status: DISCONTINUED | OUTPATIENT
Start: 2025-03-19 | End: 2025-03-22 | Stop reason: HOSPADM

## 2025-03-19 RX ORDER — MECOBALAMIN 5000 MCG
5 TABLET,DISINTEGRATING ORAL NIGHTLY
Status: DISCONTINUED | OUTPATIENT
Start: 2025-03-19 | End: 2025-03-22 | Stop reason: HOSPADM

## 2025-03-19 RX ORDER — CALCIUM CARBONATE 500 MG/1
1 TABLET, CHEWABLE ORAL 2 TIMES DAILY
Status: DISCONTINUED | OUTPATIENT
Start: 2025-03-19 | End: 2025-03-22 | Stop reason: HOSPADM

## 2025-03-19 RX ORDER — POLYETHYLENE GLYCOL 3350 17 G/17G
17 POWDER, FOR SOLUTION ORAL DAILY PRN
Status: DISCONTINUED | OUTPATIENT
Start: 2025-03-19 | End: 2025-03-22 | Stop reason: HOSPADM

## 2025-03-19 RX ORDER — POLYETHYLENE GLYCOL 3350 17 G/17G
17 POWDER, FOR SOLUTION ORAL DAILY PRN
Status: ON HOLD | COMMUNITY

## 2025-03-19 RX ORDER — PAROXETINE 20 MG/1
40 TABLET, FILM COATED ORAL EVERY MORNING
Status: DISCONTINUED | OUTPATIENT
Start: 2025-03-20 | End: 2025-03-22 | Stop reason: HOSPADM

## 2025-03-19 RX ORDER — INSULIN LISPRO 100 [IU]/ML
0-4 INJECTION, SOLUTION INTRAVENOUS; SUBCUTANEOUS
Status: DISCONTINUED | OUTPATIENT
Start: 2025-03-19 | End: 2025-03-22 | Stop reason: HOSPADM

## 2025-03-19 RX ORDER — HEPARIN SODIUM 5000 [USP'U]/ML
5000 INJECTION, SOLUTION INTRAVENOUS; SUBCUTANEOUS 3 TIMES DAILY
Status: ON HOLD | COMMUNITY
End: 2025-03-22 | Stop reason: HOSPADM

## 2025-03-19 RX ORDER — FAMOTIDINE 20 MG/1
20 TABLET, FILM COATED ORAL DAILY
Status: ON HOLD | COMMUNITY

## 2025-03-19 RX ORDER — OMEGA-3-ACID ETHYL ESTERS 1 G/1
1 CAPSULE, LIQUID FILLED ORAL 2 TIMES DAILY
Status: DISCONTINUED | OUTPATIENT
Start: 2025-03-19 | End: 2025-03-22 | Stop reason: HOSPADM

## 2025-03-19 RX ORDER — HEPARIN SODIUM 5000 [USP'U]/ML
5000 INJECTION, SOLUTION INTRAVENOUS; SUBCUTANEOUS 3 TIMES DAILY
Status: DISCONTINUED | OUTPATIENT
Start: 2025-03-19 | End: 2025-03-22 | Stop reason: HOSPADM

## 2025-03-19 RX ORDER — POLYETHYLENE GLYCOL 3350 17 G/17G
17 POWDER, FOR SOLUTION ORAL DAILY PRN
Status: DISCONTINUED | OUTPATIENT
Start: 2025-03-19 | End: 2025-03-19 | Stop reason: SDUPTHER

## 2025-03-19 RX ORDER — DEXTROSE MONOHYDRATE 100 MG/ML
INJECTION, SOLUTION INTRAVENOUS CONTINUOUS PRN
Status: DISCONTINUED | OUTPATIENT
Start: 2025-03-19 | End: 2025-03-22 | Stop reason: HOSPADM

## 2025-03-19 RX ORDER — ATORVASTATIN CALCIUM 10 MG/1
10 TABLET, FILM COATED ORAL DAILY
Status: DISCONTINUED | OUTPATIENT
Start: 2025-03-20 | End: 2025-03-22 | Stop reason: HOSPADM

## 2025-03-19 RX ORDER — FAMOTIDINE 20 MG/1
20 TABLET, FILM COATED ORAL DAILY
Status: DISCONTINUED | OUTPATIENT
Start: 2025-03-19 | End: 2025-03-22 | Stop reason: HOSPADM

## 2025-03-19 RX ORDER — DILTIAZEM HYDROCHLORIDE 120 MG/1
240 CAPSULE, COATED, EXTENDED RELEASE ORAL DAILY
Status: DISCONTINUED | OUTPATIENT
Start: 2025-03-19 | End: 2025-03-22 | Stop reason: HOSPADM

## 2025-03-19 RX ORDER — DILTIAZEM HYDROCHLORIDE 240 MG/1
240 CAPSULE, EXTENDED RELEASE ORAL EVERY MORNING
Status: ON HOLD | COMMUNITY

## 2025-03-19 RX ORDER — CALCITRIOL 0.25 UG/1
0.25 CAPSULE, LIQUID FILLED ORAL
Status: DISCONTINUED | OUTPATIENT
Start: 2025-03-19 | End: 2025-03-22 | Stop reason: HOSPADM

## 2025-03-19 RX ORDER — VITAMIN B COMPLEX
5000 TABLET ORAL DAILY
Status: DISCONTINUED | OUTPATIENT
Start: 2025-03-20 | End: 2025-03-22 | Stop reason: HOSPADM

## 2025-03-19 RX ORDER — LACTULOSE 10 G/15ML
10 SOLUTION ORAL DAILY
Status: ON HOLD | COMMUNITY

## 2025-03-19 RX ORDER — TIRZEPATIDE 5 MG/.5ML
0.5 INJECTION, SOLUTION SUBCUTANEOUS WEEKLY
Status: ON HOLD | COMMUNITY

## 2025-03-19 RX ORDER — LACTULOSE 10 G/15ML
10 SOLUTION ORAL DAILY
Status: DISCONTINUED | OUTPATIENT
Start: 2025-03-19 | End: 2025-03-22 | Stop reason: HOSPADM

## 2025-03-19 RX ORDER — LEVOTHYROXINE SODIUM 137 UG/1
137 TABLET ORAL DAILY
Status: ON HOLD | COMMUNITY

## 2025-03-19 RX ORDER — PANTOPRAZOLE SODIUM 40 MG/1
40 TABLET, DELAYED RELEASE ORAL
Status: DISCONTINUED | OUTPATIENT
Start: 2025-03-20 | End: 2025-03-22 | Stop reason: HOSPADM

## 2025-03-19 RX ORDER — CALCIUM CARBONATE 500 MG/1
1 TABLET, CHEWABLE ORAL 2 TIMES DAILY
Status: ON HOLD | COMMUNITY

## 2025-03-19 RX ORDER — ACETAMINOPHEN 325 MG/1
650 TABLET ORAL EVERY 4 HOURS PRN
Status: DISCONTINUED | OUTPATIENT
Start: 2025-03-19 | End: 2025-03-22 | Stop reason: HOSPADM

## 2025-03-19 RX ORDER — HYDRALAZINE HYDROCHLORIDE 25 MG/1
25 TABLET, FILM COATED ORAL 3 TIMES DAILY PRN
Status: ON HOLD | COMMUNITY

## 2025-03-19 RX ORDER — IBUPROFEN 600 MG/1
1 TABLET ORAL PRN
Status: DISCONTINUED | OUTPATIENT
Start: 2025-03-19 | End: 2025-03-22 | Stop reason: HOSPADM

## 2025-03-19 RX ADMIN — OMEGA-3-ACID ETHYL ESTERS 1 G: 1 CAPSULE, LIQUID FILLED ORAL at 21:41

## 2025-03-19 RX ADMIN — CALCIUM CARBONATE 500 MG: 500 TABLET, CHEWABLE ORAL at 21:41

## 2025-03-19 RX ADMIN — TIZANIDINE 2 MG: 4 TABLET ORAL at 21:55

## 2025-03-19 RX ADMIN — HEPARIN SODIUM 5000 UNITS: 5000 INJECTION INTRAVENOUS; SUBCUTANEOUS at 21:41

## 2025-03-19 RX ADMIN — Medication 5 MG: at 21:41

## 2025-03-19 RX ADMIN — DOXAZOSIN 8 MG: 4 TABLET ORAL at 21:42

## 2025-03-19 RX ADMIN — INSULIN LISPRO 2 UNITS: 100 INJECTION, SOLUTION INTRAVENOUS; SUBCUTANEOUS at 21:42

## 2025-03-19 ASSESSMENT — LIFESTYLE VARIABLES: HOW OFTEN DO YOU HAVE A DRINK CONTAINING ALCOHOL: NEVER

## 2025-03-19 NOTE — PLAN OF CARE
Problem: Safety - Adult  Goal: Free from fall injury  Outcome: Progressing     Problem: Musculoskeletal - Adult  Goal: Return mobility to safest level of function  Outcome: Progressing

## 2025-03-19 NOTE — CARDIO/PULMONARY
Breath sounds are clear no treatments recommend at this time. Family is bring CPAP from home. Nocturnal O2  with CPAP.

## 2025-03-19 NOTE — PROGRESS NOTES
4 Eyes Skin Assessment     NAME:  Jayleen Peters  YOB: 1950  MEDICAL RECORD NUMBER:  [id number]    The patient is being assessed for  Admission    I agree that at least one RN has performed a thorough Head to Toe Skin Assessment on the patient. ALL assessment sites listed below have been assessed.      Areas assessed by both nurses:    Head, Face, Ears, Shoulders, Back, Chest, Arms, Elbows, Hands, Sacrum. Buttock, Coccyx, Ischium, and Legs. Feet and Heels        Does the Patient have a Wound? Yes wound(s) were present on assessment. LDA wound assessment was Initiated and completed by RN       Juan Prevention initiated by RN: Yes  Wound Care Orders initiated by RN: No    Pressure Injury (Stage 3,4, Unstageable, DTI, NWPT, and Complex wounds) if present, place Wound referral order by RN under : No    New Ostomies, if present place, Ostomy referral order under : No     Nurse 1 eSignature: Electronically signed by Radha Johnson RN on 3/19/25 at 3:10 PM EDT    **SHARE this note so that the co-signing nurse can place an eSignature**    Nurse 2 eSignature: Electronically signed by Darcy Johnson RN on 3/19/25 at 3:19 PM EDT

## 2025-03-19 NOTE — FLOWSHEET NOTE
03/19/25 1502   Assessment   Charting Type Admission   Psychosocial   Psychosocial (WDL) WDL   Neurological   Neuro (WDL) WDL   [place] Coma Scale   Eye Opening 4   Best Verbal Response 5   Best Motor Response 6   [place] Coma Scale Score 15   HEENT (Head, Ears, Eyes, Nose, & Throat)   HEENT (WDL) WDL   Respiratory   Respiratory (WDL) WDL   Cardiac   Cardiac (WDL) X  (hx of afib)   Gastrointestinal   Abdominal (WDL) WDL   Last BM (including prior to admit) 03/18/25   Genitourinary   Genitourinary (WDL) WDL   Peripheral Vascular   Peripheral Vascular (WDL) WDL   RLE Neurovascular Assessment   Capillary Refill Less than/Equal to 3 seconds   Color Pink   Temperature Warm   R Pedal Pulse +3   LLE Neurovascular Assessment   Capillary Refill Less than/Equal to 3 seconds   Color Pink   Temperature Warm   L Pedal Pulse +3   Dual Clinician Skin Assessment   Dual Skin Assessment (4 Eyes) WDL   Second Clinical  (First and Last Name) Darcy WILLARD   Skin Integumentary    Skin Color Ecchymosis (comment)  (L hip)   Skin Condition/Temp Warm;Dry   Skin Integrity Incision (see LDA)   Location L hip   Skin Integumentary (WDL) X   Wound Prevention and Protection Methods   Wound Offloading (Prevention Methods) Pillows;Repositioning   Musculoskeletal   LL Extremity Surgery   Musculoskeletal (WDL) X   Musculoskeletal Details   L Hip Surgery   Care Plan - Chronic Conditions and Co-Morbidity   Care Plan - Patient's Chronic Conditions and Co-Morbidity Symptoms are Monitored and Maintained or Improved Monitor and assess patient's chronic conditions and comorbid symptoms for stability, deterioration, or improvement   Care Plan - Discharge Planning Goals   Discharge to home or other facility with appropriate resources Identify barriers to discharge with patient and caregiver     Patient admitted to swing bed unit att. Vitals WNL. Pt denies pain att. Call bell within reach, safety measures in place

## 2025-03-20 ENCOUNTER — TELEPHONE (OUTPATIENT)
Dept: CARDIOLOGY | Facility: CLINIC | Age: 75
End: 2025-03-20
Payer: MEDICARE

## 2025-03-20 PROBLEM — S72.002A FRACTURE OF FEMORAL NECK, LEFT: Status: ACTIVE | Noted: 2025-03-20

## 2025-03-20 PROBLEM — N18.4 STAGE 4 CHRONIC KIDNEY DISEASE (HCC): Status: ACTIVE | Noted: 2025-03-20

## 2025-03-20 LAB
ALBUMIN SERPL-MCNC: 3.2 G/DL (ref 3.4–4.8)
ALBUMIN/GLOB SERPL: 0.9 {RATIO} (ref 0.8–2)
ALP SERPL-CCNC: 64 U/L (ref 25–100)
ALT SERPL-CCNC: 6 U/L (ref 4–36)
ANION GAP SERPL CALCULATED.3IONS-SCNC: 10 MMOL/L (ref 3–16)
AST SERPL-CCNC: 30 U/L (ref 8–33)
BASOPHILS # BLD: 0 K/UL (ref 0–0.1)
BASOPHILS NFR BLD: 0.5 %
BILIRUB SERPL-MCNC: 0.4 MG/DL (ref 0.3–1.2)
BUN SERPL-MCNC: 61 MG/DL (ref 6–20)
CALCIUM SERPL-MCNC: 9.5 MG/DL (ref 8.3–10.6)
CHLORIDE SERPL-SCNC: 102 MMOL/L (ref 98–107)
CO2 SERPL-SCNC: 28 MMOL/L (ref 20–30)
CREAT SERPL-MCNC: 2.6 MG/DL (ref 0.4–1.2)
EOSINOPHIL # BLD: 0.1 K/UL (ref 0–0.4)
EOSINOPHIL NFR BLD: 2.1 %
ERYTHROCYTE [DISTWIDTH] IN BLOOD BY AUTOMATED COUNT: 13.8 % (ref 11–16)
FERRITIN SERPL IA-MCNC: 457 NG/ML (ref 22–322)
FOLATE SERPL-MCNC: 7.19 NG/ML
GFR SERPLBLD CREATININE-BSD FMLA CKD-EPI: 19 ML/MIN/{1.73_M2}
GLOBULIN SER CALC-MCNC: 3.7 G/DL
GLUCOSE BLD-MCNC: 197 MG/DL (ref 74–106)
GLUCOSE BLD-MCNC: 211 MG/DL (ref 74–106)
GLUCOSE BLD-MCNC: 218 MG/DL (ref 74–106)
GLUCOSE BLD-MCNC: 252 MG/DL (ref 74–106)
GLUCOSE BLD-MCNC: 316 MG/DL (ref 74–106)
GLUCOSE SERPL-MCNC: 177 MG/DL (ref 74–106)
HCT VFR BLD AUTO: 24.3 % (ref 37–47)
HGB BLD-MCNC: 7.8 G/DL (ref 11.5–16.5)
IMM GRANULOCYTES # BLD: 0 K/UL
IMM GRANULOCYTES NFR BLD: 0.5 % (ref 0–5)
IRON SATN MFR SERPL: 23 % (ref 15–50)
IRON SERPL-MCNC: 42 UG/DL (ref 37–145)
LYMPHOCYTES # BLD: 1.5 K/UL (ref 1.5–4)
LYMPHOCYTES NFR BLD: 25.1 %
MAGNESIUM SERPL-MCNC: 2.5 MG/DL (ref 1.7–2.4)
MCH RBC QN AUTO: 31 PG (ref 27–32)
MCHC RBC AUTO-ENTMCNC: 32.1 G/DL (ref 31–35)
MCV RBC AUTO: 96.4 FL (ref 80–100)
MONOCYTES # BLD: 0.7 K/UL (ref 0.2–0.8)
MONOCYTES NFR BLD: 11.4 %
NEUTROPHILS # BLD: 3.5 K/UL (ref 2–7.5)
NEUTS SEG NFR BLD: 60.4 %
PERFORMED ON: ABNORMAL
PLATELET # BLD AUTO: 210 K/UL (ref 150–400)
PMV BLD AUTO: 9.5 FL (ref 6–10)
POTASSIUM SERPL-SCNC: 4.8 MMOL/L (ref 3.4–5.1)
PROT SERPL-MCNC: 6.9 G/DL (ref 6.4–8.3)
RBC # BLD AUTO: 2.52 M/UL (ref 3.8–5.8)
SODIUM SERPL-SCNC: 140 MMOL/L (ref 136–145)
TIBC SERPL-MCNC: 186 UG/DL (ref 250–450)
VIT B12 SERPL-MCNC: 539 PG/ML (ref 211–911)
WBC # BLD AUTO: 5.8 K/UL (ref 4–11)

## 2025-03-20 PROCEDURE — 97116 GAIT TRAINING THERAPY: CPT | Performed by: PHYSICAL THERAPIST

## 2025-03-20 PROCEDURE — 82607 VITAMIN B-12: CPT

## 2025-03-20 PROCEDURE — 1200000002 HC SEMI PRIVATE SWING BED

## 2025-03-20 PROCEDURE — 97161 PT EVAL LOW COMPLEX 20 MIN: CPT | Performed by: PHYSICAL THERAPIST

## 2025-03-20 PROCEDURE — 97165 OT EVAL LOW COMPLEX 30 MIN: CPT

## 2025-03-20 PROCEDURE — 83540 ASSAY OF IRON: CPT

## 2025-03-20 PROCEDURE — 36415 COLL VENOUS BLD VENIPUNCTURE: CPT

## 2025-03-20 PROCEDURE — 82728 ASSAY OF FERRITIN: CPT

## 2025-03-20 PROCEDURE — 80053 COMPREHEN METABOLIC PANEL: CPT

## 2025-03-20 PROCEDURE — 97535 SELF CARE MNGMENT TRAINING: CPT

## 2025-03-20 PROCEDURE — 83735 ASSAY OF MAGNESIUM: CPT

## 2025-03-20 PROCEDURE — 82746 ASSAY OF FOLIC ACID SERUM: CPT

## 2025-03-20 PROCEDURE — 6370000000 HC RX 637 (ALT 250 FOR IP): Performed by: PHYSICIAN ASSISTANT

## 2025-03-20 PROCEDURE — 6370000000 HC RX 637 (ALT 250 FOR IP): Performed by: INTERNAL MEDICINE

## 2025-03-20 PROCEDURE — 85025 COMPLETE CBC W/AUTO DIFF WBC: CPT

## 2025-03-20 PROCEDURE — 83550 IRON BINDING TEST: CPT

## 2025-03-20 PROCEDURE — 94761 N-INVAS EAR/PLS OXIMETRY MLT: CPT

## 2025-03-20 PROCEDURE — 97802 MEDICAL NUTRITION INDIV IN: CPT

## 2025-03-20 PROCEDURE — 6360000002 HC RX W HCPCS: Performed by: INTERNAL MEDICINE

## 2025-03-20 RX ORDER — ACETAMINOPHEN 325 MG/1
650 TABLET ORAL EVERY 6 HOURS
Status: ON HOLD | COMMUNITY

## 2025-03-20 RX ORDER — LIDOCAINE 4 G/G
1 PATCH TOPICAL DAILY
Status: DISCONTINUED | OUTPATIENT
Start: 2025-03-20 | End: 2025-03-22 | Stop reason: HOSPADM

## 2025-03-20 RX ORDER — INSULIN LISPRO 100 [IU]/ML
10 INJECTION, SOLUTION INTRAVENOUS; SUBCUTANEOUS ONCE
Status: DISCONTINUED | OUTPATIENT
Start: 2025-03-20 | End: 2025-03-22 | Stop reason: HOSPADM

## 2025-03-20 RX ADMIN — DOXAZOSIN 8 MG: 4 TABLET ORAL at 20:13

## 2025-03-20 RX ADMIN — ACETAMINOPHEN 650 MG: 325 TABLET, FILM COATED ORAL at 20:12

## 2025-03-20 RX ADMIN — OMEGA-3-ACID ETHYL ESTERS 1 G: 1 CAPSULE, LIQUID FILLED ORAL at 20:13

## 2025-03-20 RX ADMIN — DILTIAZEM HYDROCHLORIDE 240 MG: 120 CAPSULE, COATED, EXTENDED RELEASE ORAL at 09:10

## 2025-03-20 RX ADMIN — FAMOTIDINE 20 MG: 20 TABLET, FILM COATED ORAL at 09:11

## 2025-03-20 RX ADMIN — INSULIN LISPRO 1 UNITS: 100 INJECTION, SOLUTION INTRAVENOUS; SUBCUTANEOUS at 17:32

## 2025-03-20 RX ADMIN — HEPARIN SODIUM 5000 UNITS: 5000 INJECTION INTRAVENOUS; SUBCUTANEOUS at 14:45

## 2025-03-20 RX ADMIN — HEPARIN SODIUM 5000 UNITS: 5000 INJECTION INTRAVENOUS; SUBCUTANEOUS at 09:12

## 2025-03-20 RX ADMIN — Medication 5 MG: at 20:14

## 2025-03-20 RX ADMIN — HEPARIN SODIUM 5000 UNITS: 5000 INJECTION INTRAVENOUS; SUBCUTANEOUS at 20:14

## 2025-03-20 RX ADMIN — OMEGA-3-ACID ETHYL ESTERS 1 G: 1 CAPSULE, LIQUID FILLED ORAL at 09:11

## 2025-03-20 RX ADMIN — LEVOTHYROXINE SODIUM 137 MCG: 25 TABLET ORAL at 06:34

## 2025-03-20 RX ADMIN — PANTOPRAZOLE SODIUM 40 MG: 40 TABLET, DELAYED RELEASE ORAL at 06:34

## 2025-03-20 RX ADMIN — INSULIN LISPRO 1 UNITS: 100 INJECTION, SOLUTION INTRAVENOUS; SUBCUTANEOUS at 06:39

## 2025-03-20 RX ADMIN — INSULIN LISPRO 3 UNITS: 100 INJECTION, SOLUTION INTRAVENOUS; SUBCUTANEOUS at 11:22

## 2025-03-20 RX ADMIN — CALCIUM CARBONATE 500 MG: 500 TABLET, CHEWABLE ORAL at 09:12

## 2025-03-20 RX ADMIN — ATORVASTATIN CALCIUM 10 MG: 10 TABLET, FILM COATED ORAL at 09:11

## 2025-03-20 RX ADMIN — Medication 5000 UNITS: at 09:11

## 2025-03-20 RX ADMIN — CALCIUM CARBONATE 500 MG: 500 TABLET, CHEWABLE ORAL at 20:14

## 2025-03-20 RX ADMIN — DOXAZOSIN 8 MG: 4 TABLET ORAL at 09:10

## 2025-03-20 RX ADMIN — INSULIN LISPRO 1 UNITS: 100 INJECTION, SOLUTION INTRAVENOUS; SUBCUTANEOUS at 20:19

## 2025-03-20 RX ADMIN — PAROXETINE HYDROCHLORIDE 40 MG: 20 TABLET, FILM COATED ORAL at 09:11

## 2025-03-20 RX ADMIN — LACTULOSE 10 G: 20 SOLUTION ORAL at 09:10

## 2025-03-20 ASSESSMENT — PAIN DESCRIPTION - DESCRIPTORS: DESCRIPTORS: SHARP;STABBING;NAGGING

## 2025-03-20 ASSESSMENT — PAIN DESCRIPTION - ORIENTATION: ORIENTATION: LEFT

## 2025-03-20 ASSESSMENT — PAIN DESCRIPTION - LOCATION: LOCATION: LEG;PELVIS

## 2025-03-20 NOTE — PLAN OF CARE
Problem: Chronic Conditions and Co-morbidities  Goal: Patient's chronic conditions and co-morbidity symptoms are monitored and maintained or improved  Outcome: Progressing  Flowsheets  Taken 3/20/2025 0112 by Laura Higgins RN  Care Plan - Patient's Chronic Conditions and Co-Morbidity Symptoms are Monitored and Maintained or Improved:   Monitor and assess patient's chronic conditions and comorbid symptoms for stability, deterioration, or improvement   Collaborate with multidisciplinary team to address chronic and comorbid conditions and prevent exacerbation or deterioration  Taken 3/19/2025 1502 by Radha Johnson RN  Care Plan - Patient's Chronic Conditions and Co-Morbidity Symptoms are Monitored and Maintained or Improved: Monitor and assess patient's chronic conditions and comorbid symptoms for stability, deterioration, or improvement     Problem: Discharge Planning  Goal: Discharge to home or other facility with appropriate resources  Outcome: Progressing  Flowsheets  Taken 3/20/2025 0112 by Laura Higgins RN  Discharge to home or other facility with appropriate resources:   Identify barriers to discharge with patient and caregiver   Refer to discharge planning if patient needs post-hospital services based on physician order or complex needs related to functional status, cognitive ability or social support system  Taken 3/19/2025 1523 by Lisa Wiggins RN  Discharge to home or other facility with appropriate resources: Refer to discharge planning if patient needs post-hospital services based on physician order or complex needs related to functional status, cognitive ability or social support system  Taken 3/19/2025 1502 by Radha Johnson RN  Discharge to home or other facility with appropriate resources: Identify barriers to discharge with patient and caregiver     Problem: Safety - Adult  Goal: Free from fall injury  3/20/2025 0452 by Laura Higgins RN  Outcome: Progressing  3/19/2025 1525 by Rosalie  MONTSE Gonzalez  Outcome: Progressing     Problem: ABCDS Injury Assessment  Goal: Absence of physical injury  Outcome: Progressing     Problem: Musculoskeletal - Adult  Goal: Return mobility to safest level of function  3/20/2025 0452 by Laura Higgins RN  Outcome: Progressing  Flowsheets (Taken 3/20/2025 0110)  Return Mobility to Safest Level of Function:   Assess patient stability and activity tolerance for standing, transferring and ambulating with or without assistive devices   Assist with transfers and ambulation using safe patient handling equipment as needed  3/19/2025 1525 by Lisa Wiggins, RN  Outcome: Progressing  Goal: Maintain proper alignment of affected body part  Outcome: Progressing  Flowsheets (Taken 3/20/2025 0110)  Maintain proper alignment of affected body part: Support and protect limb and body alignment per provider's orders  Goal: Return ADL status to a safe level of function  Outcome: Progressing  Flowsheets (Taken 3/20/2025 0110)  Return ADL Status to a Safe Level of Function:   Assess activities of daily living deficits and provide assistive devices as needed   Obtain physical therapy/occupational therapy consults as needed

## 2025-03-20 NOTE — PROGRESS NOTES
Mediation History completed with discharge summary from [facility].  -Novolog and Lovaza are not on [facility] d/c summary  -added Acetaminophen 325 mg 2t q6h

## 2025-03-20 NOTE — TELEPHONE ENCOUNTER
Name: Jas Nweman    Relationship: Emergency Contact    Best Callback Number: [phone]    Incoming call to the Hub, requesting to  Cancel their Other: PVA 1 YEAR   appointment on 04/03/25.     Per [facility] workflow, further review is needed before the task can be completed.     Result of Call: Please cancel this appointment      PT'S  WILL CALL BACK TO [facility].

## 2025-03-20 NOTE — PROGRESS NOTES
Occupational Therapy  Facility/Department: [facility] MED SURG  Occupational Therapy Initial Assessment    Name: Jayleen Peters  : 1950  MRN: [id number]  Date of Service: 3/20/2025    Discharge Recommendations:  Continue to assess pending progress  OT Equipment Recommendations  Equipment Needed: Yes  Mobility Devices: Walker;ADL Assistive Devices  Walker: Rolling  ADL Assistive Devices: Toileting - 3-in-1 Commode       Patient Diagnosis(es): There were no encounter diagnoses.  Past Medical History:  has a past medical history of CAD (coronary artery disease), Hypertension, Hyperthyroidism, IgA nephropathy, and Type II or unspecified type diabetes mellitus without mention of complication, not stated as uncontrolled.  Past Surgical History:  has a past surgical history that includes Colonoscopy (); Hysterectomy (); and Hip Arthroplasty (Left, 2025).           Assessment  Performance deficits / Impairments: Decreased functional mobility ;Decreased ADL status;Decreased high-level IADLs;Decreased balance;Decreased endurance  Assessment: This 74 year old female was referred to OT services upon admission following onset of functional decline. Pt was at home and had a fall resulting in femur fracture. Pt was transported to [facility] and underwent LLE hemiarthroplasty. Pt is WBAT with Posterior Hip Precautions. Pt is aware of precautions and voices understanding with overview. Pt transferred from supine to sitting with SBA with increased time to complete. Pt sat unsupported. AROM and MMT WFL MMT 4/5 grossly. Pt come to stand from EOB with CGA. Pt ambulated to bathroom with RW CGA and transferred to toilet with CGA with BSC over toilet. Pt tolerated well. Pt toileted with CGA. Pt able to complete LB clothing management with CGA and completed krish hygiene with CGA in standing. Pt had bleeding from hemorrhoid and nursing made aware. Pt provided with pad to place in undergarment. Pt completed UB/LB sponge bathing. Pt

## 2025-03-20 NOTE — CONSULTS
Comprehensive Nutrition Assessment    Type and Reason for Visit:  Initial, Consult, Positive nutrition screen (New swing bed)    Nutrition Recommendations/Plan:   Continue current diet as medically appropriate and tolerated.   Continue to encourage PO intakes as appropriate. Avg of 14-38% x 2 meals.   Continue Glucerna BID. Patrick added BID to promote wound healing and to help meet estimated nutrient needs.   Consider MVI.    RD to follow patient and available PRN.      Malnutrition Assessment:  Malnutrition Status:       Context:        Findings of the 6 clinical characteristics of malnutrition:  Energy Intake:     Weight Loss:        Body Fat Loss:        Muscle Mass Loss:       Fluid Accumulation:        Strength:       Nutrition Assessment:    Patient admitted with declining functional status. Patient is at moderate-high risk for ongoing nutritional compromise r/t wound and fair PO intakes.    Nutrition Related Findings:    PMH T2DM, HTN, hyperthyroidism, CAD, CKD, iron-deficiency anemia. Medications: insulin, vitamin D, melatonin, lovaza, lactulose. Labs: BUN 61, Cr 2.6, GFR 19, Mg 2.5, glu 177-316, alb 3.2. No edema at this time. Wound Type: Surgical Incision (Left hip)       Current Nutrition Intake & Therapies:    Average Meal Intake: 1-25%, 26-50% (14-38% x 2 meals)     ADULT ORAL NUTRITION SUPPLEMENT; Breakfast, Dinner; Diabetic Oral Supplement  ADULT DIET; Regular; 3 carb choices (45 gm/meal)  ADULT ORAL NUTRITION SUPPLEMENT; Lunch, Dinner; Wound Healing Oral Supplement    Anthropometric Measures:  Height: 162.6 cm (5' 4\")  Ideal Body Weight (IBW): 120 lbs (55 kg)    Admission Body Weight: 70.3 kg (155 lb)  Current Body Weight: 70.3 kg (155 lb), 129.2 % IBW. Weight Source: Bed scale  Current BMI (kg/m2): 26.6           Weight Adjustment For: No Adjustment                 BMI Categories: Overweight (BMI 25.0-29.9)    Estimated Daily Nutrient Needs:  Energy Requirements Based On: Kcal/kg  Weight Used for

## 2025-03-20 NOTE — CARE COORDINATION
SDOH - Heparin- Lives with SO.  Has hospital bed, cpap, O2 ([facility]).  DME per therapy recs.  HH vs. Outpt therapy at [facility].         03/20/25 1029   Service Assessment   Patient Orientation Alert and Oriented   Cognition Alert   History Provided By Patient;Medical Record   Primary Caregiver Self   Support Systems Spouse/Significant Other;Children;Family Members   Patient's Healthcare Decision Maker is: Named in Scanned ACP Document   PCP Verified by CM Yes   Last Visit to PCP Within last 3 months   Prior Functional Level Independent in ADLs/IADLs   Current Functional Level Assistance with the following:  (per therapy recs)   Can patient return to prior living arrangement Yes   Ability to make needs known: Good   Family able to assist with home care needs: Yes   Would you like for me to discuss the discharge plan with any other family members/significant others, and if so, who? No   Financial Resources None   Community Resources Other (Comment)   CM/SW Referral DME   Social/Functional History   Lives With Spouse   Type of Home House   Home Layout Two level;Able to Live on Main level with bedroom/bathroom   Home Access Level entry   Bathroom Shower/Tub Tub/Shower unit   Bathroom Toilet Handicap height   Bathroom Accessibility Walker accessible   Home Equipment Adjustable bed;Oxygen  (2L 02 at night)   Prior Level of Assist for ADLs Independent   Prior Level of Assist for Homemaking Independent   Homemaking Responsibilities Yes   Prior Level of Assist for Transfers Independent   Active  Yes   Mode of Transportation Car   Discharge Planning   Type of Residence House   Living Arrangements Spouse/Significant Other   Current Services Prior To Admission Durable Medical Equipment;C-pap;Oxygen Therapy  ([facility])   Current DME Prior to Arrival Hospital Bed;Cpap;Oxygen Therapy (Comment)   Potential Assistance Needed Durable Medical Equipment;Home Care   Potential DME Needed Other (Comment)  (per  therapy recs)   DME Ordered? No   Potential Assistance Purchasing Medications No   Type of Home Care Services Nursing Services;Skilled Therapy   Patient expects to be discharged to: House   Follow Up Appointment: Best Day/Time  Wednesday PM  (afternoon appt)   One/Two Story Residence Two story, live on first floor   History of falls? 1

## 2025-03-20 NOTE — H&P
177 (H) 03/20/2025    CALCIUM 9.5 03/20/2025    BILITOT 0.4 03/20/2025    ALKPHOS 64 03/20/2025    AST 30 03/20/2025    ALT 6 03/20/2025    LABGLOM 19 (L) 03/20/2025    GFRAA 30 (L) 09/10/2022    AGRATIO 0.9 03/20/2025    GLOB 3.7 03/20/2025           Lab Results   Component Value Date    WBC 5.8 03/20/2025    HGB 7.8 (L) 03/20/2025    HCT 24.3 (L) 03/20/2025    MCV 96.4 03/20/2025     03/20/2025       PA/lat CXR:   No orders to display         EKG: None    Additional notes:  -Baseline serum creatinine around 2.6  -PT OT consulted  -CM consulted for discharge planning assistance      Patient was seen and examined by Dr. Thakur.  Assessment and plan was formulated entirely by Dr. Thakur.      Electronically signed by MICHELE Catherine on 3/20/2025 at 11:16 AM        Assessment and Plan     Patient was seen and examined with MICHELE Catherine.  Agree with note as above.  Assessment and plan was done by me as below.    Active Hospital Problems    Diagnosis Date Noted    Fracture of femoral neck, left (HCC) [S72.002A]  Status post hemiarthroplasty to the left hip on 3/16/2025.  Follow-up to be recommendations.  Wound care, pain control, GI and DVT prophylaxis in addition to PT/OT.  Monitor response to treatment closely.  Proceed with bowel regimen if needed.   following to assist in discharge planning.   03/20/2025    Stage 4 chronic kidney disease (HCC) [N18.4]  Patient with advanced renal failure.  Try to avoid any nephrotoxic agent.  Make sure blood pressure under good control.  Monitor renal function, electrolytes and urine output closely.   03/20/2025    Declining functional status [R53.81]  Related to her recent fall/hip fracture status post repair.  Treat as mentioned above.  Try to improve medical problems as able.  PT/OT.  Monitor nutrition status.  Monitor mental capacity closely and memory.   03/19/2025    HTN (hypertension) [I10]  Blood pressure in acceptable range.  Monitor closely.    02/28/2017    Atrial fibrillation (HCC) [I48.91]  Status post MAZE.  Monitor heart rate/vitals closely.  Patient also with history of moderate to severe mitral regurgitation.  Follow-up with orthopedic on outpatient basis.   02/28/2017    Anemia [D64.9]  Anemia workup was unremarkable.  Likely related to chronic renal failure and will benefit from Procrit injection.  Proceed with that and monitor hemoglobin closely.  GI prophylaxis.   02/28/2017    Type 2 diabetes mellitus with complication, without long-term current use of insulin (HCC) [E11.8]  Fingersticks has been slightly elevated but recent A1c was 6.5.  Continue current regimen and monitor closely.   02/28/2017   Reviewed record from recent hospital stay including labs, imaging studies, operative report and discharge summary.      I certify that the skilled nursing and/or rehabilitation services are required to be given on a daily basis, which as a practical matter can only be provided in a skilled nursing unit on an inpatient basis.     Electronically signed by Taya Thakur MD on 3/20/2025 at 9:56 PM

## 2025-03-20 NOTE — PLAN OF CARE
Problem: Chronic Conditions and Co-morbidities  Goal: Patient's chronic conditions and co-morbidity symptoms are monitored and maintained or improved  3/20/2025 1301 by Sue Mann RN  Outcome: Progressing  3/20/2025 0452 by Laura Higgins RN  Outcome: Progressing  Flowsheets  Taken 3/20/2025 0112 by Laura Higgins RN  Care Plan - Patient's Chronic Conditions and Co-Morbidity Symptoms are Monitored and Maintained or Improved:   Monitor and assess patient's chronic conditions and comorbid symptoms for stability, deterioration, or improvement   Collaborate with multidisciplinary team to address chronic and comorbid conditions and prevent exacerbation or deterioration  Taken 3/19/2025 1502 by Radha Johnson RN  Care Plan - Patient's Chronic Conditions and Co-Morbidity Symptoms are Monitored and Maintained or Improved: Monitor and assess patient's chronic conditions and comorbid symptoms for stability, deterioration, or improvement     Problem: Discharge Planning  Goal: Discharge to home or other facility with appropriate resources  3/20/2025 1301 by Sue Mann RN  Outcome: Progressing  3/20/2025 0452 by Laura Higgins RN  Outcome: Progressing  Flowsheets  Taken 3/20/2025 0112 by Laura Higgins RN  Discharge to home or other facility with appropriate resources:   Identify barriers to discharge with patient and caregiver   Refer to discharge planning if patient needs post-hospital services based on physician order or complex needs related to functional status, cognitive ability or social support system  Taken 3/19/2025 1523 by Lisa Wiggins RN  Discharge to home or other facility with appropriate resources: Refer to discharge planning if patient needs post-hospital services based on physician order or complex needs related to functional status, cognitive ability or social support system  Taken 3/19/2025 1502 by Radha Johnson RN  Discharge to home or other facility with appropriate resources: Identify

## 2025-03-20 NOTE — PROGRESS NOTES
Physical Therapy  Facility/Department: [facility] MED SURG  Physical Therapy Initial Assessment    Name: Jayleen Peters  : 1950  MRN: [id number]  Date of Service: 3/20/2025    Discharge Recommendations:  Continue to assess pending progress          Patient Diagnosis(es): There were no encounter diagnoses.  Past Medical History:  has a past medical history of CAD (coronary artery disease), Hypertension, Hyperthyroidism, IgA nephropathy, and Type II or unspecified type diabetes mellitus without mention of complication, not stated as uncontrolled.  Past Surgical History:  has a past surgical history that includes Colonoscopy (); Hysterectomy (); and Hip Arthroplasty (Left, 2025).    Assessment  Body Structures, Functions, Activity Limitations Requiring Skilled Therapeutic Intervention: Decreased functional mobility ;Decreased ADL status;Decreased ROM;Decreased strength;Increased pain;Decreased balance  Assessment: Patient will benefit from skilled physical therapy to increase BLE strength, safe ambulation and transfers and allow her to be able to return home to her normal ADL's.  Therapy Prognosis: Good  Decision Making: Medium Complexity  Requires PT Follow-Up: Yes  Activity Tolerance  Activity Tolerance: Patient tolerated evaluation without incident;Patient tolerated treatment well;Patient limited by pain    Plan  Physical Therapy Plan  General Plan: 3-5 times per week  Current Treatment Recommendations: Strengthening, Balance training, Functional mobility training, Transfer training, Gait training, Home exercise program, Safety education & training, Positioning    Restrictions  Restrictions/Precautions  Restrictions/Precautions: General Precautions, Fall Risk, Weight Bearing  Required Braces or Orthoses?: No  Lower Extremity Weight Bearing Restrictions  Left Lower Extremity Weight Bearing: Weight Bearing As Tolerated     Subjective  General  Chart Reviewed: Yes  Patient assessed for rehabilitation  assistance  Balance  Sitting: Intact  Standing: Intact  Transfer Training  Transfer Training: Yes  Overall Level of Assistance: Contact guard assistance  Sit to Stand: Contact guard assistance  Stand to Sit: Contact guard assistance  Stand Pivot Transfers: Contact guard assistance  Gait Training  Left Side Weight Bearing: As tolerated  Gait  Gait Training: Yes  Left Side Weight Bearing: As tolerated  Overall Level of Assistance: Contact guard assistance  Distance (ft): 15 Feet (15)  Assistive Device: Gait belt;Walker, rolling  Bed mobility  Bridging: Modified independent   Rolling to Left: Modified independent  Rolling to Right: Modified independent  Supine to Sit: Modified independent;Stand by assistance  Sit to Supine: Contact guard assistance  Scooting: Modified independent  Transfers  Sit to Stand: Modified independent;Stand by assistance  Stand to Sit: Modified independent;Stand by assistance  Bed to Chair: Modified independent;Stand by assistance  Ambulation  WB Status: WBAT LLE  Ambulation  Surface: Level tile  Device: Rolling Walker  Assistance: Contact guard assistance  Quality of Gait: slow shuffling gait  Distance: 200ft  Comments: gait training with gait belt     Balance  Sitting - Static: Good  Sitting - Dynamic: Good  Standing - Static: Fair  Standing - Dynamic: Fair   Goals  Short Term Goals  Time Frame for Short Term Goals: 3 weeks  Short Term Goal 1: Patient to increase LLE strength to 4/5  Short Term Goal 2: Patient to be IND and safe with all bed mobility.  Short Term Goal 3: Patient to be SBA and safe with all transfers  Short Term Goal 4: Patient to increase ambulation with RW and CGA to 500ft  Patient Goals   Patient Goals : Patient wants to get stronger and walk more so she can go home as soon as possible.            Therapy Time   Individual Concurrent Group Co-treatment   Time In  1420         Time Out  1455         Minutes  35                 Rose Butcher PT

## 2025-03-20 NOTE — FLOWSHEET NOTE
03/20/25 0112   Care Plan - Chronic Conditions and Co-Morbidity   Care Plan - Patient's Chronic Conditions and Co-Morbidity Symptoms are Monitored and Maintained or Improved Monitor and assess patient's chronic conditions and comorbid symptoms for stability, deterioration, or improvement;Collaborate with multidisciplinary team to address chronic and comorbid conditions and prevent exacerbation or deterioration   Care Plan - Discharge Planning Goals   Discharge to home or other facility with appropriate resources Identify barriers to discharge with patient and caregiver;Refer to discharge planning if patient needs post-hospital services based on physician order or complex needs related to functional status, cognitive ability or social support system

## 2025-03-20 NOTE — CARE COORDINATION
3/19/2025    I certify that the skilled nursing and/or rehabilitation services are required to be given on a daily basis, which as a practical matter can only be provided in a skilled nursing unit on an inpatient basis.    Electronically signed by Ghazala Eckert RN on 3/20/2025 at 9:56 AM

## 2025-03-21 LAB
EKG ATRIAL RATE: 65 BPM
EKG DIAGNOSIS: NORMAL
EKG P AXIS: 84 DEGREES
EKG P-R INTERVAL: 212 MS
EKG Q-T INTERVAL: 420 MS
EKG QRS DURATION: 82 MS
EKG QTC CALCULATION (BAZETT): 436 MS
EKG R AXIS: 18 DEGREES
EKG T AXIS: 89 DEGREES
EKG VENTRICULAR RATE: 65 BPM
GLUCOSE BLD-MCNC: 259 MG/DL (ref 74–106)
GLUCOSE BLD-MCNC: 294 MG/DL (ref 74–106)
GLUCOSE BLD-MCNC: 317 MG/DL (ref 74–106)
GLUCOSE BLD-MCNC: 317 MG/DL (ref 74–106)
PERFORMED ON: ABNORMAL

## 2025-03-21 PROCEDURE — 6360000002 HC RX W HCPCS: Performed by: INTERNAL MEDICINE

## 2025-03-21 PROCEDURE — 94761 N-INVAS EAR/PLS OXIMETRY MLT: CPT

## 2025-03-21 PROCEDURE — 6370000000 HC RX 637 (ALT 250 FOR IP): Performed by: INTERNAL MEDICINE

## 2025-03-21 PROCEDURE — 1200000002 HC SEMI PRIVATE SWING BED

## 2025-03-21 PROCEDURE — 2580000003 HC RX 258: Performed by: PHYSICIAN ASSISTANT

## 2025-03-21 PROCEDURE — 6360000002 HC RX W HCPCS: Performed by: PHYSICIAN ASSISTANT

## 2025-03-21 PROCEDURE — 93005 ELECTROCARDIOGRAM TRACING: CPT

## 2025-03-21 PROCEDURE — 6370000000 HC RX 637 (ALT 250 FOR IP): Performed by: PHYSICIAN ASSISTANT

## 2025-03-21 RX ORDER — ONDANSETRON 4 MG/1
4 TABLET, FILM COATED ORAL ONCE
Status: COMPLETED | OUTPATIENT
Start: 2025-03-21 | End: 2025-03-21

## 2025-03-21 RX ORDER — HYDRALAZINE HYDROCHLORIDE 25 MG/1
50 TABLET, FILM COATED ORAL ONCE
Status: COMPLETED | OUTPATIENT
Start: 2025-03-21 | End: 2025-03-21

## 2025-03-21 RX ORDER — PROCHLORPERAZINE EDISYLATE 5 MG/ML
10 INJECTION INTRAMUSCULAR; INTRAVENOUS EVERY 6 HOURS PRN
Status: DISCONTINUED | OUTPATIENT
Start: 2025-03-21 | End: 2025-03-22 | Stop reason: HOSPADM

## 2025-03-21 RX ORDER — HYDRALAZINE HYDROCHLORIDE 25 MG/1
50 TABLET, FILM COATED ORAL EVERY 8 HOURS SCHEDULED
Status: DISCONTINUED | OUTPATIENT
Start: 2025-03-21 | End: 2025-03-22 | Stop reason: HOSPADM

## 2025-03-21 RX ORDER — SODIUM CHLORIDE 9 MG/ML
INJECTION, SOLUTION INTRAVENOUS CONTINUOUS
Status: DISCONTINUED | OUTPATIENT
Start: 2025-03-21 | End: 2025-03-22 | Stop reason: HOSPADM

## 2025-03-21 RX ADMIN — HEPARIN SODIUM 5000 UNITS: 5000 INJECTION INTRAVENOUS; SUBCUTANEOUS at 12:52

## 2025-03-21 RX ADMIN — Medication 5 MG: at 21:31

## 2025-03-21 RX ADMIN — DILTIAZEM HYDROCHLORIDE 240 MG: 120 CAPSULE, COATED, EXTENDED RELEASE ORAL at 10:32

## 2025-03-21 RX ADMIN — INSULIN LISPRO 3 UNITS: 100 INJECTION, SOLUTION INTRAVENOUS; SUBCUTANEOUS at 17:22

## 2025-03-21 RX ADMIN — CALCIUM CARBONATE 500 MG: 500 TABLET, CHEWABLE ORAL at 21:30

## 2025-03-21 RX ADMIN — PROCHLORPERAZINE EDISYLATE 10 MG: 5 INJECTION INTRAMUSCULAR; INTRAVENOUS at 21:41

## 2025-03-21 RX ADMIN — PROCHLORPERAZINE EDISYLATE 10 MG: 5 INJECTION INTRAMUSCULAR; INTRAVENOUS at 09:32

## 2025-03-21 RX ADMIN — INSULIN LISPRO 3 UNITS: 100 INJECTION, SOLUTION INTRAVENOUS; SUBCUTANEOUS at 21:31

## 2025-03-21 RX ADMIN — OMEGA-3-ACID ETHYL ESTERS 1 G: 1 CAPSULE, LIQUID FILLED ORAL at 21:31

## 2025-03-21 RX ADMIN — PAROXETINE HYDROCHLORIDE 40 MG: 20 TABLET, FILM COATED ORAL at 10:32

## 2025-03-21 RX ADMIN — LEVOTHYROXINE SODIUM 137 MCG: 25 TABLET ORAL at 06:17

## 2025-03-21 RX ADMIN — INSULIN LISPRO 2 UNITS: 100 INJECTION, SOLUTION INTRAVENOUS; SUBCUTANEOUS at 11:48

## 2025-03-21 RX ADMIN — Medication 12.5 MG: at 22:45

## 2025-03-21 RX ADMIN — HYDRALAZINE HYDROCHLORIDE 50 MG: 25 TABLET ORAL at 21:30

## 2025-03-21 RX ADMIN — HEPARIN SODIUM 5000 UNITS: 5000 INJECTION INTRAVENOUS; SUBCUTANEOUS at 21:31

## 2025-03-21 RX ADMIN — SODIUM CHLORIDE: 0.9 INJECTION, SOLUTION INTRAVENOUS at 22:38

## 2025-03-21 RX ADMIN — HYDRALAZINE HYDROCHLORIDE 50 MG: 25 TABLET ORAL at 12:52

## 2025-03-21 RX ADMIN — CALCITRIOL CAPSULES 0.25 MCG 0.25 MCG: 0.25 CAPSULE ORAL at 17:01

## 2025-03-21 RX ADMIN — DOXAZOSIN 8 MG: 4 TABLET ORAL at 21:31

## 2025-03-21 RX ADMIN — PANTOPRAZOLE SODIUM 40 MG: 40 TABLET, DELAYED RELEASE ORAL at 06:17

## 2025-03-21 RX ADMIN — ONDANSETRON HYDROCHLORIDE 4 MG: 4 TABLET, FILM COATED ORAL at 06:48

## 2025-03-21 RX ADMIN — HYDRALAZINE HYDROCHLORIDE 50 MG: 25 TABLET ORAL at 15:12

## 2025-03-21 RX ADMIN — INSULIN LISPRO 2 UNITS: 100 INJECTION, SOLUTION INTRAVENOUS; SUBCUTANEOUS at 06:48

## 2025-03-21 RX ADMIN — DOXAZOSIN 8 MG: 4 TABLET ORAL at 10:32

## 2025-03-21 NOTE — PLAN OF CARE
Problem: Chronic Conditions and Co-morbidities  Goal: Patient's chronic conditions and co-morbidity symptoms are monitored and maintained or improved  3/21/2025 0235 by Ad Velázquez RN  Outcome: Progressing  3/20/2025 1301 by Sue Mann RN  Outcome: Progressing     Problem: Discharge Planning  Goal: Discharge to home or other facility with appropriate resources  3/21/2025 0235 by Ad Velázquez RN  Outcome: Progressing  3/20/2025 1301 by Sue Mann RN  Outcome: Progressing     Problem: Safety - Adult  Goal: Free from fall injury  3/20/2025 1301 by Sue Mann RN  Outcome: Progressing     Problem: ABCDS Injury Assessment  Goal: Absence of physical injury  3/20/2025 1301 by Sue Mann RN  Outcome: Progressing     Problem: Musculoskeletal - Adult  Goal: Return mobility to safest level of function  3/20/2025 1301 by Sue Mann RN  Outcome: Progressing  Goal: Maintain proper alignment of affected body part  3/20/2025 1301 by Sue Mann RN  Outcome: Progressing  Goal: Return ADL status to a safe level of function  3/20/2025 1301 by Sue Mann RN  Outcome: Progressing

## 2025-03-21 NOTE — CARE COORDINATION
Patient declines 2 attempts for therapy services. Pt is not feeling well on this date and unable to tolerate.

## 2025-03-21 NOTE — PLAN OF CARE
Problem: Chronic Conditions and Co-morbidities  Goal: Patient's chronic conditions and co-morbidity symptoms are monitored and maintained or improved  3/21/2025 1631 by Sue Mann RN  Outcome: Progressing  3/21/2025 0235 by Ad Velázquez RN  Outcome: Progressing     Problem: Discharge Planning  Goal: Discharge to home or other facility with appropriate resources  3/21/2025 1631 by Sue Mann RN  Outcome: Progressing  3/21/2025 0235 by Ad Velázquez RN  Outcome: Progressing     Problem: Safety - Adult  Goal: Free from fall injury  Outcome: Progressing     Problem: ABCDS Injury Assessment  Goal: Absence of physical injury  Outcome: Progressing     Problem: Musculoskeletal - Adult  Goal: Return mobility to safest level of function  Outcome: Progressing  Goal: Maintain proper alignment of affected body part  Outcome: Progressing  Goal: Return ADL status to a safe level of function  Outcome: Progressing     Problem: Nutrition Deficit:  Goal: Optimize nutritional status  Outcome: Progressing

## 2025-03-21 NOTE — CARE COORDINATION
Swing Bed Interdisciplinary Care Plan Conference Report    Patients name:Jayleen Peters  Date of Conference: 3/21/2025    Interdisciplinary rounding completed.  Activities reviewed with OT.  The Following Information was discussed and agreed upon with the patient and/or Caregivers as listed below.      Names of Team Members and Caregivers present for meeting:  : MONTSE Wesley    Therapy: MARIETTA Gallo; MATHIEU Barnes  Dietician: Brenna      Nutrition:  Current Body Weight:   Wt Readings from Last 1 Encounters:   03/19/25 70.5 kg (155 lb 6 oz)     Current Diet order:ADULT ORAL NUTRITION SUPPLEMENT; Breakfast, Dinner; Diabetic Oral Supplement  ADULT DIET; Regular; 3 carb choices (45 gm/meal)  ADULT ORAL NUTRITION SUPPLEMENT; Lunch, Dinner; Wound Healing Oral Supplement  Continue current diet as medically appropriate and tolerated.   Continue to encourage PO intakes as appropriate. Avg of 14-38% x 2 meals.   Continue Glucerna BID. Patrick added BID to promote wound healing and to help meet estimated nutrient needs.   Consider MVI.     RD to follow patient and available PRN.   Goal: PO >75% meals and supplements    Physical/Occupational Therapy:  Current ADL Status: ADL  Equipment Provided: Reacher, Sock aid, Long-handled shoe horn, Long-handled sponge  Feeding: Independent  Grooming: Setup  UE Bathing: Setup  LE Bathing: Minimal assistance  UE Dressing: Stand by assistance  LE Dressing: Contact guard assistance  Putting On/Taking Off Footwear: Contact guard assistance  Toileting: Contact guard assistance  Functional Mobility: Contact guard assistance  Product Used : Bath wipes  Transfers:   Transfers  Sit to Stand: Modified independent, Stand by assistance  Stand to Sit: Modified independent, Stand by assistance  Bed to Chair: Modified independent, Stand by assistance  Mobility/Ambulation:   Ambulation  Surface: Level tile  Device: Rolling Walker  Assistance: Contact guard assistance  Quality of Gait: slow shuffling  gait  Distance: 200ft  Comments: gait training with gait belt    Short Term Goals  Time Frame for Short Term Goals: 2 weeks  Short Term Goal 1: Pt to complete bed mobility with MOD I.  Short Term Goal 2: Pt to complete bathing with MOD I.  Short Term Goal 3: Pt to complete shower transfer with MOD I.  Short Term Goal 4: Pt to complete HEP with independence.  Short Term Goal 5: Pt to complete dressing with MOD I.    Short Term Goals:  Time Frame for Short Term Goals: 3 weeks  Short Term Goal 1: Patient to increase LLE strength to 4/5  Short Term Goal 2: Patient to be IND and safe with all bed mobility.  Short Term Goal 3: Patient to be SBA and safe with all transfers  Short Term Goal 4: Patient to increase ambulation with RW and CGA to 500ft     Nursing:  Skin/Wound/Incisions:  Retired, Read Only Skin Color/Condition  Skin Color: Pale  Skin Condition/Temp: Warm, Dry  Skin Integumentary   Skin Color: Pale  Skin Condition/Temp: Warm, Dry  Skin Integrity: Incision (see LDA)  Location: L hip       Pharmacy:  Pain Control: Tylenol PRN pain  Antibiotics:  none  Anticoagulants: Heparin 5000 units TID       Goals:   Pt will remain free from falls  Pt will have control of acute pain    Activities:   Goal: Participate in 3 activities per week    DC Planning:  Plan for Discharge: home with family assist and HH vs outpt therapy when cleared by therapy  Follow Up/Services needed: ortho on 4/1/25    Continued skilled needs:  Skilled Services are for the ongoing condition for which the individual received inpatient care in a hospital.

## 2025-03-22 ENCOUNTER — APPOINTMENT (OUTPATIENT)
Dept: CT IMAGING | Facility: HOSPITAL | Age: 75
End: 2025-03-22
Attending: INTERNAL MEDICINE
Payer: MEDICARE

## 2025-03-22 VITALS
DIASTOLIC BLOOD PRESSURE: 76 MMHG | OXYGEN SATURATION: 100 % | SYSTOLIC BLOOD PRESSURE: 156 MMHG | BODY MASS INDEX: 26.53 KG/M2 | RESPIRATION RATE: 18 BRPM | TEMPERATURE: 98.1 F | HEART RATE: 72 BPM | WEIGHT: 155.38 LBS | HEIGHT: 64 IN

## 2025-03-22 LAB
ALBUMIN SERPL-MCNC: 3.1 G/DL (ref 3.4–4.8)
ALBUMIN SERPL-MCNC: 3.3 G/DL (ref 3.4–4.8)
ALBUMIN/GLOB SERPL: 0.9 {RATIO} (ref 0.8–2)
ALBUMIN/GLOB SERPL: 0.9 {RATIO} (ref 0.8–2)
ALP SERPL-CCNC: 63 U/L (ref 25–100)
ALP SERPL-CCNC: 69 U/L (ref 25–100)
ALT SERPL-CCNC: 7 U/L (ref 4–36)
ALT SERPL-CCNC: 9 U/L (ref 4–36)
ANION GAP SERPL CALCULATED.3IONS-SCNC: 13 MMOL/L (ref 3–16)
ANION GAP SERPL CALCULATED.3IONS-SCNC: 14 MMOL/L (ref 3–16)
AST SERPL-CCNC: 20 U/L (ref 8–33)
AST SERPL-CCNC: 24 U/L (ref 8–33)
BASOPHILS # BLD: 0 K/UL (ref 0–0.1)
BASOPHILS NFR BLD: 0.1 %
BILIRUB SERPL-MCNC: 0.5 MG/DL (ref 0.3–1.2)
BILIRUB SERPL-MCNC: 0.6 MG/DL (ref 0.3–1.2)
BUN SERPL-MCNC: 42 MG/DL (ref 6–20)
BUN SERPL-MCNC: 46 MG/DL (ref 6–20)
CALCIUM SERPL-MCNC: 8.2 MG/DL (ref 8.3–10.6)
CALCIUM SERPL-MCNC: 9 MG/DL (ref 8.3–10.6)
CHLORIDE SERPL-SCNC: 101 MMOL/L (ref 98–107)
CHLORIDE SERPL-SCNC: 104 MMOL/L (ref 98–107)
CO2 SERPL-SCNC: 22 MMOL/L (ref 20–30)
CO2 SERPL-SCNC: 22 MMOL/L (ref 20–30)
CREAT SERPL-MCNC: 1.8 MG/DL (ref 0.4–1.2)
CREAT SERPL-MCNC: 2 MG/DL (ref 0.4–1.2)
CRP SERPL-MCNC: 45.6 MG/L (ref 0–5.1)
EOSINOPHIL # BLD: 0 K/UL (ref 0–0.4)
EOSINOPHIL NFR BLD: 0 %
ERYTHROCYTE [DISTWIDTH] IN BLOOD BY AUTOMATED COUNT: 14 % (ref 11–16)
ERYTHROCYTE [DISTWIDTH] IN BLOOD BY AUTOMATED COUNT: 14.1 % (ref 11–16)
GFR SERPLBLD CREATININE-BSD FMLA CKD-EPI: 26 ML/MIN/{1.73_M2}
GFR SERPLBLD CREATININE-BSD FMLA CKD-EPI: 29 ML/MIN/{1.73_M2}
GLOBULIN SER CALC-MCNC: 3.3 G/DL
GLOBULIN SER CALC-MCNC: 3.8 G/DL
GLUCOSE BLD-MCNC: 263 MG/DL (ref 74–106)
GLUCOSE BLD-MCNC: 329 MG/DL (ref 74–106)
GLUCOSE SERPL-MCNC: 241 MG/DL (ref 74–106)
GLUCOSE SERPL-MCNC: 297 MG/DL (ref 74–106)
HCT VFR BLD AUTO: 21.2 % (ref 37–47)
HCT VFR BLD AUTO: 22.6 % (ref 37–47)
HGB BLD-MCNC: 6.7 G/DL (ref 11.5–16.5)
HGB BLD-MCNC: 7.2 G/DL (ref 11.5–16.5)
IMM GRANULOCYTES # BLD: 0.1 K/UL
IMM GRANULOCYTES NFR BLD: 1.5 % (ref 0–5)
LACTATE BLDV-SCNC: 1.8 MMOL/L (ref 0.4–2)
LYMPHOCYTES # BLD: 0.7 K/UL (ref 1.5–4)
LYMPHOCYTES NFR BLD: 9.6 %
MCH RBC QN AUTO: 30.6 PG (ref 27–32)
MCH RBC QN AUTO: 30.8 PG (ref 27–32)
MCHC RBC AUTO-ENTMCNC: 31.6 G/DL (ref 31–35)
MCHC RBC AUTO-ENTMCNC: 31.9 G/DL (ref 31–35)
MCV RBC AUTO: 96.6 FL (ref 80–100)
MCV RBC AUTO: 96.8 FL (ref 80–100)
MONOCYTES # BLD: 0.4 K/UL (ref 0.2–0.8)
MONOCYTES NFR BLD: 6.2 %
NEUTROPHILS # BLD: 5.6 K/UL (ref 2–7.5)
NEUTS SEG NFR BLD: 82.6 %
PERFORMED ON: ABNORMAL
PERFORMED ON: ABNORMAL
PLATELET # BLD AUTO: 235 K/UL (ref 150–400)
PLATELET # BLD AUTO: 242 K/UL (ref 150–400)
PMV BLD AUTO: 9.1 FL (ref 6–10)
PMV BLD AUTO: 9.3 FL (ref 6–10)
POTASSIUM SERPL-SCNC: 4.3 MMOL/L (ref 3.4–5.1)
POTASSIUM SERPL-SCNC: 4.8 MMOL/L (ref 3.4–5.1)
PROCALCITONIN SERPL IA-MCNC: 0.07 NG/ML (ref 0–0.15)
PROT SERPL-MCNC: 6.4 G/DL (ref 6.4–8.3)
PROT SERPL-MCNC: 7.1 G/DL (ref 6.4–8.3)
RBC # BLD AUTO: 2.19 M/UL (ref 3.8–5.8)
RBC # BLD AUTO: 2.34 M/UL (ref 3.8–5.8)
SODIUM SERPL-SCNC: 137 MMOL/L (ref 136–145)
SODIUM SERPL-SCNC: 139 MMOL/L (ref 136–145)
WBC # BLD AUTO: 6.7 K/UL (ref 4–11)
WBC # BLD AUTO: 6.8 K/UL (ref 4–11)

## 2025-03-22 PROCEDURE — 74176 CT ABD & PELVIS W/O CONTRAST: CPT

## 2025-03-22 PROCEDURE — 36415 COLL VENOUS BLD VENIPUNCTURE: CPT

## 2025-03-22 PROCEDURE — 6360000002 HC RX W HCPCS: Performed by: PHYSICIAN ASSISTANT

## 2025-03-22 PROCEDURE — 85025 COMPLETE CBC W/AUTO DIFF WBC: CPT

## 2025-03-22 PROCEDURE — 84145 PROCALCITONIN (PCT): CPT

## 2025-03-22 PROCEDURE — 94761 N-INVAS EAR/PLS OXIMETRY MLT: CPT

## 2025-03-22 PROCEDURE — 86140 C-REACTIVE PROTEIN: CPT

## 2025-03-22 PROCEDURE — 6370000000 HC RX 637 (ALT 250 FOR IP): Performed by: INTERNAL MEDICINE

## 2025-03-22 PROCEDURE — 2580000003 HC RX 258: Performed by: PHYSICIAN ASSISTANT

## 2025-03-22 PROCEDURE — 83605 ASSAY OF LACTIC ACID: CPT

## 2025-03-22 PROCEDURE — 99316 NF DSCHRG MGMT 30 MIN+: CPT | Performed by: PHYSICIAN ASSISTANT

## 2025-03-22 PROCEDURE — 80053 COMPREHEN METABOLIC PANEL: CPT

## 2025-03-22 PROCEDURE — 6360000002 HC RX W HCPCS: Performed by: INTERNAL MEDICINE

## 2025-03-22 PROCEDURE — 85027 COMPLETE CBC AUTOMATED: CPT

## 2025-03-22 PROCEDURE — 51702 INSERT TEMP BLADDER CATH: CPT

## 2025-03-22 PROCEDURE — 6370000000 HC RX 637 (ALT 250 FOR IP): Performed by: PHYSICIAN ASSISTANT

## 2025-03-22 RX ORDER — CLONIDINE 0.1 MG/24H
1 PATCH, EXTENDED RELEASE TRANSDERMAL WEEKLY
Status: DISCONTINUED | OUTPATIENT
Start: 2025-03-22 | End: 2025-03-22 | Stop reason: HOSPADM

## 2025-03-22 RX ORDER — INSULIN GLARGINE 100 [IU]/ML
10 INJECTION, SOLUTION SUBCUTANEOUS DAILY
Status: DISCONTINUED | OUTPATIENT
Start: 2025-03-22 | End: 2025-03-22 | Stop reason: HOSPADM

## 2025-03-22 RX ORDER — LORAZEPAM 2 MG/ML
0.5 INJECTION INTRAMUSCULAR EVERY 6 HOURS PRN
Status: DISCONTINUED | OUTPATIENT
Start: 2025-03-22 | End: 2025-03-22 | Stop reason: HOSPADM

## 2025-03-22 RX ADMIN — ATORVASTATIN CALCIUM 10 MG: 10 TABLET, FILM COATED ORAL at 10:39

## 2025-03-22 RX ADMIN — HYDRALAZINE HYDROCHLORIDE 50 MG: 25 TABLET ORAL at 05:54

## 2025-03-22 RX ADMIN — PANTOPRAZOLE SODIUM 40 MG: 40 TABLET, DELAYED RELEASE ORAL at 05:54

## 2025-03-22 RX ADMIN — DOXAZOSIN 8 MG: 4 TABLET ORAL at 10:38

## 2025-03-22 RX ADMIN — OMEGA-3-ACID ETHYL ESTERS 1 G: 1 CAPSULE, LIQUID FILLED ORAL at 10:39

## 2025-03-22 RX ADMIN — LEVOTHYROXINE SODIUM 137 MCG: 25 TABLET ORAL at 05:55

## 2025-03-22 RX ADMIN — FAMOTIDINE 20 MG: 20 TABLET, FILM COATED ORAL at 10:39

## 2025-03-22 RX ADMIN — INSULIN LISPRO 3 UNITS: 100 INJECTION, SOLUTION INTRAVENOUS; SUBCUTANEOUS at 06:32

## 2025-03-22 RX ADMIN — LORAZEPAM 0.5 MG: 2 INJECTION INTRAMUSCULAR; INTRAVENOUS at 08:35

## 2025-03-22 RX ADMIN — Medication 5000 UNITS: at 10:38

## 2025-03-22 RX ADMIN — DILTIAZEM HYDROCHLORIDE 240 MG: 120 CAPSULE, COATED, EXTENDED RELEASE ORAL at 10:39

## 2025-03-22 RX ADMIN — INSULIN GLARGINE 10 UNITS: 100 INJECTION, SOLUTION SUBCUTANEOUS at 11:06

## 2025-03-22 RX ADMIN — CALCIUM CARBONATE 500 MG: 500 TABLET, CHEWABLE ORAL at 10:38

## 2025-03-22 RX ADMIN — SODIUM CHLORIDE: 0.9 INJECTION, SOLUTION INTRAVENOUS at 11:40

## 2025-03-22 RX ADMIN — PROCHLORPERAZINE EDISYLATE 10 MG: 5 INJECTION INTRAMUSCULAR; INTRAVENOUS at 05:54

## 2025-03-22 RX ADMIN — PAROXETINE HYDROCHLORIDE 40 MG: 20 TABLET, FILM COATED ORAL at 10:39

## 2025-03-22 NOTE — PLAN OF CARE
Problem: Safety - Adult  Goal: Free from fall injury  3/22/2025 1006 by Lisa Wiggins, RN  Outcome: Progressing  3/22/2025 0239 by Ad Velázquez, RN  Outcome: Progressing

## 2025-03-22 NOTE — DISCHARGE SUMMARY
Discharge Summary      Patient ID: Jayleen Peters   :  1950     Patient's PCP: Aysha Luciano APRN    Admit Date: 3/19/2025     Discharge Date:   3/22/2025    Admitting Provider: Taya Thakur MD    Discharging Provider: MICHELE Catherine     Reason for this admission:   Declining functional status    Reason for transfer:   Adrenal hemorrhage    Discharge Diagnoses:     Active Hospital Problems    Diagnosis Date Noted    Fracture of femoral neck, left (HCC) [S72.002A] 2025    Stage 4 chronic kidney disease (HCC) [N18.4] 2025    Declining functional status [R53.81] 2025    HTN (hypertension) [I10] 2017    Atrial fibrillation (HCC) [I48.91] 2017    Anemia [D64.9] 2017    Type 2 diabetes mellitus with complication, without long-term current use of insulin (HCC) [E11.8] 2017       Procedures:  CT ABDOMEN PELVIS WO CONTRAST Additional Contrast? None   Final Result      1. Thickening of the bilateral adrenal glands is adjacent fat stranding. These findings are nonspecific and possibly could reflect recent adrenal hemorrhage.    2. Findings of presumed recent left femoral hemiarthroplasty, as described above, with a subcutaneous hematoma in the gluteal region.      Electronically signed by Jonah Aaron DO            Consults:   IP CONSULT TO CASE MANAGEMENT  IP CONSULT TO DIETITIAN  PT OT    Briefly:   74 y.o. female with PMH of CAD, CKD stage 4, HTN, history of Graves' disease status post iodine ablation with resulting hypothyroidism, atrial fibrillation s/p MAZE (not on AC), moderate to severe mitral regurgitation, GERD, mood disorder, CITLALY, T2DM, suspected osteoporosis and recent left hip fracture s/p fall s/p left hip hemiarthroplasty (3/16/25) who was admitted to swing bed for declining functional status.  Swing bed course complicated by intractable nausea and vomiting, uncontrolled blood pressure and recent adrenal hemorrhage on CT abdomen pelvis.  Patient being  Take 1 tablet by mouth 3 times daily as needed (muscle spasms)      Tirzepatide (MOUNJARO) 5 MG/0.5ML SOAJ Inject 0.5 mLs into the skin once a week      atorvastatin (LIPITOR) 10 MG tablet Take 1 tablet by mouth daily      omega-3 acid ethyl esters (LOVAZA) 1 g capsule TAKE 1-4 CAPSULES EVERY EVENING AT BEDTIME AS NEEDED  Qty: 120 capsule, Refills: 5      insulin detemir (LEVEMIR FLEXTOUCH) 100 UNIT/ML injection pen INJECT 35 UNITS INTO THE SKIN NIGHTLY 340B Disp 90 day supply  Qty: 10 pen, Refills: 3      insulin aspart (NOVOLOG FLEXPEN) 100 UNIT/ML injection pen Inject 16-20 Units into the skin 3 times daily (before meals) 340B Disp 90 day supply  Qty: 20 pen, Refills: 3      omeprazole (PRILOSEC) 40 MG delayed release capsule TAKE 1 CAPSULE BY MOUTH  DAILY  Qty: 90 capsule, Refills: 3    Comments: Requesting 1 year supply      PARoxetine (PAXIL) 40 MG tablet TAKE 1 TABLET BY MOUTH  EVERY MORNING  Qty: 90 tablet, Refills: 3    Comments: Requesting 1 year supply      doxazosin (CARDURA) 8 MG tablet Take 1 tablet by mouth in the morning and at bedtime      Insulin Syringe-Needle U-100 (INSULIN SYRINGE .5CC/31GX5/16\") 31G X 5/16\" 0.5 ML MISC 1 each by Does not apply route daily Please sub U50  Qty: 50 each, Refills: 5      ONE TOUCH ULTRA TEST strip Refills: 0      Syringe/Needle, Disp, (SYRINGE 3CC/25GX1\") 25G X 1\" 3 ML MISC Monthly for B12 injection  Qty: 3 each, Refills: 3      Needles & Syringes MISC 1 each by Does not apply route daily. Syringe for B12 IM injection  Qty: 3 each, Refills: 3              Patient's case was reviewed and discussed with Dr. Logan over the phone.    Time spent: >30 minutes        Signed:  Electronically signed by MICHELE Catherine on 3/22/2025 at 11:09 AM       Thank you Aysha Luciano APRN for the opportunity to be involved in this patient's care. If you have any questions or concerns please feel free to contact me at [phone].

## 2025-03-22 NOTE — PROGRESS NOTES
OK to given Lantus 10 units per Amanda Gilmer blood glucose 263 at lunch check -Ok to hold humalog dose per Amanda Scherer- Report given to Benjamin Tang- [phone] - ER

## 2025-03-22 NOTE — PLAN OF CARE
Problem: Discharge Planning  Goal: Discharge to home or other facility with appropriate resources  3/22/2025 0239 by Ad Velázquez RN  Outcome: Progressing  3/21/2025 1631 by Sue Mann RN  Outcome: Progressing     Problem: Safety - Adult  Goal: Free from fall injury  3/22/2025 0239 by Ad Velázquez RN  Outcome: Progressing  3/21/2025 1631 by Sue Mann RN  Outcome: Progressing     Problem: Discharge Planning  Goal: Discharge to home or other facility with appropriate resources  3/22/2025 0239 by Ad Velázquez RN  Outcome: Progressing  3/21/2025 1631 by Sue Mann RN  Outcome: Progressing     Problem: Safety - Adult  Goal: Free from fall injury  3/22/2025 0239 by Ad Velázquez RN  Outcome: Progressing  3/21/2025 1631 by Sue Mann RN  Outcome: Progressing     Problem: ABCDS Injury Assessment  Goal: Absence of physical injury  3/21/2025 1631 by Sue Mann RN  Outcome: Progressing

## 2025-03-22 NOTE — PROGRESS NOTES
Notified Amanda Scherer of pt BP this am (76 heart rate /67)and pt nauseated received new orders- IV ativan and Clonidine patch for elevated BP -  CT results read to Amanda Scherer- Amanda Scherer notified and aware

## 2025-03-22 NOTE — FLOWSHEET NOTE
03/22/25 0753   Vital Signs   Temp 98.1 °F (36.7 °C)   Temp Source Oral   Pulse 76   Heart Rate Source Monitor   BP (!) 167/67   MAP (Calculated) 100   BP Location Left upper arm   BP Method Automatic   Oxygen Therapy   SpO2 99 %     Pt. Given clonidine path this am

## 2025-03-22 NOTE — PROGRESS NOTES
Amanda Scherer aware of CBC results @ 12:35 -Report given to [facility] -pt left via stretcher -all items taken and accounted for no acute distress noted

## 2025-03-22 NOTE — FLOWSHEET NOTE
03/22/25 0845   Assessment   Charting Type Shift assessment   Psychosocial   Psychosocial (WDL) WDL   Neurological   Level of Consciousness 0   Neuro (WDL) WDL   Swallow Screening   Is the patient unable to remain alert for testing? No   Is the patient on a modified diet (thickened liquids) due to pre-existing dysphagia? No   Is there presence of existing enteral tube feeding via the stomach or nose? No   Is there presence of head-of-bed restrictions (less than 30 degrees)? No   Is there presence of tracheotomy tube? No   Is the patient ordered nothing-by-mouth status? No   [place] Coma Scale   Eye Opening 4   Best Verbal Response 5   Best Motor Response 6   [place] Coma Scale Score 15   NIH Stroke Scale   NIH Stroke Scale Assessed No   HEENT (Head, Ears, Eyes, Nose, & Throat)   HEENT (WDL) X   Right Eye Impaired vision   Left Eye Impaired vision   Respiratory   Respiratory Pattern Regular   Respiratory Depth Normal   Respiratory Quality/Effort Unlabored   Respiratory (WDL) WDL   Breath Sounds   Right Upper Lobe Clear   Right Middle Lobe Clear   Right Lower Lobe Clear   Left Upper Lobe Clear   Left Lower Lobe Clear   Cough/Sputum   Cough Dry;Non-productive   Cardiac   Cardiac Regularity Regular   Cardiac (WDL) WDL   Gastrointestinal   Abdominal (WDL) X   RUQ Bowel Sounds Active   LUQ Bowel Sounds Active   RLQ Bowel Sounds Active   LLQ Bowel Sounds Active   GI Symptoms Nausea;Vomiting   Genitourinary   Genitourinary (WDL) WDL   Peripheral Vascular   Peripheral Vascular (WDL) WDL   RUE Neurovascular Assessment   R Radial Pulse +3 (Strong)   LUE Neurovascular Assessment   L Radial Pulse +3 (Strong)   RLE Neurovascular Assessment   Capillary Refill Less than/Equal to 3 seconds   Color Pink   Temperature Warm   R Pedal Pulse +3   LLE Neurovascular Assessment   Capillary Refill Less than/Equal to 3 seconds   Color Pink   Temperature Warm   L Pedal Pulse +3   Anti-Embolism   Anti-Embolism Intervention Medication   Skin

## 2025-03-24 NOTE — TELEPHONE ENCOUNTER
Sw pt's  via phone he states they will just keep the appt cancelled since pt is in the hospital w a broke hip and they do not know how long she will be in the hospital for, they will call back to reschedule.

## 2025-03-28 ENCOUNTER — HOSPITAL ENCOUNTER (INPATIENT)
Facility: HOSPITAL | Age: 75
LOS: 3 days | Discharge: HOME | DRG: 560 | End: 2025-03-31
Attending: INTERNAL MEDICINE | Admitting: INTERNAL MEDICINE
Payer: MEDICARE

## 2025-03-28 LAB
GLUCOSE BLD-MCNC: 210 MG/DL (ref 74–106)
GLUCOSE BLD-MCNC: 239 MG/DL (ref 74–106)
PERFORMED ON: ABNORMAL
PERFORMED ON: ABNORMAL

## 2025-03-28 PROCEDURE — 1200000002 HC SEMI PRIVATE SWING BED

## 2025-03-28 PROCEDURE — 6360000002 HC RX W HCPCS: Performed by: PHYSICIAN ASSISTANT

## 2025-03-28 PROCEDURE — 6370000000 HC RX 637 (ALT 250 FOR IP): Performed by: PHYSICIAN ASSISTANT

## 2025-03-28 RX ORDER — OMEGA-3-ACID ETHYL ESTERS 1 G/1
1 CAPSULE, LIQUID FILLED ORAL 2 TIMES DAILY
Status: DISCONTINUED | OUTPATIENT
Start: 2025-03-28 | End: 2025-03-31 | Stop reason: HOSPADM

## 2025-03-28 RX ORDER — ACETAMINOPHEN 325 MG/1
650 TABLET ORAL EVERY 8 HOURS PRN
Status: DISCONTINUED | OUTPATIENT
Start: 2025-03-28 | End: 2025-03-30 | Stop reason: SDUPTHER

## 2025-03-28 RX ORDER — SENNOSIDES A AND B 8.6 MG/1
2 TABLET, FILM COATED ORAL NIGHTLY
Status: DISCONTINUED | OUTPATIENT
Start: 2025-03-28 | End: 2025-03-31 | Stop reason: HOSPADM

## 2025-03-28 RX ORDER — LIDOCAINE 4 G/G
1 PATCH TOPICAL DAILY
COMMUNITY

## 2025-03-28 RX ORDER — ATORVASTATIN CALCIUM 10 MG/1
10 TABLET, FILM COATED ORAL DAILY
Status: DISCONTINUED | OUTPATIENT
Start: 2025-03-28 | End: 2025-03-31 | Stop reason: HOSPADM

## 2025-03-28 RX ORDER — METOCLOPRAMIDE 10 MG/1
10 TABLET ORAL 4 TIMES DAILY PRN
COMMUNITY

## 2025-03-28 RX ORDER — PANTOPRAZOLE SODIUM 40 MG/1
40 TABLET, DELAYED RELEASE ORAL
Status: DISCONTINUED | OUTPATIENT
Start: 2025-03-29 | End: 2025-03-28

## 2025-03-28 RX ORDER — INSULIN LISPRO 100 [IU]/ML
INJECTION, SOLUTION INTRAVENOUS; SUBCUTANEOUS SEE ADMIN INSTRUCTIONS
COMMUNITY

## 2025-03-28 RX ORDER — HYDRALAZINE HYDROCHLORIDE 25 MG/1
25 TABLET, FILM COATED ORAL 3 TIMES DAILY PRN
Status: DISCONTINUED | OUTPATIENT
Start: 2025-03-28 | End: 2025-03-31 | Stop reason: HOSPADM

## 2025-03-28 RX ORDER — MECOBALAMIN 5000 MCG
5 TABLET,DISINTEGRATING ORAL NIGHTLY
Status: DISCONTINUED | OUTPATIENT
Start: 2025-03-28 | End: 2025-03-31 | Stop reason: HOSPADM

## 2025-03-28 RX ORDER — ENOXAPARIN SODIUM 100 MG/ML
40 INJECTION SUBCUTANEOUS DAILY
Status: DISCONTINUED | OUTPATIENT
Start: 2025-03-29 | End: 2025-03-28

## 2025-03-28 RX ORDER — NIFEDIPINE 30 MG
30 TABLET, EXTENDED RELEASE ORAL 2 TIMES DAILY
Status: DISCONTINUED | OUTPATIENT
Start: 2025-03-28 | End: 2025-03-28

## 2025-03-28 RX ORDER — DILTIAZEM HYDROCHLORIDE 240 MG/1
240 CAPSULE, COATED, EXTENDED RELEASE ORAL EVERY MORNING
Status: DISCONTINUED | OUTPATIENT
Start: 2025-03-29 | End: 2025-03-31 | Stop reason: HOSPADM

## 2025-03-28 RX ORDER — NIFEDIPINE 30 MG
30 TABLET, EXTENDED RELEASE ORAL 2 TIMES DAILY
Status: ON HOLD | COMMUNITY
End: 2025-03-31

## 2025-03-28 RX ORDER — PANTOPRAZOLE SODIUM 40 MG/1
40 TABLET, DELAYED RELEASE ORAL DAILY
COMMUNITY

## 2025-03-28 RX ORDER — INSULIN GLARGINE-YFGN 100 [IU]/ML
5 INJECTION, SOLUTION SUBCUTANEOUS NIGHTLY
COMMUNITY

## 2025-03-28 RX ORDER — HEPARIN SODIUM 5000 [USP'U]/ML
5000 INJECTION, SOLUTION INTRAVENOUS; SUBCUTANEOUS EVERY 8 HOURS SCHEDULED
Status: ON HOLD | COMMUNITY
End: 2025-03-31

## 2025-03-28 RX ORDER — VITAMIN B COMPLEX
5000 TABLET ORAL DAILY
Status: DISCONTINUED | OUTPATIENT
Start: 2025-03-28 | End: 2025-03-31 | Stop reason: HOSPADM

## 2025-03-28 RX ORDER — POLYETHYLENE GLYCOL 3350 17 G/17G
17 POWDER, FOR SOLUTION ORAL DAILY PRN
Status: DISCONTINUED | OUTPATIENT
Start: 2025-03-28 | End: 2025-03-28

## 2025-03-28 RX ORDER — INSULIN LISPRO 100 [IU]/ML
0-5 INJECTION, SOLUTION INTRAVENOUS; SUBCUTANEOUS
Status: DISCONTINUED | OUTPATIENT
Start: 2025-03-28 | End: 2025-03-29

## 2025-03-28 RX ORDER — ACETAMINOPHEN 325 MG/1
650 TABLET ORAL EVERY 4 HOURS PRN
Status: DISCONTINUED | OUTPATIENT
Start: 2025-03-28 | End: 2025-03-31 | Stop reason: HOSPADM

## 2025-03-28 RX ORDER — HEPARIN SODIUM 5000 [USP'U]/ML
5000 INJECTION, SOLUTION INTRAVENOUS; SUBCUTANEOUS EVERY 8 HOURS SCHEDULED
Status: DISCONTINUED | OUTPATIENT
Start: 2025-03-28 | End: 2025-03-31 | Stop reason: HOSPADM

## 2025-03-28 RX ORDER — METOCLOPRAMIDE 5 MG/1
10 TABLET ORAL 4 TIMES DAILY PRN
Status: DISCONTINUED | OUTPATIENT
Start: 2025-03-28 | End: 2025-03-29

## 2025-03-28 RX ORDER — IBUPROFEN 600 MG/1
1 TABLET ORAL PRN
Status: DISCONTINUED | OUTPATIENT
Start: 2025-03-28 | End: 2025-03-31 | Stop reason: HOSPADM

## 2025-03-28 RX ORDER — LIDOCAINE 4 G/G
1 PATCH TOPICAL DAILY
Status: DISCONTINUED | OUTPATIENT
Start: 2025-03-28 | End: 2025-03-31 | Stop reason: HOSPADM

## 2025-03-28 RX ORDER — INSULIN GLARGINE-YFGN 100 [IU]/ML
5 INJECTION, SOLUTION SUBCUTANEOUS NIGHTLY
Status: DISCONTINUED | OUTPATIENT
Start: 2025-03-28 | End: 2025-03-31 | Stop reason: ALTCHOICE

## 2025-03-28 RX ORDER — PANTOPRAZOLE SODIUM 40 MG/1
40 TABLET, DELAYED RELEASE ORAL DAILY
Status: DISCONTINUED | OUTPATIENT
Start: 2025-03-28 | End: 2025-03-31 | Stop reason: HOSPADM

## 2025-03-28 RX ORDER — FAMOTIDINE 20 MG/1
20 TABLET, FILM COATED ORAL DAILY
Status: DISCONTINUED | OUTPATIENT
Start: 2025-03-28 | End: 2025-03-31 | Stop reason: HOSPADM

## 2025-03-28 RX ORDER — SENNOSIDES 8.6 MG
2 TABLET ORAL NIGHTLY
COMMUNITY

## 2025-03-28 RX ORDER — PAROXETINE 20 MG/1
40 TABLET, FILM COATED ORAL EVERY MORNING
Status: DISCONTINUED | OUTPATIENT
Start: 2025-03-29 | End: 2025-03-31 | Stop reason: HOSPADM

## 2025-03-28 RX ORDER — CALCIUM CARBONATE 500 MG/1
1 TABLET, CHEWABLE ORAL 2 TIMES DAILY
Status: DISCONTINUED | OUTPATIENT
Start: 2025-03-28 | End: 2025-03-31 | Stop reason: HOSPADM

## 2025-03-28 RX ORDER — POLYETHYLENE GLYCOL 3350 17 G/17G
17 POWDER, FOR SOLUTION ORAL DAILY PRN
Status: DISCONTINUED | OUTPATIENT
Start: 2025-03-28 | End: 2025-03-31 | Stop reason: HOSPADM

## 2025-03-28 RX ORDER — CALCITRIOL 0.25 UG/1
0.25 CAPSULE, LIQUID FILLED ORAL
Status: DISCONTINUED | OUTPATIENT
Start: 2025-03-28 | End: 2025-03-31 | Stop reason: HOSPADM

## 2025-03-28 RX ORDER — DEXTROSE MONOHYDRATE 100 MG/ML
INJECTION, SOLUTION INTRAVENOUS CONTINUOUS PRN
Status: DISCONTINUED | OUTPATIENT
Start: 2025-03-28 | End: 2025-03-31 | Stop reason: HOSPADM

## 2025-03-28 RX ORDER — BISACODYL 5 MG/1
5 TABLET, DELAYED RELEASE ORAL DAILY PRN
Status: DISCONTINUED | OUTPATIENT
Start: 2025-03-28 | End: 2025-03-31 | Stop reason: HOSPADM

## 2025-03-28 RX ADMIN — Medication 5 MG: at 22:54

## 2025-03-28 RX ADMIN — OMEGA-3-ACID ETHYL ESTERS 1 G: 1 CAPSULE, LIQUID FILLED ORAL at 22:53

## 2025-03-28 RX ADMIN — SENNOSIDES 17.2 MG: 8.6 TABLET, FILM COATED ORAL at 22:54

## 2025-03-28 RX ADMIN — HEPARIN SODIUM 5000 UNITS: 5000 INJECTION INTRAVENOUS; SUBCUTANEOUS at 22:54

## 2025-03-28 RX ADMIN — CALCIUM CARBONATE 500 MG: 500 TABLET, CHEWABLE ORAL at 22:54

## 2025-03-28 ASSESSMENT — LIFESTYLE VARIABLES
HOW OFTEN DO YOU HAVE A DRINK CONTAINING ALCOHOL: NEVER
HOW MANY STANDARD DRINKS CONTAINING ALCOHOL DO YOU HAVE ON A TYPICAL DAY: PATIENT DOES NOT DRINK

## 2025-03-28 NOTE — PROGRESS NOTES
4 Eyes Skin Assessment     NAME:  Jayleen Peters  YOB: 1950  MEDICAL RECORD NUMBER:  [id number]    The patient is being assessed for  Admission  Post-Op Incision    I agree that at least one RN has performed a thorough Head to Toe Skin Assessment on the patient. ALL assessment sites listed below have been assessed.      Areas assessed by both nurses:    Sacrum. Buttock, Coccyx, Ischium and Legs. Feet and Heels        Does the Patient have a Wound? Yes wound(s) were present on assessment. LDA wound assessment was Initiated and completed by RN       Juan Prevention initiated by RN: Yes  Wound Care Orders initiated by RN: Yes    Pressure Injury (Stage 3,4, Unstageable, DTI, NWPT, and Complex wounds) if present, place Wound referral order by RN under : No N/A    New Ostomies, if present place, Ostomy referral order under : No N/A    Nurse 1 eSignature: Electronically signed by Amara Carson RN on 3/28/25 at 6:15 PM EDT    **SHARE this note so that the co-signing nurse can place an eSignature**

## 2025-03-28 NOTE — PROGRESS NOTES
Medication History completed with discharge summary from .  Not Taking:  -Novolog  -Lactulose 10 gm/15 ml (which she had filled recently from her pharmacy, [facility])  -Doxazosin 8 mg bid  -Furosemide 40 mg  -Omeprazole 40 mg  -Tizanidine 4 mg (which she had filled recently from her pharmacy, [facility])  -Mounjaro 5 mg/0.5ml inject weekly (which she had filled recently from her pharmacy, [facility])    Added:  -Senna 8.6 mg 2t qhs  -Metoclopramide 10 mg q6h prn  -Nifedipine XL 30 mg bid  -Insulin Glargine yfgn 100 u/ml inject 5 units under the skin qhs   -Pantoprazole 40 mg qd  -Lidocaine 4% patch qd  -Humalog 100 u/ml inject per sliding scale  -Glucgon kit  -Glucose chewable tablet 4 g  -Heparin 5,000 u/ml 1 ml subcutaneous tid for 28 days    Changed:  -Hydralazine 100 mg tab bid TO 25 mg tid prn  -Famotidine 40 mg TO 20 mg qd  -Lovaza 1 gm po BID instead 1-4 at bedtime

## 2025-03-28 NOTE — CARDIO/PULMONARY
I spoke to the patient about her home Cpap. She stated that she asked the family to take it back home because she just wants to wear O2 per Nasal Cannula at night. DW, RRT

## 2025-03-29 LAB
ALBUMIN SERPL-MCNC: 3.5 G/DL (ref 3.4–4.8)
ALBUMIN/GLOB SERPL: 1.1 {RATIO} (ref 0.8–2)
ALP SERPL-CCNC: 78 U/L (ref 25–100)
ALT SERPL-CCNC: 17 U/L (ref 4–36)
ANION GAP SERPL CALCULATED.3IONS-SCNC: 9 MMOL/L (ref 3–16)
AST SERPL-CCNC: 19 U/L (ref 8–33)
BASOPHILS # BLD: 0 K/UL (ref 0–0.1)
BASOPHILS NFR BLD: 0.1 %
BILIRUB SERPL-MCNC: 0.7 MG/DL (ref 0.3–1.2)
BUN SERPL-MCNC: 38 MG/DL (ref 6–20)
CALCIUM SERPL-MCNC: 9.2 MG/DL (ref 8.3–10.6)
CHLORIDE SERPL-SCNC: 99 MMOL/L (ref 98–107)
CO2 SERPL-SCNC: 26 MMOL/L (ref 20–30)
CREAT SERPL-MCNC: 2.4 MG/DL (ref 0.4–1.2)
EOSINOPHIL # BLD: 0.1 K/UL (ref 0–0.4)
EOSINOPHIL NFR BLD: 1.1 %
ERYTHROCYTE [DISTWIDTH] IN BLOOD BY AUTOMATED COUNT: 15.8 % (ref 11–16)
FERRITIN SERPL IA-MCNC: 439 NG/ML (ref 22–322)
GFR SERPLBLD CREATININE-BSD FMLA CKD-EPI: 21 ML/MIN/{1.73_M2}
GLOBULIN SER CALC-MCNC: 3.1 G/DL
GLUCOSE BLD-MCNC: 212 MG/DL (ref 74–106)
GLUCOSE BLD-MCNC: 225 MG/DL (ref 74–106)
GLUCOSE BLD-MCNC: 227 MG/DL (ref 74–106)
GLUCOSE BLD-MCNC: 261 MG/DL (ref 74–106)
GLUCOSE BLD-MCNC: 267 MG/DL (ref 74–106)
GLUCOSE SERPL-MCNC: 264 MG/DL (ref 74–106)
HCT VFR BLD AUTO: 32 % (ref 37–47)
HGB BLD-MCNC: 10.4 G/DL (ref 11.5–16.5)
IMM GRANULOCYTES # BLD: 0 K/UL
IMM GRANULOCYTES NFR BLD: 0.4 % (ref 0–5)
IRON SATN MFR SERPL: 28 % (ref 15–50)
IRON SERPL-MCNC: 58 UG/DL (ref 37–145)
LYMPHOCYTES # BLD: 1 K/UL (ref 1.5–4)
LYMPHOCYTES NFR BLD: 14.9 %
MCH RBC QN AUTO: 30.9 PG (ref 27–32)
MCHC RBC AUTO-ENTMCNC: 32.5 G/DL (ref 31–35)
MCV RBC AUTO: 95 FL (ref 80–100)
MONOCYTES # BLD: 0.7 K/UL (ref 0.2–0.8)
MONOCYTES NFR BLD: 10.6 %
NEUTROPHILS # BLD: 5.1 K/UL (ref 2–7.5)
NEUTS SEG NFR BLD: 72.9 %
PERFORMED ON: ABNORMAL
PLATELET # BLD AUTO: 309 K/UL (ref 150–400)
PMV BLD AUTO: 9.4 FL (ref 6–10)
POTASSIUM SERPL-SCNC: 4.8 MMOL/L (ref 3.4–5.1)
PROT SERPL-MCNC: 6.6 G/DL (ref 6.4–8.3)
RBC # BLD AUTO: 3.37 M/UL (ref 3.8–5.8)
SODIUM SERPL-SCNC: 134 MMOL/L (ref 136–145)
TIBC SERPL-MCNC: 208 UG/DL (ref 250–450)
WBC # BLD AUTO: 7 K/UL (ref 4–11)

## 2025-03-29 PROCEDURE — 6360000002 HC RX W HCPCS: Performed by: PHYSICIAN ASSISTANT

## 2025-03-29 PROCEDURE — 99305 1ST NF CARE MODERATE MDM 35: CPT | Performed by: INTERNAL MEDICINE

## 2025-03-29 PROCEDURE — 80053 COMPREHEN METABOLIC PANEL: CPT

## 2025-03-29 PROCEDURE — 1200000002 HC SEMI PRIVATE SWING BED

## 2025-03-29 PROCEDURE — 83550 IRON BINDING TEST: CPT

## 2025-03-29 PROCEDURE — 85025 COMPLETE CBC W/AUTO DIFF WBC: CPT

## 2025-03-29 PROCEDURE — 2700000000 HC OXYGEN THERAPY PER DAY

## 2025-03-29 PROCEDURE — 83540 ASSAY OF IRON: CPT

## 2025-03-29 PROCEDURE — 36415 COLL VENOUS BLD VENIPUNCTURE: CPT

## 2025-03-29 PROCEDURE — 82728 ASSAY OF FERRITIN: CPT

## 2025-03-29 PROCEDURE — 6370000000 HC RX 637 (ALT 250 FOR IP): Performed by: PHYSICIAN ASSISTANT

## 2025-03-29 RX ORDER — INSULIN LISPRO 100 [IU]/ML
0-4 INJECTION, SOLUTION INTRAVENOUS; SUBCUTANEOUS
Status: DISCONTINUED | OUTPATIENT
Start: 2025-03-29 | End: 2025-03-31 | Stop reason: HOSPADM

## 2025-03-29 RX ORDER — METOCLOPRAMIDE 5 MG/1
5 TABLET ORAL 4 TIMES DAILY PRN
Status: DISCONTINUED | OUTPATIENT
Start: 2025-03-29 | End: 2025-03-31 | Stop reason: HOSPADM

## 2025-03-29 RX ORDER — INSULIN LISPRO 100 [IU]/ML
0-4 INJECTION, SOLUTION INTRAVENOUS; SUBCUTANEOUS ONCE
Status: COMPLETED | OUTPATIENT
Start: 2025-03-29 | End: 2025-03-29

## 2025-03-29 RX ORDER — INSULIN LISPRO 100 [IU]/ML
0-4 INJECTION, SOLUTION INTRAVENOUS; SUBCUTANEOUS
Status: DISCONTINUED | OUTPATIENT
Start: 2025-03-29 | End: 2025-03-29

## 2025-03-29 RX ADMIN — FAMOTIDINE 20 MG: 20 TABLET, FILM COATED ORAL at 08:15

## 2025-03-29 RX ADMIN — PANTOPRAZOLE SODIUM 40 MG: 40 TABLET, DELAYED RELEASE ORAL at 08:15

## 2025-03-29 RX ADMIN — Medication 5000 UNITS: at 08:15

## 2025-03-29 RX ADMIN — CALCIUM CARBONATE 500 MG: 500 TABLET, CHEWABLE ORAL at 21:23

## 2025-03-29 RX ADMIN — INSULIN LISPRO 1 UNITS: 100 INJECTION, SOLUTION INTRAVENOUS; SUBCUTANEOUS at 11:36

## 2025-03-29 RX ADMIN — INSULIN LISPRO 1 UNITS: 100 INJECTION, SOLUTION INTRAVENOUS; SUBCUTANEOUS at 17:11

## 2025-03-29 RX ADMIN — HEPARIN SODIUM 5000 UNITS: 5000 INJECTION INTRAVENOUS; SUBCUTANEOUS at 21:23

## 2025-03-29 RX ADMIN — METOCLOPRAMIDE 10 MG: 5 TABLET ORAL at 05:29

## 2025-03-29 RX ADMIN — LEVOTHYROXINE SODIUM 137 MCG: 25 TABLET ORAL at 05:29

## 2025-03-29 RX ADMIN — DILTIAZEM HYDROCHLORIDE 240 MG: 240 CAPSULE, EXTENDED RELEASE ORAL at 08:15

## 2025-03-29 RX ADMIN — OMEGA-3-ACID ETHYL ESTERS 1 G: 1 CAPSULE, LIQUID FILLED ORAL at 08:15

## 2025-03-29 RX ADMIN — PAROXETINE HYDROCHLORIDE 40 MG: 20 TABLET, FILM COATED ORAL at 08:15

## 2025-03-29 RX ADMIN — CALCIUM CARBONATE 500 MG: 500 TABLET, CHEWABLE ORAL at 08:15

## 2025-03-29 RX ADMIN — Medication 5 MG: at 21:23

## 2025-03-29 RX ADMIN — HEPARIN SODIUM 5000 UNITS: 5000 INJECTION INTRAVENOUS; SUBCUTANEOUS at 05:29

## 2025-03-29 RX ADMIN — INSULIN LISPRO 2 UNITS: 100 INJECTION, SOLUTION INTRAVENOUS; SUBCUTANEOUS at 09:08

## 2025-03-29 RX ADMIN — ATORVASTATIN CALCIUM 10 MG: 10 TABLET, FILM COATED ORAL at 08:15

## 2025-03-29 RX ADMIN — OMEGA-3-ACID ETHYL ESTERS 1 G: 1 CAPSULE, LIQUID FILLED ORAL at 21:23

## 2025-03-29 RX ADMIN — HEPARIN SODIUM 5000 UNITS: 5000 INJECTION INTRAVENOUS; SUBCUTANEOUS at 14:14

## 2025-03-29 NOTE — PLAN OF CARE
Problem: Chronic Conditions and Co-morbidities  Goal: Patient's chronic conditions and co-morbidity symptoms are monitored and maintained or improved  3/29/2025 0949 by Radha Johnson RN  Outcome: Progressing  Flowsheets (Taken 3/29/2025 0815)  Care Plan - Patient's Chronic Conditions and Co-Morbidity Symptoms are Monitored and Maintained or Improved: Monitor and assess patient's chronic conditions and comorbid symptoms for stability, deterioration, or improvement  3/29/2025 0356 by Ad Velázquez RN  Outcome: Progressing     Problem: Discharge Planning  Goal: Discharge to home or other facility with appropriate resources  3/29/2025 0949 by Radha Johnson RN  Outcome: Progressing  Flowsheets (Taken 3/29/2025 0815)  Discharge to home or other facility with appropriate resources: Identify barriers to discharge with patient and caregiver  3/29/2025 0356 by Ad Velázquez RN  Outcome: Progressing     Problem: Safety - Adult  Goal: Free from fall injury  3/29/2025 0949 by Radha Johnson RN  Outcome: Progressing  3/29/2025 0356 by Ad Velázquez RN  Outcome: Progressing     Problem: Skin/Tissue Integrity  Goal: Skin integrity remains intact  Description: 1.  Monitor for areas of redness and/or skin breakdown  2.  Assess vascular access sites hourly  3.  Every 4-6 hours minimum:  Change oxygen saturation probe site  4.  Every 4-6 hours:  If on nasal continuous positive airway pressure, respiratory therapy assess nares and determine need for appliance change or resting period  Outcome: Progressing  Flowsheets (Taken 3/29/2025 0815)  Skin Integrity Remains Intact: Monitor for areas of redness and/or skin breakdown     Problem: ABCDS Injury Assessment  Goal: Absence of physical injury  Outcome: Progressing

## 2025-03-29 NOTE — PROGRESS NOTES
Recent Labs     03/29/25  0456   BUN 38*       Recent Labs     03/29/25 0456   CREATININE 2.4*       CrCl cannot be calculated (Unknown ideal weight.).      Plan: Changed Reglan 10mg QID prn to 5mg QID prn due to decline in patients renal function    Electronically signed by Julia Cottrell RPH on 3/29/2025 at 7:44 AM

## 2025-03-29 NOTE — H&P
History and Physical    Patient:  Jayleen Peters  :  1950    CHIEF COMPLAINT:    DFS     HISTORY OF PRESENT ILLNESS:   The patient is a 74 y.o. female with a history of IgA nephropathy, CAD, type 2 diabetes, hypothyroidism, A-fib, anemia, CITLALY, CKD 4, AVRIL, hypertension who presents as a transfer from  for inpatient rehab.  Initially patient had a ground-level fall on 3/15/2025 and was evaluated at the ER and found to have a left femoral neck fracture secondary to osteoporosis.  She underwent arthroplasty of her left hip on 3/16/2025.  Patient was sent to subacute rehab on 3/19/2025.  While there patient became confused and began to have abdominal pain.  CT abdomen pelvis showed fat stranding around the bilateral adrenal glands concerning for hemorrhage so she was then transferred back to  for evaluation.  Once at  repeat CT was read by radiologist as more consistent with age-related changes with a left adrenal nodule.  There were just no concern for possible adrenal hemorrhage at that time.  Encephalopathy thought to be due to polypharmacy which resolved.  While there she did start to have downtrending H&H which required 1 unit of PRBC.  Thought to be related to postoperative hematoma.  She did have an episode of hematochezia which was thought to be due to hemorrhoids.  Per physical therapy note she was noted to have significant orthostasis dropping from a systolic of 170-129 on 3/26/2025..  Because of her orthostatics blood pressure medication was changed she was started on nifedipine with titration up and discontinued on clonidine, doxazosin, hydralazine.  [facility] cardiology consulted.  Patient admitted here for swing bed in subacute rehab.  At this time she denies any chest pain, shortness of breath, weakness, dizziness or any other acute complaints.        Past Medical History:      Diagnosis Date    CAD (coronary artery disease)     Hypertension     Hyperthyroidism     IgA nephropathy     Type

## 2025-03-30 LAB
ANION GAP SERPL CALCULATED.3IONS-SCNC: 10 MMOL/L (ref 3–16)
BUN SERPL-MCNC: 28 MG/DL (ref 6–20)
CALCIUM SERPL-MCNC: 9.2 MG/DL (ref 8.3–10.6)
CHLORIDE SERPL-SCNC: 98 MMOL/L (ref 98–107)
CO2 SERPL-SCNC: 25 MMOL/L (ref 20–30)
CREAT SERPL-MCNC: 1.9 MG/DL (ref 0.4–1.2)
GFR SERPLBLD CREATININE-BSD FMLA CKD-EPI: 27 ML/MIN/{1.73_M2}
GLUCOSE BLD-MCNC: 173 MG/DL (ref 74–106)
GLUCOSE BLD-MCNC: 206 MG/DL (ref 74–106)
GLUCOSE BLD-MCNC: 241 MG/DL (ref 74–106)
GLUCOSE BLD-MCNC: 278 MG/DL (ref 74–106)
GLUCOSE SERPL-MCNC: 251 MG/DL (ref 74–106)
PERFORMED ON: ABNORMAL
POTASSIUM SERPL-SCNC: 4.5 MMOL/L (ref 3.4–5.1)
SODIUM SERPL-SCNC: 133 MMOL/L (ref 136–145)

## 2025-03-30 PROCEDURE — 80048 BASIC METABOLIC PNL TOTAL CA: CPT

## 2025-03-30 PROCEDURE — 36415 COLL VENOUS BLD VENIPUNCTURE: CPT

## 2025-03-30 PROCEDURE — 6360000002 HC RX W HCPCS: Performed by: PHYSICIAN ASSISTANT

## 2025-03-30 PROCEDURE — 6370000000 HC RX 637 (ALT 250 FOR IP): Performed by: PHYSICIAN ASSISTANT

## 2025-03-30 PROCEDURE — 1200000002 HC SEMI PRIVATE SWING BED

## 2025-03-30 RX ADMIN — ATORVASTATIN CALCIUM 10 MG: 10 TABLET, FILM COATED ORAL at 08:37

## 2025-03-30 RX ADMIN — Medication 5000 UNITS: at 08:37

## 2025-03-30 RX ADMIN — CALCIUM CARBONATE 500 MG: 500 TABLET, CHEWABLE ORAL at 22:42

## 2025-03-30 RX ADMIN — CALCIUM CARBONATE 500 MG: 500 TABLET, CHEWABLE ORAL at 08:37

## 2025-03-30 RX ADMIN — INSULIN GLARGINE-YFGN 5 UNITS: 100 INJECTION, SOLUTION SUBCUTANEOUS at 22:43

## 2025-03-30 RX ADMIN — DILTIAZEM HYDROCHLORIDE 240 MG: 240 CAPSULE, EXTENDED RELEASE ORAL at 08:37

## 2025-03-30 RX ADMIN — ACETAMINOPHEN 650 MG: 325 TABLET, FILM COATED ORAL at 22:42

## 2025-03-30 RX ADMIN — INSULIN LISPRO 2 UNITS: 100 INJECTION, SOLUTION INTRAVENOUS; SUBCUTANEOUS at 08:44

## 2025-03-30 RX ADMIN — HEPARIN SODIUM 5000 UNITS: 5000 INJECTION INTRAVENOUS; SUBCUTANEOUS at 14:12

## 2025-03-30 RX ADMIN — SENNOSIDES 17.2 MG: 8.6 TABLET, FILM COATED ORAL at 22:42

## 2025-03-30 RX ADMIN — PANTOPRAZOLE SODIUM 40 MG: 40 TABLET, DELAYED RELEASE ORAL at 08:37

## 2025-03-30 RX ADMIN — Medication 5 MG: at 22:42

## 2025-03-30 RX ADMIN — PAROXETINE HYDROCHLORIDE 40 MG: 20 TABLET, FILM COATED ORAL at 08:37

## 2025-03-30 RX ADMIN — INSULIN LISPRO 1 UNITS: 100 INJECTION, SOLUTION INTRAVENOUS; SUBCUTANEOUS at 17:10

## 2025-03-30 RX ADMIN — FAMOTIDINE 20 MG: 20 TABLET, FILM COATED ORAL at 08:37

## 2025-03-30 RX ADMIN — HEPARIN SODIUM 5000 UNITS: 5000 INJECTION INTRAVENOUS; SUBCUTANEOUS at 06:25

## 2025-03-30 RX ADMIN — LEVOTHYROXINE SODIUM 137 MCG: 25 TABLET ORAL at 06:25

## 2025-03-30 RX ADMIN — OMEGA-3-ACID ETHYL ESTERS 1 G: 1 CAPSULE, LIQUID FILLED ORAL at 08:37

## 2025-03-30 RX ADMIN — OMEGA-3-ACID ETHYL ESTERS 1 G: 1 CAPSULE, LIQUID FILLED ORAL at 22:42

## 2025-03-30 RX ADMIN — HEPARIN SODIUM 5000 UNITS: 5000 INJECTION INTRAVENOUS; SUBCUTANEOUS at 22:42

## 2025-03-30 ASSESSMENT — PAIN SCALES - GENERAL: PAINLEVEL_OUTOF10: 5

## 2025-03-30 NOTE — PLAN OF CARE
Problem: Chronic Conditions and Co-morbidities  Goal: Patient's chronic conditions and co-morbidity symptoms are monitored and maintained or improved  3/29/2025 2129 by Lakshmi Miller RN  Outcome: Progressing  3/29/2025 0949 by Radha Johnson RN  Outcome: Progressing  Flowsheets (Taken 3/29/2025 0815)  Care Plan - Patient's Chronic Conditions and Co-Morbidity Symptoms are Monitored and Maintained or Improved: Monitor and assess patient's chronic conditions and comorbid symptoms for stability, deterioration, or improvement     Problem: Discharge Planning  Goal: Discharge to home or other facility with appropriate resources  3/29/2025 2129 by Lakshmi Miller RN  Outcome: Progressing  3/29/2025 0949 by Radha Johnson RN  Outcome: Progressing  Flowsheets (Taken 3/29/2025 0815)  Discharge to home or other facility with appropriate resources: Identify barriers to discharge with patient and caregiver     Problem: Safety - Adult  Goal: Free from fall injury  3/29/2025 2129 by Lakshmi Miller RN  Outcome: Progressing  3/29/2025 0949 by Radha Johnson RN  Outcome: Progressing     Problem: Skin/Tissue Integrity  Goal: Skin integrity remains intact  Description: 1.  Monitor for areas of redness and/or skin breakdown  2.  Assess vascular access sites hourly  3.  Every 4-6 hours minimum:  Change oxygen saturation probe site  4.  Every 4-6 hours:  If on nasal continuous positive airway pressure, respiratory therapy assess nares and determine need for appliance change or resting period  3/29/2025 0949 by Radha Johnson RN  Outcome: Progressing  Flowsheets (Taken 3/29/2025 0815)  Skin Integrity Remains Intact: Monitor for areas of redness and/or skin breakdown     Problem: ABCDS Injury Assessment  Goal: Absence of physical injury  3/29/2025 0949 by Radha Johnson RN  Outcome: Progressing

## 2025-03-30 NOTE — PLAN OF CARE
Problem: Chronic Conditions and Co-morbidities  Goal: Patient's chronic conditions and co-morbidity symptoms are monitored and maintained or improved  3/30/2025 0919 by Radha Johnson RN  Outcome: Progressing  Flowsheets (Taken 3/30/2025 0837)  Care Plan - Patient's Chronic Conditions and Co-Morbidity Symptoms are Monitored and Maintained or Improved: Monitor and assess patient's chronic conditions and comorbid symptoms for stability, deterioration, or improvement  3/29/2025 2129 by Lakshmi Miller RN  Outcome: Progressing     Problem: Discharge Planning  Goal: Discharge to home or other facility with appropriate resources  3/30/2025 0919 by Radha Johnson RN  Outcome: Progressing  Flowsheets (Taken 3/30/2025 0837)  Discharge to home or other facility with appropriate resources: Identify barriers to discharge with patient and caregiver  3/29/2025 2129 by Lakshmi Miller RN  Outcome: Progressing     Problem: Safety - Adult  Goal: Free from fall injury  3/30/2025 0919 by Radha Johnson RN  Outcome: Progressing  3/29/2025 2129 by Lakshmi Miller RN  Outcome: Progressing     Problem: Skin/Tissue Integrity  Goal: Skin integrity remains intact  Description: 1.  Monitor for areas of redness and/or skin breakdown  2.  Assess vascular access sites hourly  3.  Every 4-6 hours minimum:  Change oxygen saturation probe site  4.  Every 4-6 hours:  If on nasal continuous positive airway pressure, respiratory therapy assess nares and determine need for appliance change or resting period  Outcome: Progressing  Flowsheets (Taken 3/30/2025 0837)  Skin Integrity Remains Intact: Monitor for areas of redness and/or skin breakdown     Problem: ABCDS Injury Assessment  Goal: Absence of physical injury  Outcome: Progressing

## 2025-03-31 VITALS
HEIGHT: 64 IN | BODY MASS INDEX: 26.67 KG/M2 | RESPIRATION RATE: 18 BRPM | TEMPERATURE: 97.7 F | OXYGEN SATURATION: 97 % | SYSTOLIC BLOOD PRESSURE: 153 MMHG | HEART RATE: 67 BPM | DIASTOLIC BLOOD PRESSURE: 68 MMHG

## 2025-03-31 LAB
GLUCOSE BLD-MCNC: 156 MG/DL (ref 74–106)
GLUCOSE BLD-MCNC: 256 MG/DL (ref 74–106)
PERFORMED ON: ABNORMAL
PERFORMED ON: ABNORMAL

## 2025-03-31 PROCEDURE — 6370000000 HC RX 637 (ALT 250 FOR IP): Performed by: PHYSICIAN ASSISTANT

## 2025-03-31 PROCEDURE — 99315 NF DSCHRG MGMT 30 MIN/LESS: CPT | Performed by: INTERNAL MEDICINE

## 2025-03-31 PROCEDURE — 6360000002 HC RX W HCPCS: Performed by: PHYSICIAN ASSISTANT

## 2025-03-31 RX ORDER — HEPARIN SODIUM 5000 [USP'U]/ML
5000 INJECTION, SOLUTION INTRAVENOUS; SUBCUTANEOUS EVERY 8 HOURS SCHEDULED
Qty: 48 ML | Refills: 0 | Status: SHIPPED | OUTPATIENT
Start: 2025-03-31 | End: 2025-04-16

## 2025-03-31 RX ORDER — DILTIAZEM HYDROCHLORIDE 240 MG/1
240 CAPSULE, EXTENDED RELEASE ORAL EVERY MORNING
Qty: 30 CAPSULE | Refills: 0 | Status: SHIPPED | OUTPATIENT
Start: 2025-03-31

## 2025-03-31 RX ORDER — ENOXAPARIN SODIUM 100 MG/ML
30 INJECTION SUBCUTANEOUS ONCE
Status: COMPLETED | OUTPATIENT
Start: 2025-03-31 | End: 2025-03-31

## 2025-03-31 RX ORDER — INSULIN GLARGINE 100 [IU]/ML
5 INJECTION, SOLUTION SUBCUTANEOUS NIGHTLY
Status: DISCONTINUED | OUTPATIENT
Start: 2025-03-31 | End: 2025-03-31 | Stop reason: HOSPADM

## 2025-03-31 RX ORDER — NIFEDIPINE 30 MG
30 TABLET, EXTENDED RELEASE ORAL 2 TIMES DAILY
Qty: 30 TABLET | Refills: 0 | Status: SHIPPED | OUTPATIENT
Start: 2025-03-31

## 2025-03-31 RX ADMIN — OMEGA-3-ACID ETHYL ESTERS 1 G: 1 CAPSULE, LIQUID FILLED ORAL at 07:49

## 2025-03-31 RX ADMIN — ATORVASTATIN CALCIUM 10 MG: 10 TABLET, FILM COATED ORAL at 07:49

## 2025-03-31 RX ADMIN — PANTOPRAZOLE SODIUM 40 MG: 40 TABLET, DELAYED RELEASE ORAL at 07:49

## 2025-03-31 RX ADMIN — LEVOTHYROXINE SODIUM 137 MCG: 25 TABLET ORAL at 06:36

## 2025-03-31 RX ADMIN — PAROXETINE HYDROCHLORIDE 40 MG: 20 TABLET, FILM COATED ORAL at 07:49

## 2025-03-31 RX ADMIN — DILTIAZEM HYDROCHLORIDE 240 MG: 240 CAPSULE, EXTENDED RELEASE ORAL at 07:49

## 2025-03-31 RX ADMIN — CALCIUM CARBONATE 500 MG: 500 TABLET, CHEWABLE ORAL at 07:49

## 2025-03-31 RX ADMIN — FAMOTIDINE 20 MG: 20 TABLET, FILM COATED ORAL at 07:49

## 2025-03-31 RX ADMIN — Medication 5000 UNITS: at 07:49

## 2025-03-31 RX ADMIN — HEPARIN SODIUM 5000 UNITS: 5000 INJECTION INTRAVENOUS; SUBCUTANEOUS at 06:36

## 2025-03-31 RX ADMIN — INSULIN LISPRO 2 UNITS: 100 INJECTION, SOLUTION INTRAVENOUS; SUBCUTANEOUS at 11:09

## 2025-03-31 RX ADMIN — ENOXAPARIN SODIUM 30 MG: 100 INJECTION SUBCUTANEOUS at 12:20

## 2025-03-31 NOTE — CARE COORDINATION
03/31/25 1032   Service Assessment   Patient Orientation Alert and Oriented;Person;Place;Situation;Self   Cognition Alert   History Provided By Patient;Medical Record   Primary Caregiver Self   Accompanied By/Relationship daughter   Support Systems Spouse/Significant Other;Children;Family Members   Patient's Healthcare Decision Maker is: Named in Scanned ACP Document   PCP Verified by CM Yes   Last Visit to PCP Within last 3 months   Prior Functional Level Independent in ADLs/IADLs   Current Functional Level Assistance with the following:;Cooking;Housework;Shopping   Can patient return to prior living arrangement Yes   Ability to make needs known: Good   Family able to assist with home care needs: Yes   Would you like for me to discuss the discharge plan with any other family members/significant others, and if so, who? No   Financial Resources None   Community Resources None   CM/SW Referral DME   Social/Functional History   Lives With Spouse   Type of Home House   Home Layout Two level;Able to Live on Main level with bedroom/bathroom   Home Access Level entry   Bathroom Shower/Tub Tub/Shower unit   Bathroom Toilet Handicap height   Bathroom Equipment None   Bathroom Accessibility Walker accessible   Home Equipment Oxygen;Adjustable bed   Receives Help From Family   Prior Level of Assist for ADLs Independent   Prior Level of Assist for Homemaking Independent   Homemaking Responsibilities Yes   Ambulation Assistance Independent   Prior Level of Assist for Transfers Independent   Active  Yes   Mode of Transportation Car   Occupation Retired   Discharge Planning   Type of Residence House   Living Arrangements Spouse/Significant Other   Current Services Prior To Admission Oxygen Therapy;C-pap   Current DME Prior to Arrival Hospital Bed;Oxygen Therapy (Comment)   Potential Assistance Needed Home Care  (family ordered walker, BSC and shower chair already)   Potential DME Needed Walker;Bedside Commode;Shower

## 2025-03-31 NOTE — PLAN OF CARE
Problem: Chronic Conditions and Co-morbidities  Goal: Patient's chronic conditions and co-morbidity symptoms are monitored and maintained or improved  3/31/2025 1044 by Radha Johnson RN  Outcome: Progressing  3/31/2025 0856 by Radha Johnson RN  Outcome: Progressing  Flowsheets (Taken 3/31/2025 0750)  Care Plan - Patient's Chronic Conditions and Co-Morbidity Symptoms are Monitored and Maintained or Improved: Monitor and assess patient's chronic conditions and comorbid symptoms for stability, deterioration, or improvement  3/31/2025 0103 by Lakshmi Miller RN  Outcome: Progressing  3/31/2025 0103 by Lakshmi Miller RN  Outcome: Progressing     Problem: Discharge Planning  Goal: Discharge to home or other facility with appropriate resources  3/31/2025 1044 by Radha Johnson RN  Outcome: Progressing  3/31/2025 0856 by Radha Johnson RN  Outcome: Progressing  Flowsheets (Taken 3/31/2025 0750)  Discharge to home or other facility with appropriate resources: Identify barriers to discharge with patient and caregiver  3/31/2025 0103 by Lakshmi Miller RN  Outcome: Progressing  3/31/2025 0103 by Lakshmi Miller RN  Outcome: Progressing     Problem: Safety - Adult  Goal: Free from fall injury  3/31/2025 1044 by Radha Johnson RN  Outcome: Progressing  3/31/2025 0856 by Radha Johnson RN  Outcome: Progressing  3/31/2025 0103 by Lakshmi Miller RN  Outcome: Progressing  3/31/2025 0103 by Lakshmi Miller RN  Outcome: Progressing     Problem: Skin/Tissue Integrity  Goal: Skin integrity remains intact  Description: 1.  Monitor for areas of redness and/or skin breakdown  2.  Assess vascular access sites hourly  3.  Every 4-6 hours minimum:  Change oxygen saturation probe site  4.  Every 4-6 hours:  If on nasal continuous positive airway pressure, respiratory therapy assess nares and determine need for appliance change or resting period  3/31/2025 1044 by Radha Johnson RN  Outcome:

## 2025-03-31 NOTE — PLAN OF CARE
Problem: Chronic Conditions and Co-morbidities  Goal: Patient's chronic conditions and co-morbidity symptoms are monitored and maintained or improved  3/31/2025 0856 by Radha Johnson RN  Outcome: Progressing  Flowsheets (Taken 3/31/2025 0750)  Care Plan - Patient's Chronic Conditions and Co-Morbidity Symptoms are Monitored and Maintained or Improved: Monitor and assess patient's chronic conditions and comorbid symptoms for stability, deterioration, or improvement  3/31/2025 0103 by Lakshmi Miller RN  Outcome: Progressing  3/31/2025 0103 by Lakshmi Miller RN  Outcome: Progressing     Problem: Discharge Planning  Goal: Discharge to home or other facility with appropriate resources  3/31/2025 0856 by Radha Johnson RN  Outcome: Progressing  Flowsheets (Taken 3/31/2025 0750)  Discharge to home or other facility with appropriate resources: Identify barriers to discharge with patient and caregiver  3/31/2025 0103 by Lakshmi Miller RN  Outcome: Progressing  3/31/2025 0103 by Lakshmi Miller RN  Outcome: Progressing     Problem: Safety - Adult  Goal: Free from fall injury  3/31/2025 0856 by Radha Johnson RN  Outcome: Progressing  3/31/2025 0103 by Lakshmi Miller RN  Outcome: Progressing  3/31/2025 0103 by Lakshmi Miller RN  Outcome: Progressing     Problem: Skin/Tissue Integrity  Goal: Skin integrity remains intact  Description: 1.  Monitor for areas of redness and/or skin breakdown  2.  Assess vascular access sites hourly  3.  Every 4-6 hours minimum:  Change oxygen saturation probe site  4.  Every 4-6 hours:  If on nasal continuous positive airway pressure, respiratory therapy assess nares and determine need for appliance change or resting period  3/31/2025 0856 by Radha Johnson RN  Outcome: Progressing  Flowsheets (Taken 3/31/2025 0750)  Skin Integrity Remains Intact: Monitor for areas of redness and/or skin breakdown  3/31/2025 0103 by Lakshmi Miller RN  Outcome:

## 2025-03-31 NOTE — CARE COORDINATION
Case Management Assessment Discharge Note    Date / Time of Note:  03/31/25 2:07 PM  Discharge Note Completed by: Reji Mann RN      Patient Name: Jayleen Peters   YOB: 1950  Diagnosis: Declining functional status [R53.81]   Date / Time: 3/28/2025  2:09 PM    Current PCP: Aysha Luciano APRN  Clinic patient: No    Hospitalization in the last 30 days: No    Advance Directives:  Code Status: Full Code    Financial:  Payor: MEDICARE / Plan: [facility] MEDICARE / Product Type: *No Product type* /      Pharmacy:    [address] -  [phone] - F [phone]  [address] [place] [id number]  Phone: [phone] Fax: [phone]      Assistance purchasing medications?: Potential Assistance Purchasing Medications: No  Assistance provided by Case Does patient want to participate in local refill/ meds to beds program?: No    Meds To Beds General Rules:  1. Can ONLY be done Monday- Friday between 8:30am-5pm  2. Prescription(s) must be in pharmacy by 3pm to be filled same day  3.Copy of patient's insurance/ prescription drug card and patient face sheet must be sent along with the prescription(s)  4. Cost of Rx cannot be added to hospital bill. If financial assistance is needed, please contact unit  or ;  or  CANNOT provide pharmacy voucher for patients co-pays  5. Patients can then  the prescription on their way out of the hospital at discharge, or pharmacy can deliver to the bedside if staff is available. (payment due at time of pick-up or delivery - cash, check, or card accepted)     Able to afford home medications/ co-pay costs: Yes    DISCHARGE Disposition: home    Case management has presented and reviewed IMM letter #2 to the patient and/or family/ POA. Patient and/or family/POA verbalized understanding of their medicare rights and appeal process if needed. Patient and/or family/POA has

## 2025-03-31 NOTE — CARE COORDINATION
Nicki Johnson RN, was give patient's appointment. She will inform patient of appointment 4/7/2025 at 2:30 PM.

## 2025-03-31 NOTE — PLAN OF CARE
Problem: Chronic Conditions and Co-morbidities  Goal: Patient's chronic conditions and co-morbidity symptoms are monitored and maintained or improved  3/31/2025 0103 by Lakshmi Miller RN  Outcome: Progressing  3/31/2025 0103 by Lakshmi Miller RN  Outcome: Progressing     Problem: Discharge Planning  Goal: Discharge to home or other facility with appropriate resources  3/31/2025 0103 by Lakshmi Miller RN  Outcome: Progressing  3/31/2025 0103 by Lakshmi Miller RN  Outcome: Progressing     Problem: Safety - Adult  Goal: Free from fall injury  3/31/2025 0103 by Lakshmi Miller RN  Outcome: Progressing  3/31/2025 0103 by Lakshmi Miller RN  Outcome: Progressing     Problem: Skin/Tissue Integrity  Goal: Skin integrity remains intact  Description: 1.  Monitor for areas of redness and/or skin breakdown  2.  Assess vascular access sites hourly  3.  Every 4-6 hours minimum:  Change oxygen saturation probe site  4.  Every 4-6 hours:  If on nasal continuous positive airway pressure, respiratory therapy assess nares and determine need for appliance change or resting period  3/31/2025 0103 by Lakshmi Miller RN  Outcome: Progressing  3/31/2025 0103 by Lakshmi Miller RN  Outcome: Progressing     Problem: ABCDS Injury Assessment  Goal: Absence of physical injury  3/31/2025 0103 by Lakshmi Miller RN  Outcome: Progressing  3/31/2025 0103 by Lakshmi Miller RN  Outcome: Progressing

## 2025-03-31 NOTE — DISCHARGE SUMMARY
capsule by mouth every morning  Qty: 30 capsule, Refills: 0                Details   pantoprazole (PROTONIX) 40 MG tablet Take 1 tablet by mouth daily      glucagon 1 MG injection Inject 1 mg into the muscle once      glucose 4 g chewable tablet Take 4 tablets by mouth as needed for Low blood sugar      insulin lispro (HUMALOG,ADMELOG) 100 UNIT/ML SOLN injection vial Inject into the skin See Admin Instructions Inject under the skin per sliding scale      lidocaine 4 % external patch Place 1 patch onto the skin daily      metoclopramide (REGLAN) 10 MG tablet Take 1 tablet by mouth 4 times daily as needed (nausea)      senna (SENOKOT) 8.6 MG TABS tablet Take 2 tablets by mouth at bedtime      insulin glargine-yfgn (SEMGLEE-YFGN) 100 UNIT/ML injection vial Inject 5 Units into the skin at bedtime      acetaminophen (TYLENOL) 325 MG tablet Take 2 tablets by mouth every 6 hours      calcitRIOL (ROCALTROL) 0.25 MCG capsule Take 1 capsule by mouth Every Monday, Wednesday, and Friday      calcium carbonate (TUMS) 500 MG chewable tablet Take 1 tablet by mouth 2 times daily      vitamin D3 (CHOLECALCIFEROL) 125 MCG (5000 UT) TABS tablet Take 1 tablet by mouth daily      famotidine (PEPCID) 20 MG tablet Take 1 tablet by mouth daily      furosemide (LASIX) 40 MG tablet Take 1 tablet by mouth daily as needed (swelling)      hydrALAZINE (APRESOLINE) 25 MG tablet Take 1 tablet by mouth 3 times daily as needed (sbp >180)      levothyroxine (SYNTHROID) 137 MCG tablet Take 1 tablet by mouth Daily      melatonin 5 MG TABS tablet Take 1 tablet by mouth at bedtime      polyethylene glycol (GLYCOLAX) 17 g packet Take 1 packet by mouth daily as needed for Constipation      Tirzepatide (MOUNJARO) 5 MG/0.5ML SOAJ Inject 0.5 mLs into the skin once a week      omega-3 acid ethyl esters (LOVAZA) 1 g capsule TAKE 1-4 CAPSULES EVERY EVENING AT BEDTIME AS NEEDED  Qty: 120 capsule, Refills: 5      PARoxetine (PAXIL) 40 MG tablet TAKE 1 TABLET BY

## 2025-03-31 NOTE — PROGRESS NOTES
Patient and patient family with no preference for home health company. Order and clinicals faxed to [facility].

## 2025-03-31 NOTE — CARE COORDINATION
Patient received fully dressed and sitting up in a bedside chair with daughter present in room. She states she is discharging home today. Per patient, she is able to perform bed mobility, sit to stand, transfers and ambulates with RW and mod I. Daughter reports patient was bathing herself when she entered her room this morning. Per daughter and patient, she has a RW, tub bench, and BSC. OT gave patient a hip kit and instructed her in how to use each item. Patient voiced understanding. Patient is also able to state and demonstrate understanding of ONOFRE precautions. She states she has no steps to enter home or within home. Recommend outpatient PT to follow patient. No formal evaluation completed d/t patient's imminent discharge to home.

## 2025-03-31 NOTE — DISCHARGE INSTRUCTIONS
Activity: activity as tolerated  Diet:  Renal, diabetic diet  Follow Up: Primary Care Physician in 1 week and with [facility] as scheduled on 4/1/25.

## 2025-03-31 NOTE — CARE COORDINATION
OT nicho attempted. Pt seated up in chair with family. She reports that she has been walking with her RW and up getting around in her room with out help. She anticipates/requests D/C this date

## 2025-03-31 NOTE — FLOWSHEET NOTE
03/28/25 1447   Assessment   Charting Type Admission   Psychosocial   Psychosocial (WDL) WDL   Neurological   Level of Consciousness 0   Neuro (WDL) WDL   [place] Coma Scale   Eye Opening 4   Best Verbal Response 5   Best Motor Response 6   [place] Coma Scale Score 15   HEENT (Head, Ears, Eyes, Nose, & Throat)   HEENT (WDL) X   Right Eye Impaired vision   Left Eye Impaired vision   Respiratory   Respiratory Pattern Regular   Respiratory Depth Normal   Respiratory Quality/Effort Unlabored   L Breath Sounds Clear   R Breath Sounds Clear   Respiratory (WDL) WDL   Breath Sounds   Breath Sounds Bilateral Clear   Right Upper Lobe Clear   Right Middle Lobe Clear   Right Lower Lobe Clear   Left Upper Lobe Clear   Left Lower Lobe Clear   Cardiac   Cardiac Regularity Regular   Cardiac (WDL) WDL   Cardiac Monitor   Cardiac/Telemetry Monitor On No   Gastrointestinal   Abdominal (WDL) WDL   Last BM (including prior to admit) 03/28/25   RUQ Bowel Sounds Active   LUQ Bowel Sounds Active   RLQ Bowel Sounds Active   LLQ Bowel Sounds Active   GI Symptoms Nausea;Vomiting   Genitourinary   Genitourinary (WDL) WDL   Peripheral Vascular   Peripheral Vascular (WDL) WDL   Edema None   Skin Integumentary    Skin Condition/Temp Warm;Dry   Skin Integrity Incision (see LDA)   Location left hip   Multiple Skin Integrity Sites No   Skin Integumentary (WDL) X   Musculoskeletal   LL Extremity Weakness;Unsteady   Musculoskeletal (WDL) X   Musculoskeletal Details   L Hip Surgery     Patient admitted for swing bed from . Patient was transferred to  on 3/22/25 for AMS and HTN. Per report from Alicia from  patient had several medication adjustments to get BP under control. Patient was brought to medical unit via wheelchair with family. Patient does get up with walker and stand by assist. Patient had left total hip on 3/16/25 after fracture related to fall. Patient is alert and oriented x4. Patient has healing incision to left hip- see 
   03/29/25 0815   Assessment   Charting Type Shift assessment   Psychosocial   Psychosocial (WDL) WDL   Neurological   Level of Consciousness 0   Neuro (WDL) WDL   [place] Coma Scale   Eye Opening 4   Best Verbal Response 5   Best Motor Response 6   [place] Coma Scale Score 15   HEENT (Head, Ears, Eyes, Nose, & Throat)   HEENT (WDL) X   Right Eye Impaired vision   Left Eye Impaired vision   Respiratory   Respiratory Pattern Regular   Respiratory Depth Normal   Respiratory Quality/Effort Unlabored   L Breath Sounds Clear   R Breath Sounds Clear   Respiratory (WDL) WDL   Breath Sounds   Right Upper Lobe Clear   Right Middle Lobe Clear   Right Lower Lobe Clear   Left Upper Lobe Clear   Left Lower Lobe Clear   Cardiac   Cardiac Regularity Regular   Cardiac (WDL) WDL   Gastrointestinal   Abdominal (WDL) WDL   RUQ Bowel Sounds Active   LUQ Bowel Sounds Active   RLQ Bowel Sounds Active   LLQ Bowel Sounds Active   GI Symptoms Nausea;Vomiting   Care Plan - Gastrointestinal Goals   Minimal or absence of nausea and vomiting Administer IV fluids as ordered to ensure adequate hydration   Maintains or returns to baseline bowel function Assess bowel function   Maintains adequate nutritional intake Monitor percentage of each meal consumed   Genitourinary   Genitourinary (WDL) WDL   Peripheral Vascular   Peripheral Vascular (WDL) WDL   Edema None   Anti-Embolism   Anti-Embolism Intervention Medication   Skin Integumentary    Skin Color Pale   Skin Condition/Temp Warm;Dry   Skin Integrity Incision (see LDA)   Location left hip   Skin Integumentary (WDL) X   Care Plan - Skin/Tissue Integrity Goals   Skin Integrity Remains Intact Monitor for areas of redness and/or skin breakdown   Musculoskeletal   LL Extremity Weakness;Unsteady   Musculoskeletal (WDL) X   Musculoskeletal Details   L Hip Surgery   Care Plan - Musculoskeletal Goals   Return Mobility to Safest Level of Function Assess patient stability and activity tolerance for 
   03/29/25 2000   Safe Environment   Arm Bands On ID   Precautions   Precautions Fall risk   Negative Pressure Room No   Positive Pressure Room No   Fall Risk Interventions   Toilet Every 2 Hours-In Advance of Need Yes   Hourly Visual Checks In bed;Awake   Fall Visual Posted Socks   Room Door Open Deferred to decrease stimulation   Alarm On Bed   Mobility   Activity In bed   Level of Assistance Minimal assist, patient does 75% or more   Turned/Repositioned Pillow support   Range of Motion Active;All extremities   Head of Bed Elevated  Self regulated   Anti-Embolism   Anti-Embolism Intervention Medication   Hygiene   Level of Assistance Minimal assist, patient does 75%...   Nutrition   Feeding Able to feed self - Independent   Entertainment   Entertainment Activities Television       
   03/29/25 2126   Assessment   Charting Type Shift assessment   Psychosocial   Psychosocial (WDL) WDL   Neurological   Level of Consciousness 0   R Pupil Size (mm) 2   R Pupil Reaction Brisk   L Pupil Size (mm) 2   L Pupil Reaction Brisk   Neuro (WDL) WDL   [place] Coma Scale   Eye Opening 4   Best Verbal Response 5   Best Motor Response 6   [place] Coma Scale Score 15   HEENT (Head, Ears, Eyes, Nose, & Throat)   HEENT (WDL) X   Right Eye Impaired vision   Left Eye Impaired vision   Respiratory   Respiratory Pattern Regular   Respiratory Depth Normal   Respiratory Quality/Effort Unlabored   L Breath Sounds Clear   R Breath Sounds Clear   Respiratory (WDL) WDL   Breath Sounds   Right Upper Lobe Clear   Right Middle Lobe Clear   Right Lower Lobe Clear   Left Upper Lobe Clear   Left Lower Lobe Clear   Cough/Sputum   Sputum How Obtained Cough on request   Cough Dry;Non-productive   Cardiac   Cardiac Regularity Regular   Cardiac (WDL) WDL   Gastrointestinal   Abdominal (WDL) WDL   Last BM (including prior to admit) 03/28/25   RUQ Bowel Sounds Active   LUQ Bowel Sounds Active   RLQ Bowel Sounds Active   LLQ Bowel Sounds Active   GI Symptoms Nausea;Vomiting   Genitourinary   Genitourinary (WDL) WDL   Peripheral Vascular   Peripheral Vascular (WDL) WDL   Edema None   RUE Neurovascular Assessment   R Radial Pulse +3 (Strong)   LUE Neurovascular Assessment   L Radial Pulse +3 (Strong)   RLE Neurovascular Assessment   Capillary Refill Less than/Equal to 3 seconds   Color Pink   Temperature Warm   R Pedal Pulse +3   LLE Neurovascular Assessment   Capillary Refill Less than/Equal to 3 seconds   Color Pink   Temperature Warm   L Pedal Pulse +3   Anti-Embolism   Anti-Embolism Intervention Medication   Skin Integumentary    Skin Color Pale   Skin Condition/Temp Warm;Dry   Skin Integrity Incision (see LDA)   Location left hip   Skin Integumentary (WDL) X   Musculoskeletal   LL Extremity Weakness;Unsteady   Musculoskeletal (WDL) X 
   03/30/25 0837   Assessment   Charting Type Shift assessment   Psychosocial   Psychosocial (WDL) WDL   Neurological   Level of Consciousness 0   Neuro (WDL) WDL   [place] Coma Scale   Eye Opening 4   Best Verbal Response 5   Best Motor Response 6   [place] Coma Scale Score 15   HEENT (Head, Ears, Eyes, Nose, & Throat)   HEENT (WDL) X   Right Eye Impaired vision   Left Eye Impaired vision   Respiratory   Respiratory Pattern Regular   Respiratory Depth Normal   Respiratory Quality/Effort Unlabored   L Breath Sounds Clear   R Breath Sounds Clear   Respiratory (WDL) WDL   Breath Sounds   Right Upper Lobe Clear   Right Middle Lobe Clear   Right Lower Lobe Clear   Left Upper Lobe Clear   Left Lower Lobe Clear   Cardiac   Cardiac Regularity Regular   Cardiac (WDL) WDL   Gastrointestinal   Abdominal (WDL) WDL   RUQ Bowel Sounds Active   LUQ Bowel Sounds Active   RLQ Bowel Sounds Active   LLQ Bowel Sounds Active   GI Symptoms Nausea;Vomiting   Care Plan - Gastrointestinal Goals   Minimal or absence of nausea and vomiting Administer IV fluids as ordered to ensure adequate hydration   Maintains or returns to baseline bowel function Assess bowel function   Maintains adequate nutritional intake Monitor percentage of each meal consumed   Genitourinary   Genitourinary (WDL) WDL   Peripheral Vascular   Peripheral Vascular (WDL) WDL   Edema None   RLE Neurovascular Assessment   Capillary Refill Less than/Equal to 3 seconds   Color Pink   Temperature Warm   R Pedal Pulse +3   LLE Neurovascular Assessment   Capillary Refill Less than/Equal to 3 seconds   Color Pink   Temperature Warm   L Pedal Pulse +3   Anti-Embolism   Anti-Embolism Intervention Medication   Skin Integumentary    Skin Color Pale   Skin Condition/Temp Warm;Dry   Skin Integrity Incision (see LDA)   Location left hip   Skin Integumentary (WDL) X   Care Plan - Skin/Tissue Integrity Goals   Skin Integrity Remains Intact Monitor for areas of redness and/or skin breakdown 
   03/30/25 5298   Assessment   Charting Type Shift assessment   Psychosocial   Psychosocial (WDL) WDL   Neurological   Level of Consciousness 0   Neuro (WDL) WDL   [place] Coma Scale   Eye Opening 4   Best Verbal Response 5   Best Motor Response 6   [place] Coma Scale Score 15   HEENT (Head, Ears, Eyes, Nose, & Throat)   HEENT (WDL) X   Right Eye Impaired vision   Left Eye Impaired vision   Respiratory   Respiratory Pattern Regular   Respiratory Depth Normal   Respiratory Quality/Effort Unlabored   L Breath Sounds Clear   R Breath Sounds Clear   Respiratory (WDL) WDL   Breath Sounds   Right Upper Lobe Clear   Right Middle Lobe Clear   Right Lower Lobe Clear   Left Upper Lobe Clear   Left Lower Lobe Clear   Cough/Sputum   Sputum How Obtained Cough on request   Cardiac   Cardiac Regularity Regular   Cardiac (WDL) WDL   Gastrointestinal   Abdominal (WDL) WDL   RUQ Bowel Sounds Active   LUQ Bowel Sounds Active   RLQ Bowel Sounds Active   LLQ Bowel Sounds Active   Genitourinary   Genitourinary (WDL) WDL   Peripheral Vascular   Peripheral Vascular (WDL) WDL   Edema None   RUE Neurovascular Assessment   R Radial Pulse +3 (Strong)   LUE Neurovascular Assessment   L Radial Pulse +3 (Strong)   RLE Neurovascular Assessment   Capillary Refill Less than/Equal to 3 seconds   Color Pink   Temperature Warm   R Pedal Pulse +3   LLE Neurovascular Assessment   Capillary Refill Less than/Equal to 3 seconds   Color Pink   Temperature Warm   L Pedal Pulse +3   Anti-Embolism   Anti-Embolism Intervention Medication   Skin Integumentary    Skin Color Pale   Skin Condition/Temp Warm;Dry   Skin Integrity Incision (see LDA)   Skin Integumentary (WDL) X   Musculoskeletal   LL Extremity Weakness;Unsteady   Musculoskeletal (WDL) X   Musculoskeletal Details   L Hip Surgery   Wound 03/19/25 Hip Left surgical incision covered with aquacel that is to stay in place till ortho appointment   Date First Assessed/Time First Assessed: 03/19/25 7808   
Integrity Goals   Skin Integrity Remains Intact Monitor for areas of redness and/or skin breakdown   Musculoskeletal   LL Extremity Weakness;Unsteady   Musculoskeletal (WDL) X   Musculoskeletal Details   L Hip Surgery   Care Plan - Musculoskeletal Goals   Return Mobility to Safest Level of Function Assess patient stability and activity tolerance for standing, transferring and ambulating with or without assistive devices   Maintain proper alignment of affected body part Support and protect limb and body alignment per provider's orders   Return ADL Status to a Safe Level of Function Administer medication as ordered   Wound 03/19/25 Hip Left surgical incision covered with aquacel that is to stay in place till ortho appointment   Date First Assessed/Time First Assessed: 03/19/25 1508   Present on Original Admission: Yes  Primary Wound Type: (c) Surgical  Location: Hip  Wound Location Orientation: Left  Wound Description (Comments): surgical incision covered with aquacel that i...   Wound Etiology Surgical   Dressing Status Clean;Dry;Intact   Dressing/Treatment Adhesive bandage   Dressing Change Due 04/04/25  (To be changed at ortho follow up on 4/1/25)   Drainage Amount None (dry)   Odor None   Care Plan - Chronic Conditions and Co-Morbidity   Care Plan - Patient's Chronic Conditions and Co-Morbidity Symptoms are Monitored and Maintained or Improved Monitor and assess patient's chronic conditions and comorbid symptoms for stability, deterioration, or improvement   Care Plan - Discharge Planning Goals   Discharge to home or other facility with appropriate resources Identify barriers to discharge with patient and caregiver

## 2025-03-31 NOTE — PROGRESS NOTES
Patient discharged att. Dc instructions reviewed. New medications reviewed and pt stated she would go  her heparin in the AM. Lovenox injection given to cover pt till heparin is ready to  per order. All questions answered. Pt taken via wheelchair with staff for transport home with family. Pt left in no distress.

## 2025-04-08 LAB
EKG ATRIAL RATE: 65 BPM
EKG DIAGNOSIS: NORMAL
EKG P AXIS: 84 DEGREES
EKG P-R INTERVAL: 212 MS
EKG Q-T INTERVAL: 420 MS
EKG QRS DURATION: 82 MS
EKG QTC CALCULATION (BAZETT): 436 MS
EKG R AXIS: 18 DEGREES
EKG T AXIS: 89 DEGREES
EKG VENTRICULAR RATE: 65 BPM

## 2025-04-11 ENCOUNTER — TRANSCRIBE ORDERS (OUTPATIENT)
Dept: LAB | Facility: HOSPITAL | Age: 75
End: 2025-04-11
Payer: MEDICARE

## 2025-04-11 ENCOUNTER — LAB (OUTPATIENT)
Dept: LAB | Facility: HOSPITAL | Age: 75
End: 2025-04-11
Payer: MEDICARE

## 2025-04-11 DIAGNOSIS — R80.9 PROTEINURIA, UNSPECIFIED TYPE: ICD-10-CM

## 2025-04-11 DIAGNOSIS — N25.81 SECONDARY HYPERPARATHYROIDISM OF RENAL ORIGIN: ICD-10-CM

## 2025-04-11 DIAGNOSIS — N18.4 CHRONIC KIDNEY DISEASE, STAGE IV (SEVERE): ICD-10-CM

## 2025-04-11 DIAGNOSIS — N25.81 SECONDARY HYPERPARATHYROIDISM OF RENAL ORIGIN: Primary | ICD-10-CM

## 2025-04-11 LAB
25(OH)D3 SERPL-MCNC: 39.1 NG/ML (ref 30–100)
ALBUMIN SERPL-MCNC: 3.4 G/DL (ref 3.5–5.2)
ANION GAP SERPL CALCULATED.3IONS-SCNC: 11.8 MMOL/L (ref 5–15)
BACTERIA UR QL AUTO: ABNORMAL /HPF
BASOPHILS # BLD AUTO: 0.02 10*3/MM3 (ref 0–0.2)
BASOPHILS NFR BLD AUTO: 0.4 % (ref 0–1.5)
BILIRUB UR QL STRIP: NEGATIVE
BUN SERPL-MCNC: 23 MG/DL (ref 8–23)
BUN/CREAT SERPL: 9.7 (ref 7–25)
CALCIUM SPEC-SCNC: 8.5 MG/DL (ref 8.6–10.5)
CHLORIDE SERPL-SCNC: 98 MMOL/L (ref 98–107)
CLARITY UR: CLEAR
CO2 SERPL-SCNC: 26.2 MMOL/L (ref 22–29)
COLOR UR: YELLOW
CREAT SERPL-MCNC: 2.37 MG/DL (ref 0.57–1)
DEPRECATED RDW RBC AUTO: 51.6 FL (ref 37–54)
EGFRCR SERPLBLD CKD-EPI 2021: 21 ML/MIN/1.73
EOSINOPHIL # BLD AUTO: 0.08 10*3/MM3 (ref 0–0.4)
EOSINOPHIL NFR BLD AUTO: 1.5 % (ref 0.3–6.2)
ERYTHROCYTE [DISTWIDTH] IN BLOOD BY AUTOMATED COUNT: 14.5 % (ref 12.3–15.4)
GLUCOSE SERPL-MCNC: 195 MG/DL (ref 65–99)
GLUCOSE UR STRIP-MCNC: NEGATIVE MG/DL
HCT VFR BLD AUTO: 30.1 % (ref 34–46.6)
HGB BLD-MCNC: 9.6 G/DL (ref 12–15.9)
HGB UR QL STRIP.AUTO: ABNORMAL
HYALINE CASTS UR QL AUTO: ABNORMAL /LPF
IMM GRANULOCYTES # BLD AUTO: 0.02 10*3/MM3 (ref 0–0.05)
IMM GRANULOCYTES NFR BLD AUTO: 0.4 % (ref 0–0.5)
KETONES UR QL STRIP: NEGATIVE
LEUKOCYTE ESTERASE UR QL STRIP.AUTO: NEGATIVE
LYMPHOCYTES # BLD AUTO: 1.24 10*3/MM3 (ref 0.7–3.1)
LYMPHOCYTES NFR BLD AUTO: 23.5 % (ref 19.6–45.3)
MCH RBC QN AUTO: 31.1 PG (ref 26.6–33)
MCHC RBC AUTO-ENTMCNC: 31.9 G/DL (ref 31.5–35.7)
MCV RBC AUTO: 97.4 FL (ref 79–97)
MONOCYTES # BLD AUTO: 0.51 10*3/MM3 (ref 0.1–0.9)
MONOCYTES NFR BLD AUTO: 9.7 % (ref 5–12)
NEUTROPHILS NFR BLD AUTO: 3.41 10*3/MM3 (ref 1.7–7)
NEUTROPHILS NFR BLD AUTO: 64.5 % (ref 42.7–76)
NITRITE UR QL STRIP: NEGATIVE
NRBC BLD AUTO-RTO: 0 /100 WBC (ref 0–0.2)
PH UR STRIP.AUTO: 6 [PH] (ref 5–8)
PHOSPHATE SERPL-MCNC: 3.5 MG/DL (ref 2.5–4.5)
PLATELET # BLD AUTO: 271 10*3/MM3 (ref 140–450)
PMV BLD AUTO: 9.3 FL (ref 6–12)
POTASSIUM SERPL-SCNC: 4.2 MMOL/L (ref 3.5–5.2)
PROT UR QL STRIP: ABNORMAL
PTH-INTACT SERPL-MCNC: 84.5 PG/ML (ref 15–65)
RBC # BLD AUTO: 3.09 10*6/MM3 (ref 3.77–5.28)
RBC # UR STRIP: ABNORMAL /HPF
REF LAB TEST METHOD: ABNORMAL
SODIUM SERPL-SCNC: 136 MMOL/L (ref 136–145)
SP GR UR STRIP: 1.01 (ref 1–1.03)
SQUAMOUS #/AREA URNS HPF: ABNORMAL /HPF
UROBILINOGEN UR QL STRIP: ABNORMAL
WBC # UR STRIP: ABNORMAL /HPF
WBC NRBC COR # BLD AUTO: 5.28 10*3/MM3 (ref 3.4–10.8)

## 2025-04-11 PROCEDURE — 82570 ASSAY OF URINE CREATININE: CPT

## 2025-04-11 PROCEDURE — 83970 ASSAY OF PARATHORMONE: CPT

## 2025-04-11 PROCEDURE — 80069 RENAL FUNCTION PANEL: CPT

## 2025-04-11 PROCEDURE — 82306 VITAMIN D 25 HYDROXY: CPT

## 2025-04-11 PROCEDURE — 84156 ASSAY OF PROTEIN URINE: CPT

## 2025-04-11 PROCEDURE — 81001 URINALYSIS AUTO W/SCOPE: CPT

## 2025-04-11 PROCEDURE — 85025 COMPLETE CBC W/AUTO DIFF WBC: CPT

## 2025-04-11 PROCEDURE — 36415 COLL VENOUS BLD VENIPUNCTURE: CPT

## 2025-04-12 LAB
CREAT UR-MCNC: 41.9 MG/DL
PROT ?TM UR-MCNC: 51.4 MG/DL

## 2025-04-22 ENCOUNTER — RESULTS FOLLOW-UP (OUTPATIENT)
Age: 75
End: 2025-04-22

## 2025-04-29 RX ORDER — ATORVASTATIN CALCIUM 10 MG/1
10 TABLET, FILM COATED ORAL DAILY
Qty: 90 TABLET | Refills: 3 | Status: SHIPPED | OUTPATIENT
Start: 2025-04-29

## 2025-04-29 NOTE — TELEPHONE ENCOUNTER
Lab Results   Component Value Date    CHOL 136 05/22/2024    TRIG 73 05/22/2024    HDL 44 05/22/2024    LDL 77 05/22/2024     Lab Results   Component Value Date    GLUCOSE 195 (H) 04/11/2025    BUN 23 04/11/2025    CREATININE 2.37 (H) 04/11/2025     04/11/2025    K 4.2 04/11/2025    CL 98 04/11/2025    CALCIUM 8.5 (L) 04/11/2025    PROTEINTOT 6.9 05/22/2024    ALBUMIN 3.4 (L) 04/11/2025    ALT 11 05/22/2024    AST 13 05/22/2024    ALKPHOS 92 05/22/2024    BILITOT 0.3 05/22/2024    GLOB 2.6 05/22/2024    AGRATIO 1.7 05/22/2024    BCR 9.7 04/11/2025    ANIONGAP 11.8 04/11/2025    EGFR 21.0 (L) 04/11/2025

## 2025-05-27 NOTE — TELEPHONE ENCOUNTER
Last OV 8/13/21 Future appt 1/5/21  
No  Tobacco: Denies  EtOH: Denies  Recreational drug use: Denies  Lives with: Wife at home   Ambulates: Independently   ADLs: Independent

## 2025-06-09 RX ORDER — TIRZEPATIDE 5 MG/.5ML
5 INJECTION, SOLUTION SUBCUTANEOUS WEEKLY
Qty: 2 ML | Refills: 0 | Status: SHIPPED | OUTPATIENT
Start: 2025-06-09

## 2025-06-09 RX ORDER — HYDRALAZINE HYDROCHLORIDE 100 MG/1
100 TABLET, FILM COATED ORAL 2 TIMES DAILY
Qty: 180 TABLET | Refills: 3 | Status: SHIPPED | OUTPATIENT
Start: 2025-06-09

## 2025-06-09 NOTE — TELEPHONE ENCOUNTER
Rx Refill Note  Requested Prescriptions     Pending Prescriptions Disp Refills    Mounjaro 5 MG/0.5ML solution auto-injector [Pharmacy Med Name: MOUNJARO 5 MG/0.5ML SOPN 5 Solution Auto-injector] 2 mL 0     Sig: INJECT 5 MG UNDER THE SKIN INTO THE APPROPRIATE AREA AS DIRECTED 1 (ONE) TIME PER WEEK. DX E11.65      Last office visit with prescribing clinician: 12/13/2024   Last telemedicine visit with prescribing clinician: Visit date not found   Next office visit with prescribing clinician: 6/27/2025                         Would you like a call back once the refill request has been completed: [] Yes [] No    If the office needs to give you a call back, can they leave a voicemail: [] Yes [] No    Susie Colindres MA  06/09/25, 13:57 EDT

## 2025-06-09 NOTE — TELEPHONE ENCOUNTER
Lab Results   Component Value Date    CREATININE 2.37 (H) 04/11/2025    BUN 23 04/11/2025     04/11/2025    K 4.2 04/11/2025    CL 98 04/11/2025    CO2 26.2 04/11/2025

## 2025-06-13 ENCOUNTER — TRANSCRIBE ORDERS (OUTPATIENT)
Dept: LAB | Facility: HOSPITAL | Age: 75
End: 2025-06-13
Payer: MEDICARE

## 2025-06-13 ENCOUNTER — LAB (OUTPATIENT)
Dept: LAB | Facility: HOSPITAL | Age: 75
End: 2025-06-13
Payer: MEDICARE

## 2025-06-13 DIAGNOSIS — D64.9 ANEMIA, UNSPECIFIED TYPE: ICD-10-CM

## 2025-06-13 DIAGNOSIS — N02.B9 PRIMARY IGA NEPHROPATHY: ICD-10-CM

## 2025-06-13 DIAGNOSIS — N02.B9 PRIMARY IGA NEPHROPATHY: Primary | ICD-10-CM

## 2025-06-13 LAB
ALBUMIN SERPL-MCNC: 3.7 G/DL (ref 3.5–5.2)
ANION GAP SERPL CALCULATED.3IONS-SCNC: 12.5 MMOL/L (ref 5–15)
BACTERIA UR QL AUTO: ABNORMAL /HPF
BASOPHILS # BLD AUTO: 0.03 10*3/MM3 (ref 0–0.2)
BASOPHILS NFR BLD AUTO: 0.5 % (ref 0–1.5)
BILIRUB UR QL STRIP: NEGATIVE
BUN SERPL-MCNC: 20 MG/DL (ref 8–23)
BUN/CREAT SERPL: 7.7 (ref 7–25)
CALCIUM SPEC-SCNC: 9.5 MG/DL (ref 8.6–10.5)
CHLORIDE SERPL-SCNC: 102 MMOL/L (ref 98–107)
CLARITY UR: CLEAR
CO2 SERPL-SCNC: 24.5 MMOL/L (ref 22–29)
COLOR UR: YELLOW
CREAT SERPL-MCNC: 2.59 MG/DL (ref 0.57–1)
CREAT UR-MCNC: 59.2 MG/DL
DEPRECATED RDW RBC AUTO: 48.4 FL (ref 37–54)
EGFRCR SERPLBLD CKD-EPI 2021: 18.8 ML/MIN/1.73
EOSINOPHIL # BLD AUTO: 0.04 10*3/MM3 (ref 0–0.4)
EOSINOPHIL NFR BLD AUTO: 0.7 % (ref 0.3–6.2)
ERYTHROCYTE [DISTWIDTH] IN BLOOD BY AUTOMATED COUNT: 13.8 % (ref 12.3–15.4)
GLUCOSE SERPL-MCNC: 242 MG/DL (ref 65–99)
GLUCOSE UR STRIP-MCNC: NEGATIVE MG/DL
HCT VFR BLD AUTO: 31.4 % (ref 34–46.6)
HGB BLD-MCNC: 10.2 G/DL (ref 12–15.9)
HGB UR QL STRIP.AUTO: NEGATIVE
HYALINE CASTS UR QL AUTO: ABNORMAL /LPF
IMM GRANULOCYTES # BLD AUTO: 0.02 10*3/MM3 (ref 0–0.05)
IMM GRANULOCYTES NFR BLD AUTO: 0.4 % (ref 0–0.5)
IRON 24H UR-MRATE: 49 MCG/DL (ref 37–145)
KETONES UR QL STRIP: NEGATIVE
LEUKOCYTE ESTERASE UR QL STRIP.AUTO: NEGATIVE
LYMPHOCYTES # BLD AUTO: 1.39 10*3/MM3 (ref 0.7–3.1)
LYMPHOCYTES NFR BLD AUTO: 24.6 % (ref 19.6–45.3)
MCH RBC QN AUTO: 31 PG (ref 26.6–33)
MCHC RBC AUTO-ENTMCNC: 32.5 G/DL (ref 31.5–35.7)
MCV RBC AUTO: 95.4 FL (ref 79–97)
MONOCYTES # BLD AUTO: 0.47 10*3/MM3 (ref 0.1–0.9)
MONOCYTES NFR BLD AUTO: 8.3 % (ref 5–12)
NEUTROPHILS NFR BLD AUTO: 3.71 10*3/MM3 (ref 1.7–7)
NEUTROPHILS NFR BLD AUTO: 65.5 % (ref 42.7–76)
NITRITE UR QL STRIP: NEGATIVE
NRBC BLD AUTO-RTO: 0 /100 WBC (ref 0–0.2)
PH UR STRIP.AUTO: 6.5 [PH] (ref 5–8)
PHOSPHATE SERPL-MCNC: 3.9 MG/DL (ref 2.5–4.5)
PLATELET # BLD AUTO: 189 10*3/MM3 (ref 140–450)
PMV BLD AUTO: 9.9 FL (ref 6–12)
POTASSIUM SERPL-SCNC: 4.2 MMOL/L (ref 3.5–5.2)
PROT UR QL STRIP: ABNORMAL
RBC # BLD AUTO: 3.29 10*6/MM3 (ref 3.77–5.28)
RBC # UR STRIP: ABNORMAL /HPF
REF LAB TEST METHOD: ABNORMAL
SODIUM SERPL-SCNC: 139 MMOL/L (ref 136–145)
SP GR UR STRIP: 1.01 (ref 1–1.03)
SQUAMOUS #/AREA URNS HPF: ABNORMAL /HPF
UROBILINOGEN UR QL STRIP: ABNORMAL
WBC # UR STRIP: ABNORMAL /HPF
WBC NRBC COR # BLD AUTO: 5.66 10*3/MM3 (ref 3.4–10.8)

## 2025-06-13 PROCEDURE — 81001 URINALYSIS AUTO W/SCOPE: CPT

## 2025-06-13 PROCEDURE — 82043 UR ALBUMIN QUANTITATIVE: CPT

## 2025-06-13 PROCEDURE — 83540 ASSAY OF IRON: CPT

## 2025-06-13 PROCEDURE — 36415 COLL VENOUS BLD VENIPUNCTURE: CPT

## 2025-06-13 PROCEDURE — 80069 RENAL FUNCTION PANEL: CPT

## 2025-06-13 PROCEDURE — 82570 ASSAY OF URINE CREATININE: CPT

## 2025-06-13 PROCEDURE — 85025 COMPLETE CBC W/AUTO DIFF WBC: CPT

## 2025-06-14 LAB — ALBUMIN UR-MCNC: 70.5 MG/DL

## 2025-06-27 ENCOUNTER — OFFICE VISIT (OUTPATIENT)
Dept: ENDOCRINOLOGY | Facility: CLINIC | Age: 75
End: 2025-06-27
Payer: MEDICARE

## 2025-06-27 VITALS
BODY MASS INDEX: 27.16 KG/M2 | OXYGEN SATURATION: 94 % | DIASTOLIC BLOOD PRESSURE: 74 MMHG | WEIGHT: 163 LBS | HEART RATE: 69 BPM | SYSTOLIC BLOOD PRESSURE: 116 MMHG | HEIGHT: 65 IN

## 2025-06-27 DIAGNOSIS — Z79.4 TYPE 2 DIABETES MELLITUS WITH CHRONIC KIDNEY DISEASE, WITH LONG-TERM CURRENT USE OF INSULIN, UNSPECIFIED CKD STAGE: ICD-10-CM

## 2025-06-27 DIAGNOSIS — E11.49 DIABETIC NEUROPATHY WITH NEUROLOGIC COMPLICATION: ICD-10-CM

## 2025-06-27 DIAGNOSIS — I10 ESSENTIAL HYPERTENSION: ICD-10-CM

## 2025-06-27 DIAGNOSIS — E11.65 UNCONTROLLED TYPE 2 DIABETES MELLITUS WITH HYPERGLYCEMIA: Primary | ICD-10-CM

## 2025-06-27 DIAGNOSIS — E11.40 DIABETIC NEUROPATHY WITH NEUROLOGIC COMPLICATION: ICD-10-CM

## 2025-06-27 DIAGNOSIS — E89.0 POSTABLATIVE HYPOTHYROIDISM: ICD-10-CM

## 2025-06-27 DIAGNOSIS — E11.22 TYPE 2 DIABETES MELLITUS WITH CHRONIC KIDNEY DISEASE, WITH LONG-TERM CURRENT USE OF INSULIN, UNSPECIFIED CKD STAGE: ICD-10-CM

## 2025-06-27 DIAGNOSIS — E78.2 MIXED HYPERLIPIDEMIA: ICD-10-CM

## 2025-06-27 LAB
CHOLEST SERPL-MCNC: 155 MG/DL (ref 0–200)
EXPIRATION DATE: ABNORMAL
EXPIRATION DATE: ABNORMAL
GLUCOSE BLDC GLUCOMTR-MCNC: 211 MG/DL (ref 70–130)
HBA1C MFR BLD: 7.3 % (ref 4.5–5.7)
HDLC SERPL-MCNC: 58 MG/DL (ref 40–60)
LDLC SERPL CALC-MCNC: 88 MG/DL (ref 0–100)
LDLC/HDLC SERPL: 1.53 {RATIO}
Lab: ABNORMAL
Lab: ABNORMAL
TRIGL SERPL-MCNC: 41 MG/DL (ref 0–150)
TSH SERPL DL<=0.05 MIU/L-ACNC: 4.96 UIU/ML (ref 0.27–4.2)
VLDLC SERPL-MCNC: 9 MG/DL (ref 5–40)

## 2025-06-27 PROCEDURE — 84443 ASSAY THYROID STIM HORMONE: CPT | Performed by: INTERNAL MEDICINE

## 2025-06-27 PROCEDURE — 80061 LIPID PANEL: CPT | Performed by: INTERNAL MEDICINE

## 2025-06-27 RX ORDER — ABALOPARATIDE 2000 UG/ML
80 INJECTION, SOLUTION SUBCUTANEOUS DAILY
COMMUNITY
Start: 2025-06-09

## 2025-06-27 RX ORDER — TIRZEPATIDE 7.5 MG/.5ML
7.5 INJECTION, SOLUTION SUBCUTANEOUS
Qty: 6 ML | Refills: 3 | Status: SHIPPED | OUTPATIENT
Start: 2025-06-27

## 2025-06-27 NOTE — ASSESSMENT & PLAN NOTE
Continue levothyroxine.  Check TSH today.  
Continue nephrology follow up.  
Continue statin.  Check lipids today.  
Diabetes is worsening.   Titrate up mounjaro.  Diabetes will be reassessed in 6 months.    FreeStyle Luc[place] 2+ CGM was downloaded today.  Data was reviewed from 6/13/25 to 6/26/25.  This showed fairly good glucose levels but some postprandial spikes.  Will increase mounjaro.  Time in range was 79%.  
Foot exam was okay today.  
Hypertension is stable and controlled.  Continue current treatment regimen.  Blood pressure will be reassessed in 6 months.  
14-Oct-2019 08:11

## 2025-06-27 NOTE — PROGRESS NOTES
"     Office Note      Date: 2025  Patient Name: Karen Newman  MRN: [id number]  : 1950    Chief Complaint   Patient presents with    Diabetes       History of Present Illness:   Karen Newman is a 75 y.o. female who presents for Diabetes type 2. Diagnosed in: . Treated in past with oral agents. Current treatments: mounjaro and basal bolus insulin. Number of insulin shots per day: 3. Checks blood sugar 288 times a day - on Free Style Lucille 2. She is making frequent adjustments in insulin based on glucose readings.  Has low blood sugar: rare. Aspirin use: No - easy bleeding. Statin use: Yes. ACE-I/ARB use: No - per nephrology. Changes in health since last visit: hip fracture - partial replacement. Last eye exam 2024.     She remains on T4. She is taking 137mcg qd. She is taking this correctly. She isn't taking any interfering meds concurrently. She hasn't noted any change in the size of her neck. She denies any compressive sxs. She denies any sxs of hypo- or hyperthyroidism.    Subjective      Diabetic Complications:  Eyes: Yes  Kidneys: Yes - microalbumin and elevated Cr  Feet: Yes  Heart: No    Diet and Exercise:  Meals per day: 2  Minutes of exercise per week: 0 mins.    Review of Systems:   Review of Systems   Constitutional: Negative.    Cardiovascular: Negative.    Gastrointestinal: Negative.    Endocrine: Negative.        The following portions of the patient's history were reviewed and updated as appropriate: allergies, current medications, past family history, past medical history, past social history, past surgical history, and problem list.    Objective     Visit Vitals  /74   Pulse 69   Ht 165.1 cm (65\")   Wt 73.9 kg (163 lb)   LMP  (LMP Unknown)   SpO2 94%   BMI 27.12 kg/m²       Physical Exam:  Physical Exam  Constitutional:       Appearance: Normal appearance.   Cardiovascular:      Pulses:           Dorsalis pedis pulses are 1+ on the right side and 1+ on the left side. " "       Posterior tibial pulses are 1+ on the right side and 1+ on the left side.   Feet:      Right foot:      Protective Sensation: 5 sites tested.  5 sites sensed.      Skin integrity: Skin integrity normal.      Toenail Condition: Right toenails are normal.      Left foot:      Protective Sensation: 5 sites tested.  5 sites sensed.      Skin integrity: Skin integrity normal.      Toenail Condition: Left toenails are normal.   Neurological:      Mental Status: She is alert.         Labs:    HbA1c  Lab Results   Component Value Date    HGBA1C 7.3 (A) 06/27/2025       CMP  Lab Results   Component Value Date    GLUCOSE 242 (H) 06/13/2025    BUN 20.0 06/13/2025    CREATININE 2.59 (H) 06/13/2025    EGFRIFNONA 24 (L) 02/25/2022    BCR 7.7 06/13/2025    K 4.2 06/13/2025    CO2 24.5 06/13/2025    CALCIUM 9.5 06/13/2025    AST 13 05/22/2024    ALT 11 05/22/2024        Lipid Panel  Lab Results   Component Value Date    HDL Cholesterol 44 05/22/2024    LDL Cholesterol  77 05/22/2024    LDL/HDL Ratio 1.76 05/22/2024    Triglycerides 73 05/22/2024        TSH  Lab Results   Component Value Date    TSH 0.159 (L) 12/13/2024    FREET4 1.4 03/22/2025        Hemoglobin A1C  No components found for: \"HGBA1C\"     Microalbumin/Creatinine  Lab Results   Component Value Date    MALBCRERATIO 291.9 (H) 05/22/2024    MICROALBUR 70.5 06/13/2025           Assessment / Plan      Assessment & Plan:  Diagnoses and all orders for this visit:    1. Uncontrolled type 2 diabetes mellitus with hyperglycemia (Primary)  Assessment & Plan:  Diabetes is worsening.   Titrate up mounjaro.  Diabetes will be reassessed in 6 months.    FreeStyle Lucille 2+ CGM was downloaded today.  Data was reviewed from 6/13/25 to 6/26/25.  This showed fairly good glucose levels but some postprandial spikes.  Will increase mounjaro.  Time in range was 79%.    Orders:  -     Lipid Panel; Future  -     TSH; Future    2. Diabetic neuropathy with neurologic " complication  Assessment & Plan:  Foot exam was okay today.    Orders:  -     POC Glucose, Blood  -     POC Glycosylated Hemoglobin (Hb A1C)    3. Essential hypertension  Assessment & Plan:  Hypertension is stable and controlled.  Continue current treatment regimen.  Blood pressure will be reassessed in 6 months.      4. Postablative hypothyroidism  Assessment & Plan:  Continue levothyroxine.  Check TSH today.      5. Type 2 diabetes mellitus with chronic kidney disease, with long-term current use of insulin, unspecified CKD stage  Assessment & Plan:  Continue nephrology follow up.      6. Mixed hyperlipidemia  Assessment & Plan:  Continue statin.  Check lipids today.      Other orders  -     Tirzepatide (Mounjaro) 7.5 MG/0.5ML solution auto-injector; Inject 7.5 mg under the skin into the appropriate area as directed Every 7 (Seven) Days.  Dispense: 6 mL; Refill: 3      Current Outpatient Medications   Medication Instructions    atorvastatin (LIPITOR) 10 mg, Oral, Daily    Blood Glucose Monitoring Suppl (OneTouch Verio) w/Device kit 1 each, Not Applicable, Daily, DX: E11.65    calcitriol (ROCALTROL) 0.25 mcg, Every Other Day    Continuous Blood Gluc  (FreeStyle Lucille 2 [place]) device 1 each, Not Applicable, Daily    Continuous Blood Gluc Sensor (FreeStyle Lucille 2 Sensor) misc 1 each, Not Applicable, Every 14 Days    dilTIAZem CD (CARDIZEM CD) 240 mg, Oral, Daily    doxazosin (CARDURA) 8 mg, Oral, Every 12 Hours    furosemide (LASIX) 20 mg, Oral, Every Other Day, Take 1 tab po prn worsening SOA/edema    furosemide (LASIX) 20 mg, Daily    hydrALAZINE (APRESOLINE) 100 mg, Oral, 2 Times Daily    Insulin Lispro, 1 Unit Dial, (HumaLOG KwikPen) 100 UNIT/ML solution pen-injector INJECT SUBCUTANEOUSLY 16 TO 20  UNITS 3 TIMES DAILY WITH MEALS    Levemir FlexPen 100 UNIT/ML injection INJECT SUBCUTANEOUSLY 35 UNITS  DAILY AS DIRECTED    levothyroxine (SYNTHROID, LEVOTHROID) 137 mcg, Oral, Daily    Linzess 145 MCG  capsule capsule 1 capsule, Daily    Mounjaro 7.5 mg, Subcutaneous, Every 7 Days    omeprazole (PRILOSEC) 40 mg, Daily    OneTouch Verio test strip Use 4x per day:  ICD-10 E11.65, E11.649, Z79.4    PARoxetine (PAXIL) 40 mg, Daily    Tymlos 80 mcg, Daily      Return in about 6 months (around 12/27/2025) for Recheck with A1c, TSH.    Electronically signed by: Karel Blevins MD  06/27/2025

## 2025-06-29 ENCOUNTER — RESULTS FOLLOW-UP (OUTPATIENT)
Dept: ENDOCRINOLOGY | Facility: CLINIC | Age: 75
End: 2025-06-29
Payer: MEDICARE

## 2025-07-01 RX ORDER — LEVOTHYROXINE SODIUM 137 UG/1
137 TABLET ORAL DAILY
Qty: 90 TABLET | Refills: 3 | OUTPATIENT
Start: 2025-07-01

## 2025-07-01 NOTE — TELEPHONE ENCOUNTER
Caller: Jas Newman    Relationship: Emergency Contact    Best call back number: [phone]    Requested Prescriptions:   Requested Prescriptions     Pending Prescriptions Disp Refills    levothyroxine (SYNTHROID, LEVOTHROID) 137 MCG tablet 90 tablet 3     Sig: Take 1 tablet by mouth Daily.        Pharmacy where request should be sent:    [facility]  Last office visit with prescribing clinician: 6/27/2025   Last telemedicine visit with prescribing clinician: Visit date not found   Next office visit with prescribing clinician: 7/3/2025     Additional details provided by patient: WOULD LIKE TO KNOW IF THE MEDICATION DOSAGE NEEDS TO BE CHANGE. TSH LEVELS ARE HIGH. PLEASE ADVISE     Does the patient have less than a 3 day supply:  [x] Yes  [] No    Would you like a call back once the refill request has been completed: [] Yes [] No    If the office needs to give you a call back, can they leave a voicemail: [] Yes [] No    Zuleyka Munoz Rep   07/01/25 13:27 EDT

## 2025-07-02 ENCOUNTER — TELEPHONE (OUTPATIENT)
Dept: ENDOCRINOLOGY | Facility: CLINIC | Age: 75
End: 2025-07-02
Payer: MEDICARE

## 2025-07-02 RX ORDER — LEVOTHYROXINE SODIUM 137 UG/1
137 TABLET ORAL DAILY
Qty: 90 TABLET | Refills: 3 | Status: SHIPPED | OUTPATIENT
Start: 2025-07-02

## 2025-07-02 NOTE — TELEPHONE ENCOUNTER
Rx Refill Note  Requested Prescriptions      No prescriptions requested or ordered in this encounter      Last office visit with prescribing clinician: 6/27/2025     Next office visit with prescribing clinician: 2/20/2026

## 2025-07-02 NOTE — TELEPHONE ENCOUNTER
Please call them back today she has been on the levothyroxine  137  for a long time is this the right dose according to her labs she just had     Please call them back  [phone] they will be there til 5 today

## 2025-07-15 ENCOUNTER — TELEPHONE (OUTPATIENT)
Dept: CARDIOLOGY | Facility: CLINIC | Age: 75
End: 2025-07-15

## 2025-07-16 ENCOUNTER — TELEPHONE (OUTPATIENT)
Dept: CARDIOLOGY | Facility: CLINIC | Age: 75
End: 2025-07-16
Payer: MEDICARE

## 2025-08-21 ENCOUNTER — TELEPHONE (OUTPATIENT)
Dept: ENDOCRINOLOGY | Facility: CLINIC | Age: 75
End: 2025-08-21
Payer: MEDICARE

## 2025-08-21 RX ORDER — TIRZEPATIDE 7.5 MG/.5ML
7.5 INJECTION, SOLUTION SUBCUTANEOUS
Qty: 6 ML | Refills: 3 | Status: SHIPPED | OUTPATIENT
Start: 2025-08-21

## (undated) DEVICE — EXCEL 10FT (3.05 M) INSUFFLATION TUBING SET WITH 0.1 MICRON FILTER: Brand: EXCEL

## (undated) DEVICE — Device: Brand: REFERENCE PATCH CARTO 3

## (undated) DEVICE — SKIN AFFIX SURG ADHESIVE 72/CS 0.55ML: Brand: MEDLINE

## (undated) DEVICE — PINNACLE INTRODUCER SHEATH: Brand: PINNACLE

## (undated) DEVICE — DRN WND CH RND FUL/FLUT NO/TROC 3/8IN 28F

## (undated) DEVICE — DRSNG SURESITE WNDW 2.38X2.75

## (undated) DEVICE — GLV SURG SENSICARE W/ALOE PF LF 7 STRL

## (undated) DEVICE — SOL LR 1000ML

## (undated) DEVICE — NDL HYPO ECLPS SFTY 22G 1 1/2IN

## (undated) DEVICE — Device: Brand: SMARTABLATE

## (undated) DEVICE — SENSR CERBRL O2 SOMASENSOR ADHS A/ LF

## (undated) DEVICE — Device: Brand: SOUNDSTAR

## (undated) DEVICE — ENDOPATH XCEL BLADELESS TROCARS WITH STABILITY SLEEVES: Brand: ENDOPATH XCEL

## (undated) DEVICE — ENDOCUT SCISSOR TIP, DISPOSABLE: Brand: RENEW

## (undated) DEVICE — ENSEAL TRIO TEMPERATURE CONTOLLED TISSUE SEALING TECHNOLOGY DISPOSABLE TISSUE SEALING DEVICE TAPTRONIC TRIGGER ACTIVATED POWER 3MM CURVED JAW: Brand: ENSEAL

## (undated) DEVICE — DECANT BG O JET

## (undated) DEVICE — TRAP,MUCUS SPECIMEN,40CC: Brand: MEDLINE

## (undated) DEVICE — PK THORACOTOMY 10

## (undated) DEVICE — ENDOPATH XCEL UNIVERSAL TROCAR STABLILITY SLEEVES: Brand: ENDOPATH XCEL

## (undated) DEVICE — SYR LUERLOK 20CC

## (undated) DEVICE — TBG SXN ESOPH ENSOETM FOR/BLANETROL/1/2

## (undated) DEVICE — SOL NS 500ML

## (undated) DEVICE — ST EXT IV SMARTSITE 2VLV SP M LL 5ML IV1

## (undated) DEVICE — STERILE (15.2 TAPERED TO 7.6 X 183CM) POLYETHYLENE ACCORDION-FOLDED COVER FOR USE WITH SIEMENS ACUNAV ULTRASOUND CATHETER FAMILY CONNECTOR: Brand: SWIFTLINK TRANSDUCER COVER

## (undated) DEVICE — SUT MNCRYL PLS ANTIB UD 4/0 PS2 18IN

## (undated) DEVICE — DRAPE,TOP,102X53,STERILE: Brand: MEDLINE

## (undated) DEVICE — PERCLOSE™ PROSTYLE™ SUTURE-MEDIATED CLOSURE AND REPAIR SYSTEM: Brand: PERCLOSE™ PROSTYLE™

## (undated) DEVICE — SUT VIC 2/0 CT2 27IN J269H

## (undated) DEVICE — ST EXT IV SMARTSITE PINCH/CLMP 5ML 46CM

## (undated) DEVICE — 1 X VERSACROSS TRANSSEPTAL SHEATH (INCLUDING  1 X J-TIP GUIDEWIRE); 1 X VERSACROSS RF WIRE (INCLUDING 1 X CONNECTOR CABLE (SINGLE USE)); 1 X DISPERSIVE ELECTRODE: Brand: VERSACROSS ACCESS SOLUTION

## (undated) DEVICE — Device

## (undated) DEVICE — MEDI-VAC YANKAUER SUCTION HANDLE W/BULBOUS TIP: Brand: CARDINAL HEALTH

## (undated) DEVICE — WIPE THERAWASH SLV SPEC CARE 2PK

## (undated) DEVICE — SOL NACL 0.9PCT 1000ML

## (undated) DEVICE — DUAL LUMEN STOMACH TUBE,ANTI-REFLUX VALVE: Brand: SALEM SUMP

## (undated) DEVICE — PRESSURE MONITORING SET: Brand: TRUWAVE

## (undated) DEVICE — ADULT, W/LG. BACK PAD, RADIOTRANSPARENT ELEMENT AND LEAD WIRE COMPATIBLE W/: Brand: DEFIBRILLATION ELECTRODES

## (undated) DEVICE — CONTAINER,SPECIMEN,OR STERILE,4OZ: Brand: MEDLINE

## (undated) DEVICE — ELECTRD BLD EXT EDGE/INSUL 6IN

## (undated) DEVICE — ELECTRD DEFIB M/FUNC PROPADZ 2PK STRL

## (undated) DEVICE — GOWN,NON-REINFORCED,SIRUS,SET IN SLV,XL: Brand: MEDLINE

## (undated) DEVICE — MEDI-VAC NON-CONDUCTIVE SUCTION TUBING: Brand: CARDINAL HEALTH

## (undated) DEVICE — PAD GRND REM POLYHESIVE A/ DISP

## (undated) DEVICE — INTRO STEER AGILIS NXT SM/CURL 8.5F

## (undated) DEVICE — AIRWY 90MM NO9

## (undated) DEVICE — OCTA,GALAXY,3-3-3-3-3,F-CURVE: Brand: OCTARAY MAPPING CATHETER

## (undated) DEVICE — ENCORE® LATEX MICRO SIZE 7.5, STERILE LATEX POWDER-FREE SURGICAL GLOVE: Brand: ENCORE

## (undated) DEVICE — 2000CC GUARDIAN II: Brand: GUARDIAN

## (undated) DEVICE — ENDOPATH 5MM ENDOSCOPIC BLUNT TIP DISSECTORS (12 POUCHES CONTAINING 3 DISSECTORS EACH): Brand: ENDOPATH

## (undated) DEVICE — CANNULA,ADULT,SOFT-TOUCH,7TUBE,SC: Brand: MEDLINE

## (undated) DEVICE — Device: Brand: WEBSTER CS

## (undated) DEVICE — BI-DIRECTIONAL NAVIGATION CATHETER, NAV, F-J: Brand: QDOT MICRO

## (undated) DEVICE — TOTAL TRAY, 16FR 10ML SIL FOLEY, URN: Brand: MEDLINE

## (undated) DEVICE — LAPAROSCOPIC TROCAR SLEEVE: Brand: DISPOSABLE CANNULAS AND SEALS

## (undated) DEVICE — PRESSURE MONITORING SET: Brand: TRUWAVE, VAMP

## (undated) DEVICE — ST CATH INTUB RETRGR 14F

## (undated) DEVICE — SAFESECURE,SECUREMENT,FOLEY CATH,STERILE: Brand: MEDLINE

## (undated) DEVICE — ST PRIM GRVTY NDLESS 3 INJ PORT 105IN

## (undated) DEVICE — MAGNETIC DRAPE: Brand: DEVON

## (undated) DEVICE — ANTIBACTERIAL UNDYED BRAIDED (POLYGLACTIN 910), SYNTHETIC ABSORBABLE SURGICAL SUTURE: Brand: COATED VICRYL

## (undated) DEVICE — DRSNG SURESITE123 4X4.8IN

## (undated) DEVICE — ST INF PRI SMRTSTE 20DRP 2VLV 24ML 117

## (undated) DEVICE — VISUALIZATION SYSTEM: Brand: CLEARIFY

## (undated) DEVICE — FLTR HME STR UNIV W/SMPL PORT

## (undated) DEVICE — ELECTRD BLD EDGE/INSUL1P 2.4X5.1MM STRL

## (undated) DEVICE — BLAKE CARDIO CONNECTOR 1:1: Brand: J-VAC

## (undated) DEVICE — SYR LL TP 10ML STRL

## (undated) DEVICE — SCOPE WARMER THAT PRE-WARMS MULTIPLE LAPAROSCOPES.: Brand: JOSNOE MEDICAL INC

## (undated) DEVICE — GUIDE SELECT ATRICLIP

## (undated) DEVICE — DEFOGGER!" ANTI FOG KIT: Brand: DEROYAL

## (undated) DEVICE — LEX ELECTRO PHYSIOLOGY: Brand: MEDLINE INDUSTRIES, INC.

## (undated) DEVICE — SUT ETHIB 1 CT1 30IN  X425H

## (undated) DEVICE — ST EXT IV SMRTSTE 2VLV FIX M LL 6ML 41

## (undated) DEVICE — INTRAOPERATIVE COVER KIT, 10 PACK: Brand: SITE-RITE

## (undated) DEVICE — CVR HNDL LT SURG ACCSSRY BLU STRL

## (undated) DEVICE — ENCORE® LATEX ACCLAIM SIZE 6, STERILE LATEX POWDER-FREE SURGICAL GLOVE: Brand: ENCORE

## (undated) DEVICE — INTENDED FOR TISSUE SEPARATION, AND OTHER PROCEDURES THAT REQUIRE A SHARP SURGICAL BLADE TO PUNCTURE OR CUT.: Brand: BARD-PARKER ® STAINLESS STEEL BLADES

## (undated) DEVICE — ELECTRD RETRN/GRND MEGADYNE SGL/PLT W/CORD 9FT DISP

## (undated) DEVICE — DRSNG WND BORDR/ADHS NONADHR/GZ LF 2X2IN STRL

## (undated) DEVICE — SET PRIMARY GRVTY 10DP MALE LL 104IN

## (undated) DEVICE — Device: Brand: MEDEX

## (undated) DEVICE — GOWN,REINF,POLY,ECL,PP SLV,XL: Brand: MEDLINE